# Patient Record
Sex: MALE | Race: WHITE | HISPANIC OR LATINO | Employment: OTHER | URBAN - METROPOLITAN AREA
[De-identification: names, ages, dates, MRNs, and addresses within clinical notes are randomized per-mention and may not be internally consistent; named-entity substitution may affect disease eponyms.]

---

## 2017-01-06 ENCOUNTER — TELEPHONE (OUTPATIENT)
Dept: PULMONOLOGY | Facility: HOSPICE | Age: 77
End: 2017-01-06

## 2017-01-06 DIAGNOSIS — R91.8 PULMONARY NODULES: ICD-10-CM

## 2017-01-06 DIAGNOSIS — I26.99 OTHER PULMONARY EMBOLISM WITHOUT ACUTE COR PULMONALE, UNSPECIFIED CHRONICITY (HCC): ICD-10-CM

## 2017-01-17 NOTE — TELEPHONE ENCOUNTER
Called pt and spoke to wife. Pt is no longer being treated in [place]. He lives in [place].  Let her know I would document account.

## 2017-01-31 ENCOUNTER — ANTICOAGULATION MONITORING (OUTPATIENT)
Dept: VASCULAR LAB | Facility: MEDICAL CENTER | Age: 77
End: 2017-01-31

## 2017-01-31 DIAGNOSIS — Z79.01 CHRONIC ANTICOAGULATION: ICD-10-CM

## 2017-01-31 DIAGNOSIS — D68.9 OTHER AND UNSPECIFIED COAGULATION DEFECTS: ICD-10-CM

## 2017-01-31 DIAGNOSIS — I82.401 ACUTE DEEP VEIN THROMBOSIS (DVT) OF RIGHT LOWER EXTREMITY, UNSPECIFIED VEIN (HCC): ICD-10-CM

## 2017-01-31 DIAGNOSIS — I82.403 DEEP VEIN THROMBOSIS (DVT) OF BOTH LOWER EXTREMITIES, UNSPECIFIED CHRONICITY, UNSPECIFIED VEIN (HCC): ICD-10-CM

## 2017-01-31 DIAGNOSIS — I26.99 MULTIPLE PULMONARY EMBOLI (HCC): ICD-10-CM

## 2017-01-31 NOTE — PROGRESS NOTES
Discharged from [facility].  Therapy managed by Dr Rothman 3-1-2016    Marta Quintana, PHARMD

## 2018-01-22 ENCOUNTER — OFFICE VISIT (OUTPATIENT)
Dept: URGENT CARE | Facility: CLINIC | Age: 78
End: 2018-01-22
Payer: COMMERCIAL

## 2018-01-22 ENCOUNTER — APPOINTMENT (OUTPATIENT)
Dept: RADIOLOGY | Facility: IMAGING CENTER | Age: 78
End: 2018-01-22
Attending: FAMILY MEDICINE
Payer: COMMERCIAL

## 2018-01-22 VITALS
DIASTOLIC BLOOD PRESSURE: 72 MMHG | RESPIRATION RATE: 14 BRPM | HEIGHT: 68 IN | TEMPERATURE: 98.6 F | WEIGHT: 200 LBS | HEART RATE: 84 BPM | SYSTOLIC BLOOD PRESSURE: 108 MMHG | BODY MASS INDEX: 30.31 KG/M2 | OXYGEN SATURATION: 92 %

## 2018-01-22 DIAGNOSIS — J98.8 RTI (RESPIRATORY TRACT INFECTION): ICD-10-CM

## 2018-01-22 DIAGNOSIS — I10 ESSENTIAL HYPERTENSION: ICD-10-CM

## 2018-01-22 LAB
FLUAV+FLUBV AG SPEC QL IA: NEGATIVE
INT CON NEG: NORMAL
INT CON POS: NORMAL

## 2018-01-22 PROCEDURE — 71046 X-RAY EXAM CHEST 2 VIEWS: CPT | Mod: TC,FY | Performed by: FAMILY MEDICINE

## 2018-01-22 PROCEDURE — 87804 INFLUENZA ASSAY W/OPTIC: CPT | Performed by: FAMILY MEDICINE

## 2018-01-22 PROCEDURE — 99214 OFFICE O/P EST MOD 30 MIN: CPT | Performed by: FAMILY MEDICINE

## 2018-01-22 RX ORDER — OSELTAMIVIR PHOSPHATE 75 MG/1
75 CAPSULE ORAL EVERY 12 HOURS
Qty: 10 CAP | Refills: 0 | Status: SHIPPED | OUTPATIENT
Start: 2018-01-22 | End: 2018-01-27

## 2018-01-22 RX ORDER — CODEINE PHOSPHATE AND GUAIFENESIN 10; 100 MG/5ML; MG/5ML
10 SOLUTION ORAL EVERY 6 HOURS PRN
Qty: 240 ML | Refills: 0 | Status: SHIPPED | OUTPATIENT
Start: 2018-01-22 | End: 2018-01-29

## 2018-01-22 RX ORDER — CEFDINIR 300 MG/1
300 CAPSULE ORAL EVERY 12 HOURS
Qty: 10 CAP | Refills: 0 | Status: SHIPPED | OUTPATIENT
Start: 2018-01-22 | End: 2018-01-27

## 2018-01-22 ASSESSMENT — ENCOUNTER SYMPTOMS
CHILLS: 0
DIZZINESS: 0
FOCAL WEAKNESS: 0
ORTHOPNEA: 0
HEMOPTYSIS: 0
SHORTNESS OF BREATH: 0
FEVER: 0

## 2018-01-22 NOTE — PROGRESS NOTES
Subjective:      Paulo Paredes is a 77 y.o. male who presents with Fever; Cough; and Pharyngitis    Chief Complaint   Patient presents with   • Fever   • Cough   • Pharyngitis        - This is a very pleasant 77 y.o. male with complaints of sudden onset cough runny nose malaise bodyaches and fever.     - Hx HTN/CKD/DM. Stable on current meds               ALLERGIES:  Patient has no known allergies.     PMH:  Past Medical History:   Diagnosis Date   • Anesthesia     difficult intubation with surgery 2008,2010   • Basal cell carcinoma of skin    • Cancer (CMS-HCC)     rt  kidney   • DVT (deep venous thrombosis) (CMS-HCC) 2014   • ED (erectile dysfunction)    • GERD (gastroesophageal reflux disease)    • Glaucoma    • Heart burn    • Hyperlipidemia 12/3/2012   • Hypertension    • Indigestion    • Multiple pulmonary emboli (CMS-HCC) 2014   • Multiple thyroid nodules 2009   • Papillary carcinoma of thyroid (CMS-HCC) 2014    R 2 foci / 4.5mm,0.25mm / no mets/ pT1pN0   • postsurgical hypothyroidism 2014   • Pre-diabetes    • Renal disorder     rt kidney cancer,nephrectomy   • S/P CABG x 4 2003   • S/P colectomy 2010    multiple colon polyps / no Ca   • S/p nephrectomy 1998    carcinoma   • S/P prostatectomy 2008    carcinoma   • Stroke (CMS-HCC)     'mild',no residual,'one doctor said it was a tia'   • Transient renal failure 2014    renal vein thrombosis   • Type II or unspecified type diabetes mellitus without mention of complication, not stated as uncontrolled     on no medications   • Unspecified urinary incontinence         MEDS:    Current Outpatient Prescriptions:   •  cefdinir (OMNICEF) 300 MG Cap, Take 1 Cap by mouth every 12 hours for 5 days., Disp: 10 Cap, Rfl: 0  •  oseltamivir (TAMIFLU) 75 MG Cap, Take 1 Cap by mouth every 12 hours for 5 days., Disp: 10 Cap, Rfl: 0  •  guaifenesin-codeine (ROBITUSSIN AC) Solution oral solution, Take 10 mL by mouth every 6 hours as needed for Cough for up to 7 days., Disp:  "240 mL, Rfl: 0  •  Levothyroxine Sodium 137 MCG Cap, Take 137 mcg by mouth every day., Disp: 90 Cap, Rfl: 0  •  valsartan (DIOVAN) 160 MG Tab, TAKE ONE TABLET BY MOUTH ONCE DAILY, Disp: 90 Tab, Rfl: 0  •  warfarin (COUMADIN) 5 MG TABS, Take 1 Tab by mouth every day. Take 1 to 1.5t;po;qd;or as directed, Disp: 135 Tab, Rfl: 1  •  Coenzyme Q10 (CO Q-10 PO), Take  by mouth every day., Disp: , Rfl:   •  CINNAMON PO, Take 1 Cap by mouth every day., Disp: , Rfl:   •  Cyanocobalamin (VITAMIN B12 PO), Take 1 Tab by mouth every day., Disp: , Rfl:   •  fenofibrate (TRICOR) 145 MG TABS, Take 1 Tab by mouth every day., Disp: 90 Tab, Rfl: 3  •  atenolol (TENORMIN) 25 MG TABS, Take 1 Tab by mouth every day., Disp: 90 Tab, Rfl: 3  •  diazepam (VALIUM) 2 MG TABS, Take 2 mg by mouth 1 time daily as needed. Indications: Feeling Anxious, Disp: , Rfl:   •  acetaminophen (TYLENOL) 500 MG TABS, Take 1,000 mg by mouth every 6 hours as needed. Indications: Pain, Disp: , Rfl:   •  sodium chloride (OCEAN) 0.65 % SOLN, Spray 2 Sprays in nose as needed. Indications: nasal dryness, Disp: , Rfl:   •  latanoprost (XALATAN) 0.005 % SOLN, Place 1 Drop in both eyes every bedtime., Disp: , Rfl:     ** I have documented what I find to be significant in regards to past medical, social, family and surgical history  in my HPI or under PMH/PSH/FH review section, otherwise it is contributory **           HPI    Review of Systems   Constitutional: Negative for chills and fever.   Respiratory: Negative for hemoptysis and shortness of breath.    Cardiovascular: Negative for chest pain and orthopnea.   Neurological: Negative for dizziness and focal weakness.          Objective:     /72   Pulse 84   Temp 37 °C (98.6 °F)   Resp 14   Ht 1.727 m (5' 8\")   Wt 90.7 kg (200 lb)   SpO2 92%   BMI 30.41 kg/m²      Physical Exam   Constitutional: He appears well-developed. No distress.   HENT:   Head: Normocephalic and atraumatic.   Mouth/Throat: Oropharynx " is clear and moist.   Eyes: Conjunctivae are normal.   Neck: Neck supple.   Cardiovascular: Regular rhythm.    No murmur heard.  Pulmonary/Chest: Effort normal. No respiratory distress.   Neurological: He is alert. He exhibits normal muscle tone.   Skin: Skin is warm and dry.   Psychiatric: He has a normal mood and affect. Judgment normal.   Nursing note and vitals reviewed.              Assessment/Plan:         1. RTI (respiratory tract infection)  POCT Influenza A/B    DX-CHEST-2 VIEWS    cefdinir (OMNICEF) 300 MG Cap    oseltamivir (TAMIFLU) 75 MG Cap    guaifenesin-codeine (ROBITUSSIN AC) Solution oral solution   2. Essential hypertension           * ? False negative flu, will add tamiflu just incase     - rest hydrate  - tylenol     Dx & d/c instructions discussed w/ patient and/or family members. Follow up w/ Prvt Dr or here in 3-4 days if not getting better, sooner if needed,  ER if worse and UC/PCP unavailable.        Possible side effects (i.e. Rash, GI upset/constipation, sedation, elevation of BP or sugars) of any medications given discussed.

## 2018-02-12 ENCOUNTER — OFFICE VISIT (OUTPATIENT)
Dept: MEDICAL GROUP | Facility: MEDICAL CENTER | Age: 78
End: 2018-02-12
Payer: MEDICARE

## 2018-02-12 ENCOUNTER — ANTICOAGULATION VISIT (OUTPATIENT)
Dept: MEDICAL GROUP | Facility: MEDICAL CENTER | Age: 78
End: 2018-02-12
Payer: MEDICARE

## 2018-02-12 VITALS
DIASTOLIC BLOOD PRESSURE: 76 MMHG | BODY MASS INDEX: 29.54 KG/M2 | SYSTOLIC BLOOD PRESSURE: 122 MMHG | WEIGHT: 194.89 LBS | OXYGEN SATURATION: 92 % | HEIGHT: 68 IN | HEART RATE: 72 BPM | TEMPERATURE: 97.8 F

## 2018-02-12 DIAGNOSIS — Z76.89 ENCOUNTER TO ESTABLISH CARE: ICD-10-CM

## 2018-02-12 DIAGNOSIS — I25.10 CORONARY ARTERY DISEASE INVOLVING NATIVE CORONARY ARTERY OF NATIVE HEART WITHOUT ANGINA PECTORIS: ICD-10-CM

## 2018-02-12 DIAGNOSIS — Z85.46 H/O PROSTATE CANCER: ICD-10-CM

## 2018-02-12 DIAGNOSIS — Z95.828 PRESENCE OF IVC FILTER: ICD-10-CM

## 2018-02-12 DIAGNOSIS — E78.5 DYSLIPIDEMIA: ICD-10-CM

## 2018-02-12 DIAGNOSIS — Z85.850 HISTORY OF THYROID CANCER: ICD-10-CM

## 2018-02-12 DIAGNOSIS — N18.32 CHRONIC KIDNEY DISEASE (CKD) STAGE G3B/A2, MODERATELY DECREASED GLOMERULAR FILTRATION RATE (GFR) BETWEEN 30-44 ML/MIN/1.73 SQUARE METER AND ALBUMINURIA CREATININE RATIO BETWEEN 30-299 MG/G (HCC): ICD-10-CM

## 2018-02-12 DIAGNOSIS — I10 ESSENTIAL HYPERTENSION: ICD-10-CM

## 2018-02-12 DIAGNOSIS — Z86.718 HISTORY OF DVT (DEEP VEIN THROMBOSIS): ICD-10-CM

## 2018-02-12 DIAGNOSIS — I26.99 MULTIPLE PULMONARY EMBOLI (HCC): ICD-10-CM

## 2018-02-12 DIAGNOSIS — Z90.79 H/O PROSTATECTOMY: ICD-10-CM

## 2018-02-12 DIAGNOSIS — Z79.01 CHRONIC ANTICOAGULATION: ICD-10-CM

## 2018-02-12 DIAGNOSIS — E11.9 DIABETES MELLITUS TYPE 2, DIET-CONTROLLED (HCC): ICD-10-CM

## 2018-02-12 DIAGNOSIS — E89.0 POSTOPERATIVE HYPOTHYROIDISM: ICD-10-CM

## 2018-02-12 DIAGNOSIS — Z00.00 ROUTINE HEALTH MAINTENANCE: ICD-10-CM

## 2018-02-12 PROBLEM — G45.9 TRANSIENT ISCHEMIC ATTACK: Status: ACTIVE | Noted: 2018-02-12

## 2018-02-12 LAB — INR PPP: 3.4 (ref 2–3.5)

## 2018-02-12 PROCEDURE — 99205 OFFICE O/P NEW HI 60 MIN: CPT | Mod: 25 | Performed by: INTERNAL MEDICINE

## 2018-02-12 PROCEDURE — 85610 PROTHROMBIN TIME: CPT | Performed by: INTERNAL MEDICINE

## 2018-02-12 PROCEDURE — 92250 FUNDUS PHOTOGRAPHY W/I&R: CPT | Mod: TC | Performed by: INTERNAL MEDICINE

## 2018-02-12 PROCEDURE — 99211 OFF/OP EST MAY X REQ PHY/QHP: CPT | Performed by: INTERNAL MEDICINE

## 2018-02-12 ASSESSMENT — PATIENT HEALTH QUESTIONNAIRE - PHQ9: CLINICAL INTERPRETATION OF PHQ2 SCORE: 0

## 2018-02-12 NOTE — PROGRESS NOTES
CHIEF COMPLIANT:   Chief Complaint   Patient presents with   • Establish Care     NP   • Diabetes   • Hyperlipidemia   • Heart Problem     Need referral to [facility]     Paulo Paredes is a 77 y.o. male here for DM follow up    DIABETES MELLITUS TYPE 2  Onset/D  Diabetes education:  unknown    Medications:   · No meds  · ACE/ARB: valsartan  · Statin: N  · ASA: N, on coumadin    Checking feet daily/wear soft socks/shoes: advised    Diabetes ABCDE TARGETS  · A1c, last:  Pending  · Fingersticks:  N  · Blood Pressure, goal < 140/90: yes  · Cholesterol-Lipid Panel: pending  · Dysalbuminuria:  pending    Diet: regular  Exercise:  Walk daily  BMI:  29.63 kg/m²    DM complications:  · Peripheral neuropathy: No numbness or tingling sensation in the feet.  · Retinopathy:      Last eye exam: . No evidence of retinopathy.    · Nephropathy:     Neg  · CVS:     Has CAD symptoms (CP/pressure, exertional dyspnea, edema).    · GI:     No gastropathy sx (nausea/vomiting).    FH of DM: mother    HYPERTENSION/CAD  Meds: Valsartan 160 mg daily, atenolol 25 mg daily; no ASA, on coumadin.   Taking meds as prescribed.   He is measuring BP at home, it has been < 140/90  Denies:  -  headaches, vision problems, tinnitus.                 -  chest pain/pressure, palpitations, irregular heart beats, exertional, dyspnea, peripheral edema.  Low salt diet: Y  Diet / exercise / BMI: as above.   FH of HTN: unknown    LIPID PROFILE / HYPERLIPIDEMIA  On Fenofibrate, once 45 mg daily, taking as prescribed. No muscle weakness, cramps, nausea,abdominal discomfort.   Diet / exercise / BMI: as above.   FH: unknown    CKD  The patient had decreased GFR with normal creatinine and electrolytes.   No consistent NSAIDs use.     H/o DVT/PE, IVC in place  Chronic anticoagulation  The patient has history of DVT and multiple PEs in 2015.  IVC filter is in place, chronic anticoagulation with Coumadin.   Pending records.     H/o thyroid  cancer  Hypothyroidism  Dg: in 2012.   Status post total thyroidectomy.   On Levothyroxine, 137 mcg, taking daily early in am, before any po intake.  No temperature intolerance. No change in weight,  hair/skin quality, BMs.   No tremors, weakness.  No peripheral swelling.  No mood changes.  FH: N    H/o kidney cancer  Dg: In 1989  Status post right nephrectomy.  Denies hematuria, flank or abdominal pain, weight loss.     H/o prostate cancer, st post prostatectomy  Dg: in 2008.   Status post prostatectomy.   He has been followed up by urology.    Reviewed PMH, PSH, FH, SH, ALL, IMM, MEDS.     Current medicines (including changes today)  Current Outpatient Prescriptions   Medication Sig Dispense Refill   • Levothyroxine Sodium 137 MCG Cap Take 137 mcg by mouth every day. 90 Cap 0   • valsartan (DIOVAN) 160 MG Tab TAKE ONE TABLET BY MOUTH ONCE DAILY 90 Tab 0   • warfarin (COUMADIN) 5 MG TABS Take 1 Tab by mouth every day. Take 1 to 1.5t;po;qd;or as directed 135 Tab 1   • Coenzyme Q10 (CO Q-10 PO) Take  by mouth every day.     • diazepam (VALIUM) 2 MG TABS Take 2 mg by mouth 1 time daily as needed. Indications: Feeling Anxious     • CINNAMON PO Take 1 Cap by mouth every day.     • Cyanocobalamin (VITAMIN B12 PO) Take 1 Tab by mouth every day.     • acetaminophen (TYLENOL) 500 MG TABS Take 1,000 mg by mouth every 6 hours as needed. Indications: Pain     • sodium chloride (OCEAN) 0.65 % SOLN Spray 2 Sprays in nose as needed. Indications: nasal dryness     • fenofibrate (TRICOR) 145 MG TABS Take 1 Tab by mouth every day. 90 Tab 3   • atenolol (TENORMIN) 25 MG TABS Take 1 Tab by mouth every day. 90 Tab 3   • latanoprost (XALATAN) 0.005 % SOLN Place 1 Drop in both eyes every bedtime.       No current facility-administered medications for this visit.      Allergies: Patient has no known allergies.  He  has a past medical history of Anesthesia; Basal cell carcinoma of skin; Cancer (CMS-HCC); DVT (deep venous thrombosis)  (CMS-HCC) (2014); ED (erectile dysfunction); GERD (gastroesophageal reflux disease); Glaucoma; Heart burn; Hyperlipidemia (12/3/2012); Hypertension; Indigestion; Multiple pulmonary emboli (CMS-HCC) (2014); Multiple thyroid nodules (2009); Papillary carcinoma of thyroid (CMS-HCC) (2014); postsurgical hypothyroidism (2014); Pre-diabetes; Renal disorder; S/P CABG x 4 (2003); S/P colectomy (2010); S/p nephrectomy (1998); S/P prostatectomy (2008); Stroke (CMS-HCC); Transient renal failure (2014); Type II or unspecified type diabetes mellitus without mention of complication, not stated as uncontrolled; and Unspecified urinary incontinence.  He  has a past surgical history that includes colon resection; nephrectomy radical; multiple coronary artery bypass; prostatectomy, radical retro; other orthopedic surgery; thyroidectomy total (5/13/2014); and node dissection (5/13/2014).  Social History   Substance Use Topics   • Smoking status: Never Smoker   • Smokeless tobacco: Never Used   • Alcohol use Yes      Comment: 2 PER WK     Social History     Social History Narrative   • No narrative on file     Family History   Problem Relation Age of Onset   • Diabetes Mother    • Diabetes Father    • Thyroid Sister      Cancer   • Cancer Sister      thyroid   • Diabetes Sister    • Cancer Brother 49     colon   • Diabetes Brother    • Diabetes Maternal Grandfather    • Diabetes Paternal Grandmother    • Diabetes Paternal Grandfather      Family Status   Relation Status   • Mother    • Father    • Sister    • Brother    • Maternal Grandfather    • Paternal Grandmother    • Paternal Grandfather        ROS   Constitutional: Negative for fever, chills, weight loss and malaise/fatigue.   HENT: Negative for ear pain, nosebleeds, congestion, sore throat and neck pain.    Eyes: Negative for blurred vision.   Respiratory: Negative for cough, sputum production, shortness of breath and wheezing.    Cardiovascular: Negative for chest pain,  "palpitations, orthopnea and leg swelling. And per HPI.  Gastrointestinal: Negative for heartburn, nausea, vomiting and abdominal pain.   Genitourinary: as above  Musculoskeletal: Negative for myalgias, back pain and joint pain.   Skin: Negative for rash and itching.   Neuro: Negative for dizziness,  tremor,  weakness and headaches.   Endo/Heme/Allergies: Does not bruise/bleed easily. And per HPI.  Psychiatric/Behavioral: Negative for depression.     PHYSICAL EXAM   Blood pressure 122/76, pulse 72, temperature 36.6 °C (97.8 °F), height 1.727 m (5' 8\"), weight 88.4 kg (194 lb 14.2 oz), SpO2 92 %. Body mass index is 29.63 kg/m².  Alert, oriented in no acute distress.  Eye contact is good, speech goal directed, affect bright.  HEENT: EOMI, PERRL, conjunctiva non-injected, sclera non-icteric.  Nares patent with no significant congestion or drainage.  Normal pinnae, external auditory canals, TM pearly gray with normal light reflex bilaterally.  Oral mucous membranes pink and moist with no lesions.  Neck supple with no cervical lymphadenopathy, JVD, palpable thyroid nodules or carotid bruits.  Lungs: clear to auscultation bilaterally with good excursion.  CV: regular rate and rhythm, without murmur, rubs, thrills, or gallops.  Abdomen: soft, non-distended, non-tender with normal bowel sounds. No CVAT, No masses, or organomegaly.  Lower extremities: color normal, vascularity normal, no edema, temperature normal  Musculoskeletal: Normal gait. No obvious muscle weakness or wasting.  Psych: Normal mood and affect. Alert and oriented x3. Judgment and insight is normal.  Neuro:speech normal, mental status intact, cranial nerves 2-12 intact, gait, including heel, toe, and tandem walking normal, muscle tone normal, muscle strength normal, sensation to light touch and pinprick normal, reflexes normal and symmetric    LABS     Results reviewed and discussed with the patient, questions answered.    The last labs were done within few " mos, not in our sytem.     Lab Results   Component Value Date/Time    CHOLSTRLTOT 254 (H) 09/21/2015 07:38 AM     (H) 09/21/2015 07:38 AM    HDL 37 (A) 09/21/2015 07:38 AM    TRIGLYCERIDE 307 (H) 09/21/2015 07:38 AM       Lab Results   Component Value Date/Time    SODIUM 138 01/26/2016 09:01 AM    POTASSIUM 4.5 01/26/2016 09:01 AM    CHLORIDE 105 01/26/2016 09:01 AM    CO2 26 01/26/2016 09:01 AM    GLUCOSE 184 (H) 01/26/2016 09:01 AM    BUN 21 01/26/2016 09:01 AM    CREATININE 2.07 (H) 01/26/2016 09:01 AM     Lab Results   Component Value Date/Time    ALKPHOSPHAT 35 01/26/2016 09:01 AM    ASTSGOT 20 01/26/2016 09:01 AM    ALTSGPT 19 01/26/2016 09:01 AM    TBILIRUBIN 0.7 01/26/2016 09:01 AM      Lab Results   Component Value Date/Time    HBA1C 7.2 (H) 09/21/2015 07:38 AM    HBA1C 6.7 (H) 03/16/2015 01:46 PM    HBA1C 7.2 (H) 12/03/2014 09:40 AM     No results found for: TSH  Lab Results   Component Value Date/Time    FREET4 1.10 01/26/2016 09:00 AM    FREET4 1.34 09/21/2015 07:38 AM     Lab Results   Component Value Date/Time    WBC 5.1 01/26/2016 09:01 AM    RBC 5.13 01/26/2016 09:01 AM    HEMOGLOBIN 16.5 01/26/2016 09:01 AM    HEMATOCRIT 49.7 01/26/2016 09:01 AM    MCV 96.9 01/26/2016 09:01 AM    MCH 32.2 01/26/2016 09:01 AM    MCHC 33.2 (L) 01/26/2016 09:01 AM    MPV 11.3 01/26/2016 09:01 AM    NEUTSPOLYS 56.60 01/26/2016 09:01 AM    LYMPHOCYTES 31.30 01/26/2016 09:01 AM    MONOCYTES 7.20 01/26/2016 09:01 AM    EOSINOPHILS 3.30 01/26/2016 09:01 AM    BASOPHILS 0.80 01/26/2016 09:01 AM        ASSESMENT AND PLAN     1. Diabetes mellitus type 2, diet-controlled (CMS-HCC)  Unclear if he is in controlled DM, or in remission:   - pending labs    - HEMOGLOBIN A1C; Future  - COMP METABOLIC PANEL; Future  - MICROALBUMIN CREAT RATIO URINE; Future  - POCT Retinal Eye Exam    Discussed about:    - importance of appropriate diet and exercise.   - hypoglycemic symptoms and precautions.    - long-term complications of  uncontrolled DM, (increased risk of CAD, strokes, retinopathy, nephropathy, /can lead to kidney failure, dialysis/, peripheral neuropathy, (can lead to amputation)   - Good DM control can prevent / delay many of these complications.    - Recommended appropriate foot care     2. Essential hypertension  Control, continue current treatment  - COMP METABOLIC PANEL; Future    3. Coronary artery disease involving native coronary artery of native heart without angina pectoris  Continue current treatment, asymptomatic and cardiology follow-up    4. Dyslipidemia  Pending labs, continue current treatment  - COMP METABOLIC PANEL; Future  - LIPID PROFILE; Future    5. Chronic kidney disease (CKD) stage G3b/A2, moderately decreased glomerular filtration rate (GFR) between 30-44 mL/min/1.73 square meter and albuminuria creatinine ratio between  mg/g  Pending labs, advised to avoid NSAIDs and have a good fluid intake  - COMP METABOLIC PANEL; Future    6. History of DVT (deep vein thrombosis)  - REFERRAL TO ANTICOAGULATION MONITORING  7. Multiple pulmonary emboli (CMS-HCC)  - REFERRAL TO ANTICOAGULATION MONITORING  8. Chronic anticoagulation  - REFERRAL TO ANTICOAGULATION MONITORING  9. Presence of IVC filter  - REFERRAL TO ANTICOAGULATION MONITORING  Continue anticoagulation, Coumadin clinic follow-up.    10. Postoperative hypothyroidism  Asymptomatic, continue current treatment and endocrinology follow-up requested by patient  - TSH; Future  - REFERRAL TO ENDOCRINOLOGY    11. History of thyroid cancer  - TSH; Future  - REFERRAL TO ENDOCRINOLOGY    12. H/O prostate cancer  13. H/O prostatectomy  Diagnosis 10 years ago, no treatment.  No signs of recurrence.   Continue urology follow-up    14. Routine health maintenance  Pending records    15. Encounter to establish care  Reviewed PMH, PSH, FH, SH, ALL, MEDS, HCM/IMM.   Advised healthy habits, diet, exercise.  Release form for old records: signed    All questions are  answered.    Smoking Counseling: Nonsmoke    Followup: Return in about 4 weeks (around 3/12/2018) for Short.    Time spent 60 minutes face to face, with > 50% spent counseling and coordinating care.      *A1c target should be based on age and diabetes complication. If the patient has any complications including retinopathy, nephropathy, or neuropathy the risk of hypoglycemia increases. Therefore the target A1c for these patients should be higher. Also higher A1c targets are appropriate and SAFER for patients who have poor medical prognoses, compliance problems, hypoglycemic unawareness, and intellectual deficits.  _____________________________________________________________________  Age <75 with no complications 7%/with complications 8%  Age >75 with no complications 8%/with complications 9%   Please note that this dictation was created using voice recognition software. Despite all efforts, some minor grammar mistakes are possible.    · (Target Urine Albumin Creatinine Ratio [UACR] < 7.5 In women and < 4.0 in men)   · Vitamin D: Target: 38-40. Levels <20 or >60 asociated with increased ALL cause mortality

## 2018-02-13 NOTE — PROGRESS NOTES
OP Anticoagulation Service Note    Date: 2/12/2018  There were no vitals filed for this visit.    Anticoagulation Summary  As of 2/12/2018    INR goal:   2.0-3.0   TTR:   73.5 % (9.9 mo)   Today's INR:   3.4!   Maintenance plan:   7.5 mg (5 mg x 1.5) on Sun, Wed; 5 mg (5 mg x 1) all other days   Weekly total:   40 mg   Plan last modified:   Kelsey Junior, PharmD (2/12/2018)   Next INR check:   3/5/2018   Target end date:   Indefinite    Indications    DVT (deep venous thrombosis) (CMS-HCC) [I82.409]  Deep vein thrombosis (DVT) of both lower extremities (CMS-HCC) [I82.403]  Presence of IVC filter [Z95.828]  Multiple pulmonary emboli (CMS-HCC) [I26.99]  Transient ischemic attack [G45.9] [G45.9]             Anticoagulation Episode Summary     INR check location:       Preferred lab:       Send INR reminders to:       Comments:         Anticoagulation Care Providers     Provider Role Specialty Phone number    [facility]   [phone]    Edward Walters M.D.  Family Medicine [phone]        Anticoagulation Patient Findings      HPI:   Paulo Paredes is new to our services, on anticoagulation therapy with warfarin (a high risk medication) for hx of DVT and PE in 2015    Pt is not new to warfarin but is returning to our services after living in [place] for a period of time. He is already well educated on warfarin.     Medication reviewed and updated, he will bring in a more complete list at future visit.   Denies hx of major bleeding.     He currently has warfarin 5 mg tablets and takes as outlined. His last INR was 2.8 about 3 weeks ago. He reports labile INRs since stopping his coumadin for a colonoscopy in December    A/P   INR  supar-therapeutic.   Tonight take 2.5 mg then resume usual regimen.     Follow up appointment in 2 week(s) as pt is new to warfarin and will require close follow up.   Kelsey Junior, Pharm D

## 2018-02-26 ENCOUNTER — NON-PROVIDER VISIT (OUTPATIENT)
Dept: MEDICAL GROUP | Facility: MEDICAL CENTER | Age: 78
End: 2018-02-26
Payer: MEDICARE

## 2018-02-26 NOTE — PROGRESS NOTES
Paulo Paredes is a 77 y.o. male here for a non-provider visit for retinal exam     If abnormal was an in office provider notified today (if so, indicate provider)? No  Routed to PCP? No

## 2018-02-27 LAB — RETINAL SCREEN: NEGATIVE

## 2018-03-05 ENCOUNTER — TELEPHONE (OUTPATIENT)
Dept: MEDICAL GROUP | Facility: MEDICAL CENTER | Age: 78
End: 2018-03-05

## 2018-03-05 ENCOUNTER — ANTICOAGULATION VISIT (OUTPATIENT)
Dept: MEDICAL GROUP | Facility: MEDICAL CENTER | Age: 78
End: 2018-03-05
Payer: MEDICARE

## 2018-03-05 VITALS — HEART RATE: 63 BPM | DIASTOLIC BLOOD PRESSURE: 69 MMHG | SYSTOLIC BLOOD PRESSURE: 144 MMHG | HEIGHT: 69 IN

## 2018-03-05 DIAGNOSIS — G45.8 OTHER SPECIFIED TRANSIENT CEREBRAL ISCHEMIAS: ICD-10-CM

## 2018-03-05 DIAGNOSIS — Z95.828 PRESENCE OF IVC FILTER: ICD-10-CM

## 2018-03-05 DIAGNOSIS — I82.401 ACUTE DEEP VEIN THROMBOSIS (DVT) OF RIGHT LOWER EXTREMITY, UNSPECIFIED VEIN (HCC): ICD-10-CM

## 2018-03-05 DIAGNOSIS — I82.423 DEEP VEIN THROMBOSIS (DVT) OF ILIAC VEIN OF BOTH LOWER EXTREMITIES, UNSPECIFIED CHRONICITY (HCC): ICD-10-CM

## 2018-03-05 DIAGNOSIS — I26.99 MULTIPLE PULMONARY EMBOLI (HCC): ICD-10-CM

## 2018-03-05 LAB — INR PPP: 2.8 (ref 2–3.5)

## 2018-03-05 PROCEDURE — 85610 PROTHROMBIN TIME: CPT | Performed by: INTERNAL MEDICINE

## 2018-03-05 PROCEDURE — 99999 PR NO CHARGE: CPT | Performed by: INTERNAL MEDICINE

## 2018-03-05 NOTE — PROGRESS NOTES
Anticoagulation Summary  As of 3/5/2018    INR goal:   2.0-3.0   TTR:   60.7 % (1.6 wk)   Today's INR:   2.8   Maintenance plan:   7.5 mg (5 mg x 1.5) on Sun, Wed; 5 mg (5 mg x 1) all other days   Weekly total:   40 mg   Plan last modified:   Kelsey Junior, PharmD (2/12/2018)   Next INR check:   4/2/2018   Target end date:   Indefinite    Indications    DVT (deep venous thrombosis) (CMS-HCC) [I82.409]  Deep vein thrombosis (DVT) of both lower extremities (CMS-HCC) [I82.403]  Presence of IVC filter [Z95.828]  Multiple pulmonary emboli (CMS-HCC) [I26.99]  Transient ischemic attack [G45.9] [G45.9]             Anticoagulation Episode Summary     INR check location:       Preferred lab:       Send INR reminders to:       Comments:         Anticoagulation Care Providers     Provider Role Specialty Phone number    [facility]   [phone]    Edward Walters M.D.  Family Medicine [phone]        Anticoagulation Patient Findings  Patient Findings     Negatives:   Signs/symptoms of thrombosis, Signs/symptoms of bleeding, Laboratory test error suspected, Change in health, Change in alcohol use, Change in activity, Upcoming invasive procedure, Emergency department visit, Upcoming dental procedure, Missed doses, Extra doses, Change in medications, Change in diet/appetite, Hospital admission, Bruising, Other complaints        History of Present Illness: follow up appointment for chronic anticoagulation with the high risk medication, warfarin for long term anticoagulation DVT/PE/TIA.    Last INR was out of range, dosage adjusted: dosage was reduced.  Pt is now therapeutic today. Pt is to continue with current warfarin dosing regimen.  Will increase the interval to recheck INR  Follow up in 4 weeks, to reduce risk of adverse events related to this high risk medication,  Warfarin.    Marta Quintana, PharmD

## 2018-03-12 ENCOUNTER — HOSPITAL ENCOUNTER (OUTPATIENT)
Dept: LAB | Facility: MEDICAL CENTER | Age: 78
End: 2018-03-12
Attending: INTERNAL MEDICINE
Payer: MEDICARE

## 2018-03-12 DIAGNOSIS — I10 ESSENTIAL HYPERTENSION: ICD-10-CM

## 2018-03-12 DIAGNOSIS — E11.9 DIABETES MELLITUS TYPE 2, DIET-CONTROLLED (HCC): ICD-10-CM

## 2018-03-12 DIAGNOSIS — E89.0 POSTOPERATIVE HYPOTHYROIDISM: ICD-10-CM

## 2018-03-12 DIAGNOSIS — Z85.850 HISTORY OF THYROID CANCER: ICD-10-CM

## 2018-03-12 DIAGNOSIS — N18.32 CHRONIC KIDNEY DISEASE (CKD) STAGE G3B/A2, MODERATELY DECREASED GLOMERULAR FILTRATION RATE (GFR) BETWEEN 30-44 ML/MIN/1.73 SQUARE METER AND ALBUMINURIA CREATININE RATIO BETWEEN 30-299 MG/G (HCC): ICD-10-CM

## 2018-03-12 DIAGNOSIS — E78.5 DYSLIPIDEMIA: ICD-10-CM

## 2018-03-12 LAB
ALBUMIN SERPL BCP-MCNC: 4.1 G/DL (ref 3.2–4.9)
ALBUMIN/GLOB SERPL: 1.5 G/DL
ALP SERPL-CCNC: 39 U/L (ref 30–99)
ALT SERPL-CCNC: 13 U/L (ref 2–50)
ANION GAP SERPL CALC-SCNC: 6 MMOL/L (ref 0–11.9)
AST SERPL-CCNC: 16 U/L (ref 12–45)
BILIRUB SERPL-MCNC: 0.6 MG/DL (ref 0.1–1.5)
BUN SERPL-MCNC: 34 MG/DL (ref 8–22)
CALCIUM SERPL-MCNC: 9.5 MG/DL (ref 8.5–10.5)
CHLORIDE SERPL-SCNC: 105 MMOL/L (ref 96–112)
CHOLEST SERPL-MCNC: 218 MG/DL (ref 100–199)
CO2 SERPL-SCNC: 27 MMOL/L (ref 20–33)
CREAT SERPL-MCNC: 2.25 MG/DL (ref 0.5–1.4)
CREAT UR-MCNC: 181.4 MG/DL
GLOBULIN SER CALC-MCNC: 2.7 G/DL (ref 1.9–3.5)
GLUCOSE SERPL-MCNC: 171 MG/DL (ref 65–99)
HDLC SERPL-MCNC: 30 MG/DL
LDLC SERPL CALC-MCNC: 131 MG/DL
MICROALBUMIN UR-MCNC: 26.1 MG/DL
MICROALBUMIN/CREAT UR: 144 MG/G (ref 0–30)
POTASSIUM SERPL-SCNC: 4.8 MMOL/L (ref 3.6–5.5)
PROT SERPL-MCNC: 6.8 G/DL (ref 6–8.2)
SODIUM SERPL-SCNC: 138 MMOL/L (ref 135–145)
TRIGL SERPL-MCNC: 283 MG/DL (ref 0–149)
TSH SERPL DL<=0.005 MIU/L-ACNC: 1.11 UIU/ML (ref 0.38–5.33)

## 2018-03-12 PROCEDURE — 80053 COMPREHEN METABOLIC PANEL: CPT

## 2018-03-12 PROCEDURE — 84443 ASSAY THYROID STIM HORMONE: CPT

## 2018-03-12 PROCEDURE — 80061 LIPID PANEL: CPT

## 2018-03-12 PROCEDURE — 36415 COLL VENOUS BLD VENIPUNCTURE: CPT

## 2018-03-12 PROCEDURE — 82570 ASSAY OF URINE CREATININE: CPT

## 2018-03-12 PROCEDURE — 83036 HEMOGLOBIN GLYCOSYLATED A1C: CPT

## 2018-03-12 PROCEDURE — 82043 UR ALBUMIN QUANTITATIVE: CPT

## 2018-03-13 LAB
EST. AVERAGE GLUCOSE BLD GHB EST-MCNC: 177 MG/DL
HBA1C MFR BLD: 7.8 % (ref 0–5.6)

## 2018-03-19 ENCOUNTER — OFFICE VISIT (OUTPATIENT)
Dept: ENDOCRINOLOGY | Facility: MEDICAL CENTER | Age: 78
End: 2018-03-19
Payer: MEDICARE

## 2018-03-19 ENCOUNTER — HOSPITAL ENCOUNTER (OUTPATIENT)
Dept: LAB | Facility: MEDICAL CENTER | Age: 78
End: 2018-03-19
Attending: INTERNAL MEDICINE
Payer: MEDICARE

## 2018-03-19 ENCOUNTER — OFFICE VISIT (OUTPATIENT)
Dept: MEDICAL GROUP | Facility: MEDICAL CENTER | Age: 78
End: 2018-03-19
Payer: MEDICARE

## 2018-03-19 VITALS
BODY MASS INDEX: 29.25 KG/M2 | HEART RATE: 60 BPM | DIASTOLIC BLOOD PRESSURE: 60 MMHG | SYSTOLIC BLOOD PRESSURE: 110 MMHG | HEIGHT: 68 IN | OXYGEN SATURATION: 92 % | WEIGHT: 193 LBS | TEMPERATURE: 96.9 F

## 2018-03-19 VITALS
BODY MASS INDEX: 29.25 KG/M2 | SYSTOLIC BLOOD PRESSURE: 114 MMHG | HEIGHT: 68 IN | OXYGEN SATURATION: 100 % | DIASTOLIC BLOOD PRESSURE: 68 MMHG | WEIGHT: 193 LBS | HEART RATE: 59 BPM

## 2018-03-19 DIAGNOSIS — Q66.70 HIGH FOOT ARCH: ICD-10-CM

## 2018-03-19 DIAGNOSIS — I10 ESSENTIAL HYPERTENSION: ICD-10-CM

## 2018-03-19 DIAGNOSIS — E78.5 DYSLIPIDEMIA: ICD-10-CM

## 2018-03-19 DIAGNOSIS — C73 PAPILLARY CARCINOMA OF THYROID (HCC): Primary | ICD-10-CM

## 2018-03-19 DIAGNOSIS — E89.0 HYPOTHYROIDISM, POSTSURGICAL: ICD-10-CM

## 2018-03-19 DIAGNOSIS — N18.4 CHRONIC KIDNEY DISEASE (CKD), STAGE IV (SEVERE) (HCC): ICD-10-CM

## 2018-03-19 DIAGNOSIS — Z95.1 S/P CABG X 4: ICD-10-CM

## 2018-03-19 DIAGNOSIS — Z86.711 HX PULMONARY EMBOLISM: ICD-10-CM

## 2018-03-19 DIAGNOSIS — E11.9 CONTROLLED TYPE 2 DIABETES MELLITUS WITHOUT COMPLICATION, WITHOUT LONG-TERM CURRENT USE OF INSULIN (HCC): ICD-10-CM

## 2018-03-19 DIAGNOSIS — Z86.718 HISTORY OF DVT OF LOWER EXTREMITY: ICD-10-CM

## 2018-03-19 DIAGNOSIS — I25.10 CORONARY ARTERY DISEASE INVOLVING NATIVE CORONARY ARTERY OF NATIVE HEART WITHOUT ANGINA PECTORIS: ICD-10-CM

## 2018-03-19 DIAGNOSIS — Z79.01 CHRONIC ANTICOAGULATION: ICD-10-CM

## 2018-03-19 DIAGNOSIS — C73 PAPILLARY CARCINOMA OF THYROID (HCC): ICD-10-CM

## 2018-03-19 DIAGNOSIS — Z95.828 PRESENCE OF IVC FILTER: ICD-10-CM

## 2018-03-19 DIAGNOSIS — Z00.00 HEALTH CARE MAINTENANCE: ICD-10-CM

## 2018-03-19 PROCEDURE — 86800 THYROGLOBULIN ANTIBODY: CPT

## 2018-03-19 PROCEDURE — 99214 OFFICE O/P EST MOD 30 MIN: CPT | Performed by: INTERNAL MEDICINE

## 2018-03-19 PROCEDURE — 99215 OFFICE O/P EST HI 40 MIN: CPT | Performed by: INTERNAL MEDICINE

## 2018-03-19 PROCEDURE — 84432 ASSAY OF THYROGLOBULIN: CPT

## 2018-03-19 PROCEDURE — 36415 COLL VENOUS BLD VENIPUNCTURE: CPT

## 2018-03-19 NOTE — PROGRESS NOTES
CHIEF COMPLIANT:   Chief Complaint   Patient presents with   • Results     lab results   • Diabetes     FV     Paulo Paredes is a 77 y.o. male here for DM follow up    DIABETES MELLITUS TYPE 2  Onset/D  Diabetes education:  unknown     Medications:   · No meds  · ACE/ARB: valsartan  · Statin: N  · ASA: N, on coumadin     Checking feet daily/wear soft socks/shoes: advised     Diabetes ABCDE TARGETS  · A1c, last:  7.8, previous 7.2  · Fingersticks:  N  · Blood Pressure, goal < 140/90: yes  · Cholesterol-Lipid Panel: elevated choilesterol/triglycerides, low HDL  · Dysalbuminuria:  pos     Diet: regular  Exercise:  Walk daily  BMI:  29 kg/m²     DM complications:  · Peripheral neuropathy:    No numbness or tingling sensation in the feet.  · Retinopathy:                   Last eye exam: . No evidence of retinopathy.    · Nephropathy:              Neg  · CVS:                                Has CAD symptoms (CP/pressure, exertional dyspnea, edema).    · GI:                             No gastropathy sx (nausea/vomiting).     FH of DM: mother     HYPERTENSION/CAD  Meds: Valsartan 160 mg daily, atenolol 25 mg daily; no ASA, on coumadin.   Taking meds as prescribed.   He is measuring BP at home, it has been < 140/90  Denies:  -  headaches, vision problems, tinnitus.                 -  chest pain/pressure, palpitations, irregular heart beats, exertional, dyspnea, peripheral edema.  Low salt diet: Y  Diet / exercise / BMI: as above.   FH of HTN: unknown     LIPID PROFILE / HYPERLIPIDEMIA  On Fenofibrate, once 45 mg daily, taking as prescribed. No muscle weakness, cramps, nausea,abdominal discomfort.   Diet / exercise / BMI: as above.   FH: unknown     CKD stage III-IV  The patient had decreased GFR with normal creatinine and electrolytes.   No consistent NSAIDs use.       H/o thyroid cancer  Hypothyroidism, postsurgical  Dg: in .   Status post total thyroidectomy.   On Levothyroxine, 137 mcg, taking daily early  in am, before any po intake.  No temperature intolerance. No change in weight,  hair/skin quality, BMs.   No tremors, weakness.  No peripheral swelling.  No mood changes.  FH: N    DVT/PE/Chronic anticoagulation/IVC filter  Dg/Onset: 5/2014.   Since, he has been on Coumadin, coumadin clinic f/u.   Denies:   - Leg swelling or pain  - Shortness of breath, palpitations.    High foot arch, B/L  He has high arch B/L, and used orthotics at all times.   He requested referral to renew.     Reviewed PMH, PSH, FH, SH, ALL, IMM, MEDS.     Current medicines (including changes today)  Current Outpatient Prescriptions   Medication Sig Dispense Refill   • Levothyroxine Sodium 137 MCG Cap Take 137 mcg by mouth every day. 90 Cap 0   • valsartan (DIOVAN) 160 MG Tab TAKE ONE TABLET BY MOUTH ONCE DAILY 90 Tab 0   • warfarin (COUMADIN) 5 MG TABS Take 1 Tab by mouth every day. Take 1 to 1.5t;po;qd;or as directed 135 Tab 1   • Coenzyme Q10 (CO Q-10 PO) Take  by mouth every day.     • diazepam (VALIUM) 2 MG TABS Take 2 mg by mouth 1 time daily as needed. Indications: Feeling Anxious     • CINNAMON PO Take 1 Cap by mouth every day.     • Cyanocobalamin (VITAMIN B12 PO) Take 1 Tab by mouth every day.     • acetaminophen (TYLENOL) 500 MG TABS Take 1,000 mg by mouth every 6 hours as needed. Indications: Pain     • sodium chloride (OCEAN) 0.65 % SOLN Spray 2 Sprays in nose as needed. Indications: nasal dryness     • fenofibrate (TRICOR) 145 MG TABS Take 1 Tab by mouth every day. 90 Tab 3   • atenolol (TENORMIN) 25 MG TABS Take 1 Tab by mouth every day. 90 Tab 3   • latanoprost (XALATAN) 0.005 % SOLN Place 1 Drop in both eyes every bedtime.       No current facility-administered medications for this visit.      Allergies: Patient has no known allergies.  He  has a past medical history of Anesthesia; Basal cell carcinoma of skin; Cancer (CMS-HCC); DVT (deep venous thrombosis) (CMS-HCC) (2014); ED (erectile dysfunction); GERD (gastroesophageal  reflux disease); Glaucoma; Heart burn; Hyperlipidemia (12/3/2012); Hypertension; Indigestion; Multiple pulmonary emboli (CMS-HCC) (2014); Multiple thyroid nodules (2009); Papillary carcinoma of thyroid (CMS-HCC) (2014); postsurgical hypothyroidism (2014); Pre-diabetes; Renal disorder; S/P CABG x 4 (2003); S/P colectomy (2010); S/p nephrectomy (1998); S/P prostatectomy (2008); Stroke (CMS-HCC); Transient renal failure (2014); Type II or unspecified type diabetes mellitus without mention of complication, not stated as uncontrolled; and Unspecified urinary incontinence.  He  has a past surgical history that includes colon resection; nephrectomy radical; multiple coronary artery bypass; prostatectomy, radical retro; other orthopedic surgery; thyroidectomy total (5/13/2014); and node dissection (5/13/2014).  Social History   Substance Use Topics   • Smoking status: Never Smoker   • Smokeless tobacco: Never Used   • Alcohol use Yes      Comment: 2 PER WK     Social History     Social History Narrative   • No narrative on file     Family History   Problem Relation Age of Onset   • Diabetes Mother    • Heart Disease Mother    • Diabetes Father    • Cancer Father      pancreas   • Thyroid Sister      Cancer   • Cancer Sister      thyroid   • Diabetes Sister    • Cancer Brother 49     colon   • Diabetes Brother    • Diabetes Maternal Grandfather    • Diabetes Paternal Grandmother    • Diabetes Paternal Grandfather      Family Status   Relation Status   • Mother    • Father    • Sister    • Brother    • Maternal Grandfather    • Paternal Grandmother    • Paternal Grandfather        ROS   Constitutional: Negative for fever, chills and weight loss.   HEENT: Negative for blurred vision, sore throat, swollen glands. No hearing loss or vertigo.  Respiratory: Negative for cough, wheezing, shortness of breath.   CVS: Negative for chest pain, palpitations, irregular heart beats, exertional dyspnea.   GI: Negative for heartburn,  "abdominal pain, nausea, vomiting, change in BMs.   : Negative for dysuria, polyuria. And per HPI.  MS: Negative for myalgias, joint pain. And per HPI.  Neuro: no headaches, numbness, weakness, seizures.   Skin: no skin lesions, rash.  Heme: as above.   Endocrine: per HPI.     PHYSICAL EXAM   /60   Pulse 60   Temp 36.1 °C (96.9 °F)   Ht 1.727 m (5' 8\")   Wt 87.5 kg (193 lb)   SpO2 92%   BMI 29.35 kg/m²   Alert, oriented in no acute distress.  Eye contact is good, speech goal directed, affect bright.  HEENT: EOMI, PERRL, conjunctiva non-injected, sclera non-icteric.  Nares patent with no significant congestion or drainage.  Normal pinnae, external auditory canals, TM pearly gray with normal light reflex bilaterally.  Oral mucous membranes pink and moist with no lesions.  Neck supple with no cervical lymphadenopathy, JVD, palpable thyroid nodules or carotid bruits.  Lungs: clear to auscultation bilaterally with good excursion.  CV: regular rate and rhythm, without murmur, rubs, thrills, or gallops.  Abdomen: soft, non-distended, non-tender with normal bowel sounds. No CVAT, No masses, or organomegaly.  Lower extremities: color normal, vascularity normal, no edema, temperature normal. High arch B/L.  Musculoskeletal: Normal gait. No obvious muscle weakness or wasting.  Psych: Normal mood and affect. Alert and oriented x3. Judgment and insight is normal.  Neuro:speech normal, mental status intact, cranial nerves 2-12 intact, gait, including heel, toe, and tandem walking normal, muscle tone normal, muscle strength normal, sensation to light touch and pinprick normal, reflexes normal and symmetric  DM foot exam: intact to pin prick, bilaterally.  Dorsalis pedis pulse is +4/4 bilaterally. No redness, ulcers, calluses. High arch B/L.    LABS     Results reviewed and discussed with the patient, questions answered.    Lab Results   Component Value Date/Time    CHOLSTRLTOT 218 (H) 03/12/2018 08:01 AM     " (H) 03/12/2018 08:01 AM    HDL 30 (A) 03/12/2018 08:01 AM    TRIGLYCERIDE 283 (H) 03/12/2018 08:01 AM       Lab Results   Component Value Date/Time    SODIUM 138 03/12/2018 08:01 AM    POTASSIUM 4.8 03/12/2018 08:01 AM    CHLORIDE 105 03/12/2018 08:01 AM    CO2 27 03/12/2018 08:01 AM    GLUCOSE 171 (H) 03/12/2018 08:01 AM    BUN 34 (H) 03/12/2018 08:01 AM    CREATININE 2.25 (H) 03/12/2018 08:01 AM     Lab Results   Component Value Date/Time    ALKPHOSPHAT 39 03/12/2018 08:01 AM    ASTSGOT 16 03/12/2018 08:01 AM    ALTSGPT 13 03/12/2018 08:01 AM    TBILIRUBIN 0.6 03/12/2018 08:01 AM      Lab Results   Component Value Date/Time    HBA1C 7.8 (H) 03/12/2018 08:01 AM    HBA1C 7.2 (H) 09/21/2015 07:38 AM    HBA1C 6.7 (H) 03/16/2015 01:46 PM      Ref. Range 1/26/18 3/12/18   TSH Latest Ref Range: 0.380 - 5.330 uIU/mL 10.050 (H) 1.110   Free T-4 Latest Ref Range: 0.53 - 1.43 ng/dL 1.10    Thyroglobulin  LC-MC/MS-S/P Latest Ref Range: 1.3 - 31.8 ng/mL NA    Thyroglobulin Latest Ref Range: 1.3 - 31.8 ng/mL 0.6 (L)    Anti-Thyroglobulin Latest Ref Range: 0.0 - 4.0 IU/mL <0.9      Lab Results   Component Value Date/Time    FREET4 1.10 01/26/2016 09:00 AM    FREET4 1.34 09/21/2015 07:38 AM     Lab Results   Component Value Date/Time    WBC 5.1 01/26/2016 09:01 AM    RBC 5.13 01/26/2016 09:01 AM    HEMOGLOBIN 16.5 01/26/2016 09:01 AM    HEMATOCRIT 49.7 01/26/2016 09:01 AM    MCV 96.9 01/26/2016 09:01 AM    MCH 32.2 01/26/2016 09:01 AM    MCHC 33.2 (L) 01/26/2016 09:01 AM    MPV 11.3 01/26/2016 09:01 AM    NEUTSPOLYS 56.60 01/26/2016 09:01 AM    LYMPHOCYTES 31.30 01/26/2016 09:01 AM    MONOCYTES 7.20 01/26/2016 09:01 AM    EOSINOPHILS 3.30 01/26/2016 09:01 AM    BASOPHILS 0.80 01/26/2016 09:01 AM      ASSESMENT AND PLAN     1. Controlled type 2 diabetes mellitus without complication, without long-term current use of insulin (CMS-HCC)  The patient has not been on any diabetes medication and he requested that he continues to  lifestyle modification / declined medication.    - COMP METABOLIC PANEL; Future  - HEMOGLOBIN A1C; Future    Discussed about:    - importance of appropriate diet and exercise.   - hypoglycemic symptoms and precautions.    - long-term complications of uncontrolled DM, (increased risk of CAD, strokes, retinopathy, nephropathy, /can lead to kidney failure, dialysis/, peripheral neuropathy, (can lead to amputation)   - Good DM control can prevent / delay many of these complications.    - Recommended appropriate foot care     2. Essential hypertension  Controlled, continue with current meds  - COMP METABOLIC PANEL; Future    3. Coronary artery disease involving native coronary artery of native heart without angina pectoris  4. S/P CABG x 4  Asymptomatic; continue current treatment and cardiology follow-up    5. Chronic kidney disease (CKD), stage IV (severe) (CMS-HCC)  Slightly decreased GFR Compared to previous.   Otherwise continue good fluid intake, avoid NSAIDs.   Follow-up labs     6. Dyslipidemia  Improved, continue current treatment  - LIPID PROFILE; Future    7. Hypothyroidism, postsurgical  Asymptomatic, continue current treatment and endocrinology follow-up  - TSH; Future    8. History of DVT of lower extremity  9. Hx pulmonary embolism  10. Chronic anticoagulation  11. Presence of IVC filter  Continue Coumadin, Coumadin clinic follow-up.    12. High foot arch  - REFERRAL FOR ORTHOTICS    13. Health care maintenance  Advised immunizations x 2.    All questions are answered.    Smoking Counseling: Nonsmoker    Followup: in 3 months    Time spent 40 minutes face to face, with > 50% spent counseling and coordinating care.      *A1c target should be based on age and diabetes complication. If the patient has any complications including retinopathy, nephropathy, or neuropathy the risk of hypoglycemia increases. Therefore the target A1c for these patients should be higher. Also higher A1c targets are appropriate and  SAFER for patients who have poor medical prognoses, compliance problems, hypoglycemic unawareness, and intellectual deficits.  _____________________________________________________________________  Age <75 with no complications 7%/with complications 8%  Age >75 with no complications 8%/with complications 9%   Please note that this dictation was created using voice recognition software. Despite all efforts, some minor grammar mistakes are possible.    · (Target Urine Albumin Creatinine Ratio [UACR] < 7.5 In women and < 4.0 in men)   · Vitamin D: Target: 38-40. Levels <20 or >60 asociated with increased ALL cause mortality

## 2018-03-20 NOTE — PROGRESS NOTES
Chief Complaint   Patient presents with   • Thyroid Carcinoma   • Hypothyroidism     post surgical        HPI:    Papillary carcinoma thyroid, 2014             Patient is back after a two-year absence. He moved to [place] and found living there was not bloody thought it was going to be. [facility]             And underwent a total thyroidectomy in 2014. There were 2 foci of papillary carcinoma. One was 4.5 mm and the other 0.2 mm. No postoperative I-131. Follow-up Thyroglobulins have been 0.2 and 0.3 2015 and 0.6 2016. He had a soft tissue ultrasound which showed some small right neck lymph nodes that appeared to be normal lymph nodes. Nothing suspicious for recurrent carcinoma.    Postsurgical hypothyroidism           Is currently euthyroid taking levothyroxine 137 µg per day. His most recent TSH is 1.1.    ROS:  Multiple other medical problems apparently are stable as indicated in Dr. Conner note      Allergies: No Known Allergies    Current medicines including changes today:  Current Outpatient Prescriptions   Medication Sig Dispense Refill   • Levothyroxine Sodium 137 MCG Cap Take 137 mcg by mouth every day. 90 Cap 0   • valsartan (DIOVAN) 160 MG Tab TAKE ONE TABLET BY MOUTH ONCE DAILY 90 Tab 0   • warfarin (COUMADIN) 5 MG TABS Take 1 Tab by mouth every day. Take 1 to 1.5t;po;qd;or as directed 135 Tab 1   • Coenzyme Q10 (CO Q-10 PO) Take  by mouth every day.     • CINNAMON PO Take 1 Cap by mouth every day.     • Cyanocobalamin (VITAMIN B12 PO) Take 1 Tab by mouth every day.     • sodium chloride (OCEAN) 0.65 % SOLN Spray 2 Sprays in nose as needed. Indications: nasal dryness     • fenofibrate (TRICOR) 145 MG TABS Take 1 Tab by mouth every day. 90 Tab 3   • atenolol (TENORMIN) 25 MG TABS Take 1 Tab by mouth every day. 90 Tab 3   • latanoprost (XALATAN) 0.005 % SOLN Place 1 Drop in both eyes every bedtime.     • diazepam (VALIUM) 2 MG TABS Take 2 mg by mouth 1 time daily as needed.  "Indications: Feeling Anxious     • acetaminophen (TYLENOL) 500 MG TABS Take 1,000 mg by mouth every 6 hours as needed. Indications: Pain       No current facility-administered medications for this visit.         Past Medical History:   Diagnosis Date   • Anesthesia     difficult intubation with surgery 2008,2010   • Basal cell carcinoma of skin    • Cancer (CMS-HCC)     rt  kidney 1989;  thyroid cancer 2012, prostate 2008, skin   • DVT (deep venous thrombosis) (CMS-HCC) 2014   • ED (erectile dysfunction)    • GERD (gastroesophageal reflux disease)    • Glaucoma    • Heart burn    • Hyperlipidemia 12/3/2012   • Hypertension    • Indigestion    • Multiple pulmonary emboli (CMS-HCC) 2014   • Multiple thyroid nodules 2009   • Papillary carcinoma of thyroid (CMS-HCC) 2014    R 2 foci / 4.5mm,0.25mm / no mets/ pT1pN0   • postsurgical hypothyroidism 2014   • Pre-diabetes    • Renal disorder     rt kidney cancer,nephrectomy   • S/P CABG x 4 2003   • S/P colectomy 2010    multiple colon polyps / no Ca   • S/p nephrectomy 1998    carcinoma   • S/P prostatectomy 2008    carcinoma   • Stroke (CMS-HCC)     'mild',no residual,'one doctor said it was a tia'   • Transient renal failure 2014    renal vein thrombosis   • Type II or unspecified type diabetes mellitus without mention of complication, not stated as uncontrolled     on no medications   • Unspecified urinary incontinence        PHYSICAL EXAM:    /68   Pulse (!) 59   Ht 1.727 m (5' 8\")   Wt 87.5 kg (193 lb)   SpO2 100%   BMI 29.35 kg/m²      Gen.   appears healthy for age    Skin   appropriate for sex and age    HEENT  unremarkable    Neck   no palpable thyroid or nodules in the neck area or elsewhere in the supraclavicular areas    Heart  regular    Extremities  no edema    Neuro  gait and station normal    Psych  appropriate    ASSESSMENT AND RECOMMENDATIONS    1. Papillary carcinoma of thyroid (CMS-HCC) 2014            2  micro foci of papillary carcinoma " without apparent residual.             Update lab and report by phone or my chart  - ANTITHYROGLOBULIN AB; Future  - THYROGLOBULIN, QT; Future    2. Hypothyroidism, postsurgical            TSH around 1.1 is satisfactory for his circumstance and well tolerated      DISPOSITION:   Follow up results by phone                              If stable return in one year       Yunier Hughes M.D.    Copies to: Edward Walters M.D. [phone]

## 2018-03-21 LAB
THYROGLOB AB SERPL-ACNC: <0.9 IU/ML (ref 0–4)
THYROGLOB SERPL-MCNC: 0.3 NG/ML (ref 1.3–31.8)
THYROGLOB SERPL-MCNC: ABNORMAL NG/ML (ref 1.3–31.8)

## 2018-04-02 ENCOUNTER — ANTICOAGULATION VISIT (OUTPATIENT)
Dept: MEDICAL GROUP | Facility: MEDICAL CENTER | Age: 78
End: 2018-04-02
Payer: MEDICARE

## 2018-04-02 VITALS — SYSTOLIC BLOOD PRESSURE: 130 MMHG | HEART RATE: 57 BPM | DIASTOLIC BLOOD PRESSURE: 62 MMHG

## 2018-04-02 DIAGNOSIS — I82.401 ACUTE DEEP VEIN THROMBOSIS (DVT) OF RIGHT LOWER EXTREMITY, UNSPECIFIED VEIN (HCC): ICD-10-CM

## 2018-04-02 DIAGNOSIS — I26.99 MULTIPLE PULMONARY EMBOLI (HCC): ICD-10-CM

## 2018-04-02 DIAGNOSIS — Z95.828 PRESENCE OF IVC FILTER: ICD-10-CM

## 2018-04-02 DIAGNOSIS — I82.403 DEEP VEIN THROMBOSIS (DVT) OF BOTH LOWER EXTREMITIES, UNSPECIFIED CHRONICITY, UNSPECIFIED VEIN (HCC): ICD-10-CM

## 2018-04-02 DIAGNOSIS — G45.8 OTHER SPECIFIED TRANSIENT CEREBRAL ISCHEMIAS: ICD-10-CM

## 2018-04-02 LAB — INR PPP: 3.2 (ref 2–3.5)

## 2018-04-02 PROCEDURE — 99211 OFF/OP EST MAY X REQ PHY/QHP: CPT | Performed by: FAMILY MEDICINE

## 2018-04-02 PROCEDURE — 85610 PROTHROMBIN TIME: CPT | Performed by: FAMILY MEDICINE

## 2018-04-02 NOTE — PROGRESS NOTES
OP Anticoagulation Service Note    Date: 4/2/2018  Vitals:    04/02/18 0907   BP: 130/62   Pulse: (!) 57       Anticoagulation Summary  As of 4/2/2018    INR goal:   2.0-3.0   TTR:   53.1 % (1.3 mo)   Today's INR:   3.2!   Maintenance plan:   7.5 mg (5 mg x 1.5) on Sun; 5 mg (5 mg x 1) all other days   Weekly total:   37.5 mg   Plan last modified:   Kelsey Junior, PharmD (4/2/2018)   Next INR check:   4/16/2018   Target end date:   Indefinite    Indications    DVT (deep venous thrombosis) (CMS-HCC) [I82.409]  Deep vein thrombosis (DVT) of both lower extremities (CMS-HCC) [I82.403]  Presence of IVC filter [Z95.828]  Multiple pulmonary emboli (CMS-HCC) [I26.99]  Transient ischemic attack [G45.9] [G45.9]             Anticoagulation Episode Summary     INR check location:       Preferred lab:       Send INR reminders to:       Comments:         Anticoagulation Care Providers     Provider Role Specialty Phone number    [facility]   [phone]    Edward Walters M.D.  Family Medicine [phone]        Anticoagulation Patient Findings      HPI:   Paulo Paredes seen in clinic today, on anticoagulation therapy with warfarin (a high risk medication) for hx of DVT, PE and TIA    Pt is here today to evaluate anticoagulation therapy    Previous INR was  2.8 on 3-5-18    Pt was instructed to Continue current warfarin regimen     Confirmed warfarin dosing regimen, denies missed or extra doses of coumadin.   Diet has been consistent with foods rich in vitamin K: Yes  Changes in ETOH:  No  Changes in smoking status: No  Changes in medication: No   Cost restriction: No  S/s of bleeding:  No  Signs/symptoms  thrombosis since the last appt: No    A/P   INR  supratherapeutic today, will require dose adjust ment today to prevent bleeding complications and closer follow up.   Begin lower weekly regimen     Pt educated to contact our clinic with any changes in medications or s/s of bleeding or  thrombosis    Follow up appointment in 2 week(s) to reduce risk of adverse events from warfarin  Kelsey Junior, PharmD

## 2018-04-16 ENCOUNTER — ANTICOAGULATION VISIT (OUTPATIENT)
Dept: MEDICAL GROUP | Facility: MEDICAL CENTER | Age: 78
End: 2018-04-16
Payer: MEDICARE

## 2018-04-16 DIAGNOSIS — Z79.01 CHRONIC ANTICOAGULATION: ICD-10-CM

## 2018-04-16 DIAGNOSIS — I82.401 ACUTE DEEP VEIN THROMBOSIS (DVT) OF RIGHT LOWER EXTREMITY, UNSPECIFIED VEIN (HCC): ICD-10-CM

## 2018-04-16 DIAGNOSIS — I26.99 MULTIPLE PULMONARY EMBOLI (HCC): ICD-10-CM

## 2018-04-16 DIAGNOSIS — G45.8 OTHER SPECIFIED TRANSIENT CEREBRAL ISCHEMIAS: ICD-10-CM

## 2018-04-16 DIAGNOSIS — Z95.828 PRESENCE OF IVC FILTER: ICD-10-CM

## 2018-04-16 DIAGNOSIS — I82.403 DEEP VEIN THROMBOSIS (DVT) OF BOTH LOWER EXTREMITIES, UNSPECIFIED CHRONICITY, UNSPECIFIED VEIN (HCC): ICD-10-CM

## 2018-04-16 LAB — INR PPP: 2.6 (ref 2–3.5)

## 2018-04-16 PROCEDURE — 85610 PROTHROMBIN TIME: CPT | Performed by: PHYSICIAN ASSISTANT

## 2018-04-16 NOTE — PROGRESS NOTES
OP Anticoagulation Service Note    Date: 4/16/2018  There were no vitals filed for this visit.   pt declined vitals    Anticoagulation Summary  As of 4/16/2018    INR goal:   2.0-3.0   TTR:   56.7 % (1.8 mo)   Today's INR:   2.6   Maintenance plan:   7.5 mg (5 mg x 1.5) on Sun; 5 mg (5 mg x 1) all other days   Weekly total:   37.5 mg   Plan last modified:   Kelsey Junior, PharmD (4/2/2018)   Next INR check:   5/14/2018   Target end date:   Indefinite    Indications    Presence of IVC filter [Z95.828]  Transient ischemic attack [G45.9] [G45.9]  Deep vein thrombosis (DVT) of both lower extremities (CMS-HCC) [I82.403]  DVT (deep venous thrombosis) (CMS-HCC) [I82.409]  Multiple pulmonary emboli (CMS-HCC) [I26.99]  Chronic anticoagulation [Z79.01]             Anticoagulation Episode Summary     INR check location:       Preferred lab:       Send INR reminders to:       Comments:         Anticoagulation Care Providers     Provider Role Specialty Phone number    [facility]   [phone]    Edward Walters M.D.  Family Medicine [phone]        Anticoagulation Patient Findings      HPI:   Paulo Paredes seen in clinic today, on anticoagulation therapy with warfarin (a high risk medication) for hx of DVT, PE and TIA       Pt is here today to evaluate anticoagulation therapy    Previous INR was  3.2 on 4-2-18    Pt was instructed to decrease weekly warfarin regimen    Confirmed warfarin dosing regimen, denies missed or extra doses of coumadin.   Diet has been consistent with foods rich in vitamin K: Yes  Changes in ETOH:  No  Changes in smoking status: No  Changes in medication: No   Cost restriction: No  S/s of bleeding:  No  Signs/symptoms  thrombosis since the last appt: No    A/P   INR  therapeutic today, will require close follow up as previous INR was supra-therapeutic   Continue current warfarin regimen      2- check referral      Pt educated to contact our clinic with any  changes in medications or s/s of bleeding or thrombosis. Pt is aware to seek immediate medical attention for falls, head injury or deep cuts    Follow up appointment in 4 week(s) to reduce risk of adverse events from warfarin  Kelsey Junior, PharmD

## 2018-05-07 DIAGNOSIS — E89.0 HYPOTHYROIDISM, POSTSURGICAL: ICD-10-CM

## 2018-05-08 ENCOUNTER — TELEPHONE (OUTPATIENT)
Dept: ENDOCRINOLOGY | Facility: MEDICAL CENTER | Age: 78
End: 2018-05-08

## 2018-05-08 DIAGNOSIS — I10 ESSENTIAL HYPERTENSION: ICD-10-CM

## 2018-05-08 RX ORDER — LEVOTHYROXINE SODIUM 137 UG/1
137 CAPSULE ORAL DAILY
Qty: 90 CAP | Refills: 0 | Status: SHIPPED | OUTPATIENT
Start: 2018-05-08 | End: 2018-08-13 | Stop reason: SDUPTHER

## 2018-05-08 NOTE — TELEPHONE ENCOUNTER
From: Paulo Lena  Sent: 5/7/2018 1:27 PM PDT  Subject: Medication Renewal Request    Paulo Diazuregui would like a refill of the following medications:     Levothyroxine Sodium 137 MCG Cap [Yunier Hughes M.D.]    Preferred pharmacy: [facility] [address], [place] - [address]        Medication renewals requested in this message routed separately:     valsartan (DIOVAN) 160 MG Tab [Sarah Mcintyre M.D.]

## 2018-05-08 NOTE — TELEPHONE ENCOUNTER
Daly from [facility] said the medication prescribed for Levothyroxine is a non formulary and usually not covered by insurance. They want to know if Dr. Hughes would like to switch it to tablets?

## 2018-05-09 RX ORDER — VALSARTAN 160 MG/1
160 TABLET ORAL DAILY
Qty: 90 TAB | Refills: 1 | Status: SHIPPED | OUTPATIENT
Start: 2018-05-09 | End: 2018-07-19

## 2018-05-09 NOTE — TELEPHONE ENCOUNTER
I let pharmacist know Dr. Hughes said it is very inexpensive and sometimes less expensive to pay cash than co-pays. He said let the patient decide. Pharmacist wanted to speak with Dr. Hughes or MA. I transferred call to that department.

## 2018-05-09 NOTE — TELEPHONE ENCOUNTER
"Dr. Hughes wrote, \"It is very inexpensive and sometimes less expensive to pay cash than co-pays. Let the patient decided.\"      "

## 2018-05-09 NOTE — TELEPHONE ENCOUNTER
From: Paulo Lena  Sent: 5/8/2018 8:07 PM PDT  Subject: Medication Renewal Request    Paulo Diazuregui would like a refill of the following medications:     valsartan (DIOVAN) 160 MG Tab [Sarah Mcintyre M.D.]    Preferred pharmacy: [facility] [address] [place] [address], [place] [address]        Medication renewals requested in this message routed separately:     Levothyroxine Sodium 137 MCG Cap [Yunier Hughes M.D.]

## 2018-05-14 ENCOUNTER — ANTICOAGULATION VISIT (OUTPATIENT)
Dept: MEDICAL GROUP | Facility: MEDICAL CENTER | Age: 78
End: 2018-05-14
Payer: MEDICARE

## 2018-05-14 DIAGNOSIS — I82.403 DEEP VEIN THROMBOSIS (DVT) OF BOTH LOWER EXTREMITIES, UNSPECIFIED CHRONICITY, UNSPECIFIED VEIN (HCC): ICD-10-CM

## 2018-05-14 DIAGNOSIS — Z79.01 CHRONIC ANTICOAGULATION: Primary | ICD-10-CM

## 2018-05-14 DIAGNOSIS — G45.8 OTHER SPECIFIED TRANSIENT CEREBRAL ISCHEMIAS: ICD-10-CM

## 2018-05-14 DIAGNOSIS — Z95.828 PRESENCE OF IVC FILTER: ICD-10-CM

## 2018-05-14 DIAGNOSIS — I82.401 ACUTE DEEP VEIN THROMBOSIS (DVT) OF RIGHT LOWER EXTREMITY, UNSPECIFIED VEIN (HCC): ICD-10-CM

## 2018-05-14 DIAGNOSIS — I26.99 MULTIPLE PULMONARY EMBOLI (HCC): ICD-10-CM

## 2018-05-14 LAB — INR PPP: 1.8 (ref 2–3.5)

## 2018-05-14 PROCEDURE — 99999 PR NO CHARGE: CPT | Performed by: FAMILY MEDICINE

## 2018-05-14 PROCEDURE — 85610 PROTHROMBIN TIME: CPT | Performed by: FAMILY MEDICINE

## 2018-05-14 PROCEDURE — 99211 OFF/OP EST MAY X REQ PHY/QHP: CPT | Performed by: FAMILY MEDICINE

## 2018-05-14 NOTE — PROGRESS NOTES
OP Anticoagulation Service Note    Date: 5/14/2018  There were no vitals filed for this visit.   pt declined vitals    Anticoagulation Summary  As of 5/14/2018    INR goal:   2.0-3.0   TTR:   63.0 % (2.7 mo)   Today's INR:   1.8!   Warfarin maintenance plan:   7.5 mg (5 mg x 1.5) on Sun, Thu; 5 mg (5 mg x 1) all other days   Weekly warfarin total:   40 mg   Plan last modified:   Kelsey Junior, PharmD (5/14/2018)   Next INR check:   5/29/2018   Target end date:   Indefinite    Indications    Presence of IVC filter [Z95.828]  Transient ischemic attack [G45.9] [G45.9]  Deep vein thrombosis (DVT) of both lower extremities (HCC) [I82.403]  DVT (deep venous thrombosis) (HCC) [I82.409]  Multiple pulmonary emboli (HCC) [I26.99]  Chronic anticoagulation [Z79.01]             Anticoagulation Episode Summary     INR check location:       Preferred lab:       Send INR reminders to:       Comments:         Anticoagulation Care Providers     Provider Role Specialty Phone number    [facility]   [phone]    Edward Walters M.D.  Family Medicine [phone]        Anticoagulation Patient Findings      HPI:   Paulo Paredes seen in clinic today, on anticoagulation therapy with warfarin (a high risk medication) for hs of DVT, PE and TIA      Pt is here today to evaluate anticoagulation therapy    Previous INR was  2.6 on 4-16-18    Pt was instructed to Continue current warfarin regimen       Confirmed warfarin dosing regimen, denies missed or extra doses of coumadin.   Diet has been consistent with foods rich in vitamin K: No - pt reports diet higher in vegetables d/t diabetes  Changes in ETOH:  No  Changes in smoking status: No  Changes in medication: No   Cost restriction: No  S/s of bleeding:  No  Signs/symptoms  thrombosis since the last appt: No    A/P   INR  sub-therapeutic today, will require dose adjust ment today to prevent  recurrence of thrombosis or stroke and closer follow up.   Increase  weekly warfarin regimen       2-2019  check referral      Pt educated to contact our clinic with any changes in medications or s/s of bleeding or thrombosis. Pt is aware to seek immediate medical attention for falls, head injury or deep cuts    Follow up appointment in 2 week(s) to reduce risk of adverse events from warfarin  Kelsey Junior, Pharm D

## 2018-05-29 ENCOUNTER — ANTICOAGULATION VISIT (OUTPATIENT)
Dept: MEDICAL GROUP | Facility: MEDICAL CENTER | Age: 78
End: 2018-05-29
Payer: MEDICARE

## 2018-05-29 VITALS — HEART RATE: 58 BPM | DIASTOLIC BLOOD PRESSURE: 75 MMHG | SYSTOLIC BLOOD PRESSURE: 142 MMHG

## 2018-05-29 DIAGNOSIS — Z95.828 PRESENCE OF IVC FILTER: ICD-10-CM

## 2018-05-29 DIAGNOSIS — I82.401 ACUTE DEEP VEIN THROMBOSIS (DVT) OF RIGHT LOWER EXTREMITY, UNSPECIFIED VEIN (HCC): ICD-10-CM

## 2018-05-29 DIAGNOSIS — G45.8 OTHER SPECIFIED TRANSIENT CEREBRAL ISCHEMIAS: ICD-10-CM

## 2018-05-29 DIAGNOSIS — Z79.01 CHRONIC ANTICOAGULATION: Primary | ICD-10-CM

## 2018-05-29 DIAGNOSIS — I82.403 DEEP VEIN THROMBOSIS (DVT) OF BOTH LOWER EXTREMITIES, UNSPECIFIED CHRONICITY, UNSPECIFIED VEIN (HCC): ICD-10-CM

## 2018-05-29 DIAGNOSIS — I26.99 MULTIPLE PULMONARY EMBOLI (HCC): ICD-10-CM

## 2018-05-29 LAB — INR PPP: 3 (ref 2–3.5)

## 2018-05-29 PROCEDURE — 85610 PROTHROMBIN TIME: CPT | Performed by: FAMILY MEDICINE

## 2018-05-29 NOTE — PROGRESS NOTES
OP Anticoagulation Service Note    Date: 5/29/2018  Vitals:    05/29/18 0908   BP: 142/75   Pulse: (!) 58     Anticoagulation Summary  As of 5/29/2018    INR goal:   2.0-3.0   TTR:   66.1 % (3.2 mo)   Today's INR:   3.0   Warfarin maintenance plan:   7.5 mg (5 mg x 1.5) on Sun, Thu; 5 mg (5 mg x 1) all other days   Weekly warfarin total:   40 mg   Plan last modified:   Kelsey Junior, PharmD (5/14/2018)   Next INR check:   6/19/2018   Target end date:   Indefinite    Indications    Presence of IVC filter [Z95.828]  Transient ischemic attack [G45.9] [G45.9]  Deep vein thrombosis (DVT) of both lower extremities (HCC) [I82.403]  DVT (deep venous thrombosis) (HCC) [I82.409]  Multiple pulmonary emboli (HCC) [I26.99]  Chronic anticoagulation [Z79.01]             Anticoagulation Episode Summary     INR check location:       Preferred lab:       Send INR reminders to:       Comments:         Anticoagulation Care Providers     Provider Role Specialty Phone number    [facility]   [phone]    Edward Walters M.D.  Family Medicine [phone]        Anticoagulation Patient Findings      HPI:   Paulo Paredes seen in clinic today, on anticoagulation therapy with warfarin (a high risk medication) for hx of DVT and PE      Pt is here today to evaluate anticoagulation therapy    Previous INR was  1.8 on 5-14-18    Pt was instructed to increase weekly regimen d/t increased vegetables    Confirmed warfarin dosing regimen, denies missed or extra doses of coumadin.   Diet has been consistent with foods rich in vitamin K: Yes  Changes in ETOH:  No  Changes in smoking status: No  Changes in medication: No   Cost restriction: No  S/s of bleeding:  No  Signs/symptoms  thrombosis since the last appt: No    A/P   INR  therapeutic today,  will require close follow up as previous INR was sub-therapeutic   Continue current warfarin regimen          Pt educated to contact our clinic with any changes in  medications or s/s of bleeding or thrombosis. Pt is aware to seek immediate medical attention for falls, head injury or deep cuts    Follow up appointment in 3 week(s) to reduce risk of adverse events from warfarin  Zay Archer D

## 2018-06-06 ENCOUNTER — HOSPITAL ENCOUNTER (OUTPATIENT)
Dept: LAB | Facility: MEDICAL CENTER | Age: 78
End: 2018-06-06
Attending: INTERNAL MEDICINE
Payer: MEDICARE

## 2018-06-06 DIAGNOSIS — E89.0 HYPOTHYROIDISM, POSTSURGICAL: ICD-10-CM

## 2018-06-06 DIAGNOSIS — E78.5 DYSLIPIDEMIA: ICD-10-CM

## 2018-06-06 DIAGNOSIS — E11.9 CONTROLLED TYPE 2 DIABETES MELLITUS WITHOUT COMPLICATION, WITHOUT LONG-TERM CURRENT USE OF INSULIN (HCC): ICD-10-CM

## 2018-06-06 DIAGNOSIS — I10 ESSENTIAL HYPERTENSION: ICD-10-CM

## 2018-06-06 LAB
ALBUMIN SERPL BCP-MCNC: 4.4 G/DL (ref 3.2–4.9)
ALBUMIN/GLOB SERPL: 1.5 G/DL
ALP SERPL-CCNC: 40 U/L (ref 30–99)
ALT SERPL-CCNC: 18 U/L (ref 2–50)
ANION GAP SERPL CALC-SCNC: 8 MMOL/L (ref 0–11.9)
AST SERPL-CCNC: 21 U/L (ref 12–45)
BILIRUB SERPL-MCNC: 0.7 MG/DL (ref 0.1–1.5)
BUN SERPL-MCNC: 35 MG/DL (ref 8–22)
CALCIUM SERPL-MCNC: 9.8 MG/DL (ref 8.5–10.5)
CHLORIDE SERPL-SCNC: 104 MMOL/L (ref 96–112)
CHOLEST SERPL-MCNC: 223 MG/DL (ref 100–199)
CO2 SERPL-SCNC: 28 MMOL/L (ref 20–33)
CREAT SERPL-MCNC: 2.22 MG/DL (ref 0.5–1.4)
GLOBULIN SER CALC-MCNC: 2.9 G/DL (ref 1.9–3.5)
GLUCOSE SERPL-MCNC: 171 MG/DL (ref 65–99)
HDLC SERPL-MCNC: 33 MG/DL
LDLC SERPL CALC-MCNC: 125 MG/DL
POTASSIUM SERPL-SCNC: 5 MMOL/L (ref 3.6–5.5)
PROT SERPL-MCNC: 7.3 G/DL (ref 6–8.2)
SODIUM SERPL-SCNC: 140 MMOL/L (ref 135–145)
TRIGL SERPL-MCNC: 324 MG/DL (ref 0–149)
TSH SERPL DL<=0.005 MIU/L-ACNC: 1.37 UIU/ML (ref 0.38–5.33)

## 2018-06-06 PROCEDURE — 80053 COMPREHEN METABOLIC PANEL: CPT

## 2018-06-06 PROCEDURE — 80061 LIPID PANEL: CPT

## 2018-06-06 PROCEDURE — 36415 COLL VENOUS BLD VENIPUNCTURE: CPT

## 2018-06-06 PROCEDURE — 84443 ASSAY THYROID STIM HORMONE: CPT

## 2018-06-06 PROCEDURE — 83036 HEMOGLOBIN GLYCOSYLATED A1C: CPT

## 2018-06-07 LAB
EST. AVERAGE GLUCOSE BLD GHB EST-MCNC: 183 MG/DL
HBA1C MFR BLD: 8 % (ref 0–5.6)

## 2018-06-11 ENCOUNTER — TELEPHONE (OUTPATIENT)
Dept: ENDOCRINOLOGY | Facility: MEDICAL CENTER | Age: 78
End: 2018-06-11

## 2018-06-11 NOTE — TELEPHONE ENCOUNTER
Pharmacy sent a fax asking if it is okay to switch Levothyroxine 137 mcg caps to tabs. I called patient and he said he is okay with the switch. I faxed back the pharmacy, [facility], to let them know. AT

## 2018-06-18 RX ORDER — LANOLIN ALCOHOL/MO/W.PET/CERES
1000 CREAM (GRAM) TOPICAL DAILY
COMMUNITY

## 2018-06-18 RX ORDER — LEVOTHYROXINE SODIUM 137 UG/1
TABLET ORAL
COMMUNITY
Start: 2018-04-09 | End: 2018-08-08

## 2018-06-18 RX ORDER — VALSARTAN 160 MG/1
160 TABLET ORAL
COMMUNITY
End: 2018-08-08

## 2018-06-18 RX ORDER — ATENOLOL 25 MG/1
TABLET ORAL
COMMUNITY
Start: 2018-01-22 | End: 2018-10-11

## 2018-06-18 RX ORDER — FENOFIBRATE 160 MG/1
160 TABLET ORAL
COMMUNITY
Start: 2018-04-12 | End: 2018-10-11

## 2018-06-18 RX ORDER — MAG HYDROX/ALUMINUM HYD/SIMETH 400-400-40
300 SUSPENSION, ORAL (FINAL DOSE FORM) ORAL DAILY
COMMUNITY

## 2018-06-18 RX ORDER — UBIDECARENONE 100 MG
100 CAPSULE ORAL
COMMUNITY
End: 2018-10-11

## 2018-06-18 RX ORDER — VITS A,C,E/LUTEIN/MINERALS 300MCG-200
1 TABLET ORAL DAILY
COMMUNITY
End: 2019-04-15

## 2018-06-18 RX ORDER — LEVOTHYROXINE SODIUM 137 UG/1
TABLET ORAL
COMMUNITY
Start: 2018-04-04 | End: 2018-10-11

## 2018-06-18 RX ORDER — AMPICILLIN TRIHYDRATE 250 MG
1000 CAPSULE ORAL
COMMUNITY

## 2018-06-18 RX ORDER — FENOFIBRATE 160 MG/1
TABLET ORAL
COMMUNITY
Start: 2018-04-24 | End: 2018-10-09 | Stop reason: SDUPTHER

## 2018-06-18 RX ORDER — WARFARIN SODIUM 5 MG/1
TABLET ORAL
COMMUNITY
Start: 2017-11-01 | End: 2018-08-08

## 2018-06-18 RX ORDER — BLOOD-GLUCOSE METER
1 EACH MISCELLANEOUS
COMMUNITY
Start: 2017-03-03 | End: 2019-01-23 | Stop reason: CLARIF

## 2018-06-18 RX ORDER — LEVOTHYROXINE SODIUM 137 UG/1
TABLET ORAL
COMMUNITY
Start: 2018-04-04 | End: 2018-08-08

## 2018-06-18 RX ORDER — FLUVASTATIN 20 MG/1
20 CAPSULE ORAL
COMMUNITY
Start: 2018-02-05 | End: 2018-10-10 | Stop reason: SDUPTHER

## 2018-06-19 ENCOUNTER — OFFICE VISIT (OUTPATIENT)
Dept: MEDICAL GROUP | Facility: MEDICAL CENTER | Age: 78
End: 2018-06-19
Payer: MEDICARE

## 2018-06-19 ENCOUNTER — ANTICOAGULATION VISIT (OUTPATIENT)
Dept: MEDICAL GROUP | Facility: MEDICAL CENTER | Age: 78
End: 2018-06-19
Payer: MEDICARE

## 2018-06-19 VITALS
SYSTOLIC BLOOD PRESSURE: 122 MMHG | DIASTOLIC BLOOD PRESSURE: 72 MMHG | OXYGEN SATURATION: 97 % | BODY MASS INDEX: 28.97 KG/M2 | WEIGHT: 191.14 LBS | HEIGHT: 68 IN | TEMPERATURE: 98.8 F | HEART RATE: 57 BPM

## 2018-06-19 DIAGNOSIS — I25.10 CORONARY ARTERY DISEASE INVOLVING NATIVE CORONARY ARTERY OF NATIVE HEART WITHOUT ANGINA PECTORIS: ICD-10-CM

## 2018-06-19 DIAGNOSIS — E89.0 POSTOPERATIVE HYPOTHYROIDISM: ICD-10-CM

## 2018-06-19 DIAGNOSIS — C73 PAPILLARY CARCINOMA OF THYROID (HCC): ICD-10-CM

## 2018-06-19 DIAGNOSIS — Z95.1 S/P CABG X 4: ICD-10-CM

## 2018-06-19 DIAGNOSIS — E78.5 DYSLIPIDEMIA: ICD-10-CM

## 2018-06-19 DIAGNOSIS — Z00.00 HEALTH CARE MAINTENANCE: ICD-10-CM

## 2018-06-19 DIAGNOSIS — Z79.01 CHRONIC ANTICOAGULATION: Primary | ICD-10-CM

## 2018-06-19 DIAGNOSIS — I10 ESSENTIAL HYPERTENSION: ICD-10-CM

## 2018-06-19 DIAGNOSIS — Z95.828 PRESENCE OF IVC FILTER: ICD-10-CM

## 2018-06-19 DIAGNOSIS — G45.8 OTHER SPECIFIED TRANSIENT CEREBRAL ISCHEMIAS: ICD-10-CM

## 2018-06-19 DIAGNOSIS — Z90.5 SINGLE KIDNEY: ICD-10-CM

## 2018-06-19 DIAGNOSIS — I82.403 DEEP VEIN THROMBOSIS (DVT) OF BOTH LOWER EXTREMITIES, UNSPECIFIED CHRONICITY, UNSPECIFIED VEIN (HCC): ICD-10-CM

## 2018-06-19 DIAGNOSIS — I26.99 MULTIPLE PULMONARY EMBOLI (HCC): ICD-10-CM

## 2018-06-19 DIAGNOSIS — E89.0 S/P TOTAL THYROIDECTOMY: ICD-10-CM

## 2018-06-19 DIAGNOSIS — E11.22 CKD STAGE 3 DUE TO TYPE 2 DIABETES MELLITUS (HCC): ICD-10-CM

## 2018-06-19 DIAGNOSIS — I82.401 ACUTE DEEP VEIN THROMBOSIS (DVT) OF RIGHT LOWER EXTREMITY, UNSPECIFIED VEIN (HCC): ICD-10-CM

## 2018-06-19 DIAGNOSIS — N18.30 CKD STAGE 3 DUE TO TYPE 2 DIABETES MELLITUS (HCC): ICD-10-CM

## 2018-06-19 LAB — INR PPP: 3.6 (ref 2–3.5)

## 2018-06-19 PROCEDURE — 99214 OFFICE O/P EST MOD 30 MIN: CPT | Performed by: INTERNAL MEDICINE

## 2018-06-19 PROCEDURE — 85610 PROTHROMBIN TIME: CPT | Performed by: FAMILY MEDICINE

## 2018-06-19 PROCEDURE — 99999 PR NO CHARGE: CPT | Performed by: FAMILY MEDICINE

## 2018-06-19 RX ORDER — GLIPIZIDE 10 MG/1
10 TABLET ORAL 2 TIMES DAILY
Qty: 180 TAB | Refills: 0 | Status: SHIPPED | OUTPATIENT
Start: 2018-06-19 | End: 2018-10-09 | Stop reason: SDUPTHER

## 2018-06-20 NOTE — PROGRESS NOTES
CHIEF COMPLIANT:   Chief Complaint   Patient presents with   • Results     Lab results   DM    Paulo Paredes is a 77 y.o. male here for DM follow up     DIABETES MELLITUS TYPE 2  Onset/D  Diabetes education:  unknown     Medications:   • No meds  o Start glipizide 5 mg BID  • ACE/ARB: valsartan  • Statin: fluvastatin 20 mg QD  • ASA: N, on coumadin     Checking feet daily/wear soft socks/shoes: advised     Diabetes ABCDE TARGETS  • A1c, last:  8.0, previous 7.8  • Fingersticks:  N  • Blood Pressure: < 140/90  • Cholesterol-Lipid Panel: elevated choilesterol/triglycerides, low HDL  • Dysalbuminuria:  pos     Diet: regular  Exercise:  Walk daily  BMI:  29 kg/m²     DM complications:  • Peripheral neuropathy:           No numbness or tingling sensation in the feet.  • Retinopathy:                      Last eye exam: . No evidence of retinopathy.    • Nephropathy:                          Neg  • CVS:                                    Has CAD, on rx.    • GI:                                  No gastropathy sx (nausea/vomiting).     FH of DM: mother     CKD stage III-IV, single kidney  The patient had decreased GFR with normal creatinine and electrolytes.   No consistent NSAIDs use.   He has been followed up by nephrology    HYPERTENSION/CAD, st post CABG  Meds: Valsartan 160 mg daily, atenolol 25 mg daily; no ASA, on coumadin.   Taking meds as prescribed.   He is measuring BP at home, it has been < 140/90  Denies:  -  headaches, vision problems, tinnitus.                 -  chest pain/pressure, palpitations, irregular heart beats, exertional, dyspnea, peripheral edema.  Low salt diet: Y  Diet / exercise / BMI: as above.   FH of HTN: unknown     Dyslipidemia  On  fluvastatin 20 mg QD, Fenofibrate, 45 mg daily, taking as prescribed.   - No muscle weakness, cramps, nausea,abdominal discomfort.   Diet / exercise / BMI: as above.  All  FH: unknown       H/o thyroid cancer  Hypothyroidism, postsurgical  Dg: in  2012.   Status post total thyroidectomy.   On Levothyroxine, 137 mcg, taking daily early in am, before any po intake.  No temperature intolerance. No change in weight,  hair/skin quality, BMs.   No tremors, weakness.  No peripheral swelling.  No mood changes.  FH: N  Review TSH.  He has been followed up by endocrinology    Reviewed PMH, PSH, FH, SH, ALL, IMM, MEDS.     Current medicines (including changes today)  Current Outpatient Prescriptions   Medication Sig Dispense Refill   • glipiZIDE (GLUCOTROL) 10 MG Tab Take 1 Tab by mouth 2 times a day. 180 Tab 0   • glucose blood (ASCENSIA MICROFILL TEST) strip as directed     • Lancets (MICROLET) Misc by Does not apply route.     • aspirin 81 MG tablet Take 81 mg by mouth.     • Blood Glucose Monitoring Suppl (ONE TOUCH ULTRA 2) w/Device Kit 1 Each by Other route.     • Calcium Carb-Cholecalciferol (CALCIUM 1000 + D PO) Take 1,000 Units by mouth.     • fenofibrate (TRIGLIDE) 160 MG tablet      • fluvastatin (LESCOL) 20 MG Cap Take 20 mg by mouth.     • levothyroxine (SYNTHROID) 137 MCG Tab      • Multiple Vitamins-Minerals (OCUVITE-LUTEIN) Tab Take 1 tablet by mouth.     • Omega-3 Krill Oil 300 MG Cap Take 350 mg by mouth.     • glucose blood (ONE TOUCH ULTRA TEST) strip To check twice daily Use as directed.     • ONETOUCH DELICA LANCETS FINE Misc 2 Each by Other route.     • Cinnamon 500 MG Cap Take 1,000 mg by mouth.     • Cyanocobalamin (VITAMIN B-12) 1000 MCG Tab Take 1,000 mcg by mouth.     • valsartan (DIOVAN) 160 MG Tab Take 1 Tab by mouth every day. 90 Tab 1   • Levothyroxine Sodium 137 MCG Cap Take 137 mcg by mouth every day. 90 Cap 0   • warfarin (COUMADIN) 5 MG TABS Take 1 Tab by mouth every day. Take 1 to 1.5t;po;qd;or as directed 135 Tab 1   • Coenzyme Q10 (CO Q-10 PO) Take  by mouth every day.     • acetaminophen (TYLENOL) 500 MG TABS Take 1,000 mg by mouth every 6 hours as needed. Indications: Pain     • fenofibrate (TRICOR) 145 MG TABS Take 1 Tab by  mouth every day. 90 Tab 3   • atenolol (TENORMIN) 25 MG TABS Take 1 Tab by mouth every day. 90 Tab 3   • enoxaparin (LOVENOX) 120 MG/0.8ML Solution inj 120 mg by Subdermal route.     • atenolol (TENORMIN) 25 MG Tab TAKE 1 TAB BY MOUTH DAILY.     • Coenzyme Q10 100 MG Cap Take 100 mg by mouth.     • fenofibrate (TRIGLIDE) 160 MG tablet Take 160 mg by mouth.     • levothyroxine (SYNTHROID) 137 MCG Tab TAKE 1 TABLET BY MOUTH DAILY' TAKE ON EMPTY STOMACH 1HR BEFORE FOOD DONT COMBINE WITH OTHER MEDS     • levothyroxine (SYNTHROID) 137 MCG Tab TAKE 1 TABLET BY MOUTH DAILY' TAKE ON EMPTY STOMACH 1HR BEFORE FOOD DONT COMBINE WITH OTHER MEDS     • valsartan (DIOVAN) 160 MG Tab Take 160 mg by mouth.     • warfarin (COUMADIN) 5 MG Tab TAKE 1 AND 1/2 TABS THREE DAYS A WEEK AND TAKE 1 TAB 4 DAYS A WEEK--AS DIRECTED BY ANTI-COAG CLINIC     • diazepam (VALIUM) 2 MG TABS Take 2 mg by mouth 1 time daily as needed. Indications: Feeling Anxious     • CINNAMON PO Take 1 Cap by mouth every day.     • Cyanocobalamin (VITAMIN B12 PO) Take 1 Tab by mouth every day.     • sodium chloride (OCEAN) 0.65 % SOLN Spray 2 Sprays in nose as needed. Indications: nasal dryness     • latanoprost (XALATAN) 0.005 % SOLN Place 1 Drop in both eyes every bedtime.       No current facility-administered medications for this visit.      Allergies: Lactose and Metoprolol  He  has a past medical history of Anesthesia; Basal cell carcinoma of skin; Cancer (HCC); DVT (deep venous thrombosis) ([facility]) (2014); ED (erectile dysfunction); GERD (gastroesophageal reflux disease); Glaucoma; Heart burn; Hyperlipidemia (12/3/2012); Hypertension; Indigestion; Multiple pulmonary emboli ([facility]) (2014); Multiple thyroid nodules (2009); Papillary carcinoma of thyroid ([facility]) (2014); postsurgical hypothyroidism (2014); Pre-diabetes; Renal disorder; S/P CABG x 4 (2003); S/P colectomy (2010); S/p nephrectomy (1998); S/P prostatectomy (2008); Stroke (HCC); Transient renal failure (2014);  "Type II or unspecified type diabetes mellitus without mention of complication, not stated as uncontrolled; and Unspecified urinary incontinence.  He  has a past surgical history that includes colon resection; nephrectomy radical; multiple coronary artery bypass; prostatectomy, radical retro; other orthopedic surgery; thyroidectomy total (5/13/2014); and node dissection (5/13/2014).  Social History   Substance Use Topics   • Smoking status: Never Smoker   • Smokeless tobacco: Never Used   • Alcohol use Yes      Comment: 2 PER WK     Social History     Social History Narrative   • No narrative on file     Family History   Problem Relation Age of Onset   • Diabetes Mother    • Heart Disease Mother    • Diabetes Father    • Cancer Father      pancreas   • Thyroid Sister      Cancer   • Cancer Sister      thyroid   • Diabetes Sister    • Cancer Brother 49     colon   • Diabetes Brother    • Diabetes Maternal Grandfather    • Diabetes Paternal Grandmother    • Diabetes Paternal Grandfather      Family Status   Relation Status   • Mother    • Father    • Sister    • Brother    • Maternal Grandfather    • Paternal Grandmother    • Paternal Grandfather      ROS   Constitutional: Negative for fever, chills and weight loss.   HEENT: Negative for blurred vision, sore throat, swollen glands. No hearing loss or vertigo.  Respiratory: Negative for cough, wheezing, shortness of breath.   CVS: Negative for chest pain, palpitations, irregular heart beats, exertional dyspnea. And per HPI.  GI: Negative for heartburn, abdominal pain, nausea, vomiting, change in BMs.   : Negative for dysuria, polyuria. And per HPI.  MS: Negative for myalgias, joint pain.   Neuro: no headaches, numbness, weakness, seizures.   Skin: no skin lesions, rash.  Endocrine: per HPI.     PHYSICAL EXAM   Blood pressure 122/72, pulse (!) 57, temperature 37.1 °C (98.8 °F), height 1.727 m (5' 8\"), weight 86.7 kg (191 lb 2.2 oz), SpO2 97 %. Body mass index is 29.06 " kg/m².  Alert, oriented in no acute distress.  Eye contact is good, speech goal directed, affect bright.  HEENT: EOMI, PERRL, conjunctiva non-injected, sclera non-icteric.  Nares patent with no significant congestion or drainage.  Normal pinnae, external auditory canals, TM pearly gray with normal light reflex bilaterally.  Oral mucous membranes pink and moist with no lesions.  Neck supple with no cervical lymphadenopathy, JVD, palpable thyroid nodules or carotid bruits.  Lungs: clear to auscultation bilaterally with good excursion.  CV: regular rate and rhythm, without murmur, rubs, thrills, or gallops.  Abdomen: soft, non-distended, non-tender with normal bowel sounds. No CVAT, No masses, or organomegaly.  Lower extremities: color normal, vascularity normal, no edema, temperature normal  Musculoskeletal: Normal gait. No obvious muscle weakness or wasting.  Psych: Normal mood and affect. Alert and oriented x3. Judgment and insight is normal.  Neuro:speech normal, mental status intact, cranial nerves 2-12 intact, gait, including heel, toe, and tandem walking normal, muscle tone normal, muscle strength normal, sensation to light touch and pinprick normal, reflexes normal and symmetric  DM foot exam: intact to pin prick, bilaterally.  Dorsalis pedis pulse is +4/4 bilaterally. No redness, ulcers, calluses.     LABS     Results reviewed and discussed with the patient, questions answered.    Last labs:  Lab Results   Component Value Date/Time    CHOLSTRLTOT 223 (H) 06/06/2018 08:25 AM     (H) 06/06/2018 08:25 AM    HDL 33 (A) 06/06/2018 08:25 AM    TRIGLYCERIDE 324 (H) 06/06/2018 08:25 AM       Lab Results   Component Value Date/Time    SODIUM 140 06/06/2018 08:25 AM    POTASSIUM 5.0 06/06/2018 08:25 AM    CHLORIDE 104 06/06/2018 08:25 AM    CO2 28 06/06/2018 08:25 AM    GLUCOSE 171 (H) 06/06/2018 08:25 AM    BUN 35 (H) 06/06/2018 08:25 AM    CREATININE 2.22 (H) 06/06/2018 08:25 AM     Lab Results   Component  Value Date/Time    ALKPHOSPHAT 40 06/06/2018 08:25 AM    ASTSGOT 21 06/06/2018 08:25 AM    ALTSGPT 18 06/06/2018 08:25 AM    TBILIRUBIN 0.7 06/06/2018 08:25 AM      Lab Results   Component Value Date/Time    HBA1C 8.0 (H) 06/06/2018 08:25 AM    HBA1C 7.8 (H) 03/12/2018 08:01 AM    HBA1C 7.2 (H) 09/21/2015 07:38 AM     No results found for: TSH  Lab Results   Component Value Date/Time    FREET4 1.10 01/26/2016 09:00 AM    FREET4 1.34 09/21/2015 07:38 AM     Lab Results   Component Value Date/Time    WBC 5.1 01/26/2016 09:01 AM    RBC 5.13 01/26/2016 09:01 AM    HEMOGLOBIN 16.5 01/26/2016 09:01 AM    HEMATOCRIT 49.7 01/26/2016 09:01 AM    MCV 96.9 01/26/2016 09:01 AM    MCH 32.2 01/26/2016 09:01 AM    MCHC 33.2 (L) 01/26/2016 09:01 AM    MPV 11.3 01/26/2016 09:01 AM    NEUTSPOLYS 56.60 01/26/2016 09:01 AM    LYMPHOCYTES 31.30 01/26/2016 09:01 AM    MONOCYTES 7.20 01/26/2016 09:01 AM    EOSINOPHILS 3.30 01/26/2016 09:01 AM    BASOPHILS 0.80 01/26/2016 09:01 AM      ASSESMENT AND PLAN     1. Uncontrolled type 2 diabetes mellitus with microalbuminuria, without long-term current use of insulin (HCC)    The patient declined in the past indications, preferred to try lifestyle modification  -He did exercise daily for 1 hour, low calorie diet  -A1c came slightly higher than previous  -Discussed about treatment options and start glipizide re CKD stage III/single kidney    - Reviewed effects and side effects of medication, the patient verbalized understanding  -Advised to continue daily exercise, low calorie diet with 3 meals and 2 snacks    - glipiZIDE (GLUCOTROL) 10 MG Tab; Take 1 Tab by mouth 2 times a day.  Dispense: 180 Tab; Refill: 0  - COMP METABOLIC PANEL; Future  - Microalbumin, future  - HEMOGLOBIN A1C; Future    2. CKD stage 3 due to type 2 diabetes mellitus (HCC)  3. Single kidney  Stable, advised to continue to avoid NSAIDs, have good fluid intake    4. Essential hypertension  Controlled, continue current treatment  and monitoring blood pressure at home    5. Coronary artery disease involving native coronary artery of native heart without angina pectoris  6. S/P CABG x 4  Asymptomatic, continue current treatment.    7. Dyslipidemia  Note at the goal, may improve with diabetes control, follow-up labs.   Advised lifestyle is above.  - LIPID PROFILE; Future      8. S/P total thyroidectomy  - TSH; Future  9. Papillary carcinoma of thyroid (CMS-HCC)  10. Postoperative hypothyroidism  Stable, controlled, continue current treatment and endocrinology follow-up    11.  Healthcare maintenance  UTD    All questions are answered.    Smoking Counseling: Nonsmoker    Followup: Return in about 3 months (around 9/19/2018) for DM-RN.

## 2018-06-26 ENCOUNTER — HOSPITAL ENCOUNTER (OUTPATIENT)
Facility: MEDICAL CENTER | Age: 78
End: 2018-06-26
Attending: INTERNAL MEDICINE
Payer: MEDICARE

## 2018-06-26 LAB
CREAT UR-MCNC: 125.9 MG/DL
MICROALBUMIN UR-MCNC: 11.8 MG/DL
MICROALBUMIN/CREAT UR: 94 MG/G (ref 0–30)

## 2018-06-26 PROCEDURE — 82043 UR ALBUMIN QUANTITATIVE: CPT

## 2018-06-26 PROCEDURE — 82570 ASSAY OF URINE CREATININE: CPT

## 2018-07-09 ENCOUNTER — PATIENT MESSAGE (OUTPATIENT)
Dept: MEDICAL GROUP | Facility: MEDICAL CENTER | Age: 78
End: 2018-07-09

## 2018-07-09 DIAGNOSIS — I15.9 SECONDARY HYPERTENSION: ICD-10-CM

## 2018-07-09 RX ORDER — ATENOLOL 25 MG/1
25 TABLET ORAL DAILY
Qty: 90 TAB | Refills: 3 | Status: SHIPPED | OUTPATIENT
Start: 2018-07-09 | End: 2019-01-23 | Stop reason: CLARIF

## 2018-07-10 ENCOUNTER — ANTICOAGULATION VISIT (OUTPATIENT)
Dept: MEDICAL GROUP | Facility: MEDICAL CENTER | Age: 78
End: 2018-07-10
Payer: MEDICARE

## 2018-07-10 DIAGNOSIS — Z79.01 CHRONIC ANTICOAGULATION: ICD-10-CM

## 2018-07-10 DIAGNOSIS — Z95.828 PRESENCE OF IVC FILTER: ICD-10-CM

## 2018-07-10 DIAGNOSIS — I26.99 MULTIPLE PULMONARY EMBOLI (HCC): ICD-10-CM

## 2018-07-10 LAB — INR PPP: 4.3 (ref 2–3.5)

## 2018-07-10 PROCEDURE — 85610 PROTHROMBIN TIME: CPT | Performed by: FAMILY MEDICINE

## 2018-07-10 PROCEDURE — 99211 OFF/OP EST MAY X REQ PHY/QHP: CPT | Performed by: INTERNAL MEDICINE

## 2018-07-10 NOTE — PROGRESS NOTES
Anticoagulation Summary  As of 7/10/2018    INR goal:   2.0-3.0   TTR:   46.1 % (4.6 mo)   Today's INR:   4.3!   Warfarin maintenance plan:   5 mg (5 mg x 1) every day   Weekly warfarin total:   35 mg   Plan last modified:   Marta Quintana, PharmD (7/10/2018)   Next INR check:   7/24/2018   Target end date:   Indefinite    Indications    Presence of IVC filter [Z95.828]  Transient ischemic attack [G45.9] [G45.9]  Deep vein thrombosis (DVT) of both lower extremities (HCC) [I82.403]  DVT (deep venous thrombosis) (HCC) [I82.409]  Multiple pulmonary emboli (HCC) [I26.99]  Chronic anticoagulation [Z79.01]             Anticoagulation Episode Summary     INR check location:       Preferred lab:       Send INR reminders to:       Comments:         Anticoagulation Care Providers     Provider Role Specialty Phone number    [facility]   [phone]    Edward Walters M.D.  Family Medicine [phone]        Anticoagulation Patient Findings    History of Present Illness: follow up appointment for chronic anticoagulation with the high risk medication, warfarin for TIA, DVT.    Last INR was out of range, dosage adjusted: pt remains supra therapeutic today, trending upward.  Will HOLD dose tonight, AND decrease weekly dose by 12%.     Follow up in 2 weeks, to reduce risk of adverse events related to this high risk medication,  Warfarin.    Marta Quintana, PharmD      Pt declines vitals today

## 2018-07-19 DIAGNOSIS — I10 ESSENTIAL HYPERTENSION: ICD-10-CM

## 2018-07-19 RX ORDER — LOSARTAN POTASSIUM 50 MG/1
50 TABLET ORAL DAILY
Qty: 90 TAB | Refills: 0 | Status: SHIPPED | OUTPATIENT
Start: 2018-07-19 | End: 2018-10-09 | Stop reason: SDUPTHER

## 2018-07-24 ENCOUNTER — ANTICOAGULATION VISIT (OUTPATIENT)
Dept: MEDICAL GROUP | Facility: MEDICAL CENTER | Age: 78
End: 2018-07-24
Payer: MEDICARE

## 2018-07-24 VITALS — SYSTOLIC BLOOD PRESSURE: 114 MMHG | DIASTOLIC BLOOD PRESSURE: 53 MMHG | HEART RATE: 69 BPM

## 2018-07-24 DIAGNOSIS — Z79.01 CHRONIC ANTICOAGULATION: Primary | ICD-10-CM

## 2018-07-24 DIAGNOSIS — Z95.828 PRESENCE OF IVC FILTER: ICD-10-CM

## 2018-07-24 DIAGNOSIS — I26.99 MULTIPLE PULMONARY EMBOLI (HCC): ICD-10-CM

## 2018-07-24 LAB — INR PPP: 2 (ref 2–3.5)

## 2018-07-24 PROCEDURE — 85610 PROTHROMBIN TIME: CPT | Performed by: PHYSICIAN ASSISTANT

## 2018-07-24 PROCEDURE — 99999 PR NO CHARGE: CPT | Performed by: PHYSICIAN ASSISTANT

## 2018-07-24 NOTE — PROGRESS NOTES
OP Anticoagulation Service Note    Date: 7/24/2018  Vitals:    07/24/18 0854   BP: 114/53   Pulse: 69         Anticoagulation Summary  As of 7/24/2018    INR goal:   2.0-3.0   TTR:   45.8 % (5.1 mo)   Today's INR:   2.0   Warfarin maintenance plan:   5 mg (5 mg x 1) every day   Weekly warfarin total:   35 mg   Plan last modified:   Marta Quintana, PharmD (7/10/2018)   Next INR check:   8/7/2018   Target end date:   Indefinite    Indications    Presence of IVC filter [Z95.828]  Transient ischemic attack [G45.9] [G45.9]  Deep vein thrombosis (DVT) of both lower extremities (HCC) [I82.403]  DVT (deep venous thrombosis) (HCC) [I82.409]  Multiple pulmonary emboli (HCC) [I26.99]  Chronic anticoagulation [Z79.01]             Anticoagulation Episode Summary     INR check location:       Preferred lab:       Send INR reminders to:       Comments:         Anticoagulation Care Providers     Provider Role Specialty Phone number    [facility]   [phone]    Edward Walters M.D.  Family Medicine [phone]        Anticoagulation Patient Findings      HPI:   Paulo Paredes seen in clinic today, on anticoagulation therapy with warfarin (a high risk medication) for hx of PE/DVT and TIA    Pt is here today to evaluate anticoagulation therapy    Previous INR was  4.3 on 7-    Pt was instructed to HOLD then lower weekly regimen    Confirmed warfarin dosing regimen, denies missed or extra doses of coumadin.   Diet has been consistent with foods rich in vitamin K: Yes  Changes in ETOH:  No  Changes in smoking status: No  Changes in medication: No   Cost restriction: No  S/s of bleeding:  No  Signs/symptoms  thrombosis since the last appt: No    A/P   INR  therapeutic today, will require close follow up as previous INR was supra-therapeutic   Continue current warfarin regimen          2-2019 check referral    Pt educated to contact our clinic with any changes in medications or s/s of bleeding or  thrombosis. Pt is aware to seek immediate medical attention for falls, head injury or deep cuts    Follow up appointment in 2 week(s) to reduce risk of adverse events from warfarin   Kelsey Junior, PharmD

## 2018-07-26 ENCOUNTER — PATIENT OUTREACH (OUTPATIENT)
Dept: HEALTH INFORMATION MANAGEMENT | Facility: OTHER | Age: 78
End: 2018-07-26

## 2018-07-26 NOTE — PROGRESS NOTES
Attempt #:1    WebIZ Checked & Epic Updated: Yes  HealthConnect Verified: yes  Verify PCP: yes    Communication Preference Obtained: yes    Diabetes Visit Scheduling  Scheduling Status:Scheduled      Care Gap Scheduling (Attempt to Schedule EACH Overdue Care Gap!)    Health Maintenance Due   Topic Date Due   • IMM PNEUMOCOCCAL 65+ (ADULT) HIGH/HIGHEST RISK SERIES (2 of 2 - PCV13) 11/01/2005   • IMM ZOSTER VACCINES (2 of 3) 12/23/2008   • IMM DTaP/Tdap/Td Vaccine (1 - Tdap) 03/05/2010   • Annual Wellness Visit  04/24/2016        Patient was directed to Health and Wellness Website: no     - Patient already has appointment scheduled for DM VISIT. SCHEDULED AWV    Screened for Food Pant[employer] Prescription? yes  [employer] Activation: already active  [employer] Jojo: no  Virtual Visits: no  Opt In to Text Messages: no

## 2018-08-01 ENCOUNTER — TELEPHONE (OUTPATIENT)
Dept: MEDICAL GROUP | Facility: MEDICAL CENTER | Age: 78
End: 2018-08-01

## 2018-08-01 NOTE — TELEPHONE ENCOUNTER
Future Appointments       Provider Department Center    [facility]2018 8:45 [facility]    8/8/2018 10:40 AM Edward Walters M.D.; [facility]  [facility] LISA Herman    10/9/2018 2:20 PM Edward Walters M.D.; [facility] DIABETES RN [facility] S. Herman    3/19/2019 1:00 PM Yunier Hughes M.D. [facility]        ANNUAL WELLNESS VISIT PRE-VISIT PLANNING WITHOUT OUTREACH    1.  Reviewed note from last office visit with PCP: YES 06/19/2018      3.  Immunizations were updated in [facility] using WebIZ?: Yes       •  WebIZ Recommendations: PREVNAR (PCV13) , TDAP and SHINGRIX (Shingles)        •  Is patient due for Tdap? Yes       •  Is patient due for Shingles?Yes    4.  Patient is due for the following Health Maintenance Topics:   Health Maintenance Due   Topic Date Due   • IMM PNEUMOCOCCAL 65+ (ADULT) HIGH/HIGHEST RISK SERIES (2 of 2 - PCV13) 11/01/2005   • IMM ZOSTER VACCINES (2 of 3) 12/23/2008   • IMM DTaP/Tdap/Td Vaccine (1 - Tdap) 03/05/2010   • Annual Wellness Visit  04/24/2016       6.  Care Team Updated:       •   DME Company (gait device, O2, CPAP, etc.): N\A       •   Other Specialists (eye doctor, derm, GYN, cardiology, endo, etc): N\A    7.  Patient has the following Care Path diagnoses on Problem List:  NONE    8.  MDX printed and highlighted for Provider? YES    9.  Patient was advised: “This is a free wellness visit. The provider will screen for medical conditions to help you stay healthy. If you have other concerns to address you may be asked to discuss these at a separate visit or there may be an additional fee.”     10.  Patient was informed to arrive 15 min prior to their scheduled appointment and bring in their medication bottles.

## 2018-08-01 NOTE — TELEPHONE ENCOUNTER
Left message for patient to call back regarding pre-visit planning. Please transfer call to [phone].

## 2018-08-07 ENCOUNTER — ANTICOAGULATION VISIT (OUTPATIENT)
Dept: MEDICAL GROUP | Facility: MEDICAL CENTER | Age: 78
End: 2018-08-07
Payer: MEDICARE

## 2018-08-07 VITALS — SYSTOLIC BLOOD PRESSURE: 130 MMHG | HEART RATE: 59 BPM | DIASTOLIC BLOOD PRESSURE: 73 MMHG

## 2018-08-07 DIAGNOSIS — Z79.01 CHRONIC ANTICOAGULATION: Primary | ICD-10-CM

## 2018-08-07 DIAGNOSIS — I26.99 MULTIPLE PULMONARY EMBOLI (HCC): ICD-10-CM

## 2018-08-07 DIAGNOSIS — Z95.828 PRESENCE OF IVC FILTER: ICD-10-CM

## 2018-08-07 LAB — INR PPP: 2.7 (ref 2–3.5)

## 2018-08-07 PROCEDURE — 85610 PROTHROMBIN TIME: CPT | Performed by: INTERNAL MEDICINE

## 2018-08-07 PROCEDURE — 99999 PR NO CHARGE: CPT | Performed by: INTERNAL MEDICINE

## 2018-08-07 NOTE — PROGRESS NOTES
OP Anticoagulation Service Note    Date: 8/7/2018  Vitals:    08/07/18 0855   BP: 130/73   Pulse: (!) 59       Anticoagulation Summary  As of 8/7/2018    INR goal:   2.0-3.0   TTR:   50.4 % (5.5 mo)   Today's INR:   2.7   Warfarin maintenance plan:   5 mg (5 mg x 1) every day   Weekly warfarin total:   35 mg   Plan last modified:   Marta Quintana, PharmD (7/10/2018)   Next INR check:   9/4/2018   Target end date:   Indefinite    Indications    Presence of IVC filter [Z95.828]  Transient ischemic attack [G45.9] [G45.9]  Deep vein thrombosis (DVT) of both lower extremities (HCC) [I82.403]  DVT (deep venous thrombosis) (HCC) [I82.409]  Multiple pulmonary emboli (HCC) [I26.99]  Chronic anticoagulation [Z79.01]             Anticoagulation Episode Summary     INR check location:       Preferred lab:       Send INR reminders to:       Comments:         Anticoagulation Care Providers     Provider Role Specialty Phone number    [facility]   [phone]    Edward Walters M.D.  Family Medicine [phone]        Anticoagulation Patient Findings      HPI:   Pauol Paredes seen in clinic today, on anticoagulation therapy with warfarin (a high risk medication) for hx of DVT and PE       Pt is here today to evaluate anticoagulation therapy    Previous INR was  2.0 on 7-    Pt was instructed to Continue current warfarin regimen       Confirmed warfarin dosing regimen, denies missed or extra doses of coumadin.   Diet has been consistent with foods rich in vitamin K: Yes  Changes in ETOH:  No  Changes in smoking status: No  Changes in medication: No   Cost restriction: No  S/s of bleeding:  No  Signs/symptoms  thrombosis since the last appt: No    A/P   INR  therapeutic today,   Continue current warfarin regimen       2-2019 check referral    Pt educated to contact our clinic with any changes in medications or s/s of bleeding or thrombosis. Pt is aware to seek immediate medical attention for  falls, head injury or deep cuts    Follow up appointment in 4 week(s) to reduce risk of adverse events from warfarin  Kelsey Junior, PharmD

## 2018-08-07 NOTE — TELEPHONE ENCOUNTER
Left message for patient to call back regarding pre-visit planning. Please transfer call to [phone].

## 2018-08-08 ENCOUNTER — OFFICE VISIT (OUTPATIENT)
Dept: MEDICAL GROUP | Facility: MEDICAL CENTER | Age: 78
End: 2018-08-08
Payer: MEDICARE

## 2018-08-08 VITALS
BODY MASS INDEX: 29.57 KG/M2 | DIASTOLIC BLOOD PRESSURE: 60 MMHG | TEMPERATURE: 98.2 F | RESPIRATION RATE: 16 BRPM | HEART RATE: 60 BPM | SYSTOLIC BLOOD PRESSURE: 128 MMHG | HEIGHT: 68 IN | WEIGHT: 195.11 LBS | OXYGEN SATURATION: 95 %

## 2018-08-08 DIAGNOSIS — I25.10 CORONARY ARTERY DISEASE INVOLVING NATIVE CORONARY ARTERY OF NATIVE HEART WITHOUT ANGINA PECTORIS: ICD-10-CM

## 2018-08-08 DIAGNOSIS — K63.5 POLYP OF COLON, UNSPECIFIED PART OF COLON, UNSPECIFIED TYPE: ICD-10-CM

## 2018-08-08 DIAGNOSIS — Z79.01 CHRONIC ANTICOAGULATION: ICD-10-CM

## 2018-08-08 DIAGNOSIS — E78.5 DYSLIPIDEMIA: ICD-10-CM

## 2018-08-08 DIAGNOSIS — C73 PAPILLARY CARCINOMA OF THYROID (HCC): ICD-10-CM

## 2018-08-08 DIAGNOSIS — K21.9 GASTROESOPHAGEAL REFLUX DISEASE, ESOPHAGITIS PRESENCE NOT SPECIFIED: ICD-10-CM

## 2018-08-08 DIAGNOSIS — Z90.49 S/P PARTIAL RESECTION OF COLON: ICD-10-CM

## 2018-08-08 DIAGNOSIS — E03.9 ACQUIRED HYPOTHYROIDISM: ICD-10-CM

## 2018-08-08 DIAGNOSIS — N52.9 ERECTILE DYSFUNCTION, UNSPECIFIED ERECTILE DYSFUNCTION TYPE: ICD-10-CM

## 2018-08-08 DIAGNOSIS — I10 ESSENTIAL HYPERTENSION: ICD-10-CM

## 2018-08-08 DIAGNOSIS — Z85.46 H/O PROSTATE CANCER: ICD-10-CM

## 2018-08-08 DIAGNOSIS — H40.10X0 OPEN-ANGLE GLAUCOMA, UNSPECIFIED GLAUCOMA STAGE, UNSPECIFIED LATERALITY, UNSPECIFIED OPEN-ANGLE GLAUCOMA TYPE: ICD-10-CM

## 2018-08-08 DIAGNOSIS — E89.0 HYPOTHYROIDISM, POSTSURGICAL: ICD-10-CM

## 2018-08-08 DIAGNOSIS — Z95.828 PRESENCE OF IVC FILTER: ICD-10-CM

## 2018-08-08 DIAGNOSIS — Z95.1 S/P CABG X 4: ICD-10-CM

## 2018-08-08 DIAGNOSIS — Z90.5 SINGLE KIDNEY: ICD-10-CM

## 2018-08-08 DIAGNOSIS — Z23 NEED FOR PNEUMOCOCCAL VACCINATION: ICD-10-CM

## 2018-08-08 DIAGNOSIS — E89.0 POSTOPERATIVE HYPOTHYROIDISM: ICD-10-CM

## 2018-08-08 DIAGNOSIS — Z00.00 HEALTH CARE MAINTENANCE: ICD-10-CM

## 2018-08-08 DIAGNOSIS — N18.4 CKD (CHRONIC KIDNEY DISEASE) STAGE 4, GFR 15-29 ML/MIN (HCC): ICD-10-CM

## 2018-08-08 DIAGNOSIS — E89.0 S/P TOTAL THYROIDECTOMY: ICD-10-CM

## 2018-08-08 DIAGNOSIS — Z86.73 HX OF TRANSIENT ISCHEMIC ATTACK (TIA): ICD-10-CM

## 2018-08-08 DIAGNOSIS — Z00.00 MEDICARE ANNUAL WELLNESS VISIT, SUBSEQUENT: ICD-10-CM

## 2018-08-08 PROCEDURE — G0439 PPPS, SUBSEQ VISIT: HCPCS | Mod: 25 | Performed by: INTERNAL MEDICINE

## 2018-08-08 PROCEDURE — G0009 ADMIN PNEUMOCOCCAL VACCINE: HCPCS | Performed by: INTERNAL MEDICINE

## 2018-08-08 PROCEDURE — 90670 PCV13 VACCINE IM: CPT | Performed by: INTERNAL MEDICINE

## 2018-08-08 ASSESSMENT — ACTIVITIES OF DAILY LIVING (ADL): BATHING_REQUIRES_ASSISTANCE: 0

## 2018-08-08 ASSESSMENT — PATIENT HEALTH QUESTIONNAIRE - PHQ9: CLINICAL INTERPRETATION OF PHQ2 SCORE: 0

## 2018-08-08 ASSESSMENT — ENCOUNTER SYMPTOMS: GENERAL WELL-BEING: FAIR

## 2018-08-08 NOTE — PROGRESS NOTES
Chief Complaint   Patient presents with   • Annual Exam     HPI:  Paulo is a 77 y.o. here for Medicare Annual Wellness Visit    Patient Active Problem List    Diagnosis Date Noted   • Chronic anticoagulation 08/01/2014     Priority: High   • Presence of IVC filter 06/02/2014     Priority: High   • Essential hypertension 12/03/2012     Priority: Medium   • S/P CABG x 4 12/03/2012     Priority: Medium   • Dyslipidemia 03/19/2018   • Transient ischemic attack [G45.9] 02/12/2018   • Health care maintenance 02/12/2018   • Deep vein thrombosis (DVT) of both lower extremities (HCC) 09/09/2015   • Hypothyroidism, postsurgical 08/03/2015   • Perirectal abscess 03/16/2015   • Hypothyroidism 02/09/2015   • postsurgical hypothyroidism    • Papillary carcinoma of thyroid (CMS-HCC)    • DVT (deep venous thrombosis) (HCC)    • Multiple pulmonary emboli (HCC)    • S/P total thyroidectomy 06/02/2014   • CAD (coronary artery disease) 01/22/2014   • S/P partial resection of colon 07/09/2013   • Glaucoma 07/09/2013   • GERD (gastroesophageal reflux disease) 12/03/2012   • ED (erectile dysfunction) 12/03/2012   • Routine health maintenance 12/03/2012   • Colon polyp 12/03/2012   • Single kidney 12/03/2012   • H/O prostate cancer 12/03/2012   • Basal cell carcinoma of skin 12/03/2012       Current Outpatient Prescriptions   Medication Sig Dispense Refill   • losartan (COZAAR) 50 MG Tab Take 1 Tab by mouth every day. 90 Tab 0   • atenolol (TENORMIN) 25 MG Tab Take 1 Tab by mouth every day. 90 Tab 3   • glipiZIDE (GLUCOTROL) 10 MG Tab Take 1 Tab by mouth 2 times a day. 180 Tab 0   • Lancets (MICROLET) Misc by Does not apply route.     • aspirin 81 MG tablet Take 81 mg by mouth.     • Blood Glucose Monitoring Suppl (ONE TOUCH ULTRA 2) w/Device Kit 1 Each by Other route.     • Calcium Carb-Cholecalciferol (CALCIUM 1000 + D PO) Take 1,000 Units by mouth.     • enoxaparin (LOVENOX) 120 MG/0.8ML Solution inj 120 mg by Subdermal route.     •  fluvastatin (LESCOL) 20 MG Cap Take 20 mg by mouth.     • levothyroxine (SYNTHROID) 137 MCG Tab      • Omega-3 Krill Oil 300 MG Cap Take 350 mg by mouth.     • glucose blood (ONE TOUCH ULTRA TEST) strip To check twice daily Use as directed.     • Cinnamon 500 MG Cap Take 1,000 mg by mouth.     • Coenzyme Q10 100 MG Cap Take 100 mg by mouth.     • Cyanocobalamin (VITAMIN B-12) 1000 MCG Tab Take 1,000 mcg by mouth.     • fenofibrate (TRIGLIDE) 160 MG tablet Take 160 mg by mouth.     • levothyroxine (SYNTHROID) 137 MCG Tab TAKE 1 TABLET BY MOUTH DAILY' TAKE ON EMPTY STOMACH 1HR BEFORE FOOD DONT COMBINE WITH OTHER MEDS     • Levothyroxine Sodium 137 MCG Cap Take 137 mcg by mouth every day. 90 Cap 0   • warfarin (COUMADIN) 5 MG TABS Take 1 Tab by mouth every day. Take 1 to 1.5t;po;qd;or as directed 135 Tab 1   • Coenzyme Q10 (CO Q-10 PO) Take  by mouth every day.     • fenofibrate (TRICOR) 145 MG TABS Take 1 Tab by mouth every day. 90 Tab 3   • glucose blood (ASCENSIA MICROFILL TEST) strip as directed     • fenofibrate (TRIGLIDE) 160 MG tablet      • Multiple Vitamins-Minerals (OCUVITE-LUTEIN) Tab Take 1 tablet by mouth.     • ONETOUCH DELICA LANCETS FINE Misc 2 Each by Other route.     • atenolol (TENORMIN) 25 MG Tab TAKE 1 TAB BY MOUTH DAILY.     • levothyroxine (SYNTHROID) 137 MCG Tab TAKE 1 TABLET BY MOUTH DAILY' TAKE ON EMPTY STOMACH 1HR BEFORE FOOD DONT COMBINE WITH OTHER MEDS     • valsartan (DIOVAN) 160 MG Tab Take 160 mg by mouth.     • warfarin (COUMADIN) 5 MG Tab TAKE 1 AND 1/2 TABS THREE DAYS A WEEK AND TAKE 1 TAB 4 DAYS A WEEK--AS DIRECTED BY ANTI-COAG CLINIC     • diazepam (VALIUM) 2 MG TABS Take 2 mg by mouth 1 time daily as needed. Indications: Feeling Anxious     • CINNAMON PO Take 1 Cap by mouth every day.     • Cyanocobalamin (VITAMIN B12 PO) Take 1 Tab by mouth every day.     • acetaminophen (TYLENOL) 500 MG TABS Take 1,000 mg by mouth every 6 hours as needed. Indications: Pain     • sodium chloride  (OCEAN) 0.65 % SOLN Spray 2 Sprays in nose as needed. Indications: nasal dryness     • latanoprost (XALATAN) 0.005 % SOLN Place 1 Drop in both eyes every bedtime.       No current facility-administered medications for this visit.       Patient is taking medications as noted in medication list.  Current supplements as per medication list.     Allergies: Lactose and Metoprolol    Current social contact/activities: [employer] club, spend time with wife.      Is patient current with immunizations? No, due for PREVNAR (PCV13)  and SHINGRIX (Shingles). Patient is interested in receiving PREVNAR (PCV13)  today.    He  reports that he has never smoked. He has never used smokeless tobacco. He reports that he drinks alcohol. He reports that he does not use drugs.  Counseling given: Not Answered    DPA/Advanced directive: Patient declined to bring in copy of advance directive.     ROS:    Gait: Uses no assistive device   Ostomy: No   Other tubes: No   Amputations: No   Chronic oxygen use No   Last eye exam 06/2018   Wears hearing aids: No     : Reports urinary leakage during the last 6 months that has not interfered at all with their daily activities or sleep.    Screening:    DIABETES  Has patient ever had diabetes education? Yes, and is NOT interested in more at this time.    Depression Screening  Little interest or pleasure in doing things?  0 - not at all  Feeling down, depressed, or hopeless? 0 - not at all  Patient Health Questionnaire Score: 0    Screening for Cognitive Impairment  Three Minute Recall (leader, season, table)  2/3 (leader, season, table)  Donal clock face with all 12 numbers and set the hands to show 10 past 11.  Yes 5/5  If cognitive concerns identified, deferred for follow up unless specifically addressed in assessment and plan.    Fall Risk Assessment  Has the patient had two or more falls in the last year or any fall with injury in the last year?  No  If fall risk identified, deferred for follow up  unless specifically addressed in assessment and plan.    Safety Assessment  Throw rugs on floor.  Yes  Handrails on all stairs.  No  Good lighting in all hallways.  Yes  Difficulty hearing.  No  Patient counseled about all safety risks that were identified.    Functional Assessment ADLs  Are there any barriers preventing you from cooking for yourself or meeting nutritional needs?  No.    Are there any barriers preventing you from driving safely or obtaining transportation?  No.    Are there any barriers preventing you from using a telephone or calling for help?  No.    Are there any barriers preventing you from shopping?  No.    Are there any barriers preventing you from taking care of your own finances?  No.    Are there any barriers preventing you from managing your medications?  No.    Are there any barriers preventing you from showering, bathing or dressing yourself?  No.    Are you currently engaging in any exercise or physical activity?  Yes.  Walking daily 50 mins on treadmill.  What is your perception of your health?  Fair.    Health Maintenance Summary                IMM PNEUMOCOCCAL 65+ (ADULT) HIGH/HIGHEST RISK SERIES Overdue 11/1/2005      Done 11/1/2004 Imm Admin: Pneumococcal polysaccharide vaccine (PPSV-23)     Patient has more history with this topic...    IMM ZOSTER VACCINES Overdue 12/23/2008      Done 10/28/2008 Imm Admin: Zoster Vaccine Live (ZVL) (Zostavax)    IMM DTaP/Tdap/Td Vaccine Overdue 3/5/2010      Done 3/4/2010 Imm Admin: TD Vaccine     Patient has more history with this topic...    Annual Wellness Visit Overdue 4/24/2016      Done 4/24/2015     IMM INFLUENZA Next Due 9/1/2018      Done 10/11/2017 Imm Admin: Influenza Vaccine Adult HD     Patient has more history with this topic...    A1C SCREENING Next Due 12/6/2018      Done 6/6/2018 HEMOGLOBIN A1C      Patient has more history with this topic...    RETINAL SCREENING Next Due 5/7/2019      Done 5/7/2018 REFERRAL FOR RETINAL SCREENING  EXAM     Patient has more history with this topic...    FASTING LIPID PROFILE Next Due 6/6/2019      Done 6/6/2018 LIPID PROFILE      Patient has more history with this topic...    SERUM CREATININE Next Due 6/6/2019      Done 6/6/2018 COMP METABOLIC PANEL      Patient has more history with this topic...    DIABETES MONOFILAMENT / LE EXAM Next Due 6/19/2019      Done 6/19/2018      Patient has more history with this topic...    URINE ACR / MICROALBUMIN Next Due 6/26/2019      Done 6/26/2018 MICROALBUMIN CREAT RATIO URINE      Patient has more history with this topic...    COLONOSCOPY Next Due 12/22/2022      Done 12/22/2017      Patient has more history with this topic...      Patient Care Team:  Edward Walters M.D. as PCP - General (Family Medicine)  Yunier Hughes M.D. as Consulting Physician (Endocrinology)  [facility]  Abhi Damon M.D. as Consulting Physician (Cardiology)    Social History   Substance Use Topics   • Smoking status: Never Smoker   • Smokeless tobacco: Never Used   • Alcohol use Yes      Comment: 2 PER WK     Family History   Problem Relation Age of Onset   • Diabetes Mother    • Heart Disease Mother    • Diabetes Father    • Cancer Father         pancreas   • Thyroid Sister         Cancer   • Cancer Sister         thyroid   • Diabetes Sister    • Cancer Brother 49        colon   • Diabetes Brother    • Diabetes Maternal Grandfather    • Diabetes Paternal Grandmother    • Diabetes Paternal Grandfather      He  has a past medical history of Anesthesia; Basal cell carcinoma of skin; Cancer (HCC); DVT (deep venous thrombosis) ([facility]) (2014); ED (erectile dysfunction); GERD (gastroesophageal reflux disease); Glaucoma; Heart burn; Hyperlipidemia (12/3/2012); Hypertension; Indigestion; Multiple pulmonary emboli (HCC) (2014); Multiple thyroid nodules (2009); Papillary carcinoma of thyroid (HCC) (2014); postsurgical hypothyroidism (2014); Pre-diabetes; Renal disorder; S/P  "CABG x 4 (2003); S/P colectomy (2010); S/p nephrectomy (1998); S/P prostatectomy (2008); Stroke (HCC); Transient renal failure (2014); Type II or unspecified type diabetes mellitus without mention of complication, not stated as uncontrolled; and Unspecified urinary incontinence.   Past Surgical History:   Procedure Laterality Date   • THYROIDECTOMY TOTAL  5/13/2014    Performed by Eliceo Bolanos M.D. at SURGERY SAME DAY [facility] ORS   • NODE DISSECTION  5/13/2014    Performed by Eliceo Bolanos M.D. at SURGERY SAME DAY [facility] ORS   • COLON RESECTION     • MULTIPLE CORONARY ARTERY BYPASS      x 4 11/2003   • NEPHRECTOMY RADICAL      right side 1998   • OTHER ORTHOPEDIC SURGERY      trotator cuff   • PROSTATECTOMY, RADICAL RETRO      cancer /january 2008     Exam:   Blood pressure 128/60, pulse 60, temperature 36.8 °C (98.2 °F), resp. rate 16, height 1.727 m (5' 8\"), weight 88.5 kg (195 lb 1.7 oz), SpO2 95 %. Body mass index is 29.67 kg/m².  Hearing fair.    Dentition fair  Alert, oriented in no acute distress.  Eye contact is good, speech goal directed, affect calm    Labs  Reviewed and discussed  Lab Results   Component Value Date/Time    CHOLSTRLTOT 223 (H) 06/06/2018 08:25 AM     (H) 06/06/2018 08:25 AM    HDL 33 (A) 06/06/2018 08:25 AM    TRIGLYCERIDE 324 (H) 06/06/2018 08:25 AM       Lab Results   Component Value Date/Time    SODIUM 140 06/06/2018 08:25 AM    POTASSIUM 5.0 06/06/2018 08:25 AM    CHLORIDE 104 06/06/2018 08:25 AM    CO2 28 06/06/2018 08:25 AM    GLUCOSE 171 (H) 06/06/2018 08:25 AM    BUN 35 (H) 06/06/2018 08:25 AM    CREATININE 2.22 (H) 06/06/2018 08:25 AM     Lab Results   Component Value Date/Time    ALKPHOSPHAT 40 06/06/2018 08:25 AM    ASTSGOT 21 06/06/2018 08:25 AM    ALTSGPT 18 06/06/2018 08:25 AM    TBILIRUBIN 0.7 06/06/2018 08:25 AM      Lab Results   Component Value Date/Time    HBA1C 8.0 (H) 06/06/2018 08:25 AM    HBA1C 7.8 (H) 03/12/2018 08:01 AM    HBA1C 7.2 (H) 09/21/2015 " 07:38 AM     No results found for: TSH  Lab Results   Component Value Date/Time    FREET4 1.10 01/26/2016 09:00 AM    FREET4 1.34 09/21/2015 07:38 AM     Lab Results   Component Value Date/Time    WBC 5.1 01/26/2016 09:01 AM    RBC 5.13 01/26/2016 09:01 AM    HEMOGLOBIN 16.5 01/26/2016 09:01 AM    HEMATOCRIT 49.7 01/26/2016 09:01 AM    MCV 96.9 01/26/2016 09:01 AM    MCH 32.2 01/26/2016 09:01 AM    MCHC 33.2 (L) 01/26/2016 09:01 AM    MPV 11.3 01/26/2016 09:01 AM    NEUTSPOLYS 56.60 01/26/2016 09:01 AM    LYMPHOCYTES 31.30 01/26/2016 09:01 AM    MONOCYTES 7.20 01/26/2016 09:01 AM    EOSINOPHILS 3.30 01/26/2016 09:01 AM    BASOPHILS 0.80 01/26/2016 09:01 AM      Assessment and Plan    1. Medicare annual wellness visit, subsequent  - Annual Wellness Visit - Includes PPPS Subsequent ()    2. Health care maintenance  - Annual Wellness Visit - Includes PPPS Subsequent ()    3. Need for pneumococcal vaccination  - Pneumococcal Conjugate Vaccine 13-Valent <4yo IM  - Annual Wellness Visit - Includes PPPS Subsequent ()  Information was provided to the patient regarding the vaccine, including side effects. Vaccine was given by my medical assistant under my supervision.    4. Essential hypertension  Controlled, continue with current meds: losartan, 50 mg QD, atenolol 25 mg QD  - Annual Wellness Visit - Includes PPPS Subsequent ()    5. S/P CABG x 4  Asymptomatic, continue current meds:  - Annual Wellness Visit - Includes PPPS Subsequent ()    6. Presence of IVC filter  Due to remote VTE/DVT  - Annual Wellness Visit - Includes PPPS Subsequent ()    7. Single kidney  Advised good fluid intake, avoid NSAIDs  - Annual Wellness Visit - Includes PPPS Subsequent ()    8. H/O prostate cancer  Remote in remission  - Annual Wellness Visit - Includes PPPS Subsequent ()    9. Acquired hypothyroidism  Postop; Controlled, continue with current dose of levothyroxine  - Annual Wellness Visit - Includes  PPPS Subsequent ()    10. CKD (chronic kidney disease) stage 4, GFR 15-29 ml/min ([facility])  Stable, advised good fluid intake, avoid NSAIDs  - REFERRAL TO NEPHROLOGY  - Annual Wellness Visit - Includes PPPS Subsequent ()    11. Uncontrolled type 2 diabetes mellitus with microalbuminuria, without long-term current use of insulin ([facility])  On glipizide 5 mg BID  - Annual Wellness Visit - Includes PPPS Subsequent ()    12. Gastroesophageal reflux disease, esophagitis presence not specified  Controlled, continue omeprazole 20 mg QD  - Annual Wellness Visit - Includes PPPS Subsequent ()    13. Erectile dysfunction, unspecified erectile dysfunction type  No meds. St post prostate cancer  - Annual Wellness Visit - Includes PPPS Subsequent ()    14. Polyp of colon, unspecified part of colon, unspecified type  Colonoscopy in 2017, need f/u after 5 yrs  - Annual Wellness Visit - Includes PPPS Subsequent ()    15. S/P partial resection of colon  Remote, no GI problems  - Annual Wellness Visit - Includes PPPS Subsequent ()    16. Coronary artery disease involving native coronary artery of native heart without angina pectoris  Asymptomatic, continue statin, BB.   - Annual Wellness Visit - Includes PPPS Subsequent ()    17. Open-angle glaucoma, unspecified glaucoma stage, unspecified laterality, unspecified open-angle glaucoma type  Stable, continue ophthalmology f/u  - Annual Wellness Visit - Includes PPPS Subsequent ()    18. S/P total thyroidectomy  Remote,due to thyroid cancer.  - Annual Wellness Visit - Includes PPPS Subsequent ()    19. Papillary carcinoma of thyroid (CMS-HCC)  St post thyroidectomy.  - Annual Wellness Visit - Includes PPPS Subsequent ()    20. Chronic anticoagulation  Due to h/o DVT, on coumadin.  - Annual Wellness Visit - Includes PPPS Subsequent ()    21. Postoperative hypothyroidism  Controlled hypothyroid.  - Annual Wellness Visit - Includes PPPS  Subsequent ()    22. Hypothyroidism, postsurgical  Controlled, continue levothyroxine 137 mcg QD    - Annual Wellness Visit - Includes PPPS Subsequent ()    23. Dyslipidemia  Controlled, continue with current dose of fenofibrate 160 mg QD, fluvastatin 20 mg QD  - Annual Wellness Visit - Includes PPPS Subsequent ()    24. Hx of transient ischemic attack (TIA)  BP is controlled. On statin and coumadin  - Annual Wellness Visit - Includes PPPS Subsequent ()    Services suggested: No services needed at this time  Health Care Screening recommendations as per orders if indicated.  Referrals offered: PT/OT/Nutrition counseling/Behavioral Health/Smoking cessation as per orders if indicated.    Discussion today about general wellness and lifestyle habits:    · Prevent falls and reduce trip hazards; Cautioned about securing or removing rugs.  · Have a working fire alarm and carbon monoxide detector;   · Engage in regular physical activity and social activities     Follow-up: in 2 months

## 2018-08-13 DIAGNOSIS — E89.0 HYPOTHYROIDISM, POSTSURGICAL: ICD-10-CM

## 2018-08-14 RX ORDER — LEVOTHYROXINE SODIUM 137 UG/1
TABLET ORAL
Qty: 90 TAB | Refills: 2 | Status: SHIPPED | OUTPATIENT
Start: 2018-08-14 | End: 2019-01-14 | Stop reason: SDUPTHER

## 2018-09-04 ENCOUNTER — ANTICOAGULATION VISIT (OUTPATIENT)
Dept: MEDICAL GROUP | Facility: MEDICAL CENTER | Age: 78
End: 2018-09-04
Payer: MEDICARE

## 2018-09-04 VITALS — DIASTOLIC BLOOD PRESSURE: 71 MMHG | SYSTOLIC BLOOD PRESSURE: 146 MMHG | HEART RATE: 57 BPM

## 2018-09-04 DIAGNOSIS — Z95.828 PRESENCE OF IVC FILTER: ICD-10-CM

## 2018-09-04 DIAGNOSIS — Z79.01 CHRONIC ANTICOAGULATION: Primary | ICD-10-CM

## 2018-09-04 LAB — INR PPP: 2.8 (ref 2–3.5)

## 2018-09-04 PROCEDURE — 90662 IIV NO PRSV INCREASED AG IM: CPT | Performed by: REGISTERED NURSE

## 2018-09-04 PROCEDURE — G0008 ADMIN INFLUENZA VIRUS VAC: HCPCS | Performed by: REGISTERED NURSE

## 2018-09-04 PROCEDURE — 85610 PROTHROMBIN TIME: CPT | Performed by: PHYSICIAN ASSISTANT

## 2018-09-04 PROCEDURE — 99999 PR NO CHARGE: CPT | Performed by: PHYSICIAN ASSISTANT

## 2018-09-04 NOTE — PROGRESS NOTES
OP Anticoagulation Service Note    Date: 9/4/2018  Vitals:    09/04/18 0910   BP: 146/71   Pulse: (!) 57       Anticoagulation Summary  As of 9/4/2018    INR goal:   2.0-3.0   TTR:   57.5 % (6.5 mo)   Today's INR:   2.8   Warfarin maintenance plan:   5 mg (5 mg x 1) every day   Weekly warfarin total:   35 mg   Plan last modified:   Marta Quintana, PharmD (7/10/2018)   Next INR check:   10/9/2018   Target end date:   Indefinite    Indications    Presence of IVC filter [Z95.828]  Transient ischemic attack [G45.9] [G45.9]  Deep vein thrombosis (DVT) of both lower extremities (HCC) (Resolved) [I82.403]  DVT (deep venous thrombosis) (HCC) (Resolved) [I82.409]  Multiple pulmonary emboli (HCC) (Resolved) [I26.99]  Chronic anticoagulation [Z79.01]             Anticoagulation Episode Summary     INR check location:       Preferred lab:       Send INR reminders to:       Comments:         Anticoagulation Care Providers     Provider Role Specialty Phone number    [facility]   [phone]    Edward Walters M.D.  Family Medicine [phone]        Anticoagulation Patient Findings      HPI:   Paulo Paredes seen in clinic today, on anticoagulation therapy with warfarin (a high risk medication) for hx of DVT and PE       Pt is here today to evaluate anticoagulation therapy    Previous INR was  2.7 on 8-7-2018    Pt was instructed to Continue current warfarin regimen       Confirmed warfarin dosing regimen, denies missed or extra doses of coumadin.   Diet has been consistent with foods rich in vitamin K: Yes  Changes in ETOH:  No  Changes in smoking status: No  Changes in medication: No   Cost restriction: No  S/s of bleeding:  No  Signs/symptoms  thrombosis since the last appt: No    A/P   INR  therapeutic today  Continue current warfarin regimen          2-2019 check referral    Pt educated to contact our clinic with any changes in medications or s/s of bleeding or thrombosis. Pt is aware to seek  immediate medical attention for falls, head injury or deep cuts    Follow up appointment in 4 week(s) to reduce risk of adverse events from warfarin  Kelsey Junior, PharmD

## 2018-09-10 ENCOUNTER — IMMUNIZATION (OUTPATIENT)
Dept: SOCIAL WORK | Facility: CLINIC | Age: 78
End: 2018-09-10
Payer: MEDICARE

## 2018-09-10 DIAGNOSIS — I82.409 ACUTE THROMBOEMBOLISM OF DEEP VEINS OF LOWER EXTREMITY, UNSPECIFIED LATERALITY (HCC): ICD-10-CM

## 2018-09-10 DIAGNOSIS — Z23 NEED FOR VACCINATION: ICD-10-CM

## 2018-09-11 RX ORDER — WARFARIN SODIUM 5 MG/1
TABLET ORAL
Qty: 90 TAB | Refills: 1 | Status: SHIPPED | OUTPATIENT
Start: 2018-09-11 | End: 2019-01-23 | Stop reason: CLARIF

## 2018-09-26 ENCOUNTER — OFFICE VISIT (OUTPATIENT)
Dept: NEPHROLOGY | Facility: MEDICAL CENTER | Age: 78
End: 2018-09-26
Payer: MEDICARE

## 2018-09-26 VITALS
HEIGHT: 68 IN | TEMPERATURE: 97.8 F | BODY MASS INDEX: 30.16 KG/M2 | DIASTOLIC BLOOD PRESSURE: 74 MMHG | OXYGEN SATURATION: 98 % | SYSTOLIC BLOOD PRESSURE: 128 MMHG | WEIGHT: 199 LBS | RESPIRATION RATE: 14 BRPM | HEART RATE: 58 BPM

## 2018-09-26 DIAGNOSIS — Z95.1 S/P CABG X 4: ICD-10-CM

## 2018-09-26 DIAGNOSIS — Z90.5 SINGLE KIDNEY: ICD-10-CM

## 2018-09-26 DIAGNOSIS — Z85.46 H/O PROSTATE CANCER: ICD-10-CM

## 2018-09-26 DIAGNOSIS — N18.4 CKD (CHRONIC KIDNEY DISEASE) STAGE 4, GFR 15-29 ML/MIN (HCC): ICD-10-CM

## 2018-09-26 DIAGNOSIS — G89.29 CHRONIC BACK PAIN, UNSPECIFIED BACK LOCATION, UNSPECIFIED BACK PAIN LATERALITY: ICD-10-CM

## 2018-09-26 DIAGNOSIS — M54.9 CHRONIC BACK PAIN, UNSPECIFIED BACK LOCATION, UNSPECIFIED BACK PAIN LATERALITY: ICD-10-CM

## 2018-09-26 DIAGNOSIS — R32 URINARY INCONTINENCE, UNSPECIFIED TYPE: ICD-10-CM

## 2018-09-26 DIAGNOSIS — I10 ESSENTIAL HYPERTENSION: ICD-10-CM

## 2018-09-26 PROCEDURE — 99204 OFFICE O/P NEW MOD 45 MIN: CPT | Performed by: INTERNAL MEDICINE

## 2018-09-26 ASSESSMENT — ENCOUNTER SYMPTOMS
INSOMNIA: 1
FEVER: 0
CHILLS: 0
BACK PAIN: 1
SHORTNESS OF BREATH: 0
HYPERTENSION: 1
COUGH: 0
DIAPHORESIS: 0
NAUSEA: 0
VOMITING: 0

## 2018-09-26 NOTE — PROGRESS NOTES
"Subjective:      Paulo Paredes is a 78 y.o. male who presents with Hypertension and Chronic Kidney Disease            Patient has a history of renal cell carcinoma, status post nephrectomy, also history of prostate cancer, chronic kidney disease, recently moved to [facility].      Hypertension   This is a chronic problem. The current episode started more than 1 year ago. The problem is unchanged. The problem is controlled. Pertinent negatives include no chest pain, malaise/fatigue, peripheral edema or shortness of breath. Risk factors for coronary artery disease include diabetes mellitus, male gender and obesity. Past treatments include angiotensin blockers and beta blockers. The current treatment provides significant improvement. There are no compliance problems.  Hypertensive end-organ damage includes kidney disease. Identifiable causes of hypertension include chronic renal disease.   Chronic Kidney Disease   This is a chronic problem. The current episode started more than 1 year ago. The problem occurs constantly. The problem has been waxing and waning. Associated symptoms include urinary symptoms. Pertinent negatives include no chest pain, chills, coughing, diaphoresis, fever, nausea or vomiting.       Review of Systems   Constitutional: Negative for chills, diaphoresis, fever and malaise/fatigue.   Respiratory: Negative for cough and shortness of breath.    Cardiovascular: Negative for chest pain and leg swelling.   Gastrointestinal: Negative for nausea and vomiting.   Genitourinary: Negative for dysuria, frequency and urgency.        Urinary incontinence   Musculoskeletal: Positive for back pain.   Psychiatric/Behavioral: The patient has insomnia.    All other systems reviewed and are negative.         Objective:     /74 (BP Location: Right arm, Patient Position: Sitting)   Pulse (!) 58   Temp 36.6 °C (97.8 °F)   Resp 14   Ht 1.727 m (5' 8\")   Wt 90.3 kg (199 lb)   SpO2 98%   BMI 30.26 kg/m²  "     Physical Exam   Constitutional: He is oriented to person, place, and time. No distress.   HENT:   Head: Normocephalic and atraumatic.   Nose: Nose normal.   Eyes: Conjunctivae are normal. Right eye exhibits no discharge. Left eye exhibits no discharge. No scleral icterus.   Cardiovascular: Normal rate and regular rhythm.    Pulmonary/Chest: Effort normal and breath sounds normal. No respiratory distress. He has no wheezes.   Musculoskeletal: He exhibits no edema.   Neurological: He is alert and oriented to person, place, and time.   Skin: Skin is warm. He is not diaphoretic.   Psychiatric: He has a normal mood and affect. His behavior is normal. Judgment and thought content normal.   Nursing note and vitals reviewed.              Assessment/Plan:     1. Essential hypertension  Controlled  Continued low-sodium diet    2. CKD (chronic kidney disease) stage 4, GFR 15-29 ml/min ([facility])  Etiology is probably multifactorial  I doubt diabetic nephropathy considering the slow progress of his kidney disease  Patient has no uremic symptoms  No acute need for dialysis  Renal dose all medication  Avoid nephrotoxins    3. Single kidney  Avoid nephrotoxins like NSAIDs    4. S/P CABG x 4   REFERRAL TO CARDIOLOGY    5. Uncontrolled type 2 diabetes mellitus with microalbuminuria, without long-term current use of insulin ([facility])  Continue to optimize diabetes control    6. H/O prostate cancer   REFERRAL TO UROLOGY    7. Urinary incontinence, unspecified type   REFERRAL TO UROLOGY    8. Chronic back pain, unspecified back location, unspecified back pain laterality  No clear etiology  Avoid nephrotoxins like NSAIDs  Consider referral to spine neurosurgeon    Over 50% of this 45 minute visit was spent on counseling and coordination of care regarding diet, side effects, and complication.

## 2018-09-29 ENCOUNTER — HOSPITAL ENCOUNTER (OUTPATIENT)
Dept: LAB | Facility: MEDICAL CENTER | Age: 78
End: 2018-09-29
Attending: INTERNAL MEDICINE
Payer: MEDICARE

## 2018-09-29 DIAGNOSIS — N18.4 CKD (CHRONIC KIDNEY DISEASE) STAGE 4, GFR 15-29 ML/MIN (HCC): ICD-10-CM

## 2018-09-29 DIAGNOSIS — E78.5 DYSLIPIDEMIA: ICD-10-CM

## 2018-09-29 DIAGNOSIS — E89.0 S/P TOTAL THYROIDECTOMY: ICD-10-CM

## 2018-09-29 DIAGNOSIS — I10 ESSENTIAL HYPERTENSION: ICD-10-CM

## 2018-09-29 DIAGNOSIS — Z90.5 SINGLE KIDNEY: ICD-10-CM

## 2018-09-29 LAB
ALBUMIN SERPL BCP-MCNC: 4.4 G/DL (ref 3.2–4.9)
ALBUMIN/GLOB SERPL: 1.4 G/DL
ALP SERPL-CCNC: 39 U/L (ref 30–99)
ALT SERPL-CCNC: 18 U/L (ref 2–50)
ANION GAP SERPL CALC-SCNC: 5 MMOL/L (ref 0–11.9)
ANION GAP SERPL CALC-SCNC: 7 MMOL/L (ref 0–11.9)
AST SERPL-CCNC: 20 U/L (ref 12–45)
BASOPHILS # BLD AUTO: 0.8 % (ref 0–1.8)
BASOPHILS # BLD: 0.05 K/UL (ref 0–0.12)
BILIRUB SERPL-MCNC: 0.7 MG/DL (ref 0.1–1.5)
BUN SERPL-MCNC: 32 MG/DL (ref 8–22)
BUN SERPL-MCNC: 32 MG/DL (ref 8–22)
CALCIUM SERPL-MCNC: 9.6 MG/DL (ref 8.5–10.5)
CALCIUM SERPL-MCNC: 9.9 MG/DL (ref 8.5–10.5)
CHLORIDE SERPL-SCNC: 106 MMOL/L (ref 96–112)
CHLORIDE SERPL-SCNC: 107 MMOL/L (ref 96–112)
CHOLEST SERPL-MCNC: 261 MG/DL (ref 100–199)
CO2 SERPL-SCNC: 24 MMOL/L (ref 20–33)
CO2 SERPL-SCNC: 27 MMOL/L (ref 20–33)
CREAT SERPL-MCNC: 2.44 MG/DL (ref 0.5–1.4)
CREAT SERPL-MCNC: 2.48 MG/DL (ref 0.5–1.4)
CREAT UR-MCNC: 166.6 MG/DL
EOSINOPHIL # BLD AUTO: 0.23 K/UL (ref 0–0.51)
EOSINOPHIL NFR BLD: 3.9 % (ref 0–6.9)
ERYTHROCYTE [DISTWIDTH] IN BLOOD BY AUTOMATED COUNT: 49.1 FL (ref 35.9–50)
EST. AVERAGE GLUCOSE BLD GHB EST-MCNC: 169 MG/DL
FASTING STATUS PATIENT QL REPORTED: NORMAL
FASTING STATUS PATIENT QL REPORTED: NORMAL
GLOBULIN SER CALC-MCNC: 3.2 G/DL (ref 1.9–3.5)
GLUCOSE SERPL-MCNC: 148 MG/DL (ref 65–99)
GLUCOSE SERPL-MCNC: 159 MG/DL (ref 65–99)
HBA1C MFR BLD: 7.5 % (ref 0–5.6)
HCT VFR BLD AUTO: 52.4 % (ref 42–52)
HDLC SERPL-MCNC: 36 MG/DL
HGB BLD-MCNC: 16.7 G/DL (ref 14–18)
IMM GRANULOCYTES # BLD AUTO: 0.03 K/UL (ref 0–0.11)
IMM GRANULOCYTES NFR BLD AUTO: 0.5 % (ref 0–0.9)
LDLC SERPL CALC-MCNC: 151 MG/DL
LYMPHOCYTES # BLD AUTO: 2.16 K/UL (ref 1–4.8)
LYMPHOCYTES NFR BLD: 36.5 % (ref 22–41)
MCH RBC QN AUTO: 31.4 PG (ref 27–33)
MCHC RBC AUTO-ENTMCNC: 31.9 G/DL (ref 33.7–35.3)
MCV RBC AUTO: 98.5 FL (ref 81.4–97.8)
MICROALBUMIN UR-MCNC: 40.1 MG/DL
MICROALBUMIN/CREAT UR: 241 MG/G (ref 0–30)
MONOCYTES # BLD AUTO: 0.43 K/UL (ref 0–0.85)
MONOCYTES NFR BLD AUTO: 7.3 % (ref 0–13.4)
NEUTROPHILS # BLD AUTO: 3.02 K/UL (ref 1.82–7.42)
NEUTROPHILS NFR BLD: 51 % (ref 44–72)
NRBC # BLD AUTO: 0 K/UL
NRBC BLD-RTO: 0 /100 WBC
PLATELET # BLD AUTO: 127 K/UL (ref 164–446)
PMV BLD AUTO: 11.7 FL (ref 9–12.9)
POTASSIUM SERPL-SCNC: 5.2 MMOL/L (ref 3.6–5.5)
POTASSIUM SERPL-SCNC: 5.4 MMOL/L (ref 3.6–5.5)
PROT SERPL-MCNC: 7.6 G/DL (ref 6–8.2)
RBC # BLD AUTO: 5.32 M/UL (ref 4.7–6.1)
SODIUM SERPL-SCNC: 138 MMOL/L (ref 135–145)
SODIUM SERPL-SCNC: 138 MMOL/L (ref 135–145)
TRIGL SERPL-MCNC: 368 MG/DL (ref 0–149)
TSH SERPL DL<=0.005 MIU/L-ACNC: 2.65 UIU/ML (ref 0.38–5.33)
WBC # BLD AUTO: 5.9 K/UL (ref 4.8–10.8)

## 2018-09-29 PROCEDURE — 80061 LIPID PANEL: CPT

## 2018-09-29 PROCEDURE — 85025 COMPLETE CBC W/AUTO DIFF WBC: CPT

## 2018-09-29 PROCEDURE — 82043 UR ALBUMIN QUANTITATIVE: CPT

## 2018-09-29 PROCEDURE — 36415 COLL VENOUS BLD VENIPUNCTURE: CPT

## 2018-09-29 PROCEDURE — 83036 HEMOGLOBIN GLYCOSYLATED A1C: CPT

## 2018-09-29 PROCEDURE — 80048 BASIC METABOLIC PNL TOTAL CA: CPT

## 2018-09-29 PROCEDURE — 84443 ASSAY THYROID STIM HORMONE: CPT

## 2018-09-29 PROCEDURE — 80053 COMPREHEN METABOLIC PANEL: CPT

## 2018-09-29 PROCEDURE — 82570 ASSAY OF URINE CREATININE: CPT

## 2018-10-09 ENCOUNTER — OFFICE VISIT (OUTPATIENT)
Dept: MEDICAL GROUP | Facility: MEDICAL CENTER | Age: 78
End: 2018-10-09
Payer: MEDICARE

## 2018-10-09 ENCOUNTER — ANTICOAGULATION VISIT (OUTPATIENT)
Dept: MEDICAL GROUP | Facility: MEDICAL CENTER | Age: 78
End: 2018-10-09
Payer: MEDICARE

## 2018-10-09 VITALS
SYSTOLIC BLOOD PRESSURE: 136 MMHG | WEIGHT: 196.43 LBS | RESPIRATION RATE: 15 BRPM | BODY MASS INDEX: 29.09 KG/M2 | HEIGHT: 69 IN | HEART RATE: 56 BPM | DIASTOLIC BLOOD PRESSURE: 64 MMHG | OXYGEN SATURATION: 97 % | TEMPERATURE: 98.3 F

## 2018-10-09 VITALS — HEART RATE: 58 BPM | DIASTOLIC BLOOD PRESSURE: 67 MMHG | SYSTOLIC BLOOD PRESSURE: 126 MMHG

## 2018-10-09 DIAGNOSIS — E89.0 HYPOTHYROIDISM, POSTSURGICAL: ICD-10-CM

## 2018-10-09 DIAGNOSIS — E78.5 DYSLIPIDEMIA: ICD-10-CM

## 2018-10-09 DIAGNOSIS — Z90.5 SINGLE KIDNEY: ICD-10-CM

## 2018-10-09 DIAGNOSIS — Z85.850 HISTORY OF THYROID CANCER: ICD-10-CM

## 2018-10-09 DIAGNOSIS — G45.9 TRANSIENT ISCHEMIC ATTACK: ICD-10-CM

## 2018-10-09 DIAGNOSIS — N18.4 CKD (CHRONIC KIDNEY DISEASE), STAGE IV (HCC): ICD-10-CM

## 2018-10-09 DIAGNOSIS — D69.6 THROMBOCYTOPENIA (HCC): ICD-10-CM

## 2018-10-09 DIAGNOSIS — M54.50 CHRONIC LUMBOSACRAL PAIN: ICD-10-CM

## 2018-10-09 DIAGNOSIS — G89.29 CHRONIC LUMBOSACRAL PAIN: ICD-10-CM

## 2018-10-09 DIAGNOSIS — I10 ESSENTIAL HYPERTENSION: ICD-10-CM

## 2018-10-09 DIAGNOSIS — Z95.828 PRESENCE OF IVC FILTER: ICD-10-CM

## 2018-10-09 DIAGNOSIS — I25.10 CORONARY ARTERY DISEASE INVOLVING NATIVE CORONARY ARTERY OF NATIVE HEART WITHOUT ANGINA PECTORIS: ICD-10-CM

## 2018-10-09 DIAGNOSIS — Z00.00 HEALTH CARE MAINTENANCE: ICD-10-CM

## 2018-10-09 DIAGNOSIS — Z79.01 CHRONIC ANTICOAGULATION: Primary | ICD-10-CM

## 2018-10-09 DIAGNOSIS — Z95.1 S/P CABG X 4: ICD-10-CM

## 2018-10-09 LAB — INR PPP: 2.2 (ref 2–3.5)

## 2018-10-09 PROCEDURE — 99999 PR NO CHARGE: CPT | Performed by: INTERNAL MEDICINE

## 2018-10-09 PROCEDURE — 99215 OFFICE O/P EST HI 40 MIN: CPT | Performed by: INTERNAL MEDICINE

## 2018-10-09 PROCEDURE — 85610 PROTHROMBIN TIME: CPT | Performed by: INTERNAL MEDICINE

## 2018-10-09 RX ORDER — FENOFIBRATE 160 MG/1
160 TABLET ORAL DAILY
Qty: 90 TAB | Refills: 3 | Status: SHIPPED | OUTPATIENT
Start: 2018-10-09 | End: 2018-10-11

## 2018-10-09 RX ORDER — LOSARTAN POTASSIUM 50 MG/1
50 TABLET ORAL DAILY
Qty: 90 TAB | Refills: 3 | Status: SHIPPED | OUTPATIENT
Start: 2018-10-09 | End: 2019-01-23 | Stop reason: CLARIF

## 2018-10-09 RX ORDER — GLIPIZIDE 10 MG/1
10 TABLET ORAL 2 TIMES DAILY
Qty: 180 TAB | Refills: 3 | Status: SHIPPED | OUTPATIENT
Start: 2018-10-09 | End: 2019-01-23 | Stop reason: CLARIF

## 2018-10-09 NOTE — PROGRESS NOTES
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  Family Medicine [phone]        Anticoagulation Patient Findings  Patient Findings     Negatives:   Signs/symptoms of thrombosis, Signs/symptoms of bleeding, Laboratory test error suspected, Change in health, Change in alcohol use, Change in activity, Upcoming invasive procedure, Emergency department visit, Upcoming dental procedure, Missed doses, Extra doses, Change in medications, Change in diet/appetite, Hospital admission, Bruising, Other complaints          HPI:   Paulo Paredes seen in clinic today, on anticoagulation therapy with warfarin (a high risk medication) for hx of DVT & PE     Pt is here today to evaluate anticoagulation therapy    Previous INR was  2.8 on 9/4/18    Pt was instructed to continue with the current regimen.    Confirmed warfarin dosing regimen, denies  missed or extra doses of coumadin.   Diet has been consistent with foods rich in vitamin K: Yes  Changes in ETOH:  No  Changes in smoking status: No  Changes in medication: No   Cost restriction: No  S/s of bleeding:  No  Signs/symptoms  thrombosis since the last appt: No    A/P   INR is therapeutic today, at 2.2  Patient will continue with the current dosing regimen.    2/2019 check referral    Pt educated to contact our clinic with any changes in medications or s/s of bleeding or thrombosis. Pt is aware to seek immediate medical attention for falls, head injury or deep cuts    Follow up appointment in 5 week(s) to reduce risk of adverse events from warfarin    Julissa CollazoD

## 2018-10-09 NOTE — PROGRESS NOTES
RNRAGHAVENDRA Note    Subjective:     Health changes since last visit/interval Hx: None    Medications (including changes made today)  Current Outpatient Prescriptions   Medication Sig Dispense Refill   • warfarin (COUMADIN) 5 MG Tab Take 1 tablet by mouth daily as directed by the coumadin clinic 90 Tab 1   • levothyroxine (SYNTHROID) 137 MCG Tab TAKE 1 TABLET BY MOUTH ONCE DAILY IN THE MORNING ON AN EMPTY STOMACH, ONE-HALF HOUR BEFORE EATING. 90 Tab 2   • losartan (COZAAR) 50 MG Tab Take 1 Tab by mouth every day. 90 Tab 0   • atenolol (TENORMIN) 25 MG Tab Take 1 Tab by mouth every day. 90 Tab 3   • glipiZIDE (GLUCOTROL) 10 MG Tab Take 1 Tab by mouth 2 times a day. 180 Tab 0   • glucose blood (ASCENSIA MICROFILL TEST) strip as directed     • Lancets (MICROLET) Misc by Does not apply route.     • aspirin 81 MG tablet Take 81 mg by mouth.     • Blood Glucose Monitoring Suppl (ONE TOUCH ULTRA 2) w/Device Kit 1 Each by Other route.     • Calcium Carb-Cholecalciferol (CALCIUM 1000 + D PO) Take 1,000 Units by mouth.     • fenofibrate (TRIGLIDE) 160 MG tablet      • fluvastatin (LESCOL) 20 MG Cap Take 20 mg by mouth.     • Multiple Vitamins-Minerals (OCUVITE-LUTEIN) Tab Take 1 tablet by mouth.     • Omega-3 Krill Oil 300 MG Cap Take 350 mg by mouth.     • glucose blood (ONE TOUCH ULTRA TEST) strip To check twice daily Use as directed.     • ONETOUCH DELICA LANCETS FINE Misc 2 Each by Other route.     • atenolol (TENORMIN) 25 MG Tab TAKE 1 TAB BY MOUTH DAILY.     • Cinnamon 500 MG Cap Take 1,000 mg by mouth.     • Coenzyme Q10 100 MG Cap Take 100 mg by mouth.     • Cyanocobalamin (VITAMIN B-12) 1000 MCG Tab Take 1,000 mcg by mouth.     • fenofibrate (TRIGLIDE) 160 MG tablet Take 160 mg by mouth.     • levothyroxine (SYNTHROID) 137 MCG Tab TAKE 1 TABLET BY MOUTH DAILY' TAKE ON EMPTY STOMACH 1HR BEFORE FOOD DONT COMBINE WITH OTHER MEDS     • Coenzyme Q10 (CO Q-10 PO) Take  by mouth every day.     • diazepam (VALIUM) 2 MG TABS Take 2  "mg by mouth 1 time daily as needed. Indications: Feeling Anxious     • CINNAMON PO Take 1 Cap by mouth every day.     • Cyanocobalamin (VITAMIN B12 PO) Take 1 Tab by mouth every day.     • acetaminophen (TYLENOL) 500 MG TABS Take 1,000 mg by mouth every 6 hours as needed. Indications: Pain     • sodium chloride (OCEAN) 0.65 % SOLN Spray 2 Sprays in nose as needed. Indications: nasal dryness     • fenofibrate (TRICOR) 145 MG TABS Take 1 Tab by mouth every day. 90 Tab 3   • latanoprost (XALATAN) 0.005 % SOLN Place 1 Drop in both eyes every bedtime.       No current facility-administered medications for this visit.        Taking daily ASA: No  Taking above medications as prescribed: yes  SIDE EFFECTS: Patient denies side effects to medications    Exercise: moderate regular exercise, aerobic < 3 days a week  Diet: \"healthy\" diet  in general  Patient's body mass index is 29.43 kg/m². Exercise and nutrition counseling were performed at this visit.  Vitals:    10/09/18 1428   BP: 136/64   Pulse: (!) 56   Resp: 15   Temp: 36.8 °C (98.3 °F)   SpO2: 97%         Health Maintenance:   Health Maintenance Due   Topic Date Due   • IMM HEP B VACCINE (1 of 3 - Risk 3-dose series) 09/06/1959   • IMM ZOSTER VACCINES (2 of 3) 12/23/2008   • IMM DTaP/Tdap/Td Vaccine (1 - Tdap) 03/05/2010       Immunizations:   PPSV23: Up-to-date  [id number]: Up-to-date  Tdap: Up-to-date  Flu: Due  Hep B: Due    DM:   Last A1c:   Lab Results   Component Value Date/Time    HBA1C 7.5 (H) 09/29/2018 08:18 AM      A1C GOAL: < 7    Glucose monitoring frequency: daily     Hypoglycemic episodes: no    Last Retinal Exam: on file and up-to-date  Daily Foot Exam: Yes   Routine Dental Exams: Yes    Lab Results   Component Value Date/Time    MALBCRT 241 (H) 09/29/2018 08:18 AM    MICROALBUR 40.1 09/29/2018 08:18 AM        ACR Albumin/Creatinine Ratio goal <30     HTN:   Blood pressure goal <140/<80 .   Currently Rx ACE/ARB: Yes    Dyslipidemia:    Lab Results "   Component Value Date/Time    CHOLSTRLTOT 261 (H) 09/29/2018 08:18 AM     (H) 09/29/2018 08:18 AM    HDL 36 (A) 09/29/2018 08:18 AM    TRIGLYCERIDE 368 (H) 09/29/2018 08:18 AM       Lab Results   Component Value Date/Time    SODIUM 138 09/29/2018 08:18 AM    SODIUM 138 09/29/2018 08:18 AM    POTASSIUM 5.2 09/29/2018 08:18 AM    POTASSIUM 5.4 09/29/2018 08:18 AM    CHLORIDE 107 09/29/2018 08:18 AM    CHLORIDE 106 09/29/2018 08:18 AM    CO2 24 09/29/2018 08:18 AM    CO2 27 09/29/2018 08:18 AM    GLUCOSE 159 (H) 09/29/2018 08:18 AM    GLUCOSE 148 (H) 09/29/2018 08:18 AM    BUN 32 (H) 09/29/2018 08:18 AM    BUN 32 (H) 09/29/2018 08:18 AM    CREATININE 2.44 (H) 09/29/2018 08:18 AM    CREATININE 2.48 (H) 09/29/2018 08:18 AM     Lab Results   Component Value Date/Time    ALKPHOSPHAT 39 09/29/2018 08:18 AM    ASTSGOT 20 09/29/2018 08:18 AM    ALTSGPT 18 09/29/2018 08:18 AM    TBILIRUBIN 0.7 09/29/2018 08:18 AM        Currently Rx Statin: Yes    He  reports that he has never smoked. He has never used smokeless tobacco.    Objective:     Exam:  Monofilament: done    Plan:     Discussed and educated on:   - All medications, side effects and compliance (discussed carefully)  - Annual eye examinations at Ophthalmology  - Diabetic Meal Plan: foods that contain carbs  - Home glucose monitoring emphasized  - Weight control and daily exercise    Recommended medication changes: add protein with carbs

## 2018-10-09 NOTE — PROGRESS NOTES
CHIEF COMPLIANT:   Chief Complaint   Patient presents with   • Diabetes Mellitus   With DM RN    Paulo Paredes is a 78 y.o. male here for DM follow up    DIABETES MELLITUS TYPE 2  Onset/D  Diabetes education:  unknown     Medications:   • Glipizide 10 mg BID  • ACE/ARB: valsartan  • Statin: fluvastatin 20 mg QD  • ASA: N, on coumadin     Checking feet daily/wear soft socks/shoes: advised     Diabetes ABCDE TARGETS  • A1c, last:  7.5, previous 8.0  • Fingersticks:  N  • Blood Pressure: < 140/90  • Cholesterol-Lipid Panel: elevated choilesterol/triglycerides, low HDL  • Dysalbuminuria:  pos     Diet: regular  Exercise:  Walk daily  BMI:  29 kg/m²     DM complications:  • Peripheral neuropathy:         No numbness or tingling sensation in the feet.  • Retinopathy:                        Last eye exam: . No evidence of retinopathy.    • Nephropathy:                          Neg  • CVS:                                     Has CAD, on rx.   • GI:                             No gastropathy sx (nausea/vomiting).     FH of DM: mother     CKD stage IV, single kidney  The patient had decreased GFR with normal creatinine and electrolytes.   No consistent NSAIDs use.   He has been followed up by nephrology.     HYPERTENSION/CAD, st post CABG  Meds: Valsartan 160 mg daily, atenolol 25 mg daily; no ASA, on coumadin.   Taking meds as prescribed.   He is measuring BP at home, it has been < 140/90  Denies:  -  headaches, vision problems, tinnitus.                 -  chest pain/pressure, palpitations, irregular heart beats, exertional, dyspnea, peripheral edema.  Low salt diet: Y  Diet / exercise / BMI: as above.   FH of HTN: unknown     Dyslipidemia  On fluvastatin 20 mg QD.  Fenofibrate, 160 mg QD, unable to find in a pharmacy   - No muscle weakness, cramps, nausea,abdominal discomfort.   Diet / exercise / BMI: as above.   FH: unknown     H/o thyroid cancer  Hypothyroidism, postsurgical  Dg: in .   Status post total  thyroidectomy.   On Levothyroxine, 137 mcg, taking daily early in am, before any po intake.  No temperature intolerance. No change in weight,  hair/skin quality, BMs.   No tremors, weakness.  No peripheral swelling.  No mood changes.  FH: N  Review TSH.  He has been followed up by endocrinology    Thrombocytopenia  The patient has had borderline thrombocytopenia, stable.   Denies easy bleeding or bruising.   Reviewed medication list.    Lower back pain  - Onset: remote  - Triger: lifting  - located in: lower back, B/L  - intensity:  mild to moderate  - quality:  dull and sharp  - radiation:  no  - alleviating factors are:  rest, tylenol   - epidural injection did not help.  -  exacerbating factors are:  activity  - accompanied:    - no numbness, weakness, tingling, fever, chills  - course: worse for the last 1-2 months  - therapy: as above  -No recent imaging.    No reported history of:  - chronic immune suppression, alcoholism, IV drug abuse, indwelling catheter, diabetes.    - No history of recent spinal surgery or injection.    Denies:  - numbness/saddle anesthesia.  - bowel/bladder changes, fever.   - trauma  - nausea/vomiting  - chest pain, shortness of breath, abdominal pain.      Reviewed PMH, PSH, FH, SH, ALL, IMM, MEDS.     Current medicines (including changes today)  Current Outpatient Prescriptions   Medication Sig Dispense Refill   • warfarin (COUMADIN) 5 MG Tab Take 1 tablet by mouth daily as directed by the coumadin clinic 90 Tab 1   • levothyroxine (SYNTHROID) 137 MCG Tab TAKE 1 TABLET BY MOUTH ONCE DAILY IN THE MORNING ON AN EMPTY STOMACH, ONE-HALF HOUR BEFORE EATING. 90 Tab 2   • losartan (COZAAR) 50 MG Tab Take 1 Tab by mouth every day. 90 Tab 0   • atenolol (TENORMIN) 25 MG Tab Take 1 Tab by mouth every day. 90 Tab 3   • glipiZIDE (GLUCOTROL) 10 MG Tab Take 1 Tab by mouth 2 times a day. 180 Tab 0   • glucose blood (ASCENSIA MICROFILL TEST) strip as directed     • Lancets (MICROLET) Misc by Does  not apply route.     • aspirin 81 MG tablet Take 81 mg by mouth.     • Blood Glucose Monitoring Suppl (ONE TOUCH ULTRA 2) w/Device Kit 1 Each by Other route.     • Calcium Carb-Cholecalciferol (CALCIUM 1000 + D PO) Take 1,000 Units by mouth.     • fenofibrate (TRIGLIDE) 160 MG tablet      • fluvastatin (LESCOL) 20 MG Cap Take 20 mg by mouth.     • Multiple Vitamins-Minerals (OCUVITE-LUTEIN) Tab Take 1 tablet by mouth.     • Omega-3 Krill Oil 300 MG Cap Take 350 mg by mouth.     • glucose blood (ONE TOUCH ULTRA TEST) strip To check twice daily Use as directed.     • ONETOUCH DELICA LANCETS FINE Misc 2 Each by Other route.     • atenolol (TENORMIN) 25 MG Tab TAKE 1 TAB BY MOUTH DAILY.     • Cinnamon 500 MG Cap Take 1,000 mg by mouth.     • Coenzyme Q10 100 MG Cap Take 100 mg by mouth.     • Cyanocobalamin (VITAMIN B-12) 1000 MCG Tab Take 1,000 mcg by mouth.     • fenofibrate (TRIGLIDE) 160 MG tablet Take 160 mg by mouth.     • levothyroxine (SYNTHROID) 137 MCG Tab TAKE 1 TABLET BY MOUTH DAILY' TAKE ON EMPTY STOMACH 1HR BEFORE FOOD DONT COMBINE WITH OTHER MEDS     • Coenzyme Q10 (CO Q-10 PO) Take  by mouth every day.     • diazepam (VALIUM) 2 MG TABS Take 2 mg by mouth 1 time daily as needed. Indications: Feeling Anxious     • CINNAMON PO Take 1 Cap by mouth every day.     • Cyanocobalamin (VITAMIN B12 PO) Take 1 Tab by mouth every day.     • acetaminophen (TYLENOL) 500 MG TABS Take 1,000 mg by mouth every 6 hours as needed. Indications: Pain     • sodium chloride (OCEAN) 0.65 % SOLN Spray 2 Sprays in nose as needed. Indications: nasal dryness     • fenofibrate (TRICOR) 145 MG TABS Take 1 Tab by mouth every day. 90 Tab 3   • latanoprost (XALATAN) 0.005 % SOLN Place 1 Drop in both eyes every bedtime.       No current facility-administered medications for this visit.      Allergies: Lactose and Metoprolol  He  has a past medical history of Anesthesia; Basal cell carcinoma of skin; Cancer (HCC); DVT (deep venous  thrombosis) (HCC) (2014); ED (erectile dysfunction); GERD (gastroesophageal reflux disease); Glaucoma; Heart burn; Hyperlipidemia (12/3/2012); Hypertension; Indigestion; Multiple pulmonary emboli (HCC) (2014); Multiple thyroid nodules (2009); Papillary carcinoma of thyroid (HCC) (2014); postsurgical hypothyroidism (2014); Pre-diabetes; Renal disorder; S/P CABG x 4 (2003); S/P colectomy (2010); S/p nephrectomy (1998); S/P prostatectomy (2008); Stroke (HCC); Transient renal failure (2014); Type II or unspecified type diabetes mellitus without mention of complication, not stated as uncontrolled; and Unspecified urinary incontinence.  He  has a past surgical history that includes colon resection; nephrectomy radical; multiple coronary artery bypass; prostatectomy, radical retro; other orthopedic surgery; thyroidectomy total (5/13/2014); and node dissection (5/13/2014).  Social History   Substance Use Topics   • Smoking status: Never Smoker   • Smokeless tobacco: Never Used   • Alcohol use Yes      Comment: 2 PER WK     Social History     Social History Narrative   • No narrative on file     Family History   Problem Relation Age of Onset   • Diabetes Mother    • Heart Disease Mother    • Diabetes Father    • Cancer Father         pancreas   • Thyroid Sister         Cancer   • Cancer Sister         thyroid   • Diabetes Sister    • Cancer Brother 49        colon   • Diabetes Brother    • Diabetes Maternal Grandfather    • Diabetes Paternal Grandmother    • Diabetes Paternal Grandfather      Family Status   Relation Status   • Mo (Not Specified)   • Fa (Not Specified)   • Sis (Not Specified)   • Bro (Not Specified)   • MGFa (Not Specified)   • PGMo (Not Specified)   • PGFa (Not Specified)     ROS   Constitutional: Negative for fever, chills and weight loss.   HEENT: Negative for blurred vision, sore throat, swollen glands. No hearing loss or vertigo.  Respiratory: Negative for cough, wheezing, shortness of breath.   CVS:  "Negative for chest pain, palpitations, irregular heart beats, exertional dyspnea. And per HPI.  GI: Negative for heartburn, abdominal pain, nausea, vomiting, change in BMs.   : Negative for dysuria, polyuria. And per HPI.  MS: as above.  Neuro: no headaches, numbness, weakness, seizures.   Heme: as above.  Skin: no skin lesions, rash.  Endocrine: per HPI.     PHYSICAL EXAM   Blood pressure 136/64, pulse (!) 56, temperature 36.8 °C (98.3 °F), temperature source Temporal, resp. rate 15, height 1.74 m (5' 8.5\"), weight 89.1 kg (196 lb 6.9 oz), SpO2 97 %. Body mass index is 29.43 kg/m².  Alert, oriented in no acute distress.  Eye contact is good, speech goal directed, affect bright.  HEENT: EOMI, PERRL, conjunctiva non-injected, sclera non-icteric.  Nares patent with no significant congestion or drainage.  Normal pinnae, external auditory canals, TM pearly gray with normal light reflex bilaterally.  Oral mucous membranes pink and moist with no lesions.  Neck supple with no cervical lymphadenopathy, JVD, palpable thyroid nodules or carotid bruits.  Lungs: clear to auscultation bilaterally with good excursion.  CV: regular rate and rhythm, without murmur, rubs, thrills, or gallops.  Abdomen: soft, non-distended, non-tender with normal bowel sounds. No CVAT, No masses, or organomegaly.  Lower extremities: color normal, vascularity normal, no edema, temperature normal  Psych: Normal mood and affect. Alert and oriented x3. Judgment and insight is normal.  Neuro:speech normal, mental status intact, cranial nerves 2-12 intact, gait, including heel, toe, and tandem walking normal, muscle tone normal, muscle strength normal, sensation to light touch and pinprick normal, reflexes normal and symmetric  MS: no TTP over the spine or paraspinal muscles.    LABS     Results reviewed and discussed with the patient, questions answered.    Last labs:  Lab Results   Component Value Date/Time    CHOLSTRLTOT 261 (H) 09/29/2018 08:18 AM    "  (H) 09/29/2018 08:18 AM    HDL 36 (A) 09/29/2018 08:18 AM    TRIGLYCERIDE 368 (H) 09/29/2018 08:18 AM       Lab Results   Component Value Date/Time    SODIUM 138 09/29/2018 08:18 AM    SODIUM 138 09/29/2018 08:18 AM    POTASSIUM 5.2 09/29/2018 08:18 AM    POTASSIUM 5.4 09/29/2018 08:18 AM    CHLORIDE 107 09/29/2018 08:18 AM    CHLORIDE 106 09/29/2018 08:18 AM    CO2 24 09/29/2018 08:18 AM    CO2 27 09/29/2018 08:18 AM    GLUCOSE 159 (H) 09/29/2018 08:18 AM    GLUCOSE 148 (H) 09/29/2018 08:18 AM    BUN 32 (H) 09/29/2018 08:18 AM    BUN 32 (H) 09/29/2018 08:18 AM    CREATININE 2.44 (H) 09/29/2018 08:18 AM    CREATININE 2.48 (H) 09/29/2018 08:18 AM     Lab Results   Component Value Date/Time    ALKPHOSPHAT 39 09/29/2018 08:18 AM    ASTSGOT 20 09/29/2018 08:18 AM    ALTSGPT 18 09/29/2018 08:18 AM    TBILIRUBIN 0.7 09/29/2018 08:18 AM      Lab Results   Component Value Date/Time    HBA1C 7.5 (H) 09/29/2018 08:18 AM    HBA1C 8.0 (H) 06/06/2018 08:25 AM    HBA1C 7.8 (H) 03/12/2018 08:01 AM      Ref. Range 9/29/2018   TSH  0.380 - 5.330 uIU/mL 2.650     Lab Results   Component Value Date/Time    FREET4 1.10 01/26/2016 09:00 AM    FREET4 1.34 09/21/2015 07:38 AM     Lab Results   Component Value Date/Time    WBC 5.9 09/29/2018 08:18 AM    RBC 5.32 09/29/2018 08:18 AM    HEMOGLOBIN 16.7 09/29/2018 08:18 AM    HEMATOCRIT 52.4 (H) 09/29/2018 08:18 AM    MCV 98.5 (H) 09/29/2018 08:18 AM    MCH 31.4 09/29/2018 08:18 AM    MCHC 31.9 (L) 09/29/2018 08:18 AM    MPV 11.7 09/29/2018 08:18 AM    NEUTSPOLYS 51.00 09/29/2018 08:18 AM    LYMPHOCYTES 36.50 09/29/2018 08:18 AM    MONOCYTES 7.30 09/29/2018 08:18 AM    EOSINOPHILS 3.90 09/29/2018 08:18 AM    BASOPHILS 0.80 09/29/2018 08:18 AM      ASSESMENT AND PLAN     1. Uncontrolled type 2 diabetes mellitus with microalbuminuria, without long-term current use of insulin (HCC)  Improved, continue current treatment.    Discussed about:    - importance of appropriate diet and  exercise.   - hypoglycemic symptoms and precautions.    - long-term complications of uncontrolled DM, (increased risk of CAD, strokes, retinopathy, nephropathy, /can lead to kidney failure, dialysis/, peripheral neuropathy, (can lead to amputation)   - Good DM control can prevent / delay many of these complications.    - Recommended appropriate foot care     - COMP METABOLIC PANEL; Future  - HEMOGLOBIN A1C; Future  - MICROALBUMIN CREAT RATIO URINE; Future  - glipiZIDE (GLUCOTROL) 10 MG Tab; Take 1 Tab by mouth 2 times a day.  Dispense: 180 Tab; Refill: 3    2. CKD (chronic kidney disease), stage IV (HCC)  3. Single kidney  Advised to continue to avoid NSAIDs, have good intake and continue nephrology follow-up.    4. Essential hypertension  Controlled, continue current treatment  - COMP METABOLIC PANEL; Future  - losartan (COZAAR) 50 MG Tab; Take 1 Tab by mouth every day.  Dispense: 90 Tab; Refill: 3    5. Coronary artery disease involving native coronary artery of native heart without angina pectoris  6. S/P CABG x 4  Asymptomatic, continue current treatment and cardiology follow-up    7. Dyslipidemia  Triglycerides were high, but he was not on fenofibrate.   He will try to find medication in some other pharmacy.  - COMP METABOLIC PANEL; Future  - LIPID PROFILE; Future    8. Hypothyroidism, postsurgical  - TSH; Future  9. History of thyroid cancer  Controlled, continue current treatment and endocrinology follow-up    10. Thrombocytopenia (HCC)  Borderline, stable, follow-up labs  - CBC WITH DIFFERENTIAL; Future    11. Chronic lumbosacral pain  Advised to continue activity as tolerated, Tylenol as needed  - REFERRAL TO PHYSICAL THERAPY Reason for Therapy: Eval/Treat/Report  - DX-LUMBAR SPINE-2 OR 3 VIEWS; Future.  Declined referral to orthopedics or PMR.    12.  Health care maintenance  UTD    All questions are answered.    Time spent 40 minutes face to face with DM RN, with > 50% spent counseling and coordinating  care.      Smoking Counseling: Nonsmoker    Followup: in 4 months

## 2018-10-10 ENCOUNTER — HOSPITAL ENCOUNTER (OUTPATIENT)
Dept: RADIOLOGY | Facility: MEDICAL CENTER | Age: 78
End: 2018-10-10
Attending: INTERNAL MEDICINE
Payer: MEDICARE

## 2018-10-10 DIAGNOSIS — G89.29 CHRONIC LUMBOSACRAL PAIN: ICD-10-CM

## 2018-10-10 DIAGNOSIS — M54.50 CHRONIC LUMBOSACRAL PAIN: ICD-10-CM

## 2018-10-10 PROCEDURE — 72100 X-RAY EXAM L-S SPINE 2/3 VWS: CPT

## 2018-10-10 RX ORDER — FLUVASTATIN 20 MG/1
20 CAPSULE ORAL
Qty: 90 CAP | Refills: 5 | Status: SHIPPED | OUTPATIENT
Start: 2018-10-10 | End: 2018-10-15

## 2018-10-11 ENCOUNTER — OFFICE VISIT (OUTPATIENT)
Dept: CARDIOLOGY | Facility: MEDICAL CENTER | Age: 78
End: 2018-10-11
Payer: MEDICARE

## 2018-10-11 VITALS
HEIGHT: 69 IN | HEART RATE: 54 BPM | OXYGEN SATURATION: 98 % | SYSTOLIC BLOOD PRESSURE: 142 MMHG | WEIGHT: 197.42 LBS | DIASTOLIC BLOOD PRESSURE: 80 MMHG | BODY MASS INDEX: 29.24 KG/M2

## 2018-10-11 DIAGNOSIS — I10 ESSENTIAL HYPERTENSION: ICD-10-CM

## 2018-10-11 DIAGNOSIS — Z95.1 S/P CABG X 4: ICD-10-CM

## 2018-10-11 DIAGNOSIS — G45.9 TRANSIENT ISCHEMIC ATTACK: ICD-10-CM

## 2018-10-11 DIAGNOSIS — Z95.828 PRESENCE OF IVC FILTER: ICD-10-CM

## 2018-10-11 DIAGNOSIS — Z79.01 CHRONIC ANTICOAGULATION: ICD-10-CM

## 2018-10-11 DIAGNOSIS — I27.82 OTHER CHRONIC PULMONARY EMBOLISM WITHOUT ACUTE COR PULMONALE (HCC): ICD-10-CM

## 2018-10-11 DIAGNOSIS — I25.10 CORONARY ARTERY DISEASE INVOLVING NATIVE CORONARY ARTERY OF NATIVE HEART WITHOUT ANGINA PECTORIS: ICD-10-CM

## 2018-10-11 DIAGNOSIS — E78.5 DYSLIPIDEMIA: ICD-10-CM

## 2018-10-11 PROCEDURE — 99214 OFFICE O/P EST MOD 30 MIN: CPT | Performed by: INTERNAL MEDICINE

## 2018-10-11 ASSESSMENT — ENCOUNTER SYMPTOMS
LOSS OF CONSCIOUSNESS: 0
BACK PAIN: 1
ABDOMINAL PAIN: 0
WEAKNESS: 0
PND: 0
DIZZINESS: 0
CHILLS: 0
INSOMNIA: 0
FEVER: 0
SHORTNESS OF BREATH: 0
BLURRED VISION: 0
ORTHOPNEA: 0
PALPITATIONS: 0
MYALGIAS: 0

## 2018-10-11 NOTE — PROGRESS NOTES
Chief Complaint   Patient presents with   • Coronary Artery Disease     follow up        Subjective:   Paulo Paredes is a 78 y.o. male who presents today for follow up of .    Since the patient's last visit on 02/04/16, he has been doing well clinically. He denies chest pain, shortness of breath, palpitations, nausea/vomiting or diaphoresis. He and his wife are moving to [place] to be close to their children, however, this did not work out as expected and they moved back to [place] approximately a year ago. He is suffering with back surgery and may need surgery.    Past Medical History:   Diagnosis Date   • Anesthesia     difficult intubation with surgery 2008,2010   • Basal cell carcinoma of skin    • CAD (coronary artery disease)    • Cancer (HCC)     rt  kidney 1989;  thyroid cancer 2012, prostate 2008, skin   • DVT (deep venous thrombosis) (HCC) 2014   • ED (erectile dysfunction)    • GERD (gastroesophageal reflux disease)    • Glaucoma    • Heart burn    • Hyperlipidemia 12/3/2012   • Hypertension    • Indigestion    • Multiple pulmonary emboli (HCC) 2014   • Multiple thyroid nodules 2009   • Papillary carcinoma of thyroid (HCC) 2014    R 2 foci / 4.5mm,0.25mm / no mets/ pT1pN0   • postsurgical hypothyroidism 2014   • Pre-diabetes    • Renal disorder     rt kidney cancer,nephrectomy   • S/P CABG x 4 2003   • S/P colectomy 2010    multiple colon polyps / no Ca   • S/p nephrectomy 1998    carcinoma   • S/P prostatectomy 2008    carcinoma   • Stroke (HCC)     'mild',no residual,'one doctor said it was a tia'   • Transient renal failure 2014    renal vein thrombosis   • Type II or unspecified type diabetes mellitus without mention of complication, not stated as uncontrolled     on no medications   • Unspecified urinary incontinence      Past Surgical History:   Procedure Laterality Date   • THYROIDECTOMY TOTAL  5/13/2014    Performed by Eliceo Bolanos M.D. at SURGERY SAME DAY [facility]   • NODE DISSECTION   5/13/2014    Performed by Eliceo Bolanos M.D. at SURGERY SAME DAY [facility]   • COLON RESECTION     • MULTIPLE CORONARY ARTERY BYPASS      x 4 11/2003   • NEPHRECTOMY RADICAL      right side 1998   • OTHER ORTHOPEDIC SURGERY      trotator cuff   • PROSTATECTOMY, RADICAL RETRO      cancer /january 2008     Family History   Problem Relation Age of Onset   • Diabetes Mother    • Heart Disease Mother    • Diabetes Father    • Cancer Father         pancreas   • Thyroid Sister         Cancer   • Cancer Sister         thyroid   • Diabetes Sister    • Cancer Brother 49        colon   • Diabetes Brother    • Diabetes Maternal Grandfather    • Diabetes Paternal Grandmother    • Diabetes Paternal Grandfather      Social History     Social History   • Marital status:      Spouse name: N/A   • Number of children: N/A   • Years of education: N/A     Occupational History   • Not on file.     Social History Main Topics   • Smoking status: Never Smoker   • Smokeless tobacco: Never Used   • Alcohol use Yes      Comment: 2 PER WK   • Drug use: No   • Sexual activity: Not on file      Comment: retired      Other Topics Concern   • Not on file     Social History Narrative   • No narrative on file     Allergies   Allergen Reactions   • Lactose    • Metoprolol      2003-09-22;cns     Medications reviewed.    Outpatient Encounter Prescriptions as of 10/11/2018   Medication Sig Dispense Refill   • fluvastatin (LESCOL) 20 MG Cap Take 1 Cap by mouth every bedtime. 90 Cap 5   • losartan (COZAAR) 50 MG Tab Take 1 Tab by mouth every day. 90 Tab 3   • glipiZIDE (GLUCOTROL) 10 MG Tab Take 1 Tab by mouth 2 times a day. 180 Tab 3   • warfarin (COUMADIN) 5 MG Tab Take 1 tablet by mouth daily as directed by the coumadin clinic 90 Tab 1   • levothyroxine (SYNTHROID) 137 MCG Tab TAKE 1 TABLET BY MOUTH ONCE DAILY IN THE MORNING ON AN EMPTY STOMACH, ONE-HALF HOUR BEFORE EATING. 90 Tab 2   • atenolol (TENORMIN) 25 MG Tab Take 1  Tab by mouth every day. 90 Tab 3   • aspirin 81 MG tablet Take 81 mg by mouth.     • Calcium Carb-Cholecalciferol (CALCIUM 1000 + D PO) Take 1,000 Units by mouth.     • Multiple Vitamins-Minerals (OCUVITE-LUTEIN) Tab Take 1 tablet by mouth.     • Omega-3 Krill Oil 300 MG Cap Take 350 mg by mouth.     • Cinnamon 500 MG Cap Take 1,000 mg by mouth.     • Cyanocobalamin (VITAMIN B-12) 1000 MCG Tab Take 1,000 mcg by mouth.     • Coenzyme Q10 (CO Q-10 PO) Take  by mouth every day.     • acetaminophen (TYLENOL) 500 MG TABS Take 1,000 mg by mouth every 6 hours as needed. Indications: Pain     • fenofibrate (TRICOR) 145 MG TABS Take 1 Tab by mouth every day. 90 Tab 3   • [DISCONTINUED] fenofibrate (TRIGLIDE) 160 MG tablet Take 1 Tab by mouth every day. 90 Tab 3   • glucose blood (ASCENSIA MICROFILL TEST) strip as directed     • Lancets (MICROLET) Misc by Does not apply route.     • Blood Glucose Monitoring Suppl (ONE TOUCH ULTRA 2) w/Device Kit 1 Each by Other route.     • glucose blood (ONE TOUCH ULTRA TEST) strip To check twice daily Use as directed.     • ONETOUCH DELICA LANCETS FINE Misc 2 Each by Other route.     • [DISCONTINUED] atenolol (TENORMIN) 25 MG Tab TAKE 1 TAB BY MOUTH DAILY.     • [DISCONTINUED] Coenzyme Q10 100 MG Cap Take 100 mg by mouth.     • [DISCONTINUED] fenofibrate (TRIGLIDE) 160 MG tablet Take 160 mg by mouth.     • [DISCONTINUED] levothyroxine (SYNTHROID) 137 MCG Tab TAKE 1 TABLET BY MOUTH DAILY' TAKE ON EMPTY STOMACH 1HR BEFORE FOOD DONT COMBINE WITH OTHER MEDS     • [DISCONTINUED] CINNAMON PO Take 1 Cap by mouth every day.     • [DISCONTINUED] Cyanocobalamin (VITAMIN B12 PO) Take 1 Tab by mouth every day.     • [DISCONTINUED] sodium chloride (OCEAN) 0.65 % SOLN Spray 2 Sprays in nose as needed. Indications: nasal dryness     • [DISCONTINUED] latanoprost (XALATAN) 0.005 % SOLN Place 1 Drop in both eyes every bedtime.       No facility-administered encounter medications on file as of 10/11/2018.   "    Review of Systems   Constitutional: Negative for chills, fever and malaise/fatigue.   HENT: Negative for congestion.    Eyes: Negative for blurred vision.   Respiratory: Negative for shortness of breath.    Cardiovascular: Negative for chest pain, palpitations, orthopnea, leg swelling and PND.   Gastrointestinal: Negative for abdominal pain.   Genitourinary: Negative for dysuria.   Musculoskeletal: Positive for back pain. Negative for joint pain and myalgias.   Skin: Negative for rash.   Neurological: Negative for dizziness, loss of consciousness and weakness.   Psychiatric/Behavioral: The patient does not have insomnia.         Objective:   /80 (BP Location: Left arm, Patient Position: Sitting, BP Cuff Size: Adult)   Pulse (!) 54   Ht 1.74 m (5' 8.5\")   Wt 89.5 kg (197 lb 6.8 oz)   SpO2 98%   BMI 29.58 kg/m²     Physical Exam   Constitutional: He is oriented to person, place, and time. He appears well-developed and well-nourished.   HENT:   Head: Normocephalic and atraumatic.   Eyes: Conjunctivae are normal.   Neck: Normal range of motion. Neck supple.   Cardiovascular: Normal rate and regular rhythm.    No murmur heard.  Pulmonary/Chest: Effort normal and breath sounds normal.   Abdominal: Soft. Bowel sounds are normal.   Musculoskeletal: Normal range of motion. He exhibits no edema.   Neurological: He is alert and oriented to person, place, and time.   Skin: Skin is warm and dry.   Psychiatric: He has a normal mood and affect.     CARDIAC STUDIES/PROCEDURES:     ABDOMINAL ULTRASOUND (06/04/14)  1. Ectatic aorta.  2. Atherosclerotic plaque.  3. Cholelithiasis.     CT OF CHEST (06/06/14)  1. There is thrombus in the IVC which extends into the distal left renal vein between the aorta and vena cava. The patient is on heparin for 36 hours approximately, and the improvement in left   renal edema and decrease in caliber of the left renal vein compared to previous CT probably reflects improved venous " drainage related to the therapy .  2. The thrombus attached to the filter extends cephalad to the filter to the level of the caudate lobe of the liver.  3. There is DVT in both lower extremities.     ECHOCARDIOGRAM CONCLUSIONS (05/21/14)  Normal left ventricular size. Sigmoid septum with increased outflow   velocity but not anterior motion of the mitral valve.  Basal inferior and apical septal hypokinesis. Left ventricular   ejection fraction is 50% to 55%.  Grade I diastolic dysfunction is present.  Normal left atrial size.  Aortic sclerosis without significant stenosis.  Trace mitral regurgitation.     ECHOCARDIOGRAM CONCLUSIONS (01/25/14)  Normal left ventricular size and function.   Grade I diastolic dysfunction is present.   Normal left ventricular wall thickness.   Left ventricular ejection fraction is 60% to 65%.  Mild mitral regurgitation.   Aortic valve probably trileaflet. No stenosis or regurgitation seen.   AV MG 5.39 mmHg, PG 9.33 mmHg.  Mild tricuspid regurgitation. Right ventricular systolic pressure is   estimated to be 30 to 35 mmHg consistent with mild pulmonary hypertension.  No pericardial effusion seen.   Normal aortic diameter 3.2 cm  No prior study for comparison.     EKG performed on (05/15/14) EKG shows normal sinus rhythm with non-specific intra-ventricular conduction delay.     Laboratory results of (09/29/18) were reviewed. Cholesterol profile of 261/365/36/151 noted.  Laboratory results of (09/21/15) Cholesterol profile of 251/307/37/153 noted.  Laboratory results of (06/16/14) Cholesterol profile of 172/233/37/88 noted.     LOWER EXTREMITY VENOUS ULTRASOUND (06/05/14)  1. No evidence of acute right lower extremity deep venous thrombosis with   recannalized venous thrombosis throughout.  2. Normal left lower extremity superficial and deep venous examination.      MRA OF BRAIN (01/25/14)  1. MRA OF THE [place] OF GREEN WITHIN NORMAL LIMITS WITH NO EVIDENCE OF ANEURYSM OR CEREBROVASCULAR  OCCLUSIVE DISEASE.  2. FIELD OF VIEW PARTIALLY EXCLUDES THE INFERIOR TEMPORAL M2 DIVISION LEFT MCA.     MRA OF NECK (01/25/14)  1. Unremarkable cervical vertebral arteries.  2. Right carotid bulb and ICA origin minimal plaquing with up to about 10% stenosis. No flow limiting lesion.  3. Left carotid bulb and left ICA origin minimal plaquing with up to about 10-15% stenosis. No flow limiting lesion.     STRESS ECHOCARDIOGRAM [facility] (03/14/12)  Stress echocardiogram showing no evidence for stress-induced ischemia.    Assessment:     1. Coronary artery disease involving native coronary artery of native heart without angina pectoris     2. S/P CABG x 4     3. Essential hypertension     4. Dyslipidemia     5. Other chronic pulmonary embolism without acute cor pulmonale (HCC)     6. Chronic anticoagulation     7. Presence of IVC filter     8. Transient ischemic attack [G45.9]         Medical Decision Making:  Today's Assessment / Status / Plan:     1. Coronary artery disease with prior coronary bypass graft (4 vessel CABG at [facility] 2003): He is clinically doing well. We will perform an echocardiogram and myocardial perfusion imaging study.  2. Hypertension: Blood pressure is well controlled.  3. Hyperlipidemia with intolerance to statin due to myalgia: We will refer him to lipid clinic for considered for PCSK9 inhibitor therapy.  4. Chronic anticoagulation therapy and IVC filter with pulmonary embolism/deep venous thrombosis (managed by [facility]): He is clinically doing well without recurrence or chest pain or shortness of breath.  5. Status post stroke (managed by Dr. Rodriguez): He is clinically doing well on Plavix. Plans per neurology.  6. Renal insufficiency (managed by Dr. Cloud)  7. Health maintenance: Recovered large hematoma following thyroidectomy.  8. Emotional stress: He is undergoing significant emotional stress.  Emotional counseling was recommended.     We will  follow up the patient in one year.     CC Sarah Schaeffer, Yunier Stout and Tamica Roblero

## 2018-10-11 NOTE — LETTER
[facility]   [address], [facility] [address]  [place] [address]  Phone: [phone]  Fax: [phone]              Paulo Paredes  1940    Encounter Date: 10/11/2018    Abhi Damon M.D.          PROGRESS NOTE:  Chief Complaint   Patient presents with   • Coronary Artery Disease     follow up        Subjective:   Paulo Paredes is a 78 y.o. male who presents today for follow up of .    Since the patient's last visit on 02/04/16, he has been doing well clinically. He denies chest pain, shortness of breath, palpitations, nausea/vomiting or diaphoresis. He and his wife are moving to [place] to be close to their children, however, this did not work out as expected and they moved back to [place] approximately a year ago. He is suffering with back surgery and may need surgery.    Past Medical History:   Diagnosis Date   • Anesthesia     difficult intubation with surgery 2008,2010   • Basal cell carcinoma of skin    • CAD (coronary artery disease)    • Cancer (HCC)     rt  kidney 1989;  thyroid cancer 2012, prostate 2008, skin   • DVT (deep venous thrombosis) (HCC) 2014   • ED (erectile dysfunction)    • GERD (gastroesophageal reflux disease)    • Glaucoma    • Heart burn    • Hyperlipidemia 12/3/2012   • Hypertension    • Indigestion    • Multiple pulmonary emboli (HCC) 2014   • Multiple thyroid nodules 2009   • Papillary carcinoma of thyroid (HCC) 2014    R 2 foci / 4.5mm,0.25mm / no mets/ pT1pN0   • postsurgical hypothyroidism 2014   • Pre-diabetes    • Renal disorder     rt kidney cancer,nephrectomy   • S/P CABG x 4 2003   • S/P colectomy 2010    multiple colon polyps / no Ca   • S/p nephrectomy 1998    carcinoma   • S/P prostatectomy 2008    carcinoma   • Stroke (HCC)     'mild',no residual,'one doctor said it was a tia'   • Transient renal failure 2014    renal vein thrombosis   • Type II or unspecified type diabetes mellitus without mention of complication, not stated  as uncontrolled     on no medications   • Unspecified urinary incontinence      Past Surgical History:   Procedure Laterality Date   • THYROIDECTOMY TOTAL  5/13/2014    Performed by Eliceo Bolanos M.D. at SURGERY SAME DAY [facility] ORS   • NODE DISSECTION  5/13/2014    Performed by Eliceo Bolanos M.D. at SURGERY SAME DAY [facility] ORS   • COLON RESECTION     • MULTIPLE CORONARY ARTERY BYPASS      x 4 11/2003   • NEPHRECTOMY RADICAL      right side 1998   • OTHER ORTHOPEDIC SURGERY      trotator cuff   • PROSTATECTOMY, RADICAL RETRO      cancer /january 2008     Family History   Problem Relation Age of Onset   • Diabetes Mother    • Heart Disease Mother    • Diabetes Father    • Cancer Father         pancreas   • Thyroid Sister         Cancer   • Cancer Sister         thyroid   • Diabetes Sister    • Cancer Brother 49        colon   • Diabetes Brother    • Diabetes Maternal Grandfather    • Diabetes Paternal Grandmother    • Diabetes Paternal Grandfather      Social History     Social History   • Marital status:      Spouse name: N/A   • Number of children: N/A   • Years of education: N/A     Occupational History   • Not on file.     Social History Main Topics   • Smoking status: Never Smoker   • Smokeless tobacco: Never Used   • Alcohol use Yes      Comment: 2 PER [facility]   • Drug use: No   • Sexual activity: Not on file      Comment: retired      Other Topics Concern   • Not on file     Social History Narrative   • No narrative on file     Allergies   Allergen Reactions   • Lactose    • Metoprolol      2003-09-22;cns     Medications reviewed.    Outpatient Encounter Prescriptions as of 10/11/2018   Medication Sig Dispense Refill   • fluvastatin (LESCOL) 20 MG Cap Take 1 Cap by mouth every bedtime. 90 Cap 5   • losartan (COZAAR) 50 MG Tab Take 1 Tab by mouth every day. 90 Tab 3   • glipiZIDE (GLUCOTROL) 10 MG Tab Take 1 Tab by mouth 2 times a day. 180 Tab 3   • warfarin (COUMADIN) 5 MG Tab Take 1  tablet by mouth daily as directed by the coumadin clinic 90 Tab 1   • levothyroxine (SYNTHROID) 137 MCG Tab TAKE 1 TABLET BY MOUTH ONCE DAILY IN THE MORNING ON AN EMPTY STOMACH, ONE-HALF HOUR BEFORE EATING. 90 Tab 2   • atenolol (TENORMIN) 25 MG Tab Take 1 Tab by mouth every day. 90 Tab 3   • aspirin 81 MG tablet Take 81 mg by mouth.     • Calcium Carb-Cholecalciferol (CALCIUM 1000 + D PO) Take 1,000 Units by mouth.     • Multiple Vitamins-Minerals (OCUVITE-LUTEIN) Tab Take 1 tablet by mouth.     • Omega-3 Krill Oil 300 MG Cap Take 350 mg by mouth.     • Cinnamon 500 MG Cap Take 1,000 mg by mouth.     • Cyanocobalamin (VITAMIN B-12) 1000 MCG Tab Take 1,000 mcg by mouth.     • Coenzyme Q10 (CO Q-10 PO) Take  by mouth every day.     • acetaminophen (TYLENOL) 500 MG TABS Take 1,000 mg by mouth every 6 hours as needed. Indications: Pain     • fenofibrate (TRICOR) 145 MG TABS Take 1 Tab by mouth every day. 90 Tab 3   • [DISCONTINUED] fenofibrate (TRIGLIDE) 160 MG tablet Take 1 Tab by mouth every day. 90 Tab 3   • glucose blood (ASCENSIA MICROFILL TEST) strip as directed     • Lancets (MICROLET) Misc by Does not apply route.     • Blood Glucose Monitoring Suppl (ONE TOUCH ULTRA 2) w/Device Kit 1 Each by Other route.     • glucose blood (ONE TOUCH ULTRA TEST) strip To check twice daily Use as directed.     • ONETOUCH DELICA LANCETS FINE Misc 2 Each by Other route.     • [DISCONTINUED] atenolol (TENORMIN) 25 MG Tab TAKE 1 TAB BY MOUTH DAILY.     • [DISCONTINUED] Coenzyme Q10 100 MG Cap Take 100 mg by mouth.     • [DISCONTINUED] fenofibrate (TRIGLIDE) 160 MG tablet Take 160 mg by mouth.     • [DISCONTINUED] levothyroxine (SYNTHROID) 137 MCG Tab TAKE 1 TABLET BY MOUTH DAILY' TAKE ON EMPTY STOMACH 1HR BEFORE FOOD DONT COMBINE WITH OTHER MEDS     • [DISCONTINUED] CINNAMON PO Take 1 Cap by mouth every day.     • [DISCONTINUED] Cyanocobalamin (VITAMIN B12 PO) Take 1 Tab by mouth every day.     • [DISCONTINUED] sodium chloride  "(OCEAN) 0.65 % SOLN Spray 2 Sprays in nose as needed. Indications: nasal dryness     • [DISCONTINUED] latanoprost (XALATAN) 0.005 % SOLN Place 1 Drop in both eyes every bedtime.       No facility-administered encounter medications on file as of 10/11/2018.      Review of Systems   Constitutional: Negative for chills, fever and malaise/fatigue.   HENT: Negative for congestion.    Eyes: Negative for blurred vision.   Respiratory: Negative for shortness of breath.    Cardiovascular: Negative for chest pain, palpitations, orthopnea, leg swelling and PND.   Gastrointestinal: Negative for abdominal pain.   Genitourinary: Negative for dysuria.   Musculoskeletal: Positive for back pain. Negative for joint pain and myalgias.   Skin: Negative for rash.   Neurological: Negative for dizziness, loss of consciousness and weakness.   Psychiatric/Behavioral: The patient does not have insomnia.         Objective:   /80 (BP Location: Left arm, Patient Position: Sitting, BP Cuff Size: Adult)   Pulse (!) 54   Ht 1.74 m (5' 8.5\")   Wt 89.5 kg (197 lb 6.8 oz)   SpO2 98%   BMI 29.58 kg/m²      Physical Exam   Constitutional: He is oriented to person, place, and time. He appears well-developed and well-nourished.   HENT:   Head: Normocephalic and atraumatic.   Eyes: Conjunctivae are normal.   Neck: Normal range of motion. Neck supple.   Cardiovascular: Normal rate and regular rhythm.    No murmur heard.  Pulmonary/Chest: Effort normal and breath sounds normal.   Abdominal: Soft. Bowel sounds are normal.   Musculoskeletal: Normal range of motion. He exhibits no edema.   Neurological: He is alert and oriented to person, place, and time.   Skin: Skin is warm and dry.   Psychiatric: He has a normal mood and affect.     CARDIAC STUDIES/PROCEDURES:     ABDOMINAL ULTRASOUND (06/04/14)  1. Ectatic aorta.  2. Atherosclerotic plaque.  3. Cholelithiasis.     CT OF CHEST (06/06/14)  1. There is thrombus in the IVC which extends into the " distal left renal vein between the aorta and vena cava. The patient is on heparin for 36 hours approximately, and the improvement in left   renal edema and decrease in caliber of the left renal vein compared to previous CT probably reflects improved venous drainage related to the therapy .  2. The thrombus attached to the filter extends cephalad to the filter to the level of the caudate lobe of the liver.  3. There is DVT in both lower extremities.     ECHOCARDIOGRAM CONCLUSIONS (05/21/14)  Normal left ventricular size. Sigmoid septum with increased outflow   velocity but not anterior motion of the mitral valve.  Basal inferior and apical septal hypokinesis. Left ventricular   ejection fraction is 50% to 55%.  Grade I diastolic dysfunction is present.  Normal left atrial size.  Aortic sclerosis without significant stenosis.  Trace mitral regurgitation.     ECHOCARDIOGRAM CONCLUSIONS (01/25/14)  Normal left ventricular size and function.   Grade I diastolic dysfunction is present.   Normal left ventricular wall thickness.   Left ventricular ejection fraction is 60% to 65%.  Mild mitral regurgitation.   Aortic valve probably trileaflet. No stenosis or regurgitation seen.   AV MG 5.39 mmHg, PG 9.33 mmHg.  Mild tricuspid regurgitation. Right ventricular systolic pressure is   estimated to be 30 to 35 mmHg consistent with mild pulmonary hypertension.  No pericardial effusion seen.   Normal aortic diameter 3.2 cm  No prior study for comparison.     EKG performed on (05/15/14) EKG shows normal sinus rhythm with non-specific intra-ventricular conduction delay.     Laboratory results of (09/29/18) were reviewed. Cholesterol profile of 261/365/36/151 noted.  Laboratory results of (09/21/15) Cholesterol profile of 251/307/37/153 noted.  Laboratory results of (06/16/14) Cholesterol profile of 172/233/37/88 noted.     LOWER EXTREMITY VENOUS ULTRASOUND (06/05/14)  1. No evidence of acute right lower extremity deep venous  thrombosis with   recannalized venous thrombosis throughout.  2. Normal left lower extremity superficial and deep venous examination.      MRA OF BRAIN (01/25/14)  1. MRA OF THE [place] OF GREEN WITHIN NORMAL LIMITS WITH NO EVIDENCE OF ANEURYSM OR CEREBROVASCULAR OCCLUSIVE DISEASE.  2. FIELD OF VIEW PARTIALLY EXCLUDES THE INFERIOR TEMPORAL M2 DIVISION LEFT MCA.     MRA OF NECK (01/25/14)  1. Unremarkable cervical vertebral arteries.  2. Right carotid bulb and ICA origin minimal plaquing with up to about 10% stenosis. No flow limiting lesion.  3. Left carotid bulb and left ICA origin minimal plaquing with up to about 10-15% stenosis. No flow limiting lesion.     STRESS ECHOCARDIOGRAM [facility] (03/14/12)  Stress echocardiogram showing no evidence for stress-induced ischemia.    Assessment:     1. Coronary artery disease involving native coronary artery of native heart without angina pectoris     2. S/P CABG x 4     3. Essential hypertension     4. Dyslipidemia     5. Other chronic pulmonary embolism without acute cor pulmonale (HCC)     6. Chronic anticoagulation     7. Presence of IVC filter     8. Transient ischemic attack [G45.9]         Medical Decision Making:  Today's Assessment / Status / Plan:     1. Coronary artery disease with prior coronary bypass graft (4 vessel CABG at [facility] 2003): He is clinically doing well. We will perform an echocardiogram and myocardial perfusion imaging study.  2. Hypertension: Blood pressure is well controlled.  3. Hyperlipidemia with intolerance to statin due to myalgia: We will refer him to lipid clinic for considered for PCSK9 inhibitor therapy.  4. Chronic anticoagulation therapy and IVC filter with pulmonary embolism/deep venous thrombosis (managed by [facility]): He is clinically doing well without recurrence or chest pain or shortness of breath.  5. Status post stroke (managed by Dr. Rodriguez): He is clinically doing well on Plavix.  Plans per neurology.  6. Renal insufficiency (managed by Dr. Cloud)  7. Health maintenance: Recovered large hematoma following thyroidectomy.  8. Emotional stress: He is undergoing significant emotional stress.  Emotional counseling was recommended.     We will follow up the patient in one year.     CC Sarah Schaeffer, Yunier Stout and Tamica Roblero        No Recipients

## 2018-10-15 ENCOUNTER — TELEPHONE (OUTPATIENT)
Dept: MEDICAL GROUP | Facility: MEDICAL CENTER | Age: 78
End: 2018-10-15

## 2018-10-15 DIAGNOSIS — E78.5 DYSLIPIDEMIA: ICD-10-CM

## 2018-10-15 RX ORDER — ATORVASTATIN CALCIUM 10 MG/1
10 TABLET, FILM COATED ORAL DAILY
Qty: 90 TAB | Refills: 1 | Status: SHIPPED | OUTPATIENT
Start: 2018-10-15 | End: 2019-01-23 | Stop reason: CLARIF

## 2018-10-15 NOTE — TELEPHONE ENCOUNTER
1. Caller Name: [facility]                                         Call Back Number: [phone]      Patient approves a detailed voicemail message: yes    We recieved a fax from [facility] stating that Fluvastatin 20mg tabs is currently not available. Can we switch to a different medication?     Please advise.     Thank you,     Grazyna Kellogg  Medical Assistant

## 2018-10-16 ENCOUNTER — HOSPITAL ENCOUNTER (OUTPATIENT)
Dept: LAB | Facility: MEDICAL CENTER | Age: 78
End: 2018-10-16
Attending: PHYSICIAN ASSISTANT
Payer: MEDICARE

## 2018-10-16 LAB — AMBIGUOUS DTTM AMBI4: NORMAL

## 2018-10-16 PROCEDURE — 84153 ASSAY OF PSA TOTAL: CPT

## 2018-10-17 LAB — PSA SERPL-MCNC: 0.02 NG/ML (ref 0–4)

## 2018-10-18 ENCOUNTER — HOSPITAL ENCOUNTER (OUTPATIENT)
Dept: CARDIOLOGY | Facility: MEDICAL CENTER | Age: 78
End: 2018-10-18
Attending: INTERNAL MEDICINE
Payer: MEDICARE

## 2018-10-18 ENCOUNTER — HOSPITAL ENCOUNTER (OUTPATIENT)
Dept: RADIOLOGY | Facility: MEDICAL CENTER | Age: 78
End: 2018-10-18
Attending: INTERNAL MEDICINE
Payer: MEDICARE

## 2018-10-18 DIAGNOSIS — Z95.1 S/P CABG X 4: ICD-10-CM

## 2018-10-18 DIAGNOSIS — I25.10 CORONARY ARTERY DISEASE INVOLVING NATIVE CORONARY ARTERY OF NATIVE HEART WITHOUT ANGINA PECTORIS: ICD-10-CM

## 2018-10-18 LAB
LV EJECT FRACT  99904: 65
LV EJECT FRACT MOD 2C 99903: 81.13
LV EJECT FRACT MOD 4C 99902: 68.91
LV EJECT FRACT MOD BP 99901: 73.81

## 2018-10-18 PROCEDURE — 93306 TTE W/DOPPLER COMPLETE: CPT | Mod: 26 | Performed by: INTERNAL MEDICINE

## 2018-10-18 PROCEDURE — A9502 TC99M TETROFOSMIN: HCPCS

## 2018-10-18 PROCEDURE — 700111 HCHG RX REV CODE 636 W/ 250 OVERRIDE (IP)

## 2018-10-18 PROCEDURE — 93018 CV STRESS TEST I&R ONLY: CPT | Performed by: INTERNAL MEDICINE

## 2018-10-18 PROCEDURE — 78452 HT MUSCLE IMAGE SPECT MULT: CPT | Mod: 26 | Performed by: INTERNAL MEDICINE

## 2018-10-18 PROCEDURE — 93306 TTE W/DOPPLER COMPLETE: CPT

## 2018-10-18 RX ORDER — REGADENOSON 0.08 MG/ML
INJECTION, SOLUTION INTRAVENOUS
Status: COMPLETED
Start: 2018-10-18 | End: 2018-10-18

## 2018-10-18 RX ADMIN — REGADENOSON 0.4 MG: 0.08 INJECTION, SOLUTION INTRAVENOUS at 10:38

## 2018-10-19 ENCOUNTER — PHYSICAL THERAPY (OUTPATIENT)
Dept: PHYSICAL THERAPY | Facility: REHABILITATION | Age: 78
End: 2018-10-19
Attending: INTERNAL MEDICINE
Payer: MEDICARE

## 2018-10-19 DIAGNOSIS — G89.29 CHRONIC RIGHT-SIDED LOW BACK PAIN WITHOUT SCIATICA: ICD-10-CM

## 2018-10-19 DIAGNOSIS — M54.50 CHRONIC LUMBOSACRAL PAIN: ICD-10-CM

## 2018-10-19 DIAGNOSIS — M54.50 CHRONIC RIGHT-SIDED LOW BACK PAIN WITHOUT SCIATICA: ICD-10-CM

## 2018-10-19 DIAGNOSIS — G89.29 CHRONIC LUMBOSACRAL PAIN: ICD-10-CM

## 2018-10-19 PROCEDURE — 97110 THERAPEUTIC EXERCISES: CPT

## 2018-10-19 PROCEDURE — 97162 PT EVAL MOD COMPLEX 30 MIN: CPT

## 2018-10-19 ASSESSMENT — ENCOUNTER SYMPTOMS
PAIN SCALE: 3
PAIN SCALE AT HIGHEST: 8
PAIN SCALE AT LOWEST: 0

## 2018-10-19 NOTE — OP THERAPY EVALUATION
"  Outpatient Physical Therapy  INITIAL EVALUATION    [facility] [address]  [address]  [place] [address]  Phone:  [phone]  Fax:  [phone]    Date of Evaluation: 10/19/2018    Patient: Paulo Paredes  YOB: 1940  MRN: [id number]     Referring Provider: Edward Walters M.D.  [address]  [address]  [place] [address]   Referring Diagnosis Chronic lumbosacral pain [M54.5, G89.29]     Time Calculation  Start time: 0810  Stop time: 0900 Time Calculation (min): 50 minutes     Physical Therapy Occurrence Codes    Date of onset of impairment:  10/9/18   Date physical therapy care plan established or reviewed:  10/19/18   Date physical therapy treatment started:  10/19/18          Chief Complaint: Back Problem    Visit Diagnoses     ICD-10-CM   1. Chronic lumbosacral pain M54.5    G89.29   2. Chronic right-sided low back pain without sciatica M54.5    G89.29         Subjective:   History of Present Illness:     Mechanism of injury:  Pt \"Al\" states right now his back just feels like it is tired and it right below his waist on the right. Pt has been dealing with the pain for about a year and thinks moving may have been the initial aggravator, but then about 2 months ago started getting more sore. Pt sleeps on his R side and has to get up after about 2-3 hours of being in bed. Mopping the kitchen floor a few days ago, but has been really sore since then. A quick move will send a shock through the back as well. Acetaminaphen helps his pain. Pain is mostly on the right side below his waist, will sometimes spread out to the side or to the left, but mainly on the right. Pt walks on the treadmill for 50 min at 3.1 mph at 3.5-4% grade. Pt denies n/t into BLE, saddle anesthesia, B/B symptoms. Pt does have incontinence issues but this has been since his prostate surgery, he plans to get this assessed in the next month or so to see if there is something he can do. Pt's " medical history includes: kidney removal due to cancer, prostate removal, partial colon removal, diabetes. No history of peripheral neuropathy.   Sleep disturbance:  Interrupted sleep  Pain:     Current pain rating:  3    At best pain ratin (If he takes the acetapminaphen)    At worst pain ratin  Patient Goals:     Patient goals for therapy:  Decreased pain, increased motion and increased strength      Past Medical History:   Diagnosis Date   • Anesthesia     difficult intubation with surgery ,   • Basal cell carcinoma of skin    • CAD (coronary artery disease)    • Cancer (HCC)     rt  kidney ;  thyroid cancer , prostate , skin   • DVT (deep venous thrombosis) (HCC)    • ED (erectile dysfunction)    • GERD (gastroesophageal reflux disease)    • Glaucoma    • Heart burn    • Hyperlipidemia 12/3/2012   • Hypertension    • Indigestion    • Multiple pulmonary emboli (HCC)    • Multiple thyroid nodules    • Papillary carcinoma of thyroid (HCC)     R 2 foci / 4.5mm,0.25mm / no mets/ pT1pN0   • postsurgical hypothyroidism    • Pre-diabetes    • Renal disorder     rt kidney cancer,nephrectomy   • S/P CABG x 2003   • S/P colectomy     multiple colon polyps / no Ca   • S/p nephrectomy     carcinoma   • S/P prostatectomy     carcinoma   • Stroke (HCC)     'mild',no residual,'one doctor said it was a tia'   • Transient renal failure     renal vein thrombosis   • Type II or unspecified type diabetes mellitus without mention of complication, not stated as uncontrolled     on no medications   • Unspecified urinary incontinence      Past Surgical History:   Procedure Laterality Date   • THYROIDECTOMY TOTAL  2014    Performed by Eliceo Bolanos M.D. at SURGERY SAME DAY [facility] ORS   • NODE DISSECTION  2014    Performed by Eliceo Bolanos M.D. at SURGERY SAME DAY [facility] ORS   • COLON RESECTION     • MULTIPLE CORONARY ARTERY BYPASS      x 4 2003   •  NEPHRECTOMY RADICAL      right side 1998   • OTHER ORTHOPEDIC SURGERY      trotator cuff   • PROSTATECTOMY, RADICAL RETRO      cancer /january 2008     Social History   Substance Use Topics   • Smoking status: Never Smoker   • Smokeless tobacco: Never Used   • Alcohol use Yes      Comment: 2 PER [facility]     Family and Occupational History     Social History   • Marital status:      Spouse name: N/A   • Number of children: N/A   • Years of education: N/A       Objective     Hip Screen   Hip range of motion within functional limits with the following exceptions: Decreased IR on the L and ER on the R  Hip strength within functional limits    Palpation   Left   No palpable tenderness to the gluteus medius, lumbar paraspinals, piriformis and quadratus lumborum.     Right   Tenderness of the gluteus medius, lumbar paraspinals, piriformis and quadratus lumborum.     Active Range of Motion     Lumbar   Flexion: within functional limits  Extension: decreased  Left lateral flexion: within functional limits (painful on the right)  Right lateral flexion: within functional limits  Left rotation: within functional limits (delayed pain after rotation)  Right rotation: within functional limits (delayed pain after rotation)    Joint Play   Spine     Central PA Gregory        T10: WFL and painful       T11: WFL       T12: WFL       L1: hypomobile       L2: hypomobile       L3: hypomobile and painful       L5: hypomobile       S1: hypomobile    Unilateral PA Glide (left)        T10: WFL       T11: WFL       T12: WFL       L1: hypomobile       L2: hypomobile       L3: hypomobile       L4: hypomobile       L5: hypomobile       S1: hypomobile    Unilateral PA Glide (right)        T10: WFL       T11: WFL       T12: WFL       L1: hypomobile and painful       L2: hypomobile and painful       L3: hypomobile and painful       L4: hypomobile and painful       L5: hypomobile and painful       S1: hypomobile and painful        Strength:   "    Abdominals   Lower abdominals: Able to initiate but not maintain neutral    Left Hip   Planes of Motion   Flexion: 4+    Right Hip   Planes of Motion   Flexion: 4+    Left Knee   Flexion: 5  Extension: 5    Right Knee   Flexion: 5  Extension: 5    Left Ankle/Foot   Dorsiflexion: 5  Plantar flexion: 5    Right Ankle/Foot   Dorsiflexion: 5  Plantar flexion: 5    Additional Strength Details  Hip MMT IR/ER: 4+/5 bilaterally    Tests     Left Hip   SLR: Negative.     Right Hip   SLR: Negative.         Therapeutic Exercises (CPT 48057):     1. LTR, 10 x 1    2. SKTC, 30\" x 1    3. Piriformis str, 30\" x 1    4. Hamstring glide, 10 x 1    5. Bridge, 10 x 1    6. Opening hip mob (DL), 20\" x 2      Time-based treatments/modalities:  Therapeutic exercise minutes (CPT 77011): 10 minutes       Assessment, Response and Plan:   Impairments: abnormal muscle tone, abnormal or restricted ROM, activity intolerance, impaired physical strength and pain with function    Assessment details:  Pt is a pleasant, cooperative 79 yo male who presents with chronic R sided low back pain. Pt has decreased lx ROM and mobility without pain, decreased hip mobility, and lower abdominal weakness. Pt will benefit from skilled physical therapy in order to increase his pain free lx ROM, increase his core and hip strength and control, improve his hip mobility, and decrease his overall pain in order to return to ADLs and recreational activities with less pain and restriction.  Prognosis: good    Goals:   Short Term Goals:   1. Pt is to have full painfree Lx ROM into SB bilaterally  2. Pt is to have 1/10 VAS pain at rest  3. Pt is to perform HEP without cueing demonstrating compliance.  Short term goal time span:  2-4 weeks      Long Term Goals:    1. Pt is to be able to perform all ADLs, including mopping without increase in pain.  2. Pt is to have 0/10 VAS pain at rest  3. Pt is to no longer wake at night due to pain  Long term goal time span:  6-8 " weeks    Plan:   Therapy options:  Physical therapy treatment to continue  Planned therapy interventions:  Mechanical Traction (CPT 17299), Manual Therapy (CPT 76475), Neuromuscular Re-education (CPT 79515), E Stim Unattended (CPT 45251), Gait Training (CPT 48429), Therapeutic Activities (CPT 96707) and Therapeutic Exercise (CPT 86737)  Frequency:  1x month  Duration in weeks:  8  Discussed with:  Patient  Plan details:  Pt to start with PT for 2x/week and will then decrease to 1x/week once HEP is established and pt is making improvements between visits.        Functional Limitation G-Codes and Severity Modifiers  Edu Adolfo Low Back Pain and Disability Score: 29.17   Current:     Goal:       Referring provider co-signature:  I have reviewed this plan of care and my co-signature certifies the need for services.  Certification Dates:   From 10/19/18     To 12/14/18    Physician Signature: ________________________________ Date: ______________

## 2018-10-22 ENCOUNTER — PATIENT MESSAGE (OUTPATIENT)
Dept: CARDIOLOGY | Facility: MEDICAL CENTER | Age: 78
End: 2018-10-22

## 2018-10-22 NOTE — PATIENT COMMUNICATION
Message   Received: 4 days ago   Message Contents   ADITYA Chávez R.N.             Please call with unremarkable studies, echocardiogram and  myocardial perfusion imaging study.     Thanks.  ELIZABETH

## 2018-10-24 ENCOUNTER — PHYSICAL THERAPY (OUTPATIENT)
Dept: PHYSICAL THERAPY | Facility: REHABILITATION | Age: 78
End: 2018-10-24
Attending: INTERNAL MEDICINE
Payer: MEDICARE

## 2018-10-24 DIAGNOSIS — G89.29 CHRONIC RIGHT-SIDED LOW BACK PAIN WITHOUT SCIATICA: ICD-10-CM

## 2018-10-24 DIAGNOSIS — G89.29 CHRONIC LUMBOSACRAL PAIN: ICD-10-CM

## 2018-10-24 DIAGNOSIS — M54.50 CHRONIC RIGHT-SIDED LOW BACK PAIN WITHOUT SCIATICA: ICD-10-CM

## 2018-10-24 DIAGNOSIS — M54.50 CHRONIC LUMBOSACRAL PAIN: ICD-10-CM

## 2018-10-24 PROCEDURE — 97110 THERAPEUTIC EXERCISES: CPT

## 2018-10-24 NOTE — OP THERAPY DAILY TREATMENT
"  Outpatient Physical Therapy  DAILY TREATMENT     [facility] [address]  [address]  [place] [address]  Phone:  [phone]  Fax:  [phone]    Date: 10/24/2018    Patient: Paulo Paredes  YOB: 1940  MRN: [id number]     Time Calculation  Start time: 1430  Stop time: 1500 Time Calculation (min): 30 minutes     Chief Complaint: Back Problem    Visit #: 2    SUBJECTIVE:  Pt states he is doing pretty well. He feels like he can reach further forward.    OBJECTIVE:  Current objective measures: Pt reaches mid shin with lx forward bend          Therapeutic Exercises (CPT 82207):     1. Bridge, 10 x 1    2. Hamstring curl/bridge on ball, 10 x 1    3. Sit<>stand, 10 x 1    4. ADIM + ball squeeze, 5\" x 10 x 1    5. ADIM + BKFO, 10 x 1    6. Tandem stance, 30\" x 2    Therapeutic Treatments and Modalities:     1. Manual Therapy (CPT 53656), opening mob, grade 3, 20\" x 2. neutral gap, grade 3, 20\" x 2    Time-based treatments/modalities:  Manual therapy minutes (CPT 90500): 5 minutes  Therapeutic exercise minutes (CPT 95286): 25 minutes       ASSESSMENT:   Response to treatment: Pt presents with chronic R low back pain. Pt able to tolerate progression of abdominal, hip, and balance exercises without increase in pain. Pt had decreased pain following manual.     PLAN/RECOMMENDATIONS:   Plan for treatment: therapy treatment to continue next visit.  Planned interventions for next visit: continue with current treatment. Progress to standing strength. Add head turns to balance/dynamic.      "

## 2018-10-31 ENCOUNTER — PHYSICAL THERAPY (OUTPATIENT)
Dept: PHYSICAL THERAPY | Facility: REHABILITATION | Age: 78
End: 2018-10-31
Attending: INTERNAL MEDICINE
Payer: MEDICARE

## 2018-10-31 DIAGNOSIS — M54.50 CHRONIC RIGHT-SIDED LOW BACK PAIN WITHOUT SCIATICA: ICD-10-CM

## 2018-10-31 DIAGNOSIS — G89.29 CHRONIC RIGHT-SIDED LOW BACK PAIN WITHOUT SCIATICA: ICD-10-CM

## 2018-10-31 DIAGNOSIS — M54.50 CHRONIC LUMBOSACRAL PAIN: ICD-10-CM

## 2018-10-31 DIAGNOSIS — G89.29 CHRONIC LUMBOSACRAL PAIN: ICD-10-CM

## 2018-10-31 PROCEDURE — 97110 THERAPEUTIC EXERCISES: CPT

## 2018-10-31 NOTE — OP THERAPY DAILY TREATMENT
"  Outpatient Physical Therapy  DAILY TREATMENT     [facility] Physical [address]  [address]  [place] [address]  Phone:  [phone]  Fax:  [phone]    Date: 10/31/2018    Patient: Paulo Paredes  YOB: 1940  MRN: [id number]     Time Calculation  Start time: 0930  Stop time: 1000 Time Calculation (min): 30 minutes     Chief Complaint: Back Problem    Visit #: 3    SUBJECTIVE:  Pt states his back is doing pretty good, it is not getting as sore. His back muscles were pretty sore and tired after the last visit, but this has improved. He has not had to take an aspirin for 4 days and was taking one daily.     OBJECTIVE:  Current objective measures: Pt reaches mid shin with lx forward bend          Therapeutic Exercises (CPT 72483):     1. Bridge, 10 x 1    2. Hamstring curl/bridge on ball, 10 x 1    3. Sit<>stand, 10 x 1    4. ADIM + ball squeeze, 5\" x 10 x 1, reviewed    5. ADIM + BKFO, 10 x 1, reviewed    6. Tandem stance (reg/head turns/EC), 30\" x 2    7. Nu step, L5, 5 min    8. Rows/shoulder ext, pink, 10 x 1     9. Multifidus WO, pink, 10 x 1      Time-based treatments/modalities:  Therapeutic exercise minutes (CPT 67586): 25 minutes       ASSESSMENT:   Response to treatment: Pt presents with chronic R low back pain. Pt able to tolerate progression of abdominal, hip, and balance exercises without increase in pain and has a decrease in back pain intensity overall.    PLAN/RECOMMENDATIONS:   Plan for treatment: therapy treatment to continue next visit.  Planned interventions for next visit: continue with current treatment. Progress to standing strength. Progress to dynamic balance.      "

## 2018-11-02 ENCOUNTER — PHYSICAL THERAPY (OUTPATIENT)
Dept: PHYSICAL THERAPY | Facility: REHABILITATION | Age: 78
End: 2018-11-02
Attending: INTERNAL MEDICINE
Payer: MEDICARE

## 2018-11-02 DIAGNOSIS — G89.29 CHRONIC LUMBOSACRAL PAIN: ICD-10-CM

## 2018-11-02 DIAGNOSIS — M54.50 CHRONIC LUMBOSACRAL PAIN: ICD-10-CM

## 2018-11-02 DIAGNOSIS — M54.50 CHRONIC RIGHT-SIDED LOW BACK PAIN WITHOUT SCIATICA: ICD-10-CM

## 2018-11-02 DIAGNOSIS — G89.29 CHRONIC RIGHT-SIDED LOW BACK PAIN WITHOUT SCIATICA: ICD-10-CM

## 2018-11-02 PROCEDURE — 97110 THERAPEUTIC EXERCISES: CPT

## 2018-11-02 NOTE — OP THERAPY DAILY TREATMENT
Outpatient Physical Therapy  DAILY TREATMENT     [facility] Physical [address]  [address]  [place] [address]  Phone:  [phone]  Fax:  [phone]    Date: 11/02/2018    Patient: Paulo Paredes  YOB: 1940  MRN: [id number]     Time Calculation  Start time: 1005  Stop time: 1035 Time Calculation (min): 30 minutes     Chief Complaint: No chief complaint on file.    Visit #: 4    SUBJECTIVE:  Pt states his back is doing pretty good, it is not getting as sore. His back muscles were pretty sore and tired after the last visit, but the soreness went away after a day or two. Pt still has soreness in the mornings at times, but has been able to ease it after walking for 20 min.     OBJECTIVE:  Current objective measures: Pt reaches mid shin with lx forward bend          Therapeutic Exercises (CPT 25665):     1. Nu-step, L5, 5 min    2. Bridge, 10 x 1    3. On ball: hamstring curl, bridge, 10 x 1    4. Sit<>stand, 10 x 1    5. 3-way hip , 10 x 1      Therapeutic Exercise Summary: HEP: bridges, sit<>stand, ADIM + ball squeeze, ADIM + BKFO, tandem (reg/head/EC), rows/shoulder ext, multifidus WO, 3-way hip    Gait: tandem (reg/dual task), marching, side step, 3 times      Time-based treatments/modalities:  Therapeutic exercise minutes (CPT 67450): 25 minutes       ASSESSMENT:   Response to treatment: Pt presents with chronic R low back pain. Pt able to tolerate progression of abdominal, hip, and balance exercises without increase in pain and has a decrease in back pain intensity overall.    PLAN/RECOMMENDATIONS:   Plan for treatment: therapy treatment to continue next visit.  Planned interventions for next visit: continue with current treatment. Progress to standing strength. Progress to dynamic balance. Work towards d/c (2 more appointments scheduled)

## 2018-11-05 ENCOUNTER — PHYSICAL THERAPY (OUTPATIENT)
Dept: PHYSICAL THERAPY | Facility: REHABILITATION | Age: 78
End: 2018-11-05
Attending: INTERNAL MEDICINE
Payer: MEDICARE

## 2018-11-05 DIAGNOSIS — M54.50 CHRONIC RIGHT-SIDED LOW BACK PAIN WITHOUT SCIATICA: ICD-10-CM

## 2018-11-05 DIAGNOSIS — G89.29 CHRONIC RIGHT-SIDED LOW BACK PAIN WITHOUT SCIATICA: ICD-10-CM

## 2018-11-05 DIAGNOSIS — M54.50 CHRONIC LUMBOSACRAL PAIN: ICD-10-CM

## 2018-11-05 DIAGNOSIS — G89.29 CHRONIC LUMBOSACRAL PAIN: ICD-10-CM

## 2018-11-05 PROCEDURE — 97110 THERAPEUTIC EXERCISES: CPT

## 2018-11-05 NOTE — OP THERAPY DAILY TREATMENT
Outpatient Physical Therapy  DAILY TREATMENT     [facility] Physical [address]  [address]  [place] [address]  Phone:  [phone]  Fax:  [phone]    Date: 11/05/2018    Patient: Paulo Paredes  YOB: 1940  MRN: [id number]     Time Calculation  Start time: 1000  Stop time: 1025 Time Calculation (min): 25 minutes     Chief Complaint: Back Problem    Visit #: 5    SUBJECTIVE:  Pt states his back is doing pretty good, it is not getting as sore. He is able to do most of the exercises without getting too sore.     OBJECTIVE:  Current objective measures: Pt reaches mid shin with lx forward bend          Therapeutic Exercises (CPT 42000):     1. Nu-step, L5, 5 min    2. Bridge, 10 x 1    3. On ball: hamstring curl, bridge, 10 x 1    4. Sit<>stand, 10 x 1    5. 3-way hip , 10 x 1    6. Seated weighted ADIM, 2lbs, 10 x 1    7. Seated isometric ADIM (bands and pull/push), pink, 10 x 1      Therapeutic Exercise Summary: HEP: bridges, sit<>stand, ADIM + ball squeeze, ADIM + BKFO, tandem (reg/head/EC), rows/shoulder ext, multifidus WO, 3-way hip    Gait: tandem, marching, 2 times (one reg/one dual tasking)      Time-based treatments/modalities:  Therapeutic exercise minutes (CPT 23143): 25 minutes       ASSESSMENT:   Response to treatment: Pt presents with chronic R low back pain. Pt able to tolerate progression of abdominal, hip, and balance exercises without increase in pain and has a decrease in back pain intensity overall.    PLAN/RECOMMENDATIONS:   Plan for treatment: therapy treatment to continue next visit.  Planned interventions for next visit: continue with current treatment. Progress to standing strength. Progress to dynamic balance. Pt scheduled in two weeks, likely d/c pending progress.

## 2018-11-07 ENCOUNTER — PHYSICAL THERAPY (OUTPATIENT)
Dept: PHYSICAL THERAPY | Facility: REHABILITATION | Age: 78
End: 2018-11-07
Attending: INTERNAL MEDICINE
Payer: MEDICARE

## 2018-11-07 DIAGNOSIS — M54.50 CHRONIC RIGHT-SIDED LOW BACK PAIN WITHOUT SCIATICA: ICD-10-CM

## 2018-11-07 DIAGNOSIS — M54.50 CHRONIC LUMBOSACRAL PAIN: ICD-10-CM

## 2018-11-07 DIAGNOSIS — G89.29 CHRONIC LUMBOSACRAL PAIN: ICD-10-CM

## 2018-11-07 DIAGNOSIS — G89.29 CHRONIC RIGHT-SIDED LOW BACK PAIN WITHOUT SCIATICA: ICD-10-CM

## 2018-11-07 PROCEDURE — 97110 THERAPEUTIC EXERCISES: CPT

## 2018-11-07 NOTE — OP THERAPY DAILY TREATMENT
"  Outpatient Physical Therapy  DAILY TREATMENT     [facility] Physical [address].  [address]  [place] [address]  Phone:  [phone]  Fax:  [phone]    Date: 11/07/2018    Patient: Paulo Paredes  YOB: 1940  MRN: [id number]     Time Calculation  Start time: 0930  Stop time: 0958 Time Calculation (min): 28 minutes     Chief Complaint: Back Problem    Visit #: 6    SUBJECTIVE:  Pt states his back is sore today and spreading out across his back.     OBJECTIVE:  Current objective measures: Pt reaches mid shin with lx forward bend. Pain spreads to the B hip, further to the R side.   Pt stands in supination, wears arch supports, arch seems to be too posterior, encouraged to look for different arch supports          Therapeutic Exercises (CPT 34789):     1. LTR, 10 x 1    2. SKTC, 30\" x 2    3. Piriformis, 30\" x 1    4. Bridge, 10 x 1    5. Cat/cow, 10 x 1    6. Quad lat SB, 10 x 1      Therapeutic Exercise Summary: HEP: bridges, sit<>stand, ADIM + ball squeeze, ADIM + BKFO, tandem (reg/head/EC), rows/shoulder ext, multifidus WO, 3-way hip, sit<>stand      Therapeutic Treatments and Modalities:     1. Manual Therapy (CPT 36497), long axis distraction, grade 3, 20\" x 3    Time-based treatments/modalities:  Manual therapy minutes (CPT 80483): 5 minutes  Therapeutic exercise minutes (CPT 40074): 23 minutes       ASSESSMENT:   Response to treatment: Pt presents with chronic R low back pain. Pt had increased pain in his low back at the start of therapy, but pain decreased and centralized with mobility and strengthening exercises.     PLAN/RECOMMENDATIONS:   Plan for treatment: therapy treatment to continue next visit.  Planned interventions for next visit: continue with current treatment. Check in, likely d/c pending progress.      "

## 2018-11-13 ENCOUNTER — ANTICOAGULATION VISIT (OUTPATIENT)
Dept: MEDICAL GROUP | Facility: MEDICAL CENTER | Age: 78
End: 2018-11-13
Payer: MEDICARE

## 2018-11-13 DIAGNOSIS — G45.9 TRANSIENT ISCHEMIC ATTACK: ICD-10-CM

## 2018-11-13 DIAGNOSIS — Z95.828 PRESENCE OF IVC FILTER: ICD-10-CM

## 2018-11-13 DIAGNOSIS — Z79.01 CHRONIC ANTICOAGULATION: ICD-10-CM

## 2018-11-13 LAB — INR PPP: 2.3 (ref 2–3.5)

## 2018-11-13 PROCEDURE — 99999 PR NO CHARGE: CPT | Performed by: PHYSICIAN ASSISTANT

## 2018-11-13 PROCEDURE — 85610 PROTHROMBIN TIME: CPT | Performed by: PHYSICIAN ASSISTANT

## 2018-11-13 NOTE — PROGRESS NOTES
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  Family Medicine [phone]        Anticoagulation Patient Findings    History of Present Illness: follow up appointment for chronic anticoagulation with the high risk medication, warfarin for TIA    Pt remains therapeutic today.Will increase the interval to recheck INR.  Follow up in 8 weeks, to reduce the risk of adverse events related to this high risk medication, warfarin.    Marta Quintana Clinical Pharmacist      Pt declines vitals today

## 2018-11-26 ENCOUNTER — PHYSICAL THERAPY (OUTPATIENT)
Dept: PHYSICAL THERAPY | Facility: REHABILITATION | Age: 78
End: 2018-11-26
Attending: INTERNAL MEDICINE
Payer: MEDICARE

## 2018-11-26 DIAGNOSIS — G89.29 CHRONIC RIGHT-SIDED LOW BACK PAIN WITHOUT SCIATICA: ICD-10-CM

## 2018-11-26 DIAGNOSIS — M54.50 CHRONIC RIGHT-SIDED LOW BACK PAIN WITHOUT SCIATICA: ICD-10-CM

## 2018-11-26 DIAGNOSIS — M54.50 CHRONIC LUMBOSACRAL PAIN: ICD-10-CM

## 2018-11-26 DIAGNOSIS — G89.29 CHRONIC LUMBOSACRAL PAIN: ICD-10-CM

## 2018-11-26 PROCEDURE — 97110 THERAPEUTIC EXERCISES: CPT

## 2018-11-26 NOTE — OP THERAPY DISCHARGE SUMMARY
Outpatient Physical Therapy  DISCHARGE SUMMARY NOTE      [facility] Physical Therapy [place] [address]  [address]  [place] [address]  Phone:  [phone]  Fax:  [phone]    Date of Visit: 11/26/2018    Patient: Paulo Paredes  YOB: 1940  MRN: [id number]     Referring Provider: Edward Walters M.D.  [address]  [address]  [place] [address]   Referring Diagnosis Low back pain [M54.5];Other chronic pain [G89.29]     Physical Therapy Occurrence Codes    Date of onset of impairment:  10/9/18   Date physical therapy care plan established or reviewed:  10/19/18   Date physical therapy treatment started:  10/19/18          Functional Limitation G-Codes and Severity Modifiers  Edu Adolfo Low Back Pain and Disability Score: 12.5   Goal:     Discharge:         Your patient is being discharged from Physical Therapy with the following comments:   · Goals met (all but one met)    Lx ROM: flexion WNL and painfree, extension limited and painfree, SB WNL and slight pull (no pain), rotation WNL and painfree  VAS: 0/10 best, current. Discomfort when he wakes up, not necessarily a hard pain  Still waking at night due to pain, but not consistently  Can do ADLs around the house without any issues    Short Term Goals:   1. Pt is to have full painfree Lx ROM into SB bilaterally. MET  2. Pt is to have 1/10 VAS pain at rest. MET  3. Pt is to perform HEP without cueing demonstrating compliance. MET  Short term goal time span:  2-4 weeks      Long Term Goals:    1. Pt is to be able to perform all ADLs, including mopping without increase in pain. MET  2. Pt is to have 0/10 VAS pain at rest. MET  3. Pt is to no longer wake at night due to pain. NOT MET  Long term goal time span:  6-8 weeks  Edu Adolfo Low Back Pain and Disability Score: 12.5     Comments:  Response to treatment: Pt presented with chronic R low back pain. Pt has decreased overall pain and increased tolerance of activities. Pt  feels ready to d/c to his HEP.     Limitations Remaining:  Pt will occasionally wake at night due to discomfort, but not as often. Pt feels comfortable focusing on HEP independenly.    Recommendations:  Pt to d/c to HEP    Heavenly Gordon PT, DPT    Date: 11/26/2018

## 2018-11-26 NOTE — OP THERAPY DAILY TREATMENT
"  Outpatient Physical Therapy  DAILY TREATMENT     [facility] Physical [address]  [address]  [place] [address]  Phone:  [phone]  Fax:  [phone]    Date: 11/26/2018    Patient: Paulo Paredes  YOB: 1940  MRN: [id number]     Time Calculation  Start time: 1500  Stop time: 1520 Time Calculation (min): 20 minutes     Chief Complaint: Back Problem    Visit #: 7    SUBJECTIVE:  Pt states he has been feeling pretty good. He has woken up once at 2am due to discomfort but takes a \"non-aspirin, aspirin\" and was able to go back to sleep. Overall, he feels like he has a good series of exercises to work on things on his own.     OBJECTIVE:  Current objective measures: Pt reaches mid shin with lx forward bend.   Lx ROM: flexion WNL and painfree, extension limited and painfree, SB WNL and slight pull (no pain), rotation WNL and painfree  VAS: 0/10 best, current. Discomfort when he wakes up, not necessarily a hard pain  Still waking at night due to pain, but not consistently  Can do ADLs around the house without any issues    Short Term Goals:   1. Pt is to have full painfree Lx ROM into SB bilaterally. MET  2. Pt is to have 1/10 VAS pain at rest. MET  3. Pt is to perform HEP without cueing demonstrating compliance. MET  Short term goal time span:  2-4 weeks      Long Term Goals:    1. Pt is to be able to perform all ADLs, including mopping without increase in pain. MET  2. Pt is to have 0/10 VAS pain at rest. MET  3. Pt is to no longer wake at night due to pain. NOT MET  Long term goal time span:  6-8 weeks  Edu Adolfo Low Back Pain and Disability Score: 12.5       Therapeutic Exercises (CPT 09759):       Therapeutic Exercise Summary: Reviewed HEP: bridges, sit<>stand, ADIM + ball squeeze, ADIM + BKFO, tandem (reg/head/EC), rows/shoulder ext, multifidus WO, 3-way hip, sit<>stand        Time-based treatments/modalities:  Therapeutic exercise minutes (CPT 73641): 15 minutes   "     ASSESSMENT:   Response to treatment: Pt presented with chronic R low back pain. Pt has decreased overall pain and increased tolerance of activities. Pt feels ready to d/c to his HEP.    PLAN/RECOMMENDATIONS:   Pt to d/c to HEP

## 2018-11-26 NOTE — LETTER
November 26, 2018    No Recipients      Re:  Paulo Paredes          Dear  No Recipients,    It was a pleasure seeing your patient, Paulo Paredes, on 11/26/2018 for his final therapy visit.     Please find enclosed a copy of the patient's discharge summary from outpatient physical therapy services.          On behalf of the staff at [facility], we would like to thank you for your referrals.  We appreciate your confidence in our clinic and will continue to work hard to provide the best outcomes for your patients.    We sincerely enjoy treating your patients and hope that you have been happy with their care.  Thank you again, and please call with any questions, concerns or ways that we can best meet your needs.        Sincerely,          Heavenly Gordon, PT, DPT    No Recipients              Outpatient Physical Therapy  DISCHARGE SUMMARY NOTE      [address]  [address]  [facility] [address]  Phone:  [phone]  Fax:  [phone]    Date of Visit: 11/26/2018    Patient: Paulo Paredes  YOB: 1940  MRN: [id number]     Referring Provider: Edward Walters M.D.  [address]  [address]  [place] [address]   Referring Diagnosis Low back pain [M54.5];Other chronic pain [G89.29]     Physical Therapy Occurrence Codes    Date of onset of impairment:  10/9/18   Date physical therapy care plan established or reviewed:  10/19/18   Date physical therapy treatment started:  10/19/18          Functional Limitation G-Codes and Severity Modifiers  Edu Adolfo Low Back Pain and Disability Score: 12.5   Goal:     Discharge:         Your patient is being discharged from Physical Therapy with the following comments:   · Goals met (all but one met)    Lx ROM: flexion WNL and painfree, extension limited and painfree, SB WNL and slight pull (no pain), rotation WNL and painfree  VAS: 0/10 best, current. Discomfort when he wakes up, not necessarily  a hard pain  Still waking at night due to pain, but not consistently  Can do ADLs around the house without any issues    Short Term Goals:   1. Pt is to have full painfree Lx ROM into SB bilaterally. MET  2. Pt is to have 1/10 VAS pain at rest. MET  3. Pt is to perform HEP without cueing demonstrating compliance. MET  Short term goal time span:  2-4 weeks      Long Term Goals:    1. Pt is to be able to perform all ADLs, including mopping without increase in pain. MET  2. Pt is to have 0/10 VAS pain at rest. MET  3. Pt is to no longer wake at night due to pain. NOT MET  Long term goal time span:  6-8 weeks  Edu Adolfo Low Back Pain and Disability Score: 12.5     Comments:  Response to treatment: Pt presented with chronic R low back pain. Pt has decreased overall pain and increased tolerance of activities. Pt feels ready to d/c to his HEP.     Limitations Remaining:  Pt will occasionally wake at night due to discomfort, but not as often. Pt feels comfortable focusing on HEP independenly.    Recommendations:  Pt to d/c to HEP    Heavenly Gordon, PT, DPT    Date: 11/26/2018

## 2018-12-13 ENCOUNTER — OFFICE VISIT (OUTPATIENT)
Dept: URGENT CARE | Facility: CLINIC | Age: 78
End: 2018-12-13
Payer: MEDICARE

## 2018-12-13 ENCOUNTER — HOSPITAL ENCOUNTER (OUTPATIENT)
Facility: MEDICAL CENTER | Age: 78
End: 2018-12-13
Attending: FAMILY MEDICINE
Payer: MEDICARE

## 2018-12-13 VITALS
HEIGHT: 69 IN | BODY MASS INDEX: 28.88 KG/M2 | RESPIRATION RATE: 16 BRPM | SYSTOLIC BLOOD PRESSURE: 124 MMHG | TEMPERATURE: 96.8 F | HEART RATE: 60 BPM | OXYGEN SATURATION: 96 % | DIASTOLIC BLOOD PRESSURE: 70 MMHG | WEIGHT: 195 LBS

## 2018-12-13 DIAGNOSIS — R30.0 DYSURIA: ICD-10-CM

## 2018-12-13 LAB
APPEARANCE UR: CLEAR
BILIRUB UR STRIP-MCNC: NEGATIVE MG/DL
COLOR UR AUTO: YELLOW
GLUCOSE UR STRIP.AUTO-MCNC: NORMAL MG/DL
KETONES UR STRIP.AUTO-MCNC: NEGATIVE MG/DL
LEUKOCYTE ESTERASE UR QL STRIP.AUTO: NEGATIVE
NITRITE UR QL STRIP.AUTO: NEGATIVE
PH UR STRIP.AUTO: 5.5 [PH] (ref 5–8)
PROT UR QL STRIP: NORMAL MG/DL
RBC UR QL AUTO: NORMAL
SP GR UR STRIP.AUTO: 1.02
UROBILINOGEN UR STRIP-MCNC: 0.2 MG/DL

## 2018-12-13 PROCEDURE — 99214 OFFICE O/P EST MOD 30 MIN: CPT | Performed by: FAMILY MEDICINE

## 2018-12-13 PROCEDURE — 87086 URINE CULTURE/COLONY COUNT: CPT

## 2018-12-13 PROCEDURE — 99000 SPECIMEN HANDLING OFFICE-LAB: CPT | Performed by: FAMILY MEDICINE

## 2018-12-13 PROCEDURE — 81002 URINALYSIS NONAUTO W/O SCOPE: CPT | Performed by: FAMILY MEDICINE

## 2018-12-13 RX ORDER — SULFAMETHOXAZOLE AND TRIMETHOPRIM 400; 80 MG/1; MG/1
1 TABLET ORAL 2 TIMES DAILY
Qty: 20 TAB | Refills: 0 | Status: SHIPPED | OUTPATIENT
Start: 2018-12-13 | End: 2018-12-23

## 2018-12-13 NOTE — PATIENT INSTRUCTIONS
Start oral antibiotics as directed twice daily  Get your INR checked within latest a week  Follow up if not significantly improved as expected, sooner if any worsening or new symptoms

## 2018-12-13 NOTE — PROGRESS NOTES
"Subjective:      Paulo Paredes is a 78 y.o. male who presents with UTI (X 3 DAYS)            This is a new problem  Patient had cystoscopy more than a week ago and over the course of the past few days is noticed some irritation when he urinates.  Denies any back pain, flank pain, nausea or vomiting abdominal pain.  Denies any urinary frequency. He is incontinent urine usually  Past history of prostate cancer        ROS       Objective:     /70 (BP Location: Left arm, Patient Position: Sitting, BP Cuff Size: Adult)   Pulse 60   Temp 36 °C (96.8 °F) (Temporal)   Resp 16   Ht 1.74 m (5' 8.5\")   Wt 88.5 kg (195 lb)   SpO2 96%   BMI 29.21 kg/m²      Physical Exam   Constitutional: He is oriented to person, place, and time. He appears well-developed and well-nourished.  Non-toxic appearance. He does not have a sickly appearance. He does not appear ill. No distress.   Cardiovascular: Normal rate.    Pulmonary/Chest: Effort normal. No respiratory distress.   Genitourinary:   Genitourinary Comments: inspection is normal, no rashes noted, no penile d/c   Neurological: He is alert and oriented to person, place, and time.   Skin: He is not diaphoretic.     Results for orders placed or performed in visit on 12/13/18   POCT Urinalysis   Result Value Ref Range    POC Color YELLOW Negative    POC Appearance CLEAR Negative    POC Leukocyte Esterase NEGATIVE Negative    POC Nitrites NEGATIVE Negative    POC Urobiligen 0.2 Negative (0.2) mg/dL    POC Protein 100MG Negative mg/dL    POC Urine PH 5.5 5.0 - 8.0    POC Blood TRACE Negative    POC Specific Gravity 1.020 <1.005 - >1.030    POC Ketones NEGATIVE Negative mg/dL    POC Bilirubin NEGATIVE Negative mg/dL    POC Glucose 500MG Negative mg/dL               Assessment/Plan:     1. Dysuria  - Urine Culture; Future  - POCT Urinalysis  - sulfamethoxazole-trimethoprim (BACTRIM) 400-80 MG Tab; Take 1 Tab by mouth 2 times a day for 10 days.  Dispense: 20 Tab; Refill: " 0      Likely Uncomplicated UTI  Bactrim half dose based on the last GFR for 10 days  Recommended to have his INR rechecked within a week  Plan per orders and instructions  Warning signs reviewed

## 2018-12-14 ENCOUNTER — ANTICOAGULATION MONITORING (OUTPATIENT)
Dept: VASCULAR LAB | Facility: MEDICAL CENTER | Age: 78
End: 2018-12-14

## 2018-12-14 DIAGNOSIS — Z95.828 PRESENCE OF IVC FILTER: ICD-10-CM

## 2018-12-14 DIAGNOSIS — Z79.01 CHRONIC ANTICOAGULATION: ICD-10-CM

## 2018-12-14 DIAGNOSIS — G45.9 TRANSIENT ISCHEMIC ATTACK: ICD-10-CM

## 2018-12-15 LAB
BACTERIA UR CULT: NORMAL
SIGNIFICANT IND 70042: NORMAL
SITE SITE: NORMAL
SOURCE SOURCE: NORMAL

## 2018-12-17 ENCOUNTER — ANTICOAGULATION VISIT (OUTPATIENT)
Dept: MEDICAL GROUP | Facility: MEDICAL CENTER | Age: 78
End: 2018-12-17
Payer: MEDICARE

## 2018-12-17 DIAGNOSIS — Z95.828 PRESENCE OF IVC FILTER: ICD-10-CM

## 2018-12-17 DIAGNOSIS — G45.9 TRANSIENT ISCHEMIC ATTACK: ICD-10-CM

## 2018-12-17 DIAGNOSIS — Z79.01 CHRONIC ANTICOAGULATION: Primary | ICD-10-CM

## 2018-12-17 LAB — INR PPP: 2 (ref 2–3.5)

## 2018-12-17 PROCEDURE — 99211 OFF/OP EST MAY X REQ PHY/QHP: CPT | Performed by: NURSE PRACTITIONER

## 2018-12-17 PROCEDURE — 85610 PROTHROMBIN TIME: CPT | Performed by: NURSE PRACTITIONER

## 2018-12-17 NOTE — PROGRESS NOTES
OP Anticoagulation Service Note    Date: 12/17/2018  There were no vitals filed for this visit.  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    Pt is here today to evaluate anticoagulation therapy    Previous INR was  2.3 on 11/13/18    Pt was instructed to continue with the current dosing regimen    Confirmed warfarin dosing regimen, 0  missed doses of coumadin. HOWEVER, patient was started on Bactrim DS x 10 days and called the clinic and reduced dose and scheduled closer follow up.   Diet has been consistent with foods rich in vitamin K: Yes  Changes in ETOH:  No  Changes in smoking status: No  Changes in medication: No   Cost restriction: No  S/s of bleeding:  No  Signs/symptoms  thrombosis since the last appt: No    A/P   INR therapeutic today, at 2.0         Patient will continue with the slightly reduced regimen while he is on Bactrim DS and will resume his current dosing regimen starting Friday. Patient will be going out of town for the holidays, and will return to clinic when he gets back in about 2 weeks.     2/2019 check referral    Pt educated to contact our clinic with any changes in medications or s/s of bleeding or thrombosis. Pt is aware to seek immediate medical attention for falls, head injury or deep cuts    Follow up appointment in 2.5 week(s) to reduce risk of adverse events from warfarin    Next appt: Thursday, Ellis 3, 2019 8:30am

## 2019-01-03 ENCOUNTER — ANTICOAGULATION VISIT (OUTPATIENT)
Dept: MEDICAL GROUP | Facility: MEDICAL CENTER | Age: 79
End: 2019-01-03
Payer: MEDICARE

## 2019-01-03 VITALS — DIASTOLIC BLOOD PRESSURE: 69 MMHG | HEART RATE: 55 BPM | SYSTOLIC BLOOD PRESSURE: 138 MMHG

## 2019-01-03 DIAGNOSIS — Z79.01 CHRONIC ANTICOAGULATION: ICD-10-CM

## 2019-01-03 DIAGNOSIS — Z95.828 PRESENCE OF IVC FILTER: ICD-10-CM

## 2019-01-03 DIAGNOSIS — G45.9 TRANSIENT ISCHEMIC ATTACK: ICD-10-CM

## 2019-01-03 LAB — INR PPP: 2.4 (ref 2–3.5)

## 2019-01-03 PROCEDURE — 85610 PROTHROMBIN TIME: CPT | Performed by: INTERNAL MEDICINE

## 2019-01-03 PROCEDURE — 99999 PR NO CHARGE: CPT | Performed by: INTERNAL MEDICINE

## 2019-01-03 NOTE — PROGRESS NOTES
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  Family Medicine [phone]        Anticoagulation Patient Findings  Patient Findings     Negatives:   Signs/symptoms of thrombosis, Signs/symptoms of bleeding, Laboratory test error suspected, Change in health, Change in alcohol use, Change in activity, Upcoming invasive procedure, Emergency department visit, Upcoming dental procedure, Missed doses, Extra doses, Change in medications, Change in diet/appetite, Hospital admission, Bruising, Other complaints        History of Present Illness: follow up appointment for chronic anticoagulation with the high risk medication, warfarin for TIA/DVT IVC filter    Pt remains therapeutic today. Continue current dosing regimen.  Follow up in 4 weeks, to reduce the risk of adverse events related to this high risk medication, warfarin.    Marta Quintana, Clinical Pharmacist

## 2019-01-07 ENCOUNTER — TELEPHONE (OUTPATIENT)
Dept: MEDICAL GROUP | Facility: MEDICAL CENTER | Age: 79
End: 2019-01-07

## 2019-01-07 NOTE — TELEPHONE ENCOUNTER
ESTABLISHED PATIENT PRE-VISIT PLANNING     Patient was contacted to complete PVP.     Note: Patient will not be contacted if there is no indication to call.     1.  Reviewed notes from the last few office visits within the medical group: Yes  10/09/2018  2.  If any orders were placed at last visit or intended to be done for this visit (i.e. 6 mos follow-up), do we have Results/Consult Notes?        •  Labs - Labs ordered, NOT completed. Patient advised to complete prior to next appointment.   Note: If patient appointment is for lab review and patient did not complete labs, check with provider if OK to reschedule patient until labs completed.       •  Imaging - Imaging was not ordered at last office visit.       •  Referrals - No referrals were ordered at last office visit.    3. Is this appointment scheduled as a Hospital Follow-Up? No    4.  Immunizations were updated in [facility] using WebIZ?: Yes       •  Web Iz Recommendations: HEPATITIS B, TDAP and SHINGRIX (Shingles)    5.  Patient is due for the following Health Maintenance Topics:   Health Maintenance Due   Topic Date Due   • IMM HEP B VACCINE (1 of 3 - Risk 3-dose series) 09/06/1959   • IMM ZOSTER VACCINES (2 of 3) 12/23/2008   • IMM DTaP/Tdap/Td Vaccine (1 - Tdap) 03/05/2010       6. Orders for overdue Health Maintenance topics pended in Pre-Charting? NO    7.  AHA (MDX) form printed for Provider? YES    8.  Patient was informed to arrive 15 min prior to their scheduled appointment and bring in their medication bottles.

## 2019-01-08 ENCOUNTER — OFFICE VISIT (OUTPATIENT)
Dept: NEPHROLOGY | Facility: MEDICAL CENTER | Age: 79
End: 2019-01-08
Payer: MEDICARE

## 2019-01-08 VITALS
SYSTOLIC BLOOD PRESSURE: 128 MMHG | TEMPERATURE: 97.6 F | WEIGHT: 199 LBS | DIASTOLIC BLOOD PRESSURE: 68 MMHG | BODY MASS INDEX: 30.16 KG/M2 | HEIGHT: 68 IN | HEART RATE: 66 BPM | OXYGEN SATURATION: 99 % | RESPIRATION RATE: 14 BRPM

## 2019-01-08 DIAGNOSIS — N18.4 CKD (CHRONIC KIDNEY DISEASE) STAGE 4, GFR 15-29 ML/MIN (HCC): ICD-10-CM

## 2019-01-08 DIAGNOSIS — M54.9 CHRONIC BACK PAIN, UNSPECIFIED BACK LOCATION, UNSPECIFIED BACK PAIN LATERALITY: ICD-10-CM

## 2019-01-08 DIAGNOSIS — G89.29 CHRONIC BACK PAIN, UNSPECIFIED BACK LOCATION, UNSPECIFIED BACK PAIN LATERALITY: ICD-10-CM

## 2019-01-08 DIAGNOSIS — I10 ESSENTIAL HYPERTENSION: ICD-10-CM

## 2019-01-08 PROCEDURE — 99214 OFFICE O/P EST MOD 30 MIN: CPT | Performed by: INTERNAL MEDICINE

## 2019-01-08 ASSESSMENT — ENCOUNTER SYMPTOMS
SHORTNESS OF BREATH: 0
HYPERTENSION: 1
CHILLS: 0
FEVER: 0
VOMITING: 0
NAUSEA: 0
COUGH: 0
BACK PAIN: 1

## 2019-01-08 NOTE — PROGRESS NOTES
"Subjective:      Paulo Paredes is a 78 y.o. male who presents with Hypertension and Chronic Kidney Disease            Hypertension   This is a chronic problem. The current episode started more than 1 year ago. The problem is unchanged. The problem is controlled. Pertinent negatives include no chest pain, malaise/fatigue, peripheral edema or shortness of breath. Risk factors for coronary artery disease include male gender, obesity and diabetes mellitus. Past treatments include angiotensin blockers and beta blockers. The current treatment provides significant improvement. There are no compliance problems.  Hypertensive end-organ damage includes kidney disease. Identifiable causes of hypertension include chronic renal disease.   Chronic Kidney Disease   This is a chronic problem. The current episode started more than 1 year ago. The problem occurs constantly. The problem has been unchanged. Pertinent negatives include no chest pain, chills, coughing, fever, nausea, urinary symptoms or vomiting.       Review of Systems   Constitutional: Negative for chills, fever and malaise/fatigue.   Respiratory: Negative for cough and shortness of breath.    Cardiovascular: Negative for chest pain and leg swelling.   Gastrointestinal: Negative for nausea and vomiting.   Genitourinary: Negative for dysuria, frequency and urgency.   Musculoskeletal: Positive for back pain.          Objective:     /68 (BP Location: Right arm, Patient Position: Sitting)   Pulse 66   Temp 36.4 °C (97.6 °F)   Resp 14   Ht 1.727 m (5' 8\")   Wt 90.3 kg (199 lb)   SpO2 99%   BMI 30.26 kg/m²      Physical Exam   Constitutional: He is oriented to person, place, and time.   HENT:   Right Ear: External ear normal.   Left Ear: External ear normal.   Nose: Nose normal.   Eyes: Conjunctivae are normal. Right eye exhibits no discharge. Left eye exhibits no discharge.   Cardiovascular: Normal rate and regular rhythm.    Pulmonary/Chest: Effort normal " and breath sounds normal. No respiratory distress. He has no wheezes.   Musculoskeletal: He exhibits no edema.   Neurological: He is alert and oriented to person, place, and time.   Skin: Skin is warm.   Psychiatric: He has a normal mood and affect. His behavior is normal.   Nursing note and vitals reviewed.              Assessment/Plan:     1. Essential hypertension  Controlled  Continue low-sodium diet    2. CKD (chronic kidney disease) stage 4, GFR 15-29 ml/min ([facility])  Stable  No uremic symptoms  Renal dose of medication  Avoid nephrotoxins    3. Uncontrolled type 2 diabetes mellitus with microalbuminuria, without long-term current use of insulin ([facility])    4. Chronic back pain, unspecified back location, unspecified back pain laterality  Referral for neurosurgery evaluation

## 2019-01-09 ENCOUNTER — HOSPITAL ENCOUNTER (OUTPATIENT)
Dept: LAB | Facility: MEDICAL CENTER | Age: 79
End: 2019-01-09
Attending: INTERNAL MEDICINE
Payer: MEDICARE

## 2019-01-09 DIAGNOSIS — D69.6 THROMBOCYTOPENIA (HCC): ICD-10-CM

## 2019-01-09 DIAGNOSIS — I10 ESSENTIAL HYPERTENSION: ICD-10-CM

## 2019-01-09 DIAGNOSIS — G89.29 CHRONIC BACK PAIN, UNSPECIFIED BACK LOCATION, UNSPECIFIED BACK PAIN LATERALITY: ICD-10-CM

## 2019-01-09 DIAGNOSIS — N18.4 CKD (CHRONIC KIDNEY DISEASE) STAGE 4, GFR 15-29 ML/MIN (HCC): ICD-10-CM

## 2019-01-09 DIAGNOSIS — E89.0 HYPOTHYROIDISM, POSTSURGICAL: ICD-10-CM

## 2019-01-09 DIAGNOSIS — E78.5 DYSLIPIDEMIA: ICD-10-CM

## 2019-01-09 DIAGNOSIS — M54.9 CHRONIC BACK PAIN, UNSPECIFIED BACK LOCATION, UNSPECIFIED BACK PAIN LATERALITY: ICD-10-CM

## 2019-01-09 LAB
ALBUMIN SERPL BCP-MCNC: 4 G/DL (ref 3.2–4.9)
ALBUMIN/GLOB SERPL: 1.3 G/DL
ALP SERPL-CCNC: 33 U/L (ref 30–99)
ALT SERPL-CCNC: 16 U/L (ref 2–50)
ANION GAP SERPL CALC-SCNC: 5 MMOL/L (ref 0–11.9)
ANION GAP SERPL CALC-SCNC: 6 MMOL/L (ref 0–11.9)
AST SERPL-CCNC: 21 U/L (ref 12–45)
BASOPHILS # BLD AUTO: 0.8 % (ref 0–1.8)
BASOPHILS # BLD: 0.05 K/UL (ref 0–0.12)
BILIRUB SERPL-MCNC: 0.6 MG/DL (ref 0.1–1.5)
BUN SERPL-MCNC: 34 MG/DL (ref 8–22)
BUN SERPL-MCNC: 34 MG/DL (ref 8–22)
CALCIUM SERPL-MCNC: 9.2 MG/DL (ref 8.5–10.5)
CALCIUM SERPL-MCNC: 9.3 MG/DL (ref 8.5–10.5)
CHLORIDE SERPL-SCNC: 106 MMOL/L (ref 96–112)
CHLORIDE SERPL-SCNC: 106 MMOL/L (ref 96–112)
CHOLEST SERPL-MCNC: 184 MG/DL (ref 100–199)
CO2 SERPL-SCNC: 28 MMOL/L (ref 20–33)
CO2 SERPL-SCNC: 28 MMOL/L (ref 20–33)
CREAT SERPL-MCNC: 2.34 MG/DL (ref 0.5–1.4)
CREAT SERPL-MCNC: 2.37 MG/DL (ref 0.5–1.4)
CREAT UR-MCNC: 167 MG/DL
CREAT UR-MCNC: 169.4 MG/DL
EOSINOPHIL # BLD AUTO: 0.27 K/UL (ref 0–0.51)
EOSINOPHIL NFR BLD: 4.1 % (ref 0–6.9)
ERYTHROCYTE [DISTWIDTH] IN BLOOD BY AUTOMATED COUNT: 47.3 FL (ref 35.9–50)
EST. AVERAGE GLUCOSE BLD GHB EST-MCNC: 192 MG/DL
FASTING STATUS PATIENT QL REPORTED: NORMAL
GLOBULIN SER CALC-MCNC: 3.1 G/DL (ref 1.9–3.5)
GLUCOSE SERPL-MCNC: 169 MG/DL (ref 65–99)
GLUCOSE SERPL-MCNC: 172 MG/DL (ref 65–99)
HBA1C MFR BLD: 8.3 % (ref 0–5.6)
HCT VFR BLD AUTO: 50.4 % (ref 42–52)
HDLC SERPL-MCNC: 29 MG/DL
HGB BLD-MCNC: 16.4 G/DL (ref 14–18)
IMM GRANULOCYTES # BLD AUTO: 0.02 K/UL (ref 0–0.11)
IMM GRANULOCYTES NFR BLD AUTO: 0.3 % (ref 0–0.9)
LDLC SERPL CALC-MCNC: 85 MG/DL
LYMPHOCYTES # BLD AUTO: 2.2 K/UL (ref 1–4.8)
LYMPHOCYTES NFR BLD: 33.2 % (ref 22–41)
MCH RBC QN AUTO: 31.7 PG (ref 27–33)
MCHC RBC AUTO-ENTMCNC: 32.5 G/DL (ref 33.7–35.3)
MCV RBC AUTO: 97.3 FL (ref 81.4–97.8)
MICROALBUMIN UR-MCNC: 30.3 MG/DL
MICROALBUMIN UR-MCNC: 30.5 MG/DL
MICROALBUMIN/CREAT UR: 180 MG/G (ref 0–30)
MICROALBUMIN/CREAT UR: 181 MG/G (ref 0–30)
MONOCYTES # BLD AUTO: 0.45 K/UL (ref 0–0.85)
MONOCYTES NFR BLD AUTO: 6.8 % (ref 0–13.4)
NEUTROPHILS # BLD AUTO: 3.64 K/UL (ref 1.82–7.42)
NEUTROPHILS NFR BLD: 54.8 % (ref 44–72)
NRBC # BLD AUTO: 0 K/UL
NRBC BLD-RTO: 0 /100 WBC
PLATELET # BLD AUTO: 127 K/UL (ref 164–446)
PMV BLD AUTO: 10.9 FL (ref 9–12.9)
POTASSIUM SERPL-SCNC: 4.5 MMOL/L (ref 3.6–5.5)
POTASSIUM SERPL-SCNC: 4.7 MMOL/L (ref 3.6–5.5)
PROT SERPL-MCNC: 7.1 G/DL (ref 6–8.2)
RBC # BLD AUTO: 5.18 M/UL (ref 4.7–6.1)
SODIUM SERPL-SCNC: 139 MMOL/L (ref 135–145)
SODIUM SERPL-SCNC: 140 MMOL/L (ref 135–145)
TRIGL SERPL-MCNC: 351 MG/DL (ref 0–149)
TSH SERPL DL<=0.005 MIU/L-ACNC: 4.06 UIU/ML (ref 0.38–5.33)
WBC # BLD AUTO: 6.6 K/UL (ref 4.8–10.8)

## 2019-01-09 PROCEDURE — 83036 HEMOGLOBIN GLYCOSYLATED A1C: CPT

## 2019-01-09 PROCEDURE — 36415 COLL VENOUS BLD VENIPUNCTURE: CPT

## 2019-01-09 PROCEDURE — 85025 COMPLETE CBC W/AUTO DIFF WBC: CPT

## 2019-01-09 PROCEDURE — 82570 ASSAY OF URINE CREATININE: CPT | Mod: 91

## 2019-01-09 PROCEDURE — 82043 UR ALBUMIN QUANTITATIVE: CPT

## 2019-01-09 PROCEDURE — 84443 ASSAY THYROID STIM HORMONE: CPT

## 2019-01-09 PROCEDURE — 80048 BASIC METABOLIC PNL TOTAL CA: CPT

## 2019-01-09 PROCEDURE — 82570 ASSAY OF URINE CREATININE: CPT

## 2019-01-09 PROCEDURE — 80061 LIPID PANEL: CPT

## 2019-01-09 PROCEDURE — 80053 COMPREHEN METABOLIC PANEL: CPT

## 2019-01-09 PROCEDURE — 82043 UR ALBUMIN QUANTITATIVE: CPT | Mod: 91

## 2019-01-14 ENCOUNTER — APPOINTMENT (OUTPATIENT)
Dept: MEDICAL GROUP | Facility: MEDICAL CENTER | Age: 79
End: 2019-01-14
Payer: MEDICARE

## 2019-01-14 ENCOUNTER — OFFICE VISIT (OUTPATIENT)
Dept: MEDICAL GROUP | Facility: MEDICAL CENTER | Age: 79
End: 2019-01-14
Payer: MEDICARE

## 2019-01-14 VITALS
SYSTOLIC BLOOD PRESSURE: 114 MMHG | RESPIRATION RATE: 14 BRPM | WEIGHT: 197.31 LBS | BODY MASS INDEX: 29.9 KG/M2 | DIASTOLIC BLOOD PRESSURE: 62 MMHG | HEIGHT: 68 IN | TEMPERATURE: 96.4 F | OXYGEN SATURATION: 98 % | HEART RATE: 50 BPM

## 2019-01-14 DIAGNOSIS — E11.22 TYPE 2 DIABETES MELLITUS WITH CHRONIC KIDNEY DISEASE, WITHOUT LONG-TERM CURRENT USE OF INSULIN, UNSPECIFIED CKD STAGE (HCC): ICD-10-CM

## 2019-01-14 DIAGNOSIS — N18.4 CKD (CHRONIC KIDNEY DISEASE), STAGE IV (HCC): ICD-10-CM

## 2019-01-14 DIAGNOSIS — E11.29 TYPE 2 DIABETES MELLITUS WITH OTHER DIABETIC KIDNEY COMPLICATION (HCC): ICD-10-CM

## 2019-01-14 DIAGNOSIS — C73 PAPILLARY CARCINOMA OF THYROID (HCC): ICD-10-CM

## 2019-01-14 DIAGNOSIS — E89.0 HYPOTHYROIDISM, POSTSURGICAL: ICD-10-CM

## 2019-01-14 DIAGNOSIS — E66.9 OBESITY (BMI 30.0-34.9): ICD-10-CM

## 2019-01-14 DIAGNOSIS — E89.0 S/P TOTAL THYROIDECTOMY: ICD-10-CM

## 2019-01-14 DIAGNOSIS — N18.32 CHRONIC KIDNEY DISEASE (CKD) STAGE G3B/A2, MODERATELY DECREASED GLOMERULAR FILTRATION RATE (GFR) BETWEEN 30-44 ML/MIN/1.73 SQUARE METER AND ALBUMINURIA CREATININE RATIO BETWEEN 30-299 MG/G (HCC): ICD-10-CM

## 2019-01-14 DIAGNOSIS — E78.5 DYSLIPIDEMIA: ICD-10-CM

## 2019-01-14 DIAGNOSIS — Z12.83 SKIN CANCER SCREENING: ICD-10-CM

## 2019-01-14 DIAGNOSIS — C73 MALIGNANT NEOPLASM OF THYROID GLAND (HCC): ICD-10-CM

## 2019-01-14 DIAGNOSIS — D69.6 THROMBOCYTOPENIA (HCC): ICD-10-CM

## 2019-01-14 DIAGNOSIS — Z95.828 PRESENCE OF IVC FILTER: ICD-10-CM

## 2019-01-14 DIAGNOSIS — R80.9 MICROALBUMINURIA: ICD-10-CM

## 2019-01-14 DIAGNOSIS — E11.65 TYPE 2 DIABETES MELLITUS WITH HYPERGLYCEMIA, WITHOUT LONG-TERM CURRENT USE OF INSULIN (HCC): ICD-10-CM

## 2019-01-14 DIAGNOSIS — N18.30 CKD (CHRONIC KIDNEY DISEASE), STAGE III (HCC): ICD-10-CM

## 2019-01-14 DIAGNOSIS — Z95.1 S/P CABG X 4: ICD-10-CM

## 2019-01-14 DIAGNOSIS — I25.10 CORONARY ARTERY DISEASE INVOLVING NATIVE CORONARY ARTERY OF NATIVE HEART WITHOUT ANGINA PECTORIS: ICD-10-CM

## 2019-01-14 DIAGNOSIS — Z79.01 CHRONIC ANTICOAGULATION: ICD-10-CM

## 2019-01-14 DIAGNOSIS — Z00.00 HEALTH CARE MAINTENANCE: ICD-10-CM

## 2019-01-14 DIAGNOSIS — I27.82 OTHER CHRONIC PULMONARY EMBOLISM WITHOUT ACUTE COR PULMONALE (HCC): ICD-10-CM

## 2019-01-14 DIAGNOSIS — Z86.73 HX-TIA (TRANSIENT ISCHEMIC ATTACK): ICD-10-CM

## 2019-01-14 DIAGNOSIS — I10 ESSENTIAL HYPERTENSION: ICD-10-CM

## 2019-01-14 PROCEDURE — 99215 OFFICE O/P EST HI 40 MIN: CPT | Performed by: INTERNAL MEDICINE

## 2019-01-14 RX ORDER — LEVOTHYROXINE SODIUM 137 UG/1
TABLET ORAL
Qty: 90 TAB | Refills: 2 | Status: SHIPPED | OUTPATIENT
Start: 2019-01-14 | End: 2019-01-23 | Stop reason: CLARIF

## 2019-01-14 ASSESSMENT — PATIENT HEALTH QUESTIONNAIRE - PHQ9: CLINICAL INTERPRETATION OF PHQ2 SCORE: 0

## 2019-01-14 NOTE — PROGRESS NOTES
CHIEF COMPLIANT:   Chief Complaint   Patient presents with   • Follow-Up   • Diabetes       Paulo Paredes is a 78 y.o. male here for DM follow up    DM 2, with microalbuminuria  Onset/D  Diabetes education:  unknown     Medications:   • Glipizide 10 mg BID  • ACE/ARB: valsartan  • Statin: fluvastatin 20 mg QD  • ASA: N, on coumadin     Checking feet daily/wear soft socks/shoes: advised     Diabetes ABCDE TARGETS  • A1c, last:  8.3, previous 7.5  • Fingersticks:  N  • Blood Pressure: < 140/90  • Cholesterol-Lipid Panel: elevated triglycerides, low HDL  • Dysalbuminuria:  pos, stable     Diet: regular  Exercise:  Walk daily  BMI:  30     DM complications:  • Peripheral neuropathy: No numbness or tingling sensation in the feet.  • Retinopathy:                     Last eye exam: . No evidence of retinopathy.    • Nephropathy:                          Neg  • CVS:                                   Has CAD, on rx.   • GI:                               No gastropathy sx (nausea/vomiting).     FH of DM: mother     CKD stage IV, single kidney  The patient had decreased GFR with normal creatinine and electrolytes.   No consistent NSAIDs use.   He has been followed up by nephrology.     Hypertension/CAD, st post CABG x 4  St post CABG  x4  in  at [facility]  Meds: Valsartan 160 mg daily, atenolol 25 mg daily; no ASA, on coumadin.   Taking meds as prescribed.   He is measuring BP at home, it has been < 140/90  Denies:  -  headaches, vision problems, tinnitus.                 -  chest pain/pressure, palpitations, irregular heart beats, exertional, dyspnea, peripheral edema.  Low salt diet: Y  Diet / exercise / BMI: as above.   FH of HTN: unknown     Reviewed previous imaging and procedures  CARDIAC STUDIES/PROCEDURES:     ABDOMINAL ULTRASOUND (14)  1. Ectatic aorta.  2. Atherosclerotic plaque.  3. Cholelithiasis.     CT OF CHEST (14)  1. There is thrombus in the IVC which extends into the  distal left renal vein between the aorta and vena cava. The patient is on heparin for 36 hours approximately, and the improvement in left   renal edema and decrease in caliber of the left renal vein compared to previous CT probably reflects improved venous drainage related to the therapy .  2. The thrombus attached to the filter extends cephalad to the filter to the level of the caudate lobe of the liver.  3. There is DVT in both lower extremities.     ECHOCARDIOGRAM CONCLUSIONS (05/21/14)  Normal left ventricular size. Sigmoid septum with increased outflow   velocity but not anterior motion of the mitral valve.  Basal inferior and apical septal hypokinesis. Left ventricular   ejection fraction is 50% to 55%.  Grade I diastolic dysfunction is present.  Normal left atrial size.  Aortic sclerosis without significant stenosis.  Trace mitral regurgitation.     ECHOCARDIOGRAM CONCLUSIONS (01/25/14)  Normal left ventricular size and function.   Grade I diastolic dysfunction is present.   Normal left ventricular wall thickness.   Left ventricular ejection fraction is 60% to 65%.  Mild mitral regurgitation.   Aortic valve probably trileaflet. No stenosis or regurgitation seen.   AV MG 5.39 mmHg, PG 9.33 mmHg.  Mild tricuspid regurgitation. Right ventricular systolic pressure is   estimated to be 30 to 35 mmHg consistent with mild pulmonary hypertension.  No pericardial effusion seen.   Normal aortic diameter 3.2 cm  No prior study for comparison.     EKG performed on (05/15/14) EKG shows normal sinus rhythm with non-specific intra-ventricular conduction delay.     Laboratory results of (09/29/18) were reviewed. Cholesterol profile of 261/365/36/151 noted.  Laboratory results of (09/21/15) Cholesterol profile of 251/307/37/153 noted.  Laboratory results of (06/16/14) Cholesterol profile of 172/233/37/88 noted.     LOWER EXTREMITY VENOUS ULTRASOUND (06/05/14)  1. No evidence of acute right lower extremity deep venous  thrombosis with   recannalized venous thrombosis throughout.  2. Normal left lower extremity superficial and deep venous examination.      MRA OF BRAIN (01/25/14)  1. MRA OF THE [place] OF GREEN WITHIN NORMAL LIMITS WITH NO EVIDENCE OF ANEURYSM OR CEREBROVASCULAR OCCLUSIVE DISEASE.  2. FIELD OF VIEW PARTIALLY EXCLUDES THE INFERIOR TEMPORAL M2 DIVISION LEFT MCA.     MRA OF NECK (01/25/14)  1. Unremarkable cervical vertebral arteries.  2. Right carotid bulb and ICA origin minimal plaquing with up to about 10% stenosis. No flow limiting lesion.  3. Left carotid bulb and left ICA origin minimal plaquing with up to about 10-15% stenosis. No flow limiting lesion.     STRESS ECHOCARDIOGRAM [facility] (03/14/12)  Stress echocardiogram showing no evidence for stress-induced ischemia.    He has been followed up by cardiology, the last office visit was on 10/11/18, note was reviewed with the following:  - Ordered echocardiography and NM stress test, came back stable.  - continue current treatment, anticoagulation, nephrology follow-up.    H/o TIA  The patient has history of TIA, on chronic anticoagulation with Coumadin, follow-up with anticoagulation clinic.  He has placed IVC filter.     Dyslipidemia  On fluvastatin 20 mg QD.  Fenofibrate, 160 mg QD, unable to find in a pharmacy   - No muscle weakness, cramps, nausea,abdominal discomfort.   Diet / exercise / BMI: as above.   FH: unknown     H/o thyroid cancer  Hypothyroidism, postsurgical  Dg: in 2012.   Status post total thyroidectomy.   On Levothyroxine, 137 mcg, taking daily early in am, before any po intake.  No temperature intolerance. No change in weight,  hair/skin quality, BMs.   No tremors, weakness.  No peripheral swelling.  No mood changes.  FH: N  Review TSH.  He has been followed up by endocrinology     Thrombocytopenia  The patient has had borderline thrombocytopenia, stable.   Denies easy bleeding or bruising.   Reviewed medication list.       Reviewed PMH,  PSH, FH, SH, ALL, IMM, MEDS.     Current medicines (including changes today)  Current Outpatient Prescriptions   Medication Sig Dispense Refill   • atorvastatin (LIPITOR) 10 MG Tab Take 1 Tab by mouth every day. 90 Tab 1   • losartan (COZAAR) 50 MG Tab Take 1 Tab by mouth every day. 90 Tab 3   • glipiZIDE (GLUCOTROL) 10 MG Tab Take 1 Tab by mouth 2 times a day. 180 Tab 3   • warfarin (COUMADIN) 5 MG Tab Take 1 tablet by mouth daily as directed by the coumadin clinic 90 Tab 1   • levothyroxine (SYNTHROID) 137 MCG Tab TAKE 1 TABLET BY MOUTH ONCE DAILY IN THE MORNING ON AN EMPTY STOMACH, ONE-HALF HOUR BEFORE EATING. 90 Tab 2   • atenolol (TENORMIN) 25 MG Tab Take 1 Tab by mouth every day. 90 Tab 3   • glucose blood (ASCENSIA MICROFILL TEST) strip as directed     • Lancets (MICROLET) Misc by Does not apply route.     • aspirin 81 MG tablet Take 81 mg by mouth.     • Blood Glucose Monitoring Suppl (ONE TOUCH ULTRA 2) w/Device Kit 1 Each by Other route.     • Calcium Carb-Cholecalciferol (CALCIUM 1000 + D PO) Take 1,000 Units by mouth.     • Multiple Vitamins-Minerals (OCUVITE-LUTEIN) Tab Take 1 tablet by mouth.     • Omega-3 Krill Oil 300 MG Cap Take 350 mg by mouth.     • glucose blood (ONE TOUCH ULTRA TEST) strip To check twice daily Use as directed.     • ONETOUCH DELICA LANCETS FINE Misc 2 Each by Other route.     • Cinnamon 500 MG Cap Take 1,000 mg by mouth.     • Cyanocobalamin (VITAMIN B-12) 1000 MCG Tab Take 1,000 mcg by mouth.     • Coenzyme Q10 (CO Q-10 PO) Take  by mouth every day.     • acetaminophen (TYLENOL) 500 MG TABS Take 1,000 mg by mouth every 6 hours as needed. Indications: Pain     • fenofibrate (TRICOR) 145 MG TABS Take 1 Tab by mouth every day. 90 Tab 3     No current facility-administered medications for this visit.      Allergies: Lactose and Metoprolol  He  has a past medical history of Anesthesia; Basal cell carcinoma of skin; CAD (coronary artery disease); Cancer (HCC); DVT (deep venous  thrombosis) (HCC) (2014); ED (erectile dysfunction); GERD (gastroesophageal reflux disease); Glaucoma; Heart burn; Hyperlipidemia (12/3/2012); Hypertension; Indigestion; Multiple pulmonary emboli (HCC) (2014); Multiple thyroid nodules (2009); Papillary carcinoma of thyroid (HCC) (2014); postsurgical hypothyroidism (2014); Pre-diabetes; Renal disorder; S/P CABG x 4 (2003); S/P colectomy (2010); S/p nephrectomy (1998); S/P prostatectomy (2008); Stroke (HCC); Transient renal failure (2014); Type II or unspecified type diabetes mellitus without mention of complication, not stated as uncontrolled; and Unspecified urinary incontinence.  He  has a past surgical history that includes colon resection; nephrectomy radical; multiple coronary artery bypass; prostatectomy, radical retro; other orthopedic surgery; thyroidectomy total (5/13/2014); and node dissection (5/13/2014).  Social History   Substance Use Topics   • Smoking status: Never Smoker   • Smokeless tobacco: Never Used   • Alcohol use Yes      Comment: 2 PER WK     Social History     Social History Narrative   • No narrative on file     Family History   Problem Relation Age of Onset   • Diabetes Mother    • Heart Disease Mother    • Diabetes Father    • Cancer Father         pancreas   • Thyroid Sister         Cancer   • Cancer Sister         thyroid   • Diabetes Sister    • Cancer Brother 49        colon   • Diabetes Brother    • Diabetes Maternal Grandfather    • Diabetes Paternal Grandmother    • Diabetes Paternal Grandfather      Family Status   Relation Status   • Mo (Not Specified)   • Fa (Not Specified)   • Sis (Not Specified)   • Bro (Not Specified)   • MGFa (Not Specified)   • PGMo (Not Specified)   • PGFa (Not Specified)       ROS   Constitutional: Negative for fever, chills and weight loss.   HEENT: Negative for blurred vision, sore throat, swollen glands. No hearing loss or vertigo.  Respiratory: Negative for cough, wheezing, shortness of breath.   CVS:  "Negative for chest pain, palpitations, irregular heart beats, exertional dyspnea. And per HPI.  GI: Negative for heartburn, abdominal pain, nausea, vomiting, change in BMs.   : as above.   MS: Negative for myalgias, joint pain.   Neuro: no headaches, numbness, weakness, seizures.   Skin: no skin lesions, rash.  Endocrine: per HPI.     PHYSICAL EXAM   Blood pressure 114/62, pulse (!) 50, temperature (!) 35.8 °C (96.4 °F), temperature source Temporal, resp. rate 14, height 1.727 m (5' 7.99\"), weight 89.5 kg (197 lb 5 oz), SpO2 98 %. Body mass index is 30.01 kg/m².  Alert, oriented in no acute distress.  Eye contact is good, speech goal directed, affect bright.  HEENT: EOMI, PERRL, conjunctiva non-injected, sclera non-icteric.  Nares patent with no significant congestion or drainage.  Normal pinnae, external auditory canals, TM pearly gray with normal light reflex bilaterally.  Oral mucous membranes pink and moist with no lesions.  Neck supple with no cervical lymphadenopathy, JVD, palpable thyroid nodules or carotid bruits.  Lungs: clear to auscultation bilaterally with good excursion.  CV: regular rate and rhythm, without murmur, rubs, thrills, or gallops.  Abdomen: soft, non-distended, non-tender with normal bowel sounds. No CVAT, No masses, or organomegaly.  Lower extremities: color normal, vascularity normal, no edema, temperature normal  Musculoskeletal: Normal gait. No obvious muscle weakness or wasting.  Psych: Normal mood and affect. Alert and oriented x3. Judgment and insight is normal.  Neuro:speech normal, mental status intact, cranial nerves 2-12 intact, no focal deficits.    LABS     Results reviewed and discussed with the patient, questions answered.    Last labs:  Lab Results   Component Value Date/Time    CHOLSTRLTOT 184 01/09/2019 07:17 AM    LDL 85 01/09/2019 07:17 AM    HDL 29 (A) 01/09/2019 07:17 AM    TRIGLYCERIDE 351 (H) 01/09/2019 07:17 AM       Lab Results   Component Value Date/Time    " SODIUM 139 01/09/2019 07:17 AM    SODIUM 140 01/09/2019 07:17 AM    POTASSIUM 4.7 01/09/2019 07:17 AM    POTASSIUM 4.5 01/09/2019 07:17 AM    CHLORIDE 106 01/09/2019 07:17 AM    CHLORIDE 106 01/09/2019 07:17 AM    CO2 28 01/09/2019 07:17 AM    CO2 28 01/09/2019 07:17 AM    GLUCOSE 172 (H) 01/09/2019 07:17 AM    GLUCOSE 169 (H) 01/09/2019 07:17 AM    BUN 34 (H) 01/09/2019 07:17 AM    BUN 34 (H) 01/09/2019 07:17 AM    CREATININE 2.34 (H) 01/09/2019 07:17 AM    CREATININE 2.37 (H) 01/09/2019 07:17 AM     Lab Results   Component Value Date/Time    ALKPHOSPHAT 33 01/09/2019 07:17 AM    ASTSGOT 21 01/09/2019 07:17 AM    ALTSGPT 16 01/09/2019 07:17 AM    TBILIRUBIN 0.6 01/09/2019 07:17 AM      Lab Results   Component Value Date/Time    HBA1C 8.3 (H) 01/09/2019 07:17 AM    HBA1C 7.5 (H) 09/29/2018 08:18 AM    HBA1C 8.0 (H) 06/06/2018 08:25 AM     No results found for: TSH  Lab Results   Component Value Date/Time    FREET4 1.10 01/26/2016 09:00 AM    FREET4 1.34 09/21/2015 07:38 AM       Lab Results   Component Value Date/Time    WBC 6.6 01/09/2019 07:17 AM    RBC 5.18 01/09/2019 07:17 AM    HEMOGLOBIN 16.4 01/09/2019 07:17 AM    HEMATOCRIT 50.4 01/09/2019 07:17 AM    MCV 97.3 01/09/2019 07:17 AM    MCH 31.7 01/09/2019 07:17 AM    MCHC 32.5 (L) 01/09/2019 07:17 AM    MPV 10.9 01/09/2019 07:17 AM    NEUTSPOLYS 54.80 01/09/2019 07:17 AM    LYMPHOCYTES 33.20 01/09/2019 07:17 AM    MONOCYTES 6.80 01/09/2019 07:17 AM    EOSINOPHILS 4.10 01/09/2019 07:17 AM    BASOPHILS 0.80 01/09/2019 07:17 AM      Imaging     Reviewed and discussed the following imaging:    Echocardiography, 10/18/18:  Prior echo on 05/21/14. Compared to the report of the study done -   there has been no significant change.   Normal left ventricular systolic function.  Left ventricular ejection fraction is visually estimated to be 65%.  Mild tricuspid regurgitation.  Unable to estimate pulmonary artery pressure due to an inadequate   tricuspid regurgitant  jet.    NM stress test, 10/18/18:   NUCLEAR IMAGING INTERPRETATION   No evidence of significant jeopardized viable myocardium or prior myocardial    infarction.   Apical thinning noted.   Normal wall motion, EF 59%.   TID absent.    Sinus rhythm.   Nondiagnostic ECG portion of a nuclear stress test.   ECG INTERPRETATION   Nondiagnostic ECG portion of a nuclear stress test.    ASSESMENT AND PLAN     1. Uncontrolled type 2 diabetes mellitus with microalbuminuria, without long-term current use of insulin ([facility])  Weight and A1c went slightly up due to holiday season.   Advised to continue low calorie diet, daily exercise, weight control  - COMP METABOLIC PANEL; Future  - HEMOGLOBIN A1C; Future    2. Type 2 diabetes mellitus with hyperglycemia, without long-term current use of insulin (HCC)  As above    3. Type 2 diabetes mellitus with chronic kidney disease, without long-term current use of insulin, unspecified CKD stage ([facility])  As above    4. Microalbuminuria  As above    5. Coronary artery disease involving native coronary artery of native heart without angina pectoris  Stable, negative recent stress test, continue current treatment and cardiology follow-up.    6. CKD (chronic kidney disease), stage IV ([facility])  Stable, continue current lifestyle regimen, nephrology follow-up    7. Essential hypertension  Controlled, continue current treatment    8. S/P CABG x 4  As above    9. Hx-TIA (transient ischemic attack)  10. Chronic anticoagulation  11. Presence of IVC filter  Continue anticoagulation, Coumadin clinic follow-up    12. Dyslipidemia  Triglycerides are still high, continue statin plus fenofibrate, follow-up labs  - Lipid Profile; Future    13. S/P total thyroidectomy  14. Papillary carcinoma of thyroid (HCC)  15. Hypothyroidism, postsurgical  Controlled, continue current treatment, endocrinology follow-up  - levothyroxine (SYNTHROID) 137 MCG Tab; TAKE 1 TABLET BY MOUTH ONCE DAILY IN THE MORNING ON AN EMPTY STOMACH,  ONE-HALF HOUR BEFORE EATING.  Dispense: 90 Tab; Refill: 2    16. Thrombocytopenia (HCC)  Stable, continue labs follow-up  - CBC WITH DIFFERENTIAL; Future    17. Obesity (BMI 30.0-34.9)  Appropriate counseling given    18. Health care maintenance  Advised shingrix    19. Skin cancer screening  - REFERRAL TO DERMATOLOGY    20. CKD (chronic kidney disease), stage III (HCC)  Worsen to stage IV, as above; continue nephrology follow-up    21. Malignant neoplasm of thyroid gland (HCC)  Resolved, status post removal thyroidectomy    22. Type 2 diabetes mellitus with other diabetic kidney complication (HCC)  Resolved, he has more specified the diagnosis as above    23. Other chronic pulmonary embolism without acute cor pulmonale (HCC)  Resolved    24. Chronic kidney disease (CKD) stage G3b/A2, moderately decreased glomerular filtration rate (GFR) between 30-44 mL/min/1.73 square meter and albuminuria creatinine ratio between  mg/g (HCC)  As per #20    All questions are answered.    Smoking Counseling: Nonsmoker    Followup: Return in about 3 months (around 4/14/2019), or DM RN.    Time spent 40 minutes face to face, with > 50% spent counseling and coordinating care.  High complexity.    Annual Health Assessment Questions:    1.  Are you currently engaging in any exercise or physical activity? Yes    2.  How would you describe your mood or emotional well-being today? good    3.  Have you had any falls in the last year? No    4.  Have you noticed any problems with your balance or had difficulty walking? No    5.  In the last six months have you experienced any leakage of urine? Yes    6. DPA/Advanced Directive: Patient does not have an Advanced Directive.  A packet and workshop information was given on Advanced Directives.

## 2019-01-23 ENCOUNTER — OFFICE VISIT (OUTPATIENT)
Dept: URGENT CARE | Facility: MEDICAL CENTER | Age: 79
End: 2019-01-23
Payer: MEDICARE

## 2019-01-23 ENCOUNTER — HOSPITAL ENCOUNTER (OUTPATIENT)
Facility: MEDICAL CENTER | Age: 79
End: 2019-01-23
Attending: EMERGENCY MEDICINE
Payer: MEDICARE

## 2019-01-23 ENCOUNTER — APPOINTMENT (OUTPATIENT)
Dept: RADIOLOGY | Facility: MEDICAL CENTER | Age: 79
End: 2019-01-23
Attending: EMERGENCY MEDICINE
Payer: MEDICARE

## 2019-01-23 VITALS
RESPIRATION RATE: 16 BRPM | TEMPERATURE: 97 F | WEIGHT: 197 LBS | HEIGHT: 67 IN | SYSTOLIC BLOOD PRESSURE: 120 MMHG | DIASTOLIC BLOOD PRESSURE: 68 MMHG | OXYGEN SATURATION: 94 % | BODY MASS INDEX: 30.92 KG/M2 | HEART RATE: 54 BPM

## 2019-01-23 VITALS
OXYGEN SATURATION: 95 % | WEIGHT: 196.87 LBS | SYSTOLIC BLOOD PRESSURE: 150 MMHG | TEMPERATURE: 97.2 F | HEART RATE: 56 BPM | HEIGHT: 69 IN | BODY MASS INDEX: 29.16 KG/M2 | RESPIRATION RATE: 18 BRPM | DIASTOLIC BLOOD PRESSURE: 72 MMHG

## 2019-01-23 DIAGNOSIS — R94.31 ABNORMAL EKG: ICD-10-CM

## 2019-01-23 DIAGNOSIS — Z86.79 HISTORY OF CARDIAC DISORDER: ICD-10-CM

## 2019-01-23 DIAGNOSIS — M79.602 PAIN OF LEFT UPPER EXTREMITY: ICD-10-CM

## 2019-01-23 DIAGNOSIS — R79.89 TROPONIN I ABOVE REFERENCE RANGE: ICD-10-CM

## 2019-01-23 PROBLEM — B02.9 ZOSTER: Status: ACTIVE | Noted: 2019-01-23

## 2019-01-23 LAB
ALBUMIN SERPL BCP-MCNC: 4.2 G/DL (ref 3.2–4.9)
ALBUMIN/GLOB SERPL: 1.3 G/DL
ALP SERPL-CCNC: 38 U/L (ref 30–99)
ALT SERPL-CCNC: 22 U/L (ref 2–50)
ANION GAP SERPL CALC-SCNC: 8 MMOL/L (ref 0–11.9)
APTT PPP: 37.2 SEC (ref 24.7–36)
AST SERPL-CCNC: 26 U/L (ref 12–45)
BASOPHILS # BLD AUTO: 0.8 % (ref 0–1.8)
BASOPHILS # BLD: 0.05 K/UL (ref 0–0.12)
BILIRUB SERPL-MCNC: 0.7 MG/DL (ref 0.1–1.5)
BNP SERPL-MCNC: 29 PG/ML (ref 0–100)
BUN SERPL-MCNC: 27 MG/DL (ref 8–22)
CALCIUM SERPL-MCNC: 9.1 MG/DL (ref 8.4–10.2)
CHLORIDE SERPL-SCNC: 103 MMOL/L (ref 96–112)
CO2 SERPL-SCNC: 27 MMOL/L (ref 20–33)
CREAT SERPL-MCNC: 2.19 MG/DL (ref 0.5–1.4)
EKG IMPRESSION: NORMAL
EKG IMPRESSION: NORMAL
EOSINOPHIL # BLD AUTO: 0.25 K/UL (ref 0–0.51)
EOSINOPHIL NFR BLD: 4.2 % (ref 0–6.9)
ERYTHROCYTE [DISTWIDTH] IN BLOOD BY AUTOMATED COUNT: 45.4 FL (ref 35.9–50)
GLOBULIN SER CALC-MCNC: 3.2 G/DL (ref 1.9–3.5)
GLUCOSE SERPL-MCNC: 227 MG/DL (ref 65–99)
HCT VFR BLD AUTO: 50.3 % (ref 42–52)
HGB BLD-MCNC: 16.7 G/DL (ref 14–18)
IMM GRANULOCYTES # BLD AUTO: 0.03 K/UL (ref 0–0.11)
IMM GRANULOCYTES NFR BLD AUTO: 0.5 % (ref 0–0.9)
INR PPP: 2.65 (ref 0.87–1.13)
LIPASE SERPL-CCNC: 61 U/L (ref 7–58)
LYMPHOCYTES # BLD AUTO: 1.9 K/UL (ref 1–4.8)
LYMPHOCYTES NFR BLD: 31.9 % (ref 22–41)
MCH RBC QN AUTO: 31.5 PG (ref 27–33)
MCHC RBC AUTO-ENTMCNC: 33.2 G/DL (ref 33.7–35.3)
MCV RBC AUTO: 94.9 FL (ref 81.4–97.8)
MONOCYTES # BLD AUTO: 0.39 K/UL (ref 0–0.85)
MONOCYTES NFR BLD AUTO: 6.6 % (ref 0–13.4)
NEUTROPHILS # BLD AUTO: 3.33 K/UL (ref 1.82–7.42)
NEUTROPHILS NFR BLD: 56 % (ref 44–72)
NRBC # BLD AUTO: 0 K/UL
NRBC BLD-RTO: 0 /100 WBC
PLATELET # BLD AUTO: 129 K/UL (ref 164–446)
PMV BLD AUTO: 10.1 FL (ref 9–12.9)
POTASSIUM SERPL-SCNC: 4.7 MMOL/L (ref 3.6–5.5)
PROT SERPL-MCNC: 7.4 G/DL (ref 6–8.2)
PROTHROMBIN TIME: 27.9 SEC (ref 12–14.6)
RBC # BLD AUTO: 5.3 M/UL (ref 4.7–6.1)
SODIUM SERPL-SCNC: 138 MMOL/L (ref 135–145)
TROPONIN I SERPL-MCNC: 0.03 NG/ML (ref 0–0.04)
TROPONIN I SERPL-MCNC: <0.02 NG/ML (ref 0–0.04)
WBC # BLD AUTO: 6 K/UL (ref 4.8–10.8)

## 2019-01-23 PROCEDURE — 99214 OFFICE O/P EST MOD 30 MIN: CPT | Performed by: NURSE PRACTITIONER

## 2019-01-23 PROCEDURE — 85730 THROMBOPLASTIN TIME PARTIAL: CPT

## 2019-01-23 PROCEDURE — 99214 OFFICE O/P EST MOD 30 MIN: CPT | Performed by: HOSPITALIST

## 2019-01-23 PROCEDURE — 84484 ASSAY OF TROPONIN QUANT: CPT

## 2019-01-23 PROCEDURE — G0378 HOSPITAL OBSERVATION PER HR: HCPCS

## 2019-01-23 PROCEDURE — 83690 ASSAY OF LIPASE: CPT

## 2019-01-23 PROCEDURE — 36415 COLL VENOUS BLD VENIPUNCTURE: CPT

## 2019-01-23 PROCEDURE — 71045 X-RAY EXAM CHEST 1 VIEW: CPT

## 2019-01-23 PROCEDURE — 99285 EMERGENCY DEPT VISIT HI MDM: CPT

## 2019-01-23 PROCEDURE — 93000 ELECTROCARDIOGRAM COMPLETE: CPT | Performed by: NURSE PRACTITIONER

## 2019-01-23 PROCEDURE — 80053 COMPREHEN METABOLIC PANEL: CPT

## 2019-01-23 PROCEDURE — 85610 PROTHROMBIN TIME: CPT

## 2019-01-23 PROCEDURE — 83880 ASSAY OF NATRIURETIC PEPTIDE: CPT

## 2019-01-23 PROCEDURE — 93005 ELECTROCARDIOGRAM TRACING: CPT | Mod: 76 | Performed by: EMERGENCY MEDICINE

## 2019-01-23 PROCEDURE — 93005 ELECTROCARDIOGRAM TRACING: CPT

## 2019-01-23 PROCEDURE — 85025 COMPLETE CBC W/AUTO DIFF WBC: CPT

## 2019-01-23 RX ORDER — ATENOLOL 25 MG/1
25 TABLET ORAL DAILY
Status: SHIPPED | COMMUNITY
End: 2019-10-18

## 2019-01-23 RX ORDER — ACYCLOVIR 400 MG/1
800 TABLET ORAL
Qty: 70 TAB | Refills: 0 | Status: SHIPPED | OUTPATIENT
Start: 2019-01-23 | End: 2019-01-30

## 2019-01-23 RX ORDER — GLIPIZIDE 10 MG/1
10 TABLET ORAL 2 TIMES DAILY
Status: SHIPPED | COMMUNITY
End: 2019-07-16

## 2019-01-23 RX ORDER — ATORVASTATIN CALCIUM 10 MG/1
10 TABLET, FILM COATED ORAL NIGHTLY
Status: SHIPPED | COMMUNITY
End: 2019-06-18

## 2019-01-23 RX ORDER — FENOFIBRATE 160 MG/1
160 TABLET ORAL DAILY
Status: SHIPPED | COMMUNITY
Start: 2019-01-05 | End: 2020-02-12

## 2019-01-23 RX ORDER — LEVOTHYROXINE SODIUM 137 UG/1
137 TABLET ORAL DAILY
Status: SHIPPED | COMMUNITY
End: 2020-02-11

## 2019-01-23 RX ORDER — WARFARIN SODIUM 5 MG/1
5 TABLET ORAL EVERY EVENING
Status: SHIPPED | COMMUNITY
End: 2019-05-06 | Stop reason: SDUPTHER

## 2019-01-23 RX ORDER — LOSARTAN POTASSIUM 50 MG/1
50 TABLET ORAL DAILY
Status: SHIPPED | COMMUNITY
End: 2020-01-02 | Stop reason: SDUPTHER

## 2019-01-23 RX ORDER — SULFAMETHOXAZOLE AND TRIMETHOPRIM 400; 80 MG/1; MG/1
1 TABLET ORAL 2 TIMES DAILY
Status: SHIPPED | COMMUNITY
Start: 2018-12-13 | End: 2019-04-15

## 2019-01-23 ASSESSMENT — ENCOUNTER SYMPTOMS
FLANK PAIN: 0
BACK PAIN: 0
CHILLS: 0
PSYCHIATRIC NEGATIVE: 1
MYALGIAS: 0
VOMITING: 0
FEVER: 0
MUSCULOSKELETAL NEGATIVE: 1
WEIGHT LOSS: 0
BRUISES/BLEEDS EASILY: 0
CARDIOVASCULAR NEGATIVE: 1
ABDOMINAL PAIN: 0
SHORTNESS OF BREATH: 0
DIZZINESS: 0
WEAKNESS: 0
CONSTITUTIONAL NEGATIVE: 1
NEUROLOGICAL NEGATIVE: 1
DEPRESSION: 0
NAUSEA: 0
GASTROINTESTINAL NEGATIVE: 1
COUGH: 0
LOSS OF CONSCIOUSNESS: 0
RESPIRATORY NEGATIVE: 1
NERVOUS/ANXIOUS: 0

## 2019-01-23 NOTE — ED PROVIDER NOTES
ED Provider Note    CHIEF COMPLAINT  Chief Complaint   Patient presents with   • Arm Pain     sharp pain left arm last night and this early am. Hx of DVT and PE's       HPI  Paulo Paredes is a 78 y.o. male who presents after waking up in the middle of the night around 2 AM this morning with sharp stabbing pain in his left bicep upper arm area.  He states he has had a little shortness of breath today as well.  He has a history of a four-vessel bypass surgery in 2003.  He states that he walks on the treadmill every day and that this does not cause him any pain or discomfort.  He denies any headaches dizziness or lightheadedness.  He has not had any fevers chills cough or cold symptoms.  He states that the shortness of breath is worse when he lays down and improves when he just rests sitting up.  He did have a stress test back in October which was not remarkable for acute ischemia.  Currently he is completely symptom-free.  He has never had any chest pain with this episode.    REVIEW OF SYSTEMS  HEENT:  No ear pain, congestion or sore throat   EYES: no discharge redness or vision changes  CARDIAC: no chest pain, palpitations    PULMONARY: Positive intermittent dyspnea, no cough or congestion   GI: no vomiting diarrhea or abdominal pain   : no dysuria, back pain or hematuria   Neuro: no weakness, numbness aphasia or headache  Musculoskeletal: no swelling deformity or pain no joint swelling positive left upper arm pain resolved  Endocrine: no fevers, sweating, weight loss   SKIN: no rash, erythema or contusions     See history of present illness all other systems are negative      PAST MEDICAL HISTORY  Past Medical History:   Diagnosis Date   • postsurgical hypothyroidism 2014   • Papillary carcinoma of thyroid (HCC) 2014    R 2 foci / 4.5mm,0.25mm / no mets/ pT1pN0   • DVT (deep venous thrombosis) ([facility]) 2014   • Multiple pulmonary emboli ([facility]) 2014   • Transient renal failure 2014    renal vein thrombosis   •  Hyperlipidemia 12/3/2012   • S/P colectomy 2010    multiple colon polyps / no Ca   • Multiple thyroid nodules 2009   • S/P prostatectomy 2008    carcinoma   • S/P CABG x 4 2003   • S/p nephrectomy 1998    carcinoma   • Anesthesia     difficult intubation with surgery 2008,2010   • Basal cell carcinoma of skin    • CAD (coronary artery disease)    • Cancer (HCC)     rt  kidney 1989;  thyroid cancer 2012, prostate 2008, skin   • ED (erectile dysfunction)    • GERD (gastroesophageal reflux disease)    • Glaucoma    • Heart burn    • Hypertension    • Indigestion    • Pre-diabetes    • Renal disorder     rt kidney cancer,nephrectomy   • Stroke (HCC)     'mild',no residual,'one doctor said it was a tia'   • Type II or unspecified type diabetes mellitus without mention of complication, not stated as uncontrolled     on no medications   • Unspecified urinary incontinence        FAMILY HISTORY  Family History   Problem Relation Age of Onset   • Diabetes Mother    • Heart Disease Mother    • Diabetes Father    • Cancer Father         pancreas   • Thyroid Sister         Cancer   • Cancer Sister         thyroid   • Diabetes Sister    • Cancer Brother 49        colon   • Diabetes Brother    • Diabetes Maternal Grandfather    • Diabetes Paternal Grandmother    • Diabetes Paternal Grandfather        SOCIAL HISTORY  Social History     Social History   • Marital status:      Spouse name: N/A   • Number of children: N/A   • Years of education: N/A     Social History Main Topics   • Smoking status: Never Smoker   • Smokeless tobacco: Never Used   • Alcohol use Yes      Comment: 2 PER WK   • Drug use: No   • Sexual activity: Not on file      Comment: retired      Other Topics Concern   • Not on file     Social History Narrative   • No narrative on file       SURGICAL HISTORY  Past Surgical History:   Procedure Laterality Date   • THYROIDECTOMY TOTAL  5/13/2014    Performed by Eliceo Bolanos M.D. at SURGERY SAME  "DAY [facility] ORS   • NODE DISSECTION  5/13/2014    Performed by Eliceo Bolanos M.D. at SURGERY SAME DAY [facility] ORS   • COLON RESECTION     • MULTIPLE CORONARY ARTERY BYPASS      x 4 11/2003   • NEPHRECTOMY RADICAL      right side 1998   • OTHER ORTHOPEDIC SURGERY      trotator cuff   • PROSTATECTOMY, RADICAL RETRO      cancer /january 2008       CURRENT MEDICATIONS  Home Medications    **Home medications have not yet been reviewed for this encounter**         ALLERGIES  Allergies   Allergen Reactions   • Lactose    • Metoprolol      2003-09-22;cns       PHYSICAL EXAM  VITAL SIGNS: /79   Pulse (!) 51   Temp 36.2 °C (97.2 °F) (Temporal)   Resp 19   Ht 1.74 m (5' 8.5\")   Wt 89.3 kg (196 lb 13.9 oz)   SpO2 95%   BMI 29.50 kg/m²  Room air O2: 93    Constitutional: Well developed, Well nourished, No acute distress, Non-toxic appearance.   HENT: Normocephalic, Atraumatic, Bilateral external ears normal, Oropharynx moist, No oral exudates, Nose normal.   Eyes: PERRLA, EOMI, Conjunctiva normal, No discharge.   Neck: Normal range of motion, No tenderness, Supple, No stridor.   Cardiovascular: Regular rate and rhythm no murmurs rubs gallops  Thorax & Lungs: There to auscultation bilaterally without wheezes rales or rhonchi  Abdomen: Bowel sounds normal, Soft, No tenderness, No masses, No pulsatile masses.   Skin: Warm, Dry, No erythema, No rash.   Back: No tenderness, No CVA tenderness.   Extremities: Intact distal pulses, No tenderness, No cyanosis, No clubbing. *Negative edema, negative ccording negative Homans sign  Neurologic: Alert & oriented x 3, Normal motor function, Normal sensory function, No focal deficits noted.       RADIOLOGY/PROCEDURES    DX-CHEST-LIMITED (1 VIEW)   Final Result      No evidence of acute cardiopulmonary process.            COURSE & MEDICAL DECISION MAKING  Pertinent Labs & Imaging studies reviewed. (See chart for details)  Differential diagnosis: Nonspecific arm pain, anginal " equivalent cardiac ischemia, congestive heart failure    Results for orders placed or performed during the hospital encounter of 01/23/19   Troponin   Result Value Ref Range    Troponin I <0.02 0.00 - 0.04 ng/mL   Btype Natriuretic Peptide   Result Value Ref Range    B Natriuretic Peptide 29 0 - 100 pg/mL   CBC with Differential   Result Value Ref Range    WBC 6.0 4.8 - 10.8 K/uL    RBC 5.30 4.70 - 6.10 M/uL    Hemoglobin 16.7 14.0 - 18.0 g/dL    Hematocrit 50.3 42.0 - 52.0 %    MCV 94.9 81.4 - 97.8 fL    MCH 31.5 27.0 - 33.0 pg    MCHC 33.2 (L) 33.7 - 35.3 g/dL    RDW 45.4 35.9 - 50.0 fL    Platelet Count 129 (L) 164 - 446 K/uL    MPV 10.1 9.0 - 12.9 fL    Neutrophils-Polys 56.00 44.00 - 72.00 %    Lymphocytes 31.90 22.00 - 41.00 %    Monocytes 6.60 0.00 - 13.40 %    Eosinophils 4.20 0.00 - 6.90 %    Basophils 0.80 0.00 - 1.80 %    Immature Granulocytes 0.50 0.00 - 0.90 %    Nucleated RBC 0.00 /100 WBC    Neutrophils (Absolute) 3.33 1.82 - 7.42 K/uL    Lymphs (Absolute) 1.90 1.00 - 4.80 K/uL    Monos (Absolute) 0.39 0.00 - 0.85 K/uL    Eos (Absolute) 0.25 0.00 - 0.51 K/uL    Baso (Absolute) 0.05 0.00 - 0.12 K/uL    Immature Granulocytes (abs) 0.03 0.00 - 0.11 K/uL    NRBC (Absolute) 0.00 K/uL   Complete Metabolic Panel (CMP)   Result Value Ref Range    Sodium 138 135 - 145 mmol/L    Potassium 4.7 3.6 - 5.5 mmol/L    Chloride 103 96 - 112 mmol/L    Co2 27 20 - 33 mmol/L    Anion Gap 8.0 0.0 - 11.9    Glucose 227 (H) 65 - 99 mg/dL    Bun 27 (H) 8 - 22 mg/dL    Creatinine 2.19 (H) 0.50 - 1.40 mg/dL    Calcium 9.1 8.4 - 10.2 mg/dL    AST(SGOT) 26 12 - 45 U/L    ALT(SGPT) 22 2 - 50 U/L    Alkaline Phosphatase 38 30 - 99 U/L    Total Bilirubin 0.7 0.1 - 1.5 mg/dL    Albumin 4.2 3.2 - 4.9 g/dL    Total Protein 7.4 6.0 - 8.2 g/dL    Globulin 3.2 1.9 - 3.5 g/dL    A-G Ratio 1.3 g/dL   Prothrombin Time   Result Value Ref Range    PT 27.9 (H) 12.0 - 14.6 sec    INR 2.65 (H) 0.87 - 1.13   APTT   Result Value Ref Range    APTT  37.2 (H) 24.7 - 36.0 sec   Lipase   Result Value Ref Range    Lipase 61 (H) 7 - 58 U/L   ESTIMATED GFR   Result Value Ref Range    GFR If African American 35 (A) >60 mL/min/1.73 m 2    GFR If Non  29 (A) >60 mL/min/1.73 m 2   TROPONIN   Result Value Ref Range    Troponin I 0.03 0.00 - 0.04 ng/mL   EKG   Result Value Ref Range    Report       [facility] Emergency Dept.    Test Date:  2019  Pt Name:    [facility]              Department: [facility]  MRN:        [id number]                      Room:  Gender:     Male                         Technician: [id number]  :        [id number]                   Requested By:ER TRIAGE PROTOCOL  Order #:    [id number]                    Reading MD: SARIAH JAMISON MD    Measurements  Intervals                                Axis  Rate:       53                           P:          49  TX:         264                          QRS:        65  QRSD:       112                          T:          99  QT:         428  QTc:        402    Interpretive Statements  SINUS BRADYCARDIA  ABERRANT COMPLEX, POSSIBLY SUPRAVENTRICULAR  FIRST DEGREE AV BLOCK  NONSPECIFIC INTRAVENTRICULAR CONDUCTION DELAY  Compared to ECG 2015 16:46:55  Aberrant conduction of supraventricular beat(s) now present  First degree AV block now present  Sinus rhythm no longer present  Atrial abnormal ity no longer present    Electronically Signed On 2019 12:28:57 PST by SARIAH JAMISON MD     EKG   Result Value Ref Range    Report       [facility] Emergency Dept.    Test Date:  2019  Pt Name:    [facility]              Department: [facility]  MRN:        [id number]                      Room:  Gender:     Male                         Technician: [id number]  :        1940                   Requested By:ER TRIAGE PROTOCOL  Order #:    [id number]                    Reading MD: SARIAH JAMISON MD    Measurements  Intervals                                 Axis  Rate:       52                           P:          45  DC:         244                          QRS:        59  QRSD:       108                          T:          96  QT:         404  QTc:        376    Interpretive Statements  Sinus bradycardia  Prolonged DC interval  Probable left atrial enlargement  Abnrm T, consider ischemia, anterolateral lds  Baseline wander in lead(s) I,III,aVR,aVL,aVF,V1,V3,V4,V5,V6  Compared to ECG 01/23/2019 11:28:55  Possible ischemia now present  Aberrant conduction of supraventricular beat(s) no longer presen t  Intraventricular conduction delay no longer present    Electronically Signed On 1- 14:30:54 PST by SARIAH JAMISON MD          I spoke with Dr. Surinder Briggs cardiology regarding the patient's now abnormal EKG.  His first troponin is negative.  The patient is pain-free.  We will perform a second troponin and if his EKG and troponin are unchanged then he can be discharged home to follow-up with cardiology as an outpatient.  If his troponin comes back slightly elevated or his EKG changes then he will need to stay in the hospital for further cardiac workup.    2:31 PM she is second troponin is slightly higher than the first 1.  His EKG is still abnormal.  I spoke with Dr. Flores who will admit him to the hospitalist telemetry service for further cardiac evaluation.  The patient understands his need to stay at this time.    FINAL IMPRESSION  1. Abnormal EKG    2. Troponin I above reference range          Electronically signed by: Sariah Jamison, 1/23/2019 12:36 PM

## 2019-01-23 NOTE — ED NOTES
Med Rec Updated and Complete per Pt at bedside  Allergies Reviewed    Pt reports completing a 10-day course of Bactrim SS twice daily Starting 12/13/18 Ending 12/22/18.    Pt is on a Warfarin Regimen as follows:    Warfarin 5mg tablets    1. Take 5mg nightly    Last dose: 5mg 01/22/19 PM.    Pt excellent historian.

## 2019-01-23 NOTE — PROGRESS NOTES
Chief Complaint   Patient presents with   • Herpes Zoster     (L) arm sore, sharp pain, tender, x 1 day       HISTORY OF PRESENT ILLNESS: Patient is a 78 y.o. male with a history of CABG, CAD, PE, DVT, hypertension, diabetes, dyslipidemia, and chronic anticoagulation who presents to the urgent care today with complaints of left arm pain.  States that for the past 2 days he has had intermittent pain to his left anterior upper arm.  The pain occurs at rest, waking him up several times last night.  He notes that when he touches his arm lightly he also feels pain.  He cannot reproduce pain with movement.  In addition, he does admit to 2 episodes of shortness of breath upon awakening over the past week.  He denies chest pain, diaphoresis, dizziness, syncope, or nausea since onset.  He has blood drawn regularly from his left arm for chronic anticoagulation, he cannot recall last date of blood draw from left arm. He has not taken any medication for symptom relief.  His cardiologist is Dr. Damon.  He is concerned about potential for shingles today.    Patient Active Problem List    Diagnosis Date Noted   • Chronic anticoagulation 08/01/2014     Priority: High   • Presence of IVC filter 06/02/2014     Priority: High   • Essential hypertension 12/03/2012     Priority: Medium   • S/P CABG x 4 12/03/2012     Priority: Medium   • Dyslipidemia 03/19/2018   • Transient ischemic attack [G45.9] 02/12/2018   • Health care maintenance 02/12/2018   • Hypothyroidism, postsurgical 08/03/2015   • postsurgical hypothyroidism    • Papillary carcinoma of thyroid (HCC) 06/30/2014   • Pulmonary embolism (HCC) 06/09/2014   • S/P total thyroidectomy 06/02/2014   • CAD (coronary artery disease) 01/22/2014   • S/P partial resection of colon 07/09/2013   • Glaucoma 07/09/2013   • GERD (gastroesophageal reflux disease) 12/03/2012   • ED (erectile dysfunction) 12/03/2012   • Uncontrolled type 2 diabetes mellitus with microalbuminuria, without  long-term current use of insulin (HCC) 12/03/2012   • Colon polyp 12/03/2012   • Single kidney 12/03/2012   • H/O prostate cancer 12/03/2012       Allergies:Lactose and Metoprolol    Current Outpatient Prescriptions Ordered in [facility]   Medication Sig Dispense Refill   • levothyroxine (SYNTHROID) 137 MCG Tab TAKE 1 TABLET BY MOUTH ONCE DAILY IN THE MORNING ON AN EMPTY STOMACH, ONE-HALF HOUR BEFORE EATING. 90 Tab 2   • atorvastatin (LIPITOR) 10 MG Tab Take 1 Tab by mouth every day. 90 Tab 1   • losartan (COZAAR) 50 MG Tab Take 1 Tab by mouth every day. 90 Tab 3   • glipiZIDE (GLUCOTROL) 10 MG Tab Take 1 Tab by mouth 2 times a day. 180 Tab 3   • warfarin (COUMADIN) 5 MG Tab Take 1 tablet by mouth daily as directed by the coumadin clinic 90 Tab 1   • atenolol (TENORMIN) 25 MG Tab Take 1 Tab by mouth every day. 90 Tab 3   • glucose blood (ASCENSIA MICROFILL TEST) strip as directed     • Lancets (MICROLET) Misc by Does not apply route.     • aspirin 81 MG tablet Take 81 mg by mouth.     • Blood Glucose Monitoring Suppl (ONE TOUCH ULTRA 2) w/Device Kit 1 Each by Other route.     • Calcium Carb-Cholecalciferol (CALCIUM 1000 + D PO) Take 1,000 Units by mouth.     • Multiple Vitamins-Minerals (OCUVITE-LUTEIN) Tab Take 1 tablet by mouth.     • Omega-3 Krill Oil 300 MG Cap Take 350 mg by mouth.     • glucose blood (ONE TOUCH ULTRA TEST) strip To check twice daily Use as directed.     • ONETOUCH DELICA LANCETS FINE Misc 2 Each by Other route.     • Cinnamon 500 MG Cap Take 1,000 mg by mouth.     • Cyanocobalamin (VITAMIN B-12) 1000 MCG Tab Take 1,000 mcg by mouth.     • Coenzyme Q10 (CO Q-10 PO) Take  by mouth every day.     • acetaminophen (TYLENOL) 500 MG TABS Take 1,000 mg by mouth every 6 hours as needed. Indications: Pain     • fenofibrate (TRICOR) 145 MG TABS Take 1 Tab by mouth every day. 90 Tab 3     No current Epic-ordered facility-administered medications on file.        Past Medical History:   Diagnosis Date   •  Anesthesia     difficult intubation with surgery 2008,2010   • Basal cell carcinoma of skin    • CAD (coronary artery disease)    • Cancer (HCC)     rt  kidney 1989;  thyroid cancer 2012, prostate 2008, skin   • DVT (deep venous thrombosis) (HCC) 2014   • ED (erectile dysfunction)    • GERD (gastroesophageal reflux disease)    • Glaucoma    • Heart burn    • Hyperlipidemia 12/3/2012   • Hypertension    • Indigestion    • Multiple pulmonary emboli (HCC) 2014   • Multiple thyroid nodules 2009   • Papillary carcinoma of thyroid (HCC) 2014    R 2 foci / 4.5mm,0.25mm / no mets/ pT1pN0   • postsurgical hypothyroidism 2014   • Pre-diabetes    • Renal disorder     rt kidney cancer,nephrectomy   • S/P CABG x 4 2003   • S/P colectomy 2010    multiple colon polyps / no Ca   • S/p nephrectomy 1998    carcinoma   • S/P prostatectomy 2008    carcinoma   • Stroke (HCC)     'mild',no residual,'one doctor said it was a tia'   • Transient renal failure 2014    renal vein thrombosis   • Type II or unspecified type diabetes mellitus without mention of complication, not stated as uncontrolled     on no medications   • Unspecified urinary incontinence        Social History   Substance Use Topics   • Smoking status: Never Smoker   • Smokeless tobacco: Never Used   • Alcohol use Yes      Comment: 2 PER WK       Family Status   Relation Status   • Mo (Not Specified)   • Fa (Not Specified)   • Sis (Not Specified)   • Bro (Not Specified)   • MGFa (Not Specified)   • PGMo (Not Specified)   • PGFa (Not Specified)     Family History   Problem Relation Age of Onset   • Diabetes Mother    • Heart Disease Mother    • Diabetes Father    • Cancer Father         pancreas   • Thyroid Sister         Cancer   • Cancer Sister         thyroid   • Diabetes Sister    • Cancer Brother 49        colon   • Diabetes Brother    • Diabetes Maternal Grandfather    • Diabetes Paternal Grandmother    • Diabetes Paternal Grandfather        ROS:  Review of Systems  "  Constitutional: Negative for fever, chills, weight loss, malaise, and fatigue.   HENT: Negative for ear pain, nosebleeds, congestion, sore throat and neck pain.    Eyes: Negative for vision changes.   Neuro: Negative for headache, sensory changes, weakness, seizure, LOC.   Cardiovascular: Negative for chest pain, palpitations, orthopnea and leg swelling.   Respiratory: Positive for shortness of breath. Negative for cough, sputum production, and wheezing.   Gastrointestinal: Negative for abdominal pain, nausea, vomiting or diarrhea.   Genitourinary: Negative for dysuria, urgency and frequency.  Musculoskeletal: Positive for left upper arm pain. Negative for falls, neck pain, back pain, myalgias.   Skin: Negative for rash, diaphoresis.     Exam:  Blood pressure 120/68, pulse (!) 54, temperature 36.1 °C (97 °F), resp. rate 16, height 1.702 m (5' 7\"), weight 89.4 kg (197 lb), SpO2 94 %.  General: well-nourished, well-developed male in NAD  Head: normocephalic, atraumatic  Eyes: PERRLA, no conjunctival injection, acuity grossly intact, lids normal.  Ears: normal shape and symmetry, no tenderness, no discharge. External canals are without any significant edema or erythema. Tympanic membranes are without any inflammation, no effusion. Gross auditory acuity is intact.  Nose: symmetrical without tenderness, no discharge.  Mouth/Throat: reasonable hygiene, no erythema, exudates or tonsillar enlargement.  Neck: no masses, range of motion within normal limits, no tracheal deviation. No obvious thyroid enlargement.   Lymph: no cervical adenopathy. No supraclavicular adenopathy.   Neuro: alert and oriented. Cranial nerves 1-12 grossly intact. No sensory deficit.   Cardiovascular: bradycardic rate, regular rhythm. No edema.  Pulmonary: no distress. Chest is symmetrical with respiration, no wheezes, crackles, or rhonchi.   Musculoskeletal: no clubbing, appropriate muscle tone, gait is stable. Left upper arm is normal in " appearance. Notes that he has pain with light touch to mid anterior area. No swelling, ecchymosis. Left shoulder, elbow have full ROM. Strength 5/5.   Skin: warm, dry, intact, no clubbing, no cyanosis, no rashes.   Psych: appropriate mood, affect, judgement.         Assessment/Plan:  1. Pain of left upper extremity  EKG   2. History of cardiac disorder           12-lead study EKG interpretation, interpreted by myself.  The rhythm is bradycardic with a rate of 54. There are no acute ST segment changes. Inverted T waves noted with previous inversion on 2015 EKG.  Interpretation: Abnormal EKG.      The patient is a pleasant 78-year-old male with a significant cardiac history who presents the clinic with atraumatic left upper arm pain for the past 2 days.  He admits to associated shortness of breath over the past week.  He feels that he can reproduce some pain with light touch to the arm but notes that he was also awakened by significant pain to the arm throughout the night last night. Differential diagnoses include but are not limited to: DVT, ACS, herpes zoster, muscular strain.  With the patient's extensive cardiac history and abnormal EKG, the patient requires a higher level of care including closer monitoring, stat lab work and/or imaging for further evaluation as I cannot entirely rule out cardiac etiology at this time. This has been discussed with the patient and his wife, they state agreement and understanding. The patient would like to go to [facility] ED, the ERP ([facility]) has been contacted regarding patient and will be anticipating patient's arrival. The patient is stable to leave [facility] at this time and will go directly to ED without delay. He is in no acute distress upon departure.       Please note that this dictation was created using voice recognition software. I have made every reasonable attempt to correct obvious errors, but I expect that there are errors of grammar and possibly content that I  did not discover before finalizing the note.      VAN oRwe.

## 2019-01-23 NOTE — ED TRIAGE NOTES
"Chief Complaint   Patient presents with   • Arm Pain     sharp pain left arm last night and this early am. Hx of DVT and PE's   The left elbow is specifically where the sharp pain occurred.  This pt has a \"filter\" that was place din his abd to catch blood clots, wife stated the Urgent Care requests he get an ultrasound on his left arm. Blood to the lab.  "

## 2019-01-23 NOTE — PROGRESS NOTES
Patient updated on results and POC and is agreeable. Patient stable and has no questions or concerns at this time.

## 2019-01-23 NOTE — ED NOTES
Assumed care of pt using AIDET.  Pt currently denies c/o arm pain, denies CP, denies feeling short of breath.

## 2019-01-24 NOTE — PROGRESS NOTES
Patient to be DCd per CARDS with OP follow up. ERP and hospitalist notified and new orders placed.

## 2019-01-24 NOTE — ASSESSMENT & PLAN NOTE
There is possible early zoster outbreak present the dermatome of the left upper arm.  As we may have a chance to get this patient very early treatment for this condition and recommend that he be discharged on a course of acyclovir to follow-up with his primary care provider.  I discussed this with Dr. Guy Gansert the ER visit from providing care who agrees with the plan.

## 2019-01-24 NOTE — ASSESSMENT & PLAN NOTE
Patient presented here with left arm pain given his prior history of coronary artery disease and bypass surgery there was concern for anginal equivalent.  He was evaluated in the emergency department with serial troponins both of which were within normal limits and no dynamic EKG changes.  This was reviewed by the cardiologist on call Dr. Surinder Briggs who recommends patient be discharged especially in the light of a recent normal cardiac stress test.  He would like the patient to follow-up as an outpatient.  I strongly believe that the pain in the arm may be due to early zoster, thus he will be treated with acyclovir empirically and follow-up with his primary care provider.  I discussed these findings with emergency department physician who took over care from Dr. Orourke and Dr. Guy Gansert agrees with the assessment and plan. No change in medications.

## 2019-01-24 NOTE — PROGRESS NOTES
Cardiology brief note    I discussed this case with emergency room physician.  2 negative troponins, nonspecific EKG changes, recent negative myocardial perfusion imaging.  Stable for discharge with outpatient cardiology follow-up.    Surinder Briggs MD  Cardiologist, [facility]

## 2019-01-24 NOTE — CONSULTS
Hospital Medicine Consultation    Date of Service  1/23/2019    Referring Physician  Dr. Sridevi Orourke.    Consulting Physician  Anayeli Flores M.D.    Reason for Consultation  Left arm pain, consideration of possible anginal equivalent.    History of Presenting Illness  78 y.o. male who presented 1/23/2019 with left arm tingling and discomfort.  He has a history of four-vessel bypass in 2003, he has not had any worsening of exercise tolerance or chest pain when he exercised on the treadmill which he does daily.  There have been no signs of heart failure such as dyspnea with exertion, shortness of breath, lower extremity edema, orthopnea or paroxysmal nocturnal dyspnea. He is concerned about possible zoster starting in his left upper arm, his wife had similar symptoms when she had zoster.    Review of Systems  Review of Systems   Constitutional: Negative.  Negative for chills, fever, malaise/fatigue and weight loss.   HENT: Negative.    Respiratory: Negative.  Negative for cough and shortness of breath.    Cardiovascular: Negative.  Negative for chest pain and leg swelling.   Gastrointestinal: Negative.  Negative for abdominal pain, nausea and vomiting.   Genitourinary: Negative.  Negative for dysuria and flank pain.   Musculoskeletal: Negative.  Negative for back pain and myalgias.   Neurological: Negative.  Negative for dizziness, loss of consciousness and weakness.   Endo/Heme/Allergies: Negative.  Does not bruise/bleed easily.   Psychiatric/Behavioral: Negative.  Negative for depression. The patient is not nervous/anxious.    All other systems reviewed and are negative.      Past Medical History   has a past medical history of Anesthesia; Basal cell carcinoma of skin; CAD (coronary artery disease); Cancer ([facility]); DVT (deep venous thrombosis) ([facility]) (2014); ED (erectile dysfunction); GERD (gastroesophageal reflux disease); Glaucoma; Heart burn; Hyperlipidemia (12/3/2012); Hypertension; Indigestion; Multiple  pulmonary emboli (HCC) (2014); Multiple thyroid nodules (2009); Papillary carcinoma of thyroid (HCC) (2014); postsurgical hypothyroidism (2014); Pre-diabetes; Renal disorder; S/P CABG x 4 (2003); S/P colectomy (2010); S/p nephrectomy (1998); S/P prostatectomy (2008); Stroke (HCC); Transient renal failure (2014); Type II or unspecified type diabetes mellitus without mention of complication, not stated as uncontrolled; and Unspecified urinary incontinence.    Surgical History   has a past surgical history that includes colon resection; nephrectomy radical; multiple coronary artery bypass; prostatectomy, radical retro; other orthopedic surgery; thyroidectomy total (5/13/2014); and node dissection (5/13/2014).    Family History  family history includes Cancer in his father and sister; Cancer (age of onset: 49) in his brother; Diabetes in his brother, father, maternal grandfather, mother, paternal grandfather, paternal grandmother, and sister; Heart Disease in his mother; Thyroid in his sister.    Social History   reports that he has never smoked. He has never used smokeless tobacco. He reports that he drinks alcohol. He reports that he does not use drugs.    Medications  Prior to Admission Medications   Prescriptions Last Dose Informant Patient Reported? Taking?   Calcium Carb-Cholecalciferol (CALCIUM 1000 + D PO) 1/23/2019 at AM Patient Yes Yes   Sig: Take 1 Dose by mouth every day.   Cinnamon 500 MG Cap 1/23/2019 at AM Patient Yes No   Sig: Take 1,000 mg by mouth.   Coenzyme Q10 (CO Q-10 PO) 1/23/2019 at AM Patient Yes No   Sig: Take 1 Dose by mouth every day. Unknown OTC Strength   Cyanocobalamin (VITAMIN B-12) 1000 MCG Tab 1/23/2019 at AM Patient Yes No   Sig: Take 1,000 mcg by mouth every day.   Multiple Vitamins-Minerals (OCUVITE-LUTEIN) Tab 1/23/2019 at AM Patient Yes No   Sig: Take 1 tablet by mouth every day.   Omega-3 Krill Oil 300 MG Cap 1/23/2019 at AM Patient Yes No   Sig: Take 300 mg by mouth every day.    aspirin 81 MG tablet 1/22/2019 at PM Patient Yes No   Sig: Take 81 mg by mouth every evening.   atenolol (TENORMIN) 25 MG Tab 1/23/2019 at AM Patient Yes Yes   Sig: Take 25 mg by mouth every day.   atorvastatin (LIPITOR) 10 MG Tab 1/22/2019 at PM Patient Yes Yes   Sig: Take 10 mg by mouth every evening.   fenofibrate (TRIGLIDE) 160 MG tablet 1/23/2019 at AM Patient Yes No   Sig: Take 160 mg by mouth every day.   glipiZIDE (GLUCOTROL) 10 MG Tab 1/23/2019 at AM Patient Yes Yes   Sig: Take 10 mg by mouth 2 times a day.   levothyroxine (SYNTHROID) 137 MCG Tab 1/23/2019 at AM Patient Yes Yes   Sig: Take 137 mcg by mouth every day.   losartan (COZAAR) 50 MG Tab 1/23/2019 at AM Patient Yes Yes   Sig: Take 50 mg by mouth every day.   sulfamethoxazole-trimethoprim (BACTRIM) 400-80 MG Tab 12/22/2018 at END  Yes Yes   Sig: Take 1 Tab by mouth 2 times a day. 10-day course Starting 12/13/18 Ending 12/22/18.   warfarin (COUMADIN) 5 MG Tab 1/22/2019 at PM Patient Yes Yes   Sig: Take 5 mg by mouth every evening.      Facility-Administered Medications: None       Allergies  Allergies   Allergen Reactions   • Lactose        Physical Exam  Temp:  [36.2 °C (97.2 °F)] 36.2 °C (97.2 °F)  Pulse:  [50-56] 56  Resp:  [16-19] 18  BP: (147-150)/(72-79) 150/72  SpO2:  [93 %-95 %] 95 %    Physical Exam   Constitutional: He is oriented to person, place, and time. He appears well-developed and well-nourished. No distress.   Plan of care discussed with bedside RN.   HENT:   Nose: Nose normal.   Mouth/Throat: Oropharynx is clear and moist. No oropharyngeal exudate.   Eyes: Conjunctivae are normal. Right eye exhibits no discharge. Left eye exhibits no discharge. No scleral icterus.   Neck: No JVD present. No tracheal deviation present.   Cardiovascular: Normal rate, regular rhythm and normal heart sounds.    Pulmonary/Chest: Effort normal and breath sounds normal. No stridor. No respiratory distress. He has no wheezes. He has no rales. He exhibits  no tenderness.   Abdominal: Soft. Bowel sounds are normal. He exhibits no distension. There is no tenderness.   Musculoskeletal: He exhibits no edema or tenderness.   Neurological: He is alert and oriented to person, place, and time. No cranial nerve deficit. He exhibits normal muscle tone.   Skin: Skin is warm and dry. He is not diaphoretic. No pallor.   Slight dysesthesia and red streak inner aspect of the left upper arm.   Psychiatric: He has a normal mood and affect. His behavior is normal. Judgment and thought content normal.   Nursing note and vitals reviewed.      Fluids       Laboratory  Recent Labs      01/23/19   1137   WBC  6.0   RBC  5.30   HEMOGLOBIN  16.7   HEMATOCRIT  50.3   MCV  94.9   MCH  31.5   MCHC  33.2*   RDW  45.4   PLATELETCT  129*   MPV  10.1     Recent Labs      01/23/19   1137   SODIUM  138   POTASSIUM  4.7   CHLORIDE  103   CO2  27   GLUCOSE  227*   BUN  27*   CREATININE  2.19*   CALCIUM  9.1     Recent Labs      01/23/19   1137   APTT  37.2*   INR  2.65*      Recent Labs      01/23/19   1137   BNPBTYPENAT  29            Imaging  DX-CHEST-LIMITED (1 VIEW)   Final Result      No evidence of acute cardiopulmonary process.          Assessment/Plan  Zoster- (present on admission)   Assessment & Plan    There is possible early zoster outbreak present the dermatome of the left upper arm.  As we may have a chance to get this patient very early treatment for this condition and recommend that he be discharged on a course of acyclovir to follow-up with his primary care provider.  I discussed this with Dr. Guy Gansert the ER visit from providing care who agrees with the plan.     CAD (coronary artery disease)- (present on admission)   Assessment & Plan    Patient presented here with left arm pain given his prior history of coronary artery disease and bypass surgery there was concern for anginal equivalent.  He was evaluated in the emergency department with serial troponins both of which were within  normal limits and no dynamic EKG changes.  This was reviewed by the cardiologist on call Dr. Surinder Briggs who recommends patient be discharged especially in the light of a recent normal cardiac stress test.  He would like the patient to follow-up as an outpatient.  I strongly believe that the pain in the arm may be due to early zoster, thus he will be treated with acyclovir empirically and follow-up with his primary care provider.  I discussed these findings with emergency department physician who took over care from Dr. Orourke and Dr. Guy Gansert agrees with the assessment and plan. No change in medications.

## 2019-01-31 ENCOUNTER — ANTICOAGULATION VISIT (OUTPATIENT)
Dept: MEDICAL GROUP | Facility: MEDICAL CENTER | Age: 79
End: 2019-01-31
Payer: MEDICARE

## 2019-01-31 VITALS — SYSTOLIC BLOOD PRESSURE: 145 MMHG | HEART RATE: 52 BPM | DIASTOLIC BLOOD PRESSURE: 67 MMHG

## 2019-01-31 DIAGNOSIS — G45.9 TRANSIENT ISCHEMIC ATTACK: ICD-10-CM

## 2019-01-31 DIAGNOSIS — Z95.828 PRESENCE OF IVC FILTER: ICD-10-CM

## 2019-01-31 DIAGNOSIS — Z79.01 CHRONIC ANTICOAGULATION: ICD-10-CM

## 2019-01-31 LAB — INR PPP: 1.9 (ref 2–3.5)

## 2019-01-31 PROCEDURE — 85610 PROTHROMBIN TIME: CPT | Performed by: INTERNAL MEDICINE

## 2019-01-31 PROCEDURE — 99211 OFF/OP EST MAY X REQ PHY/QHP: CPT | Performed by: INTERNAL MEDICINE

## 2019-01-31 NOTE — PROGRESS NOTES
Anticoagulation Summary  As of 2019    INR goal:   2.0-3.0   TTR:   75.7 % (11.4 mo)   INR used for dosin.9! 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  Family Medicine [phone]        Anticoagulation Patient Findings  Patient Findings     Negatives:   Signs/symptoms of thrombosis, Signs/symptoms of bleeding, Laboratory test error suspected, Change in health, Change in alcohol use, Change in activity, Upcoming invasive procedure, Emergency department visit, Upcoming dental procedure, Missed doses, Extra doses, Change in medications, Change in diet/appetite, Hospital admission, Bruising, Other complaints        History of Present Illness: follow up appointment for chronic anticoagulation with the high risk medication, warfarin for DVT/TIA    Last INR was at goal, pt is now sub therapeutic today.  Will bolus dose with 7.5mg tonight, then resume current dosing regimen. Follow up in 2 weeks, to reduce the risk of adverse events related to this high risk medication, warfarin.    Marta Quintana, Clinical Pharmacist

## 2019-02-14 ENCOUNTER — ANTICOAGULATION VISIT (OUTPATIENT)
Dept: MEDICAL GROUP | Facility: MEDICAL CENTER | Age: 79
End: 2019-02-14
Payer: MEDICARE

## 2019-02-14 DIAGNOSIS — Z95.828 PRESENCE OF IVC FILTER: ICD-10-CM

## 2019-02-14 DIAGNOSIS — G45.9 TRANSIENT ISCHEMIC ATTACK: ICD-10-CM

## 2019-02-14 DIAGNOSIS — Z79.01 CHRONIC ANTICOAGULATION: ICD-10-CM

## 2019-02-14 LAB — INR PPP: 2.5 (ref 2–3.5)

## 2019-02-14 PROCEDURE — 85610 PROTHROMBIN TIME: CPT | Performed by: FAMILY MEDICINE

## 2019-02-14 PROCEDURE — 93793 ANTICOAG MGMT PT WARFARIN: CPT | Performed by: FAMILY MEDICINE

## 2019-02-14 NOTE — PROGRESS NOTES
Anticoagulation Summary  As of 2019    INR goal:   2.0-3.0   TTR:   76.0 % (11.9 mo)   INR used for dosin.5 (2019)   Warfarin maintenance plan:   5 mg (5 mg x 1) every day   Weekly warfarin total:   35 mg   Plan last modified:   Marta Quintana, PharmD (7/10/2018)   Next INR check:   3/14/2019   Target end date:   Indefinite    Indications    Presence of IVC filter [Z95.828]  Transient ischemic attack [G45.9] [G45.9]  Deep vein thrombosis (DVT) of both lower extremities (HCC) (Resolved) [I82.403]  DVT (deep venous thrombosis) (HCC) (Resolved) [I82.409]  Multiple pulmonary emboli (HCC) (Resolved) [I26.99]  Chronic anticoagulation [Z79.01]             Anticoagulation Episode Summary     INR check location:       Preferred lab:       Send INR reminders to:       Comments:         Anticoagulation Care Providers     Provider Role Specialty Phone number    [facility]   [phone]    Edward Walters M.D.  Family Medicine [phone]        Anticoagulation Patient Findings    History of Present Illness: follow up appointment for chronic anticoagulation with the high risk medication, warfarin for TIA    Last INR was out of range, dosage adjusted: pt is now at goal,  kylah mark the interval to recheck INR   Follow up in 4 weeks, to reduce the risk of adverse events related to this high risk medication, warfarin.    Marta Quintana, Clinical Pharmacist  Pt declines vitals today

## 2019-02-19 ENCOUNTER — APPOINTMENT (RX ONLY)
Dept: URBAN - METROPOLITAN AREA CLINIC 22 | Facility: CLINIC | Age: 79
Setting detail: DERMATOLOGY
End: 2019-02-19

## 2019-02-19 DIAGNOSIS — L72.8 OTHER FOLLICULAR CYSTS OF THE SKIN AND SUBCUTANEOUS TISSUE: ICD-10-CM

## 2019-02-19 DIAGNOSIS — D22 MELANOCYTIC NEVI: ICD-10-CM

## 2019-02-19 DIAGNOSIS — L82.0 INFLAMED SEBORRHEIC KERATOSIS: ICD-10-CM

## 2019-02-19 DIAGNOSIS — Z85.828 PERSONAL HISTORY OF OTHER MALIGNANT NEOPLASM OF SKIN: ICD-10-CM

## 2019-02-19 DIAGNOSIS — L82.1 OTHER SEBORRHEIC KERATOSIS: ICD-10-CM

## 2019-02-19 PROBLEM — D48.5 NEOPLASM OF UNCERTAIN BEHAVIOR OF SKIN: Status: ACTIVE | Noted: 2019-02-19

## 2019-02-19 PROCEDURE — ? BIOPSY BY PUNCH METHOD

## 2019-02-19 PROCEDURE — ? COUNSELING

## 2019-02-19 PROCEDURE — 11104 PUNCH BX SKIN SINGLE LESION: CPT

## 2019-02-19 PROCEDURE — 17110 DESTRUCTION B9 LES UP TO 14: CPT | Mod: 59

## 2019-02-19 PROCEDURE — ? LIQUID NITROGEN

## 2019-02-19 PROCEDURE — 11103 TANGNTL BX SKIN EA SEP/ADDL: CPT

## 2019-02-19 PROCEDURE — ? BIOPSY BY SHAVE METHOD

## 2019-02-19 PROCEDURE — 99203 OFFICE O/P NEW LOW 30 MIN: CPT | Mod: 25

## 2019-02-19 ASSESSMENT — LOCATION DETAILED DESCRIPTION DERM
LOCATION DETAILED: RIGHT FOREHEAD
LOCATION DETAILED: LEFT INFERIOR OCCIPITAL SCALP
LOCATION DETAILED: RIGHT CENTRAL MALAR CHEEK
LOCATION DETAILED: INFERIOR THORACIC SPINE
LOCATION DETAILED: LEFT CENTRAL MALAR CHEEK
LOCATION DETAILED: LEFT INFERIOR UPPER BACK

## 2019-02-19 ASSESSMENT — LOCATION SIMPLE DESCRIPTION DERM
LOCATION SIMPLE: RIGHT FOREHEAD
LOCATION SIMPLE: LEFT CHEEK
LOCATION SIMPLE: UPPER BACK
LOCATION SIMPLE: SCALP
LOCATION SIMPLE: RIGHT CHEEK
LOCATION SIMPLE: LEFT UPPER BACK

## 2019-02-19 ASSESSMENT — LOCATION ZONE DERM
LOCATION ZONE: TRUNK
LOCATION ZONE: SCALP
LOCATION ZONE: FACE

## 2019-02-19 NOTE — PROCEDURE: BIOPSY BY SHAVE METHOD
Biopsy Type: H and E
Electrodesiccation And Curettage Text: The wound bed was treated with electrodesiccation and curettage after the biopsy was performed.
Type Of Destruction Used: Curettage
Dressing: Band-Aid
Post-Care Instructions: I reviewed with the patient in detail post-care instructions. Patient is to keep the biopsy site dry overnight. Gentle cleansing daily.  Apply petroleum ointment daily until healed. Patient may apply hydrogen peroxide soaks to remove any crusting.
Anesthesia Volume In Cc: 2
Was A Bandage Applied: Yes
Notification Instructions: Patient will be notified of biopsy results. However, patient instructed to call the office if not contacted within 2 weeks.
Silver Nitrate Text: The wound bed was treated with silver nitrate after the biopsy was performed.
Curettage Text: The wound bed was treated with curettage after the biopsy was performed.
Lab: [id number]
Bill For Surgical Tray: no
Detail Level: Detailed
Biopsy Method: Personna blade
Hemostasis: Drysol
Consent: Written consent was obtained and risks were reviewed including but not limited to scarring, infection, bleeding, scabbing, incomplete removal, nerve damage and allergy to anesthesia.
Lab Facility: 
Size Of Lesion In Cm: 0.5
Anesthesia Type: 1% lidocaine with 1:100,000 epinephrine and a 1:10 solution of 8.4% sodium bicarbonate
Cryotherapy Text: The wound bed was treated with cryotherapy after the biopsy was performed.
Electrodesiccation Text: The wound bed was treated with electrodesiccation after the biopsy was performed.
Additional Anesthesia Volume In Cc (Will Not Render If 0): 0
Wound Care: Petrolatum
Billing Type: Third-Party Bill
Depth Of Biopsy: dermis

## 2019-02-19 NOTE — HPI: SKIN LESION
Is This A New Presentation, Or A Follow-Up?: Growth
How Severe Is Your Skin Lesion?: moderate
Has Your Skin Lesion Been Treated?: not been treated
no indicators present

## 2019-02-19 NOTE — PROCEDURE: LIQUID NITROGEN
Add 52 Modifier (Optional): no
Medical Necessity Clause: This procedure was medically necessary because the lesions that were treated were:
Detail Level: Simple
Consent: The patient's consent was obtained including but not limited to risks of crusting, scabbing, blistering, scarring, darker or lighter pigmentary change, recurrence, incomplete removal and infection.
Render Post-Care Instructions In Note?: yes
Post-Care Instructions: I reviewed with the patient in detail post-care instructions. Patient is to wear sunprotection, and avoid picking at any of the treated lesions. Pt may apply Vaseline to crusted or scabbing areas.
Duration Of Freeze Thaw-Cycle (Seconds): 4
Medical Necessity Information: It is in your best interest to select a reason for this procedure from the list below. All of these items fulfill various CMS LCD requirements except the new and changing color options.
Number Of Freeze-Thaw Cycles: 1 freeze-thaw cycle

## 2019-02-19 NOTE — PROCEDURE: BIOPSY BY PUNCH METHOD
Render Post-Care Instructions In Note?: yes
Additional Anesthesia Volume In Cc (Will Not Render If 0): 0
Number Of Epidermal Sutures (Optional): 1
Lab Facility: 
Anesthesia Type: 1% lidocaine with 1:100,000 epinephrine and a 1:10 solution of 8.4% sodium bicarbonate
Bill 63881 For Specimen Handling/Conveyance To Laboratory?: no
Billing Type: Third-Party Bill
Suture Removal: 10 days
Detail Level: Detailed
Punch Size In Mm: 3
Wound Care: Petrolatum
Anesthesia Volume In Cc: 2.5
Post-Care Instructions: I reviewed with the patient in detail post-care instructions. Patient is to keep the biopsy site dry overnight, and then apply bacitracin twice daily until healed. Patient may apply hydrogen peroxide soaks to remove any crusting.
Epidermal Sutures: 4-0 Monosof
Home Suture Removal Text: Patient was provided a home suture removal kit and will remove their sutures at home.  If they have any questions or difficulties they will call the office.
Hemostasis: None
Lab: [id number]
Biopsy Type: H and E
Notification Instructions: Patient will be notified of biopsy results. However, patient instructed to call the office if not contacted within 2 weeks.
Consent: Written consent was obtained and risks were reviewed including but not limited to scarring, infection, bleeding, scabbing, incomplete removal, nerve damage and allergy to anesthesia.
Dressing: pressure dressing

## 2019-02-20 DIAGNOSIS — Z86.711 HISTORY OF PULMONARY EMBOLISM: ICD-10-CM

## 2019-02-28 ENCOUNTER — APPOINTMENT (RX ONLY)
Dept: URBAN - METROPOLITAN AREA CLINIC 22 | Facility: CLINIC | Age: 79
Setting detail: DERMATOLOGY
End: 2019-02-28

## 2019-02-28 DIAGNOSIS — Z48.02 ENCOUNTER FOR REMOVAL OF SUTURES: ICD-10-CM

## 2019-02-28 PROCEDURE — ? SUTURE REMOVAL (GLOBAL PERIOD)

## 2019-02-28 ASSESSMENT — LOCATION ZONE DERM: LOCATION ZONE: SCALP

## 2019-02-28 ASSESSMENT — LOCATION DETAILED DESCRIPTION DERM: LOCATION DETAILED: LEFT INFERIOR OCCIPITAL SCALP

## 2019-02-28 ASSESSMENT — LOCATION SIMPLE DESCRIPTION DERM: LOCATION SIMPLE: POSTERIOR SCALP

## 2019-02-28 NOTE — PROCEDURE: SUTURE REMOVAL (GLOBAL PERIOD)
Add 35772 Cpt? (Important Note: In 2017 The Use Of 49032 Is Being Tracked By Cms To Determine Future Global Period Reimbursement For Global Periods): no
Detail Level: Detailed

## 2019-03-14 ENCOUNTER — ANTICOAGULATION VISIT (OUTPATIENT)
Dept: MEDICAL GROUP | Facility: MEDICAL CENTER | Age: 79
End: 2019-03-14
Payer: MEDICARE

## 2019-03-14 DIAGNOSIS — Z79.01 CHRONIC ANTICOAGULATION: Primary | ICD-10-CM

## 2019-03-14 DIAGNOSIS — Z95.828 PRESENCE OF IVC FILTER: ICD-10-CM

## 2019-03-14 DIAGNOSIS — G45.9 TRANSIENT ISCHEMIC ATTACK: ICD-10-CM

## 2019-03-14 LAB — INR PPP: 2.4 (ref 2–3.5)

## 2019-03-14 PROCEDURE — 85610 PROTHROMBIN TIME: CPT | Performed by: FAMILY MEDICINE

## 2019-03-14 PROCEDURE — 93793 ANTICOAG MGMT PT WARFARIN: CPT | Performed by: FAMILY MEDICINE

## 2019-03-14 NOTE — PROGRESS NOTES
OP Anticoagulation Service Note    Date: 3/14/2019  There were no vitals filed for this visit.   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  Family Medicine [phone]        Anticoagulation Patient Findings      HPI:   Paulo Paredes seen in clinic today, on anticoagulation therapy with warfarin (a high risk medication) for hx of DVT and TIA     Pt is here today to evaluate anticoagulation therapy    Previous INR was  2.5 on 2019    Pt was instructed to Continue current warfarin regimen       Confirmed warfarin dosing regimen, denies missed or extra doses of coumadin.   Diet has been consistent with foods rich in vitamin K: Yes  Changes in ETOH:  No  Changes in smoking status: No  Changes in medication: No   Cost restriction: No  S/s of bleeding:  No  Falls or accidents since last visit No  Signs/symptoms  thrombosis since the last appt: No    A/P   INR  therapeutic today,   Continue current warfarin regimen           check referral    Pt educated to  contact our clinic with any changes in medications or s/s of bleeding or thrombosis. Pt is aware to seek immediate medical attention for falls, head injury or deep cuts    Follow up appointment in 4 week(s) to reduce risk of adverse events from warfarin   Kelsey Junior, PharmD

## 2019-03-19 ENCOUNTER — OFFICE VISIT (OUTPATIENT)
Dept: ENDOCRINOLOGY | Facility: MEDICAL CENTER | Age: 79
End: 2019-03-19
Payer: MEDICARE

## 2019-03-19 VITALS
HEART RATE: 57 BPM | SYSTOLIC BLOOD PRESSURE: 124 MMHG | WEIGHT: 194 LBS | BODY MASS INDEX: 28.73 KG/M2 | DIASTOLIC BLOOD PRESSURE: 78 MMHG | HEIGHT: 69 IN | OXYGEN SATURATION: 97 %

## 2019-03-19 DIAGNOSIS — E89.0 HYPOTHYROIDISM, POSTSURGICAL: ICD-10-CM

## 2019-03-19 DIAGNOSIS — C73 PAPILLARY CARCINOMA OF THYROID (HCC): ICD-10-CM

## 2019-03-19 PROCEDURE — 99213 OFFICE O/P EST LOW 20 MIN: CPT | Performed by: INTERNAL MEDICINE

## 2019-03-20 NOTE — PROGRESS NOTES
Chief Complaint   Patient presents with   • Thyroid Carcinoma     2014   • Hypothyroidism     Post thyroidectomy        HPI:    See assessment and recommendations below    ROS:  All other systems reported as negative or unchanged since last exam      Allergies:   Allergies   Allergen Reactions   • Lactose        Current medicines including changes today:  Current Outpatient Prescriptions   Medication Sig Dispense Refill   • fenofibrate (TRIGLIDE) 160 MG tablet Take 160 mg by mouth every day.     • atenolol (TENORMIN) 25 MG Tab Take 25 mg by mouth every day.     • atorvastatin (LIPITOR) 10 MG Tab Take 10 mg by mouth every evening.     • Calcium Carb-Cholecalciferol (CALCIUM 1000 + D PO) Take 1 Dose by mouth every day.     • glipiZIDE (GLUCOTROL) 10 MG Tab Take 10 mg by mouth 2 times a day.     • levothyroxine (SYNTHROID) 137 MCG Tab Take 137 mcg by mouth every day.     • losartan (COZAAR) 50 MG Tab Take 50 mg by mouth every day.     • warfarin (COUMADIN) 5 MG Tab Take 5 mg by mouth every evening.     • aspirin 81 MG tablet Take 81 mg by mouth every evening.     • Omega-3 Krill Oil 300 MG Cap Take 300 mg by mouth every day.     • Cinnamon 500 MG Cap Take 1,000 mg by mouth.     • Cyanocobalamin (VITAMIN B-12) 1000 MCG Tab Take 1,000 mcg by mouth every day.     • Coenzyme Q10 (CO Q-10 PO) Take 1 Dose by mouth every day. Unknown OTC Strength     • sulfamethoxazole-trimethoprim (BACTRIM) 400-80 MG Tab Take 1 Tab by mouth 2 times a day. 10-day course Starting 12/13/18 Ending 12/22/18.     • Multiple Vitamins-Minerals (OCUVITE-LUTEIN) Tab Take 1 tablet by mouth every day.       No current facility-administered medications for this visit.         Past Medical History:   Diagnosis Date   • Anesthesia     difficult intubation with surgery 2008,2010   • Basal cell carcinoma of skin    • CAD (coronary artery disease)    • Cancer (HCC)     rt  kidney 1989;  thyroid cancer 2012, prostate 2008, skin   • DVT (deep venous thrombosis)  "(HCC) 2014   • ED (erectile dysfunction)    • GERD (gastroesophageal reflux disease)    • Glaucoma    • Heart burn    • Hyperlipidemia 12/3/2012   • Hypertension    • Indigestion    • Multiple pulmonary emboli (HCC) 2014   • Multiple thyroid nodules 2009   • Papillary carcinoma of thyroid (HCC) 2014    R 2 foci / 4.5mm,0.25mm / no mets/ pT1pN0   • postsurgical hypothyroidism 2014   • Pre-diabetes    • Renal disorder     rt kidney cancer,nephrectomy   • S/P CABG x 4 2003   • S/P colectomy 2010    multiple colon polyps / no Ca   • S/p nephrectomy 1998    carcinoma   • S/P prostatectomy 2008    carcinoma   • Stroke (HCC)     'mild',no residual,'one doctor said it was a tia'   • Transient renal failure 2014    renal vein thrombosis   • Type II or unspecified type diabetes mellitus without mention of complication, not stated as uncontrolled     on no medications   • Unspecified urinary incontinence        PHYSICAL EXAM:    /78 (BP Location: Left arm, Patient Position: Sitting)   Pulse (!) 57   Ht 1.74 m (5' 8.5\")   Wt 88 kg (194 lb)   SpO2 97%   BMI 29.07 kg/m²     Gen.   appears healthy     Skin   appropriate for sex and age    HEENT  unremarkable    Neck   no palpable nodules in the thyroid area or elsewhere in the neck or supraclavicular areas    Heart     regular    Extremities  no edema    Neuro  gait and station normal    Psych  appropriate    ASSESSMENT AND RECOMMENDATIONS    1. Papillary carcinoma of thyroid (HCC) 2014              There were 2 micro papillary carcinomas 4.5 mm and 0.2 mm.               No invasion and no metastases.  No I-131 post thyroidectomy               1 year ago thyroglobulin was 0.3 without interfering antibodies                Update lab and report by my chart  - ANTITHYROGLOBULIN AB; Future  - THYROGLOBULIN, QT; Future    2. Hypothyroidism, postsurgical              Clinically euthyroid taking levothyroxine 137 mcg daily.               In January of this year his TSH was " 4.0.  Previously TSH was 2.6 and prior to that 1.3 on the same dose.               He insists that he takes his thyroid correctly every day and never misses but he does not have a weekly                organizer so I can easily imagine how he misses a dose here and there                He is comfortable with his current level of thyroid.               I would prefer his TSH be in the low normal range between 0.5 and 1.0 but I am not going to risk pushing the dose and overtreating.  I do not think the possible benefit would be worth the risk.    Update lab  - FREE THYROXINE; Future  - TSH; Future      DISPOSITION: Follow-up blood test by phone or my chart                            If stable I think Dr. Conner can follow this for his convenience on an annual basis.      Yunier Hughes M.D.    Copies to: Edward Walters M.D. [phone]

## 2019-04-12 ENCOUNTER — HOSPITAL ENCOUNTER (OUTPATIENT)
Dept: LAB | Facility: MEDICAL CENTER | Age: 79
End: 2019-04-12
Attending: INTERNAL MEDICINE
Payer: MEDICARE

## 2019-04-12 DIAGNOSIS — E89.0 HYPOTHYROIDISM, POSTSURGICAL: ICD-10-CM

## 2019-04-12 DIAGNOSIS — D69.6 THROMBOCYTOPENIA (HCC): ICD-10-CM

## 2019-04-12 DIAGNOSIS — E78.5 DYSLIPIDEMIA: ICD-10-CM

## 2019-04-12 DIAGNOSIS — C73 PAPILLARY CARCINOMA OF THYROID (HCC): ICD-10-CM

## 2019-04-12 LAB
ALBUMIN SERPL BCP-MCNC: 4 G/DL (ref 3.2–4.9)
ALBUMIN/GLOB SERPL: 1.7 G/DL
ALP SERPL-CCNC: 33 U/L (ref 30–99)
ALT SERPL-CCNC: 17 U/L (ref 2–50)
ANION GAP SERPL CALC-SCNC: 6 MMOL/L (ref 0–11.9)
AST SERPL-CCNC: 20 U/L (ref 12–45)
BASOPHILS # BLD AUTO: 0.8 % (ref 0–1.8)
BASOPHILS # BLD: 0.04 K/UL (ref 0–0.12)
BILIRUB SERPL-MCNC: 0.7 MG/DL (ref 0.1–1.5)
BUN SERPL-MCNC: 27 MG/DL (ref 8–22)
CALCIUM SERPL-MCNC: 8.8 MG/DL (ref 8.5–10.5)
CHLORIDE SERPL-SCNC: 108 MMOL/L (ref 96–112)
CHOLEST SERPL-MCNC: 181 MG/DL (ref 100–199)
CO2 SERPL-SCNC: 28 MMOL/L (ref 20–33)
CREAT SERPL-MCNC: 2 MG/DL (ref 0.5–1.4)
EOSINOPHIL # BLD AUTO: 0.2 K/UL (ref 0–0.51)
EOSINOPHIL NFR BLD: 4 % (ref 0–6.9)
ERYTHROCYTE [DISTWIDTH] IN BLOOD BY AUTOMATED COUNT: 46.5 FL (ref 35.9–50)
EST. AVERAGE GLUCOSE BLD GHB EST-MCNC: 206 MG/DL
FASTING STATUS PATIENT QL REPORTED: NORMAL
GLOBULIN SER CALC-MCNC: 2.4 G/DL (ref 1.9–3.5)
GLUCOSE SERPL-MCNC: 160 MG/DL (ref 65–99)
HBA1C MFR BLD: 8.8 % (ref 0–5.6)
HCT VFR BLD AUTO: 48.5 % (ref 42–52)
HDLC SERPL-MCNC: 27 MG/DL
HGB BLD-MCNC: 15.8 G/DL (ref 14–18)
IMM GRANULOCYTES # BLD AUTO: 0.03 K/UL (ref 0–0.11)
IMM GRANULOCYTES NFR BLD AUTO: 0.6 % (ref 0–0.9)
LDLC SERPL CALC-MCNC: 81 MG/DL
LYMPHOCYTES # BLD AUTO: 1.64 K/UL (ref 1–4.8)
LYMPHOCYTES NFR BLD: 33.2 % (ref 22–41)
MCH RBC QN AUTO: 32.2 PG (ref 27–33)
MCHC RBC AUTO-ENTMCNC: 32.6 G/DL (ref 33.7–35.3)
MCV RBC AUTO: 99 FL (ref 81.4–97.8)
MONOCYTES # BLD AUTO: 0.33 K/UL (ref 0–0.85)
MONOCYTES NFR BLD AUTO: 6.7 % (ref 0–13.4)
NEUTROPHILS # BLD AUTO: 2.7 K/UL (ref 1.82–7.42)
NEUTROPHILS NFR BLD: 54.7 % (ref 44–72)
NRBC # BLD AUTO: 0 K/UL
NRBC BLD-RTO: 0 /100 WBC
PLATELET # BLD AUTO: 112 K/UL (ref 164–446)
PMV BLD AUTO: 11 FL (ref 9–12.9)
POTASSIUM SERPL-SCNC: 4.8 MMOL/L (ref 3.6–5.5)
PROT SERPL-MCNC: 6.4 G/DL (ref 6–8.2)
RBC # BLD AUTO: 4.9 M/UL (ref 4.7–6.1)
SODIUM SERPL-SCNC: 142 MMOL/L (ref 135–145)
T4 FREE SERPL-MCNC: 0.9 NG/DL (ref 0.53–1.43)
TRIGL SERPL-MCNC: 366 MG/DL (ref 0–149)
TSH SERPL DL<=0.005 MIU/L-ACNC: 0.99 UIU/ML (ref 0.38–5.33)
WBC # BLD AUTO: 4.9 K/UL (ref 4.8–10.8)

## 2019-04-12 PROCEDURE — 80053 COMPREHEN METABOLIC PANEL: CPT

## 2019-04-12 PROCEDURE — 80061 LIPID PANEL: CPT

## 2019-04-12 PROCEDURE — 83036 HEMOGLOBIN GLYCOSYLATED A1C: CPT

## 2019-04-12 PROCEDURE — 85025 COMPLETE CBC W/AUTO DIFF WBC: CPT

## 2019-04-12 PROCEDURE — 84432 ASSAY OF THYROGLOBULIN: CPT

## 2019-04-12 PROCEDURE — 84439 ASSAY OF FREE THYROXINE: CPT

## 2019-04-12 PROCEDURE — 84443 ASSAY THYROID STIM HORMONE: CPT

## 2019-04-12 PROCEDURE — 86800 THYROGLOBULIN ANTIBODY: CPT

## 2019-04-12 PROCEDURE — 36415 COLL VENOUS BLD VENIPUNCTURE: CPT

## 2019-04-14 LAB
THYROGLOB AB SERPL-ACNC: <0.9 IU/ML (ref 0–4)
THYROGLOB SERPL-MCNC: 0.2 NG/ML (ref 1.3–31.8)
THYROGLOB SERPL-MCNC: ABNORMAL NG/ML (ref 1.3–31.8)

## 2019-04-15 ENCOUNTER — ANTICOAGULATION VISIT (OUTPATIENT)
Dept: MEDICAL GROUP | Facility: MEDICAL CENTER | Age: 79
End: 2019-04-15
Payer: MEDICARE

## 2019-04-15 ENCOUNTER — OFFICE VISIT (OUTPATIENT)
Dept: MEDICAL GROUP | Facility: MEDICAL CENTER | Age: 79
End: 2019-04-15
Payer: MEDICARE

## 2019-04-15 VITALS — DIASTOLIC BLOOD PRESSURE: 60 MMHG | SYSTOLIC BLOOD PRESSURE: 133 MMHG | HEART RATE: 55 BPM

## 2019-04-15 VITALS
TEMPERATURE: 98.1 F | HEART RATE: 53 BPM | OXYGEN SATURATION: 97 % | DIASTOLIC BLOOD PRESSURE: 58 MMHG | BODY MASS INDEX: 29.5 KG/M2 | HEIGHT: 68 IN | WEIGHT: 194.67 LBS | SYSTOLIC BLOOD PRESSURE: 106 MMHG

## 2019-04-15 DIAGNOSIS — Z00.00 HEALTH CARE MAINTENANCE: ICD-10-CM

## 2019-04-15 DIAGNOSIS — I10 ESSENTIAL HYPERTENSION: ICD-10-CM

## 2019-04-15 DIAGNOSIS — Z79.01 CHRONIC ANTICOAGULATION: ICD-10-CM

## 2019-04-15 DIAGNOSIS — N18.30 CKD (CHRONIC KIDNEY DISEASE), STAGE III (HCC): ICD-10-CM

## 2019-04-15 DIAGNOSIS — G45.9 TRANSIENT ISCHEMIC ATTACK: ICD-10-CM

## 2019-04-15 DIAGNOSIS — E11.29 MICROALBUMINURIA DUE TO TYPE 2 DIABETES MELLITUS (HCC): ICD-10-CM

## 2019-04-15 DIAGNOSIS — I25.10 CORONARY ARTERY DISEASE INVOLVING NATIVE CORONARY ARTERY OF NATIVE HEART WITHOUT ANGINA PECTORIS: ICD-10-CM

## 2019-04-15 DIAGNOSIS — E78.5 DYSLIPIDEMIA: ICD-10-CM

## 2019-04-15 DIAGNOSIS — R80.9 MICROALBUMINURIA DUE TO TYPE 2 DIABETES MELLITUS (HCC): ICD-10-CM

## 2019-04-15 DIAGNOSIS — D69.6 THROMBOCYTOPENIA (HCC): ICD-10-CM

## 2019-04-15 DIAGNOSIS — Z95.1 S/P CABG X 4: ICD-10-CM

## 2019-04-15 DIAGNOSIS — Z90.5 SINGLE KIDNEY: ICD-10-CM

## 2019-04-15 DIAGNOSIS — Z95.828 PRESENCE OF IVC FILTER: ICD-10-CM

## 2019-04-15 LAB — INR PPP: 3 (ref 2–3.5)

## 2019-04-15 PROCEDURE — 99214 OFFICE O/P EST MOD 30 MIN: CPT | Performed by: INTERNAL MEDICINE

## 2019-04-15 PROCEDURE — 85610 PROTHROMBIN TIME: CPT | Performed by: FAMILY MEDICINE

## 2019-04-15 NOTE — PROGRESS NOTES
[id number] Note    Subjective:     Health changes since last visit/interval Hx: Paulo is a new patient to me.  He has Type II DM treated with glipizide 10 mg twice daily    Medications (including changes made today)  Current Outpatient Prescriptions   Medication Sig Dispense Refill   • fenofibrate (TRIGLIDE) 160 MG tablet Take 160 mg by mouth every day.     • atenolol (TENORMIN) 25 MG Tab Take 25 mg by mouth every day.     • atorvastatin (LIPITOR) 10 MG Tab Take 10 mg by mouth every evening.     • Calcium Carb-Cholecalciferol (CALCIUM 1000 + D PO) Take 1 Dose by mouth every day.     • glipiZIDE (GLUCOTROL) 10 MG Tab Take 10 mg by mouth 2 times a day.     • levothyroxine (SYNTHROID) 137 MCG Tab Take 137 mcg by mouth every day.     • losartan (COZAAR) 50 MG Tab Take 50 mg by mouth every day.     • warfarin (COUMADIN) 5 MG Tab Take 5 mg by mouth every evening.     • aspirin 81 MG tablet Take 81 mg by mouth every evening.     • Omega-3 Krill Oil 300 MG Cap Take 300 mg by mouth every day.     • Cinnamon 500 MG Cap Take 1,000 mg by mouth.     • Cyanocobalamin (VITAMIN B-12) 1000 MCG Tab Take 1,000 mcg by mouth every day.     • Coenzyme Q10 (CO Q-10 PO) Take 1 Dose by mouth every day. Unknown OTC Strength       No current facility-administered medications for this visit.        Taking daily ASA: Yes  Taking above medications as prescribed: yes  SIDE EFFECTS: Patient denies side effects to medications    Exercise: TM daily at 0900 x 40 minutes  Diet: meals per day on average: 3, snacks on chips  Patient's body mass index is unknown because there is no height or weight on file. Exercise and nutrition counseling were performed at this visit.      Health Maintenance:   Health Maintenance Due   Topic Date Due   • IMM HEP B VACCINE (1 of 3 - Risk 3-dose series) 09/06/1959   • IMM ZOSTER VACCINES (2 of 3) 12/23/2008   • IMM DTaP/Tdap/Td Vaccine (1 - Tdap) 03/05/2010       Immunizations:   PPSV23: Up-to-date  [id number]:  Up-to-date  Tdap: Due  Flu: Up-to-date  Hep B: Due    DM:   Last A1c:   Lab Results   Component Value Date/Time    HBA1C 8.8 (H) 04/12/2019 07:33 AM      A1C GOAL: < 7    Glucose monitoring frequency: Fasting  frustrated his numbers have been > 200  Hypoglycemic episodes: no    Last Retinal Exam: on file and up-to-date  Daily Foot Exam: Yes advised  Routine Dental Exams: Yes    Lab Results   Component Value Date/Time    MALBCRT 181 (H) 01/09/2019 07:22 AM    MICROALBUR 30.3 01/09/2019 07:22 AM        ACR Albumin/Creatinine Ratio goal <30     HTN:   Blood pressure goal <140/<80 yes.   Currently Rx ACE/ARB: Yes    Dyslipidemia:    Lab Results   Component Value Date/Time    CHOLSTRLTOT 181 04/12/2019 07:33 AM    LDL 81 04/12/2019 07:33 AM    HDL 27 (A) 04/12/2019 07:33 AM    TRIGLYCERIDE 366 (H) 04/12/2019 07:33 AM       Lab Results   Component Value Date/Time    SODIUM 142 04/12/2019 07:33 AM    POTASSIUM 4.8 04/12/2019 07:33 AM    CHLORIDE 108 04/12/2019 07:33 AM    CO2 28 04/12/2019 07:33 AM    GLUCOSE 160 (H) 04/12/2019 07:33 AM    BUN 27 (H) 04/12/2019 07:33 AM    CREATININE 2.00 (H) 04/12/2019 07:33 AM     Lab Results   Component Value Date/Time    ALKPHOSPHAT 33 04/12/2019 07:33 AM    ASTSGOT 20 04/12/2019 07:33 AM    ALTSGPT 17 04/12/2019 07:33 AM    TBILIRUBIN 0.7 04/12/2019 07:33 AM        Currently Rx Statin: Yes    He  reports that he has never smoked. He has never used smokeless tobacco.    Objective:     Exam:  Monofilament: not done    Plan:     Discussed and educated on:   - All medications, side effects and compliance (discussed carefully)  - Annual eye examinations at Ophthalmology  - Diabetic Meal Plan: foods that contain carbs and plate method  - Foot Care: what to look for when checking feet every day and when to contact HCP  - HbA1C: target and what A1C is  - Home glucose monitoring emphasized  - Interpretation of Lab Results  - Long term diabetic complications  - Reminded pt to bring in BS diary  at next visit  - Testing Blood Glucose: when to test, recording blood sugars, target range for FSBS and when to contact HCP  - Weight control and daily exercise    Recommended medication changes: start Trulicity 0.75 mg weekly, decrease Glipizide to 1 tablet every morning, send blood glucose results in 2 weeks or F/U with me in 2-3 weeks

## 2019-04-15 NOTE — PROGRESS NOTES
CHIEF COMPLIANT:   Chief Complaint   Patient presents with   • Diabetes   With DM RN  Paulo Paredes is a 78 y.o. male here for DM follow up      DM 2, with microalbuminuria  Onset/D  Diabetes education:  unknown     Medications:   • Glipizide 10 mg BID  • ACE/ARB: valsartan  • Statin: fluvastatin 20 mg QD  • ASA: N, on coumadin     Checking feet daily/wear soft socks/shoes: advised     Diabetes ABCDE TARGETS  • A1c, last:  8.8, previous 8.3  • Fingersticks:  N  • Blood Pressure: < 140/90  • Cholesterol-Lipid Panel: elevated triglycerides, low HDL  • Dysalbuminuria:  microalbumin pos     Diet: regular  Exercise:  Walk daily  BMI:  29     DM complications:  • Peripheral neuropathy:           No numbness or tingling sensation in the feet.  • Retinopathy:                     Last eye exam: . No evidence of retinopathy.    • Nephropathy:                          Neg  • CVS:                                  Has CAD, on rx.   • GI:                                        No gastropathy sx (nausea/vomiting).     FH of DM: mother     CKD stage IV, single kidney  The patient had decreased GFR with normal creatinine and electrolytes.   No consistent NSAIDs use.   He has been followed up by nephrology.     Hypertension/CAD, st post CABG x 4  [facility]  Meds: Valsartan 160 mg daily, atenolol 25 mg daily; no ASA, on coumadin.   Taking meds as prescribed.   He is measuring BP at home, it has been < 140/90  Denies:  -  headaches, vision problems, tinnitus.                 -  chest pain/pressure, palpitations, irregular heart beats, exertional, dyspnea, peripheral edema.  Low salt diet: Y  Diet / exercise / BMI: as above.   FH of HTN: unknown      Reviewed previous imaging and procedures  CARDIAC STUDIES/PROCEDURES:     ABDOMINAL ULTRASOUND (14)  1. Ectatic aorta.  2. Atherosclerotic plaque.  3. Cholelithiasis.     CT OF CHEST (14)  1. There is thrombus in the IVC which  extends into the distal left renal vein between the aorta and vena cava. The patient is on heparin for 36 hours approximately, and the improvement in left   renal edema and decrease in caliber of the left renal vein compared to previous CT probably reflects improved venous drainage related to the therapy .  2. The thrombus attached to the filter extends cephalad to the filter to the level of the caudate lobe of the liver.  3. There is DVT in both lower extremities.     ECHOCARDIOGRAM CONCLUSIONS (05/21/14)  Normal left ventricular size. Sigmoid septum with increased outflow   velocity but not anterior motion of the mitral valve.  Basal inferior and apical septal hypokinesis. Left ventricular   ejection fraction is 50% to 55%.  Grade I diastolic dysfunction is present.  Normal left atrial size.  Aortic sclerosis without significant stenosis.  Trace mitral regurgitation.     ECHOCARDIOGRAM CONCLUSIONS (01/25/14)  Normal left ventricular size and function.   Grade I diastolic dysfunction is present.   Normal left ventricular wall thickness.   Left ventricular ejection fraction is 60% to 65%.  Mild mitral regurgitation.   Aortic valve probably trileaflet. No stenosis or regurgitation seen.   AV MG 5.39 mmHg, PG 9.33 mmHg.  Mild tricuspid regurgitation. Right ventricular systolic pressure is   estimated to be 30 to 35 mmHg consistent with mild pulmonary hypertension.  No pericardial effusion seen.   Normal aortic diameter 3.2 cm  No prior study for comparison.     EKG performed on (05/15/14) EKG shows normal sinus rhythm with non-specific intra-ventricular conduction delay.     Laboratory results of (09/29/18) were reviewed. Cholesterol profile of 261/365/36/151 noted.  Laboratory results of (09/21/15) Cholesterol profile of 251/307/37/153 noted.  Laboratory results of (06/16/14) Cholesterol profile of 172/233/37/88 noted.     LOWER EXTREMITY VENOUS ULTRASOUND (06/05/14)  1. No evidence of acute right lower extremity  deep venous thrombosis with   recannalized venous thrombosis throughout.  2. Normal left lower extremity superficial and deep venous examination.      MRA OF BRAIN (01/25/14)  1. MRA OF THE [place] WITHIN NORMAL LIMITS WITH NO EVIDENCE OF ANEURYSM OR CEREBROVASCULAR OCCLUSIVE DISEASE.  2. FIELD OF VIEW PARTIALLY EXCLUDES THE INFERIOR TEMPORAL M2 DIVISION LEFT MCA.     MRA OF NECK (01/25/14)  1. Unremarkable cervical vertebral arteries.  2. Right carotid bulb and ICA origin minimal plaquing with up to about 10% stenosis. No flow limiting lesion.  3. Left carotid bulb and left ICA origin minimal plaquing with up to about 10-15% stenosis. No flow limiting lesion.     STRESS ECHOCARDIOGRAM [facility] (03/14/12)  Stress echocardiogram showing no evidence for stress-induced ischemia.     He has been followed up by cardiology, the last office visit was on 10/11/18, note was reviewed with the following:  - Ordered echocardiography and NM stress test, came back stable.  - continue current treatment, anticoagulation, nephrology follow-up.     Dyslipidemia  On fluvastatin 20 mg QD.  Fenofibrate, 160 mg QD, unable to find in a pharmacy   - No muscle weakness, cramps, nausea,abdominal discomfort.   Diet / exercise / BMI: as above.   FH: unknown     Thrombocytopenia  The patient has had borderline thrombocytopenia, stable.   Denies easy bleeding or bruising.   Reviewed medication list.     Reviewed PMH, PSH, FH, SH, ALL, IMM, MEDS.     Current medicines (including changes today)  Current Outpatient Prescriptions   Medication Sig Dispense Refill   • Dulaglutide (TRULICITY) 0.75 MG/0.5ML Solution Pen-injector Inject 0.75 mg as instructed every 7 days. 4 PEN 11   • fenofibrate (TRIGLIDE) 160 MG tablet Take 160 mg by mouth every day.     • atenolol (TENORMIN) 25 MG Tab Take 25 mg by mouth every day.     • atorvastatin (LIPITOR) 10 MG Tab Take 10 mg by mouth every evening.     • Calcium Carb-Cholecalciferol (CALCIUM 1000  + D PO) Take 1 Dose by mouth every day.     • glipiZIDE (GLUCOTROL) 10 MG Tab Take 10 mg by mouth 2 times a day.     • levothyroxine (SYNTHROID) 137 MCG Tab Take 137 mcg by mouth every day.     • losartan (COZAAR) 50 MG Tab Take 50 mg by mouth every day.     • warfarin (COUMADIN) 5 MG Tab Take 5 mg by mouth every evening.     • aspirin 81 MG tablet Take 81 mg by mouth every evening.     • Omega-3 Krill Oil 300 MG Cap Take 300 mg by mouth every day.     • Cinnamon 500 MG Cap Take 1,000 mg by mouth.     • Cyanocobalamin (VITAMIN B-12) 1000 MCG Tab Take 1,000 mcg by mouth every day.     • Coenzyme Q10 (CO Q-10 PO) Take 1 Dose by mouth every day. Unknown OTC Strength       No current facility-administered medications for this visit.      Allergies: Lactose  He  has a past medical history of Anesthesia; Basal cell carcinoma of skin; CAD (coronary artery disease); Cancer ([facility]); DVT (deep venous thrombosis) ([facility]) (2014); ED (erectile dysfunction); GERD (gastroesophageal reflux disease); Glaucoma; Heart burn; Hyperlipidemia (12/3/2012); Hypertension; Indigestion; Multiple pulmonary emboli ([facility]) (2014); Multiple thyroid nodules (2009); Papillary carcinoma of thyroid ([facility]) (2014); postsurgical hypothyroidism (2014); Pre-diabetes; Renal disorder; S/P CABG x 4 (2003); S/P colectomy (2010); S/p nephrectomy (1998); S/P prostatectomy (2008); Stroke (HCC); Transient renal failure (2014); Type II or unspecified type diabetes mellitus without mention of complication, not stated as uncontrolled; and Unspecified urinary incontinence.  He  has a past surgical history that includes colon resection; nephrectomy radical; multiple coronary artery bypass; prostatectomy, radical retro; other orthopedic surgery; thyroidectomy total (5/13/2014); and node dissection (5/13/2014).  Social History   Substance Use Topics   • Smoking status: Never Smoker   • Smokeless tobacco: Never Used   • Alcohol use Yes      Comment: 2 PER WK     Social History  "    Social History Narrative   • No narrative on file     Family History   Problem Relation Age of Onset   • Diabetes Mother    • Heart Disease Mother    • Diabetes Father    • Cancer Father         pancreas   • Thyroid Sister         Cancer   • Cancer Sister         thyroid   • Diabetes Sister    • Cancer Brother 49        colon   • Diabetes Brother    • Diabetes Maternal Grandfather    • Diabetes Paternal Grandmother    • Diabetes Paternal Grandfather      Family Status   Relation Status   • Mo (Not Specified)   • Fa (Not Specified)   • Sis (Not Specified)   • Bro (Not Specified)   • MGFa (Not Specified)   • PGMo (Not Specified)   • PGFa (Not Specified)     ROS   Constitutional: Negative for fever, chills and weight loss.   HEENT: Negative for blurred vision, sore throat, swollen glands. No hearing loss or vertigo.  Respiratory: Negative for cough, wheezing, shortness of breath.   CVS: Negative for chest pain, palpitations, irregular heart beats, exertional dyspnea.   GI: Negative for heartburn, abdominal pain, nausea, vomiting, change in BMs.   : Negative for polyuria.   MS: Negative for myalgias, joint pain.   Neuro: no headaches, numbness, weakness, seizures.   Skin: no skin lesions, rash.  Endocrine: per HPI.     PHYSICAL EXAM   /58   Pulse (!) 53   Temp 36.7 °C (98.1 °F)   Ht 1.727 m (5' 8\")   Wt 88.3 kg (194 lb 10.7 oz)   SpO2 97%  Body mass index is 29.6 kg/m².  Alert, oriented in no acute distress.  Eye contact is good, speech goal directed, affect bright.  HEENT: EOMI, PERRL, conjunctiva non-injected, sclera non-icteric.  Nares patent with no significant congestion or drainage.  Normal pinnae, external auditory canals, TM pearly gray with normal light reflex bilaterally.  Oral mucous membranes pink and moist with no lesions.  Neck supple with no cervical lymphadenopathy, JVD, palpable thyroid nodules or carotid bruits.  Lungs: clear to auscultation bilaterally with good excursion.  CV: " regular rate and rhythm, without murmur, rubs, thrills, or gallops.  Abdomen: soft, non-distended, non-tender with normal bowel sounds. No CVAT, unable to palpate liver/spleen due to obesity..  Lower extremities: color normal, vascularity normal, no edema, temperature normal  Musculoskeletal: Normal gait. No obvious muscle weakness or wasting.  Psych: Normal mood and affect. Alert and oriented x3. Judgment and insight is normal.  Neuro:speech normal, mental status intact, cranial nerves 2-12 intact.    LABS     Results reviewed and discussed with the patient, questions answered.    Last labs:  Lab Results   Component Value Date/Time    CHOLSTRLTOT 181 04/12/2019 07:33 AM    LDL 81 04/12/2019 07:33 AM    HDL 27 (A) 04/12/2019 07:33 AM    TRIGLYCERIDE 366 (H) 04/12/2019 07:33 AM       Lab Results   Component Value Date/Time    SODIUM 142 04/12/2019 07:33 AM    POTASSIUM 4.8 04/12/2019 07:33 AM    CHLORIDE 108 04/12/2019 07:33 AM    CO2 28 04/12/2019 07:33 AM    GLUCOSE 160 (H) 04/12/2019 07:33 AM    BUN 27 (H) 04/12/2019 07:33 AM    CREATININE 2.00 (H) 04/12/2019 07:33 AM     Lab Results   Component Value Date/Time    ALKPHOSPHAT 33 04/12/2019 07:33 AM    ASTSGOT 20 04/12/2019 07:33 AM    ALTSGPT 17 04/12/2019 07:33 AM    TBILIRUBIN 0.7 04/12/2019 07:33 AM      Lab Results   Component Value Date/Time    HBA1C 8.8 (H) 04/12/2019 07:33 AM    HBA1C 8.3 (H) 01/09/2019 07:17 AM    HBA1C 7.5 (H) 09/29/2018 08:18 AM     No results found for: TSH  Lab Results   Component Value Date/Time    FREET4 0.90 04/12/2019 07:30 AM    FREET4 1.10 01/26/2016 09:00 AM       Lab Results   Component Value Date/Time    WBC 4.9 04/12/2019 07:33 AM    RBC 4.90 04/12/2019 07:33 AM    HEMOGLOBIN 15.8 04/12/2019 07:33 AM    HEMATOCRIT 48.5 04/12/2019 07:33 AM    MCV 99.0 (H) 04/12/2019 07:33 AM    MCH 32.2 04/12/2019 07:33 AM    MCHC 32.6 (L) 04/12/2019 07:33 AM    MPV 11.0 04/12/2019 07:33 AM    NEUTSPOLYS 54.70 04/12/2019 07:33 AM     LYMPHOCYTES 33.20 04/12/2019 07:33 AM    MONOCYTES 6.70 04/12/2019 07:33 AM    EOSINOPHILS 4.00 04/12/2019 07:33 AM    BASOPHILS 0.80 04/12/2019 07:33 AM      ASSESMENT AND PLAN     1. Uncontrolled type 2 diabetes mellitus with microalbuminuria, without long-term current use of insulin (HCC)  Changes:     Discussed and educated on:   - All medications, side effects and compliance (discussed carefully)  - Annual eye examinations at Ophthalmology  - Diabetic Meal Plan: foods that contain carbs and plate method  - Foot Care: what to look for when checking feet every day and when to contact HCP  - HbA1C: target and what A1C is  - Home glucose monitoring emphasized  - Interpretation of Lab Results  - Long term diabetic complications  - Reminded pt to bring in BS diary at next visit  - Testing Blood Glucose: when to test, recording blood sugars, target range for FSBS and when to contact HCP  - Weight control and daily exercise     Recommended medication changes: start Trulicity 0.75 mg weekly, decrease Glipizide to 1 tablet every morning, send blood glucose results in 2 weeks or F/U with me in 2-3 weeks       - Dulaglutide (TRULICITY) 0.75 MG/0.5ML Solution Pen-injector; Inject 0.75 mg as instructed every 7 days.  Dispense: 4 PEN; Refill: 11  - HEMOGLOBIN A1C; Future  - Comp Metabolic Panel; Future  - HEMOGLOBIN A1C; Future  - POCT Retinal Eye Exam    2. Microalbuminuria due to type 2 diabetes mellitus (HCC)  As above    3. CKD (chronic kidney disease), stage III ([facility])  He has been followed up by nephrology   - stable, continue to avoid NSAIDs, have good fluid intake  - Comp Metabolic Panel; Future    4. Single kidney  As above    5. Essential hypertension  Controlled, continue current treatment  - Comp Metabolic Panel; Future    6. Coronary artery disease involving native coronary artery of native heart without angina pectoris  7. S/P CABG x 4  Asymptomatic, continue current treatment and cardiology follow-up    8.  Dyslipidemia  Triglycerides still high regardless of combined treatment;  -Follow-up labs  - Lipid Profile; Future    11. Thrombocytopenia (HCC)  Stable, follow-up labs  - CBC WITH DIFFERENTIAL; Future    12. Health care maintenance  Advised immunizations X3    All questions are answered.    Smoking Counseling: Nonsmoker    Time spent 40 minutes face to face, with > 50% spent counseling and coordinating care.  With DM RN    Followup: Return in about 3 months (around 7/15/2019) for DM-RN, DM.

## 2019-04-15 NOTE — PATIENT INSTRUCTIONS
start Trulicity 0.75 mg weekly, decrease Glipizide to 1 tablet every morning, send blood glucose results in 2 weeks or F/U with me in 2-3 weeks

## 2019-04-15 NOTE — PROGRESS NOTES
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  Family Medicine [phone]        Anticoagulation Patient Findings      HPI:   Paulo Paredes seen in clinic today, on anticoagulation therapy with warfarin (a high risk medication) for hx of DVT and TIA       Pt is here today to evaluate anticoagulation therapy    Previous INR was  2.4 on 3-    Pt was instructed to Continue current warfarin regimen       Confirmed warfarin dosing regimen, denies missed or extra doses of coumadin.   Diet has been consistent with foods rich in vitamin K: Yes  Changes in ETOH:  No  Changes in smoking status: No  Changes in medication: No   Cost restriction: No  S/s of bleeding:  No  Falls or accidents since last visit No  Signs/symptoms  thrombosis since the last appt: No    A/P   INR  therapeutic today   Continue current warfarin regimen          2-2020 check referral    Pt educated to contact our clinic with any changes in  medications or s/s of bleeding or thrombosis. Pt is aware to seek immediate medical attention for falls, head injury or deep cuts    Follow up appointment in 5 week(s) to reduce risk of adverse events from warfarin  Zay LuzD

## 2019-04-23 NOTE — TELEPHONE ENCOUNTER
Was the patient seen in the last year in this department? Yes    Does patient have an active prescription for medications requested? No     Received Request Via: Pharmacy       NEED TO ADD DX, QUANTITY AND DIRECTIONS PER MEDICARE GUIDELINES

## 2019-04-24 RX ORDER — BLOOD-GLUCOSE METER
1 EACH MISCELLANEOUS 2 TIMES DAILY
Qty: 1 KIT | Refills: 2 | Status: SHIPPED | OUTPATIENT
Start: 2019-04-24 | End: 2019-07-16

## 2019-04-24 NOTE — TELEPHONE ENCOUNTER
Was the patient seen in the last year in this department? Yes    Does patient have an active prescription for medications requested? No     Received Request Via: Pharmacy       PATIENT ALSO NEEDS BLOOD TEST DEVICE

## 2019-05-06 RX ORDER — WARFARIN SODIUM 5 MG/1
5 TABLET ORAL EVERY EVENING
Qty: 120 TAB | Refills: 3 | Status: SHIPPED | OUTPATIENT
Start: 2019-05-06 | End: 2020-08-21 | Stop reason: SDUPTHER

## 2019-05-14 ENCOUNTER — OFFICE VISIT (OUTPATIENT)
Dept: NEPHROLOGY | Facility: MEDICAL CENTER | Age: 79
End: 2019-05-14
Payer: MEDICARE

## 2019-05-14 VITALS
RESPIRATION RATE: 14 BRPM | OXYGEN SATURATION: 95 % | HEIGHT: 69 IN | TEMPERATURE: 98 F | DIASTOLIC BLOOD PRESSURE: 68 MMHG | HEART RATE: 70 BPM | WEIGHT: 188 LBS | BODY MASS INDEX: 27.85 KG/M2 | SYSTOLIC BLOOD PRESSURE: 106 MMHG

## 2019-05-14 DIAGNOSIS — N18.30 CKD (CHRONIC KIDNEY DISEASE) STAGE 3, GFR 30-59 ML/MIN (HCC): ICD-10-CM

## 2019-05-14 DIAGNOSIS — I10 ESSENTIAL HYPERTENSION: ICD-10-CM

## 2019-05-14 PROCEDURE — 99214 OFFICE O/P EST MOD 30 MIN: CPT | Performed by: INTERNAL MEDICINE

## 2019-05-14 ASSESSMENT — ENCOUNTER SYMPTOMS
CHILLS: 0
SHORTNESS OF BREATH: 0
NAUSEA: 0
VOMITING: 0
COUGH: 0
FEVER: 0
HYPERTENSION: 1

## 2019-05-14 NOTE — PROGRESS NOTES
"Subjective:      Paulo Paredes is a 78 y.o. male who presents with Chronic Kidney Disease and Hypertension            The patient has a history of diabetes mellitus, recently started on Trulicity, is noticing his blood sugar has been low, he had one incidence with it was in the 50s and the second was in the 70s.      Chronic Kidney Disease   This is a chronic problem. The current episode started more than 1 year ago. The problem occurs constantly. The problem has been unchanged. Pertinent negatives include no chest pain, chills, coughing, fever, nausea, urinary symptoms or vomiting. Nothing aggravates the symptoms.   Hypertension   This is a chronic problem. The current episode started more than 1 year ago. The problem is unchanged. The problem is controlled. Pertinent negatives include no chest pain, malaise/fatigue, peripheral edema or shortness of breath. Agents associated with hypertension include thyroid hormones. Risk factors for coronary artery disease include diabetes mellitus and male gender. Past treatments include beta blockers and angiotensin blockers. The current treatment provides significant improvement. There are no compliance problems.  Hypertensive end-organ damage includes kidney disease. Identifiable causes of hypertension include chronic renal disease.       Review of Systems   Constitutional: Negative for chills, fever and malaise/fatigue.   Respiratory: Negative for cough and shortness of breath.    Cardiovascular: Negative for chest pain and leg swelling.   Gastrointestinal: Negative for nausea and vomiting.   Genitourinary: Negative for dysuria, frequency and urgency.          Objective:     /68 (BP Location: Right arm, Patient Position: Sitting, BP Cuff Size: Adult)   Pulse 70   Temp 36.7 °C (98 °F) (Temporal)   Resp 14   Ht 1.74 m (5' 8.5\")   Wt 85.3 kg (188 lb)   SpO2 95%   BMI 28.17 kg/m²      Physical Exam   Constitutional: He is oriented to person, place, and time. "   HENT:   Right Ear: External ear normal.   Left Ear: External ear normal.   Nose: Nose normal.   Eyes: Conjunctivae are normal. Right eye exhibits no discharge. Left eye exhibits no discharge.   Cardiovascular: Normal rate and regular rhythm.    Pulmonary/Chest: Effort normal and breath sounds normal. No respiratory distress. He has no wheezes.   Musculoskeletal: He exhibits no edema.   Neurological: He is alert and oriented to person, place, and time.   Skin: Skin is warm.   Psychiatric: He has a normal mood and affect. His behavior is normal.   Nursing note and vitals reviewed.    Past Medical History:   Diagnosis Date   • Anesthesia     difficult intubation with surgery 2008,2010   • Basal cell carcinoma of skin    • CAD (coronary artery disease)    • Cancer (HCC)     rt  kidney 1989;  thyroid cancer 2012, prostate 2008, skin   • DVT (deep venous thrombosis) (HCC) 2014   • ED (erectile dysfunction)    • GERD (gastroesophageal reflux disease)    • Glaucoma    • Heart burn    • Hyperlipidemia 12/3/2012   • Hypertension    • Indigestion    • Multiple pulmonary emboli (HCC) 2014   • Multiple thyroid nodules 2009   • Papillary carcinoma of thyroid (HCC) 2014    R 2 foci / 4.5mm,0.25mm / no mets/ pT1pN0   • postsurgical hypothyroidism 2014   • Pre-diabetes    • Renal disorder     rt kidney cancer,nephrectomy   • S/P CABG x 4 2003   • S/P colectomy 2010    multiple colon polyps / no Ca   • S/p nephrectomy 1998    carcinoma   • S/P prostatectomy 2008    carcinoma   • Stroke (HCC)     'mild',no residual,'one doctor said it was a tia'   • Transient renal failure 2014    renal vein thrombosis   • Type II or unspecified type diabetes mellitus without mention of complication, not stated as uncontrolled     on no medications   • Unspecified urinary incontinence      Social History     Social History   • Marital status:      Spouse name: N/A   • Number of children: N/A   • Years of education: N/A     Occupational  History   • Not on file.     Social History Main Topics   • Smoking status: Never Smoker   • Smokeless tobacco: Never Used   • Alcohol use Yes      Comment: 2 PER WK   • Drug use: No   • Sexual activity: Not on file      Comment: retired      Other Topics Concern   • Not on file     Social History Narrative   • No narrative on file     Family History   Problem Relation Age of Onset   • Diabetes Mother    • Heart Disease Mother    • Diabetes Father    • Cancer Father         pancreas   • Thyroid Sister         Cancer   • Cancer Sister         thyroid   • Diabetes Sister    • Cancer Brother 49        colon   • Diabetes Brother    • Diabetes Maternal Grandfather    • Diabetes Paternal Grandmother    • Diabetes Paternal Grandfather      Recent Labs      09/29/18   0818  01/09/19   0717  01/23/19   1137  04/12/19   0733   ALBUMIN  4.4  4.0  4.2  4.0   HDL  36*  29*   --   27*   TRIGLYCERIDE  368*  351*   --   366*   SODIUM  138  138  140  139  138  142   POTASSIUM  5.2  5.4  4.5  4.7  4.7  4.8   CHLORIDE  107  106  106  106  103  108   CO2  24  27  28  28  27  28   BUN  32*  32*  34*  34*  27*  27*   CREATININE  2.44*  2.48*  2.37*  2.34*  2.19*  2.00*               Assessment/Plan:     1. Essential hypertension  Controlled  Continue same medication regimen  Continue low-sodium diet      2. CKD (chronic kidney disease) stage 3, GFR 30-59 ml/min ([facility])  Stable  No uremic symptoms  Renal dose of medication  Avoid nephrotoxins  Continue same medication regimen      3. Uncontrolled type 2 diabetes mellitus with microalbuminuria, without long-term current use of insulin ([facility])  I advised the patient to decrease the dose of glipizide, check his blood sugar regularly, to avoid hypoglycemia.

## 2019-05-20 ENCOUNTER — ANTICOAGULATION VISIT (OUTPATIENT)
Dept: MEDICAL GROUP | Facility: MEDICAL CENTER | Age: 79
End: 2019-05-20
Payer: MEDICARE

## 2019-05-20 DIAGNOSIS — Z79.01 CHRONIC ANTICOAGULATION: Primary | ICD-10-CM

## 2019-05-20 DIAGNOSIS — Z95.828 PRESENCE OF IVC FILTER: ICD-10-CM

## 2019-05-20 DIAGNOSIS — G45.9 TRANSIENT ISCHEMIC ATTACK: ICD-10-CM

## 2019-05-20 LAB — INR PPP: 2.4 (ref 2–3.5)

## 2019-05-20 PROCEDURE — 93793 ANTICOAG MGMT PT WARFARIN: CPT | Performed by: INTERNAL MEDICINE

## 2019-05-20 PROCEDURE — 85610 PROTHROMBIN TIME: CPT | Performed by: INTERNAL MEDICINE

## 2019-05-20 NOTE — PROGRESS NOTES
OP Anticoagulation Service Note    Date: 2019      Anticoagulation Summary  As of 2019    INR goal:   2.0-3.0   TTR:   81.0 % (1.2 y)   INR used for dosin.40 (2019)   Warfarin maintenance plan:   5 mg (5 mg x 1) every day   Weekly warfarin total:   35 mg   Plan last modified:   Marta Quintana, PharmD (7/10/2018)   Next INR check:   2019   Target end date:   Indefinite    Indications    Presence of IVC filter [Z95.828]  Transient ischemic attack [G45.9] [G45.9]  Deep vein thrombosis (DVT) of both lower extremities (HCC) (Resolved) [I82.403]  DVT (deep venous thrombosis) (HCC) (Resolved) [I82.409]  Multiple pulmonary emboli (HCC) (Resolved) [I26.99]  Chronic anticoagulation [Z79.01]             Anticoagulation Episode Summary     INR check location:       Preferred lab:       Send INR reminders to:       Comments:         Anticoagulation Care Providers     Provider Role Specialty Phone number    [facility]   [phone]    Edward Walters M.D.  [facility] [phone]        Anticoagulation Patient Findings      HPI:   Pualo Paredes seen in clinic today, on anticoagulation therapy with warfarin (a high risk medication) for hx of DVT and TIA     Pt is here today to evaluate anticoagulation therapy    Previous INR was  3.0 on 4-15-19    Pt was instructed to continue current regimen.     Confirmed warfarin dosing regimen, denies missed or extra doses of coumadin.   Diet has been consistent with foods rich in vitamin K: Yes  Changes in ETOH:  No  Changes in smoking status: No  Changes in medication: No   Cost restriction: No  S/s of bleeding:  No  Falls or accidents since last visit No  Signs/symptoms  thrombosis since the last appt: No    A/P   INR  therapeutic today,   Continue current warfarin regimen          2-20 check referral    Pt educated to contact our clinic with any changes in medications or s/s of bleeding or thrombosis. Pt is aware to seek  immediate medical attention for falls, head injury or deep cuts    Follow up appointment in 6 week(s) to reduce risk of adverse events from warfarin   Kelsey Junior, PharmD

## 2019-06-07 ENCOUNTER — NON-PROVIDER VISIT (OUTPATIENT)
Dept: MEDICAL GROUP | Facility: MEDICAL CENTER | Age: 79
End: 2019-06-07
Payer: MEDICARE

## 2019-06-07 PROCEDURE — 92250 FUNDUS PHOTOGRAPHY W/I&R: CPT | Mod: TC | Performed by: INTERNAL MEDICINE

## 2019-06-07 NOTE — NON-PROVIDER
Paulo Paredes is a 78 y.o. male here for a non-provider visit for Retinal Eye Exam    If abnormal was an in office provider notified today (if so, indicate provider)? No  Routed to PCP? Yes

## 2019-06-11 LAB — RETINAL SCREEN: NEGATIVE

## 2019-06-15 DIAGNOSIS — E78.5 DYSLIPIDEMIA: ICD-10-CM

## 2019-06-17 RX ORDER — ATORVASTATIN CALCIUM 10 MG/1
TABLET, FILM COATED ORAL
Qty: 90 TAB | Refills: 5 | Status: SHIPPED | OUTPATIENT
Start: 2019-06-17 | End: 2019-06-18 | Stop reason: SDUPTHER

## 2019-06-18 DIAGNOSIS — E78.5 DYSLIPIDEMIA: ICD-10-CM

## 2019-06-18 RX ORDER — ATORVASTATIN CALCIUM 10 MG/1
10 TABLET, FILM COATED ORAL DAILY
Qty: 100 TAB | Refills: 1 | Status: SHIPPED | OUTPATIENT
Start: 2019-06-18 | End: 2020-01-16 | Stop reason: SDUPTHER

## 2019-07-01 ENCOUNTER — ANTICOAGULATION VISIT (OUTPATIENT)
Dept: MEDICAL GROUP | Facility: MEDICAL CENTER | Age: 79
End: 2019-07-01
Payer: MEDICARE

## 2019-07-01 VITALS — DIASTOLIC BLOOD PRESSURE: 70 MMHG | SYSTOLIC BLOOD PRESSURE: 130 MMHG | HEART RATE: 68 BPM

## 2019-07-01 DIAGNOSIS — G45.9 TRANSIENT ISCHEMIC ATTACK: ICD-10-CM

## 2019-07-01 DIAGNOSIS — Z95.828 PRESENCE OF IVC FILTER: ICD-10-CM

## 2019-07-01 DIAGNOSIS — Z79.01 CHRONIC ANTICOAGULATION: Primary | ICD-10-CM

## 2019-07-01 LAB — INR PPP: 2.4 (ref 2–3.5)

## 2019-07-01 PROCEDURE — 93793 ANTICOAG MGMT PT WARFARIN: CPT | Performed by: INTERNAL MEDICINE

## 2019-07-01 PROCEDURE — 85610 PROTHROMBIN TIME: CPT | Performed by: INTERNAL MEDICINE

## 2019-07-01 NOTE — PROGRESS NOTES
OP Anticoagulation Service Note    Date: 2019  Vitals:    19 0924   BP: 130/70   Pulse: 68         Anticoagulation Summary  As of 2019    INR goal:   2.0-3.0   TTR:   82.6 % (1.4 y)   INR used for dosin.40 (2019)   Warfarin maintenance plan:   5 mg (5 mg x 1) every day   Weekly warfarin total:   35 mg   Plan last modified:   Marta Quintana, PharmD (7/10/2018)   Next INR check:   2019   Target end date:   Indefinite    Indications    Presence of IVC filter [Z95.828]  Transient ischemic attack [G45.9] [G45.9]  Deep vein thrombosis (DVT) of both lower extremities (HCC) (Resolved) [I82.403]  DVT (deep venous thrombosis) (HCC) (Resolved) [I82.409]  Multiple pulmonary emboli (HCC) (Resolved) [I26.99]  Chronic anticoagulation [Z79.01]             Anticoagulation Episode Summary     INR check location:       Preferred lab:       Send INR reminders to:       Comments:         Anticoagulation Care Providers     Provider Role Specialty Phone number    [facility]   [phone]    Edward Walters M.D.  Family Medicine [phone]        Anticoagulation Patient Findings      HPI:   Paulo Paredes seen in clinic today, on anticoagulation therapy with warfarin (a high risk medication) for hx of DVT and TIA, IVC filter      Pt is here today to evaluate anticoagulation therapy    Previous INR was  2.4 on 19    Pt was instructed to continue current regimen    Confirmed warfarin dosing regimen, denies missed or extra doses of coumadin.   Diet has been consistent with foods rich in vitamin K: Yes  Changes in ETOH:  No  Changes in smoking status: No  Changes in medication: No   Cost restriction: No  S/s of bleeding:  No  Falls or accidents since last visit No  Signs/symptoms  thrombosis since the last appt: No    A/P   INR  therapeutic today,  Continue current warfarin regimen          - check referral    Pt educated to contact our clinic with any changes in medications  or s/s of bleeding or thrombosis. Pt is aware to seek immediate medical attention for falls, head injury or deep cuts    Follow up appointment in 8 week(s) to reduce risk of adverse events from warfarin   Kelsey Junior, PharmD

## 2019-07-09 ENCOUNTER — TELEPHONE (OUTPATIENT)
Dept: MEDICAL GROUP | Facility: MEDICAL CENTER | Age: 79
End: 2019-07-09

## 2019-07-09 NOTE — TELEPHONE ENCOUNTER
ESTABLISHED PATIENT PRE-VISIT PLANNING     Patient was NOT contacted to complete PVP.     Note: Patient will not be contacted if there is no indication to call.     1.  Reviewed notes from the last few office visits within the medical group: Yes    2.  If any orders were placed at last visit or intended to be done for this visit (i.e. 6 mos follow-up), do we have Results/Consult Notes?        •  Labs - Labs ordered, NOT completed. Patient advised to complete prior to next appointment.   Note: If patient appointment is for lab review and patient did not complete labs, check with provider if OK to reschedule patient until labs completed.       •  Imaging - Imaging was not ordered at last office visit.       •  Referrals - Referral ordered, patient was seen and consult notes are in chart. Care Teams updated  YES.    3. Is this appointment scheduled as a Hospital Follow-Up? No    4.  Immunizations were updated in [facility] using WebIZ?: Yes       •  Web Iz Recommendations: MMR , TDAP and SHINGRIX (Shingles)    5.  Patient is due for the following Health Maintenance Topics:   Health Maintenance Due   Topic Date Due   • IMM HEP B VACCINE (1 of 3 - Risk 3-dose series) 09/06/1959   • IMM ZOSTER VACCINES (2 of 3) 12/23/2008   • IMM DTaP/Tdap/Td Vaccine (1 - Tdap) 03/05/2010       - Patient has completed FLU and PREVNAR (PCV13)  Immunization(s) per WebIZ. Chart has been updated.    6. Orders for overdue Health Maintenance topics pended in Pre-Charting? NO    7.  AHA (MDX) form printed for Provider? No, already completed    8.  Patient was NOT informed to arrive 15 min prior to their scheduled appointment and bring in their medication bottles.

## 2019-07-15 ENCOUNTER — HOSPITAL ENCOUNTER (OUTPATIENT)
Dept: LAB | Facility: MEDICAL CENTER | Age: 79
End: 2019-07-15
Attending: INTERNAL MEDICINE
Payer: MEDICARE

## 2019-07-15 DIAGNOSIS — N18.30 CKD (CHRONIC KIDNEY DISEASE), STAGE III (HCC): ICD-10-CM

## 2019-07-15 DIAGNOSIS — E78.5 DYSLIPIDEMIA: ICD-10-CM

## 2019-07-15 DIAGNOSIS — D69.6 THROMBOCYTOPENIA (HCC): ICD-10-CM

## 2019-07-15 DIAGNOSIS — I10 ESSENTIAL HYPERTENSION: ICD-10-CM

## 2019-07-15 LAB
ALBUMIN SERPL BCP-MCNC: 4 G/DL (ref 3.2–4.9)
ALBUMIN/GLOB SERPL: 1.5 G/DL
ALP SERPL-CCNC: 32 U/L (ref 30–99)
ALT SERPL-CCNC: 15 U/L (ref 2–50)
ANION GAP SERPL CALC-SCNC: 8 MMOL/L (ref 0–11.9)
AST SERPL-CCNC: 18 U/L (ref 12–45)
BASOPHILS # BLD AUTO: 0.7 % (ref 0–1.8)
BASOPHILS # BLD: 0.04 K/UL (ref 0–0.12)
BILIRUB SERPL-MCNC: 0.6 MG/DL (ref 0.1–1.5)
BUN SERPL-MCNC: 34 MG/DL (ref 8–22)
CALCIUM SERPL-MCNC: 9.2 MG/DL (ref 8.5–10.5)
CHLORIDE SERPL-SCNC: 107 MMOL/L (ref 96–112)
CHOLEST SERPL-MCNC: 169 MG/DL (ref 100–199)
CO2 SERPL-SCNC: 26 MMOL/L (ref 20–33)
CREAT SERPL-MCNC: 2.17 MG/DL (ref 0.5–1.4)
EOSINOPHIL # BLD AUTO: 0.2 K/UL (ref 0–0.51)
EOSINOPHIL NFR BLD: 3.6 % (ref 0–6.9)
ERYTHROCYTE [DISTWIDTH] IN BLOOD BY AUTOMATED COUNT: 48.3 FL (ref 35.9–50)
EST. AVERAGE GLUCOSE BLD GHB EST-MCNC: 148 MG/DL
FASTING STATUS PATIENT QL REPORTED: NORMAL
GLOBULIN SER CALC-MCNC: 2.7 G/DL (ref 1.9–3.5)
GLUCOSE SERPL-MCNC: 131 MG/DL (ref 65–99)
HBA1C MFR BLD: 6.8 % (ref 0–5.6)
HCT VFR BLD AUTO: 48.3 % (ref 42–52)
HDLC SERPL-MCNC: 32 MG/DL
HGB BLD-MCNC: 15.6 G/DL (ref 14–18)
IMM GRANULOCYTES # BLD AUTO: 0.01 K/UL (ref 0–0.11)
IMM GRANULOCYTES NFR BLD AUTO: 0.2 % (ref 0–0.9)
LDLC SERPL CALC-MCNC: 100 MG/DL
LYMPHOCYTES # BLD AUTO: 1.86 K/UL (ref 1–4.8)
LYMPHOCYTES NFR BLD: 33.8 % (ref 22–41)
MCH RBC QN AUTO: 31.8 PG (ref 27–33)
MCHC RBC AUTO-ENTMCNC: 32.3 G/DL (ref 33.7–35.3)
MCV RBC AUTO: 98.4 FL (ref 81.4–97.8)
MONOCYTES # BLD AUTO: 0.4 K/UL (ref 0–0.85)
MONOCYTES NFR BLD AUTO: 7.3 % (ref 0–13.4)
NEUTROPHILS # BLD AUTO: 2.99 K/UL (ref 1.82–7.42)
NEUTROPHILS NFR BLD: 54.4 % (ref 44–72)
NRBC # BLD AUTO: 0 K/UL
NRBC BLD-RTO: 0 /100 WBC
PLATELET # BLD AUTO: 124 K/UL (ref 164–446)
PMV BLD AUTO: 11.2 FL (ref 9–12.9)
POTASSIUM SERPL-SCNC: 4.7 MMOL/L (ref 3.6–5.5)
PROT SERPL-MCNC: 6.7 G/DL (ref 6–8.2)
RBC # BLD AUTO: 4.91 M/UL (ref 4.7–6.1)
SODIUM SERPL-SCNC: 141 MMOL/L (ref 135–145)
TRIGL SERPL-MCNC: 185 MG/DL (ref 0–149)
WBC # BLD AUTO: 5.5 K/UL (ref 4.8–10.8)

## 2019-07-15 PROCEDURE — 85025 COMPLETE CBC W/AUTO DIFF WBC: CPT

## 2019-07-15 PROCEDURE — 36415 COLL VENOUS BLD VENIPUNCTURE: CPT

## 2019-07-15 PROCEDURE — 80061 LIPID PANEL: CPT

## 2019-07-15 PROCEDURE — 80053 COMPREHEN METABOLIC PANEL: CPT

## 2019-07-15 PROCEDURE — 83036 HEMOGLOBIN GLYCOSYLATED A1C: CPT

## 2019-07-15 NOTE — PROGRESS NOTES
RN-JOHN Note    Subjective:     Health changes since last visit/interval Hx: Paulo follows up after starting Trulicity.  He decreased glipizide to once daily due to hypoglycemia, then stopped it entirely.  He has lost 13 pounds    Medications (including changes made today)  Current Outpatient Prescriptions   Medication Sig Dispense Refill   • atorvastatin (LIPITOR) 10 MG Tab Take 1 Tab by mouth every day. 100 Tab 1   • FREESTYLE LITE strip   11   • warfarin (COUMADIN) 5 MG Tab Take 1 Tab by mouth every evening. 120 Tab 3   • Dulaglutide (TRULICITY) 0.75 MG/0.5ML Solution Pen-injector Inject 0.75 mg as instructed every 7 days. 4 PEN 2   • fenofibrate (TRIGLIDE) 160 MG tablet Take 160 mg by mouth every day.     • atenolol (TENORMIN) 25 MG Tab Take 25 mg by mouth every day.     • Calcium Carb-Cholecalciferol (CALCIUM 1000 + D PO) Take 1 Dose by mouth every day.     • levothyroxine (SYNTHROID) 137 MCG Tab Take 137 mcg by mouth every day.     • losartan (COZAAR) 50 MG Tab Take 50 mg by mouth every day.     • aspirin 81 MG tablet Take 81 mg by mouth every evening.     • Omega-3 Krill Oil 300 MG Cap Take 300 mg by mouth every day.     • Cinnamon 500 MG Cap Take 1,000 mg by mouth.     • Cyanocobalamin (VITAMIN B-12) 1000 MCG Tab Take 1,000 mcg by mouth every day.     • Coenzyme Q10 (CO Q-10 PO) Take 1 Dose by mouth every day. Unknown OTC Strength       No current facility-administered medications for this visit.        Taking daily ASA: Yes  Taking above medications as prescribed: yes  SIDE EFFECTS: Patient denies side effects to medications    Exercise: 5x/week 50 minutes TM  Diet: eating less and healthy choices  Patient's body mass index is 27.15 kg/m². Exercise and nutrition counseling were performed at this visit.      Health Maintenance:   Health Maintenance Due   Topic Date Due   • IMM HEP B VACCINE (1 of 3 - Risk 3-dose series) 09/06/1959   • IMM ZOSTER VACCINES (2 of 3) 12/23/2008   • IMM DTaP/Tdap/Td Vaccine (1 -  Tdap) 03/05/2010       Immunizations:   PPSV23: Up-to-date  [id number]: Up-to-date  Tdap: Due  Flu: not asked  Hep B: Due    DM:   Last A1c:   Lab Results   Component Value Date/Time    HBA1C 6.8 (H) 07/15/2019 07:19 AM      A1C GOAL: < 7    Glucose monitoring frequency: daily  's, most 110-115  Hypoglycemic episodes: no    Last Retinal Exam: on file and up-to-date  Daily Foot Exam: Yes no problems  Routine Dental Exams: Yes    Lab Results   Component Value Date/Time    MALBCRT 181 (H) 01/09/2019 07:22 AM    MICROALBUR 30.3 01/09/2019 07:22 AM        ACR Albumin/Creatinine Ratio goal <30     HTN:   Blood pressure goal <140/<80 yes.   Currently Rx ACE/ARB: Yes    Dyslipidemia:    Lab Results   Component Value Date/Time    CHOLSTRLTOT 169 07/15/2019 07:19 AM     (H) 07/15/2019 07:19 AM    HDL 32 (A) 07/15/2019 07:19 AM    TRIGLYCERIDE 185 (H) 07/15/2019 07:19 AM       Lab Results   Component Value Date/Time    SODIUM 141 07/15/2019 07:19 AM    POTASSIUM 4.7 07/15/2019 07:19 AM    CHLORIDE 107 07/15/2019 07:19 AM    CO2 26 07/15/2019 07:19 AM    GLUCOSE 131 (H) 07/15/2019 07:19 AM    BUN 34 (H) 07/15/2019 07:19 AM    CREATININE 2.17 (H) 07/15/2019 07:19 AM     Lab Results   Component Value Date/Time    ALKPHOSPHAT 32 07/15/2019 07:19 AM    ASTSGOT 18 07/15/2019 07:19 AM    ALTSGPT 15 07/15/2019 07:19 AM    TBILIRUBIN 0.6 07/15/2019 07:19 AM        Currently Rx Statin: Yes    He  reports that he has never smoked. He has never used smokeless tobacco.    Objective:     Exam:  Monofilament: not done    Plan:     Discussed and educated on:   - All medications, side effects and compliance (discussed carefully)  - Annual eye examinations at Ophthalmology  - Diabetic Meal Plan: foods that contain carbs and plate method  - Factors Affecting Blood Glucose Control: food, illness, medication and stress  - Foot Care: what to look for when checking feet every day and when to contact HCP  - HbA1C: target and what A1C  is  - Home glucose monitoring emphasized  - Hypoglycemia: factors causing, signs/symptoms, proper treatment and when to contact HCP  - Interpretation of Lab Results  - Long term diabetic complications  - Reminded pt to bring in BS diary at next visit  - Testing Blood Glucose: when to test, recording blood sugars, target range for FSBS and when to contact HCP  - Weight control and daily exercise    Recommended medication changes: no change

## 2019-07-16 ENCOUNTER — OFFICE VISIT (OUTPATIENT)
Dept: MEDICAL GROUP | Facility: MEDICAL CENTER | Age: 79
End: 2019-07-16
Payer: MEDICARE

## 2019-07-16 VITALS
WEIGHT: 181.22 LBS | TEMPERATURE: 97.2 F | HEIGHT: 69 IN | BODY MASS INDEX: 26.84 KG/M2 | DIASTOLIC BLOOD PRESSURE: 64 MMHG | OXYGEN SATURATION: 94 % | SYSTOLIC BLOOD PRESSURE: 124 MMHG | HEART RATE: 66 BPM

## 2019-07-16 DIAGNOSIS — Z95.1 S/P CABG X 4: ICD-10-CM

## 2019-07-16 DIAGNOSIS — D69.6 THROMBOCYTOPENIA (HCC): ICD-10-CM

## 2019-07-16 DIAGNOSIS — Z90.5 SINGLE KIDNEY: ICD-10-CM

## 2019-07-16 DIAGNOSIS — N18.30 CKD (CHRONIC KIDNEY DISEASE), STAGE III (HCC): ICD-10-CM

## 2019-07-16 DIAGNOSIS — I25.10 CORONARY ARTERY DISEASE INVOLVING NATIVE CORONARY ARTERY OF NATIVE HEART WITHOUT ANGINA PECTORIS: ICD-10-CM

## 2019-07-16 DIAGNOSIS — E11.8 CONTROLLED TYPE 2 DIABETES MELLITUS WITH COMPLICATION, WITHOUT LONG-TERM CURRENT USE OF INSULIN (HCC): ICD-10-CM

## 2019-07-16 DIAGNOSIS — I10 ESSENTIAL HYPERTENSION: ICD-10-CM

## 2019-07-16 DIAGNOSIS — R80.9 MICROALBUMINURIA DUE TO TYPE 2 DIABETES MELLITUS (HCC): ICD-10-CM

## 2019-07-16 DIAGNOSIS — Z86.711 HISTORY OF PULMONARY EMBOLUS (PE): ICD-10-CM

## 2019-07-16 DIAGNOSIS — E11.29 MICROALBUMINURIA DUE TO TYPE 2 DIABETES MELLITUS (HCC): ICD-10-CM

## 2019-07-16 DIAGNOSIS — Z79.01 CHRONIC ANTICOAGULATION: ICD-10-CM

## 2019-07-16 DIAGNOSIS — Z86.718 HISTORY OF DVT IN ADULTHOOD: ICD-10-CM

## 2019-07-16 DIAGNOSIS — E78.5 DYSLIPIDEMIA: ICD-10-CM

## 2019-07-16 DIAGNOSIS — Z85.850 HISTORY OF THYROID CANCER: ICD-10-CM

## 2019-07-16 DIAGNOSIS — Z95.828 PRESENCE OF IVC FILTER: ICD-10-CM

## 2019-07-16 DIAGNOSIS — E89.0 HYPOTHYROIDISM, POSTSURGICAL: ICD-10-CM

## 2019-07-16 DIAGNOSIS — Z86.73 HX-TIA (TRANSIENT ISCHEMIC ATTACK): ICD-10-CM

## 2019-07-16 PROCEDURE — 99214 OFFICE O/P EST MOD 30 MIN: CPT | Performed by: INTERNAL MEDICINE

## 2019-07-16 NOTE — PROGRESS NOTES
CHIEF COMPLIANT:   Chief Complaint   Patient presents with   • Diabetes   With DM RNA    Paulo Paredes is a 78 y.o. male here for DM follow up    DM 2, with microalbuminuria  Onset/D  Diabetes education:  unknown     Medications:   • Trulicity 0.75 mg weekly  • ACE/ARB: losartan  • Statin: atorvastatin 10 mg QD  • ASA: No, on coumadin     Checking feet daily/wear soft socks/shoes: advised     Diabetes ABCDE TARGETS  • A1c, last:  6.8, previous 8.8  • Fingersticks:  N  • Blood Pressure: < 140/90  • Cholesterol-Lipid Panel: elevated triglycerides, low HDL  • Dysalbuminuria:  microalbumin pos     Diet: regular  Exercise:  Walk daily  BMI:  27     DM complications:  • Peripheral neuropathy:  No numbness or tingling in feet.  • Retinopathy:                     Last eye exam: . No retinopathy.    • Nephropathy:                          CKD stage III-IV, microalbuminuria  • CVS:                                   Has CAD, on rx.   • GI:                                     No gastropathy.     FH of DM: mother     CKD stage IV, single kidney  The patient had decreased GFR with normal creatinine and electrolytes.   No consistent NSAIDs use.   He has been followed up by nephrology.     Hypertension/CAD, st post CABG x 4  St post CABG x4 in  at [facility]  Meds: Valsartan 160 mg daily, atenolol 25 mg daily; no ASA, on coumadin.   Taking meds as prescribed.   He is measuring BP at home, it has been < 140/90  Denies:  -  headaches, vision problems, tinnitus.                 -  chest pain/pressure, palpitations, irregular heart beats, exertional, dyspnea, peripheral edema.  Low salt diet: Y  Diet / exercise / BMI: as above.   FH of HTN: unknown    H/o thyroid cancer  Hypothyroidism, postsurgical  Dg: in .   Status post total thyroidectomy.   On Levothyroxine, 137 mcg, taking daily early in am, before any po intake.  No temperature intolerance. No change in weight,  hair/skin quality, BMs.   No  tremors, weakness.  No peripheral swelling.  No mood changes.  FH: N  Review TSH.  He has been followed up by endocrinology     H/o TIA  (H/o DVT/PE)  Chronic anticoagulation/IVC filter  Dg/Onset: 5/2014.   Since, he has been on Coumadin, coumadin clinic f/u.   Denies:   - Leg swelling or pain  - Shortness of breath, palpitations.    Thrombocytopenia  The patient has had borderline thrombocytopenia, stable.   Denies easy bleeding or bruising.   Reviewed medication list.     Reviewed PMH, PSH, FH, SH, ALL, IMM, MEDS.     Current medicines (including changes today)  Current Outpatient Prescriptions   Medication Sig Dispense Refill   • atorvastatin (LIPITOR) 10 MG Tab Take 1 Tab by mouth every day. 100 Tab 1   • FREESTYLE LITE strip   11   • warfarin (COUMADIN) 5 MG Tab Take 1 Tab by mouth every evening. 120 Tab 3   • Dulaglutide (TRULICITY) 0.75 MG/0.5ML Solution Pen-injector Inject 0.75 mg as instructed every 7 days. 4 PEN 2   • fenofibrate (TRIGLIDE) 160 MG tablet Take 160 mg by mouth every day.     • atenolol (TENORMIN) 25 MG Tab Take 25 mg by mouth every day.     • Calcium Carb-Cholecalciferol (CALCIUM 1000 + D PO) Take 1 Dose by mouth every day.     • levothyroxine (SYNTHROID) 137 MCG Tab Take 137 mcg by mouth every day.     • losartan (COZAAR) 50 MG Tab Take 50 mg by mouth every day.     • aspirin 81 MG tablet Take 81 mg by mouth every evening.     • Omega-3 Krill Oil 300 MG Cap Take 300 mg by mouth every day.     • Cinnamon 500 MG Cap Take 1,000 mg by mouth.     • Cyanocobalamin (VITAMIN B-12) 1000 MCG Tab Take 1,000 mcg by mouth every day.     • Coenzyme Q10 (CO Q-10 PO) Take 1 Dose by mouth every day. Unknown OTC Strength       No current facility-administered medications for this visit.      Allergies: Lactose  He  has a past medical history of Anesthesia; Basal cell carcinoma of skin; CAD (coronary artery disease); Cancer (HCC); DVT (deep venous thrombosis) ([facility]) (2014); ED (erectile dysfunction); GERD  (gastroesophageal reflux disease); Glaucoma; Heart burn; Hyperlipidemia (12/3/2012); Hypertension; Indigestion; Multiple pulmonary emboli (HCC) (2014); Multiple thyroid nodules (2009); Papillary carcinoma of thyroid (HCC) (2014); postsurgical hypothyroidism (2014); Pre-diabetes; Renal disorder; S/P CABG x 4 (2003); S/P colectomy (2010); S/p nephrectomy (1998); S/P prostatectomy (2008); Stroke (HCC); Transient renal failure (2014); Type II or unspecified type diabetes mellitus without mention of complication, not stated as uncontrolled; and Unspecified urinary incontinence.  He  has a past surgical history that includes colon resection; nephrectomy radical; multiple coronary artery bypass; prostatectomy, radical retro; other orthopedic surgery; thyroidectomy total (5/13/2014); and node dissection (5/13/2014).  Social History   Substance Use Topics   • Smoking status: Never Smoker   • Smokeless tobacco: Never Used   • Alcohol use Yes      Comment: 2 PER WK     Social History     Social History Narrative   • No narrative on file     Family History   Problem Relation Age of Onset   • Diabetes Mother    • Heart Disease Mother    • Diabetes Father    • Cancer Father         pancreas   • Thyroid Sister         Cancer   • Cancer Sister         thyroid   • Diabetes Sister    • Cancer Brother 49        colon   • Diabetes Brother    • Diabetes Maternal Grandfather    • Diabetes Paternal Grandmother    • Diabetes Paternal Grandfather      Family Status   Relation Status   • Mo (Not Specified)   • Fa (Not Specified)   • Sis (Not Specified)   • Bro (Not Specified)   • MGFa (Not Specified)   • PGMo (Not Specified)   • PGFa (Not Specified)     ROS   Constitutional: Negative for fever, chills.   HEENT: Negative for blurred vision, sore throat, swollen glands. No hearing loss or vertigo.  Respiratory: Negative for cough, wheezing, shortness of breath.   CVS: Negative for chest pain, palpitations, irregular heart beats, exertional  "dyspnea.   GI: Negative for heartburn, abdominal pain, nausea, vomiting, change in BMs.   : Negative for dysuria, polyuria.   MS: Negative for myalgias, joint pain.   Neuro: no headaches, numbness, weakness, seizures.   Skin: no skin lesions, rash.  Endocrine: per HPI.     PHYSICAL EXAM   /64   Pulse 66   Temp 36.2 °C (97.2 °F)   Ht 1.74 m (5' 8.5\")   Wt 82.2 kg (181 lb 3.5 oz)   SpO2 94%  Body mass index is 27.15 kg/m².  Alert, oriented in no acute distress.  Eye contact is good, speech goal directed, affect bright.  HEENT: EOMI, PERRL, conjunctiva non-injected, sclera non-icteric.  Nares patent with no significant congestion or drainage.  Normal pinnae, external auditory canals, TM pearly gray with normal light reflex bilaterally.  Oral mucous membranes pink and moist with no lesions.  Neck supple with no cervical lymphadenopathy, JVD, palpable thyroid nodules or carotid bruits.  Lungs: clear to auscultation bilaterally with good excursion.  CV: regular rate and rhythm, without murmur, rubs, thrills, or gallops.  Abdomen: soft, non-distended, non-tender with normal bowel sounds. No CVAT, No masses, or organomegaly.  Lower extremities: color normal, vascularity normal, no edema, temperature normal  Musculoskeletal: Normal gait. No obvious muscle weakness or wasting.  Psych: Normal mood and affect. Alert and oriented x3. Judgment and insight is normal.  Neuro: AAO x 3. CN 2-12 intact. No focal deficits.    LABS     Results reviewed and discussed with the patient, questions answered.    Last labs:  Lab Results   Component Value Date/Time    CHOLSTRLTOT 169 07/15/2019 07:19 AM     (H) 07/15/2019 07:19 AM    HDL 32 (A) 07/15/2019 07:19 AM    TRIGLYCERIDE 185 (H) 07/15/2019 07:19 AM       Lab Results   Component Value Date/Time    SODIUM 141 07/15/2019 07:19 AM    POTASSIUM 4.7 07/15/2019 07:19 AM    CHLORIDE 107 07/15/2019 07:19 AM    CO2 26 07/15/2019 07:19 AM    GLUCOSE 131 (H) 07/15/2019 07:19 AM "    BUN 34 (H) 07/15/2019 07:19 AM    CREATININE 2.17 (H) 07/15/2019 07:19 AM     Lab Results   Component Value Date/Time    ALKPHOSPHAT 32 07/15/2019 07:19 AM    ASTSGOT 18 07/15/2019 07:19 AM    ALTSGPT 15 07/15/2019 07:19 AM    TBILIRUBIN 0.6 07/15/2019 07:19 AM      Lab Results   Component Value Date/Time    HBA1C 6.8 (H) 07/15/2019 07:19 AM    HBA1C 8.8 (H) 04/12/2019 07:33 AM    HBA1C 8.3 (H) 01/09/2019 07:17 AM     No results found for: TSH  Lab Results   Component Value Date/Time    FREET4 0.90 04/12/2019 07:30 AM    FREET4 1.10 01/26/2016 09:00 AM       Lab Results   Component Value Date/Time    WBC 5.5 07/15/2019 07:19 AM    RBC 4.91 07/15/2019 07:19 AM    HEMOGLOBIN 15.6 07/15/2019 07:19 AM    HEMATOCRIT 48.3 07/15/2019 07:19 AM    MCV 98.4 (H) 07/15/2019 07:19 AM    MCH 31.8 07/15/2019 07:19 AM    MCHC 32.3 (L) 07/15/2019 07:19 AM    MPV 11.2 07/15/2019 07:19 AM    NEUTSPOLYS 54.40 07/15/2019 07:19 AM    LYMPHOCYTES 33.80 07/15/2019 07:19 AM    MONOCYTES 7.30 07/15/2019 07:19 AM    EOSINOPHILS 3.60 07/15/2019 07:19 AM    BASOPHILS 0.70 07/15/2019 07:19 AM      ASSESMENT AND PLAN     1. Controlled type 2 diabetes mellitus with complication, without long-term current use of insulin (HCC)      Discussed and educated on:   - All medications, side effects and compliance (discussed carefully)  - Annual eye examinations at Ophthalmology  - Diabetic Meal Plan: foods that contain carbs and plate method  - Factors Affecting Blood Glucose Control: food, illness, medication and stress  - Foot Care: what to look for when checking feet every day and when to contact HCP  - HbA1C: target and what A1C is  - Home glucose monitoring emphasized  - Hypoglycemia: factors causing, signs/symptoms, proper treatment and when to contact HCP  - Interpretation of Lab Results  - Long term diabetic complications  - Reminded pt to bring in BS diary at next visit  - Testing Blood Glucose: when to test, recording blood sugars, target  range for FSBS and when to contact HCP  - Weight control and daily exercise     Recommended medication changes: continue current rx    - HEMOGLOBIN A1C; Future  - Comp Metabolic Panel; Future  - MICROALBUMIN CREAT RATIO URINE; Future    2. Microalbuminuria due to type 2 diabetes mellitus (HCC)  As above    3. CKD (chronic kidney disease), stage III (HCC)  Stable, continue nephrology follow-up, avoid NSAIDs and have good fluid intake    4. Single kidney  As above    5. Essential hypertension  Controlled, continue current treatment    6. Coronary artery disease involving native coronary artery of native heart without angina pectoris  7. S/P CABG x 4  Asymptomatic, continue current treatment and cardiology follow-up    8. Dyslipidemia  Controlled, continue current treatment    9. Hypothyroidism, postsurgical  10. History of thyroid cancer  FT4 and TSH, Future  Stable and controlled, continue current treatment    11. Hx-TIA (transient ischemic attack)  12. History of DVT in adulthood  13. History of pulmonary embolus (PE)  14. Chronic anticoagulation  15. Presence of IVC filter  Continue current treatment    16. Thrombocytopenia (HCC)  F/u labs  - CBC WITH DIFFERENTIAL; Future    All questions are answered.    Health Maintenance: Completed    All questions are answered.    Smoking Counseling: Nonsmoker    Followup: in 10/19      Time spent 40 minutes face to face, with > 50% spent counseling and coordinating care.  With DM RN

## 2019-07-19 ENCOUNTER — APPOINTMENT (OUTPATIENT)
Dept: RADIOLOGY | Facility: MEDICAL CENTER | Age: 79
End: 2019-07-19
Attending: EMERGENCY MEDICINE
Payer: MEDICARE

## 2019-07-19 ENCOUNTER — HOSPITAL ENCOUNTER (EMERGENCY)
Facility: MEDICAL CENTER | Age: 79
End: 2019-07-19
Attending: EMERGENCY MEDICINE
Payer: MEDICARE

## 2019-07-19 VITALS
OXYGEN SATURATION: 97 % | HEART RATE: 68 BPM | SYSTOLIC BLOOD PRESSURE: 139 MMHG | DIASTOLIC BLOOD PRESSURE: 73 MMHG | RESPIRATION RATE: 16 BRPM | TEMPERATURE: 97.7 F

## 2019-07-19 DIAGNOSIS — M79.605 PAIN OF LEFT LOWER EXTREMITY: ICD-10-CM

## 2019-07-19 DIAGNOSIS — S86.812A STRAIN OF CALF MUSCLE, LEFT, INITIAL ENCOUNTER: ICD-10-CM

## 2019-07-19 LAB
INR PPP: 2.38 (ref 0.87–1.13)
PROTHROMBIN TIME: 26.6 SEC (ref 12–14.6)

## 2019-07-19 PROCEDURE — 85610 PROTHROMBIN TIME: CPT

## 2019-07-19 PROCEDURE — 99284 EMERGENCY DEPT VISIT MOD MDM: CPT

## 2019-07-19 PROCEDURE — 93971 EXTREMITY STUDY: CPT | Mod: RT

## 2019-07-19 NOTE — ED PROVIDER NOTES
ED Provider Note    CHIEF COMPLAINT   Chief Complaint   Patient presents with   • Leg Pain     Report a sharp pain this am in his right calf, then after walking on his treadmill he co now of calf swelling.       HPI   Paulo Paredes is a 78 y.o. male who presents with right calf muscular tenderness, worse with movement.  He uses a treadmill daily for exercise, denies acute pain during this but does agree he may have overdone it.  Patient takes Coumadin as well as has an IVC filter for history of DVT.  He states he has been compliant with his medications.  He felt like his right lower leg was swollen in addition to the pain that started last night.  No chest pain, no pleurisy, no cough.  Patient presents with normal vital signs.  Patient concerned regarding injury, or recurrent DVT    REVIEW OF SYSTEMS   Musculoskeletal: Right calf pain  Neurologic: No numbness  Skin: Swelling, no bruising  Respiratory: No shortness of breath, no pleurisy    PAST MEDICAL HISTORY   Past Medical History:   Diagnosis Date   • Anesthesia     difficult intubation with surgery 2008,2010   • Basal cell carcinoma of skin    • CAD (coronary artery disease)    • Cancer (HCC)     rt  kidney 1989;  thyroid cancer 2012, prostate 2008, skin   • DVT (deep venous thrombosis) (HCC) 2014   • ED (erectile dysfunction)    • GERD (gastroesophageal reflux disease)    • Glaucoma    • Heart burn    • Hyperlipidemia 12/3/2012   • Hypertension    • Indigestion    • Multiple pulmonary emboli (HCC) 2014   • Multiple thyroid nodules 2009   • Papillary carcinoma of thyroid (HCC) 2014    R 2 foci / 4.5mm,0.25mm / no mets/ pT1pN0   • postsurgical hypothyroidism 2014   • Pre-diabetes    • Renal disorder     rt kidney cancer,nephrectomy   • S/P CABG x 4 2003   • S/P colectomy 2010    multiple colon polyps / no Ca   • S/p nephrectomy 1998    carcinoma   • S/P prostatectomy 2008    carcinoma   • Stroke (HCC)     'mild',no residual,'one doctor said it was a tia'   •  Transient renal failure 2014    renal vein thrombosis   • Type II or unspecified type diabetes mellitus without mention of complication, not stated as uncontrolled     on no medications   • Unspecified urinary incontinence        FAMILY HISTORY  Family History   Problem Relation Age of Onset   • Diabetes Mother    • Heart Disease Mother    • Diabetes Father    • Cancer Father         pancreas   • Thyroid Sister         Cancer   • Cancer Sister         thyroid   • Diabetes Sister    • Cancer Brother 49        colon   • Diabetes Brother    • Diabetes Maternal Grandfather    • Diabetes Paternal Grandmother    • Diabetes Paternal Grandfather        SOCIAL HISTORY  Social History     Social History   • Marital status:      Spouse name: N/A   • Number of children: N/A   • Years of education: N/A     Social History Main Topics   • Smoking status: Never Smoker   • Smokeless tobacco: Never Used   • Alcohol use Yes      Comment: 2 PER WK   • Drug use: No   • Sexual activity: Not on file      Comment: retired      Other Topics Concern   • Not on file     Social History Narrative   • No narrative on file       SURGICAL HISTORY  Past Surgical History:   Procedure Laterality Date   • THYROIDECTOMY TOTAL  5/13/2014    Performed by Eliceo Bolanos M.D. at SURGERY SAME DAY [facility] ORS   • NODE DISSECTION  5/13/2014    Performed by Eliceo Bolanos M.D. at SURGERY SAME DAY [facility] ORS   • COLON RESECTION     • MULTIPLE CORONARY ARTERY BYPASS      x 4 11/2003   • NEPHRECTOMY RADICAL      right side 1998   • OTHER ORTHOPEDIC SURGERY      trotator cuff   • PROSTATECTOMY, RADICAL RETRO      cancer /january 2008       CURRENT MEDICATIONS   Home Medications     Reviewed by Keo Robles (Pharmacy Tech) on 07/19/19 at 1255  Med List Status: Complete   Medication Last Dose Status   aspirin 81 MG tablet 7/18/2019 Active   atenolol (TENORMIN) 25 MG Tab 7/18/2019 Active   atorvastatin (LIPITOR) 10 MG Tab  7/18/2019 Active   Calcium Carb-Cholecalciferol (CALCIUM 1000 + D PO) 7/18/2019 Active   Cinnamon 500 MG Cap 7/18/2019 Active   Coenzyme Q10 (CO Q-10 PO) 7/18/2019 Active   Cyanocobalamin (VITAMIN B-12) 1000 MCG Tab 7/18/2019 Active   Dulaglutide (TRULICITY) 0.75 MG/0.5ML Solution Pen-injector 7/15/2019 Active   fenofibrate (TRIGLIDE) 160 MG tablet 7/18/2019 Active   levothyroxine (SYNTHROID) 137 MCG Tab 7/19/2019 Active   losartan (COZAAR) 50 MG Tab 7/18/2019 Active   Omega-3 Krill Oil 300 MG Cap 7/18/2019 Active   warfarin (COUMADIN) 5 MG Tab 7/18/2019 Active                ALLERGIES   Allergies   Allergen Reactions   • Lactose        PHYSICAL EXAM  VITAL SIGNS: /73   Pulse 68   Temp 36.5 °C (97.7 °F) (Temporal)   Resp 16   SpO2 97%   Skin: Slight swelling right calf compared to the left.  No foot swelling, no redness, no bruising..   Vascular: Intact distal capillary refill.   Musculoskeletal: Mild right calf tenderness.  No crepitance or deformity.  Plantar flexion elicits left calf pain.  Left knee and left ankle are nontender  Neurologic: Sensation normal        RADIOLOGY/PROCEDURES  US-EXTREMITY VENOUS LOWER UNILAT RIGHT   Final Result        Results for orders placed or performed during the hospital encounter of 07/19/19   PROTHROMBIN TIME (INR)   Result Value Ref Range    PT 26.6 (H) 12.0 - 14.6 sec    INR 2.38 (H) 0.87 - 1.13         COURSE & MEDICAL DECISION MAKING  Pertinent Labs & Imaging studies reviewed. (See chart for details)  Ultrasound is read as negative for DVT.  Patient's INR is therapeutic.  No evidence of hematoma on the ultrasound.  Suspect simple calf musculature strain.  Patient has refused crutches or splint.  He is advised follow-up with his doctor for recheck in 1 week if not better and to discontinue any activity causing him calf pain while he rests his leg to help with healing.  Patient is well-appearing upon discharge    FINAL IMPRESSION     1. Strain of calf muscle, left,  initial encounter    2. Pain of left lower extremity              Electronically signed by: Eliceo De Luna, 7/20/2019 12:58 PM

## 2019-07-19 NOTE — ED TRIAGE NOTES
Chief Complaint   Patient presents with   • Leg Pain     Report a sharp pain this am in his right calf, then after walking on his treadmill he co now of calf swelling.   Hx of DVT, he has a filter for blood clots in his aorta.  /90   Pulse 72   Temp 36.4 °C (97.6 °F) (Temporal)   Resp 16   SpO2 97%

## 2019-07-19 NOTE — ED NOTES
Patient in stable condition. No signs of distress. Patient pain free. Patient ambulatory out of ED to personal vehicle with stable gait with family.   Patient verbalized understanding plan of care and discharge information.

## 2019-07-19 NOTE — ED NOTES
Med Rec completed per patient   Allergies reviewed  No ORAL antibiotics in last 14 days    Patient stated he takes straight 5 mg Coumadin daily

## 2019-08-26 ENCOUNTER — ANTICOAGULATION VISIT (OUTPATIENT)
Dept: MEDICAL GROUP | Facility: MEDICAL CENTER | Age: 79
End: 2019-08-26
Payer: MEDICARE

## 2019-08-26 VITALS — HEIGHT: 67 IN | BODY MASS INDEX: 28.38 KG/M2

## 2019-08-26 DIAGNOSIS — G45.9 TRANSIENT ISCHEMIC ATTACK: ICD-10-CM

## 2019-08-26 DIAGNOSIS — Z95.828 PRESENCE OF IVC FILTER: ICD-10-CM

## 2019-08-26 DIAGNOSIS — Z79.01 CHRONIC ANTICOAGULATION: ICD-10-CM

## 2019-08-26 LAB — INR PPP: 2.5 (ref 2–3.5)

## 2019-08-26 PROCEDURE — 93793 ANTICOAG MGMT PT WARFARIN: CPT | Performed by: INTERNAL MEDICINE

## 2019-08-26 PROCEDURE — 85610 PROTHROMBIN TIME: CPT | Performed by: PHYSICIAN ASSISTANT

## 2019-08-26 NOTE — PROGRESS NOTES
"OP Anticoagulation Service Note    Date: 2019  Vitals:    19 1112   Height: 1.702 m (5' 7.01\")   Vitals declined today    Anticoagulation Summary  As of 2019    INR goal:   2.0-3.0   TTR:   84.4 % (1.5 y)   INR used for dosin.50 (2019)   Warfarin maintenance plan:   5 mg (5 mg x 1) every day   Weekly warfarin total:   35 mg   Plan last modified:   Marta Quintana, PharmD (7/10/2018)   Next INR check:   2019   Target end date:   Indefinite    Indications    Presence of IVC filter [Z95.828]  Transient ischemic attack [G45.9] [G45.9]  Deep vein thrombosis (DVT) of both lower extremities (HCC) (Resolved) [I82.403]  DVT (deep venous thrombosis) (HCC) (Resolved) [I82.409]  Multiple pulmonary emboli (HCC) (Resolved) [I26.99]  Chronic anticoagulation [Z79.01]             Anticoagulation Episode Summary     INR check location:       Preferred lab:       Send INR reminders to:       Comments:         Anticoagulation Care Providers     Provider Role Specialty Phone number    [facility]   [phone]    Edward Walters M.D.  Family Medicine [phone]        Anticoagulation Patient Findings  Patient Findings     Negatives:   Signs/symptoms of thrombosis, Signs/symptoms of bleeding, Laboratory test error suspected, Change in health, Change in alcohol use, Change in activity, Upcoming invasive procedure, Emergency department visit, Upcoming dental procedure, Missed doses, Extra doses, Change in medications, Change in diet/appetite, Hospital admission, Bruising, Other complaints          HPI:   Paulo Marinellii seen in clinic today, they are here today for a INR check on anticoagulation therapy with warfarin because they have hx of DVT/PE, TIA    The reason for today's visit is to prevent morbidity and mortality from a clot/stroke  and to reduce the risk of bleeding while on a anticoagulant.     Reason for today's visit (per our collaborative practice policy) is because " their last INR was 2.4  on  7/1/19.   Intervention at the last visit:  Pt is to continue with current warfarin dosing regimen.    Confirmed warfarin dosing regimen  Interval Changes with foods rich in vitamin K: No  Interval Changes in ETOH:   No  Interval Changes in smoking status:  No  Interval Changes in medication:  No   Cost restriction:  No    S/S of bleeding or bruising:  No  Signs/symptoms  thrombosis since the last appt:  No    Assessment:   INR  therapeutic.     No change in dose needed today, they will need to continue with the same dose and diet to prevent adverse events while on a anticoagulant.     Plan:  Pt is to continue with current warfarin dosing regimen.    Follow up:  Follow up appointment in 10 week(s)       Other info:  Pt educated to contact our clinic with any changes in medications or s/s of bleeding or thrombosis    CHEST guidelines recommend frequent INR monitoring at regular intervals (a few days up to a max of 12 weeks) to ensure they are on the proper dose of warfarin and not having any complications from therapy.  INRs can dramatically change over a short time period due to diet, medications, and medical conditions.     Leah Solo, ZayD

## 2019-08-27 ENCOUNTER — TELEPHONE (OUTPATIENT)
Dept: HEALTH INFORMATION MANAGEMENT | Facility: MEDICAL CENTER | Age: 79
End: 2019-08-27

## 2019-09-03 ENCOUNTER — IMMUNIZATION (OUTPATIENT)
Dept: SOCIAL WORK | Facility: CLINIC | Age: 79
End: 2019-09-03
Payer: MEDICARE

## 2019-09-03 DIAGNOSIS — Z23 NEED FOR VACCINATION: ICD-10-CM

## 2019-09-03 PROCEDURE — G0008 ADMIN INFLUENZA VIRUS VAC: HCPCS | Performed by: REGISTERED NURSE

## 2019-09-03 PROCEDURE — 90662 IIV NO PRSV INCREASED AG IM: CPT | Performed by: REGISTERED NURSE

## 2019-09-03 PROCEDURE — G0009 ADMIN PNEUMOCOCCAL VACCINE: HCPCS | Performed by: REGISTERED NURSE

## 2019-09-03 PROCEDURE — 90732 PPSV23 VACC 2 YRS+ SUBQ/IM: CPT | Performed by: REGISTERED NURSE

## 2019-09-04 DIAGNOSIS — I15.9 SECONDARY HYPERTENSION: ICD-10-CM

## 2019-09-04 RX ORDER — ATENOLOL 25 MG/1
TABLET ORAL
Qty: 90 TAB | Refills: 3 | Status: SHIPPED | OUTPATIENT
Start: 2019-09-04 | End: 2020-09-10

## 2019-10-01 ENCOUNTER — APPOINTMENT (RX ONLY)
Dept: URBAN - METROPOLITAN AREA CLINIC 22 | Facility: CLINIC | Age: 79
Setting detail: DERMATOLOGY
End: 2019-10-01

## 2019-10-01 DIAGNOSIS — L57.0 ACTINIC KERATOSIS: ICD-10-CM

## 2019-10-01 DIAGNOSIS — Z85.828 PERSONAL HISTORY OF OTHER MALIGNANT NEOPLASM OF SKIN: ICD-10-CM

## 2019-10-01 DIAGNOSIS — L21.8 OTHER SEBORRHEIC DERMATITIS: ICD-10-CM

## 2019-10-01 PROCEDURE — 17000 DESTRUCT PREMALG LESION: CPT

## 2019-10-01 PROCEDURE — ? LIQUID NITROGEN

## 2019-10-01 PROCEDURE — ? PRESCRIPTION

## 2019-10-01 PROCEDURE — 99213 OFFICE O/P EST LOW 20 MIN: CPT | Mod: 25

## 2019-10-01 PROCEDURE — ? COUNSELING

## 2019-10-01 RX ORDER — KETOCONAZOLE 20.5 MG/ML
SHAMPOO, SUSPENSION TOPICAL BIW
Qty: 1 | Refills: 9 | Status: ERX

## 2019-10-01 RX ORDER — HYDROCORTISONE 25 MG/G
CREAM TOPICAL
Qty: 1 | Refills: 3 | Status: ERX | COMMUNITY
Start: 2019-10-01

## 2019-10-01 RX ADMIN — HYDROCORTISONE THIN LAYER: 25 CREAM TOPICAL at 00:00

## 2019-10-01 ASSESSMENT — LOCATION ZONE DERM
LOCATION ZONE: FACE
LOCATION ZONE: SCALP

## 2019-10-01 ASSESSMENT — LOCATION DETAILED DESCRIPTION DERM
LOCATION DETAILED: INFERIOR MID FOREHEAD
LOCATION DETAILED: LEFT SUPERIOR CENTRAL MALAR CHEEK
LOCATION DETAILED: POSTERIOR MID-PARIETAL SCALP
LOCATION DETAILED: RIGHT SUPERIOR POSTERIOR PARIETAL SCALP

## 2019-10-01 ASSESSMENT — LOCATION SIMPLE DESCRIPTION DERM
LOCATION SIMPLE: POSTERIOR SCALP
LOCATION SIMPLE: LEFT CHEEK
LOCATION SIMPLE: INFERIOR FOREHEAD

## 2019-10-15 ENCOUNTER — HOSPITAL ENCOUNTER (OUTPATIENT)
Dept: LAB | Facility: MEDICAL CENTER | Age: 79
End: 2019-10-15
Attending: INTERNAL MEDICINE
Payer: MEDICARE

## 2019-10-15 DIAGNOSIS — I10 ESSENTIAL HYPERTENSION: ICD-10-CM

## 2019-10-15 DIAGNOSIS — N18.30 CKD (CHRONIC KIDNEY DISEASE) STAGE 3, GFR 30-59 ML/MIN (HCC): ICD-10-CM

## 2019-10-15 DIAGNOSIS — E11.8 CONTROLLED TYPE 2 DIABETES MELLITUS WITH COMPLICATION, WITHOUT LONG-TERM CURRENT USE OF INSULIN (HCC): ICD-10-CM

## 2019-10-15 LAB
ALBUMIN SERPL BCP-MCNC: 4.1 G/DL (ref 3.2–4.9)
ALBUMIN/GLOB SERPL: 1.8 G/DL
ALP SERPL-CCNC: 30 U/L (ref 30–99)
ALT SERPL-CCNC: 18 U/L (ref 2–50)
ANION GAP SERPL CALC-SCNC: 7 MMOL/L (ref 0–11.9)
ANION GAP SERPL CALC-SCNC: 9 MMOL/L (ref 0–11.9)
AST SERPL-CCNC: 21 U/L (ref 12–45)
BASOPHILS # BLD AUTO: 0.7 % (ref 0–1.8)
BASOPHILS # BLD: 0.03 K/UL (ref 0–0.12)
BILIRUB SERPL-MCNC: 0.6 MG/DL (ref 0.1–1.5)
BUN SERPL-MCNC: 32 MG/DL (ref 8–22)
BUN SERPL-MCNC: 33 MG/DL (ref 8–22)
CALCIUM SERPL-MCNC: 9 MG/DL (ref 8.5–10.5)
CALCIUM SERPL-MCNC: 9.1 MG/DL (ref 8.5–10.5)
CHLORIDE SERPL-SCNC: 107 MMOL/L (ref 96–112)
CHLORIDE SERPL-SCNC: 108 MMOL/L (ref 96–112)
CO2 SERPL-SCNC: 27 MMOL/L (ref 20–33)
CO2 SERPL-SCNC: 28 MMOL/L (ref 20–33)
CREAT SERPL-MCNC: 1.97 MG/DL (ref 0.5–1.4)
CREAT SERPL-MCNC: 2.01 MG/DL (ref 0.5–1.4)
CREAT UR-MCNC: 143 MG/DL
CREAT UR-MCNC: 143.7 MG/DL
EOSINOPHIL # BLD AUTO: 0.16 K/UL (ref 0–0.51)
EOSINOPHIL NFR BLD: 3.6 % (ref 0–6.9)
ERYTHROCYTE [DISTWIDTH] IN BLOOD BY AUTOMATED COUNT: 49.1 FL (ref 35.9–50)
FASTING STATUS PATIENT QL REPORTED: NORMAL
FASTING STATUS PATIENT QL REPORTED: NORMAL
GLOBULIN SER CALC-MCNC: 2.3 G/DL (ref 1.9–3.5)
GLUCOSE SERPL-MCNC: 127 MG/DL (ref 65–99)
GLUCOSE SERPL-MCNC: 129 MG/DL (ref 65–99)
HCT VFR BLD AUTO: 46.4 % (ref 42–52)
HGB BLD-MCNC: 15 G/DL (ref 14–18)
IMM GRANULOCYTES # BLD AUTO: 0.02 K/UL (ref 0–0.11)
IMM GRANULOCYTES NFR BLD AUTO: 0.4 % (ref 0–0.9)
LYMPHOCYTES # BLD AUTO: 1.5 K/UL (ref 1–4.8)
LYMPHOCYTES NFR BLD: 33.5 % (ref 22–41)
MCH RBC QN AUTO: 32.3 PG (ref 27–33)
MCHC RBC AUTO-ENTMCNC: 32.3 G/DL (ref 33.7–35.3)
MCV RBC AUTO: 99.8 FL (ref 81.4–97.8)
MICROALBUMIN UR-MCNC: 16.4 MG/DL
MICROALBUMIN UR-MCNC: 16.6 MG/DL
MICROALBUMIN/CREAT UR: 114 MG/G (ref 0–30)
MICROALBUMIN/CREAT UR: 116 MG/G (ref 0–30)
MONOCYTES # BLD AUTO: 0.4 K/UL (ref 0–0.85)
MONOCYTES NFR BLD AUTO: 8.9 % (ref 0–13.4)
NEUTROPHILS # BLD AUTO: 2.37 K/UL (ref 1.82–7.42)
NEUTROPHILS NFR BLD: 52.9 % (ref 44–72)
NRBC # BLD AUTO: 0 K/UL
NRBC BLD-RTO: 0 /100 WBC
PLATELET # BLD AUTO: 109 K/UL (ref 164–446)
PMV BLD AUTO: 11.3 FL (ref 9–12.9)
POTASSIUM SERPL-SCNC: 4.6 MMOL/L (ref 3.6–5.5)
POTASSIUM SERPL-SCNC: 4.6 MMOL/L (ref 3.6–5.5)
PROT SERPL-MCNC: 6.4 G/DL (ref 6–8.2)
RBC # BLD AUTO: 4.65 M/UL (ref 4.7–6.1)
SODIUM SERPL-SCNC: 143 MMOL/L (ref 135–145)
SODIUM SERPL-SCNC: 143 MMOL/L (ref 135–145)
WBC # BLD AUTO: 4.5 K/UL (ref 4.8–10.8)

## 2019-10-15 PROCEDURE — 82043 UR ALBUMIN QUANTITATIVE: CPT

## 2019-10-15 PROCEDURE — 80053 COMPREHEN METABOLIC PANEL: CPT

## 2019-10-15 PROCEDURE — 80048 BASIC METABOLIC PNL TOTAL CA: CPT

## 2019-10-15 PROCEDURE — 85025 COMPLETE CBC W/AUTO DIFF WBC: CPT

## 2019-10-15 PROCEDURE — 82570 ASSAY OF URINE CREATININE: CPT

## 2019-10-15 PROCEDURE — 82570 ASSAY OF URINE CREATININE: CPT | Mod: 91

## 2019-10-15 PROCEDURE — 82043 UR ALBUMIN QUANTITATIVE: CPT | Mod: 91

## 2019-10-15 PROCEDURE — 83036 HEMOGLOBIN GLYCOSYLATED A1C: CPT

## 2019-10-15 PROCEDURE — 36415 COLL VENOUS BLD VENIPUNCTURE: CPT

## 2019-10-18 ENCOUNTER — OFFICE VISIT (OUTPATIENT)
Dept: CARDIOLOGY | Facility: MEDICAL CENTER | Age: 79
End: 2019-10-18
Payer: MEDICARE

## 2019-10-18 VITALS
BODY MASS INDEX: 28.6 KG/M2 | OXYGEN SATURATION: 95 % | WEIGHT: 182.21 LBS | HEART RATE: 66 BPM | HEIGHT: 67 IN | SYSTOLIC BLOOD PRESSURE: 118 MMHG | DIASTOLIC BLOOD PRESSURE: 68 MMHG

## 2019-10-18 DIAGNOSIS — I10 ESSENTIAL HYPERTENSION: ICD-10-CM

## 2019-10-18 DIAGNOSIS — Z95.828 PRESENCE OF IVC FILTER: ICD-10-CM

## 2019-10-18 DIAGNOSIS — Z95.1 S/P CABG X 4: ICD-10-CM

## 2019-10-18 DIAGNOSIS — Z79.01 CHRONIC ANTICOAGULATION: ICD-10-CM

## 2019-10-18 DIAGNOSIS — I25.10 CORONARY ARTERY DISEASE INVOLVING NATIVE CORONARY ARTERY OF NATIVE HEART WITHOUT ANGINA PECTORIS: ICD-10-CM

## 2019-10-18 DIAGNOSIS — E78.5 DYSLIPIDEMIA: ICD-10-CM

## 2019-10-18 DIAGNOSIS — Z86.711 HISTORY OF PULMONARY EMBOLUS (PE): ICD-10-CM

## 2019-10-18 LAB
EST. AVERAGE GLUCOSE BLD GHB EST-MCNC: 151 MG/DL
HBA1C MFR BLD: 6.9 % (ref 0–5.6)

## 2019-10-18 PROCEDURE — 99214 OFFICE O/P EST MOD 30 MIN: CPT | Performed by: INTERNAL MEDICINE

## 2019-10-18 ASSESSMENT — ENCOUNTER SYMPTOMS
DEPRESSION: 0
NAUSEA: 0
SHORTNESS OF BREATH: 0
CLAUDICATION: 0
NEUROLOGICAL NEGATIVE: 1
PALPITATIONS: 0
FOCAL WEAKNESS: 0
DIZZINESS: 0
EYES NEGATIVE: 1
CHILLS: 0
PSYCHIATRIC NEGATIVE: 1
DOUBLE VISION: 0
VOMITING: 0
ABDOMINAL PAIN: 0
MUSCULOSKELETAL NEGATIVE: 1
CARDIOVASCULAR NEGATIVE: 1
NERVOUS/ANXIOUS: 0
HEADACHES: 0
FEVER: 0
GASTROINTESTINAL NEGATIVE: 1
CONSTITUTIONAL NEGATIVE: 1
RESPIRATORY NEGATIVE: 1
BLURRED VISION: 0
WEIGHT LOSS: 0
COUGH: 0
BRUISES/BLEEDS EASILY: 0
MYALGIAS: 0
WEAKNESS: 0

## 2019-10-18 NOTE — PROGRESS NOTES
Chief Complaint   Patient presents with   • Coronary Artery Disease       Subjective:   Kevin Paredes is a 79 y.o. male who presents today for annual follow up of coronary artery disease with prior coronary artery bypass graft surgery.    Since the patient's last visit on 10/11/18, he has been doing well clinically. He denies chest pain, shortness of breath, palpitations, nausea/vomiting or diaphoresis. He keeps active walking on treadmill daily and was active gardening this summer.    Past Medical History:   Diagnosis Date   • Anesthesia     difficult intubation with surgery 2008,2010   • Basal cell carcinoma of skin    • CAD (coronary artery disease)    • Cancer (HCC)     rt  kidney 1989;  thyroid cancer 2012, prostate 2008, skin   • DVT (deep venous thrombosis) (HCC) 2014   • ED (erectile dysfunction)    • GERD (gastroesophageal reflux disease)    • Glaucoma    • Heart burn    • Hyperlipidemia 12/3/2012   • Hypertension    • Indigestion    • Multiple pulmonary emboli (HCC) 2014   • Multiple thyroid nodules 2009   • Papillary carcinoma of thyroid (HCC) 2014    R 2 foci / 4.5mm,0.25mm / no mets/ pT1pN0   • postsurgical hypothyroidism 2014   • Pre-diabetes    • Renal disorder     rt kidney cancer,nephrectomy   • S/P CABG x 4 2003   • S/P colectomy 2010    multiple colon polyps / no Ca   • S/p nephrectomy 1998    carcinoma   • S/P prostatectomy 2008    carcinoma   • Stroke (HCC)     'mild',no residual,'one doctor said it was a tia'   • Transient renal failure 2014    renal vein thrombosis   • Type II or unspecified type diabetes mellitus without mention of complication, not stated as uncontrolled     on no medications   • Unspecified urinary incontinence      Past Surgical History:   Procedure Laterality Date   • THYROIDECTOMY TOTAL  5/13/2014    Performed by Eliceo Bolanos M.D. at SURGERY SAME DAY [facility] ORS   • NODE DISSECTION  5/13/2014    Performed by Eliceo Bolanos M.D. at SURGERY SAME DAY [facility] ORS   •  COLON RESECTION     • MULTIPLE CORONARY ARTERY BYPASS      x 4 11/2003   • NEPHRECTOMY RADICAL      right side 1998   • OTHER ORTHOPEDIC SURGERY      trotator cuff   • PROSTATECTOMY, RADICAL RETRO      cancer /january 2008     Family History   Problem Relation Age of Onset   • Diabetes Mother    • Heart Disease Mother    • Diabetes Father    • Cancer Father         pancreas   • Thyroid Sister         Cancer   • Cancer Sister         thyroid   • Diabetes Sister    • Cancer Brother 49        colon   • Diabetes Brother    • Diabetes Maternal Grandfather    • Diabetes Paternal Grandmother    • Diabetes Paternal Grandfather      Social History     Socioeconomic History   • Marital status:      Spouse name: Not on file   • Number of children: Not on file   • Years of education: Not on file   • Highest education level: Not on file   Occupational History   • Not on file   Social Needs   • Financial resource strain: Not on file   • Food insecurity:     Worry: Not on file     Inability: Not on file   • Transportation needs:     Medical: Not on file     Non-medical: Not on file   Tobacco Use   • Smoking status: Never Smoker   • Smokeless tobacco: Never Used   Substance and Sexual Activity   • Alcohol use: Yes     Comment: 2 PER WK   • Drug use: No   • Sexual activity: Not on file     Comment: retired    Lifestyle   • Physical activity:     Days per week: Not on file     Minutes per session: Not on file   • Stress: Not on file   Relationships   • Social connections:     Talks on phone: Not on file     Gets together: Not on file     Attends Baptism service: Not on file     Active member of club or organization: Not on file     Attends meetings of clubs or organizations: Not on file     Relationship status: Not on file   • Intimate partner violence:     Fear of current or ex partner: Not on file     Emotionally abused: Not on file     Physically abused: Not on file     Forced sexual activity: Not on file    Other Topics Concern   • Not on file   Social History Narrative   • Not on file     Allergies   Allergen Reactions   • Lactose      (Medications reviewed.)    Outpatient Encounter Medications as of 10/18/2019   Medication Sig Dispense Refill   • atenolol (TENORMIN) 25 MG Tab TAKE 1 TABLET BY MOUTH ONCE DAILY 90 Tab 3   • Dulaglutide (TRULICITY) 0.75 MG/0.5ML Solution Pen-injector Inject 0.75 mg as instructed every 7 days. 2 PEN 0   • atorvastatin (LIPITOR) 10 MG Tab Take 1 Tab by mouth every day. 100 Tab 1   • warfarin (COUMADIN) 5 MG Tab Take 1 Tab by mouth every evening. 120 Tab 3   • fenofibrate (TRIGLIDE) 160 MG tablet Take 160 mg by mouth every day.     • Calcium Carb-Cholecalciferol (CALCIUM 1000 + D PO) Take 1 Dose by mouth every day.     • levothyroxine (SYNTHROID) 137 MCG Tab Take 137 mcg by mouth every day.     • losartan (COZAAR) 50 MG Tab Take 50 mg by mouth every day.     • aspirin 81 MG tablet Take 81 mg by mouth every evening.     • Omega-3 Krill Oil 300 MG Cap Take 300 mg by mouth every day.     • Cinnamon 500 MG Cap Take 1,000 mg by mouth.     • Cyanocobalamin (VITAMIN B-12) 1000 MCG Tab Take 1,000 mcg by mouth every day.     • Coenzyme Q10 (CO Q-10 PO) Take 1 Dose by mouth every day. Unknown OTC Strength     • Dulaglutide (TRULICITY) 0.75 MG/0.5ML Solution Pen-injector Inject 0.75 mg as instructed every 7 days. 4 PEN 2   • atenolol (TENORMIN) 25 MG Tab Take 25 mg by mouth every day.       No facility-administered encounter medications on file as of 10/18/2019.      Review of Systems   Constitutional: Negative.  Negative for chills, fever, malaise/fatigue and weight loss.   HENT: Negative.  Negative for hearing loss.    Eyes: Negative.  Negative for blurred vision and double vision.   Respiratory: Negative.  Negative for cough and shortness of breath.    Cardiovascular: Negative.  Negative for chest pain, palpitations, claudication and leg swelling.   Gastrointestinal: Negative.  Negative for  "abdominal pain, nausea and vomiting.   Genitourinary: Negative.  Negative for dysuria and urgency.   Musculoskeletal: Negative.  Negative for joint pain and myalgias.   Skin: Negative.  Negative for itching and rash.   Neurological: Negative.  Negative for dizziness, focal weakness, weakness and headaches.   Endo/Heme/Allergies: Negative.  Does not bruise/bleed easily.   Psychiatric/Behavioral: Negative.  Negative for depression. The patient is not nervous/anxious.         Objective:   /68 (BP Location: Right arm, Patient Position: Sitting, BP Cuff Size: Adult)   Pulse 66   Ht 1.702 m (5' 7.01\")   Wt 82.6 kg (182 lb 3.4 oz)   SpO2 95%   BMI 28.53 kg/m²     Physical Exam   Constitutional: He is oriented to person, place, and time. He appears well-developed and well-nourished.   HENT:   Head: Normocephalic and atraumatic.   Eyes: EOM are normal.   Neck: No JVD present.   Cardiovascular: Normal rate, regular rhythm and normal heart sounds.   Pulmonary/Chest: Effort normal and breath sounds normal.   Abdominal: Soft. Bowel sounds are normal.   No hepatosplenomegaly.   Musculoskeletal: Normal range of motion.   Lymphadenopathy:     He has no cervical adenopathy.   Neurological: He is alert and oriented to person, place, and time.   Skin: Skin is warm and dry.   Psychiatric: He has a normal mood and affect.     CARDIAC STUDIES/PROCEDURES:     ABDOMINAL ULTRASOUND (06/04/14)  1. Ectatic aorta.  2. Atherosclerotic plaque.  3. Cholelithiasis.     CT OF CHEST (06/06/14)  1. There is thrombus in the IVC which extends into the distal left renal vein between the aorta and vena cava. The patient is on heparin for 36 hours approximately, and the improvement in left   renal edema and decrease in caliber of the left renal vein compared to previous CT probably reflects improved venous drainage related to the therapy .  2. The thrombus attached to the filter extends cephalad to the filter to the level of the caudate lobe of " the liver.  3. There is DVT in both lower extremities.    ECHOCARDIOGRAM CONCLUSIONS (10/18/18)  Prior echo on 05/21/14. Compared to the report of the study done -   there has been no significant change.   Normal left ventricular systolic function.  Left ventricular ejection fraction is visually estimated to be 65%.  Mild tricuspid regurgitation.  Unable to estimate pulmonary artery pressure due to an inadequate   tricuspid regurgitant jet.  (study result reviewed)     ECHOCARDIOGRAM CONCLUSIONS (05/21/14)  Normal left ventricular size. Sigmoid septum with increased outflow   velocity but not anterior motion of the mitral valve.  Basal inferior and apical septal hypokinesis. Left ventricular   ejection fraction is 50% to 55%.  Grade I diastolic dysfunction is present.  Normal left atrial size.  Aortic sclerosis without significant stenosis.  Trace mitral regurgitation.     ECHOCARDIOGRAM CONCLUSIONS (01/25/14)  Normal left ventricular size and function.   Grade I diastolic dysfunction is present.   Normal left ventricular wall thickness.   Left ventricular ejection fraction is 60% to 65%.  Mild mitral regurgitation.   Aortic valve probably trileaflet. No stenosis or regurgitation seen.   AV MG 5.39 mmHg, PG 9.33 mmHg.  Mild tricuspid regurgitation. Right ventricular systolic pressure is   estimated to be 30 to 35 mmHg consistent with mild pulmonary hypertension.  No pericardial effusion seen.   Normal aortic diameter 3.2 cm  No prior study for comparison.     EKG performed on (01/23/19) was reviewed: EKG personally interpreted shows sinus bradycardia.  EKG performed on (05/15/14) EKG shows normal sinus rhythm with non-specific intra-ventricular conduction delay.     Laboratory results of (07/15/19) were reviewed. Cholesterol profile of 169/185/32/100 noted.  Laboratory results of (09/29/18) Cholesterol profile of 261/365/36/151 noted.  Laboratory results of (09/21/15) Cholesterol profile of 251/307/37/153  noted.  Laboratory results of (06/16/14) Cholesterol profile of 172/233/37/88 noted.    LOWER EXTREMITY VENOUS ULTRASOUND (07/19/19)  No evidence of RIGHT lower extremity DVT.   (study result reviewed)     LOWER EXTREMITY VENOUS ULTRASOUND (06/05/14)  1. No evidence of acute right lower extremity deep venous thrombosis with   recannalized venous thrombosis throughout.  2. Normal left lower extremity superficial and deep venous examination.      MRA OF BRAIN (01/25/14)  1. MRA OF THE [place] OF GREEN WITHIN NORMAL LIMITS WITH NO EVIDENCE OF ANEURYSM OR CEREBROVASCULAR OCCLUSIVE DISEASE.  2. FIELD OF VIEW PARTIALLY EXCLUDES THE INFERIOR TEMPORAL M2 DIVISION LEFT MCA.     MRA OF NECK (01/25/14)  1. Unremarkable cervical vertebral arteries.  2. Right carotid bulb and ICA origin minimal plaquing with up to about 10% stenosis. No flow limiting lesion.  3. Left carotid bulb and left ICA origin minimal plaquing with up to about 10-15% stenosis. No flow limiting lesion.     MYOCARDIAL PERFUSION STUDY CONCLUSIONS (10/18/18)  No evidence of significant jeopardized viable myocardium or prior myocardial infarction.  Apical thinning noted.  Normal wall motion, EF 59%.   TID absent.   Sinus rhythm.  Nondiagnostic ECG portion of a nuclear stress test.  ECG INTERPRETATION  Nondiagnostic ECG portion of a nuclear stress test.  (study result reviewed)    STRESS ECHOCARDIOGRAM [facility] (03/14/12)  Stress echocardiogram showing no evidence for stress-induced ischemia.    Assessment:     1. Coronary artery disease involving native coronary artery of native heart without angina pectoris     2. S/P CABG x 4     3. Essential hypertension     4. Dyslipidemia     5. History of pulmonary embolus (PE)     6. Presence of IVC filter     7. Chronic anticoagulation       Medical Decision Making:  Today's Assessment / Status / Plan:     1. Coronary artery disease with prior coronary bypass graft (4 vessel CABG at [facility] 2003):  He is clinically doing well. I will continue with current medical care including aspirin, atenolol, losartan and atorvastatin..  2. Hypertension: Blood pressure is well controlled. We will continue with beta blockade therapy and angiotensin receptor blockade.  3. Hyperlipidemia: He is doing well on statin therapy without myalgia symptoms. (Managed by primary care physician)  4. Chronic anticoagulation therapy and IVC filter with pulmonary embolism/deep venous thrombosis (managed by [facility]): He is clinically doing well without recurrence or chest pain or shortness of breath.  5. Status post stroke (managed by Dr. Rodriguez): He is clinically doing well on Plavix. Plans per neurology.  6. Renal insufficiency (managed by Dr. Cloud)  7. Health maintenance: Recovered large hematoma following thyroidectomy.  8. Emotional stress: He is undergoing significant emotional stress.  Emotional counseling was recommended.     We will follow up the patient in one year.     CC Edward Mckoy

## 2019-11-04 ENCOUNTER — ANTICOAGULATION VISIT (OUTPATIENT)
Dept: MEDICAL GROUP | Facility: MEDICAL CENTER | Age: 79
End: 2019-11-04
Payer: MEDICARE

## 2019-11-04 DIAGNOSIS — Z95.828 PRESENCE OF IVC FILTER: ICD-10-CM

## 2019-11-04 DIAGNOSIS — G45.9 TRANSIENT ISCHEMIC ATTACK: ICD-10-CM

## 2019-11-04 DIAGNOSIS — Z79.01 CHRONIC ANTICOAGULATION: ICD-10-CM

## 2019-11-04 LAB — INR PPP: 2.5 (ref 2–3.5)

## 2019-11-04 PROCEDURE — 93793 ANTICOAG MGMT PT WARFARIN: CPT | Performed by: NURSE PRACTITIONER

## 2019-11-04 PROCEDURE — 85610 PROTHROMBIN TIME: CPT | Performed by: NURSE PRACTITIONER

## 2019-11-04 NOTE — PROGRESS NOTES
OP Anticoagulation Service Note    Date: 2019  There were no vitals filed for this visit.   pt declined vitals    Anticoagulation Summary  As of 2019    INR goal:   2.0-3.0   TTR:   86.1 % (1.7 y)   INR used for dosin.50 (2019)   Warfarin maintenance plan:   5 mg (5 mg x 1) every day   Weekly warfarin total:   35 mg   Plan last modified:   Marta Quintana, PharmD (7/10/2018)   Next INR check:   2020   Target end date:   Indefinite    Indications    Presence of IVC filter [Z95.828]  Transient ischemic attack [G45.9] [G45.9]  Deep vein thrombosis (DVT) of both lower extremities (HCC) (Resolved) [I82.403]  DVT (deep venous thrombosis) (HCC) (Resolved) [I82.409]  Multiple pulmonary emboli (HCC) (Resolved) [I26.99]  Chronic anticoagulation [Z79.01]             Anticoagulation Episode Summary     INR check location:       Preferred lab:       Send INR reminders to:       Comments:         Anticoagulation Care Providers     Provider Role Specialty Phone number    [facility]   [phone]    Edward Walters M.D.  [facility] [phone]        Anticoagulation Patient Findings      HPI:   Paulo Paredes seen in clinic today, on anticoagulation therapy with warfarin (a high risk medication) for hx of DVT and PE       Pt is here today to evaluate anticoagulation therapy    Previous INR was  2.5 on 19    Pt was instructed to continue current regimen    Confirmed warfarin dosing regimen, denies missed or extra doses of coumadin.   Diet has been consistent with foods rich in vitamin K: Yes  Changes in ETOH:  No  Changes in smoking status: No  Changes in medication: No   Cost restriction: No  S/s of bleeding:  No  Falls or accidents since last visit No  Signs/symptoms  thrombosis since the last appt: No    A/P   INR  therapeutic today,   Continue current warfarin regimen          220 check referral    Pt educated to contact our clinic with any changes in medications  or s/s of bleeding or thrombosis. Pt is aware to seek immediate medical attention for falls, head injury or deep cuts    Follow up appointment in 12 week(s) to reduce risk of adverse events from warfarin   Kelsey Junior, PharmD

## 2019-11-20 ENCOUNTER — OFFICE VISIT (OUTPATIENT)
Dept: NEPHROLOGY | Facility: MEDICAL CENTER | Age: 79
End: 2019-11-20
Payer: MEDICARE

## 2019-11-20 VITALS
DIASTOLIC BLOOD PRESSURE: 66 MMHG | WEIGHT: 181 LBS | OXYGEN SATURATION: 98 % | HEIGHT: 68 IN | TEMPERATURE: 98.4 F | SYSTOLIC BLOOD PRESSURE: 116 MMHG | BODY MASS INDEX: 27.43 KG/M2 | HEART RATE: 67 BPM | RESPIRATION RATE: 16 BRPM

## 2019-11-20 DIAGNOSIS — N18.30 CKD (CHRONIC KIDNEY DISEASE) STAGE 3, GFR 30-59 ML/MIN (HCC): ICD-10-CM

## 2019-11-20 DIAGNOSIS — I10 ESSENTIAL HYPERTENSION: ICD-10-CM

## 2019-11-20 DIAGNOSIS — M19.90 OSTEOARTHRITIS, UNSPECIFIED OSTEOARTHRITIS TYPE, UNSPECIFIED SITE: ICD-10-CM

## 2019-11-20 PROCEDURE — 99214 OFFICE O/P EST MOD 30 MIN: CPT | Performed by: INTERNAL MEDICINE

## 2019-11-20 ASSESSMENT — ENCOUNTER SYMPTOMS
VOMITING: 0
COUGH: 0
NAUSEA: 0
SHORTNESS OF BREATH: 0
HYPERTENSION: 1
FEVER: 0
CHILLS: 0
BACK PAIN: 1

## 2019-11-20 NOTE — PROGRESS NOTES
"Subjective:      Kevin Paredes is a 79 y.o. male who presents with Chronic Kidney Disease and Hypertension            Chronic Kidney Disease   This is a chronic problem. The current episode started more than 1 year ago. The problem occurs constantly. The problem has been unchanged. Pertinent negatives include no chest pain, chills, coughing, fever, nausea, urinary symptoms or vomiting.   Hypertension   This is a chronic problem. The current episode started more than 1 year ago. The problem is unchanged. The problem is controlled. Pertinent negatives include no chest pain, malaise/fatigue, peripheral edema or shortness of breath. Agents associated with hypertension include thyroid hormones. Risk factors for coronary artery disease include male gender and diabetes mellitus. Past treatments include beta blockers and angiotensin blockers. The current treatment provides significant improvement. There are no compliance problems.  Hypertensive end-organ damage includes kidney disease. Identifiable causes of hypertension include chronic renal disease.       Review of Systems   Constitutional: Negative for chills, fever and malaise/fatigue.   Respiratory: Negative for cough and shortness of breath.    Cardiovascular: Negative for chest pain and leg swelling.   Gastrointestinal: Negative for nausea and vomiting.   Genitourinary: Negative for dysuria, frequency and urgency.   Musculoskeletal: Positive for back pain.          Objective:     /66 (BP Location: Right arm, Patient Position: Sitting, BP Cuff Size: Adult)   Pulse 67   Temp 36.9 °C (98.4 °F) (Temporal)   Resp 16   Ht 1.727 m (5' 8\")   Wt 82.1 kg (181 lb)   SpO2 98%   BMI 27.52 kg/m²      Physical Exam  Vitals signs and nursing note reviewed.   Constitutional:       General: He is not in acute distress.     Appearance: He is not ill-appearing.   HENT:      Head: Normocephalic and atraumatic.      Right Ear: External ear normal.      Left Ear: External ear " normal.      Nose: Nose normal.   Eyes:      General:         Right eye: No discharge.         Left eye: No discharge.      Conjunctiva/sclera: Conjunctivae normal.   Cardiovascular:      Rate and Rhythm: Normal rate and regular rhythm.      Heart sounds: No murmur.   Pulmonary:      Effort: Pulmonary effort is normal. No respiratory distress.      Breath sounds: Normal breath sounds. No wheezing.   Musculoskeletal:         General: No swelling, tenderness or deformity.   Skin:     General: Skin is warm.   Neurological:      General: No focal deficit present.      Mental Status: He is alert and oriented to person, place, and time.   Psychiatric:         Mood and Affect: Mood normal.         Behavior: Behavior normal.       Past Medical History:   Diagnosis Date   • Anesthesia     difficult intubation with surgery 2008,2010   • Basal cell carcinoma of skin    • CAD (coronary artery disease)    • Cancer (HCC)     rt  kidney 1989;  thyroid cancer 2012, prostate 2008, skin   • DVT (deep venous thrombosis) (HCC) 2014   • ED (erectile dysfunction)    • GERD (gastroesophageal reflux disease)    • Glaucoma    • Heart burn    • Hyperlipidemia 12/3/2012   • Hypertension    • Indigestion    • Multiple pulmonary emboli (HCC) 2014   • Multiple thyroid nodules 2009   • Papillary carcinoma of thyroid (HCC) 2014    R 2 foci / 4.5mm,0.25mm / no mets/ pT1pN0   • postsurgical hypothyroidism 2014   • Pre-diabetes    • Renal disorder     rt kidney cancer,nephrectomy   • S/P CABG x 4 2003   • S/P colectomy 2010    multiple colon polyps / no Ca   • S/p nephrectomy 1998    carcinoma   • S/P prostatectomy 2008    carcinoma   • Stroke (HCC)     'mild',no residual,'one doctor said it was a tia'   • Transient renal failure 2014    renal vein thrombosis   • Type II or unspecified type diabetes mellitus without mention of complication, not stated as uncontrolled     on no medications   • Unspecified urinary incontinence      Social History      Socioeconomic History   • Marital status:      Spouse name: Not on file   • Number of children: Not on file   • Years of education: Not on file   • Highest education level: Not on file   Occupational History   • Not on file   Social Needs   • Financial resource strain: Not on file   • Food insecurity:     Worry: Not on file     Inability: Not on file   • Transportation needs:     Medical: Not on file     Non-medical: Not on file   Tobacco Use   • Smoking status: Never Smoker   • Smokeless tobacco: Never Used   Substance and Sexual Activity   • Alcohol use: Yes     Comment: 2 PER WK   • Drug use: No   • Sexual activity: Not on file     Comment: retired    Lifestyle   • Physical activity:     Days per week: Not on file     Minutes per session: Not on file   • Stress: Not on file   Relationships   • Social connections:     Talks on phone: Not on file     Gets together: Not on file     Attends Baptism service: Not on file     Active member of club or organization: Not on file     Attends meetings of clubs or organizations: Not on file     Relationship status: Not on file   • Intimate partner violence:     Fear of current or ex partner: Not on file     Emotionally abused: Not on file     Physically abused: Not on file     Forced sexual activity: Not on file   Other Topics Concern   • Not on file   Social History Narrative   • Not on file     Family History   Problem Relation Age of Onset   • Diabetes Mother    • Heart Disease Mother    • Diabetes Father    • Cancer Father         pancreas   • Thyroid Sister         Cancer   • Cancer Sister         thyroid   • Diabetes Sister    • Cancer Brother 49        colon   • Diabetes Brother    • Diabetes Maternal Grandfather    • Diabetes Paternal Grandmother    • Diabetes Paternal Grandfather      Recent Labs     01/09/19  0717  04/12/19  0733 07/15/19  0719 10/15/19  0711 10/15/19  0716   ALBUMIN 4.0   < > 4.0 4.0 4.1  --    HDL 29*  --  27* 32*  --    --    TRIGLYCERIDE 351*  --  366* 185*  --   --    SODIUM 140  139   < > 142 141 143 143   POTASSIUM 4.5  4.7   < > 4.8 4.7 4.6 4.6   CHLORIDE 106  106   < > 108 107 108 107   CO2 28  28   < > 28 26 28 27   BUN 34*  34*   < > 27* 34* 32* 33*   CREATININE 2.37*  2.34*   < > 2.00* 2.17* 2.01* 1.97*    < > = values in this interval not displayed.               Assessment/Plan:       1. CKD (chronic kidney disease) stage 3, GFR 30-59 ml/min ([facility])  Stable  No uremic symptoms  Renal dose of medication  Avoid nephrotoxins  Continue same medication regimen      2. Essential hypertension  Controlled  Continue same medication regimen  Continue low-sodium diet      3. Type 2 diabetes mellitus with microalbuminuria, without long-term current use of insulin ([facility])  Better controlled    4. Osteoarthritis, unspecified osteoarthritis type, unspecified site  Avoid nephrotoxins like NSAIDs  - REFERRAL TO NEUROSURGERY

## 2019-12-06 DIAGNOSIS — I10 ESSENTIAL HYPERTENSION: ICD-10-CM

## 2019-12-09 RX ORDER — LOSARTAN POTASSIUM 50 MG/1
TABLET ORAL
Qty: 90 TAB | Refills: 0 | Status: SHIPPED | OUTPATIENT
Start: 2019-12-09 | End: 2020-10-27 | Stop reason: SDUPTHER

## 2019-12-18 ENCOUNTER — HOSPITAL ENCOUNTER (OUTPATIENT)
Dept: RADIOLOGY | Facility: MEDICAL CENTER | Age: 79
End: 2019-12-18
Attending: NEUROLOGICAL SURGERY
Payer: MEDICARE

## 2019-12-18 DIAGNOSIS — M54.50 LOW BACK PAIN, UNSPECIFIED BACK PAIN LATERALITY, UNSPECIFIED CHRONICITY, UNSPECIFIED WHETHER SCIATICA PRESENT: ICD-10-CM

## 2019-12-18 PROCEDURE — 72110 X-RAY EXAM L-2 SPINE 4/>VWS: CPT

## 2020-01-02 RX ORDER — LOSARTAN POTASSIUM 50 MG/1
50 TABLET ORAL DAILY
Qty: 90 TAB | Refills: 0 | Status: SHIPPED | OUTPATIENT
Start: 2020-01-02 | End: 2020-03-10 | Stop reason: SDUPTHER

## 2020-01-14 ENCOUNTER — HOSPITAL ENCOUNTER (OUTPATIENT)
Dept: LAB | Facility: MEDICAL CENTER | Age: 80
End: 2020-01-14
Attending: INTERNAL MEDICINE
Payer: MEDICARE

## 2020-01-14 DIAGNOSIS — Z85.850 HISTORY OF THYROID CANCER: ICD-10-CM

## 2020-01-14 DIAGNOSIS — N18.30 CKD (CHRONIC KIDNEY DISEASE) STAGE 3, GFR 30-59 ML/MIN: ICD-10-CM

## 2020-01-14 DIAGNOSIS — I10 ESSENTIAL HYPERTENSION: ICD-10-CM

## 2020-01-14 DIAGNOSIS — D69.6 THROMBOCYTOPENIA (HCC): ICD-10-CM

## 2020-01-14 DIAGNOSIS — E89.0 HYPOTHYROIDISM, POSTSURGICAL: ICD-10-CM

## 2020-01-14 LAB
ANION GAP SERPL CALC-SCNC: 5 MMOL/L (ref 0–11.9)
BASOPHILS # BLD AUTO: 0.8 % (ref 0–1.8)
BASOPHILS # BLD: 0.04 K/UL (ref 0–0.12)
BUN SERPL-MCNC: 33 MG/DL (ref 8–22)
CALCIUM SERPL-MCNC: 10 MG/DL (ref 8.5–10.5)
CHLORIDE SERPL-SCNC: 107 MMOL/L (ref 96–112)
CO2 SERPL-SCNC: 29 MMOL/L (ref 20–33)
CREAT SERPL-MCNC: 2.13 MG/DL (ref 0.5–1.4)
CREAT UR-MCNC: 144.1 MG/DL
EOSINOPHIL # BLD AUTO: 0.22 K/UL (ref 0–0.51)
EOSINOPHIL NFR BLD: 4.3 % (ref 0–6.9)
ERYTHROCYTE [DISTWIDTH] IN BLOOD BY AUTOMATED COUNT: 48.4 FL (ref 35.9–50)
GLUCOSE SERPL-MCNC: 146 MG/DL (ref 65–99)
HCT VFR BLD AUTO: 49.2 % (ref 42–52)
HGB BLD-MCNC: 16 G/DL (ref 14–18)
IMM GRANULOCYTES # BLD AUTO: 0.02 K/UL (ref 0–0.11)
IMM GRANULOCYTES NFR BLD AUTO: 0.4 % (ref 0–0.9)
LYMPHOCYTES # BLD AUTO: 1.57 K/UL (ref 1–4.8)
LYMPHOCYTES NFR BLD: 30.6 % (ref 22–41)
MCH RBC QN AUTO: 32.4 PG (ref 27–33)
MCHC RBC AUTO-ENTMCNC: 32.5 G/DL (ref 33.7–35.3)
MCV RBC AUTO: 99.6 FL (ref 81.4–97.8)
MICROALBUMIN UR-MCNC: 22.1 MG/DL
MICROALBUMIN/CREAT UR: 153 MG/G (ref 0–30)
MONOCYTES # BLD AUTO: 0.34 K/UL (ref 0–0.85)
MONOCYTES NFR BLD AUTO: 6.6 % (ref 0–13.4)
NEUTROPHILS # BLD AUTO: 2.94 K/UL (ref 1.82–7.42)
NEUTROPHILS NFR BLD: 57.3 % (ref 44–72)
NRBC # BLD AUTO: 0 K/UL
NRBC BLD-RTO: 0 /100 WBC
PLATELET # BLD AUTO: 111 K/UL (ref 164–446)
PMV BLD AUTO: 11.3 FL (ref 9–12.9)
POTASSIUM SERPL-SCNC: 4.7 MMOL/L (ref 3.6–5.5)
RBC # BLD AUTO: 4.94 M/UL (ref 4.7–6.1)
SODIUM SERPL-SCNC: 141 MMOL/L (ref 135–145)
WBC # BLD AUTO: 5.1 K/UL (ref 4.8–10.8)

## 2020-01-14 PROCEDURE — 85025 COMPLETE CBC W/AUTO DIFF WBC: CPT | Mod: 91

## 2020-01-14 PROCEDURE — 82043 UR ALBUMIN QUANTITATIVE: CPT

## 2020-01-14 PROCEDURE — 85025 COMPLETE CBC W/AUTO DIFF WBC: CPT

## 2020-01-14 PROCEDURE — 80048 BASIC METABOLIC PNL TOTAL CA: CPT

## 2020-01-14 PROCEDURE — 84439 ASSAY OF FREE THYROXINE: CPT

## 2020-01-14 PROCEDURE — 82570 ASSAY OF URINE CREATININE: CPT

## 2020-01-14 PROCEDURE — 36415 COLL VENOUS BLD VENIPUNCTURE: CPT

## 2020-01-14 PROCEDURE — 84443 ASSAY THYROID STIM HORMONE: CPT

## 2020-01-16 ENCOUNTER — OFFICE VISIT (OUTPATIENT)
Dept: MEDICAL GROUP | Facility: MEDICAL CENTER | Age: 80
End: 2020-01-16
Payer: MEDICARE

## 2020-01-16 VITALS
SYSTOLIC BLOOD PRESSURE: 107 MMHG | HEIGHT: 67 IN | DIASTOLIC BLOOD PRESSURE: 58 MMHG | BODY MASS INDEX: 28.03 KG/M2 | OXYGEN SATURATION: 95 % | WEIGHT: 178.57 LBS | TEMPERATURE: 98.7 F | RESPIRATION RATE: 12 BRPM | HEART RATE: 76 BPM

## 2020-01-16 DIAGNOSIS — E89.0 HYPOTHYROIDISM, POSTSURGICAL: ICD-10-CM

## 2020-01-16 DIAGNOSIS — Z00.00 HEALTH CARE MAINTENANCE: ICD-10-CM

## 2020-01-16 DIAGNOSIS — Z23 NEED FOR HEPATITIS B VACCINATION: ICD-10-CM

## 2020-01-16 DIAGNOSIS — Z86.718 HISTORY OF DVT IN ADULTHOOD: ICD-10-CM

## 2020-01-16 DIAGNOSIS — Z95.828 PRESENCE OF IVC FILTER: ICD-10-CM

## 2020-01-16 DIAGNOSIS — Z86.711 HISTORY OF PULMONARY EMBOLUS (PE): ICD-10-CM

## 2020-01-16 DIAGNOSIS — Z86.73 HX-TIA (TRANSIENT ISCHEMIC ATTACK): ICD-10-CM

## 2020-01-16 DIAGNOSIS — Z79.01 CHRONIC ANTICOAGULATION: ICD-10-CM

## 2020-01-16 DIAGNOSIS — N18.4 CKD (CHRONIC KIDNEY DISEASE), STAGE IV (HCC): ICD-10-CM

## 2020-01-16 DIAGNOSIS — D75.89 MACROCYTOSIS: ICD-10-CM

## 2020-01-16 DIAGNOSIS — I25.10 CORONARY ARTERY DISEASE INVOLVING NATIVE CORONARY ARTERY OF NATIVE HEART WITHOUT ANGINA PECTORIS: ICD-10-CM

## 2020-01-16 DIAGNOSIS — E89.0 S/P TOTAL THYROIDECTOMY: ICD-10-CM

## 2020-01-16 DIAGNOSIS — E55.9 HYPOVITAMINOSIS D: ICD-10-CM

## 2020-01-16 DIAGNOSIS — E11.29 MICROALBUMINURIA DUE TO TYPE 2 DIABETES MELLITUS (HCC): ICD-10-CM

## 2020-01-16 DIAGNOSIS — Z90.5 SINGLE KIDNEY: ICD-10-CM

## 2020-01-16 DIAGNOSIS — Z85.850 HISTORY OF THYROID CANCER: ICD-10-CM

## 2020-01-16 DIAGNOSIS — E11.8 CONTROLLED TYPE 2 DIABETES MELLITUS WITH COMPLICATION, WITHOUT LONG-TERM CURRENT USE OF INSULIN (HCC): ICD-10-CM

## 2020-01-16 DIAGNOSIS — D69.6 THROMBOCYTOPENIA (HCC): ICD-10-CM

## 2020-01-16 DIAGNOSIS — I10 ESSENTIAL HYPERTENSION: ICD-10-CM

## 2020-01-16 DIAGNOSIS — Z95.1 S/P CABG X 4: ICD-10-CM

## 2020-01-16 DIAGNOSIS — E78.5 DYSLIPIDEMIA: ICD-10-CM

## 2020-01-16 DIAGNOSIS — R80.9 MICROALBUMINURIA DUE TO TYPE 2 DIABETES MELLITUS (HCC): ICD-10-CM

## 2020-01-16 PROBLEM — G45.9 TRANSIENT ISCHEMIC ATTACK: Status: RESOLVED | Noted: 2018-02-12 | Resolved: 2020-01-16

## 2020-01-16 PROCEDURE — G0010 ADMIN HEPATITIS B VACCINE: HCPCS | Performed by: INTERNAL MEDICINE

## 2020-01-16 PROCEDURE — 90746 HEPB VACCINE 3 DOSE ADULT IM: CPT | Performed by: INTERNAL MEDICINE

## 2020-01-16 PROCEDURE — 99214 OFFICE O/P EST MOD 30 MIN: CPT | Mod: 25 | Performed by: INTERNAL MEDICINE

## 2020-01-16 RX ORDER — ATORVASTATIN CALCIUM 10 MG/1
10 TABLET, FILM COATED ORAL DAILY
Qty: 100 TAB | Refills: 1 | Status: SHIPPED | OUTPATIENT
Start: 2020-01-16 | End: 2020-08-13

## 2020-01-16 RX ORDER — FENOFIBRATE 160 MG/1
TABLET ORAL
Qty: 90 TAB | Refills: 0 | OUTPATIENT
Start: 2020-01-16

## 2020-01-16 ASSESSMENT — PATIENT HEALTH QUESTIONNAIRE - PHQ9: CLINICAL INTERPRETATION OF PHQ2 SCORE: 0

## 2020-01-16 NOTE — PROGRESS NOTES
CHIEF COMPLAINT  Chief Complaint   Patient presents with   • Follow-Up   DM, labs    HPI  Paulo Paredes is a 79 y.o. male who presents today for the following     DM 2, with microalbuminuria  Onset/D  Diabetes education:  unknown     Medications:   • Trulicity 0.75 mg weekly  • ACE/ARB: losartan  • Statin: atorvastatin 10 mg QD  • ASA: No, on coumadin     Checking feet daily/wear soft socks/shoes: advised     Diabetes ABCDE TARGETS  • A1c, last:  6.8, previous 8.8  • Fingersticks:  N  • Blood Pressure: < 140/90  • Cholesterol-Lipid Panel: elevated triglycerides, low HDL  • Dysalbuminuria:  microalbumin pos     Diet: regular  Exercise:  Walk daily  BMI:  27     DM complications:  • Peripheral neuropathy:           No numbness or tingling in feet.  • Retinopathy:                    Last eye exam: . No retinopathy.    • Nephropathy:                         CKD stage III-IV, microalbuminuria  • CVS:                                 Has CAD, on rx.   • GI:                                       No gastropathy.     FH of DM: mother     CKD stage IV, single kidney  The patient had decreased GFR with normal creatinine and electrolytes.   No consistent NSAIDs use.   He has been followed up by nephrology.     Hypertension/CAD, st post CABG x 4  St [facility] CABG [facility] in  at [facility]  Meds: Valsartan 160 mg daily, atenolol 25 mg daily; no ASA, on coumadin.   Taking meds as prescribed.   He is measuring BP at home, it has been < 140/90  Denies:  -  headaches, vision problems, tinnitus.                 -  chest pain/pressure, palpitations, irregular heart beats, exertional, dyspnea, peripheral edema.  Low salt diet: Y  Diet / exercise / BMI: as above.   FH of HTN: unknown    Dyslipidemia  The patient is on atorvastatin 10 mg, taking daily, as prescribed. No myalgias, muscle cramps or pain.   Diet /Exercise/BMI:  As above  FH:     H/o thyroid cancer  Hypothyroidism, postsurgical  Dg: in .   Status post  total thyroidectomy.   On Levothyroxine, 137 mcg, taking daily early in am, before any po intake.  No temperature intolerance. No change in weight,  hair/skin quality, BMs.   No tremors, weakness.  No peripheral swelling.  No mood changes.  FH: N  Review TSH.  He has been followed up by endocrinology     H/o TIA  (H/o DVT/PE)  Chronic anticoagulation/IVC filter  Dg/Onset: 5/2014.   Since, he has been on Coumadin, coumadin clinic f/u.   Denies:   - Leg swelling or pain  - Shortness of breath, palpitations.     Thrombocytopenia, macrocytosis  The patient has had borderline thrombocytopenia, stable.   Denies easy bleeding or bruising.   Reviewed medication list.     Hypovitaminosis D  The patient had low vitamin D level.  Vitamin D supplement:      Reviewed PMH, PSH, FH, SH, ALL, HCM/IMM, no changes  Reviewed MEDS, no changes    Patient Active Problem List    Diagnosis Date Noted   • Chronic anticoagulation 08/01/2014     Priority: High   • Presence of IVC filter 06/02/2014     Priority: High   • Essential hypertension 12/03/2012     Priority: Medium   • S/P CABG x 4 12/03/2012     Priority: Medium   • History of thyroid cancer 07/16/2019   • Microalbuminuria due to type 2 diabetes mellitus (HCC) 07/16/2019   • History of pulmonary embolus (PE) 07/16/2019   • History of DVT in adulthood 07/16/2019   • Zoster 01/23/2019   • Dyslipidemia 03/19/2018   • Transient ischemic attack [G45.9] 02/12/2018   • Health care maintenance 02/12/2018   • Hypothyroidism, postsurgical 08/03/2015   • Pulmonary embolism (HCC) 06/09/2014   • S/P total thyroidectomy 06/02/2014   • CAD (coronary artery disease) 01/22/2014   • S/P partial resection of colon 07/09/2013   • Glaucoma 07/09/2013   • GERD (gastroesophageal reflux disease) 12/03/2012   • ED (erectile dysfunction) 12/03/2012   • Controlled type 2 diabetes mellitus with complication, without long-term current use of insulin (HCC) 12/03/2012   • Colon polyp 12/03/2012   • Single kidney  12/03/2012   • H/O prostate cancer 12/03/2012     CURRENT MEDICATIONS  Current Outpatient Medications   Medication Sig Dispense Refill   • losartan (COZAAR) 50 MG Tab Take 1 Tab by mouth every day. 90 Tab 0   • Dulaglutide (TRULICITY) 0.75 MG/0.5ML Solution Pen-injector Inject 0.75 mg as instructed every 7 days. 2 PEN 0   • losartan (COZAAR) 50 MG Tab TAKE 1 TABLET BY MOUTH ONCE DAILY 90 Tab 0   • atenolol (TENORMIN) 25 MG Tab TAKE 1 TABLET BY MOUTH ONCE DAILY 90 Tab 3   • atorvastatin (LIPITOR) 10 MG Tab Take 1 Tab by mouth every day. 100 Tab 1   • warfarin (COUMADIN) 5 MG Tab Take 1 Tab by mouth every evening. 120 Tab 3   • fenofibrate (TRIGLIDE) 160 MG tablet Take 160 mg by mouth every day.     • Calcium Carb-Cholecalciferol (CALCIUM 1000 + D PO) Take 1 Dose by mouth every day.     • levothyroxine (SYNTHROID) 137 MCG Tab Take 137 mcg by mouth every day.     • aspirin 81 MG tablet Take 81 mg by mouth every evening.     • Omega-3 Krill Oil 300 MG Cap Take 300 mg by mouth every day.     • Cinnamon 500 MG Cap Take 1,000 mg by mouth.     • Cyanocobalamin (VITAMIN B-12) 1000 MCG Tab Take 1,000 mcg by mouth every day.     • Coenzyme Q10 (CO Q-10 PO) Take 1 Dose by mouth every day. Unknown OTC Strength       No current facility-administered medications for this visit.      ALLERGIES  Allergies: Lactose  PAST MEDICAL HISTORY  Past Medical History:   Diagnosis Date   • postsurgical hypothyroidism 2014   • Papillary carcinoma of thyroid (HCC) 2014    R 2 foci / 4.5mm,0.25mm / no mets/ pT1pN0   • DVT (deep venous thrombosis) (HCC) 2014   • Multiple pulmonary emboli (HCC) 2014   • Transient renal failure 2014    renal vein thrombosis   • Hyperlipidemia 12/3/2012   • S/P colectomy 2010    multiple colon polyps / no Ca   • Multiple thyroid nodules 2009   • S/P prostatectomy 2008    carcinoma   • S/P CABG x 4 2003   • S/p nephrectomy 1998    carcinoma   • Anesthesia     difficult intubation with surgery 2008,2010   • Basal cell  carcinoma of skin    • CAD (coronary artery disease)    • Cancer (HCC)     rt  kidney 1989;  thyroid cancer 2012, prostate 2008, skin   • ED (erectile dysfunction)    • GERD (gastroesophageal reflux disease)    • Glaucoma    • Heart burn    • Hypertension    • Indigestion    • Pre-diabetes    • Renal disorder     rt kidney cancer,nephrectomy   • Stroke (HCC)     'mild',no residual,'one doctor said it was a tia'   • Type II or unspecified type diabetes mellitus without mention of complication, not stated as uncontrolled     on no medications   • Unspecified urinary incontinence      SURGICAL HISTORY  He  has a past surgical history that includes colon resection; nephrectomy radical; multiple coronary artery bypass; prostatectomy, radical retro; other orthopedic surgery; thyroidectomy total (5/13/2014); and node dissection (5/13/2014).  SOCIAL HISTORY  Social History     Tobacco Use   • Smoking status: Never Smoker   • Smokeless tobacco: Never Used   Substance Use Topics   • Alcohol use: Yes     Comment: 2 PER WK   • Drug use: No     Social History     Patient does not qualify to have social determinant information on file (likely too young).   Social History Narrative   • Not on file     FAMILY HISTORY  Family History   Problem Relation Age of Onset   • Diabetes Mother    • Heart Disease Mother    • Diabetes Father    • Cancer Father         pancreas   • Thyroid Sister         Cancer   • Cancer Sister         thyroid   • Diabetes Sister    • Cancer Brother 49        colon   • Diabetes Brother    • Diabetes Maternal Grandfather    • Diabetes Paternal Grandmother    • Diabetes Paternal Grandfather      Family Status   Relation Name Status   • Mo  (Not Specified)   • Fa  (Not Specified)   • Sis  (Not Specified)   • Bro  (Not Specified)   • MGFa  (Not Specified)   • PGMo  (Not Specified)   • PGFa  (Not Specified)     ROS   Constitutional: Negative for fever, chills, fatigue.  HENT: Negative for congestion, sore  "throat.  Eyes: Negative for vision problems.   Respiratory: Negative for cough, shortness of breath.  Cardiovascular: Negative for chest pain, palpitations.   Gastrointestinal: Negative for heartburn, nausea, abdominal pain.   Genitourinary: Negative for dysuria.  Musculoskeletal: Negative for significant myalgia, back and joint pain.   Skin: Negative for rash.   Neuro: Negative for dizziness, weakness and headaches.   Endo/Heme/Allergies: Does not bruise/bleed easily.   Psychiatric/Behavioral: Negative for depression.    PHYSICAL EXAM   Blood Pressure 107/58   Pulse 76   Temperature 37.1 °C (98.7 °F)   Respiration 12   Height 1.702 m (5' 7\")   Weight 81 kg (178 lb 9.2 oz)   Oxygen Saturation 95%  Body mass index is 27.97 kg/m².  General:  NAD, well appearing  HEENT:   NC/AT, PERRLA, EOMI, TMs are clear. Oropharyngeal mucosa is pink,  without lesions;  no cervical / supraclavicular  lymphadenopathy, no thyromegaly.    Cardiovascular: RRR.   No m/r/g.       Lungs:   CTAB, no w/r/r, no respiratory distress.  Abdomen: Soft, NT/ND; no hepatosplenomegaly.  Extremities:  2+ DP and radial pulses bilaterally.  No c/c/e.   Skin:  Warm, dry.  No erythema. No rash.   Neurologic: Alert & oriented x 3. CN II-XII grossly intact. No focal deficits.  Psychiatric:  Affect normal, mood normal, judgment normal.    Labs     Labs are reviewed and discussed with a patient  Lab Results   Component Value Date/Time    CHOLSTRLTOT 169 07/15/2019 07:19 AM     (H) 07/15/2019 07:19 AM    HDL 32 (A) 07/15/2019 07:19 AM    TRIGLYCERIDE 185 (H) 07/15/2019 07:19 AM       Lab Results   Component Value Date/Time    SODIUM 141 01/14/2020 07:06 AM    POTASSIUM 4.7 01/14/2020 07:06 AM    CHLORIDE 107 01/14/2020 07:06 AM    CO2 29 01/14/2020 07:06 AM    GLUCOSE 146 (H) 01/14/2020 07:06 AM    BUN 33 (H) 01/14/2020 07:06 AM    CREATININE 2.13 (H) 01/14/2020 07:06 AM     Lab Results   Component Value Date/Time    ALKPHOSPHAT 30 10/15/2019 07:11 " AM    ASTSGOT 21 10/15/2019 07:11 AM    ALTSGPT 18 10/15/2019 07:11 AM    TBILIRUBIN 0.6 10/15/2019 07:11 AM      Lab Results   Component Value Date/Time    HBA1C 6.9 (H) 10/15/2019 07:11 AM    HBA1C 6.8 (H) 07/15/2019 07:19 AM    HBA1C 8.8 (H) 04/12/2019 07:33 AM     No results found for: TSH  Lab Results   Component Value Date/Time    FREET4 0.90 04/12/2019 07:30 AM    FREET4 1.10 01/26/2016 09:00 AM       Lab Results   Component Value Date/Time    WBC 5.1 01/14/2020 07:06 AM    RBC 4.94 01/14/2020 07:06 AM    HEMOGLOBIN 16.0 01/14/2020 07:06 AM    HEMATOCRIT 49.2 01/14/2020 07:06 AM    MCV 99.6 (H) 01/14/2020 07:06 AM    MCH 32.4 01/14/2020 07:06 AM    MCHC 32.5 (L) 01/14/2020 07:06 AM    MPV 11.3 01/14/2020 07:06 AM    NEUTSPOLYS 57.30 01/14/2020 07:06 AM    LYMPHOCYTES 30.60 01/14/2020 07:06 AM    MONOCYTES 6.60 01/14/2020 07:06 AM    EOSINOPHILS 4.30 01/14/2020 07:06 AM    BASOPHILS 0.80 01/14/2020 07:06 AM        Imaging     None    Assessment and Plan     Paulo Paredes is a 79 y.o. male    1. Controlled type 2 diabetes mellitus with complication, without long-term current use of insulin (HCC)  Controlled, continue current treatment  - Comp Metabolic Panel; Future  - HEMOGLOBIN A1C; Future  - MICROALBUMIN CREAT RATIO URINE; Future    2. Microalbuminuria due to type 2 diabetes mellitus (HCC)  As above    3. CKD (chronic kidney disease), stage IV (HCC)  Stable, advised to continue good fluid intake, avoid NSAIDs, follow-up by nephrology  - Comp Metabolic Panel; Future  4. Single kidney  As above    5. Essential hypertension  Controlled cardiovascular conditions, continue current treatment and cardiology follow-up  6. Coronary artery disease involving native coronary artery of native heart without angina pectoris  7. S/P CABG x 4  As above    8. Dyslipidemia  Controlled, continue current treatment  - Comp Metabolic Panel; Future  - Lipid Profile; Future  - atorvastatin (LIPITOR) 10 MG Tab; Take 1 Tab by mouth  every day.  Dispense: 100 Tab; Refill: 1    9. History of thyroid cancer  No recurrence, thyroid function controlled, continue current treatment  10. S/P total thyroidectomy  - TSH; Future  - FREE THYROXINE; Future  11. Hypothyroidism, postsurgical  - TSH; Future  - FREE THYROXINE; Future    12. Hx-TIA (transient ischemic attack)  13. History of DVT in adulthood  14. History of pulmonary embolus (PE)  15. Chronic anticoagulation  16. Presence of IVC filter  On chronic anticoagulation no VTE recurrence; continue current treatment and Coumadin clinic follow-up    17. Thrombocytopenia (HCC)  Stable, follow-up labs  - CBC WITH DIFFERENTIAL; Future    18. Macrocytosis  Stable, follow-up labs  - CBC WITH DIFFERENTIAL; Future  - CBC WITH DIFFERENTIAL; Future  - VIT B12,  FOLIC ACID    19. Hypovitaminosis D  Pending labs, advised 2000 units of vitamin D daily, OTC  - VITAMIN D,25 HYDROXY; Future    20. Health care maintenance  21. Need for hepatitis B vaccination  Information was provided to the patient regarding the vaccine, including side effects. Vaccine was given by my medical assistant under my supervision.  - Hep B Adult 20+    Counseling:   - Smoking:  Nonsmoker    Followup: in 4 months and prn    All questions are answered.    Please note that this dictation was created using voice recognition software, and that there might be errors of venkata and possibly content.

## 2020-01-27 ENCOUNTER — ANTICOAGULATION VISIT (OUTPATIENT)
Dept: MEDICAL GROUP | Facility: MEDICAL CENTER | Age: 80
End: 2020-01-27
Payer: MEDICARE

## 2020-01-27 DIAGNOSIS — Z95.828 PRESENCE OF IVC FILTER: ICD-10-CM

## 2020-01-27 DIAGNOSIS — Z79.01 CHRONIC ANTICOAGULATION: ICD-10-CM

## 2020-01-27 LAB — INR PPP: 3 (ref 2–3.5)

## 2020-01-27 PROCEDURE — 85610 PROTHROMBIN TIME: CPT | Performed by: INTERNAL MEDICINE

## 2020-01-27 PROCEDURE — 93793 ANTICOAG MGMT PT WARFARIN: CPT | Performed by: INTERNAL MEDICINE

## 2020-01-27 NOTE — PROGRESS NOTES
OP Anticoagulation Service Note    Date: 1/27/2020  There were no vitals filed for this visit.   pt declined vitals    Anticoagulation Summary  As of 1/27/2020    INR goal:   2.0-3.0   TTR:   87.8 % (1.9 y)   INR used for dosing:   3.00 (1/27/2020)   Warfarin maintenance plan:   5 mg (5 mg x 1) every day   Weekly warfarin total:   35 mg   Plan last modified:   Marta Quintana, PharmD (7/10/2018)   Next INR check:   4/20/2020   Target end date:   Indefinite    Indications    Presence of IVC filter [Z95.828]  Transient ischemic attack [G45.9] (Resolved) [G45.9]  Deep vein thrombosis (DVT) of both lower extremities (HCC) (Resolved) [I82.403]  DVT (deep venous thrombosis) (HCC) (Resolved) [I82.409]  Multiple pulmonary emboli (HCC) (Resolved) [I26.99]  Chronic anticoagulation [Z79.01]             Anticoagulation Episode Summary     INR check location:       Preferred lab:       Send INR reminders to:       Comments:         Anticoagulation Care Providers     Provider Role Specialty Phone number    [facility]   [phone]    Edward Walters M.D.  Family Medicine [phone]        Anticoagulation Patient Findings      HPI:   Paulo Paredes seen in clinic today, on anticoagulation therapy with warfarin (a high risk medication) for hx of DVT and TIA      Pt is here today to evaluate anticoagulation therapy    Previous INR was  2.5 on 11-4-2019    Pt was instructed to continue current regimen    Confirmed warfarin dosing regimen, denies missed or extra doses of coumadin.   Diet has been consistent with foods rich in vitamin K: Yes  Changes in ETOH:  Yes- had a few extra drinkgs this week.   Changes in smoking status: No  Changes in medication: No   Cost restriction: No  S/s of bleeding:  No  Falls or accidents since last visit No  Signs/symptoms  thrombosis since the last appt: No    A/P   INR  -therapeutic today  Continue current warfarin regimen          2-20 check referral    Pt educated to  contact our clinic with any changes in medications or s/s of bleeding or thrombosis. Pt is aware to seek immediate medical attention for falls, head injury or deep cuts    Follow up appointment in 12 week(s) to reduce risk of adverse events from warfarin  Kelsey Junior, PharmD

## 2020-01-29 ENCOUNTER — TELEPHONE (OUTPATIENT)
Dept: CARDIOLOGY | Facility: MEDICAL CENTER | Age: 80
End: 2020-01-29

## 2020-01-29 NOTE — TELEPHONE ENCOUNTER
"Received office notes from [facility].  Plan is to do a right greater trochanteric bursa injection.    Per SC:  \"Pt has CAD s/p CABG; Hx of PE with IVC filter.  Coumadin not per cardiology - managed by PCP or pulmonary?  Ok to proceed from cardiology standpoint\".    Office notes from [facility] placed in basket for scanning.  "

## 2020-02-11 RX ORDER — LEVOTHYROXINE SODIUM 137 UG/1
TABLET ORAL
Qty: 90 TAB | Refills: 0 | Status: SHIPPED | OUTPATIENT
Start: 2020-02-11 | End: 2020-05-11

## 2020-02-12 RX ORDER — FENOFIBRATE 160 MG/1
TABLET ORAL
Qty: 90 TAB | Refills: 0 | Status: SHIPPED | OUTPATIENT
Start: 2020-02-12 | End: 2020-05-11

## 2020-03-10 RX ORDER — LOSARTAN POTASSIUM 50 MG/1
50 TABLET ORAL DAILY
Qty: 100 TAB | Refills: 0 | Status: SHIPPED | OUTPATIENT
Start: 2020-03-10 | End: 2020-06-29

## 2020-03-10 NOTE — TELEPHONE ENCOUNTER
Received request via: Pharmacy    Was the patient seen in the last year in this department? Yes    Does the patient have an active prescription (recently filled or refills available) for medication(s) requested? Per pharamacy; needs a 100-day supply please

## 2020-03-30 ENCOUNTER — ANTICOAGULATION MONITORING (OUTPATIENT)
Dept: MEDICAL GROUP | Facility: MEDICAL CENTER | Age: 80
End: 2020-03-30

## 2020-03-30 ENCOUNTER — OFFICE VISIT (OUTPATIENT)
Dept: URGENT CARE | Facility: CLINIC | Age: 80
End: 2020-03-30
Payer: MEDICARE

## 2020-03-30 ENCOUNTER — ANTICOAGULATION MONITORING (OUTPATIENT)
Dept: VASCULAR LAB | Facility: MEDICAL CENTER | Age: 80
End: 2020-03-30

## 2020-03-30 ENCOUNTER — HOSPITAL ENCOUNTER (OUTPATIENT)
Facility: MEDICAL CENTER | Age: 80
End: 2020-03-30
Attending: FAMILY MEDICINE
Payer: MEDICARE

## 2020-03-30 ENCOUNTER — HOSPITAL ENCOUNTER (OUTPATIENT)
Dept: LAB | Facility: MEDICAL CENTER | Age: 80
End: 2020-03-30
Attending: FAMILY MEDICINE
Payer: MEDICARE

## 2020-03-30 VITALS
WEIGHT: 175 LBS | TEMPERATURE: 97.9 F | HEIGHT: 68 IN | HEART RATE: 62 BPM | OXYGEN SATURATION: 99 % | BODY MASS INDEX: 26.52 KG/M2 | RESPIRATION RATE: 20 BRPM | DIASTOLIC BLOOD PRESSURE: 58 MMHG | SYSTOLIC BLOOD PRESSURE: 122 MMHG

## 2020-03-30 DIAGNOSIS — Z95.828 PRESENCE OF IVC FILTER: ICD-10-CM

## 2020-03-30 DIAGNOSIS — R31.9 HEMATURIA, UNSPECIFIED TYPE: ICD-10-CM

## 2020-03-30 DIAGNOSIS — Z79.01 CHRONIC ANTICOAGULATION: ICD-10-CM

## 2020-03-30 LAB
ALBUMIN SERPL BCP-MCNC: 4.3 G/DL (ref 3.2–4.9)
ALBUMIN/GLOB SERPL: 1.7 G/DL
ALP SERPL-CCNC: 38 U/L (ref 30–99)
ALT SERPL-CCNC: 13 U/L (ref 2–50)
ANION GAP SERPL CALC-SCNC: 11 MMOL/L (ref 7–16)
APPEARANCE UR: CLEAR
AST SERPL-CCNC: 19 U/L (ref 12–45)
BILIRUB SERPL-MCNC: 0.7 MG/DL (ref 0.1–1.5)
BILIRUB UR STRIP-MCNC: NORMAL MG/DL
BUN SERPL-MCNC: 30 MG/DL (ref 8–22)
CALCIUM SERPL-MCNC: 9.5 MG/DL (ref 8.4–10.2)
CHLORIDE SERPL-SCNC: 104 MMOL/L (ref 96–112)
CO2 SERPL-SCNC: 25 MMOL/L (ref 20–33)
COLOR UR AUTO: YELLOW
CREAT SERPL-MCNC: 1.97 MG/DL (ref 0.5–1.4)
GLOBULIN SER CALC-MCNC: 2.6 G/DL (ref 1.9–3.5)
GLUCOSE SERPL-MCNC: 174 MG/DL (ref 65–99)
GLUCOSE UR STRIP.AUTO-MCNC: 100 MG/DL
INR PPP: 4.15 (ref 0.87–1.13)
KETONES UR STRIP.AUTO-MCNC: NORMAL MG/DL
LEUKOCYTE ESTERASE UR QL STRIP.AUTO: NORMAL
NITRITE UR QL STRIP.AUTO: NORMAL
PH UR STRIP.AUTO: 5 [PH] (ref 5–8)
POTASSIUM SERPL-SCNC: 4.8 MMOL/L (ref 3.6–5.5)
PROT SERPL-MCNC: 6.9 G/DL (ref 6–8.2)
PROT UR QL STRIP: 30 MG/DL
PROTHROMBIN TIME: 41.5 SEC (ref 12–14.6)
RBC UR QL AUTO: NORMAL
SODIUM SERPL-SCNC: 140 MMOL/L (ref 135–145)
SP GR UR STRIP.AUTO: 1.02
UROBILINOGEN UR STRIP-MCNC: 0.2 MG/DL

## 2020-03-30 PROCEDURE — 87086 URINE CULTURE/COLONY COUNT: CPT

## 2020-03-30 PROCEDURE — 36415 COLL VENOUS BLD VENIPUNCTURE: CPT

## 2020-03-30 PROCEDURE — 80053 COMPREHEN METABOLIC PANEL: CPT

## 2020-03-30 PROCEDURE — 99214 OFFICE O/P EST MOD 30 MIN: CPT | Performed by: FAMILY MEDICINE

## 2020-03-30 PROCEDURE — 85610 PROTHROMBIN TIME: CPT

## 2020-03-30 PROCEDURE — 81002 URINALYSIS NONAUTO W/O SCOPE: CPT | Performed by: FAMILY MEDICINE

## 2020-03-30 ASSESSMENT — FIBROSIS 4 INDEX: FIB4 SCORE: 3.61

## 2020-03-30 NOTE — PROGRESS NOTES
Anticoagulation Summary  As of 3/30/2020    INR goal:   2.0-3.0   TTR:   80.6 % (2.1 y)   INR used for dosin.15! (3/30/2020)   Warfarin maintenance plan:   5 mg (5 mg x 1) every day   Weekly warfarin total:   35 mg   Plan last modified:   Marta Quintana, PharmD (7/10/2018)   Next INR check:   2020   Target end date:   Indefinite    Indications    Presence of IVC filter [Z95.828]  Transient ischemic attack [G45.9] (Resolved) [G45.9]  Deep vein thrombosis (DVT) of both lower extremities (HCC) (Resolved) [I82.403]  DVT (deep venous thrombosis) (HCC) (Resolved) [I82.409]  Multiple pulmonary emboli (HCC) (Resolved) [I26.99]  Chronic anticoagulation [Z79.01]             Anticoagulation Episode Summary     INR check location:       Preferred lab:       Send INR reminders to:       Comments:         Anticoagulation Care Providers     Provider Role Specialty Phone number    [facility]   [phone]    Edward Walters M.D.  Family Medicine [phone]        Anticoagulation Patient Findings      Spoke with pt's wife.  INR is SUPRA therapeutic.   Pt has recently started Trulicity and has noted some weight loss, but appetite pretty normal.   Pt denies any unusual s/s of bleeding, bruising, clotting or any changes to diet or medications. Denies any etoh, cranberries, supplements, or illness.   Pt verifies warfarin weekly dosing.     Will have pt hold the warfarin dose today, tomorrow take a reduced dose of 2.5mg and then resume back to the normal dosing.     Repeat INR in 2 week(s).     Sarah Omalley, PharmD

## 2020-03-30 NOTE — PROGRESS NOTES
"Chief Complaint   Patient presents with   • Blood in Urine     dark urine, stains on blood, worsening  x 3 days               DYSURIA    This is a new problem. The current episode started in the past 3 days.  He c/o gross hematuria and some \"debris\" in urine.    Denies fevers or chills.   He does have remote hx of left sided kidney stone, but denies any back pain.    Denies dysuria or frequency or urgency .    problem occurs constantly. The problem has been unchanged.   States that he does not get frequent UTI's.   He does have some chronic incontinece, which started after radical prostatectomy.      Pertinent negatives include no change in bowel habit, chest pain, chills, visual change, vomiting or weakness. Nothing aggravates the symptoms.  he has tried nothing for the symptoms.          Social History     Socioeconomic History   • Marital status:      Spouse name: Not on file   • Number of children: Not on file   • Years of education: Not on file   • Highest education level: Not on file   Occupational History   • Not on file   Social Needs   • Financial resource strain: Not on file   • Food insecurity     Worry: Not on file     Inability: Not on file   • Transportation needs     Medical: Not on file     Non-medical: Not on file   Tobacco Use   • Smoking status: Never Smoker   • Smokeless tobacco: Never Used   Substance and Sexual Activity   • Alcohol use: Yes     Comment: 1 PER WK   • Drug use: No   • Sexual activity: Not on file     Comment: retired    Lifestyle   • Physical activity     Days per week: Not on file     Minutes per session: Not on file   • Stress: Not on file   Relationships   • Social connections     Talks on phone: Not on file     Gets together: Not on file     Attends Yazdanism service: Not on file     Active member of club or organization: Not on file     Attends meetings of clubs or organizations: Not on file     Relationship status: Not on file   • Intimate partner " violence     Fear of current or ex partner: Not on file     Emotionally abused: Not on file     Physically abused: Not on file     Forced sexual activity: Not on file   Other Topics Concern   • Not on file   Social History Narrative   • Not on file          Past Medical History:   Diagnosis Date   • Anesthesia     difficult intubation with surgery 2008,2010   • Basal cell carcinoma of skin    • CAD (coronary artery disease)    • Cancer (HCC)     rt  kidney 1989;  thyroid cancer 2012, prostate 2008, skin   • DVT (deep venous thrombosis) (HCC) 2014   • ED (erectile dysfunction)    • GERD (gastroesophageal reflux disease)    • Glaucoma    • Heart burn    • Hyperlipidemia 12/3/2012   • Hypertension    • Indigestion    • Multiple pulmonary emboli (HCC) 2014   • Multiple thyroid nodules 2009   • Papillary carcinoma of thyroid (HCC) 2014    R 2 foci / 4.5mm,0.25mm / no mets/ pT1pN0   • postsurgical hypothyroidism 2014   • Pre-diabetes    • Renal disorder     rt kidney cancer,nephrectomy   • S/P CABG x 4 2003   • S/P colectomy 2010    multiple colon polyps / no Ca   • S/p nephrectomy 1998    carcinoma   • S/P prostatectomy 2008    carcinoma   • Stroke (HCC)     'mild',no residual,'one doctor said it was a tia'   • Transient renal failure 2014    renal vein thrombosis   • Type II or unspecified type diabetes mellitus without mention of complication, not stated as uncontrolled     on no medications   • Unspecified urinary incontinence          Current Outpatient Medications on File Prior to Visit   Medication Sig Dispense Refill   • fenofibrate (TRIGLIDE) 160 MG tablet TAKE 1 TABLET BY MOUTH ONCE DAILY 90 Tab 0   • levothyroxine (SYNTHROID) 137 MCG Tab TAKE 1 TABLET BY MOUTH ONCE DAILY IN THE MORNING ON AN EMPTY STOMACH, ONE-HALF HOUR BEFORE EATING 90 Tab 0   • Dulaglutide (TRULICITY) 0.75 MG/0.5ML Solution Pen-injector Inject 0.75 mg as instructed every 7 days. 12 PEN 1   • atorvastatin (LIPITOR) 10 MG Tab Take 1 Tab by  "mouth every day. 100 Tab 1   • losartan (COZAAR) 50 MG Tab TAKE 1 TABLET BY MOUTH ONCE DAILY 90 Tab 0   • warfarin (COUMADIN) 5 MG Tab Take 1 Tab by mouth every evening. 120 Tab 3   • Calcium Carb-Cholecalciferol (CALCIUM 1000 + D PO) Take 1 Dose by mouth every day.     • aspirin 81 MG tablet Take 81 mg by mouth every evening.     • Omega-3 Krill Oil 300 MG Cap Take 300 mg by mouth every day.     • Cinnamon 500 MG Cap Take 1,000 mg by mouth.     • Cyanocobalamin (VITAMIN B-12) 1000 MCG Tab Take 1,000 mcg by mouth every day.     • Coenzyme Q10 (CO Q-10 PO) Take 1 Dose by mouth every day. Unknown OTC Strength     • losartan (COZAAR) 50 MG Tab Take 1 Tab by mouth every day. (Patient not taking: Reported on 3/30/2020) 100 Tab 0   • atenolol (TENORMIN) 25 MG Tab TAKE 1 TABLET BY MOUTH ONCE DAILY 90 Tab 3     No current facility-administered medications on file prior to visit.               Review of Systems   Constitutional: Negative for fever, chills and malaise/fatigue.   Eyes: Negative for vision changes, d/c.    Respiratory: Negative for cough and sputum production.    Cardiovascular: Negative for chest pain and palpitations.   Gastrointestinal: Negative for nausea, vomiting, abdominal pain, diarrhea and constipation.   Genitourinary: Negative for dysuria, urgency and frequency.   Skin: Negative for rash or  itching.   Neurological: Negative for dizziness and tingling.   Psychiatric/Behavioral: Negative for depression.   Hematologic/lymphatic - denies bruising or excessive bleeding  All other systems reviewed and are negative.         Objective:   /58 (BP Location: Left arm, Patient Position: Sitting, BP Cuff Size: Adult)   Pulse 62   Temp 36.6 °C (97.9 °F) (Temporal)   Resp 20   Ht 1.727 m (5' 8\")   Wt 79.4 kg (175 lb)   SpO2 99%       Physical Exam  HEENT - PERRLA, EOMI  Neuro - alert and oriented x3. CN 2-12 grossly intact.  Lungs - CTA. No wheezes, rhonchi or rales.  Heart - regular rate and rhythm " without murmur.  Abdomen - soft and non-tender, bowel sounds active x4.   No flank pain  Musculoskeletal - No lower extremity edema noted.         Lab Results   Component Value Date/Time    POCCOLOR YELLOW 12/13/2018 10:13 AM    POCAPPEAR CLEAR 12/13/2018 10:13 AM    POCLEUKEST NEGATIVE 12/13/2018 10:13 AM    POCNITRITE NEGATIVE 12/13/2018 10:13 AM    POCUROBILIGE 0.2 12/13/2018 10:13 AM    POCPROTEIN 100MG 12/13/2018 10:13 AM    POCURPH 5.5 12/13/2018 10:13 AM    POCBLOOD TRACE 12/13/2018 10:13 AM    POCSPGRV 1.020 12/13/2018 10:13 AM    POCKETONES NEGATIVE 12/13/2018 10:13 AM    POCBILIRUBIN NEGATIVE 12/13/2018 10:13 AM    POCGLUCUA 500MG 12/13/2018 10:13 AM           Assessment/Plan:     1. Hematuria, unspecified type  Pt presents with gross hematuria x 3 d, but really no other sx.       Currently on coumadin s/p CABG, DVT.   Most recent  INR 3.0 on 1/27    Will check INR today    UA was unremarkable, except for blood - will send for culture.  Does not seem to be infection at this time.       Pt has left single kidney - will check BUN/Cr      - URINE CULTURE(NEW); Future      Blood pressure today is greater than 120/80, patient is instructed to follow up with primary care provider for blood pressure recheck.

## 2020-04-01 LAB
BACTERIA UR CULT: NORMAL
SIGNIFICANT IND 70042: NORMAL
SITE SITE: NORMAL
SOURCE SOURCE: NORMAL

## 2020-04-02 ENCOUNTER — ANTICOAGULATION MONITORING (OUTPATIENT)
Dept: VASCULAR LAB | Facility: MEDICAL CENTER | Age: 80
End: 2020-04-02

## 2020-04-02 ENCOUNTER — ANTICOAGULATION VISIT (OUTPATIENT)
Dept: MEDICAL GROUP | Facility: MEDICAL CENTER | Age: 80
End: 2020-04-02
Payer: MEDICARE

## 2020-04-02 ENCOUNTER — TELEPHONE (OUTPATIENT)
Dept: VASCULAR LAB | Facility: MEDICAL CENTER | Age: 80
End: 2020-04-02

## 2020-04-02 DIAGNOSIS — Z79.01 CHRONIC ANTICOAGULATION: ICD-10-CM

## 2020-04-02 DIAGNOSIS — Z95.828 PRESENCE OF IVC FILTER: ICD-10-CM

## 2020-04-02 DIAGNOSIS — Z79.01 CHRONIC ANTICOAGULATION: Primary | ICD-10-CM

## 2020-04-02 LAB — INR PPP: 2.9 (ref 2–3.5)

## 2020-04-02 PROCEDURE — 85610 PROTHROMBIN TIME: CPT | Performed by: INTERNAL MEDICINE

## 2020-04-02 PROCEDURE — 99211 OFF/OP EST MAY X REQ PHY/QHP: CPT | Performed by: INTERNAL MEDICINE

## 2020-04-02 NOTE — TELEPHONE ENCOUNTER
Attempted to speak with pt's wife regarding a recent msg stating the pt had blood in his urine and blood-shot eyes. There was no answer; left VM to please call clinic back to discuss.      CORDELL Carney  [facility]

## 2020-04-02 NOTE — Clinical Note
Dr. Conner,  Pt with hematuria, he was seen in urgent care, Hematuria has gotten worse per pt. I held his warfarin today. Arranged for pt to see you tomorrow. Please see my note for details.  Thank you,  Kelsey Junior, ZayD

## 2020-04-02 NOTE — PROGRESS NOTES
Spoke with pt.   Having some hematuria again and blood shot eyes.   Confirmed he followed the warfarin dosing.   Will have pt come into clinic today.     Sarah Omalley, PharmD

## 2020-04-02 NOTE — PROGRESS NOTES
OP Anticoagulation Service Note    Date: 2020  There were no vitals filed for this visit.   pt declined vitals    Anticoagulation Summary  As of 2020    INR goal:   2.0-3.0   TTR:   80.3 % (2.1 y)   INR used for dosin.90 (2020)   Warfarin maintenance plan:   5 mg (5 mg x 1) every day   Weekly warfarin total:   35 mg   Plan last modified:   Marta Quintana, PharmD (7/10/2018)   Next INR check:   2020   Target end date:   Indefinite    Indications    Presence of IVC filter [Z95.828]  Transient ischemic attack [G45.9] (Resolved) [G45.9]  Deep vein thrombosis (DVT) of both lower extremities (HCC) (Resolved) [I82.403]  DVT (deep venous thrombosis) (HCC) (Resolved) [I82.409]  Multiple pulmonary emboli (HCC) (Resolved) [I26.99]  Chronic anticoagulation [Z79.01]             Anticoagulation Episode Summary     INR check location:       Preferred lab:       Send INR reminders to:       Comments:         Anticoagulation Care Providers     Provider Role Specialty Phone number    [facility]   [phone]    Edward Walters M.D.  Family Medicine [phone]        Anticoagulation Patient Findings  Patient Findings     Positives:   Signs/symptoms of bleeding    Negatives:   Signs/symptoms of thrombosis, Laboratory test error suspected, Change in health, Change in alcohol use, Change in activity, Upcoming invasive procedure, Emergency department visit, Upcoming dental procedure, Missed doses, Extra doses, Change in medications, Change in diet/appetite, Hospital admission, Bruising, Other complaints          HPI:   Paulo Paredes seen in clinic today, on anticoagulation therapy with warfarin (a high risk medication) for DVT And PE      Pt is here today to evaluate anticoagulation therapy    Previous INR was  4.15 on 3-30-20    Pt was instructed to HOLD, take 2.5 mg next day then resume usual regimen    Confirmed warfarin dosing regimen, denies missed or extra doses of coumadin.    Diet has been consistent with foods rich in vitamin K: Yes  Changes in ETOH:  No  Changes in smoking status: No  Changes in medication: No   Cost restriction: No  S/s of bleeding:  Yes - hematuria, pt reports it has gotten progressively worse, at this point. Advised pt to set up appt with PCP ASAP to discuss blood in urine as he went to urgent care already. He denies fever, pain with urination, back pain, no lightheadedness or dizziness.   Falls or accidents since last visit No  Signs/symptoms  thrombosis since the last appt: No    A/P   INR  therapeutic today,  will require close follow up as previous INR was supra-therapeutic and pt has hematuria  HOLD warfarin today then resume usual regimen.  Arranged for pt to see his PCP tomorrow. Advised pt to go to ER if symptoms worsen or he has signs of an infection.       Pt educated to contact our clinic with any changes in medications or s/s of bleeding or thrombosis. Pt is aware to seek immediate medical attention for falls, head injury or deep cuts    Follow up appointment in 4 days(s) to reduce risk of adverse events from warfarin   Kelsey Junior, PharmD

## 2020-04-03 ENCOUNTER — HOSPITAL ENCOUNTER (OUTPATIENT)
Dept: LAB | Facility: MEDICAL CENTER | Age: 80
End: 2020-04-03
Attending: INTERNAL MEDICINE
Payer: MEDICARE

## 2020-04-03 ENCOUNTER — TELEPHONE (OUTPATIENT)
Dept: VASCULAR LAB | Facility: MEDICAL CENTER | Age: 80
End: 2020-04-03

## 2020-04-03 ENCOUNTER — HOSPITAL ENCOUNTER (OUTPATIENT)
Dept: RADIOLOGY | Facility: MEDICAL CENTER | Age: 80
End: 2020-04-03
Attending: INTERNAL MEDICINE
Payer: MEDICARE

## 2020-04-03 ENCOUNTER — OFFICE VISIT (OUTPATIENT)
Dept: MEDICAL GROUP | Age: 80
End: 2020-04-03
Payer: MEDICARE

## 2020-04-03 VITALS
HEART RATE: 74 BPM | TEMPERATURE: 96.5 F | DIASTOLIC BLOOD PRESSURE: 72 MMHG | BODY MASS INDEX: 27.75 KG/M2 | SYSTOLIC BLOOD PRESSURE: 122 MMHG | WEIGHT: 176.8 LBS | OXYGEN SATURATION: 97 % | HEIGHT: 67 IN

## 2020-04-03 DIAGNOSIS — Z85.46 H/O PROSTATE CANCER: ICD-10-CM

## 2020-04-03 DIAGNOSIS — D69.6 THROMBOCYTOPENIA (HCC): ICD-10-CM

## 2020-04-03 DIAGNOSIS — R31.0 HEMATURIA, GROSS: ICD-10-CM

## 2020-04-03 DIAGNOSIS — Z79.01 CHRONIC ANTICOAGULATION: ICD-10-CM

## 2020-04-03 LAB
BASOPHILS # BLD AUTO: 0.4 % (ref 0–1.8)
BASOPHILS # BLD: 0.02 K/UL (ref 0–0.12)
EOSINOPHIL # BLD AUTO: 0.22 K/UL (ref 0–0.51)
EOSINOPHIL NFR BLD: 4.5 % (ref 0–6.9)
ERYTHROCYTE [DISTWIDTH] IN BLOOD BY AUTOMATED COUNT: 46 FL (ref 35.9–50)
HCT VFR BLD AUTO: 48.6 % (ref 42–52)
HGB BLD-MCNC: 15.7 G/DL (ref 14–18)
IMM GRANULOCYTES # BLD AUTO: 0.03 K/UL (ref 0–0.11)
IMM GRANULOCYTES NFR BLD AUTO: 0.6 % (ref 0–0.9)
LYMPHOCYTES # BLD AUTO: 1.57 K/UL (ref 1–4.8)
LYMPHOCYTES NFR BLD: 31.8 % (ref 22–41)
MCH RBC QN AUTO: 31.4 PG (ref 27–33)
MCHC RBC AUTO-ENTMCNC: 32.3 G/DL (ref 33.7–35.3)
MCV RBC AUTO: 97.2 FL (ref 81.4–97.8)
MONOCYTES # BLD AUTO: 0.3 K/UL (ref 0–0.85)
MONOCYTES NFR BLD AUTO: 6.1 % (ref 0–13.4)
NEUTROPHILS # BLD AUTO: 2.79 K/UL (ref 1.82–7.42)
NEUTROPHILS NFR BLD: 56.6 % (ref 44–72)
NRBC # BLD AUTO: 0 K/UL
NRBC BLD-RTO: 0 /100 WBC
PLATELET # BLD AUTO: 97 K/UL (ref 164–446)
PMV BLD AUTO: 10.2 FL (ref 9–12.9)
RBC # BLD AUTO: 5 M/UL (ref 4.7–6.1)
WBC # BLD AUTO: 4.9 K/UL (ref 4.8–10.8)

## 2020-04-03 PROCEDURE — 84153 ASSAY OF PSA TOTAL: CPT

## 2020-04-03 PROCEDURE — 76775 US EXAM ABDO BACK WALL LIM: CPT

## 2020-04-03 PROCEDURE — 84154 ASSAY OF PSA FREE: CPT

## 2020-04-03 PROCEDURE — 36415 COLL VENOUS BLD VENIPUNCTURE: CPT

## 2020-04-03 PROCEDURE — 85025 COMPLETE CBC W/AUTO DIFF WBC: CPT

## 2020-04-03 PROCEDURE — 99214 OFFICE O/P EST MOD 30 MIN: CPT | Performed by: INTERNAL MEDICINE

## 2020-04-03 ASSESSMENT — FIBROSIS 4 INDEX: FIB4 SCORE: 3.85

## 2020-04-03 NOTE — PROGRESS NOTES
CHIEF COMPLAINT  Hematuria    HPI  Patient is a 79 y.o. male patient who presents today for the following     Hematuria, chronic anticoagulation, thrombocytopenia, history of prostate cancer  The patient is 79-year old, male, with history of prostate cancer in 2007, st post prostatectomy, chronic anticoagulation with coumadin and supratherapeutic INR of 4.15, the last 2.9, developed significant and persistent gross hematuria regardless of normalization of the INR.      C/o:  · Dysuria  · Urinary frequency  · Suprapubic discomfort    Denies:  · Fever, chills  · Abdominal/flank pain  · N/V  · Change in urine odor/color    He was seen in  on 3/30, note was reviewed:  - had supratherapeutic INR of 4.15  - UA was negative    He has been followed up by anticoagulation clinic/medicine.    Reviewed PMH, PSH, FH, SH, ALL, HCM/IMM, no changes  Reviewed MEDS, no changes    Patient Active Problem List    Diagnosis Date Noted   • Chronic anticoagulation 08/01/2014     Priority: High   • Presence of IVC filter 06/02/2014     Priority: High   • Essential hypertension 12/03/2012     Priority: Medium   • S/P CABG x 4 12/03/2012     Priority: Medium   • Thrombocytopenia (HCC) 01/16/2020   • Macrocytosis 01/16/2020   • CKD (chronic kidney disease), stage IV (HCC) 01/16/2020   • Hypovitaminosis D 01/16/2020   • History of thyroid cancer 07/16/2019   • Microalbuminuria due to type 2 diabetes mellitus (HCC) 07/16/2019   • History of pulmonary embolus (PE) 07/16/2019   • History of DVT in adulthood 07/16/2019   • Zoster 01/23/2019   • Dyslipidemia 03/19/2018   • Health care maintenance 02/12/2018   • Hypothyroidism, postsurgical 08/03/2015   • S/P total thyroidectomy 06/02/2014   • CAD (coronary artery disease) 01/22/2014   • S/P partial resection of colon 07/09/2013   • GERD (gastroesophageal reflux disease) 12/03/2012   • ED (erectile dysfunction) 12/03/2012   • Controlled type 2 diabetes mellitus with complication, without long-term  current use of insulin (HCC) 12/03/2012   • Colon polyp 12/03/2012   • Single kidney 12/03/2012   • H/O prostate cancer 12/03/2012     CURRENT MEDICATIONS  Current Outpatient Medications   Medication Sig Dispense Refill   • losartan (COZAAR) 50 MG Tab Take 1 Tab by mouth every day. (Patient not taking: Reported on 3/30/2020) 100 Tab 0   • fenofibrate (TRIGLIDE) 160 MG tablet TAKE 1 TABLET BY MOUTH ONCE DAILY 90 Tab 0   • levothyroxine (SYNTHROID) 137 MCG Tab TAKE 1 TABLET BY MOUTH ONCE DAILY IN THE MORNING ON AN EMPTY STOMACH, ONE-HALF HOUR BEFORE EATING 90 Tab 0   • Dulaglutide (TRULICITY) 0.75 MG/0.5ML Solution Pen-injector Inject 0.75 mg as instructed every 7 days. 12 PEN 1   • atorvastatin (LIPITOR) 10 MG Tab Take 1 Tab by mouth every day. 100 Tab 1   • losartan (COZAAR) 50 MG Tab TAKE 1 TABLET BY MOUTH ONCE DAILY 90 Tab 0   • atenolol (TENORMIN) 25 MG Tab TAKE 1 TABLET BY MOUTH ONCE DAILY 90 Tab 3   • warfarin (COUMADIN) 5 MG Tab Take 1 Tab by mouth every evening. 120 Tab 3   • Calcium Carb-Cholecalciferol (CALCIUM 1000 + D PO) Take 1 Dose by mouth every day.     • aspirin 81 MG tablet Take 81 mg by mouth every evening.     • Omega-3 Krill Oil 300 MG Cap Take 300 mg by mouth every day.     • Cinnamon 500 MG Cap Take 1,000 mg by mouth.     • Cyanocobalamin (VITAMIN B-12) 1000 MCG Tab Take 1,000 mcg by mouth every day.     • Coenzyme Q10 (CO Q-10 PO) Take 1 Dose by mouth every day. Unknown OTC Strength       No current facility-administered medications for this visit.      ALLERGIES  Allergies: Lactose  PAST MEDICAL HISTORY  Past Medical History:   Diagnosis Date   • postsurgical hypothyroidism 2014   • Papillary carcinoma of thyroid (HCC) 2014    R 2 foci / 4.5mm,0.25mm / no mets/ pT1pN0   • DVT (deep venous thrombosis) (HCC) 2014   • Multiple pulmonary emboli (HCC) 2014   • Transient renal failure 2014    renal vein thrombosis   • Hyperlipidemia 12/3/2012   • S/P colectomy 2010    multiple colon polyps / no Ca    • Multiple thyroid nodules 2009   • S/P prostatectomy 2008    carcinoma   • S/P CABG x 4 2003   • S/p nephrectomy 1998    carcinoma   • Anesthesia     difficult intubation with surgery 2008,2010   • Basal cell carcinoma of skin    • CAD (coronary artery disease)    • Cancer (HCC)     rt  kidney 1989;  thyroid cancer 2012, prostate 2008, skin   • ED (erectile dysfunction)    • GERD (gastroesophageal reflux disease)    • Glaucoma    • Heart burn    • Hypertension    • Indigestion    • Pre-diabetes    • Renal disorder     rt kidney cancer,nephrectomy   • Stroke (HCC)     'mild',no residual,'one doctor said it was a tia'   • Type II or unspecified type diabetes mellitus without mention of complication, not stated as uncontrolled     on no medications   • Unspecified urinary incontinence      SURGICAL HISTORY  He  has a past surgical history that includes colon resection; nephrectomy radical; multiple coronary artery bypass; prostatectomy, radical retro; other orthopedic surgery; thyroidectomy total (5/13/2014); and node dissection (5/13/2014).  SOCIAL HISTORY  Social History     Tobacco Use   • Smoking status: Never Smoker   • Smokeless tobacco: Never Used   Substance Use Topics   • Alcohol use: Yes     Comment: 1 PER WK   • Drug use: No     Social History     Social History Narrative   • Not on file     FAMILY HISTORY  Family History   Problem Relation Age of Onset   • Diabetes Mother    • Heart Disease Mother    • Diabetes Father    • Cancer Father         pancreas   • Thyroid Sister         Cancer   • Cancer Sister         thyroid   • Diabetes Sister    • Cancer Brother 49        colon   • Diabetes Brother    • Diabetes Maternal Grandfather    • Diabetes Paternal Grandmother    • Diabetes Paternal Grandfather      Family Status   Relation Name Status   • Mo  (Not Specified)   • Fa  (Not Specified)   • Sis  (Not Specified)   • Bro  (Not Specified)   • MGFa  (Not Specified)   • PGMo  (Not Specified)   • PGFa  (Not  "Specified)     ROS   Constitutional: As above.  Respiratory: Negative for cough, shortness of breath.  Cardiovascular: Negative for chest pain.   Gastrointestinal: Negative for heartburn.   Genitourinary: As above.  Musculoskeletal: Negative for bones or back pain.   Skin: Negative for rash.   Neuro: Negative for dizziness, headaches.   Endo/Heme/Allergies: Does not bruise/bleed easily.   Psychiatric/Behavioral: Negative for depression.    PHYSICAL EXAM   Blood Pressure 122/72 (BP Location: Left arm, Patient Position: Sitting, BP Cuff Size: Adult)   Pulse 74   Temperature 35.8 °C (96.5 °F)   Height 1.702 m (5' 7\")   Weight 80.2 kg (176 lb 12.8 oz)   Oxygen Saturation 97%   Body Mass Index 27.69 kg/m²   General:  NAD, well appearing, afebrile.  HEENT:   NC/AT, PERRLA, EOMI .      Lungs:   CTAB, no w/r/r, no respiratory distress.  Abdomen: Soft, NT/ND + BS.  Extremities:  2+ DP and radial pulses bilaterally.  No c/c/e.   Skin:  Warm, dry.  No erythema. No rash.   Neurologic: Alert & oriented x 3.  No focal deficits.  Psychiatric:  Affect normal, mood normal, judgment normal.    LABS     Labs are reviewed and discussed with a patient:    POCT urinalysis: gross hematuria    Lab Results   Component Value Date/Time    CHOLSTRLTOT 169 07/15/2019 07:19 AM     (H) 07/15/2019 07:19 AM    HDL 32 (A) 07/15/2019 07:19 AM    TRIGLYCERIDE 185 (H) 07/15/2019 07:19 AM       Lab Results   Component Value Date/Time    SODIUM 140 03/30/2020 10:01 AM    POTASSIUM 4.8 03/30/2020 10:01 AM    CHLORIDE 104 03/30/2020 10:01 AM    CO2 25 03/30/2020 10:01 AM    GLUCOSE 174 (H) 03/30/2020 10:01 AM    BUN 30 (H) 03/30/2020 10:01 AM    CREATININE 1.97 (H) 03/30/2020 10:01 AM     Lab Results   Component Value Date/Time    ALKPHOSPHAT 38 03/30/2020 10:01 AM    ASTSGOT 19 03/30/2020 10:01 AM    ALTSGPT 13 03/30/2020 10:01 AM    TBILIRUBIN 0.7 03/30/2020 10:01 AM      Lab Results   Component Value Date/Time    HBA1C 6.9 (H) 10/15/2019 " 07:11 AM    HBA1C 6.8 (H) 07/15/2019 07:19 AM    HBA1C 8.8 (H) 04/12/2019 07:33 AM     No results found for: TSH  Lab Results   Component Value Date/Time    FREET4 0.90 04/12/2019 07:30 AM    FREET4 1.10 01/26/2016 09:00 AM     Lab Results   Component Value Date/Time    WBC 4.9 04/03/2020 09:58 AM    RBC 5.00 04/03/2020 09:58 AM    HEMOGLOBIN 15.7 04/03/2020 09:58 AM    HEMATOCRIT 48.6 04/03/2020 09:58 AM    MCV 97.2 04/03/2020 09:58 AM    MCH 31.4 04/03/2020 09:58 AM    MCHC 32.3 (L) 04/03/2020 09:58 AM    MPV 10.2 04/03/2020 09:58 AM    NEUTSPOLYS 56.60 04/03/2020 09:58 AM    LYMPHOCYTES 31.80 04/03/2020 09:58 AM    MONOCYTES 6.10 04/03/2020 09:58 AM    EOSINOPHILS 4.50 04/03/2020 09:58 AM    BASOPHILS 0.40 04/03/2020 09:58 AM      IMAGING     Reviewed and discussed last CT abdomen pelvis from 6/6/14  CT Abdomen:  There is mild atelectasis in both lung bases which is new.  There are stones in the gallbladder.    There is a filter in the IVC. The filter is tilted within the IVC which is somewhat dilated. There is a lack of enhancement in portions of the IVC near the filter which is consistent with clot. This clot extends into the left renal vein about 2-3 cm. The renal vein between the aorta and the kidney appears patent although mildly distended. The amount of distention of the left renal vein has decreased compared to previous    There is mild edema around the left kidney, improved from previous. There is no hydronephrosis.    No lesions are seen in the liver, spleen, pancreas, or adrenals. There has been previous right nephrectomy.    ASSESMENT AND PLAN        There are no diagnoses linked to this encounter.    1. Hematuria, gross  He continued to have gross hematuria, follow-up CBC  Because of history of prostate cancer, ordered PSA and imaging  - CBC WITH DIFFERENTIAL; Future  - REFERRAL TO UROLOGY  - PSA TOTAL + %FREE; Future  - US-RENAL; Future    2. Chronic anticoagulation  INR is currently  therapeutic    3. Thrombocytopenia (HCC)  Borderline, follow-up labs  - CBC WITH DIFFERENTIAL; Future    4. H/O prostate cancer  Follow-up labs, refer to urology  - REFERRAL TO UROLOGY  - PSA TOTAL + %FREE; Future  - US-RENAL; Future    Counseling:   - Smoking:  Nonsmoker    Followup: as needed    All questions are answered.    Please note that this dictation was created using voice recognition software, and that there might be errors of venkata and possibly content.

## 2020-04-03 NOTE — TELEPHONE ENCOUNTER
[facility]    Called pt to follow up with his hematuria and warfarin dose that was held yesterday.  Pt had appt with PCP today.  Left on call pharmacist phone number so the pt can utilize our clinic if he needs help over the weekend.    John Clement, PharmD

## 2020-04-04 LAB
PSA FREE MFR SERPL: NORMAL %
PSA FREE SERPL-MCNC: <0.1 NG/ML
PSA SERPL-MCNC: <0.1 NG/ML (ref 0–4)

## 2020-04-06 ENCOUNTER — ANTICOAGULATION VISIT (OUTPATIENT)
Dept: MEDICAL GROUP | Facility: MEDICAL CENTER | Age: 80
End: 2020-04-06
Payer: MEDICARE

## 2020-04-06 DIAGNOSIS — Z95.828 PRESENCE OF IVC FILTER: ICD-10-CM

## 2020-04-06 DIAGNOSIS — Z79.01 CHRONIC ANTICOAGULATION: Primary | ICD-10-CM

## 2020-04-06 LAB — INR PPP: 1.7 (ref 2–3.5)

## 2020-04-06 PROCEDURE — 85610 PROTHROMBIN TIME: CPT | Performed by: INTERNAL MEDICINE

## 2020-04-06 PROCEDURE — 99211 OFF/OP EST MAY X REQ PHY/QHP: CPT | Performed by: INTERNAL MEDICINE

## 2020-04-06 NOTE — PROGRESS NOTES
OP Anticoagulation Service Note    Date: 2020  There were no vitals filed for this visit.   pt declined vitals    Anticoagulation Summary  As of 2020    INR goal:   2.0-3.0   TTR:   80.3 % (2.1 y)   INR used for dosin.70! (2020)   Warfarin maintenance plan:   5 mg (5 mg x 1) every day   Weekly warfarin total:   35 mg   Plan last modified:   Marta Quintana, PharmD (7/10/2018)   Next INR check:   2020   Target end date:   Indefinite    Indications    Presence of IVC filter [Z95.828]  Transient ischemic attack [G45.9] (Resolved) [G45.9]  Deep vein thrombosis (DVT) of both lower extremities (HCC) (Resolved) [I82.403]  DVT (deep venous thrombosis) (HCC) (Resolved) [I82.409]  Multiple pulmonary emboli (HCC) (Resolved) [I26.99]  Chronic anticoagulation [Z79.01]             Anticoagulation Episode Summary     INR check location:       Preferred lab:       Send INR reminders to:       Comments:         Anticoagulation Care Providers     Provider Role Specialty Phone number    [facility]   [phone]    Edward Walters M.D.  Family Medicine [phone]        Anticoagulation Patient Findings      HPI:   Paulo Paredse seen in clinic today, on anticoagulation therapy with warfarin (a high risk medication) for TIA, DVT, PE      Pt is here today to evaluate anticoagulation therapy    Previous INR was  2.9 on 20    Pt was instructed to HOLD x 1 then continue usual regimen d/t hematuria    Confirmed warfarin dosing regimen, denies missed or extra doses of coumadin.   Diet has been consistent with foods rich in vitamin K: Yes  Changes in ETOH:  No  Changes in smoking status: No  Changes in medication: No   Cost restriction: No  S/s of bleeding:  No - the blood in his urine has stopped at this point.   Falls or accidents since last visit No  Signs/symptoms  thrombosis since the last appt: No    A/P   INR  SUB-therapeutic today, will require dose adjust ment today to prevent   thrombosis or stroke and closer follow up.   Resume usual regimen, hematuria has resolved at this point and he is following up with urology         Pt educated to contact our clinic with any changes in medications or s/s of bleeding or thrombosis. Pt is aware to seek immediate medical attention for falls, head injury or deep cuts    Follow up appointment in 1 week(s) to reduce risk of adverse events from warfarin   Kelsey Junior, PharmD

## 2020-04-13 ENCOUNTER — ANTICOAGULATION VISIT (OUTPATIENT)
Dept: MEDICAL GROUP | Facility: MEDICAL CENTER | Age: 80
End: 2020-04-13
Payer: MEDICARE

## 2020-04-13 ENCOUNTER — HOSPITAL ENCOUNTER (OUTPATIENT)
Dept: RADIOLOGY | Facility: MEDICAL CENTER | Age: 80
End: 2020-04-13
Attending: PHYSICIAN ASSISTANT
Payer: MEDICARE

## 2020-04-13 DIAGNOSIS — Z95.828 PRESENCE OF IVC FILTER: ICD-10-CM

## 2020-04-13 DIAGNOSIS — R31.0 GROSS HEMATURIA: ICD-10-CM

## 2020-04-13 DIAGNOSIS — Z79.01 CHRONIC ANTICOAGULATION: Primary | ICD-10-CM

## 2020-04-13 DIAGNOSIS — R31.0 FRANK HEMATURIA: ICD-10-CM

## 2020-04-13 LAB — INR PPP: 2.5 (ref 2–3.5)

## 2020-04-13 PROCEDURE — 93793 ANTICOAG MGMT PT WARFARIN: CPT | Performed by: INTERNAL MEDICINE

## 2020-04-13 PROCEDURE — 74176 CT ABD & PELVIS W/O CONTRAST: CPT

## 2020-04-13 PROCEDURE — 85610 PROTHROMBIN TIME: CPT | Performed by: INTERNAL MEDICINE

## 2020-04-13 NOTE — PROGRESS NOTES
OP Anticoagulation Service Note    Date: 2020  There were no vitals filed for this visit.   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  Family Medicine [phone]        Anticoagulation Patient Findings      HPI:   Paulo Paredes seen in clinic today, on anticoagulation therapy with warfarin (a high risk medication) for hx of DVT, PE and TIA    Pt is here today to evaluate anticoagulation therapy    Previous INR was  1.7 on 20    Pt was instructed to resume usual regimen    Confirmed warfarin dosing regimen. He reports he thought he may have missed a dose yesterday but is unsure  Diet has been consistent with foods rich in vitamin K: Yes  Changes in ETOH:  No  Changes in smoking status: No  Changes in medication: No   Cost restriction: No  S/s of bleeding:  No - no further hematuria  Falls or accidents since last visit No  Signs/symptoms  thrombosis since the last appt: No    A/P   INR  therapeutic today, will require close follow up as previous INR was SUB-therapeutic    Continue current warfarin regimen              Pt educated to contact our clinic with any changes in medications or s/s of bleeding or thrombosis. Pt is aware to seek immediate medical attention for falls, head injury or deep cuts    Follow up appointment in 2 week(s) to reduce risk of adverse events from warfarin  Kelsey Junior, PharmD

## 2020-04-25 ENCOUNTER — HOSPITAL ENCOUNTER (EMERGENCY)
Facility: MEDICAL CENTER | Age: 80
End: 2020-04-25
Attending: EMERGENCY MEDICINE
Payer: MEDICARE

## 2020-04-25 VITALS
SYSTOLIC BLOOD PRESSURE: 160 MMHG | DIASTOLIC BLOOD PRESSURE: 70 MMHG | WEIGHT: 178.57 LBS | RESPIRATION RATE: 18 BRPM | HEIGHT: 68 IN | BODY MASS INDEX: 27.06 KG/M2 | HEART RATE: 74 BPM | TEMPERATURE: 97 F | OXYGEN SATURATION: 93 %

## 2020-04-25 DIAGNOSIS — S01.512A: ICD-10-CM

## 2020-04-25 DIAGNOSIS — Z79.01 WARFARIN ANTICOAGULATION: ICD-10-CM

## 2020-04-25 PROCEDURE — 700101 HCHG RX REV CODE 250

## 2020-04-25 PROCEDURE — 304999 HCHG REPAIR-SIMPLE/INTERMED LEVEL 1

## 2020-04-25 PROCEDURE — 304217 HCHG IRRIGATION SYSTEM

## 2020-04-25 PROCEDURE — 99283 EMERGENCY DEPT VISIT LOW MDM: CPT

## 2020-04-25 PROCEDURE — 303747 HCHG EXTRA SUTURE

## 2020-04-25 RX ORDER — LIDOCAINE HYDROCHLORIDE AND EPINEPHRINE 10; 10 MG/ML; UG/ML
20 INJECTION, SOLUTION INFILTRATION; PERINEURAL ONCE
Status: COMPLETED | OUTPATIENT
Start: 2020-04-25 | End: 2020-04-25

## 2020-04-25 RX ORDER — LIDOCAINE HYDROCHLORIDE AND EPINEPHRINE 10; 10 MG/ML; UG/ML
INJECTION, SOLUTION INFILTRATION; PERINEURAL
Status: COMPLETED
Start: 2020-04-25 | End: 2020-04-25

## 2020-04-25 RX ADMIN — LIDOCAINE HYDROCHLORIDE AND EPINEPHRINE 20 ML: 10; 10 INJECTION, SOLUTION INFILTRATION; PERINEURAL at 03:15

## 2020-04-25 ASSESSMENT — FIBROSIS 4 INDEX: FIB4 SCORE: 4.4

## 2020-04-25 NOTE — ED NOTES
Pt d.c with steady gait. NAD noted. Pt called wife to pick him up. Pt instructed on salt water rinses at home. Pt stated understanding.

## 2020-04-25 NOTE — ED NOTES
"Pt \"bit left side of tongue eating salad at 1930 4/24/20 and is c.o of increased bleeding while on warfarin\" Pt has large blood clot noted in mouth. No current bleeding.   "

## 2020-04-25 NOTE — ED TRIAGE NOTES
"Chief Complaint   Patient presents with   • Tongue Laceration     bit hit tongue at 1700, reports slow bleeding all night.     ED Triage Vitals [04/25/20 0258]   Enc Vitals Group      Blood Pressure  (!) 186/88      Pulse 77      Respiration 16      Temperature 36.1 °C (97 °F)      Temp src Temporal      Pulse Oximetry 93 %      Weight 81 kg (178 lb 9.2 oz)      Height 1.727 m (5' 8\")     "

## 2020-04-25 NOTE — ED PROVIDER NOTES
ED Provider Note    ED Provider Note    Scribed for hCanda Moore MD by Chanda Moore M.D.. 4/25/2020, 3:11 AM.    Primary care provider: Edward Walters M.D.  Means of arrival: Private  History obtained from: Patient  History limited by: None    CHIEF COMPLAINT  Chief Complaint   Patient presents with   • Tongue Laceration     bit hit tongue at 1700, reports slow bleeding all night.       HPI  Paulo Paredes is a 79 y.o. male who presents to the Emergency Department for evaluation of a tongue laceration.  Patient relates last night he was eating a salad, he actually bit the left posterior aspect of his tongue.  Patient is anticoagulated on warfarin and unfortunately despite direct pressure and packing is had slow bleeding throughout the night.  Patient notes sensation of swallowed blood and has also been spitting out blood clots.  He has no trouble breathing and no difficulty swallowing, no significant pain.  No other trauma elsewhere, he does have a complicated medical history including heart disease status post CABG but notes no active chest pain or dizziness nor dyspnea.    REVIEW OF SYSTEMS  Pertinent positives include tongue laceration with anticoagulation. Pertinent negatives include no no dyspnea, no difficulty swallowing, no vomiting, no dizziness nor syncope.      PAST MEDICAL HISTORY   has a past medical history of Anesthesia, Basal cell carcinoma of skin, CAD (coronary artery disease), Cancer (HCC), DVT (deep venous thrombosis) ([facility]) (2014), ED (erectile dysfunction), GERD (gastroesophageal reflux disease), Glaucoma, Heart burn, Hyperlipidemia (12/3/2012), Hypertension, Indigestion, Multiple pulmonary emboli ([facility]) (2014), Multiple thyroid nodules (2009), Papillary carcinoma of thyroid (HCC) (2014), postsurgical hypothyroidism (2014), Pre-diabetes, Renal disorder, S/P CABG x 4 (2003), S/P colectomy (2010), S/p nephrectomy (1998), S/P prostatectomy (2008), Stroke (HCC), Transient  renal failure (2014), Type II or unspecified type diabetes mellitus without mention of complication, not stated as uncontrolled, and Unspecified urinary incontinence.    SURGICAL HISTORY   has a past surgical history that includes colon resection; nephrectomy radical; multiple coronary artery bypass; prostatectomy, radical retro; other orthopedic surgery; thyroidectomy total (5/13/2014); and node dissection (5/13/2014).    SOCIAL HISTORY  Social History     Tobacco Use   • Smoking status: Never Smoker   • Smokeless tobacco: Never Used   Substance Use Topics   • Alcohol use: Yes     Comment: 1 PER WK   • Drug use: No      Social History     Substance and Sexual Activity   Drug Use No       FAMILY HISTORY  Family History   Problem Relation Age of Onset   • Diabetes Mother    • Heart Disease Mother    • Diabetes Father    • Cancer Father         pancreas   • Thyroid Sister         Cancer   • Cancer Sister         thyroid   • Diabetes Sister    • Cancer Brother 49        colon   • Diabetes Brother    • Diabetes Maternal Grandfather    • Diabetes Paternal Grandmother    • Diabetes Paternal Grandfather        CURRENT MEDICATIONS  Home Medications     Reviewed by Jose Manuel Terry R.N. (Registered Nurse) on 04/25/20 at 0300  Med List Status: Partial   Medication Last Dose Status   aspirin 81 MG tablet 4/24/2020 Active   atenolol (TENORMIN) 25 MG Tab 4/24/2020 Active   atorvastatin (LIPITOR) 10 MG Tab 4/24/2020 Active   Calcium Carb-Cholecalciferol (CALCIUM 1000 + D PO) 4/24/2020 Active   Cinnamon 500 MG Cap 4/24/2020 Active   Coenzyme Q10 (CO Q-10 PO) 4/24/2020 Active   Cyanocobalamin (VITAMIN B-12) 1000 MCG Tab 4/24/2020 Active   Dulaglutide (TRULICITY) 0.75 MG/0.5ML Solution Pen-injector  Active   fenofibrate (TRIGLIDE) 160 MG tablet 4/24/2020 Active   levothyroxine (SYNTHROID) 137 MCG Tab 4/24/2020 Active   losartan (COZAAR) 50 MG Tab uncertain Active   losartan (COZAAR) 50 MG Tab  Active   Omega-3 Krill Oil 300 MG Cap  "4/24/2020 Active   warfarin (COUMADIN) 5 MG Tab 4/24/2020 Active                ALLERGIES  Allergies   Allergen Reactions   • Lactose        PHYSICAL EXAM  VITAL SIGNS: BP (!) 186/88 Comment: Simultaneous filing. User may not have seen previous data.  Pulse 77 Comment: Simultaneous filing. User may not have seen previous data.  Temp 36.1 °C (97 °F) (Temporal)   Resp 16 Comment: Simultaneous filing. User may not have seen previous data.  Ht 1.727 m (5' 8\")   Wt 81 kg (178 lb 9.2 oz)   SpO2 93% Comment: Simultaneous filing. User may not have seen previous data.  BMI 27.15 kg/m²     General: Alert, no acute distress  Skin: Warm, dry, normal for ethnicity  Head: Normocephalic, atraumatic  Neck: Trachea midline, no tenderness  ENMT: Oral mucosa moist, no pharyngeal erythema or exudate.  2.5 cm flap-like laceration in the vertical plane to the left posterior tongue with active nonpulsatile bleeding, extending into the superficial musculature of the tongue  Cardiovascular:  Normal peripheral perfusion  Respiratory: respirations are non-labored  Gastrointestinal: Soft, nontender, non distended  Musculoskeletal: No swelling, no deformity  Neurological: Alert and oriented to person, place, time, and situation, speech is clear.  Psychiatric: Cooperative, appropriate mood & affect        COURSE & MEDICAL DECISION MAKING  Pertinent Labs & Imaging studies reviewed. (See chart for details)    3:11 AM - Patient seen and examined at bedside. Patient will be treated with infiltration 1% lidocaine with epinephrine.  Performed laceration repair to his symptoms. The differential diagnoses include but are not limited to: Tongue laceration, anticoagulation with warfarin    0313: Laceration Repair Procedure Note    Indication: Laceration    Procedure: The patient was placed in the appropriate position and anesthesia around the laceration was obtained by infiltration using 1% Lidocaine with epinephrine. The area was then irrigated " "with normal saline and explored with no foreign bodies discovered. The laceration was closed with 4-0 absorbable Vicryl using interrupted sutures. There were no additional lacerations requiring repair.     Total repaired wound length: 2.5 cm.     Other Items: Suture count: 2    The patient tolerated the procedure well.    Complications: bleeding    0333: Patient tolerates laceration repair well.  Hemostasis at this time, will continue to observe and reassess to ensure there is no persistent significant bleeding.    0358: Patient reassessed, there is no active bleeding at this time, at this point he stable for discharge.    Patient Vitals for the past 24 hrs:   BP Temp Temp src Pulse Resp SpO2 Height Weight   04/25/20 0258 (!) 186/88 36.1 °C (97 °F) Temporal 77 16 93 % 1.727 m (5' 8\") 81 kg (178 lb 9.2 oz)         Decision Making:  This is a 79 y.o. year old male who presents with tongue laceration.  This occurred while he was eating a salad approximately 9 hours prior to arrival.  Patient has been bleeding since and as such I have performed laceration repair.  An uncomplicated tongue lacerations this is usually not necessary but the patient is anticoagulated with warfarin and given risk of warfarin reversal outweighs the benefit laceration repair with absorbable Vicryl is performed.    The patient will return for new or worsening symptoms and is stable at the time of discharge.    Patient has had high blood pressure while in the emergency department, felt likely secondary to medical condition. Counseled patient to monitor blood pressure at home and follow up with primary care physician.      DISPOSITION:  Patient will be discharged home in stable condition.    FOLLOW UP:  Edward Walters M.D.   [address] [place] [id number] [phone]          OUTPATIENT MEDICATIONS:  New Prescriptions    No medications on file         FINAL IMPRESSION  1. Laceration of tongue with complication, initial encounter    2. " Warfarin anticoagulation          Chanda BARTLETT M.D. (Scribe), am scribing for, and in the presence of, Chanda Moore MD.    Electronically signed by: Chanda Moore M.D. (Scribe), 4/25/2020    Chanda BARTLETT MD personally performed the services described in this documentation, as scribed by Chanda Moore M.D. in my presence, and it is both accurate and complete    The note accurately reflects work and decisions made by me.  Chanda Moore M.D.  4/25/2020  4:00 AM

## 2020-04-27 ENCOUNTER — ANTICOAGULATION VISIT (OUTPATIENT)
Dept: MEDICAL GROUP | Facility: MEDICAL CENTER | Age: 80
End: 2020-04-27
Payer: MEDICARE

## 2020-04-27 ENCOUNTER — PATIENT OUTREACH (OUTPATIENT)
Dept: HEALTH INFORMATION MANAGEMENT | Facility: OTHER | Age: 80
End: 2020-04-27

## 2020-04-27 DIAGNOSIS — Z95.828 PRESENCE OF IVC FILTER: ICD-10-CM

## 2020-04-27 DIAGNOSIS — Z79.01 CHRONIC ANTICOAGULATION: Primary | ICD-10-CM

## 2020-04-27 LAB — INR PPP: 3.6 (ref 2–3.5)

## 2020-04-27 PROCEDURE — 85610 PROTHROMBIN TIME: CPT | Performed by: INTERNAL MEDICINE

## 2020-04-27 PROCEDURE — 99211 OFF/OP EST MAY X REQ PHY/QHP: CPT | Performed by: INTERNAL MEDICINE

## 2020-04-27 NOTE — PROGRESS NOTES
Called to follow up on ED visit from 4/25/2020. Patient is doing good and did not need anything at this time.

## 2020-04-27 NOTE — PROGRESS NOTES
OP Anticoagulation Service Note    Date: 4/27/2020  There were no vitals filed for this visit.   pt declined vitals    Anticoagulation Summary  As of 4/27/2020    INR goal:   2.0-3.0   TTR:   79.5 % (2.2 y)   INR used for dosing:   3.60! 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  Family Medicine [phone]        Anticoagulation Patient Findings      HPI:   Paulo Paredes seen in clinic today, on anticoagulation therapy with warfarin (a high risk medication) for TIA, hx of DVT and PE       Pt is here today to evaluate anticoagulation therapy    Previous INR was  2.5 on 4-13-20    Pt was instructed to continue current regimen    Confirmed warfarin dosing regimen, denies missed or extra doses of coumadin.   Diet has been consistent with foods rich in vitamin K: Yes  Changes in ETOH:  No  Changes in smoking status: No  Changes in medication: No   Cost restriction: No  S/s of bleeding:  Yes - pt had to get stiches after biting tongue and could not get to stop bleeding.   Falls or accidents since last visit No  Signs/symptoms  thrombosis since the last appt: No    A/P   INR   SUPRA-therapeutic today, will require dose adjust ment today to prevent bleeding complications  and closer follow up.    Lower weekly regimen     Pt educated to contact our clinic with any changes in medications or s/s of bleeding or thrombosis. Pt is aware to seek immediate medical attention for falls, head injury or deep cuts    Follow up appointment in 2 week(s) to reduce risk of adverse events from warfarin  Kelsey Junior, PharmD

## 2020-05-07 ENCOUNTER — HOSPITAL ENCOUNTER (OUTPATIENT)
Dept: LAB | Facility: MEDICAL CENTER | Age: 80
End: 2020-05-07
Attending: INTERNAL MEDICINE
Payer: MEDICARE

## 2020-05-07 DIAGNOSIS — N18.4 CKD (CHRONIC KIDNEY DISEASE), STAGE IV (HCC): ICD-10-CM

## 2020-05-07 DIAGNOSIS — E11.8 CONTROLLED TYPE 2 DIABETES MELLITUS WITH COMPLICATION, WITHOUT LONG-TERM CURRENT USE OF INSULIN (HCC): ICD-10-CM

## 2020-05-07 DIAGNOSIS — D69.6 THROMBOCYTOPENIA (HCC): ICD-10-CM

## 2020-05-07 DIAGNOSIS — E89.0 HYPOTHYROIDISM, POSTSURGICAL: ICD-10-CM

## 2020-05-07 DIAGNOSIS — E55.9 HYPOVITAMINOSIS D: ICD-10-CM

## 2020-05-07 DIAGNOSIS — D75.89 MACROCYTOSIS: ICD-10-CM

## 2020-05-07 DIAGNOSIS — E89.0 S/P TOTAL THYROIDECTOMY: ICD-10-CM

## 2020-05-07 DIAGNOSIS — E78.5 DYSLIPIDEMIA: ICD-10-CM

## 2020-05-07 LAB
25(OH)D3 SERPL-MCNC: 56 NG/ML (ref 30–100)
ALBUMIN SERPL BCP-MCNC: 4.4 G/DL (ref 3.2–4.9)
ALBUMIN/GLOB SERPL: 1.7 G/DL
ALP SERPL-CCNC: 34 U/L (ref 30–99)
ALT SERPL-CCNC: 11 U/L (ref 2–50)
ANION GAP SERPL CALC-SCNC: 9 MMOL/L (ref 7–16)
AST SERPL-CCNC: 18 U/L (ref 12–45)
BASOPHILS # BLD AUTO: 0.9 % (ref 0–1.8)
BASOPHILS # BLD: 0.05 K/UL (ref 0–0.12)
BILIRUB SERPL-MCNC: 0.7 MG/DL (ref 0.1–1.5)
BUN SERPL-MCNC: 28 MG/DL (ref 8–22)
CALCIUM SERPL-MCNC: 9.3 MG/DL (ref 8.4–10.2)
CHLORIDE SERPL-SCNC: 103 MMOL/L (ref 96–112)
CHOLEST SERPL-MCNC: 177 MG/DL (ref 100–199)
CO2 SERPL-SCNC: 27 MMOL/L (ref 20–33)
CREAT SERPL-MCNC: 2.25 MG/DL (ref 0.5–1.4)
CREAT UR-MCNC: 145.04 MG/DL
EOSINOPHIL # BLD AUTO: 0.31 K/UL (ref 0–0.51)
EOSINOPHIL NFR BLD: 5.3 % (ref 0–6.9)
ERYTHROCYTE [DISTWIDTH] IN BLOOD BY AUTOMATED COUNT: 48.3 FL (ref 35.9–50)
EST. AVERAGE GLUCOSE BLD GHB EST-MCNC: 154 MG/DL
FASTING STATUS PATIENT QL REPORTED: NORMAL
GLOBULIN SER CALC-MCNC: 2.6 G/DL (ref 1.9–3.5)
GLUCOSE SERPL-MCNC: 130 MG/DL (ref 65–99)
HBA1C MFR BLD: 7 % (ref 0–5.6)
HCT VFR BLD AUTO: 49.6 % (ref 42–52)
HDLC SERPL-MCNC: 36 MG/DL
HGB BLD-MCNC: 15.4 G/DL (ref 14–18)
IMM GRANULOCYTES # BLD AUTO: 0.02 K/UL (ref 0–0.11)
IMM GRANULOCYTES NFR BLD AUTO: 0.3 % (ref 0–0.9)
LDLC SERPL CALC-MCNC: 106 MG/DL
LYMPHOCYTES # BLD AUTO: 1.83 K/UL (ref 1–4.8)
LYMPHOCYTES NFR BLD: 31.6 % (ref 22–41)
MCH RBC QN AUTO: 31.4 PG (ref 27–33)
MCHC RBC AUTO-ENTMCNC: 31 G/DL (ref 33.7–35.3)
MCV RBC AUTO: 101 FL (ref 81.4–97.8)
MICROALBUMIN UR-MCNC: 11.3 MG/DL
MICROALBUMIN/CREAT UR: 78 MG/G (ref 0–30)
MONOCYTES # BLD AUTO: 0.4 K/UL (ref 0–0.85)
MONOCYTES NFR BLD AUTO: 6.9 % (ref 0–13.4)
NEUTROPHILS # BLD AUTO: 3.19 K/UL (ref 1.82–7.42)
NEUTROPHILS NFR BLD: 55 % (ref 44–72)
NRBC # BLD AUTO: 0 K/UL
NRBC BLD-RTO: 0 /100 WBC
PLATELET # BLD AUTO: 139 K/UL (ref 164–446)
PMV BLD AUTO: 10.1 FL (ref 9–12.9)
POTASSIUM SERPL-SCNC: 5.3 MMOL/L (ref 3.6–5.5)
PROT SERPL-MCNC: 7 G/DL (ref 6–8.2)
RBC # BLD AUTO: 4.91 M/UL (ref 4.7–6.1)
SODIUM SERPL-SCNC: 139 MMOL/L (ref 135–145)
T4 FREE SERPL-MCNC: 1.46 NG/DL (ref 0.93–1.7)
TRIGL SERPL-MCNC: 173 MG/DL (ref 0–149)
TSH SERPL DL<=0.005 MIU/L-ACNC: 0.3 UIU/ML (ref 0.38–5.33)
WBC # BLD AUTO: 5.8 K/UL (ref 4.8–10.8)

## 2020-05-07 PROCEDURE — 82043 UR ALBUMIN QUANTITATIVE: CPT

## 2020-05-07 PROCEDURE — 82570 ASSAY OF URINE CREATININE: CPT

## 2020-05-07 PROCEDURE — 36415 COLL VENOUS BLD VENIPUNCTURE: CPT

## 2020-05-07 PROCEDURE — 80053 COMPREHEN METABOLIC PANEL: CPT

## 2020-05-07 PROCEDURE — 84443 ASSAY THYROID STIM HORMONE: CPT

## 2020-05-07 PROCEDURE — 84439 ASSAY OF FREE THYROXINE: CPT

## 2020-05-07 PROCEDURE — 83036 HEMOGLOBIN GLYCOSYLATED A1C: CPT

## 2020-05-07 PROCEDURE — 80061 LIPID PANEL: CPT

## 2020-05-07 PROCEDURE — 82306 VITAMIN D 25 HYDROXY: CPT

## 2020-05-07 PROCEDURE — 85025 COMPLETE CBC W/AUTO DIFF WBC: CPT

## 2020-05-11 ENCOUNTER — ANTICOAGULATION VISIT (OUTPATIENT)
Dept: MEDICAL GROUP | Facility: MEDICAL CENTER | Age: 80
End: 2020-05-11
Payer: MEDICARE

## 2020-05-11 DIAGNOSIS — Z95.828 PRESENCE OF IVC FILTER: ICD-10-CM

## 2020-05-11 DIAGNOSIS — Z79.01 CHRONIC ANTICOAGULATION: Primary | ICD-10-CM

## 2020-05-11 LAB — INR PPP: 2.4 (ref 2–3.5)

## 2020-05-11 PROCEDURE — 93793 ANTICOAG MGMT PT WARFARIN: CPT | Performed by: INTERNAL MEDICINE

## 2020-05-11 PROCEDURE — 85610 PROTHROMBIN TIME: CPT | Performed by: INTERNAL MEDICINE

## 2020-05-11 NOTE — PROGRESS NOTES
OP Anticoagulation Service Note    Date: 2020  There were no vitals filed for this visit.   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  Family Medicine [phone]        Anticoagulation Patient Findings      HPI:   Paulo Paredes seen in clinic today, on anticoagulation therapy with warfarin (a high risk medication) for TIA, PE and DVT   Pt is here today to evaluate anticoagulation therapy    Previous INR was  3.6 on 20    Pt was instructed to lower weekly regimen    Confirmed warfarin dosing regimen, denies missed or extra doses of coumadin.   Diet has been consistent with foods rich in vitamin K: Yes  Changes in ETOH:  No  Changes in smoking status: No  Changes in medication: No   Cost restriction: No  S/s of bleeding:  No  Falls or accidents since last visit No  Signs/symptoms  thrombosis since the last appt: No    A/P   INR  therapeutic today   Continue current warfarin regimen            Pt educated to contact our clinic with any  changes in medications or s/s of bleeding or thrombosis. Pt is aware to seek immediate medical attention for falls, head injury or deep cuts    Follow up appointment in 4 week(s) to reduce risk of adverse events from warfarin   Kelsey Junior, PharmD

## 2020-05-14 ENCOUNTER — TELEPHONE (OUTPATIENT)
Dept: MEDICAL GROUP | Age: 80
End: 2020-05-14

## 2020-05-14 NOTE — TELEPHONE ENCOUNTER
ESTABLISHED PATIENT PRE-VISIT PLANNING     Patient was NOT contacted to complete PVP.     Note: Patient will not be contacted if there is no indication to call.     1.  Reviewed notes from the last few office visits within the medical group: Yes    2.  If any orders were placed at last visit or intended to be done for this visit (i.e. 6 mos follow-up), do we have Results/Consult Notes?        •  Labs - Labs ordered, completed on 5/7/2020 and results are in chart.   Note: If patient appointment is for lab review and patient did not complete labs, check with provider if OK to reschedule patient until labs completed.       •  Imaging - Imaging ordered, completed and results are in chart.       •  Referrals - Referral ordered, patient has NOT been seen.    3. Is this appointment scheduled as a Hospital Follow-Up? No    4.  Immunizations were updated in Epic using WebIZ?: Epic matches WebIZ       •  Web Iz Recommendations: HEPATITIS B, TDAP and SHINGRIX (Shingles)    5.  Patient is due for the following Health Maintenance Topics:   Health Maintenance Due   Topic Date Due   • IMM HEP B VACCINE (2 of 3 - CpG risk 3-dose series) 09/06/1959   • IMM DTaP/Tdap/Td Vaccine (1 - Tdap) 09/06/1959   • IMM ZOSTER VACCINES (2 of 3) 12/23/2008   • Annual Wellness Visit  08/09/2019   • DIABETES MONOFILAMENT / LE EXAM  04/15/2020   • RETINAL SCREENING  06/11/2020           6. Orders for overdue Health Maintenance topics pended in Pre-Charting? N\A    7.  AHA (MDX) form printed for Provider? No, already completed    8.  Patient was NOT informed to arrive 15 min prior to their scheduled appointment and bring in their medication bottles.

## 2020-05-17 PROBLEM — Z86.73 H/O TIA (TRANSIENT ISCHEMIC ATTACK) AND STROKE: Status: ACTIVE | Noted: 2020-05-17

## 2020-05-17 NOTE — PROGRESS NOTES
CHIEF COMPLAINT  DM2    HPI  Paulo Paredes is a 79 y.o. male who presents today for the following     DM 2, with microalbuminuria  Interval course  HBA1C 7.0 (H) 2020 07:30 AM   HBA1C 6.9 (H) 10/15/2019 07:11 AM   HBA1C 6.8 (H) 07/15/2019 07:19 AM     Onset/D  Diabetes education:  unknown     Medications:   · Trulicity 0.75 mg weekly  · ACE/ARB: losartan  · Statin: atorvastatin 10 mg QD  · ASA: No, on coumadin     Checking feet daily/wear soft socks/shoes: advised     Diabetes ABCDE TARGETS  · Fingersticks:  N  · Blood Pressure: < 140/90  · Cholesterol-Lipid Panel: elevated triglycerides, low HDL  · Dysalbuminuria:  microalbumin pos     Diet: regular  Exercise:  Walk daily  BMI:  26     DM complications:  · Peripheral neuropathy:           No numbness or tingling in feet.  · Retinopathy:                     Last eye exam: . No retinopathy.    · Nephropathy:                          CKD stage III-IV, microalbuminuria  · CVS:                                 Has CAD, on rx.   · GI:                                       No gastropathy.     FH of DM: mother     CKD stage IV, single kidney  The patient had decreased GFR with normal creatinine and electrolytes.   No consistent NSAIDs use.   He has been followed up by nephrology.     Hypertension/CAD, st post CABG x 4  [facility]  Meds: Valsartan 160 mg daily, atenolol 25 mg daily; no ASA, on coumadin.   Taking meds as prescribed.   He is measuring BP at home, it has been < 140/90  Denies:  -  headaches, vision problems, tinnitus.                 -  chest pain/pressure, palpitations, irregular heart beats, exertional, dyspnea, peripheral edema.  Low salt diet: Y  Diet / exercise / BMI: as above.   FH of HTN: unknown     Dyslipidemia  The patient is on atorvastatin 10 mg, taking daily, as prescribed. No myalgias, muscle cramps or pain.   Diet /Exercise/BMI:  As above  FH:      H/o thyroid cancer  Hypothyroidism,  postsurgical  Dg: in 2012.   Status post total thyroidectomy.   On Levothyroxine, 137 mcg, taking daily early in am, before any po intake.  No temperature intolerance. No change in weight,  hair/skin quality, BMs.   No tremors, weakness.  No peripheral swelling.  No mood changes.  FH: N  Review TSH.  He has been followed up by endocrinology     H/o TIA  (H/o DVT/PE)  Chronic anticoagulation/IVC filter  Dg/Onset: 5/2014.   Since, he has been on Coumadin, coumadin clinic f/u.   Denies:   - Leg swelling or pain  - Shortness of breath, palpitations.     Thrombocytopenia, macrocytosis  The patient has had borderline thrombocytopenia, stable.   Denies easy bleeding or bruising.   Reviewed medication list.      Hypovitaminosis D  The patient had low vitamin D level.  Vitamin D supplement:      Reviewed PMH, PSH, FH, SH, ALL, HCM/IMM, no changes  Reviewed MEDS, no changes    Patient Active Problem List    Diagnosis Date Noted   • Chronic anticoagulation 08/01/2014     Priority: High   • Presence of IVC filter 06/02/2014     Priority: High   • Essential hypertension 12/03/2012     Priority: Medium   • S/P CABG x 4 12/03/2012     Priority: Medium   • Thrombocytopenia (HCC) 01/16/2020   • Macrocytosis 01/16/2020   • CKD (chronic kidney disease), stage IV (HCC) 01/16/2020   • Hypovitaminosis D 01/16/2020   • History of thyroid cancer 07/16/2019   • Microalbuminuria due to type 2 diabetes mellitus (HCC) 07/16/2019   • History of pulmonary embolus (PE) 07/16/2019   • History of DVT in adulthood 07/16/2019   • Zoster 01/23/2019   • Dyslipidemia 03/19/2018   • Health care maintenance 02/12/2018   • Hypothyroidism, postsurgical 08/03/2015   • S/P total thyroidectomy 06/02/2014   • CAD (coronary artery disease) 01/22/2014   • S/P partial resection of colon 07/09/2013   • GERD (gastroesophageal reflux disease) 12/03/2012   • ED (erectile dysfunction) 12/03/2012   • Controlled type 2 diabetes mellitus with complication, without  long-term current use of insulin (HCC) 12/03/2012   • Colon polyp 12/03/2012   • Single kidney 12/03/2012   • H/O prostate cancer 12/03/2012     CURRENT MEDICATIONS  Current Outpatient Medications   Medication Sig Dispense Refill   • EUTHYROX 137 MCG Tab TAKE 1 TABLET BY MOUTH ONCE DAILY IN THE MORNING ON AN EMPTY STOMACH. ONE HOUR BEFORE EATING 90 Tab 0   • fenofibrate (TRIGLIDE) 160 MG tablet Take 1 tablet by mouth once daily 90 Tab 0   • losartan (COZAAR) 50 MG Tab Take 1 Tab by mouth every day. (Patient not taking: Reported on 3/30/2020) 100 Tab 0   • Dulaglutide (TRULICITY) 0.75 MG/0.5ML Solution Pen-injector Inject 0.75 mg as instructed every 7 days. 12 PEN 1   • atorvastatin (LIPITOR) 10 MG Tab Take 1 Tab by mouth every day. 100 Tab 1   • losartan (COZAAR) 50 MG Tab TAKE 1 TABLET BY MOUTH ONCE DAILY 90 Tab 0   • atenolol (TENORMIN) 25 MG Tab TAKE 1 TABLET BY MOUTH ONCE DAILY 90 Tab 3   • warfarin (COUMADIN) 5 MG Tab Take 1 Tab by mouth every evening. 120 Tab 3   • Calcium Carb-Cholecalciferol (CALCIUM 1000 + D PO) Take 1 Dose by mouth every day.     • aspirin 81 MG tablet Take 81 mg by mouth every evening.     • Omega-3 Krill Oil 300 MG Cap Take 300 mg by mouth every day.     • Cinnamon 500 MG Cap Take 1,000 mg by mouth.     • Cyanocobalamin (VITAMIN B-12) 1000 MCG Tab Take 1,000 mcg by mouth every day.     • Coenzyme Q10 (CO Q-10 PO) Take 1 Dose by mouth every day. Unknown OTC Strength       No current facility-administered medications for this visit.      ALLERGIES  Allergies: Lactose  PAST MEDICAL HISTORY  Past Medical History:   Diagnosis Date   • postsurgical hypothyroidism 2014   • Papillary carcinoma of thyroid (HCC) 2014    R 2 foci / 4.5mm,0.25mm / no mets/ pT1pN0   • DVT (deep venous thrombosis) (HCC) 2014   • Multiple pulmonary emboli (HCC) 2014   • Transient renal failure 2014    renal vein thrombosis   • Hyperlipidemia 12/3/2012   • S/P colectomy 2010    multiple colon polyps / no Ca   • Multiple  thyroid nodules 2009   • S/P prostatectomy 2008    carcinoma   • S/P CABG x 4 2003   • S/p nephrectomy 1998    carcinoma   • Anesthesia     difficult intubation with surgery 2008,2010   • Basal cell carcinoma of skin    • CAD (coronary artery disease)    • Cancer (HCC)     rt  kidney 1989;  thyroid cancer 2012, prostate 2008, skin   • ED (erectile dysfunction)    • GERD (gastroesophageal reflux disease)    • Glaucoma    • Heart burn    • Hypertension    • Indigestion    • Pre-diabetes    • Renal disorder     rt kidney cancer,nephrectomy   • Stroke (HCC)     'mild',no residual,'one doctor said it was a tia'   • Type II or unspecified type diabetes mellitus without mention of complication, not stated as uncontrolled     on no medications   • Unspecified urinary incontinence      SURGICAL HISTORY  He  has a past surgical history that includes colon resection; nephrectomy radical; multiple coronary artery bypass; prostatectomy, radical retro; other orthopedic surgery; thyroidectomy total (5/13/2014); and node dissection (5/13/2014).  SOCIAL HISTORY  Social History     Tobacco Use   • Smoking status: Never Smoker   • Smokeless tobacco: Never Used   Substance Use Topics   • Alcohol use: Yes     Comment: 1 PER WK   • Drug use: No     Social History     Social History Narrative   • Not on file     FAMILY HISTORY  Family History   Problem Relation Age of Onset   • Diabetes Mother    • Heart Disease Mother    • Diabetes Father    • Cancer Father         pancreas   • Thyroid Sister         Cancer   • Cancer Sister         thyroid   • Diabetes Sister    • Cancer Brother 49        colon   • Diabetes Brother    • Diabetes Maternal Grandfather    • Diabetes Paternal Grandmother    • Diabetes Paternal Grandfather      Family Status   Relation Name Status   • Mo  (Not Specified)   • Fa  (Not Specified)   • Sis  (Not Specified)   • Bro  (Not Specified)   • MGFa  (Not Specified)   • PGMo  (Not Specified)   • PGFa  (Not Specified)  "    ROS   Constitutional: Negative for fever, chills.  HENT: Negative for congestion, sore throat.  Eyes: Negative for vision problems.   Respiratory: Negative for cough, shortness of breath.  Cardiovascular: Negative for chest pain, palpitations.   Gastrointestinal: Negative for heartburn, nausea, abdominal pain.   Genitourinary: Negative for dysuria.  Musculoskeletal: Negative for significant myalgia, back and joint pain.   Skin: Negative for rash.   Neuro: Negative for dizziness, weakness and headaches.   Endo/Heme/Allergies: Does not bruise/bleed easily.   Psychiatric/Behavioral: Negative for depression.    PHYSICAL EXAM   Blood Pressure 138/60 (BP Location: Left arm, Patient Position: Sitting, BP Cuff Size: Adult)   Pulse 63   Temperature 36.2 °C (97.2 °F) (Temporal)   Height 1.727 m (5' 8\")   Weight 80.2 kg (176 lb 11.2 oz)   Oxygen Saturation 97%   Body Mass Index 26.87 kg/m²   General:  NAD, well appearing  HEENT:   NC/AT, PERRLA, EOMI, TMs are clear. Oropharyngeal mucosa is pink,  without lesions;  no cervical / supraclavicular  lymphadenopathy, no thyromegaly.    Cardiovascular: RRR.   No m/r/g.       Lungs:   CTAB, no w/r/r, no respiratory distress.  Abdomen: Soft, NT/ND; no hepatosplenomegaly.  Extremities:  2+ DP and radial pulses bilaterally.  No c/c/e.   Skin:  Warm, dry.  No erythema. No rash.   Neurologic: Alert & oriented x 3. CN II-XII grossly intact. No focal deficits.  Psychiatric:  Affect normal, mood normal, judgment normal.    Labs     Labs are reviewed and discussed with a patient  Lab Results   Component Value Date/Time    CHOLSTRLTOT 177 05/07/2020 07:30 AM     (H) 05/07/2020 07:30 AM    HDL 36 (A) 05/07/2020 07:30 AM    TRIGLYCERIDE 173 (H) 05/07/2020 07:30 AM       Lab Results   Component Value Date/Time    SODIUM 139 05/07/2020 07:30 AM    POTASSIUM 5.3 05/07/2020 07:30 AM    CHLORIDE 103 05/07/2020 07:30 AM    CO2 27 05/07/2020 07:30 AM    GLUCOSE 130 (H) 05/07/2020 07:30 " AM    BUN 28 (H) 05/07/2020 07:30 AM    CREATININE 2.25 (H) 05/07/2020 07:30 AM     Lab Results   Component Value Date/Time    ALKPHOSPHAT 34 05/07/2020 07:30 AM    ASTSGOT 18 05/07/2020 07:30 AM    ALTSGPT 11 05/07/2020 07:30 AM    TBILIRUBIN 0.7 05/07/2020 07:30 AM      Lab Results   Component Value Date/Time    HBA1C 7.0 (H) 05/07/2020 07:30 AM    HBA1C 6.9 (H) 10/15/2019 07:11 AM    HBA1C 6.8 (H) 07/15/2019 07:19 AM     No results found for: TSH  Lab Results   Component Value Date/Time    FREET4 1.46 05/07/2020 07:30 AM    FREET4 0.90 04/12/2019 07:30 AM       Lab Results   Component Value Date/Time    WBC 5.8 05/07/2020 07:30 AM    RBC 4.91 05/07/2020 07:30 AM    HEMOGLOBIN 15.4 05/07/2020 07:30 AM    HEMATOCRIT 49.6 05/07/2020 07:30 AM    .0 (H) 05/07/2020 07:30 AM    MCH 31.4 05/07/2020 07:30 AM    MCHC 31.0 (L) 05/07/2020 07:30 AM    MPV 10.1 05/07/2020 07:30 AM    NEUTSPOLYS 55.00 05/07/2020 07:30 AM    LYMPHOCYTES 31.60 05/07/2020 07:30 AM    MONOCYTES 6.90 05/07/2020 07:30 AM    EOSINOPHILS 5.30 05/07/2020 07:30 AM    BASOPHILS 0.90 05/07/2020 07:30 AM        Imaging     None    Assessment and Plan     Paulo Paredes is a 79 y.o. male    There are no diagnoses linked to this encounter.    Counseling:   - Smoking:  Nonsmoker    Followup: in 3 months and prn    All questions are answered.    Please note that this dictation was created using voice recognition software, and that there might be errors of venkata and possibly content.

## 2020-05-18 ENCOUNTER — OFFICE VISIT (OUTPATIENT)
Dept: MEDICAL GROUP | Age: 80
End: 2020-05-18
Payer: MEDICARE

## 2020-05-18 VITALS
BODY MASS INDEX: 26.78 KG/M2 | WEIGHT: 176.7 LBS | SYSTOLIC BLOOD PRESSURE: 138 MMHG | HEIGHT: 68 IN | OXYGEN SATURATION: 97 % | DIASTOLIC BLOOD PRESSURE: 60 MMHG | HEART RATE: 63 BPM | TEMPERATURE: 97.2 F

## 2020-05-18 DIAGNOSIS — E89.0 POSTSURGICAL HYPOTHYROIDISM: ICD-10-CM

## 2020-05-18 DIAGNOSIS — N18.4 CKD (CHRONIC KIDNEY DISEASE), STAGE IV (HCC): ICD-10-CM

## 2020-05-18 DIAGNOSIS — E11.8 CONTROLLED TYPE 2 DIABETES MELLITUS WITH COMPLICATION, WITHOUT LONG-TERM CURRENT USE OF INSULIN (HCC): ICD-10-CM

## 2020-05-18 DIAGNOSIS — Z86.711 HX PULMONARY EMBOLISM: ICD-10-CM

## 2020-05-18 DIAGNOSIS — E78.5 DYSLIPIDEMIA: ICD-10-CM

## 2020-05-18 DIAGNOSIS — Z86.73 H/O TIA (TRANSIENT ISCHEMIC ATTACK) AND STROKE: ICD-10-CM

## 2020-05-18 DIAGNOSIS — R80.9 MICROALBUMINURIA DUE TO TYPE 2 DIABETES MELLITUS (HCC): ICD-10-CM

## 2020-05-18 DIAGNOSIS — I10 ESSENTIAL HYPERTENSION: ICD-10-CM

## 2020-05-18 DIAGNOSIS — E55.9 HYPOVITAMINOSIS D: ICD-10-CM

## 2020-05-18 DIAGNOSIS — I25.10 CAD IN NATIVE ARTERY: ICD-10-CM

## 2020-05-18 DIAGNOSIS — D75.89 MACROCYTOSIS: ICD-10-CM

## 2020-05-18 DIAGNOSIS — Z85.850 HISTORY OF THYROID CANCER: ICD-10-CM

## 2020-05-18 DIAGNOSIS — Z90.5 SINGLE KIDNEY: ICD-10-CM

## 2020-05-18 DIAGNOSIS — E11.29 MICROALBUMINURIA DUE TO TYPE 2 DIABETES MELLITUS (HCC): ICD-10-CM

## 2020-05-18 DIAGNOSIS — D69.6 THROMBOCYTOPENIA (HCC): ICD-10-CM

## 2020-05-18 DIAGNOSIS — Z79.01 CHRONIC ANTICOAGULATION: ICD-10-CM

## 2020-05-18 DIAGNOSIS — Z95.1 S/P CABG X 4: ICD-10-CM

## 2020-05-18 PROCEDURE — 99214 OFFICE O/P EST MOD 30 MIN: CPT | Performed by: INTERNAL MEDICINE

## 2020-05-18 ASSESSMENT — FIBROSIS 4 INDEX: FIB4 SCORE: 3.16

## 2020-05-18 NOTE — PROGRESS NOTES
Chief Complaint   Patient presents with   • Lab Results         HPI:  Al is a 79 y.o. here for Medicare Annual Wellness Visit      Patient Active Problem List    Diagnosis Date Noted   • Chronic anticoagulation 08/01/2014     Priority: High   • Presence of IVC filter 06/02/2014     Priority: High   • Essential hypertension 12/03/2012     Priority: Medium   • S/P CABG x 4 12/03/2012     Priority: Medium   • H/O TIA (transient ischemic attack) and stroke 05/17/2020   • Thrombocytopenia (HCC) 01/16/2020   • Macrocytosis 01/16/2020   • CKD (chronic kidney disease), stage IV (HCC) 01/16/2020   • Hypovitaminosis D 01/16/2020   • History of thyroid cancer 07/16/2019   • Microalbuminuria due to type 2 diabetes mellitus ([facility]) 07/16/2019   • Hx pulmonary embolism 07/16/2019   • History of DVT in adulthood 07/16/2019   • Zoster 01/23/2019   • Dyslipidemia 03/19/2018   • Health care maintenance 02/12/2018   • Postsurgical hypothyroidism 08/03/2015   • S/P total thyroidectomy 06/02/2014   • CAD in native artery 01/22/2014   • S/P partial resection of colon 07/09/2013   • GERD (gastroesophageal reflux disease) 12/03/2012   • ED (erectile dysfunction) 12/03/2012   • Controlled type 2 diabetes mellitus with complication, without long-term current use of insulin ([facility]) 12/03/2012   • Colon polyp 12/03/2012   • Single kidney 12/03/2012   • H/O prostate cancer 12/03/2012       Current Outpatient Medications   Medication Sig Dispense Refill   • EUTHYROX 137 MCG Tab TAKE 1 TABLET BY MOUTH ONCE DAILY IN THE MORNING ON AN EMPTY STOMACH. ONE HOUR BEFORE EATING 90 Tab 0   • fenofibrate (TRIGLIDE) 160 MG tablet Take 1 tablet by mouth once daily 90 Tab 0   • losartan (COZAAR) 50 MG Tab Take 1 Tab by mouth every day. (Patient not taking: Reported on 3/30/2020) 100 Tab 0   • Dulaglutide (TRULICITY) 0.75 MG/0.5ML Solution Pen-injector Inject 0.75 mg as instructed every 7 days. 12 PEN 1   • atorvastatin (LIPITOR) 10 MG Tab Take 1 Tab by mouth  every day. 100 Tab 1   • losartan (COZAAR) 50 MG Tab TAKE 1 TABLET BY MOUTH ONCE DAILY 90 Tab 0   • atenolol (TENORMIN) 25 MG Tab TAKE 1 TABLET BY MOUTH ONCE DAILY 90 Tab 3   • warfarin (COUMADIN) 5 MG Tab Take 1 Tab by mouth every evening. 120 Tab 3   • Calcium Carb-Cholecalciferol (CALCIUM 1000 + D PO) Take 1 Dose by mouth every day.     • aspirin 81 MG tablet Take 81 mg by mouth every evening.     • Omega-3 Krill Oil 300 MG Cap Take 300 mg by mouth every day.     • Cinnamon 500 MG Cap Take 1,000 mg by mouth.     • Cyanocobalamin (VITAMIN B-12) 1000 MCG Tab Take 1,000 mcg by mouth every day.     • Coenzyme Q10 (CO Q-10 PO) Take 1 Dose by mouth every day. Unknown OTC Strength       No current facility-administered medications for this visit.         {MEDICATION ADHERENCE:20401}  Current supplements as per medication list.     Allergies: Lactose    Current social contact/activities: walking    Is patient current with immunizations? No, due for HEPATITIS B, TDAP and SHINGRIX (Shingles). Patient is interested in receiving NONE today.    He  reports that he has never smoked. He has never used smokeless tobacco. He reports current alcohol use. He reports that he does not use drugs.  Counseling given: Not Answered        DPA/Advanced directive: Patient does not have an Advanced Directive.  A packet and workshop information was given on Advanced Directives.    ROS:    Gait: Uses no assistive device   Ostomy: No   Other tubes: No   Amputations: No   Chronic oxygen use No   Last eye exam: Pt is unable to recall  Wears hearing aids: No   : Denies any urinary leakage during the last 6 months      Screening:    Depression Screening    Little interest or pleasure in doing things?     Feeling down, depressed, or hopeless?    Trouble falling or staying asleep, or sleeping too much?     Feeling tired or having little energy?     Poor appetite or overeating?     Feeling bad about yourself - or that you are a failure or have let  yourself or your family down?    Trouble concentrating on things, such as reading the newspaper or watching television?    Moving or speaking so slowly that other people could have noticed.  Or the opposite - being so fidgety or restless that you have been moving around a lot more than usual?     Thoughts that you would be better off dead, or of hurting yourself?     Patient Health Questionnaire Score:        If depressive symptoms identified deferred to follow up visit unless specifically addressed in assessment and plan.    Interpretation of PHQ-9 Total Score   Score Severity   1-4 No Depression   5-9 Mild Depression   10-14 Moderate Depression   15-19 Moderately Severe Depression   20-27 Severe Depression      Screening for Cognitive Impairment    Three Minute Recall (village, kitchen, baby)   /3    Draw clock face with all 12 numbers and set the hands to show 10 past 10.       If cognitive concerns identified, deferred for follow up unless specifically addressed in assessment and plan.    Fall Risk Assessment    Has the patient had two or more falls in the last year or any fall with injury in the last year?     If fall risk identified, deferred for follow up unless specifically addressed in assessment and plan.      Safety Assessment    Throw rugs on floor.     Handrails on all stairs.     Good lighting in all hallways.     Difficulty hearing.     Patient counseled about all safety risks that were identified.    Functional Assessment ADLs    Are there any barriers preventing you from cooking for yourself or meeting nutritional needs?   .    Are there any barriers preventing you from driving safely or obtaining transportation?   .    Are there any barriers preventing you from using a telephone or calling for help?   .    Are there any barriers preventing you from shopping?   .    Are there any barriers preventing you from taking care of your own finances?   .    Are there any barriers preventing you from managing  your medications?     .    Are there any barriers preventing you from showering, bathing or dressing yourself?   .    Are you currently engaging in any exercise or physical activity?   .     What is your perception of your health?   .    Health Maintenance Summary                IMM HEP B VACCINE Overdue 9/6/1959      Done 1/16/2020 Imm Admin: Hepatitis B Vaccine Recombivax (Adol/Adult)    IMM DTaP/Tdap/Td Vaccine Overdue 9/6/1959      Done 3/4/2010 Imm Admin: TD Vaccine     Patient has more history with this topic...    IMM ZOSTER VACCINES Overdue 12/23/2008      Done 10/28/2008 Imm Admin: Zoster Vaccine Live (ZVL) (Zostavax)    Annual Wellness Visit Overdue 8/9/2019      Done 8/8/2018      Patient has more history with this topic...    RETINAL SCREENING Next Due 6/11/2020      Done 6/11/2019 REFERRAL FOR RETINAL SCREENING EXAM     Patient has more history with this topic...    A1C SCREENING Next Due 11/7/2020      Done 5/7/2020 HEMOGLOBIN A1C     Patient has more history with this topic...    FASTING LIPID PROFILE Next Due 5/7/2021      Done 5/7/2020 LIPID PROFILE     Patient has more history with this topic...    URINE ACR / MICROALBUMIN Next Due 5/7/2021      Done 5/7/2020 MICROALBUMIN CREAT RATIO URINE     Patient has more history with this topic...    SERUM CREATININE Next Due 5/7/2021      Done 5/7/2020 COMP METABOLIC PANEL     Patient has more history with this topic...    DIABETES MONOFILAMENT / LE EXAM Next Due 5/18/2021      Done 5/18/2020      Patient has more history with this topic...    COLONOSCOPY Next Due 12/22/2022      Done 12/22/2017      Patient has more history with this topic...          Patient Care Team:  Edward Walters M.D. as PCP - General (Family Medicine)  Yunier Hughes M.D. as Consulting Physician (Endocrinology)  [facility]  Abhi Damon M.D. as Consulting Physician (Cardiology)  Heavenly Gordon, PT, DPT as Physical Therapy  Beth KASH Garcia R.N. as  Registered Nurse (Diabetic Education)      Social History     Tobacco Use   • Smoking status: Never Smoker   • Smokeless tobacco: Never Used   Substance Use Topics   • Alcohol use: Yes     Comment: 1 PER WK   • Drug use: No     Family History   Problem Relation Age of Onset   • Diabetes Mother    • Heart Disease Mother    • Diabetes Father    • Cancer Father         pancreas   • Thyroid Sister         Cancer   • Cancer Sister         thyroid   • Diabetes Sister    • Cancer Brother 49        colon   • Diabetes Brother    • Diabetes Maternal Grandfather    • Diabetes Paternal Grandmother    • Diabetes Paternal Grandfather      He  has a past medical history of Anesthesia, Basal cell carcinoma of skin, CAD (coronary artery disease), Cancer (HCC), DVT (deep venous thrombosis) (HCC) (2014), ED (erectile dysfunction), GERD (gastroesophageal reflux disease), Glaucoma, Heart burn, Hyperlipidemia (12/3/2012), Hypertension, Indigestion, Multiple pulmonary emboli (HCC) (2014), Multiple thyroid nodules (2009), Papillary carcinoma of thyroid (HCC) (2014), postsurgical hypothyroidism (2014), Pre-diabetes, Renal disorder, S/P CABG x 4 (2003), S/P colectomy (2010), S/p nephrectomy (1998), S/P prostatectomy (2008), Stroke (HCC), Transient renal failure (2014), Type II or unspecified type diabetes mellitus without mention of complication, not stated as uncontrolled, and Unspecified urinary incontinence.   Past Surgical History:   Procedure Laterality Date   • THYROIDECTOMY TOTAL  5/13/2014    Performed by Eliceo Bolanos M.D. at SURGERY SAME DAY [facility] ORS   • NODE DISSECTION  5/13/2014    Performed by Eliceo Bolanos M.D. at SURGERY SAME DAY [facility] ORS   • COLON RESECTION     • MULTIPLE CORONARY ARTERY BYPASS      x 4 11/2003   • NEPHRECTOMY RADICAL      right side 1998   • OTHER ORTHOPEDIC SURGERY      trotator cuff   • PROSTATECTOMY, RADICAL RETRO      cancer /january 2008         Exam:     /60 (BP Location: Left arm,  "Patient Position: Sitting, BP Cuff Size: Adult)   Pulse 63   Temp 36.2 °C (97.2 °F) (Temporal)   Ht 1.727 m (5' 8\")   Wt 80.2 kg (176 lb 11.2 oz)   SpO2 97%  Body mass index is 26.87 kg/m².    Hearing {GOOD/FAIR/POOR/EXCELLENT:37510}.    Dentition {DENTITION:16883}  Alert, oriented in no acute distress.  Eye contact is good, speech goal directed, affect calm  ***    Assessment and Plan. The following treatment and monitoring plan is recommended:  ***  1. Controlled type 2 diabetes mellitus with complication, without long-term current use of insulin (HCC)  Comp Metabolic Panel   2. Microalbuminuria due to type 2 diabetes mellitus (HCC)  Comp Metabolic Panel   3. CKD (chronic kidney disease), stage IV (HCC)  Comp Metabolic Panel   4. Single kidney     5. Essential hypertension  Comp Metabolic Panel   6. CAD in native artery     7. S/P CABG x 4     8. Dyslipidemia  Comp Metabolic Panel    Lipid Profile   9. History of thyroid cancer     10. Postsurgical hypothyroidism  TSH    FREE THYROXINE   11. H/O TIA (transient ischemic attack) and stroke     12. Hx pulmonary embolism     13. Chronic anticoagulation     14. Thrombocytopenia (HCC)  CBC WITH DIFFERENTIAL   15. Macrocytosis  CBC WITH DIFFERENTIAL   16. Hypovitaminosis D  VITAMIN D,25 HYDROXY         Services suggested: { AWV COORDINATION OF SERVICES:20405}  Health Care Screening recommendations as per orders if indicated.  Referrals offered: PT/OT/Nutrition counseling/Behavioral Health/Smoking cessation as per orders if indicated.    Discussion today about general wellness and lifestyle habits:    · Prevent falls and reduce trip hazards; Cautioned about securing or removing rugs.  · Have a working fire alarm and carbon monoxide detector;   · Engage in regular physical activity and social activities       Follow-up: No follow-ups on file.  "

## 2020-05-19 ENCOUNTER — OFFICE VISIT (OUTPATIENT)
Dept: NEPHROLOGY | Facility: MEDICAL CENTER | Age: 80
End: 2020-05-19
Payer: MEDICARE

## 2020-05-19 VITALS
SYSTOLIC BLOOD PRESSURE: 118 MMHG | TEMPERATURE: 98 F | HEART RATE: 60 BPM | RESPIRATION RATE: 16 BRPM | BODY MASS INDEX: 26.98 KG/M2 | OXYGEN SATURATION: 96 % | DIASTOLIC BLOOD PRESSURE: 68 MMHG | HEIGHT: 68 IN | WEIGHT: 178 LBS

## 2020-05-19 DIAGNOSIS — N18.30 CKD (CHRONIC KIDNEY DISEASE) STAGE 3, GFR 30-59 ML/MIN: ICD-10-CM

## 2020-05-19 DIAGNOSIS — I10 ESSENTIAL HYPERTENSION: ICD-10-CM

## 2020-05-19 PROCEDURE — 99214 OFFICE O/P EST MOD 30 MIN: CPT | Performed by: INTERNAL MEDICINE

## 2020-05-19 ASSESSMENT — ENCOUNTER SYMPTOMS
HYPERTENSION: 1
CHILLS: 0
NAUSEA: 0
INSOMNIA: 1
VOMITING: 0
FEVER: 0
COUGH: 0
SHORTNESS OF BREATH: 0

## 2020-05-19 ASSESSMENT — FIBROSIS 4 INDEX: FIB4 SCORE: 3.16

## 2020-05-19 NOTE — PROGRESS NOTES
"Subjective:      Kevin Paredes is a 79 y.o. male who presents with Hypertension and Chronic Kidney Disease            Hypertension   This is a chronic problem. The current episode started more than 1 year ago. The problem is unchanged. The problem is controlled. Pertinent negatives include no chest pain, malaise/fatigue, peripheral edema or shortness of breath. Risk factors for coronary artery disease include male gender. Past treatments include angiotensin blockers. The current treatment provides significant improvement. There are no compliance problems.  Hypertensive end-organ damage includes kidney disease. Identifiable causes of hypertension include chronic renal disease.   Chronic Kidney Disease   This is a chronic problem. The current episode started more than 1 year ago. The problem occurs constantly. The problem has been unchanged. Pertinent negatives include no chest pain, chills, coughing, fever, nausea, urinary symptoms or vomiting.       Review of Systems   Constitutional: Negative for chills, fever and malaise/fatigue.   Respiratory: Negative for cough and shortness of breath.    Cardiovascular: Negative for chest pain and leg swelling.   Gastrointestinal: Negative for nausea and vomiting.   Genitourinary: Negative for dysuria, frequency and urgency.   Musculoskeletal: Positive for joint pain.   Psychiatric/Behavioral: The patient has insomnia.           Objective:     /68 (BP Location: Right arm, Patient Position: Sitting, BP Cuff Size: Adult)   Pulse 60   Temp 36.7 °C (98 °F) (Temporal)   Resp 16   Ht 1.727 m (5' 8\")   Wt 80.7 kg (178 lb)   SpO2 96%   BMI 27.06 kg/m²      Physical Exam  Vitals signs and nursing note reviewed.   Constitutional:       General: He is not in acute distress.     Appearance: He is not ill-appearing.   HENT:      Head: Normocephalic and atraumatic.      Right Ear: External ear normal.      Left Ear: External ear normal.      Nose: Nose normal.   Eyes:      " General:         Right eye: No discharge.         Left eye: No discharge.      Conjunctiva/sclera: Conjunctivae normal.   Cardiovascular:      Rate and Rhythm: Normal rate and regular rhythm.      Heart sounds: No murmur.   Pulmonary:      Effort: Pulmonary effort is normal. No respiratory distress.      Breath sounds: Normal breath sounds. No wheezing.   Musculoskeletal:         General: No tenderness or deformity.      Right lower leg: No edema.      Left lower leg: No edema.   Skin:     General: Skin is warm.   Neurological:      General: No focal deficit present.      Mental Status: He is alert and oriented to person, place, and time.   Psychiatric:         Mood and Affect: Mood normal.         Behavior: Behavior normal.       Past Medical History:   Diagnosis Date   • Anesthesia     difficult intubation with surgery 2008,2010   • Basal cell carcinoma of skin    • CAD (coronary artery disease)    • Cancer (HCC)     rt  kidney 1989;  thyroid cancer 2012, prostate 2008, skin   • DVT (deep venous thrombosis) (HCC) 2014   • ED (erectile dysfunction)    • GERD (gastroesophageal reflux disease)    • Glaucoma    • Heart burn    • Hyperlipidemia 12/3/2012   • Hypertension    • Indigestion    • Multiple pulmonary emboli (HCC) 2014   • Multiple thyroid nodules 2009   • Papillary carcinoma of thyroid (HCC) 2014    R 2 foci / 4.5mm,0.25mm / no mets/ pT1pN0   • postsurgical hypothyroidism 2014   • Pre-diabetes    • Renal disorder     rt kidney cancer,nephrectomy   • S/P CABG x 4 2003   • S/P colectomy 2010    multiple colon polyps / no Ca   • S/p nephrectomy 1998    carcinoma   • S/P prostatectomy 2008    carcinoma   • Stroke (HCC)     'mild',no residual,'one doctor said it was a tia'   • Transient renal failure 2014    renal vein thrombosis   • Type II or unspecified type diabetes mellitus without mention of complication, not stated as uncontrolled     on no medications   • Unspecified urinary incontinence      Social  History     Socioeconomic History   • Marital status:      Spouse name: Not on file   • Number of children: Not on file   • Years of education: Not on file   • Highest education level: Not on file   Occupational History   • Not on file   Social Needs   • Financial resource strain: Not on file   • Food insecurity     Worry: Not on file     Inability: Not on file   • Transportation needs     Medical: Not on file     Non-medical: Not on file   Tobacco Use   • Smoking status: Never Smoker   • Smokeless tobacco: Never Used   Substance and Sexual Activity   • Alcohol use: Yes     Comment: 1 PER WK   • Drug use: No   • Sexual activity: Not on file     Comment: retired    Lifestyle   • Physical activity     Days per week: Not on file     Minutes per session: Not on file   • Stress: Not on file   Relationships   • Social connections     Talks on phone: Not on file     Gets together: Not on file     Attends Jain service: Not on file     Active member of club or organization: Not on file     Attends meetings of clubs or organizations: Not on file     Relationship status: Not on file   • Intimate partner violence     Fear of current or ex partner: Not on file     Emotionally abused: Not on file     Physically abused: Not on file     Forced sexual activity: Not on file   Other Topics Concern   • Not on file   Social History Narrative   • Not on file     Family History   Problem Relation Age of Onset   • Diabetes Mother    • Heart Disease Mother    • Diabetes Father    • Cancer Father         pancreas   • Thyroid Sister         Cancer   • Cancer Sister         thyroid   • Diabetes Sister    • Cancer Brother 49        colon   • Diabetes Brother    • Diabetes Maternal Grandfather    • Diabetes Paternal Grandmother    • Diabetes Paternal Grandfather      Recent Labs     07/15/19  0719 10/15/19  0711  01/14/20  0706 03/30/20  1001 05/07/20  0730   ALBUMIN 4.0 4.1  --   --  4.3 4.4   HDL 32*  --   --   --    --  36*   TRIGLYCERIDE 185*  --   --   --   --  173*   SODIUM 141 143   < > 141 140 139   POTASSIUM 4.7 4.6   < > 4.7 4.8 5.3   CHLORIDE 107 108   < > 107 104 103   CO2 26 28   < > 29 25 27   BUN 34* 32*   < > 33* 30* 28*   CREATININE 2.17* 2.01*   < > 2.13* 1.97* 2.25*    < > = values in this interval not displayed.               Assessment/Plan:       1. Essential hypertension  Controlled  Continue same medication regimen  Continue low-sodium diet      2. CKD (chronic kidney disease) stage 3, GFR 30-59 ml/min ([facility])  Stable  No uremic symptoms  Renal dose of medication  Avoid nephrotoxins  Continue same medication regimen      3. Uncontrolled type 2 diabetes mellitus with microalbuminuria, without long-term current use of insulin ([facility])  Continue to optimize diabetes control    4.  Osteoarthritis  I advised the patient to try CBD ointment, patient was advised about the possible side effects

## 2020-06-08 ENCOUNTER — ANTICOAGULATION VISIT (OUTPATIENT)
Dept: MEDICAL GROUP | Facility: MEDICAL CENTER | Age: 80
End: 2020-06-08
Payer: MEDICARE

## 2020-06-08 DIAGNOSIS — Z95.828 PRESENCE OF IVC FILTER: ICD-10-CM

## 2020-06-08 DIAGNOSIS — Z79.01 CHRONIC ANTICOAGULATION: Primary | ICD-10-CM

## 2020-06-08 LAB — INR PPP: 1.6 (ref 2–3.5)

## 2020-06-08 PROCEDURE — 85610 PROTHROMBIN TIME: CPT | Performed by: PHYSICIAN ASSISTANT

## 2020-06-08 PROCEDURE — 99211 OFF/OP EST MAY X REQ PHY/QHP: CPT | Performed by: FAMILY MEDICINE

## 2020-06-08 NOTE — PROGRESS NOTES
OP Anticoagulation Service Note    Date: 6/8/2020  There were no vitals filed for this visit.  pt declined vitals    Anticoagulation Summary  As of 6/8/2020    INR goal:   2.0-3.0   TTR:   79.0 % (2.2 y)   INR used for dosing:      Plan last modified:   Kelsey Junior, PharmD (4/27/2020)   Next INR check:      Target end date:   Indefinite    Indications    Presence of IVC filter [Z95.828]  Transient ischemic attack [G45.9] (Resolved) [G45.9]  Deep vein thrombosis (DVT) of both lower extremities (HCC) (Resolved) [I82.403]  DVT (deep venous thrombosis) (HCC) (Resolved) [I82.409]  Multiple pulmonary emboli (HCC) (Resolved) [I26.99]  Chronic anticoagulation [Z79.01]             Anticoagulation Episode Summary     INR check location:       Preferred lab:       Send INR reminders to:       Comments:         Anticoagulation Care Providers     Provider Role Specialty Phone number    [facility]   [phone]    Edward Walters M.D.  [facility] [phone]        Anticoagulation Patient Findings      HPI:   Paulo Paredes seen in clinic today, on anticoagulation therapy with warfarin (a high risk medication) for hx of DVT    Pt is here today to evaluate anticoagulation therapy    Previous INR was  2.4 on 5/11    Pt was instructed to take 2.5 mg every Monday and 5 mg all other days    Confirmed warfarin dosing regimen, denies missed or extra doses of coumadin.   Diet has been consistent with foods rich in vitamin K: Yes  Changes in ETOH:  No  Changes in smoking status: No  Changes in medication: No   Cost restriction: No  S/s of bleeding:  No  Falls or accidents since last visit No  Signs/symptoms  thrombosis since the last appt: No    A/P   INR  SUB-therapeutic today, will require dose adjust ment today to prevent recurrence of thrombosis or stroke and closer follow up.     Instructed patient to take 7.5 mg today and tomorrow and continue with 5 mg daily     Pt educated to contact our  clinic with any changes in medications or s/s of bleeding or thrombosis. Pt is aware to seek immediate medical attention for falls, head injury or deep cuts    Follow up appointment in 3 days to reduce risk of adverse events from warfarin     Jose Short, ZayD

## 2020-06-11 ENCOUNTER — ANTICOAGULATION VISIT (OUTPATIENT)
Dept: MEDICAL GROUP | Facility: MEDICAL CENTER | Age: 80
End: 2020-06-11
Payer: MEDICARE

## 2020-06-11 DIAGNOSIS — Z95.828 PRESENCE OF IVC FILTER: ICD-10-CM

## 2020-06-11 DIAGNOSIS — Z79.01 CHRONIC ANTICOAGULATION: Primary | ICD-10-CM

## 2020-06-11 LAB — INR PPP: 3 (ref 2–3.5)

## 2020-06-11 PROCEDURE — 93793 ANTICOAG MGMT PT WARFARIN: CPT | Performed by: PHYSICIAN ASSISTANT

## 2020-06-11 PROCEDURE — 85610 PROTHROMBIN TIME: CPT | Performed by: PHYSICIAN ASSISTANT

## 2020-06-11 NOTE — PROGRESS NOTES
OP Anticoagulation Service Note    Date: 6/11/2020  There were no vitals filed for this visit.   pt declined vitals    Anticoagulation Summary  As of 6/11/2020    INR goal:   2.0-3.0   TTR:   78.0 % (2.3 y)   INR used for dosing:      Plan last modified:   Kelsey Junior, PharmD (4/27/2020)   Next INR check:      Target end date:   Indefinite    Indications    Presence of IVC filter [Z95.828]  Transient ischemic attack [G45.9] (Resolved) [G45.9]  Deep vein thrombosis (DVT) of both lower extremities (HCC) (Resolved) [I82.403]  DVT (deep venous thrombosis) (HCC) (Resolved) [I82.409]  Multiple pulmonary emboli (HCC) (Resolved) [I26.99]  Chronic anticoagulation [Z79.01]             Anticoagulation Episode Summary     INR check location:       Preferred lab:       Send INR reminders to:       Comments:         Anticoagulation Care Providers     Provider Role Specialty Phone number    [facility]   [phone]    Edward Walters M.D.  [facility] [phone]        Anticoagulation Patient Findings      HPI:   Paulo Paredes seen in clinic today, on anticoagulation therapy with warfarin (a high risk medication) for hx of DVT and PE      Pt is here today to evaluate anticoagulation therapy    Previous INR was  1.6 on 6-8-20    Pt was instructed to 7.5 mg x 2 days, then resume usual regimen    Confirmed warfarin dosing regimen, denies missed or extra doses of coumadin.   Diet has been consistent with foods rich in vitamin K: Yes  Changes in ETOH:  No  Changes in smoking status: No  Changes in medication: No   Cost restriction: No  S/s of bleeding:  No  Falls or accidents since last visit No  Signs/symptoms  thrombosis since the last appt: No    A/P   INR  therapeutic today, will require close follow up as previous INR was Sub-therapeutic   Continue current warfarin regimen        Pt educated to contact our clinic with any changes in medications or s/s of bleeding or thrombosis. Pt is aware  to seek immediate medical attention for falls, head injury or deep cuts    Follow up appointment in 2 week(s) to reduce risk of adverse events from warfarin   Kelsey Junior, PharmD

## 2020-06-25 ENCOUNTER — ANTICOAGULATION VISIT (OUTPATIENT)
Dept: MEDICAL GROUP | Facility: MEDICAL CENTER | Age: 80
End: 2020-06-25
Payer: MEDICARE

## 2020-06-25 DIAGNOSIS — Z95.828 PRESENCE OF IVC FILTER: ICD-10-CM

## 2020-06-25 DIAGNOSIS — Z79.01 CHRONIC ANTICOAGULATION: Primary | ICD-10-CM

## 2020-06-25 LAB — INR PPP: 2.3 (ref 2–3.5)

## 2020-06-25 PROCEDURE — 93793 ANTICOAG MGMT PT WARFARIN: CPT | Performed by: INTERNAL MEDICINE

## 2020-06-25 PROCEDURE — 85610 PROTHROMBIN TIME: CPT | Performed by: INTERNAL MEDICINE

## 2020-06-25 NOTE — PROGRESS NOTES
OP Anticoagulation Service Note    Date: 2020  There were no vitals filed for this visit.   pt declined vitals    Anticoagulation Summary  As of 2020    INR goal:   2.0-3.0   TTR:   78.4 % (2.3 y)   INR used for dosin.30 (2020)   Warfarin maintenance plan:   2.5 mg (5 mg x 0.5) every Mon; 5 mg (5 mg x 1) all other days   Weekly warfarin total:   32.5 mg   Plan last modified:   Kelsey Junior, PharmD (2020)   Next INR check:   2020   Target end date:   Indefinite    Indications    Presence of IVC filter [Z95.828]  Transient ischemic attack [G45.9] (Resolved) [G45.9]  Deep vein thrombosis (DVT) of both lower extremities (HCC) (Resolved) [I82.403]  DVT (deep venous thrombosis) (HCC) (Resolved) [I82.409]  Multiple pulmonary emboli (HCC) (Resolved) [I26.99]  Chronic anticoagulation [Z79.01]             Anticoagulation Episode Summary     INR check location:       Preferred lab:       Send INR reminders to:       Comments:         Anticoagulation Care Providers     Provider Role Specialty Phone number    [facility]   [phone]    Edward Walters M.D.  Family Medicine [phone]        Anticoagulation Patient Findings      HPI:   Paulo Paredes seen in clinic today, on anticoagulation therapy with warfarin (a high risk medication) for hx of TIA, DVT, PE      Pt is here today to evaluate anticoagulation therapy    Previous INR was  3.0 on 20    Pt was instructed to continue current regimen    Confirmed warfarin dosing regimen, denies missed or extra doses of coumadin.   Diet has been consistent with foods rich in vitamin K: Yes  Changes in ETOH:  No  Changes in smoking status: No  Changes in medication: No   Cost restriction: No  S/s of bleeding:  No  Falls or accidents since last visit No  Signs/symptoms  thrombosis since the last appt: No    A/P   INR  therapeutic today,   Continue current warfarin regimen          Pt educated to contact our clinic  with any changes in medications or s/s of bleeding or thrombosis. Pt is aware to seek immediate medical attention for falls, head injury or deep cuts    Follow up appointment in 4 week(s) to reduce risk of adverse events from warfarin  Kelsey Junior, PharmD

## 2020-06-29 RX ORDER — LOSARTAN POTASSIUM 50 MG/1
TABLET ORAL
Qty: 100 TAB | Refills: 0 | Status: SHIPPED | OUTPATIENT
Start: 2020-06-29 | End: 2020-10-10

## 2020-07-07 RX ORDER — DULAGLUTIDE 0.75 MG/.5ML
0.75 INJECTION, SOLUTION SUBCUTANEOUS
Qty: 12 PEN | Refills: 0 | Status: SHIPPED | OUTPATIENT
Start: 2020-07-07 | End: 2020-10-26

## 2020-07-23 ENCOUNTER — ANTICOAGULATION VISIT (OUTPATIENT)
Dept: MEDICAL GROUP | Facility: MEDICAL CENTER | Age: 80
End: 2020-07-23
Payer: MEDICARE

## 2020-07-23 DIAGNOSIS — Z95.828 PRESENCE OF IVC FILTER: ICD-10-CM

## 2020-07-23 DIAGNOSIS — Z79.01 CHRONIC ANTICOAGULATION: ICD-10-CM

## 2020-07-23 LAB — INR PPP: 1.5 (ref 2–3.5)

## 2020-07-23 PROCEDURE — 99211 OFF/OP EST MAY X REQ PHY/QHP: CPT | Performed by: INTERNAL MEDICINE

## 2020-07-23 PROCEDURE — 85610 PROTHROMBIN TIME: CPT | Performed by: INTERNAL MEDICINE

## 2020-07-23 NOTE — PROGRESS NOTES
Anticoagulation Summary  As of 2020    INR goal:   2.0-3.0   TTR:   77.1 % (2.4 y)   INR used for dosin.50! (2020)   Warfarin maintenance plan:   2.5 mg (5 mg x 0.5) every Mon; 5 mg (5 mg x 1) all other days   Weekly warfarin total:   32.5 mg   Plan last modified:   Kelsey Junior, PharmD (2020)   Next INR check:   2020   Target end date:   Indefinite    Indications    Presence of IVC filter [Z95.828]  Transient ischemic attack [G45.9] (Resolved) [G45.9]  Deep vein thrombosis (DVT) of both lower extremities (HCC) (Resolved) [I82.403]  DVT (deep venous thrombosis) (HCC) (Resolved) [I82.409]  Multiple pulmonary emboli (HCC) (Resolved) [I26.99]  Chronic anticoagulation [Z79.01]             Anticoagulation Episode Summary     INR check location:       Preferred lab:       Send INR reminders to:       Comments:         Anticoagulation Care Providers     Provider Role Specialty Phone number    [facility]   [phone]    Edward Walters M.D.  Family Medicine [phone]        Anticoagulation Patient Findings  Patient Findings     Negatives:   Signs/symptoms of thrombosis, Signs/symptoms of bleeding, Laboratory test error suspected, Change in health, Change in alcohol use, Change in activity, Upcoming invasive procedure, Emergency department visit, Upcoming dental procedure, Missed doses, Extra doses, Change in medications, Change in diet/appetite, Hospital admission, Bruising, Other complaints            History of Present Illness: follow up appointment for chronic anticoagulation with the high risk medication, warfarin for DVT    Last INR was at goal, pt is now sub therapeutic today.  He cannot remember if he has missed any doses.  Will bolus dose with 10mg tonight and 7.5mg tomorrow. Continue current dosing regimen.  Follow up in 1 weeks, to reduce the risk of adverse events related to this high risk medication, warfarin.    Pt declines vitals today  Marta Filter,  Clinical Pharmacist

## 2020-07-30 ENCOUNTER — ANTICOAGULATION VISIT (OUTPATIENT)
Dept: MEDICAL GROUP | Facility: MEDICAL CENTER | Age: 80
End: 2020-07-30
Payer: MEDICARE

## 2020-07-30 DIAGNOSIS — Z95.828 PRESENCE OF IVC FILTER: ICD-10-CM

## 2020-07-30 DIAGNOSIS — Z79.01 CHRONIC ANTICOAGULATION: Primary | ICD-10-CM

## 2020-07-30 LAB — INR PPP: 2.1 (ref 2–3.5)

## 2020-07-30 PROCEDURE — 93793 ANTICOAG MGMT PT WARFARIN: CPT | Performed by: INTERNAL MEDICINE

## 2020-07-30 PROCEDURE — 85610 PROTHROMBIN TIME: CPT | Performed by: INTERNAL MEDICINE

## 2020-07-30 NOTE — PROGRESS NOTES
OP Anticoagulation Service Note    Date: 2020  There were no vitals filed for this visit.   pt declined vitals    Anticoagulation Summary  As of 2020    INR goal:   2.0-3.0   TTR:   76.6 % (2.4 y)   INR used for dosin.10 (2020)   Warfarin maintenance plan:   2.5 mg (5 mg x 0.5) every Mon; 5 mg (5 mg x 1) all other days   Weekly warfarin total:   32.5 mg   Plan last modified:   Kelsey Junior, PharmD (2020)   Next INR check:   2020   Target end date:   Indefinite    Indications    Presence of IVC filter [Z95.828]  Transient ischemic attack [G45.9] (Resolved) [G45.9]  Deep vein thrombosis (DVT) of both lower extremities (HCC) (Resolved) [I82.403]  DVT (deep venous thrombosis) (HCC) (Resolved) [I82.409]  Multiple pulmonary emboli (HCC) (Resolved) [I26.99]  Chronic anticoagulation [Z79.01]             Anticoagulation Episode Summary     INR check location:       Preferred lab:       Send INR reminders to:       Comments:         Anticoagulation Care Providers     Provider Role Specialty Phone number    [facility]   [phone]    Edward Walters M.D.  [facility] [phone]        Anticoagulation Patient Findings      HPI:   Paulo Paredes seen in clinic today, on anticoagulation therapy with warfarin (a high risk medication) for hx of DVT and PE and TIA      Pt is here today to evaluate anticoagulation therapy    Previous INR was  1.5 on 20    Pt was instructed to increase x 2 then resume usual regimen    Confirmed warfarin dosing regimen, denies missed or extra doses of coumadin.   Diet has been consistent with foods rich in vitamin K: Yes  Changes in ETOH:  No  Changes in smoking status: No  Changes in medication: No   Cost restriction: No  S/s of bleeding:  No  Falls or accidents since last visit No  Signs/symptoms  thrombosis since the last appt: No    A/P   INR  therapeutic today,  will require close follow up as previous INR was  sub-therapeutic   Continue current warfarin regimen          1/21 check referral    Pt educated to contact our clinic with any changes in medications or s/s of bleeding or thrombosis. Pt is aware to seek immediate medical attention for falls, head injury or deep cuts    Follow up appointment in 1 week(s) to reduce risk of adverse events from warfarin   Kelsey Junior, PharmD

## 2020-08-06 ENCOUNTER — ANTICOAGULATION VISIT (OUTPATIENT)
Dept: MEDICAL GROUP | Facility: MEDICAL CENTER | Age: 80
End: 2020-08-06
Payer: MEDICARE

## 2020-08-06 DIAGNOSIS — Z95.828 PRESENCE OF IVC FILTER: ICD-10-CM

## 2020-08-06 DIAGNOSIS — Z79.01 CHRONIC ANTICOAGULATION: ICD-10-CM

## 2020-08-06 LAB — INR PPP: 1.5 (ref 2–3.5)

## 2020-08-06 PROCEDURE — 99211 OFF/OP EST MAY X REQ PHY/QHP: CPT | Performed by: PHYSICIAN ASSISTANT

## 2020-08-06 PROCEDURE — 85610 PROTHROMBIN TIME: CPT | Performed by: PHYSICIAN ASSISTANT

## 2020-08-06 NOTE — PROGRESS NOTES
OP Anticoagulation Service Note    Date: 2020  There were no vitals filed for this visit.   pt declined vitals    Anticoagulation Summary  As of 2020    INR goal:  2.0-3.0   TTR:  76.1 % (2.5 y)   INR used for dosin.50 (2020)   Warfarin maintenance plan:  5 mg (5 mg x 1) every day   Weekly warfarin total:  35 mg   Plan last modified:  Kelsey Junior, PharmD (2020)   Next INR check:  2020   Target end date:  Indefinite    Indications    Presence of IVC filter [Z95.828]  Transient ischemic attack [G45.9] (Resolved) [G45.9]  Deep vein thrombosis (DVT) of both lower extremities (HCC) (Resolved) [I82.403]  DVT (deep venous thrombosis) (HCC) (Resolved) [I82.409]  Multiple pulmonary emboli (HCC) (Resolved) [I26.99]  Chronic anticoagulation [Z79.01]             Anticoagulation Episode Summary     INR check location:      Preferred lab:      Send INR reminders to:      Comments:        Anticoagulation Care Providers     Provider Role Specialty Phone number    [facility]   [phone]    Edward Walters M.D.  Family Medicine [phone]        Anticoagulation Patient Findings      HPI:   Paulo Paredes seen in clinic today, on anticoagulation therapy with warfarin (a high risk medication) for hx of DVT and PE     Pt is here today to evaluate anticoagulation therapy    Previous INR was  2.1 on 20    Pt was instructed to continue current regimen    Confirmed warfarin dosing regimen, denies missed or extra doses of coumadin.   Diet has been consistent with foods rich in vitamin K: pt has been eating more tomatoes   Changes in ETOH:  No  Changes in smoking status: No  Changes in medication: No   Cost restriction: No  S/s of bleeding:  No  Falls or accidents since last visit No  Signs/symptoms  thrombosis since the last appt: No    A/P   INR  SUB-therapeutic today, will require dose adjust ment today to prevent recurrence of thrombosis  and closer follow up.    Tonight  7.5 mg then increase weekly regimen         Pt educated to contact our clinic with any changes in medications or s/s of bleeding or thrombosis. Pt is aware to seek immediate medical attention for falls, head injury or deep cuts    Follow up appointment in 2 week(s) to reduce risk of adverse events from warfarin   Kelsey Junoir, PharmD

## 2020-08-12 DIAGNOSIS — E78.5 DYSLIPIDEMIA: ICD-10-CM

## 2020-08-12 RX ORDER — LEVOTHYROXINE SODIUM 137 UG/1
TABLET ORAL
Qty: 90 TAB | Refills: 0 | Status: SHIPPED | OUTPATIENT
Start: 2020-08-12 | End: 2020-11-06

## 2020-08-13 RX ORDER — ATORVASTATIN CALCIUM 10 MG/1
TABLET, FILM COATED ORAL
Qty: 100 TAB | Refills: 0 | Status: SHIPPED | OUTPATIENT
Start: 2020-08-13 | End: 2020-10-27

## 2020-08-13 RX ORDER — FENOFIBRATE 160 MG/1
TABLET ORAL
Qty: 90 TAB | Refills: 0 | Status: SHIPPED | OUTPATIENT
Start: 2020-08-13 | End: 2020-10-27 | Stop reason: SDUPTHER

## 2020-08-20 ENCOUNTER — ANTICOAGULATION VISIT (OUTPATIENT)
Dept: MEDICAL GROUP | Facility: MEDICAL CENTER | Age: 80
End: 2020-08-20
Payer: MEDICARE

## 2020-08-20 DIAGNOSIS — Z95.828 PRESENCE OF IVC FILTER: ICD-10-CM

## 2020-08-20 DIAGNOSIS — Z79.01 CHRONIC ANTICOAGULATION: Primary | ICD-10-CM

## 2020-08-20 LAB — INR PPP: 2.2 (ref 2–3.5)

## 2020-08-20 PROCEDURE — 85610 PROTHROMBIN TIME: CPT | Performed by: INTERNAL MEDICINE

## 2020-08-20 PROCEDURE — 93793 ANTICOAG MGMT PT WARFARIN: CPT | Performed by: INTERNAL MEDICINE

## 2020-08-20 NOTE — PROGRESS NOTES
OP Anticoagulation Service Note    Date: 2020  There were no vitals filed for this visit.   pt declined vitals    Anticoagulation Summary  As of 2020    INR goal:  2.0-3.0   TTR:  75.4 % (2.5 y)   INR used for dosin.20 (2020)   Warfarin maintenance plan:  5 mg (5 mg x 1) every day   Weekly warfarin total:  35 mg   Plan last modified:  Kelsey Junior, PharmD (2020)   Next INR check:  9/10/2020   Target end date:  Indefinite    Indications    Presence of IVC filter [Z95.828]  Transient ischemic attack [G45.9] (Resolved) [G45.9]  Deep vein thrombosis (DVT) of both lower extremities (HCC) (Resolved) [I82.403]  DVT (deep venous thrombosis) (HCC) (Resolved) [I82.409]  Multiple pulmonary emboli (HCC) (Resolved) [I26.99]  Chronic anticoagulation [Z79.01]             Anticoagulation Episode Summary     INR check location:      Preferred lab:      Send INR reminders to:      Comments:        Anticoagulation Care Providers     Provider Role Specialty Phone number    [facility]   [phone]    Edward Walters M.D.  Family Medicine [phone]        Anticoagulation Patient Findings      HPI:   Paulo Paredes seen in clinic today, on anticoagulation therapy with warfarin (a high risk medication) for hx of TIA, DVT       Pt is here today to evaluate anticoagulation therapy    Previous INR was  1.5 on 20    Pt was instructed to increase regimen    Confirmed warfarin dosing regimen, denies missed or extra doses of coumadin.   Diet has been consistent with foods rich in vitamin K: Yes  Changes in ETOH:  No  Changes in smoking status: No  Changes in medication: No   Cost restriction: No  S/s of bleeding:  No  Falls or accidents since last visit No  Signs/symptoms  thrombosis since the last appt: No    A/P   INR  therapeutic today,  will require close follow up as previous INR was sub-therapeutic   Continue current warfarin regimen        Pt educated to contact our clinic  with any changes in medications or s/s of bleeding or thrombosis. Pt is aware to seek immediate medical attention for falls, head injury or deep cuts    Follow up appointment in 3 week(s) to reduce risk of adverse events from warfarin   Kelsey Junior, PharmD

## 2020-08-21 RX ORDER — WARFARIN SODIUM 5 MG/1
5 TABLET ORAL EVERY EVENING
Qty: 120 TAB | Refills: 3 | Status: SHIPPED | OUTPATIENT
Start: 2020-08-21 | End: 2021-10-07

## 2020-08-24 ENCOUNTER — HOSPITAL ENCOUNTER (OUTPATIENT)
Dept: LAB | Facility: MEDICAL CENTER | Age: 80
End: 2020-08-24
Attending: INTERNAL MEDICINE
Payer: MEDICARE

## 2020-08-24 DIAGNOSIS — E78.5 DYSLIPIDEMIA: ICD-10-CM

## 2020-08-24 DIAGNOSIS — D69.6 THROMBOCYTOPENIA (HCC): ICD-10-CM

## 2020-08-24 DIAGNOSIS — R80.9 MICROALBUMINURIA DUE TO TYPE 2 DIABETES MELLITUS (HCC): ICD-10-CM

## 2020-08-24 DIAGNOSIS — I10 ESSENTIAL HYPERTENSION: ICD-10-CM

## 2020-08-24 DIAGNOSIS — E89.0 POSTSURGICAL HYPOTHYROIDISM: ICD-10-CM

## 2020-08-24 DIAGNOSIS — D75.89 MACROCYTOSIS: ICD-10-CM

## 2020-08-24 DIAGNOSIS — E11.8 CONTROLLED TYPE 2 DIABETES MELLITUS WITH COMPLICATION, WITHOUT LONG-TERM CURRENT USE OF INSULIN (HCC): ICD-10-CM

## 2020-08-24 DIAGNOSIS — N18.4 CKD (CHRONIC KIDNEY DISEASE), STAGE IV (HCC): ICD-10-CM

## 2020-08-24 DIAGNOSIS — E11.29 MICROALBUMINURIA DUE TO TYPE 2 DIABETES MELLITUS (HCC): ICD-10-CM

## 2020-08-24 DIAGNOSIS — E55.9 HYPOVITAMINOSIS D: ICD-10-CM

## 2020-08-24 LAB
25(OH)D3 SERPL-MCNC: 53 NG/ML (ref 30–100)
ALBUMIN SERPL BCP-MCNC: 4.2 G/DL (ref 3.2–4.9)
ALBUMIN/GLOB SERPL: 1.8 G/DL
ALP SERPL-CCNC: 36 U/L (ref 30–99)
ALT SERPL-CCNC: 11 U/L (ref 2–50)
ANION GAP SERPL CALC-SCNC: 9 MMOL/L (ref 7–16)
AST SERPL-CCNC: 20 U/L (ref 12–45)
BASOPHILS # BLD AUTO: 0.4 % (ref 0–1.8)
BASOPHILS # BLD: 0.02 K/UL (ref 0–0.12)
BILIRUB SERPL-MCNC: 0.4 MG/DL (ref 0.1–1.5)
BUN SERPL-MCNC: 39 MG/DL (ref 8–22)
CALCIUM SERPL-MCNC: 9.3 MG/DL (ref 8.4–10.2)
CHLORIDE SERPL-SCNC: 106 MMOL/L (ref 96–112)
CHOLEST SERPL-MCNC: 199 MG/DL (ref 100–199)
CO2 SERPL-SCNC: 26 MMOL/L (ref 20–33)
CREAT SERPL-MCNC: 2.25 MG/DL (ref 0.5–1.4)
EOSINOPHIL # BLD AUTO: 0.2 K/UL (ref 0–0.51)
EOSINOPHIL NFR BLD: 4 % (ref 0–6.9)
ERYTHROCYTE [DISTWIDTH] IN BLOOD BY AUTOMATED COUNT: 46.4 FL (ref 35.9–50)
FASTING STATUS PATIENT QL REPORTED: NORMAL
GLOBULIN SER CALC-MCNC: 2.4 G/DL (ref 1.9–3.5)
GLUCOSE SERPL-MCNC: 145 MG/DL (ref 65–99)
HCT VFR BLD AUTO: 47 % (ref 42–52)
HDLC SERPL-MCNC: 37 MG/DL
HGB BLD-MCNC: 15.1 G/DL (ref 14–18)
IMM GRANULOCYTES # BLD AUTO: 0.02 K/UL (ref 0–0.11)
IMM GRANULOCYTES NFR BLD AUTO: 0.4 % (ref 0–0.9)
LDLC SERPL CALC-MCNC: 122 MG/DL
LYMPHOCYTES # BLD AUTO: 1.66 K/UL (ref 1–4.8)
LYMPHOCYTES NFR BLD: 33 % (ref 22–41)
MCH RBC QN AUTO: 31 PG (ref 27–33)
MCHC RBC AUTO-ENTMCNC: 32.1 G/DL (ref 33.7–35.3)
MCV RBC AUTO: 96.5 FL (ref 81.4–97.8)
MONOCYTES # BLD AUTO: 0.38 K/UL (ref 0–0.85)
MONOCYTES NFR BLD AUTO: 7.6 % (ref 0–13.4)
NEUTROPHILS # BLD AUTO: 2.75 K/UL (ref 1.82–7.42)
NEUTROPHILS NFR BLD: 54.6 % (ref 44–72)
NRBC # BLD AUTO: 0 K/UL
NRBC BLD-RTO: 0 /100 WBC
PLATELET # BLD AUTO: 120 K/UL (ref 164–446)
PMV BLD AUTO: 9.7 FL (ref 9–12.9)
POTASSIUM SERPL-SCNC: 5.2 MMOL/L (ref 3.6–5.5)
PROT SERPL-MCNC: 6.6 G/DL (ref 6–8.2)
RBC # BLD AUTO: 4.87 M/UL (ref 4.7–6.1)
SODIUM SERPL-SCNC: 141 MMOL/L (ref 135–145)
T4 FREE SERPL-MCNC: 1.32 NG/DL (ref 0.93–1.7)
TRIGL SERPL-MCNC: 202 MG/DL (ref 0–149)
TSH SERPL DL<=0.005 MIU/L-ACNC: 0.44 UIU/ML (ref 0.38–5.33)
WBC # BLD AUTO: 5 K/UL (ref 4.8–10.8)

## 2020-08-24 PROCEDURE — 80061 LIPID PANEL: CPT

## 2020-08-24 PROCEDURE — 85025 COMPLETE CBC W/AUTO DIFF WBC: CPT

## 2020-08-24 PROCEDURE — 80053 COMPREHEN METABOLIC PANEL: CPT

## 2020-08-24 PROCEDURE — 84439 ASSAY OF FREE THYROXINE: CPT

## 2020-08-24 PROCEDURE — 84443 ASSAY THYROID STIM HORMONE: CPT

## 2020-08-24 PROCEDURE — 82306 VITAMIN D 25 HYDROXY: CPT

## 2020-08-24 PROCEDURE — 36415 COLL VENOUS BLD VENIPUNCTURE: CPT

## 2020-08-31 ENCOUNTER — OFFICE VISIT (OUTPATIENT)
Dept: MEDICAL GROUP | Age: 80
End: 2020-08-31
Payer: MEDICARE

## 2020-08-31 VITALS
TEMPERATURE: 97.7 F | SYSTOLIC BLOOD PRESSURE: 118 MMHG | HEIGHT: 68 IN | OXYGEN SATURATION: 97 % | WEIGHT: 178 LBS | DIASTOLIC BLOOD PRESSURE: 66 MMHG | BODY MASS INDEX: 26.98 KG/M2 | HEART RATE: 60 BPM

## 2020-08-31 DIAGNOSIS — E55.9 HYPOVITAMINOSIS D: ICD-10-CM

## 2020-08-31 DIAGNOSIS — Z95.1 S/P CABG X 4: ICD-10-CM

## 2020-08-31 DIAGNOSIS — K63.5 POLYP OF COLON, UNSPECIFIED PART OF COLON, UNSPECIFIED TYPE: ICD-10-CM

## 2020-08-31 DIAGNOSIS — Z90.5 SINGLE KIDNEY: ICD-10-CM

## 2020-08-31 DIAGNOSIS — E11.59 HYPERTENSION ASSOCIATED WITH TYPE 2 DIABETES MELLITUS (HCC): ICD-10-CM

## 2020-08-31 DIAGNOSIS — E78.5 HYPERLIPIDEMIA ASSOCIATED WITH TYPE 2 DIABETES MELLITUS (HCC): ICD-10-CM

## 2020-08-31 DIAGNOSIS — N18.4 CKD (CHRONIC KIDNEY DISEASE), STAGE IV (HCC): ICD-10-CM

## 2020-08-31 DIAGNOSIS — Z23 NEED FOR VACCINATION: ICD-10-CM

## 2020-08-31 DIAGNOSIS — D69.6 THROMBOCYTOPENIA (HCC): ICD-10-CM

## 2020-08-31 DIAGNOSIS — R80.9 MICROALBUMINURIA DUE TO TYPE 2 DIABETES MELLITUS (HCC): ICD-10-CM

## 2020-08-31 DIAGNOSIS — I15.2 HYPERTENSION ASSOCIATED WITH TYPE 2 DIABETES MELLITUS (HCC): ICD-10-CM

## 2020-08-31 DIAGNOSIS — E11.69 HYPERLIPIDEMIA ASSOCIATED WITH TYPE 2 DIABETES MELLITUS (HCC): ICD-10-CM

## 2020-08-31 DIAGNOSIS — E89.0 POSTSURGICAL HYPOTHYROIDISM: ICD-10-CM

## 2020-08-31 DIAGNOSIS — E11.8 CONTROLLED TYPE 2 DIABETES MELLITUS WITH COMPLICATION, WITHOUT LONG-TERM CURRENT USE OF INSULIN (HCC): ICD-10-CM

## 2020-08-31 DIAGNOSIS — Z12.11 SCREENING FOR MALIGNANT NEOPLASM OF COLON: ICD-10-CM

## 2020-08-31 DIAGNOSIS — E11.29 MICROALBUMINURIA DUE TO TYPE 2 DIABETES MELLITUS (HCC): ICD-10-CM

## 2020-08-31 DIAGNOSIS — Z85.850 HISTORY OF THYROID CANCER: ICD-10-CM

## 2020-08-31 PROCEDURE — 99214 OFFICE O/P EST MOD 30 MIN: CPT | Performed by: INTERNAL MEDICINE

## 2020-08-31 ASSESSMENT — FIBROSIS 4 INDEX: FIB4 SCORE: 4.07

## 2020-08-31 NOTE — PROGRESS NOTES
CHIEF COMPLAINT  Chief Complaint   Patient presents with   • Lab Results       HPI  Paulo Paredes is a 79 y.o. male who presents today for the following     DM 2, with microalbuminuria  Interval course  HBA1C 7.0 (H) 2020 07:30 AM   HBA1C 6.9 (H) 10/15/2019 07:11 AM   HBA1C 6.8 (H) 07/15/2019 07:19 AM      Onset/D  Diabetes education:  unknown     Medications:   · Trulicity 0.75 mg weekly  · ACE/ARB: losartan  · Statin: atorvastatin 10 mg QD  · ASA: No, on coumadin     Checking feet daily/wear soft socks/shoes: advised     Diabetes ABCDE TARGETS  · Fingersticks:  occasionally  · - range   · Blood Pressure: < 140/90  · Cholesterol-Lipid Panel: elevated triglycerides, low HDL  · Dysalbuminuria:  microalbumin pos     Diet: regular  Exercise:  Walk daily  BMI:  27     DM complications:  · No neuropathy  · Last eye exam: . No retinopathy.    · Nephropathy:  CKD stage III-IV, microalbuminuria  · CVS: Has CAD, on rx.   · No gastropathy.     FH of DM: mother     CKD stage IV, single kidney  The patient had decreased GFR with normal creatinine and electrolytes.   No consistent NSAIDs use.   He has been followed up by nephrology.     Hypertension/CAD, st post CABG x 4  St post CABG x4 in  at [facility]  Meds: Valsartan 160 mg daily, atenolol 25 mg daily; no ASA, on coumadin.   Taking meds as prescribed.   He is measuring BP at home, it has been < 140/90  Denies:  -  headaches, vision problems, tinnitus.                 -  chest pain/pressure, palpitations, irregular heart beats, exertional, dyspnea, peripheral edema.  Low salt diet: Y  Diet / exercise / BMI: as above.   FH of HTN: unknown    Echocardiography, 10/18/2018  Prior echo on 14. Compared to the report of the study done -   there has been no significant change.   Normal left ventricular systolic function.  Left ventricular ejection fraction is visually estimated to be 65%.  Mild tricuspid regurgitation.  Unable to  estimate pulmonary artery pressure due to an inadequate   tricuspid regurgitant jet.     Hyperlipidemia  The patient is on atorvastatin 10 mg, taking daily, as prescribed. No myalgias, muscle cramps or pain.   Diet /Exercise/BMI:  As above  FH: unknown       Hypovitaminosis D  The patient had low vitamin D level.  Vitamin D supplement: OTC    H/o thyroid cancer  Hypothyroidism, postsurgical  Dg: in 2012.   Status post total thyroidectomy.   On Levothyroxine, 137 mcg, taking daily early in am, before any po intake.  No temperature intolerance. No change in weight,  hair/skin quality, BMs.   No tremors, weakness.  No peripheral swelling.  No mood changes.  FH: N  Review TSH.  He has been followed up by endocrinology     Thrombocytopenia  The patient has had borderline thrombocytopenia, stable.   Denies easy bleeding or bruising.   Reviewed medication list.    Reviewed PMH, PSH, FH, SH, ALL, HCM/IMM, no changes  Reviewed MEDS, no changes    Patient Active Problem List    Diagnosis Date Noted   • Chronic anticoagulation 08/01/2014     Priority: High   • Presence of IVC filter 06/02/2014     Priority: High   • Essential hypertension 12/03/2012     Priority: Medium   • S/P CABG x 4 12/03/2012     Priority: Medium   • H/O TIA (transient ischemic attack) and stroke 05/17/2020   • Thrombocytopenia (HCC) 01/16/2020   • Macrocytosis 01/16/2020   • CKD (chronic kidney disease), stage IV (HCC) 01/16/2020   • Hypovitaminosis D 01/16/2020   • History of thyroid cancer 07/16/2019   • Microalbuminuria due to type 2 diabetes mellitus (HCC) 07/16/2019   • Hx pulmonary embolism 07/16/2019   • History of DVT in adulthood 07/16/2019   • Zoster 01/23/2019   • Dyslipidemia 03/19/2018   • Health care maintenance 02/12/2018   • Postsurgical hypothyroidism 08/03/2015   • S/P total thyroidectomy 06/02/2014   • CAD in native artery 01/22/2014   • S/P partial resection of colon 07/09/2013   • GERD (gastroesophageal reflux disease) 12/03/2012   •  ED (erectile dysfunction) 12/03/2012   • Controlled type 2 diabetes mellitus with complication, without long-term current use of insulin (HCC) 12/03/2012   • Colon polyp 12/03/2012   • Single kidney 12/03/2012   • H/O prostate cancer 12/03/2012     CURRENT MEDICATIONS  Current Outpatient Medications   Medication Sig Dispense Refill   • warfarin (COUMADIN) 5 MG Tab Take 1 Tab by mouth every evening. 120 Tab 3   • fenofibrate (TRIGLIDE) 160 MG tablet Take 1 tablet by mouth once daily 90 Tab 0   • atorvastatin (LIPITOR) 10 MG Tab Take 1 tablet by mouth once daily 100 Tab 0   • EUTHYROX 137 MCG Tab TAKE 1 TABLET BY MOUTH IN THE MORNING ON AN EMPTY STOMACH ONE  HOUR  BEFORE  EATING 90 Tab 0   • Dulaglutide (TRULICITY) 0.75 MG/0.5ML Solution Pen-injector Inject 0.75 mg as instructed every 7 days. 12 PEN 0   • losartan (COZAAR) 50 MG Tab Take 1 tablet by mouth once daily 100 Tab 0   • losartan (COZAAR) 50 MG Tab TAKE 1 TABLET BY MOUTH ONCE DAILY 90 Tab 0   • atenolol (TENORMIN) 25 MG Tab TAKE 1 TABLET BY MOUTH ONCE DAILY 90 Tab 3   • Calcium Carb-Cholecalciferol (CALCIUM 1000 + D PO) Take 1 Dose by mouth every day.     • aspirin 81 MG tablet Take 81 mg by mouth every evening.     • Omega-3 Krill Oil 300 MG Cap Take 300 mg by mouth every day.     • Cinnamon 500 MG Cap Take 1,000 mg by mouth.     • Cyanocobalamin (VITAMIN B-12) 1000 MCG Tab Take 1,000 mcg by mouth every day.     • Coenzyme Q10 (CO Q-10 PO) Take 1 Dose by mouth every day. Unknown OTC Strength       No current facility-administered medications for this visit.      ALLERGIES  Allergies: Lactose  PAST MEDICAL HISTORY  Past Medical History:   Diagnosis Date   • postsurgical hypothyroidism 2014   • Papillary carcinoma of thyroid (HCC) 2014    R 2 foci / 4.5mm,0.25mm / no mets/ pT1pN0   • DVT (deep venous thrombosis) (HCC) 2014   • Multiple pulmonary emboli (HCC) 2014   • Transient renal failure 2014    renal vein thrombosis   • Hyperlipidemia 12/3/2012   • S/P  colectomy 2010    multiple colon polyps / no Ca   • Multiple thyroid nodules 2009   • S/P prostatectomy 2008    carcinoma   • S/P CABG x 4 2003   • S/p nephrectomy 1998    carcinoma   • Anesthesia     difficult intubation with surgery 2008,2010   • Basal cell carcinoma of skin    • CAD (coronary artery disease)    • Cancer (HCC)     rt  kidney 1989;  thyroid cancer 2012, prostate 2008, skin   • ED (erectile dysfunction)    • GERD (gastroesophageal reflux disease)    • Glaucoma    • Heart burn    • Hypertension    • Indigestion    • Pre-diabetes    • Renal disorder     rt kidney cancer,nephrectomy   • Stroke (HCC)     'mild',no residual,'one doctor said it was a tia'   • Type II or unspecified type diabetes mellitus without mention of complication, not stated as uncontrolled     on no medications   • Unspecified urinary incontinence      SURGICAL HISTORY  He  has a past surgical history that includes colon resection; nephrectomy radical; multiple coronary artery bypass; prostatectomy, radical retro; other orthopedic surgery; thyroidectomy total (5/13/2014); and node dissection (5/13/2014).  SOCIAL HISTORY  Social History     Tobacco Use   • Smoking status: Never Smoker   • Smokeless tobacco: Never Used   Substance Use Topics   • Alcohol use: Yes     Comment: 1 PER WK   • Drug use: No     Social History     Social History Narrative   • Not on file     FAMILY HISTORY  Family History   Problem Relation Age of Onset   • Diabetes Mother    • Heart Disease Mother    • Diabetes Father    • Cancer Father         pancreas   • Thyroid Sister         Cancer   • Cancer Sister         thyroid   • Diabetes Sister    • Cancer Brother 49        colon   • Diabetes Brother    • Diabetes Maternal Grandfather    • Diabetes Paternal Grandmother    • Diabetes Paternal Grandfather      Family Status   Relation Name Status   • Mo  (Not Specified)   • Fa  (Not Specified)   • Sis  (Not Specified)   • Bro  (Not Specified)   • MGFa  (Not  "Specified)   • PGMo  (Not Specified)   • PGFa  (Not Specified)     ROS   Constitutional: Negative for fever, chills, fatigue.  HENT: Negative for congestion, sore throat.  Eyes: Negative for vision problems.   Respiratory: Negative for cough, shortness of breath.  Cardiovascular: Negative for chest pain, palpitations.   Gastrointestinal: Negative for heartburn, nausea, abdominal pain. H/o colon polyps.  Genitourinary: Negative for dysuria.  Musculoskeletal: Negative for significant myalgia, back and joint pain.   Skin: Negative for rash.   Neuro: Negative for dizziness, weakness and headaches.   Endo/Heme/Allergies: Does not bruise/bleed easily.   Psychiatric/Behavioral: Negative for depression.    PHYSICAL EXAM   Blood Pressure 118/66 (BP Location: Left arm, Patient Position: Sitting, BP Cuff Size: Adult)   Pulse 60   Temperature 36.5 °C (97.7 °F) (Temporal)   Height 1.727 m (5' 8\")   Weight 80.7 kg (178 lb)   Oxygen Saturation 97%  Body mass index is 27.06 kg/m².  General:  NAD, well appearing  HEENT:   NC/AT, PERRLA, EOMI.  Cardiovascular: unlabored breathing, no peripheral cyanosis or swelling.     Lungs:   no respiratory distress.  Abdomen: non- distended.  Extremities:  No LE swelling.  Skin:  Warm, dry.  No erythema. No rash.   Neurologic: Alert & oriented x 3. CN II-XII grossly intact. No focal deficits.  Psychiatric:  Affect normal, mood normal, judgment normal.    Labs     Labs are reviewed and discussed with a patient  Lab Results   Component Value Date/Time    CHOLSTRLTOT 199 08/24/2020 07:40 AM     (H) 08/24/2020 07:40 AM    HDL 37 (A) 08/24/2020 07:40 AM    TRIGLYCERIDE 202 (H) 08/24/2020 07:40 AM       Lab Results   Component Value Date/Time    SODIUM 141 08/24/2020 07:40 AM    POTASSIUM 5.2 08/24/2020 07:40 AM    CHLORIDE 106 08/24/2020 07:40 AM    CO2 26 08/24/2020 07:40 AM    GLUCOSE 145 (H) 08/24/2020 07:40 AM    BUN 39 (H) 08/24/2020 07:40 AM    CREATININE 2.25 (H) 08/24/2020 07:40 AM "     Lab Results   Component Value Date/Time    ALKPHOSPHAT 36 08/24/2020 07:40 AM    ASTSGOT 20 08/24/2020 07:40 AM    ALTSGPT 11 08/24/2020 07:40 AM    TBILIRUBIN 0.4 08/24/2020 07:40 AM      Lab Results   Component Value Date/Time    HBA1C 7.0 (H) 05/07/2020 07:30 AM    HBA1C 6.9 (H) 10/15/2019 07:11 AM    HBA1C 6.8 (H) 07/15/2019 07:19 AM     No results found for: TSH  Lab Results   Component Value Date/Time    FREET4 1.32 08/24/2020 07:40 AM    FREET4 1.46 05/07/2020 07:30 AM       Lab Results   Component Value Date/Time    WBC 5.0 08/24/2020 07:40 AM    RBC 4.87 08/24/2020 07:40 AM    HEMOGLOBIN 15.1 08/24/2020 07:40 AM    HEMATOCRIT 47.0 08/24/2020 07:40 AM    MCV 96.5 08/24/2020 07:40 AM    MCH 31.0 08/24/2020 07:40 AM    MCHC 32.1 (L) 08/24/2020 07:40 AM    MPV 9.7 08/24/2020 07:40 AM    NEUTSPOLYS 54.60 08/24/2020 07:40 AM    LYMPHOCYTES 33.00 08/24/2020 07:40 AM    MONOCYTES 7.60 08/24/2020 07:40 AM    EOSINOPHILS 4.00 08/24/2020 07:40 AM    BASOPHILS 0.40 08/24/2020 07:40 AM      Imaging     Reviewed and discussed per HPI    Assessment and Plan     Paulo Paredes is a 79 y.o. male    1. Controlled type 2 diabetes mellitus with complication, without long-term current use of insulin (HCC)  Controlled, continue current treatment  - MICROALBUMIN CREAT RATIO URINE; Future  - Comp Metabolic Panel; Future  - HEMOGLOBIN A1C; Future    2. Microalbuminuria due to type 2 diabetes mellitus (HCC)  As above    3. CKD (chronic kidney disease), stage IV ([facility])  Advised good fluid intake, avoid NSAIDs  - Comp Metabolic Panel; Future  4. Single kidney  As above    5. Hypertension associated with type 2 diabetes mellitus ([facility])  Controlled cardiovascular conditions, continue current treatment and cardiology follow-up  6. S/P CABG x 4  7. Hyperlipidemia associated with type 2 diabetes mellitus (HCC)  - Comp Metabolic Panel; Future  - Lipid Profile; Future    8. Hypovitaminosis D  Improved, continue current supplement,  follow-up labs  - VITAMIN D,25 HYDROXY; Future    9. Thrombocytopenia (HCC)  Stable, follow-up labs  - CBC WITH DIFFERENTIAL; Future    10. Postsurgical hypothyroidism  Controlled, continue current treatment, endocrinology follow-up  - TSH WITH REFLEX TO FT4; Future  11. History of thyroid cancer  - TSH WITH REFLEX TO FT4; Future    12. Polyp of colon, unspecified part of colon, unspecified type  Due colonoscopy  - REFERRAL TO GI FOR COLONOSCOPY  13. Screening for malignant neoplasm of colon  - REFERRAL TO GI FOR COLONOSCOPY    14. Need for vaccination  Due immunizations x 3    Counseling:   - Smoking:  Nonsmoker    Followup: In 4 months, annual and labs    All questions are answered.    Please note that this dictation was created using voice recognition software, and that there might be errors of venkata and possibly content.

## 2020-09-10 ENCOUNTER — ANTICOAGULATION VISIT (OUTPATIENT)
Dept: MEDICAL GROUP | Facility: MEDICAL CENTER | Age: 80
End: 2020-09-10
Payer: MEDICARE

## 2020-09-10 DIAGNOSIS — I15.9 SECONDARY HYPERTENSION: ICD-10-CM

## 2020-09-10 DIAGNOSIS — Z79.01 CHRONIC ANTICOAGULATION: Primary | ICD-10-CM

## 2020-09-10 DIAGNOSIS — Z95.828 PRESENCE OF IVC FILTER: ICD-10-CM

## 2020-09-10 LAB — INR PPP: 3 (ref 2–3.5)

## 2020-09-10 PROCEDURE — 93793 ANTICOAG MGMT PT WARFARIN: CPT | Performed by: INTERNAL MEDICINE

## 2020-09-10 PROCEDURE — 85610 PROTHROMBIN TIME: CPT | Performed by: INTERNAL MEDICINE

## 2020-09-10 RX ORDER — ATENOLOL 25 MG/1
TABLET ORAL
Qty: 90 TAB | Refills: 0 | Status: SHIPPED | OUTPATIENT
Start: 2020-09-10 | End: 2020-10-27 | Stop reason: SDUPTHER

## 2020-09-10 NOTE — PROGRESS NOTES
OP Anticoagulation Service Note    Date: 9/10/2020  There were no vitals filed for this visit.   pt declined vitals    Anticoagulation Summary  As of 9/10/2020    INR goal:  2.0-3.0   TTR:  75.9 % (2.6 y)   INR used for dosing:  3.00 (9/10/2020)   Warfarin maintenance plan:  5 mg (5 mg x 1) every day   Weekly warfarin total:  35 mg   Plan last modified:  Kelsey Junior, PharmD (8/6/2020)   Next INR check:  10/8/2020   Target end date:  Indefinite    Indications    Presence of IVC filter [Z95.828]  Transient ischemic attack [G45.9] (Resolved) [G45.9]  Deep vein thrombosis (DVT) of both lower extremities (HCC) (Resolved) [I82.403]  DVT (deep venous thrombosis) (HCC) (Resolved) [I82.409]  Multiple pulmonary emboli (HCC) (Resolved) [I26.99]  Chronic anticoagulation [Z79.01]             Anticoagulation Episode Summary     INR check location:      Preferred lab:      Send INR reminders to:      Comments:        Anticoagulation Care Providers     Provider Role Specialty Phone number    [facility]   [phone]    Edward Walters M.D.  Family Medicine [phone]        Anticoagulation Patient Findings      HPI:   Paulo Paredes seen in clinic today, on anticoagulation therapy with warfarin (a high risk medication) for TIA, hx of DVT and PE       Pt is here today to evaluate anticoagulation therapy    Previous INR was  2.2 on 8-20-20    Pt was instructed to continue current regimen    Confirmed warfarin dosing regimen, denies missed or extra doses of coumadin.   Diet has been consistent with foods rich in vitamin K: Yes  Changes in ETOH:  No  Changes in smoking status: No  Changes in medication: No   Cost restriction: No  S/s of bleeding:  No  Falls or accidents since last visit No  Signs/symptoms  thrombosis since the last appt: No    A/P   INR  therapeutic today,   Continue current warfarin regimen          Pt educated to contact our clinic with any changes in medications or s/s of bleeding  or thrombosis. Pt is aware to seek immediate medical attention for falls, head injury or deep cuts    Follow up appointment in 4 week(s) to reduce risk of adverse events from warfarin   Kelsey Junior, PharmD

## 2020-10-08 ENCOUNTER — ANTICOAGULATION VISIT (OUTPATIENT)
Dept: MEDICAL GROUP | Facility: MEDICAL CENTER | Age: 80
End: 2020-10-08
Payer: MEDICARE

## 2020-10-08 DIAGNOSIS — Z95.828 PRESENCE OF IVC FILTER: ICD-10-CM

## 2020-10-08 DIAGNOSIS — Z79.01 CHRONIC ANTICOAGULATION: ICD-10-CM

## 2020-10-08 LAB — INR PPP: 2.5 (ref 2–3.5)

## 2020-10-08 PROCEDURE — 93793 ANTICOAG MGMT PT WARFARIN: CPT | Performed by: INTERNAL MEDICINE

## 2020-10-08 PROCEDURE — 85610 PROTHROMBIN TIME: CPT | Performed by: INTERNAL MEDICINE

## 2020-10-08 NOTE — PROGRESS NOTES
OP Anticoagulation Service Note    Date: 10/8/2020  There were no vitals filed for this visit.   pt declined vitals    Anticoagulation Summary  As of 10/8/2020    INR goal:  2.0-3.0   TTR:  76.6 % (2.6 y)   INR used for dosin.50 (10/8/2020)   Warfarin maintenance plan:  5 mg (5 mg x 1) every day   Weekly warfarin total:  35 mg   Plan last modified:  Kelsey Junior, PharmD (2020)   Next INR check:  2020   Target end date:  Indefinite    Indications    Presence of IVC filter [Z95.828]  Transient ischemic attack [G45.9] (Resolved) [G45.9]  Deep vein thrombosis (DVT) of both lower extremities (HCC) (Resolved) [I82.403]  DVT (deep venous thrombosis) (HCC) (Resolved) [I82.409]  Multiple pulmonary emboli (HCC) (Resolved) [I26.99]  Chronic anticoagulation [Z79.01]             Anticoagulation Episode Summary     INR check location:      Preferred lab:      Send INR reminders to:      Comments:        Anticoagulation Care Providers     Provider Role Specialty Phone number    [facility]   [phone]    Edward Walters M.D.  Family Medicine [phone]        Anticoagulation Patient Findings  Patient Findings     Negatives:  Signs/symptoms of thrombosis, Signs/symptoms of bleeding, Laboratory test error suspected, Change in health, Change in alcohol use, Change in activity, Upcoming invasive procedure, Emergency department visit, Upcoming dental procedure, Missed doses, Extra doses, Change in medications, Change in diet/appetite, Hospital admission, Bruising, Other complaints          HPI:   Paulo Lena seen in clinic today, on anticoagulation therapy with warfarin (a high risk medication) for PE/DVT    Pt is here today to evaluate anticoagulation therapy    Previous INR was  3.0 on 9/10/2020    Pt was instructed to continue regimen    Confirmed warfarin dosing regimen, denies missed or extra doses of coumadin.   Diet has been consistent with foods rich in vitamin K:  Yes  Changes in ETOH:  No  Changes in smoking status: No  Changes in medication: No   Cost restriction: No  S/s of bleeding:  No  Falls or accidents since last visit No  Signs/symptoms  thrombosis since the last appt: No    A/P   INR  therapeutic today    Pt is to continue with current warfarin dosing regimen.     01/21 check referral    Pt educated to contact our clinic with any changes in medications or s/s of bleeding or thrombosis. Pt is aware to seek immediate medical attention for falls, head injury or deep cuts    Follow up appointment in 6 week(s) to reduce risk of adverse events from warfarin   Leah Solo, ZayD

## 2020-10-10 RX ORDER — LOSARTAN POTASSIUM 50 MG/1
TABLET ORAL
Qty: 100 TAB | Refills: 0 | Status: SHIPPED | OUTPATIENT
Start: 2020-10-10 | End: 2020-10-27

## 2020-10-21 NOTE — TELEPHONE ENCOUNTER
Prescription electronically sent to pharmacy.       Was the patient seen in the last year in this department? Yes    Does patient have an active prescription for medications requested? No     Received Request Via: Pharmacy

## 2020-10-26 RX ORDER — DULAGLUTIDE 0.75 MG/.5ML
INJECTION, SOLUTION SUBCUTANEOUS
Qty: 12 ML | Refills: 3 | Status: SHIPPED | OUTPATIENT
Start: 2020-10-26 | End: 2021-09-24

## 2020-10-27 ENCOUNTER — OFFICE VISIT (OUTPATIENT)
Dept: CARDIOLOGY | Facility: MEDICAL CENTER | Age: 80
End: 2020-10-27
Payer: MEDICARE

## 2020-10-27 VITALS
HEIGHT: 68 IN | BODY MASS INDEX: 27.04 KG/M2 | SYSTOLIC BLOOD PRESSURE: 124 MMHG | RESPIRATION RATE: 12 BRPM | OXYGEN SATURATION: 97 % | DIASTOLIC BLOOD PRESSURE: 82 MMHG | HEART RATE: 61 BPM | WEIGHT: 178.4 LBS

## 2020-10-27 DIAGNOSIS — E78.5 DYSLIPIDEMIA: ICD-10-CM

## 2020-10-27 DIAGNOSIS — I15.9 SECONDARY HYPERTENSION: ICD-10-CM

## 2020-10-27 DIAGNOSIS — Z01.810 PREOP CARDIOVASCULAR EXAM: ICD-10-CM

## 2020-10-27 DIAGNOSIS — Z95.1 S/P CABG X 4: ICD-10-CM

## 2020-10-27 DIAGNOSIS — I25.10 CAD IN NATIVE ARTERY: ICD-10-CM

## 2020-10-27 DIAGNOSIS — I10 ESSENTIAL HYPERTENSION, BENIGN: ICD-10-CM

## 2020-10-27 DIAGNOSIS — I10 ESSENTIAL HYPERTENSION: ICD-10-CM

## 2020-10-27 PROCEDURE — 99214 OFFICE O/P EST MOD 30 MIN: CPT | Performed by: INTERNAL MEDICINE

## 2020-10-27 RX ORDER — ATENOLOL 25 MG/1
25 TABLET ORAL DAILY
Qty: 90 TAB | Refills: 3 | Status: SHIPPED | OUTPATIENT
Start: 2020-10-27 | End: 2021-07-14 | Stop reason: SDUPTHER

## 2020-10-27 RX ORDER — FENOFIBRATE 160 MG/1
160 TABLET ORAL DAILY
Qty: 90 TAB | Refills: 3 | Status: SHIPPED | OUTPATIENT
Start: 2020-10-27 | End: 2021-07-14 | Stop reason: SDUPTHER

## 2020-10-27 RX ORDER — ATORVASTATIN CALCIUM 20 MG/1
20 TABLET, FILM COATED ORAL DAILY
Qty: 90 TAB | Refills: 3 | Status: SHIPPED | OUTPATIENT
Start: 2020-10-27 | End: 2020-10-27 | Stop reason: SDUPTHER

## 2020-10-27 RX ORDER — LOSARTAN POTASSIUM 50 MG/1
50 TABLET ORAL DAILY
Qty: 90 TAB | Refills: 3 | Status: SHIPPED | OUTPATIENT
Start: 2020-10-27 | End: 2021-06-19 | Stop reason: SDUPTHER

## 2020-10-27 ASSESSMENT — ENCOUNTER SYMPTOMS
CLAUDICATION: 0
EYES NEGATIVE: 1
CARDIOVASCULAR NEGATIVE: 1
WEIGHT LOSS: 0
PSYCHIATRIC NEGATIVE: 1
GASTROINTESTINAL NEGATIVE: 1
BLURRED VISION: 0
HEADACHES: 0
DIZZINESS: 0
VOMITING: 0
NAUSEA: 0
PALPITATIONS: 0
DOUBLE VISION: 0
NEUROLOGICAL NEGATIVE: 1
COUGH: 0
MUSCULOSKELETAL NEGATIVE: 1
MYALGIAS: 0
FEVER: 0
BRUISES/BLEEDS EASILY: 0
CHILLS: 0
FOCAL WEAKNESS: 0
CONSTITUTIONAL NEGATIVE: 1
ABDOMINAL PAIN: 0
DEPRESSION: 0
SHORTNESS OF BREATH: 0
NERVOUS/ANXIOUS: 0
WEAKNESS: 0
RESPIRATORY NEGATIVE: 1

## 2020-10-27 ASSESSMENT — FIBROSIS 4 INDEX: FIB4 SCORE: 4.12

## 2020-10-27 NOTE — PROGRESS NOTES
Chief Complaint   Patient presents with   • Coronary Artery Disease     follow up        Subjective:   Kevin Paredes is a 80 y.o. male who presents today for annual follow up of coronary artery disease with prior coronary artery bypass graft surgery.    Since the patient's last visit on 10/18/19, he has been doing well clinically. He denies chest pain, shortness of breath, palpitations, nausea/vomiting or diaphoresis. He remains very active on his treadmill 50 minutes daily. He is pending colonoscope.    Past Medical History:   Diagnosis Date   • Anesthesia     difficult intubation with surgery 2008,2010   • Basal cell carcinoma of skin    • CAD (coronary artery disease)    • Cancer (HCC)     rt  kidney 1989;  thyroid cancer 2012, prostate 2008, skin   • DVT (deep venous thrombosis) (HCC) 2014   • ED (erectile dysfunction)    • GERD (gastroesophageal reflux disease)    • Glaucoma    • Heart burn    • Hyperlipidemia 12/3/2012   • Hypertension    • Indigestion    • Multiple pulmonary emboli (HCC) 2014   • Multiple thyroid nodules 2009   • Papillary carcinoma of thyroid (HCC) 2014    R 2 foci / 4.5mm,0.25mm / no mets/ pT1pN0   • postsurgical hypothyroidism 2014   • Pre-diabetes    • Renal disorder     rt kidney cancer,nephrectomy   • S/P CABG x 4 2003   • S/P colectomy 2010    multiple colon polyps / no Ca   • S/p nephrectomy 1998    carcinoma   • S/P prostatectomy 2008    carcinoma   • Stroke (HCC)     'mild',no residual,'one doctor said it was a tia'   • Transient renal failure 2014    renal vein thrombosis   • Type II or unspecified type diabetes mellitus without mention of complication, not stated as uncontrolled     on no medications   • Unspecified urinary incontinence      Past Surgical History:   Procedure Laterality Date   • THYROIDECTOMY TOTAL  5/13/2014    Performed by Eliceo Bolanos M.D. at SURGERY SAME DAY [facility]   • NODE DISSECTION  5/13/2014    Performed by Eliceo Bolanos M.D. at SURGERY SAME DAY  [facility]   • COLON RESECTION     • MULTIPLE CORONARY ARTERY BYPASS      x 4 11/2003   • NEPHRECTOMY RADICAL      right side 1998   • OTHER ORTHOPEDIC SURGERY      trotator cuff   • PROSTATECTOMY, RADICAL RETRO      cancer /january 2008     Family History   Problem Relation Age of Onset   • Diabetes Mother    • Heart Disease Mother    • Diabetes Father    • Cancer Father         pancreas   • Thyroid Sister         Cancer   • Cancer Sister         thyroid   • Diabetes Sister    • Cancer Brother 49        colon   • Diabetes Brother    • Diabetes Maternal Grandfather    • Diabetes Paternal Grandmother    • Diabetes Paternal Grandfather      Social History     Socioeconomic History   • Marital status:      Spouse name: Not on file   • Number of children: Not on file   • Years of education: Not on file   • Highest education level: Not on file   Occupational History   • Not on file   Social Needs   • Financial resource strain: Not on file   • Food insecurity     Worry: Not on file     Inability: Not on file   • Transportation needs     Medical: Not on file     Non-medical: Not on file   Tobacco Use   • Smoking status: Never Smoker   • Smokeless tobacco: Never Used   Substance and Sexual Activity   • Alcohol use: Yes     Comment: 1 PER WK   • Drug use: No   • Sexual activity: Not on file     Comment: retired    Lifestyle   • Physical activity     Days per week: Not on file     Minutes per session: Not on file   • Stress: Not on file   Relationships   • Social connections     Talks on phone: Not on file     Gets together: Not on file     Attends [employer] service: Not on file     Active member of club or organization: Not on file     Attends meetings of clubs or organizations: Not on file     Relationship status: Not on file   • Intimate partner violence     Fear of current or ex partner: Not on file     Emotionally abused: Not on file     Physically abused: Not on file     Forced sexual activity:  Not on file   Other Topics Concern   • Not on file   Social History Narrative   • Not on file     Allergies   Allergen Reactions   • Lactose      (Medications reviewed.)  Outpatient Encounter Medications as of 10/27/2020   Medication Sig Dispense Refill   • atorvastatin (LIPITOR) 20 MG Tab Take 1 Tab by mouth every day. 90 Tab 3   • atenolol (TENORMIN) 25 MG Tab Take 1 Tab by mouth every day. 90 Tab 3   • fenofibrate (TRIGLIDE) 160 MG tablet Take 1 Tab by mouth every day. 90 Tab 3   • losartan (COZAAR) 50 MG Tab Take 1 Tab by mouth every day. 90 Tab 3   • TRULICITY 0.75 MG/0.5ML Solution Pen-injector INJECT 1 SYRINGE SUBCUTANEOUSLY AS DIRECTED ONCE EVERY 7 DAYS 12 mL 3   • warfarin (COUMADIN) 5 MG Tab Take 1 Tab by mouth every evening. 120 Tab 3   • EUTHYROX 137 MCG Tab TAKE 1 TABLET BY MOUTH IN THE MORNING ON AN EMPTY STOMACH ONE  HOUR  BEFORE  EATING 90 Tab 0   • Calcium Carb-Cholecalciferol (CALCIUM 1000 + D PO) Take 1 Dose by mouth every day.     • aspirin 81 MG tablet Take 81 mg by mouth every evening.     • Omega-3 Krill Oil 300 MG Cap Take 300 mg by mouth every day.     • Cinnamon 500 MG Cap Take 1,000 mg by mouth.     • Cyanocobalamin (VITAMIN B-12) 1000 MCG Tab Take 1,000 mcg by mouth every day.     • Coenzyme Q10 (CO Q-10 PO) Take 1 Dose by mouth every day. Unknown OTC Strength     • [DISCONTINUED] losartan (COZAAR) 50 MG Tab Take 1 tablet by mouth once daily (Patient not taking: Reported on 10/27/2020) 100 Tab 0   • [DISCONTINUED] atenolol (TENORMIN) 25 MG Tab Take 1 tablet by mouth once daily 90 Tab 0   • [DISCONTINUED] fenofibrate (TRIGLIDE) 160 MG tablet Take 1 tablet by mouth once daily 90 Tab 0   • [DISCONTINUED] atorvastatin (LIPITOR) 10 MG Tab Take 1 tablet by mouth once daily 100 Tab 0   • [DISCONTINUED] losartan (COZAAR) 50 MG Tab TAKE 1 TABLET BY MOUTH ONCE DAILY 90 Tab 0     No facility-administered encounter medications on file as of 10/27/2020.      Review of Systems   Constitutional:  "Negative.  Negative for chills, fever, malaise/fatigue and weight loss.   HENT: Negative.  Negative for hearing loss.    Eyes: Negative.  Negative for blurred vision and double vision.   Respiratory: Negative.  Negative for cough and shortness of breath.    Cardiovascular: Negative.  Negative for chest pain, palpitations, claudication and leg swelling.   Gastrointestinal: Negative.  Negative for abdominal pain, nausea and vomiting.   Genitourinary: Negative.  Negative for dysuria and urgency.   Musculoskeletal: Negative.  Negative for joint pain and myalgias.   Skin: Negative.  Negative for itching and rash.   Neurological: Negative.  Negative for dizziness, focal weakness, weakness and headaches.   Endo/Heme/Allergies: Negative.  Does not bruise/bleed easily.   Psychiatric/Behavioral: Negative.  Negative for depression. The patient is not nervous/anxious.         Objective:   /82 (BP Location: Left arm, Patient Position: Sitting, BP Cuff Size: Adult)   Pulse 61   Resp 12   Ht 1.727 m (5' 8\")   Wt 80.9 kg (178 lb 6.4 oz)   SpO2 97%   BMI 27.13 kg/m²     Physical Exam   Constitutional: He is oriented to person, place, and time. He appears well-developed and well-nourished.   HENT:   Head: Normocephalic and atraumatic.   Eyes: EOM are normal.   Neck: No JVD present.   Cardiovascular: Normal rate, regular rhythm and normal heart sounds.   Pulmonary/Chest: Effort normal and breath sounds normal.   Abdominal: Soft. Bowel sounds are normal.   No hepatosplenomegaly.   Musculoskeletal: Normal range of motion.   Lymphadenopathy:     He has no cervical adenopathy.   Neurological: He is alert and oriented to person, place, and time.   Skin: Skin is warm and dry.   Psychiatric: He has a normal mood and affect.     CARDIAC STUDIES/PROCEDURES:     ABDOMINAL ULTRASOUND (06/04/14)  1. Ectatic aorta.  2. Atherosclerotic plaque.  3. Cholelithiasis.     CT OF CHEST (06/06/14)  1. There is thrombus in the IVC which extends " into the distal left renal vein between the aorta and vena cava. The patient is on heparin for 36 hours approximately, and the improvement in left   renal edema and decrease in caliber of the left renal vein compared to previous CT probably reflects improved venous drainage related to the therapy .  2. The thrombus attached to the filter extends cephalad to the filter to the level of the caudate lobe of the liver.  3. There is DVT in both lower extremities.     ECHOCARDIOGRAM CONCLUSIONS (10/18/18)  Prior echo on 05/21/14. Compared to the report of the study done -   there has been no significant change.   Normal left ventricular systolic function.  Left ventricular ejection fraction is visually estimated to be 65%.  Mild tricuspid regurgitation.  Unable to estimate pulmonary artery pressure due to an inadequate   tricuspid regurgitant jet.    ECHOCARDIOGRAM CONCLUSIONS (05/21/14)  Normal left ventricular size. Sigmoid septum with increased outflow   velocity but not anterior motion of the mitral valve.  Basal inferior and apical septal hypokinesis. Left ventricular   ejection fraction is 50% to 55%.  Grade I diastolic dysfunction is present.  Normal left atrial size.  Aortic sclerosis without significant stenosis.  Trace mitral regurgitation.     ECHOCARDIOGRAM CONCLUSIONS (01/25/14)  Normal left ventricular size and function.   Grade I diastolic dysfunction is present.   Normal left ventricular wall thickness.   Left ventricular ejection fraction is 60% to 65%.  Mild mitral regurgitation.   Aortic valve probably trileaflet. No stenosis or regurgitation seen.   AV MG 5.39 mmHg, PG 9.33 mmHg.  Mild tricuspid regurgitation. Right ventricular systolic pressure is   estimated to be 30 to 35 mmHg consistent with mild pulmonary hypertension.  No pericardial effusion seen.   Normal aortic diameter 3.2 cm  No prior study for comparison.     EKG performed on (01/23/19) EKG shows sinus bradycardia.  EKG performed on  (05/15/14) EKG shows normal sinus rhythm with non-specific intra-ventricular conduction delay.     Laboratory results of (08/24/20) were reviewed. Cholesterol profile of 199/202/37/122 mg/dl noted.  Laboratory results of (07/15/19) Cholesterol profile of 169/185/32/100 noted.  Laboratory results of (09/29/18) Cholesterol profile of 261/365/36/151 noted.  Laboratory results of (09/21/15) Cholesterol profile of 251/307/37/153 noted.  Laboratory results of (06/16/14) Cholesterol profile of 172/233/37/88 noted.     LOWER EXTREMITY VENOUS ULTRASOUND (07/19/19)  No evidence of RIGHT lower extremity DVT.      LOWER EXTREMITY VENOUS ULTRASOUND (06/05/14)  1. No evidence of acute right lower extremity deep venous thrombosis with   recannalized venous thrombosis throughout.  2. Normal left lower extremity superficial and deep venous examination.      MRA OF BRAIN (01/25/14)  1. MRA OF THE [place] OF GREEN WITHIN NORMAL LIMITS WITH NO EVIDENCE OF ANEURYSM OR CEREBROVASCULAR OCCLUSIVE DISEASE.  2. FIELD OF VIEW PARTIALLY EXCLUDES THE INFERIOR TEMPORAL M2 DIVISION LEFT MCA.     MRA OF NECK (01/25/14)  1. Unremarkable cervical vertebral arteries.  2. Right carotid bulb and ICA origin minimal plaquing with up to about 10% stenosis. No flow limiting lesion.  3. Left carotid bulb and left ICA origin minimal plaquing with up to about 10-15% stenosis. No flow limiting lesion.     MYOCARDIAL PERFUSION STUDY CONCLUSIONS (10/18/18)  No evidence of significant jeopardized viable myocardium or prior myocardial infarction.  Apical thinning noted.  Normal wall motion, EF 59%.   TID absent.   Sinus rhythm.  Nondiagnostic ECG portion of a nuclear stress test.  ECG INTERPRETATION  Nondiagnostic ECG portion of a nuclear stress test.     STRESS ECHOCARDIOGRAM [facility] (03/14/12)  Stress echocardiogram showing no evidence for stress-induced ischemia.    Assessment:     1. Preop cardiovascular exam     2. CAD in native artery     3. S/P CABG  x 4     4. Essential hypertension, benign     5. Dyslipidemia  Comp Metabolic Panel    Lipid Profile   6. Secondary hypertension  atenolol (TENORMIN) 25 MG Tab   7. Essential hypertension  losartan (COZAAR) 50 MG Tab       Medical Decision Making:  Today's Assessment / Status / Plan:     1. Presurgical evaluation: He is pending colonoscope and is doing well from cardiac standpoint. He will require bridging for his anticoagulation.  2. Coronary artery disease with prior coronary bypass graft (4 vessel CABG at [facility] 2003): He is clinically doing well. I will continue with current medical care including aspirin, atenolol, losartan and atorvastatin.  3. Hypertension: Blood pressure is well controlled. We will continue with beta blockade therapy and angiotensin receptor blockade.  4. Hyperlipidemia: He is doing well on statin therapy without myalgia symptoms. (Managed by primary care physician)  5. Chronic anticoagulation therapy and IVC filter with pulmonary embolism/deep venous thrombosis. He is clinically doing well without recurrence or chest pain or shortness of breath.  6. Status post stroke (managed by Dr. Rodriguez): He is clinically doing well on Plavix. Plans per neurology.  7. Renal insufficiency (managed by Dr. Cloud)  8. Health maintenance: Recovered large hematoma following thyroidectomy.    We will follow up the patient in one year.     CC Edward Mckoy

## 2020-10-28 ENCOUNTER — TELEPHONE (OUTPATIENT)
Dept: VASCULAR LAB | Facility: MEDICAL CENTER | Age: 80
End: 2020-10-28

## 2020-10-28 ENCOUNTER — TELEPHONE (OUTPATIENT)
Dept: CARDIOLOGY | Facility: MEDICAL CENTER | Age: 80
End: 2020-10-28

## 2020-10-28 RX ORDER — ATORVASTATIN CALCIUM 20 MG/1
20 TABLET, FILM COATED ORAL DAILY
Qty: 100 TAB | Refills: 3 | Status: SHIPPED | OUTPATIENT
Start: 2020-10-28 | End: 2021-07-14 | Stop reason: SDUPTHER

## 2020-10-28 NOTE — TELEPHONE ENCOUNTER
Message  Received: Yesterday  Message Contents   ADITYA Chávez R.N.             Please send [facility] clearance to [facility]. The may proceed with surgery from cardiac standpoint with moderate risk on beta blockade therapy. He will need bridging.     Thanks.  ELIZABETH.      -----------------------------------------------------------------    Pre-operative evaluation letter composed and will be faxed to [facility] at [phone].     To [facility] to help manage bridging.

## 2020-10-28 NOTE — LETTER
PROCEDURE/SURGERY CLEARANCE FORM      Encounter Date: 10/28/2020    Patient: Paulo Paredes  YOB: 1940    CARDIOLOGIST:   Abhi Damon MD    REFERRING DOCTOR:  [facility]      The above patient may proceed with surgery from cardiac standpoint with moderate risk on beta blockade therapy. He will need bridging.                                              Additional comments: Bridging should be managed by the [facility].                    MD Signature

## 2020-10-28 NOTE — TELEPHONE ENCOUNTER
Received notification from cardiology that pt will be having a colonoscopy. Procedure date unknown.     LVM with pt to reschedule f/u appt @ [facility] if his procedure is sooner than 11/19/2020.    Leah Solo, ZayD

## 2020-11-02 ENCOUNTER — ANTICOAGULATION VISIT (OUTPATIENT)
Dept: MEDICAL GROUP | Facility: MEDICAL CENTER | Age: 80
End: 2020-11-02
Payer: MEDICARE

## 2020-11-02 DIAGNOSIS — Z79.01 CHRONIC ANTICOAGULATION: Primary | ICD-10-CM

## 2020-11-02 DIAGNOSIS — Z95.828 PRESENCE OF IVC FILTER: ICD-10-CM

## 2020-11-02 LAB — INR PPP: 3.3 (ref 2–3.5)

## 2020-11-02 PROCEDURE — 85610 PROTHROMBIN TIME: CPT | Performed by: INTERNAL MEDICINE

## 2020-11-02 PROCEDURE — 99211 OFF/OP EST MAY X REQ PHY/QHP: CPT | Performed by: INTERNAL MEDICINE

## 2020-11-02 NOTE — PROGRESS NOTES
OP Anticoagulation Service Note    Date: 11/2/2020  There were no vitals filed for this visit.   pt declined vitals    Anticoagulation Summary  As of 11/2/2020    INR goal:  2.0-3.0   TTR:  76.3 % (2.7 y)   INR used for dosing:  3.30 (11/2/2020)   Warfarin maintenance plan:  5 mg (5 mg x 1) every day   Weekly warfarin total:  35 mg   Plan last modified:  Kelsey Junior, PharmD (8/6/2020)   Next INR check:  11/16/2020   Target end date:  Indefinite    Indications    Presence of IVC filter [Z95.828]  Transient ischemic attack [G45.9] (Resolved) [G45.9]  Deep vein thrombosis (DVT) of both lower extremities (HCC) (Resolved) [I82.403]  DVT (deep venous thrombosis) (HCC) (Resolved) [I82.409]  Multiple pulmonary emboli (HCC) (Resolved) [I26.99]  Chronic anticoagulation [Z79.01]             Anticoagulation Episode Summary     INR check location:      Preferred lab:      Send INR reminders to:      Comments:        Anticoagulation Care Providers     Provider Role Specialty Phone number    [facility]   [phone]    Edward Walters M.D.  Family Medicine [phone]        Anticoagulation Patient Findings      HPI:   Paulo Paredes seen in clinic today, on anticoagulation therapy with warfarin (a high risk medication) for PE/DVT, hx of TIA     Pt is here today to evaluate anticoagulation therapy    Previous INR was  2.5 on 10-8-2020    Pt was instructed to continue current regimen    Confirmed warfarin dosing regimen, denies missed or extra doses of coumadin.   Diet has been consistent with foods rich in vitamin K: Yes  Changes in ETOH:  No  Changes in smoking status: No  Changes in medication: No   Cost restriction: No  S/s of bleeding:  No  Falls or accidents since last visit No  Signs/symptoms  thrombosis since the last appt: No    A/P   INR  supra-therapeutic today, will require dose adjust ment today to prevent bleeding complications  and closer follow up.    Tonight 2.5 mg then Continue  current warfarin regimen   Pt is having colonoscopy 11-, he will need to stop anticoaguation. He has a hx of PE and DVT in 2014 with IVC filter in place. His CrCl = 28 ml/min and his last SCR =2.25 which is is his baseline. This limits ability to bridge pt with full dose anticoagulation. Discussed with pt risks of stopping anticoagulation including risk of DVT, will bridging with prophylactic dose lovenox 30 mg once daily due to renal function, pt is aware this is not full dose anticoagulation but will provide some protection for prevention of DVT while off warfarin and understands there is still a risk for bleeding as well.     11-6-2020 Stop warfarin   11-7-2020 Start Lovenox 30 mg once daily in the AM   11-8-2020 Continue Lovenox 30 mg once daily in the AM   11-9-2020 Continue  Lovenox 30 mg once daily in the AM  11- Day before procedure no blood thinners  11- Day of procedure No blood thinners before procedure. After procedure if ok with provider restart warfarin at your usual dosing regimen.   11- Continue warfarin at usual dosing regimen, Resume Lovenox 30 mg once daily in the AM. Continue both wafarin and lovenox until INR > 2.0    INR check 11-        Pt educated to contact our clinic with any changes in medications or s/s of bleeding or thrombosis. Pt is aware to seek immediate medical attention for falls, head injury or deep cuts    Follow up appointment in 2 week(s) to reduce risk of adverse events from warfarin  Kelsey Junior, ZayD

## 2020-11-04 NOTE — TELEPHONE ENCOUNTER
ELIZABETH    11/4/20 Batsheva Gonzalez from [facility] called to request the medical clearance for this patient be faxed to [phone].    Thank you

## 2020-11-05 NOTE — TELEPHONE ENCOUNTER
A clearance was sent 10/28.     This clearance form, plus 10/27 progress note, was filled out per Dr. Damon and faxed back to  at [phone].

## 2020-11-06 RX ORDER — LEVOTHYROXINE SODIUM 137 UG/1
TABLET ORAL
Qty: 90 TAB | Refills: 3 | Status: SHIPPED | OUTPATIENT
Start: 2020-11-06 | End: 2021-07-14 | Stop reason: SDUPTHER

## 2020-11-16 ENCOUNTER — ANTICOAGULATION VISIT (OUTPATIENT)
Dept: MEDICAL GROUP | Facility: MEDICAL CENTER | Age: 80
End: 2020-11-16
Payer: MEDICARE

## 2020-11-16 DIAGNOSIS — Z79.01 CHRONIC ANTICOAGULATION: Primary | ICD-10-CM

## 2020-11-16 DIAGNOSIS — Z95.828 PRESENCE OF IVC FILTER: ICD-10-CM

## 2020-11-16 LAB — INR PPP: 2 (ref 2–3.5)

## 2020-11-16 PROCEDURE — 99211 OFF/OP EST MAY X REQ PHY/QHP: CPT | Performed by: INTERNAL MEDICINE

## 2020-11-16 PROCEDURE — 85610 PROTHROMBIN TIME: CPT | Performed by: INTERNAL MEDICINE

## 2020-11-16 NOTE — PROGRESS NOTES
OP Anticoagulation Service Note    Date: 2020  There were no vitals filed for this visit.   pt declined vitals    Anticoagulation Summary  As of 2020    INR goal:  2.0-3.0   TTR:  76.3 % (2.7 y)   INR used for dosin.00 (2020)   Warfarin maintenance plan:  5 mg (5 mg x 1) every day   Weekly warfarin total:  35 mg   Plan last modified:  Kelsey Junior, PharmD (2020)   Next INR check:  2020   Target end date:  Indefinite    Indications    Presence of IVC filter [Z95.828]  Transient ischemic attack [G45.9] (Resolved) [G45.9]  Deep vein thrombosis (DVT) of both lower extremities (HCC) (Resolved) [I82.403]  DVT (deep venous thrombosis) (HCC) (Resolved) [I82.409]  Multiple pulmonary emboli (HCC) (Resolved) [I26.99]  Chronic anticoagulation [Z79.01]             Anticoagulation Episode Summary     INR check location:      Preferred lab:      Send INR reminders to:      Comments:        Anticoagulation Care Providers     Provider Role Specialty Phone number    [facility]   [phone]    Edward Walters M.D.  Family Medicine [phone]        Anticoagulation Patient Findings      HPI:   Paulo Paredes seen in clinic today, on anticoagulation therapy with warfarin (a high risk medication) for PE/DVT, hx of TIA  Pt is here today to evaluate anticoagulation therapy    Pt is currently bridging with Lovenox 30 mg daily since colonscopy last week.     Confirmed warfarin dosing regimen, denies missed or extra doses of coumadin.   Diet has been consistent with foods rich in vitamin K: Yes  Changes in ETOH:  No  Changes in smoking status: No  Changes in medication: No   Cost restriction: No  S/s of bleeding:  No  Falls or accidents since last visit No  Signs/symptoms  thrombosis since the last appt: No    A/P   INR  therapeutic today,   Stop lovenox, Continue current warfarin regimen           check referral    Pt educated to contact our clinic with any changes in  medications or s/s of bleeding or thrombosis. Pt is aware to seek immediate medical attention for falls, head injury or deep cuts    Follow up appointment in 2 week(s) to reduce risk of adverse events from warfarin  Kelsey Junior, PharmD

## 2020-11-30 ENCOUNTER — ANTICOAGULATION VISIT (OUTPATIENT)
Dept: MEDICAL GROUP | Facility: MEDICAL CENTER | Age: 80
End: 2020-11-30
Payer: MEDICARE

## 2020-11-30 DIAGNOSIS — Z95.828 PRESENCE OF IVC FILTER: ICD-10-CM

## 2020-11-30 DIAGNOSIS — Z79.01 CHRONIC ANTICOAGULATION: Primary | ICD-10-CM

## 2020-11-30 LAB — INR PPP: 2.6 (ref 2–3.5)

## 2020-11-30 PROCEDURE — 85610 PROTHROMBIN TIME: CPT | Performed by: INTERNAL MEDICINE

## 2020-11-30 PROCEDURE — 93793 ANTICOAG MGMT PT WARFARIN: CPT | Performed by: INTERNAL MEDICINE

## 2020-11-30 NOTE — PROGRESS NOTES
OP Anticoagulation Service Note    Date: 2020  There were no vitals filed for this visit.   pt declined vitals    Anticoagulation Summary  As of 2020    INR goal:  2.0-3.0   TTR:  76.6 % (2.8 y)   INR used for dosin.60 (2020)   Warfarin maintenance plan:  5 mg (5 mg x 1) every day   Weekly warfarin total:  35 mg   Plan last modified:  Kelsey Junior, PharmD (2020)   Next INR check:  2020   Target end date:  Indefinite    Indications    Presence of IVC filter [Z95.828]  Transient ischemic attack [G45.9] (Resolved) [G45.9]  Deep vein thrombosis (DVT) of both lower extremities (HCC) (Resolved) [I82.403]  DVT (deep venous thrombosis) (HCC) (Resolved) [I82.409]  Multiple pulmonary emboli (HCC) (Resolved) [I26.99]  Chronic anticoagulation [Z79.01]             Anticoagulation Episode Summary     INR check location:      Preferred lab:      Send INR reminders to:      Comments:        Anticoagulation Care Providers     Provider Role Specialty Phone number    [facility]   [phone]    Edward Walters M.D.  Family Medicine [phone]        Anticoagulation Patient Findings      HPI:   Paulo Paredes seen in clinic today, on anticoagulation therapy with warfarin (a high risk medication) for hx of DVT and PE and TIA       Pt is here today to evaluate anticoagulation therapy    Previous INR was  2.0 on 2020    Pt was instructed to continue current regimen    Confirmed warfarin dosing regimen, he missed a dose about 10 days ago  Diet has been consistent with foods rich in vitamin K: Yes  Changes in ETOH:  No  Changes in smoking status: No  Changes in medication: No   Cost restriction: No  S/s of bleeding:  No  Falls or accidents since last visit No  Signs/symptoms  thrombosis since the last appt: No    A/P   INR  therapeutic today  Continue current warfarin regimen          Pt educated to contact our clinic with any changes in medications or s/s of bleeding  or thrombosis. Pt is aware to seek immediate medical attention for falls, head injury or deep cuts    Follow up appointment in 4 week(s) to reduce risk of adverse events from warfarin   Kelsey Junior, PharmD

## 2020-12-04 ENCOUNTER — HOSPITAL ENCOUNTER (OUTPATIENT)
Dept: LAB | Facility: MEDICAL CENTER | Age: 80
End: 2020-12-04
Attending: INTERNAL MEDICINE
Payer: MEDICARE

## 2020-12-04 DIAGNOSIS — E11.8 CONTROLLED TYPE 2 DIABETES MELLITUS WITH COMPLICATION, WITHOUT LONG-TERM CURRENT USE OF INSULIN (HCC): ICD-10-CM

## 2020-12-04 DIAGNOSIS — E55.9 HYPOVITAMINOSIS D: ICD-10-CM

## 2020-12-04 DIAGNOSIS — E11.69 HYPERLIPIDEMIA ASSOCIATED WITH TYPE 2 DIABETES MELLITUS (HCC): ICD-10-CM

## 2020-12-04 DIAGNOSIS — Z85.850 HISTORY OF THYROID CANCER: ICD-10-CM

## 2020-12-04 DIAGNOSIS — D69.6 THROMBOCYTOPENIA (HCC): ICD-10-CM

## 2020-12-04 DIAGNOSIS — N18.4 CKD (CHRONIC KIDNEY DISEASE), STAGE IV (HCC): ICD-10-CM

## 2020-12-04 DIAGNOSIS — E78.5 HYPERLIPIDEMIA ASSOCIATED WITH TYPE 2 DIABETES MELLITUS (HCC): ICD-10-CM

## 2020-12-04 DIAGNOSIS — E89.0 POSTSURGICAL HYPOTHYROIDISM: ICD-10-CM

## 2020-12-04 LAB
25(OH)D3 SERPL-MCNC: 50 NG/ML (ref 30–100)
ALBUMIN SERPL BCP-MCNC: 4.4 G/DL (ref 3.2–4.9)
ALBUMIN/GLOB SERPL: 1.8 G/DL
ALP SERPL-CCNC: 43 U/L (ref 30–99)
ALT SERPL-CCNC: 12 U/L (ref 2–50)
ANION GAP SERPL CALC-SCNC: 7 MMOL/L (ref 7–16)
AST SERPL-CCNC: 21 U/L (ref 12–45)
BASOPHILS # BLD AUTO: 0.7 % (ref 0–1.8)
BASOPHILS # BLD: 0.04 K/UL (ref 0–0.12)
BILIRUB SERPL-MCNC: 0.5 MG/DL (ref 0.1–1.5)
BUN SERPL-MCNC: 29 MG/DL (ref 8–22)
CALCIUM SERPL-MCNC: 9.3 MG/DL (ref 8.4–10.2)
CHLORIDE SERPL-SCNC: 108 MMOL/L (ref 96–112)
CHOLEST SERPL-MCNC: 148 MG/DL (ref 100–199)
CO2 SERPL-SCNC: 27 MMOL/L (ref 20–33)
CREAT SERPL-MCNC: 1.97 MG/DL (ref 0.5–1.4)
CREAT UR-MCNC: 135.47 MG/DL
EOSINOPHIL # BLD AUTO: 0.26 K/UL (ref 0–0.51)
EOSINOPHIL NFR BLD: 4.7 % (ref 0–6.9)
ERYTHROCYTE [DISTWIDTH] IN BLOOD BY AUTOMATED COUNT: 47.8 FL (ref 35.9–50)
FASTING STATUS PATIENT QL REPORTED: NORMAL
GLOBULIN SER CALC-MCNC: 2.5 G/DL (ref 1.9–3.5)
GLUCOSE SERPL-MCNC: 145 MG/DL (ref 65–99)
HCT VFR BLD AUTO: 48 % (ref 42–52)
HDLC SERPL-MCNC: 37 MG/DL
HGB BLD-MCNC: 15.4 G/DL (ref 14–18)
IMM GRANULOCYTES # BLD AUTO: 0.02 K/UL (ref 0–0.11)
IMM GRANULOCYTES NFR BLD AUTO: 0.4 % (ref 0–0.9)
LDLC SERPL CALC-MCNC: 78 MG/DL
LYMPHOCYTES # BLD AUTO: 1.75 K/UL (ref 1–4.8)
LYMPHOCYTES NFR BLD: 31.4 % (ref 22–41)
MCH RBC QN AUTO: 31.5 PG (ref 27–33)
MCHC RBC AUTO-ENTMCNC: 32.1 G/DL (ref 33.7–35.3)
MCV RBC AUTO: 98.2 FL (ref 81.4–97.8)
MICROALBUMIN UR-MCNC: 18.5 MG/DL
MICROALBUMIN/CREAT UR: 137 MG/G (ref 0–30)
MONOCYTES # BLD AUTO: 0.36 K/UL (ref 0–0.85)
MONOCYTES NFR BLD AUTO: 6.5 % (ref 0–13.4)
NEUTROPHILS # BLD AUTO: 3.15 K/UL (ref 1.82–7.42)
NEUTROPHILS NFR BLD: 56.3 % (ref 44–72)
NRBC # BLD AUTO: 0 K/UL
NRBC BLD-RTO: 0 /100 WBC
PLATELET # BLD AUTO: 124 K/UL (ref 164–446)
PMV BLD AUTO: 10 FL (ref 9–12.9)
POTASSIUM SERPL-SCNC: 5.2 MMOL/L (ref 3.6–5.5)
PROT SERPL-MCNC: 6.9 G/DL (ref 6–8.2)
RBC # BLD AUTO: 4.89 M/UL (ref 4.7–6.1)
SODIUM SERPL-SCNC: 142 MMOL/L (ref 135–145)
TRIGL SERPL-MCNC: 166 MG/DL (ref 0–149)
TSH SERPL DL<=0.005 MIU/L-ACNC: 0.28 UIU/ML (ref 0.38–5.33)
WBC # BLD AUTO: 5.6 K/UL (ref 4.8–10.8)

## 2020-12-04 PROCEDURE — 80061 LIPID PANEL: CPT

## 2020-12-04 PROCEDURE — 82043 UR ALBUMIN QUANTITATIVE: CPT

## 2020-12-04 PROCEDURE — 82570 ASSAY OF URINE CREATININE: CPT

## 2020-12-04 PROCEDURE — 84443 ASSAY THYROID STIM HORMONE: CPT

## 2020-12-04 PROCEDURE — 85025 COMPLETE CBC W/AUTO DIFF WBC: CPT

## 2020-12-04 PROCEDURE — 83036 HEMOGLOBIN GLYCOSYLATED A1C: CPT

## 2020-12-04 PROCEDURE — 82306 VITAMIN D 25 HYDROXY: CPT

## 2020-12-04 PROCEDURE — 36415 COLL VENOUS BLD VENIPUNCTURE: CPT

## 2020-12-04 PROCEDURE — 80053 COMPREHEN METABOLIC PANEL: CPT

## 2020-12-05 LAB
EST. AVERAGE GLUCOSE BLD GHB EST-MCNC: 151 MG/DL
HBA1C MFR BLD: 6.9 % (ref 0–5.6)

## 2020-12-08 ENCOUNTER — OFFICE VISIT (OUTPATIENT)
Dept: NEPHROLOGY | Facility: MEDICAL CENTER | Age: 80
End: 2020-12-08
Payer: MEDICARE

## 2020-12-08 VITALS
DIASTOLIC BLOOD PRESSURE: 70 MMHG | HEIGHT: 68 IN | TEMPERATURE: 97.1 F | HEART RATE: 72 BPM | OXYGEN SATURATION: 97 % | WEIGHT: 143.6 LBS | SYSTOLIC BLOOD PRESSURE: 120 MMHG | BODY MASS INDEX: 21.76 KG/M2

## 2020-12-08 DIAGNOSIS — N18.30 STAGE 3 CHRONIC KIDNEY DISEASE, UNSPECIFIED WHETHER STAGE 3A OR 3B CKD: ICD-10-CM

## 2020-12-08 DIAGNOSIS — E11.22 TYPE 2 DIABETES MELLITUS WITH STAGE 3 CHRONIC KIDNEY DISEASE, UNSPECIFIED WHETHER LONG TERM INSULIN USE, UNSPECIFIED WHETHER STAGE 3A OR 3B CKD (HCC): ICD-10-CM

## 2020-12-08 DIAGNOSIS — N18.30 TYPE 2 DIABETES MELLITUS WITH STAGE 3 CHRONIC KIDNEY DISEASE, UNSPECIFIED WHETHER LONG TERM INSULIN USE, UNSPECIFIED WHETHER STAGE 3A OR 3B CKD (HCC): ICD-10-CM

## 2020-12-08 DIAGNOSIS — I10 ESSENTIAL HYPERTENSION: ICD-10-CM

## 2020-12-08 PROCEDURE — 99214 OFFICE O/P EST MOD 30 MIN: CPT | Performed by: INTERNAL MEDICINE

## 2020-12-08 ASSESSMENT — FIBROSIS 4 INDEX: FIB4 SCORE: 4.01

## 2020-12-08 ASSESSMENT — ENCOUNTER SYMPTOMS
NAUSEA: 0
SHORTNESS OF BREATH: 0
VOMITING: 0
FEVER: 0
COUGH: 0
CHILLS: 0
HYPERTENSION: 1

## 2020-12-08 NOTE — PROGRESS NOTES
"Subjective:      Kevin Paredes is a 80 y.o. male who presents with Hypertension and Chronic Kidney Disease            Hypertension  This is a chronic problem. The current episode started more than 1 year ago. The problem is unchanged. The problem is controlled. Pertinent negatives include no chest pain, malaise/fatigue, peripheral edema or shortness of breath. Risk factors for coronary artery disease include male gender and diabetes mellitus. Past treatments include angiotensin blockers. The current treatment provides significant improvement. There are no compliance problems.  Hypertensive end-organ damage includes kidney disease. Identifiable causes of hypertension include chronic renal disease.   Chronic Kidney Disease  This is a chronic problem. The current episode started more than 1 year ago. The problem occurs constantly. The problem has been unchanged. Pertinent negatives include no chest pain, chills, coughing, fever, nausea, urinary symptoms or vomiting.       Review of Systems   Constitutional: Negative for chills, fever and malaise/fatigue.   Respiratory: Negative for cough and shortness of breath.    Cardiovascular: Negative for chest pain and leg swelling.   Gastrointestinal: Negative for nausea and vomiting.   Genitourinary: Negative for dysuria, frequency and urgency.          Objective:     /70   Pulse 72   Temp 36.2 °C (97.1 °F) (Temporal)   Ht 1.727 m (5' 8\") Comment: pt reported  Wt 65.1 kg (143 lb 9.6 oz)   SpO2 97%   BMI 21.83 kg/m²      Physical Exam  Vitals signs and nursing note reviewed.   Constitutional:       General: He is not in acute distress.     Appearance: He is not ill-appearing.   HENT:      Head: Normocephalic and atraumatic.      Right Ear: External ear normal.      Left Ear: External ear normal.      Nose: Nose normal.   Eyes:      General:         Right eye: No discharge.         Left eye: No discharge.      Conjunctiva/sclera: Conjunctivae normal.   Cardiovascular: "      Rate and Rhythm: Normal rate and regular rhythm.      Heart sounds: No murmur.   Pulmonary:      Effort: Pulmonary effort is normal. No respiratory distress.      Breath sounds: Normal breath sounds. No wheezing.   Musculoskeletal:         General: No tenderness or deformity.      Right lower leg: No edema.      Left lower leg: No edema.   Skin:     General: Skin is warm.   Neurological:      General: No focal deficit present.      Mental Status: He is alert and oriented to person, place, and time.   Psychiatric:         Mood and Affect: Mood normal.         Behavior: Behavior normal.       Past Medical History:   Diagnosis Date   • Anesthesia     difficult intubation with surgery 2008,2010   • Basal cell carcinoma of skin    • CAD (coronary artery disease)    • Cancer (HCC)     rt  kidney 1989;  thyroid cancer 2012, prostate 2008, skin   • DVT (deep venous thrombosis) (HCC) 2014   • ED (erectile dysfunction)    • GERD (gastroesophageal reflux disease)    • Glaucoma    • Heart burn    • Hyperlipidemia 12/3/2012   • Hypertension    • Indigestion    • Multiple pulmonary emboli (HCC) 2014   • Multiple thyroid nodules 2009   • Papillary carcinoma of thyroid (HCC) 2014    R 2 foci / 4.5mm,0.25mm / no mets/ pT1pN0   • postsurgical hypothyroidism 2014   • Pre-diabetes    • Renal disorder     rt kidney cancer,nephrectomy   • S/P CABG x 4 2003   • S/P colectomy 2010    multiple colon polyps / no Ca   • S/p nephrectomy 1998    carcinoma   • S/P prostatectomy 2008    carcinoma   • Stroke (HCC)     'mild',no residual,'one doctor said it was a tia'   • Transient renal failure 2014    renal vein thrombosis   • Type II or unspecified type diabetes mellitus without mention of complication, not stated as uncontrolled     on no medications   • Unspecified urinary incontinence      Social History     Socioeconomic History   • Marital status:      Spouse name: Not on file   • Number of children: Not on file   • Years of  education: Not on file   • Highest education level: Not on file   Occupational History   • Not on file   Social Needs   • Financial resource strain: Not on file   • Food insecurity     Worry: Not on file     Inability: Not on file   • Transportation needs     Medical: Not on file     Non-medical: Not on file   Tobacco Use   • Smoking status: Never Smoker   • Smokeless tobacco: Never Used   Substance and Sexual Activity   • Alcohol use: Yes     Comment: 1 PER WK   • Drug use: No   • Sexual activity: Not on file     Comment: retired    Lifestyle   • Physical activity     Days per week: Not on file     Minutes per session: Not on file   • Stress: Not on file   Relationships   • Social connections     Talks on phone: Not on file     Gets together: Not on file     Attends Zoroastrianism service: Not on file     Active member of club or organization: Not on file     Attends meetings of clubs or organizations: Not on file     Relationship status: Not on file   • Intimate partner violence     Fear of current or ex partner: Not on file     Emotionally abused: Not on file     Physically abused: Not on file     Forced sexual activity: Not on file   Other Topics Concern   • Not on file   Social History Narrative   • Not on file     Family History   Problem Relation Age of Onset   • Diabetes Mother    • Heart Disease Mother    • Diabetes Father    • Cancer Father         pancreas   • Thyroid Sister         Cancer   • Cancer Sister         thyroid   • Diabetes Sister    • Cancer Brother 49        colon   • Diabetes Brother    • Diabetes Maternal Grandfather    • Diabetes Paternal Grandmother    • Diabetes Paternal Grandfather      Recent Labs     05/07/20  0730 08/24/20  0740 12/04/20  0723   ALBUMIN 4.4 4.2 4.4   HDL 36* 37* 37*   TRIGLYCERIDE 173* 202* 166*   SODIUM 139 141 142   POTASSIUM 5.3 5.2 5.2   CHLORIDE 103 106 108   CO2 27 26 27   BUN 28* 39* 29*   CREATININE 2.25* 2.25* 1.97*                    Assessment/Plan:        1. Essential hypertension  Controlled  Continue same medication regimen  Continue low-sodium diet      2. Stage 3 chronic kidney disease, unspecified whether stage 3a or 3b CKD  Stable  No uremic symptoms  Renal dose of medication  Avoid nephrotoxins  Continue same medication regimen      3. Type 2 diabetes mellitus with stage 3 chronic kidney disease, unspecified whether long term insulin use, unspecified whether stage 3a or 3b CKD (HCC)

## 2020-12-15 ENCOUNTER — TELEMEDICINE (OUTPATIENT)
Dept: MEDICAL GROUP | Age: 80
End: 2020-12-15
Payer: MEDICARE

## 2020-12-15 VITALS
SYSTOLIC BLOOD PRESSURE: 133 MMHG | HEIGHT: 68 IN | BODY MASS INDEX: 25.82 KG/M2 | DIASTOLIC BLOOD PRESSURE: 78 MMHG | TEMPERATURE: 97.9 F | WEIGHT: 170.4 LBS | HEART RATE: 63 BPM

## 2020-12-15 DIAGNOSIS — D75.89 MACROCYTOSIS: ICD-10-CM

## 2020-12-15 DIAGNOSIS — K21.9 GASTROESOPHAGEAL REFLUX DISEASE WITHOUT ESOPHAGITIS: ICD-10-CM

## 2020-12-15 DIAGNOSIS — Z79.01 CHRONIC ANTICOAGULATION: ICD-10-CM

## 2020-12-15 DIAGNOSIS — Z86.718 HISTORY OF DVT IN ADULTHOOD: ICD-10-CM

## 2020-12-15 DIAGNOSIS — R80.9 MICROALBUMINURIA DUE TO TYPE 2 DIABETES MELLITUS (HCC): ICD-10-CM

## 2020-12-15 DIAGNOSIS — Z00.00 MEDICARE ANNUAL WELLNESS VISIT, SUBSEQUENT: ICD-10-CM

## 2020-12-15 DIAGNOSIS — Z95.1 S/P CABG X 4: ICD-10-CM

## 2020-12-15 DIAGNOSIS — E89.0 POSTOPERATIVE HYPOTHYROIDISM: ICD-10-CM

## 2020-12-15 DIAGNOSIS — I25.10 CAD IN NATIVE ARTERY: ICD-10-CM

## 2020-12-15 DIAGNOSIS — Z90.5 SINGLE KIDNEY: ICD-10-CM

## 2020-12-15 DIAGNOSIS — N18.4 CKD (CHRONIC KIDNEY DISEASE), STAGE IV (HCC): ICD-10-CM

## 2020-12-15 DIAGNOSIS — Z86.73 H/O TIA (TRANSIENT ISCHEMIC ATTACK) AND STROKE: ICD-10-CM

## 2020-12-15 DIAGNOSIS — Z95.828 PRESENCE OF IVC FILTER: ICD-10-CM

## 2020-12-15 DIAGNOSIS — E78.5 DYSLIPIDEMIA: ICD-10-CM

## 2020-12-15 DIAGNOSIS — Z86.711 HX PULMONARY EMBOLISM: ICD-10-CM

## 2020-12-15 DIAGNOSIS — D69.6 THROMBOCYTOPENIA (HCC): ICD-10-CM

## 2020-12-15 DIAGNOSIS — E11.29 MICROALBUMINURIA DUE TO TYPE 2 DIABETES MELLITUS (HCC): ICD-10-CM

## 2020-12-15 DIAGNOSIS — I10 ESSENTIAL HYPERTENSION, BENIGN: ICD-10-CM

## 2020-12-15 DIAGNOSIS — E11.8 CONTROLLED TYPE 2 DIABETES MELLITUS WITH COMPLICATION, WITHOUT LONG-TERM CURRENT USE OF INSULIN (HCC): ICD-10-CM

## 2020-12-15 DIAGNOSIS — Z00.00 HEALTH CARE MAINTENANCE: ICD-10-CM

## 2020-12-15 PROBLEM — E55.9 HYPOVITAMINOSIS D: Status: RESOLVED | Noted: 2020-01-16 | Resolved: 2020-12-15

## 2020-12-15 PROBLEM — B02.9 ZOSTER: Status: RESOLVED | Noted: 2019-01-23 | Resolved: 2020-12-15

## 2020-12-15 PROBLEM — Z85.850 HISTORY OF THYROID CANCER: Status: RESOLVED | Noted: 2019-07-16 | Resolved: 2020-12-15

## 2020-12-15 PROBLEM — Z01.810 PREOP CARDIOVASCULAR EXAM: Status: RESOLVED | Noted: 2018-02-12 | Resolved: 2020-12-15

## 2020-12-15 PROCEDURE — G0439 PPPS, SUBSEQ VISIT: HCPCS | Mod: 95,CR | Performed by: INTERNAL MEDICINE

## 2020-12-15 RX ORDER — ASPIRIN 81 MG/1
TABLET ORAL
COMMUNITY
End: 2021-02-11

## 2020-12-15 RX ORDER — POLYETHYLENE GLYCOL 3350, SODIUM SULFATE, SODIUM CHLORIDE, POTASSIUM CHLORIDE, SODIUM ASCORBATE, AND ASCORBIC ACID 7.5-2.691G
KIT ORAL
COMMUNITY
Start: 2020-11-05 | End: 2021-07-01

## 2020-12-15 SDOH — HEALTH STABILITY: MENTAL HEALTH: HOW OFTEN DO YOU HAVE A DRINK CONTAINING ALCOHOL?: 2-4 TIMES A MONTH

## 2020-12-15 SDOH — HEALTH STABILITY: MENTAL HEALTH: HOW OFTEN DO YOU HAVE 6 OR MORE DRINKS ON ONE OCCASION?: NEVER

## 2020-12-15 SDOH — HEALTH STABILITY: MENTAL HEALTH: HOW MANY STANDARD DRINKS CONTAINING ALCOHOL DO YOU HAVE ON A TYPICAL DAY?: 1 OR 2

## 2020-12-15 ASSESSMENT — PATIENT HEALTH QUESTIONNAIRE - PHQ9: CLINICAL INTERPRETATION OF PHQ2 SCORE: 0

## 2020-12-15 ASSESSMENT — FIBROSIS 4 INDEX: FIB4 SCORE: 4.01

## 2020-12-15 ASSESSMENT — ACTIVITIES OF DAILY LIVING (ADL): BATHING_REQUIRES_ASSISTANCE: 0

## 2020-12-15 ASSESSMENT — PAIN SCALES - GENERAL: PAINLEVEL: NO PAIN

## 2020-12-15 ASSESSMENT — ENCOUNTER SYMPTOMS: GENERAL WELL-BEING: GOOD

## 2020-12-15 NOTE — PROGRESS NOTES
Chief Complaint   Patient presents with   • Annual Wellness Visit     Telemedicine Visit: Established Patient     This Remote Face to Face encounter was conducted via Fineline. Given the importance of social distancing and other strategies recommended to reduce the risk of COVID-19 transmission, I am providing medical care to this patient via audio/video visit in place of an in person visit at the request of the patient. Verbal consent to telehealth, risks, benefits, and consequences were discussed. Patient retains the right to withdraw at any time. All existing confidentiality protections apply. The patient has access to all transmitted medical information. No dissemination of any patient images or information to other entities without further written consent.    HPI:  Al is a 80 y.o. here for Medicare Annual Wellness Visit    DM 2, with microalbuminuria  Interval course  HBA1C 6.9 (H) 2020 07:23 AM   HBA1C 7.0 (H) 2020 07:30 AM   HBA1C 6.9 (H) 10/15/2019 07:11 AM     Onset/D  Diabetes education:  unknown     Medications:   · Trulicity 0.75 mg weekly  · ACE/ARB: losartan  · Statin: atorvastatin 10 mg QD  · ASA: No, on coumadin     Checking feet daily/wear soft socks/shoes: advised     Diabetes ABCDE TARGETS  · Fingersticks:  occasionally  ? - range   · Blood Pressure: < 140/90  · Cholesterol-Lipid Panel: elevated triglycerides, low HDL  · Dysalbuminuria:  microalbumin pos     Diet: regular  Exercise:  Walk daily  BMI:  27     DM complications:  · No neuropathy  · Last eye exam: . No retinopathy.    · Nephropathy:  CKD stage III-IV, microalbuminuria  · CVS: Has CAD, on rx.   · No gastropathy.     FH of DM: mother     CKD stage IV, single kidney  The patient had decreased GFR with normal creatinine and electrolytes.   No consistent NSAIDs use.   He has been followed up by nephrology.     Hypertension/CAD, st post CABG x 4  St post CABG x4 in  at [facility]  Meds: Valsartan  160 mg daily, atenolol 25 mg daily; no ASA, on coumadin.   Taking meds as prescribed.   He is measuring BP at home, it has been < 140/90  Denies:  -  headaches, vision problems, tinnitus.                 -  chest pain/pressure, palpitations, irregular heart beats, exertional, dyspnea, peripheral edema.  Low salt diet: Y  Diet / exercise / BMI: as above.   FH of HTN: unknown     Echocardiography, 10/18/2018  Prior echo on 05/21/14. Compared to the report of the study done -   there has been no significant change.   Normal left ventricular systolic function.  Left ventricular ejection fraction is visually estimated to be 65%.  Mild tricuspid regurgitation.  Unable to estimate pulmonary artery pressure due to an inadequate   tricuspid regurgitant jet.     Dyslipidemia  The patient is on atorvastatin 10 mg, taking daily, as prescribed. No myalgias, muscle cramps or pain.   Diet /Exercise/BMI:  As above  FH: unknown      Hypothyroidism, postsurgical (H/o thyroid cancer)  Dg: in 2012.   Status post total thyroidectomy.   On Levothyroxine, 137 mcg, taking daily early in am, before any po intake.  No temperature intolerance. No change in weight,  hair/skin quality, BMs.   No tremors, weakness.  No peripheral swelling.  No mood changes.  FH: N  Review TSH.  He has been followed up by endocrinology     Thrombocytopenia, macrocytosis  The patient has had borderline thrombocytopenia/elevated MCV, stable.   B12/folate were normal.  Denies easy bleeding or bruising.   Reviewed medication list.    GERD  On omeprazole, 20 mg QD; takes medication as prescribed, that controls sx.   Denies:   - heartburn, epigastric pain.   -  nausea, vomiting, or diarrhea  - dysphagia  - abnormal cough  - blood in the stool or dark tarry stools.  - early satiety  - tobacco use.    Patient Active Problem List    Diagnosis Date Noted   • Chronic anticoagulation 08/01/2014     Priority: High   • Presence of IVC filter 06/02/2014     Priority: High   •  Hypertension associated with type 2 diabetes mellitus ([facility]) 12/03/2012     Priority: Medium   • S/P CABG x 4 12/03/2012     Priority: Medium   • Essential hypertension, benign 10/27/2020   • H/O TIA (transient ischemic attack) and stroke 05/17/2020   • Thrombocytopenia (HCC) 01/16/2020   • Macrocytosis 01/16/2020   • CKD (chronic kidney disease), stage IV ([facility]) 01/16/2020   • Hypovitaminosis D 01/16/2020   • History of thyroid cancer 07/16/2019   • Microalbuminuria due to type 2 diabetes mellitus (HCC) 07/16/2019   • Hx pulmonary embolism 07/16/2019   • History of DVT in adulthood 07/16/2019   • Zoster 01/23/2019   • Dyslipidemia 03/19/2018   • Preop cardiovascular exam 02/12/2018   • Postsurgical hypothyroidism 08/03/2015   • S/P total thyroidectomy 06/02/2014   • CAD in native artery 01/22/2014   • S/P partial resection of colon 07/09/2013   • GERD (gastroesophageal reflux disease) 12/03/2012   • ED (erectile dysfunction) 12/03/2012   • Controlled type 2 diabetes mellitus with complication, without long-term current use of insulin ([facility]) 12/03/2012   • Colon polyp 12/03/2012   • Single kidney 12/03/2012   • H/O prostate cancer 12/03/2012       Current Outpatient Medications   Medication Sig Dispense Refill   • aspirin 81 MG EC tablet aspirin 81 mg tablet,delayed release   Take 1 tablet every day by oral route.     • EUTHYROX 137 MCG Tab TAKE 1 TABLET BY MOUTH ONCE DAILY IN THE MORNING ON  AN  EMPTY  STOMACH  ONE  HOUR  BEFORE  EATING 90 Tab 3   • atorvastatin (LIPITOR) 20 MG Tab Take 1 Tab by mouth every day. 100 Tab 3   • atenolol (TENORMIN) 25 MG Tab Take 1 Tab by mouth every day. 90 Tab 3   • fenofibrate (TRIGLIDE) 160 MG tablet Take 1 Tab by mouth every day. 90 Tab 3   • losartan (COZAAR) 50 MG Tab Take 1 Tab by mouth every day. 90 Tab 3   • TRULICITY 0.75 MG/0.5ML Solution Pen-injector INJECT 1 SYRINGE SUBCUTANEOUSLY AS DIRECTED ONCE EVERY 7 DAYS 12 mL 3   • warfarin (COUMADIN) 5 MG Tab Take 1 Tab by mouth  every evening. 120 Tab 3   • Calcium Carb-Cholecalciferol (CALCIUM 1000 + D PO) Take 1 Dose by mouth every day.     • Omega-3 Krill Oil 300 MG Cap Take 300 mg by mouth every day.     • Cinnamon 500 MG Cap Take 1,000 mg by mouth.     • Cyanocobalamin (VITAMIN B-12) 1000 MCG Tab Take 1,000 mcg by mouth every day.     • Coenzyme Q10 (CO Q-10 PO) Take 1 Dose by mouth every day. Unknown OTC Strength     • PEG-3350/ELECTROLYTES/ASCORBAT 100 g Recon Soln USE AS DIRECTED (FOLLOW GI CONSULTANTS INSTRUCTION HANDOUT)     • aspirin 81 MG tablet Take 81 mg by mouth every evening.       No current facility-administered medications for this visit.       Patient is taking medications as noted in medication list.  Current supplements as per medication list.     Allergies: Lactose and Seasonal    Current social contact/activities: Patient resides with both spouse and a family member. Reading, puzzles, listen to music and watch television. House hold chores: Laundry, Cleaning, & laundry. Gardening. Limited visits with family and friends during global pandemic COVID-19.    Is patient current with immunizations? No, due for FLU, HEPATITIS B and TDAP. Patient is interested in receiving NONE today.    He  reports that he has never smoked. He has never used smokeless tobacco. He reports current alcohol use. He reports that he does not use drugs.  Counseling given: Not Answered        DPA/Advanced directive: Patient does not have an Advanced Directive.  A packet and workshop information was given on Advanced Directives.    ROS:    Gait: Uses no assistive device   Ostomy: No   Other tubes: No   Amputations: No   Chronic oxygen use No   Last eye exam: Year 2020  Wears hearing aids: No   : Reports urinary leakage during the last 6 months that has not interfered at all with their daily activities or sleep.    Screening:    Depression Screening  Little interest or pleasure in doing things?  0 - not at all  Feeling down, depressed, or hopeless?  0 - not at all  Patient Health Questionnaire Score: 0    Screening for Cognitive Impairment  Three Minute Recall (river, nation, finger)  3/3 River, Nation, Finger  Donal clock face with all 12 numbers and set the hands to show 10 past 11.  Yes 5/5  If cognitive concerns identified, deferred for follow up unless specifically addressed in assessment and plan.    Fall Risk Assessment  Has the patient had two or more falls in the last year or any fall with injury in the last year?  No  If fall risk identified, deferred for follow up unless specifically addressed in assessment and plan.    Safety Assessment  Throw rugs on floor.  Yes  Handrails on all stairs.  Yes  Good lighting in all hallways.  Yes  Difficulty hearing.  No  Patient counseled about all safety risks that were identified.    Functional Assessment ADLs    Are there any barriers preventing you from cooking for yourself or meeting nutritional needs?  No.    Are there any barriers preventing you from driving safely or obtaining transportation?  No.    Are there any barriers preventing you from using a telephone or calling for help?  No.    Are there any barriers preventing you from shopping?  No.    Are there any barriers preventing you from taking care of your own finances?  No.    Are there any barriers preventing you from managing your medications?  No.    Are there any barriers preventing you from showering, bathing or dressing yourself?  No.    Are you currently engaging in any exercise or physical activity?  Yes.  Walk on treadmill 50 minutes daily  What is your perception of your health?  Good.    Health Maintenance Summary                IMM DTaP/Tdap/Td Vaccine Overdue 9/6/1959      Done 3/4/2010 Imm Admin: TD Vaccine     Patient has more history with this topic...    Annual Wellness Visit Overdue 8/9/2019      Done 8/8/2018      Patient has more history with this topic...    IMM HEP B VACCINE Overdue 2/13/2020      Done 1/16/2020 Imm Admin:  Hepatitis B Vaccine (Adol/Adult)    RETINAL SCREENING Overdue 6/11/2020      Done 6/11/2019 REFERRAL FOR RETINAL SCREENING EXAM     Patient has more history with this topic...    IMM INFLUENZA Overdue 9/1/2020      Done 9/3/2019 Imm Admin: Influenza Vaccine Adult HD     Patient has more history with this topic...    DIABETES MONOFILAMENT / LE EXAM Next Due 5/18/2021      Done 5/18/2020      Patient has more history with this topic...    A1C SCREENING Next Due 6/4/2021      Done 12/4/2020 HEMOGLOBIN A1C     Patient has more history with this topic...    FASTING LIPID PROFILE Next Due 12/4/2021      Done 12/4/2020 LIPID PROFILE     Patient has more history with this topic...    URINE ACR / MICROALBUMIN Next Due 12/4/2021      Done 12/4/2020 MICROALBUMIN CREAT RATIO URINE     Patient has more history with this topic...    SERUM CREATININE Next Due 12/4/2021      Done 12/4/2020 COMP METABOLIC PANEL     Patient has more history with this topic...    COLONOSCOPY Next Due 11/11/2025      Done 11/11/2020 REFERRAL TO GI FOR COLONOSCOPY     Patient has more history with this topic...        Patient Care Team:  Edward Walters M.D. as PCP - General (Family Medicine)  Yunier Hughes M.D. as Consulting Physician (Endocrinology)  [facility]tico[facility]  Abhi Damon M.D. as Consulting Physician (Cardiology)  Heavenly Gordon, PT, DPT as Physical Therapy  Beth Garcia R.N. as Registered Nurse (Diabetic Education)    Social History     Tobacco Use   • Smoking status: Never Smoker   • Smokeless tobacco: Never Used   Substance Use Topics   • Alcohol use: Yes     Frequency: 2-4 times a month     Drinks per session: 1 or 2     Binge frequency: Never     Comment: 1 PER WK   • Drug use: No     Family History   Problem Relation Age of Onset   • Diabetes Mother    • Heart Disease Mother    • Diabetes Father    • Cancer Father         pancreas   • Thyroid Sister         Cancer   • Cancer Sister         thyroid   •  "Diabetes Sister    • Cancer Brother 49        colon   • Diabetes Brother    • Diabetes Maternal Grandfather    • Diabetes Paternal Grandmother    • Diabetes Paternal Grandfather    • No Known Problems Maternal Grandmother      He  has a past medical history of Anesthesia, Basal cell carcinoma of skin, CAD (coronary artery disease), Cancer (HCC), DVT (deep venous thrombosis) ([facility]) (2014), ED (erectile dysfunction), GERD (gastroesophageal reflux disease), Glaucoma, Heart burn, Hyperlipidemia (12/3/2012), Hypertension, Indigestion, Multiple pulmonary emboli ([facility]) (2014), Multiple thyroid nodules (2009), Papillary carcinoma of thyroid (HCC) (2014), postsurgical hypothyroidism (2014), Pre-diabetes, Renal disorder, S/P CABG x 4 (2003), S/P colectomy (2010), S/p nephrectomy (1998), S/P prostatectomy (2008), Stroke ([facility]), Transient renal failure (2014), Type II or unspecified type diabetes mellitus without mention of complication, not stated as uncontrolled, and Unspecified urinary incontinence.   Past Surgical History:   Procedure Laterality Date   • THYROIDECTOMY TOTAL  5/13/2014    Performed by Eliceo Bolanos M.D. at SURGERY SAME DAY [facility] ORS   • NODE DISSECTION  5/13/2014    Performed by Eliceo Bolanos M.D. at SURGERY SAME DAY [facility] ORS   • COLON RESECTION     • MULTIPLE CORONARY ARTERY BYPASS      x 4 11/2003   • NEPHRECTOMY RADICAL      right side 1998   • OTHER ORTHOPEDIC SURGERY      trotator cuff   • PROSTATECTOMY, RADICAL RETRO      cancer /january 2008     Exam:   /78 (BP Location: Right arm, Patient Position: Sitting)   Pulse 63   Temp 36.6 °C (97.9 °F) (Tympanic)   Ht 1.727 m (5' 8\")   Wt 77.3 kg (170 lb 6.4 oz)  Body mass index is 25.91 kg/m².  Hearing as above.    Dentition fair  Alert, oriented in no acute distress.  Eye contact is good, speech goal directed, affect calm    Labs  Reviewed and discussed  Lab Results   Component Value Date/Time    CHOLSTRLTOT 148 12/04/2020 07:23 AM    LDL " 78 12/04/2020 07:23 AM    HDL 37 (A) 12/04/2020 07:23 AM    TRIGLYCERIDE 166 (H) 12/04/2020 07:23 AM       Lab Results   Component Value Date/Time    SODIUM 142 12/04/2020 07:23 AM    POTASSIUM 5.2 12/04/2020 07:23 AM    CHLORIDE 108 12/04/2020 07:23 AM    CO2 27 12/04/2020 07:23 AM    GLUCOSE 145 (H) 12/04/2020 07:23 AM    BUN 29 (H) 12/04/2020 07:23 AM    CREATININE 1.97 (H) 12/04/2020 07:23 AM     Lab Results   Component Value Date/Time    ALKPHOSPHAT 43 12/04/2020 07:23 AM    ASTSGOT 21 12/04/2020 07:23 AM    ALTSGPT 12 12/04/2020 07:23 AM    TBILIRUBIN 0.5 12/04/2020 07:23 AM      Lab Results   Component Value Date/Time    HBA1C 6.9 (H) 12/04/2020 07:23 AM    HBA1C 7.0 (H) 05/07/2020 07:30 AM    HBA1C 6.9 (H) 10/15/2019 07:11 AM     No results found for: TSH  Lab Results   Component Value Date/Time    FREET4 1.32 08/24/2020 07:40 AM    FREET4 1.46 05/07/2020 07:30 AM     Lab Results   Component Value Date/Time    WBC 5.6 12/04/2020 07:23 AM    RBC 4.89 12/04/2020 07:23 AM    HEMOGLOBIN 15.4 12/04/2020 07:23 AM    HEMATOCRIT 48.0 12/04/2020 07:23 AM    MCV 98.2 (H) 12/04/2020 07:23 AM    MCH 31.5 12/04/2020 07:23 AM    MCHC 32.1 (L) 12/04/2020 07:23 AM    MPV 10.0 12/04/2020 07:23 AM    NEUTSPOLYS 56.30 12/04/2020 07:23 AM    LYMPHOCYTES 31.40 12/04/2020 07:23 AM    MONOCYTES 6.50 12/04/2020 07:23 AM    EOSINOPHILS 4.70 12/04/2020 07:23 AM    BASOPHILS 0.70 12/04/2020 07:23 AM      Imaging  Reviewed and discussed per HPI    Assessment and Plan    1. Medicare annual wellness visit, subsequent  Reviewed PMH, PSH, FH, SH, ALL, MEDS, HCM/IMM.   - Subsequent Annual Wellness Visit - Includes PPPS ()    2. Health care maintenance  Per HPI  - Subsequent Annual Wellness Visit - Includes PPPS ()    3. Controlled type 2 diabetes mellitus with complication, without long-term current use of insulin (HCC)  Controlled, continue with current treatment.  - Comp Metabolic Panel; Future  - HEMOGLOBIN A1C; Future  -  MICROALBUMIN CREAT RATIO URINE; Future  - Subsequent Annual Wellness Visit - Includes PPPS ()  4. Microalbuminuria due to type 2 diabetes mellitus (HCC)  - Subsequent Annual Wellness Visit - Includes PPPS ()    5. CKD (chronic kidney disease), stage IV (HCC)  Stable, continue good fluid intake, avoid NSAIDs  - Comp Metabolic Panel; Future  - Subsequent Annual Wellness Visit - Includes PPPS ()  6. Single kidney  - Subsequent Annual Wellness Visit - Includes PPPS ()    7. Essential hypertension, benign  Controlled, continue with current treatment, cardiology f/u  - Comp Metabolic Panel; Future  - Subsequent Annual Wellness Visit - Includes PPPS ()  8. CAD in native artery  - Subsequent Annual Wellness Visit - Includes PPPS ()  9. S/P CABG x 4  - Subsequent Annual Wellness Visit - Includes PPPS ()    10. Dyslipidemia  Controlled, continue with current treatment.  - Comp Metabolic Panel; Future  - Lipid Profile; Future  - Subsequent Annual Wellness Visit - Includes PPPS ()    11. Postoperative hypothyroidism  Controlled, continue with current treatment.  - TSH; Future  - FREE THYROXINE; Future  - Subsequent Annual Wellness Visit - Includes PPPS ()    12. Thrombocytopenia (HCC)  Stable, borderline, f/u labs  - CBC WITH DIFFERENTIAL; Future  - Subsequent Annual Wellness Visit - Includes PPPS ()  13. Macrocytosis  - CBC WITH DIFFERENTIAL; Future  - Subsequent Annual Wellness Visit - Includes PPPS ()    14. Gastroesophageal reflux disease without esophagitis  Controlled, continue with current treatment.  - Subsequent Annual Wellness Visit - Includes PPPS ()    15. H/O TIA (transient ischemic attack) and stroke  No recurrence, continue current rx  - Subsequent Annual Wellness Visit - Includes PPPS ()  16. History of DVT in adulthood  - Subsequent Annual Wellness Visit - Includes PPPS ()  17. Hx pulmonary embolism  - Subsequent Annual Wellness Visit -  Includes PPPS ()  18. Chronic anticoagulation  - Subsequent Annual Wellness Visit - Includes PPPS ()  19. Presence of IVC filter  - Subsequent Annual Wellness Visit - Includes PPPS ()     Services suggested: No services needed at this time  Health Care Screening recommendations as per orders if indicated.  Referrals offered: PT/OT/Nutrition counseling/Behavioral Health/Smoking cessation as per orders if indicated.    Discussion today about general wellness and lifestyle habits:    · Prevent falls and reduce trip hazards; Cautioned about securing or removing rugs.  · Have a working fire alarm and carbon monoxide detector;   · Engage in regular physical activity and social activities     Follow-up: in 6 months and prn, labs

## 2020-12-28 ENCOUNTER — ANTICOAGULATION VISIT (OUTPATIENT)
Dept: MEDICAL GROUP | Facility: MEDICAL CENTER | Age: 80
End: 2020-12-28
Payer: MEDICARE

## 2020-12-28 DIAGNOSIS — Z79.01 CHRONIC ANTICOAGULATION: Primary | ICD-10-CM

## 2020-12-28 DIAGNOSIS — Z95.828 PRESENCE OF IVC FILTER: ICD-10-CM

## 2020-12-28 LAB — INR PPP: 2.9 (ref 2–3.5)

## 2020-12-28 PROCEDURE — 93793 ANTICOAG MGMT PT WARFARIN: CPT | Performed by: INTERNAL MEDICINE

## 2020-12-28 PROCEDURE — 85610 PROTHROMBIN TIME: CPT | Performed by: INTERNAL MEDICINE

## 2020-12-28 NOTE — PROGRESS NOTES
OP Anticoagulation Service Note    Date: 2020  There were no vitals filed for this visit.   pt declined vitals    Anticoagulation Summary  As of 2020    INR goal:  2.0-3.0   TTR:  77.2 % (2.8 y)   INR used for dosin.90 (2020)   Warfarin maintenance plan:  5 mg (5 mg x 1) every day   Weekly warfarin total:  35 mg   Plan last modified:  Kelsey Junior, PharmD (2020)   Next INR check:  2021   Target end date:  Indefinite    Indications    Presence of IVC filter [Z95.828]  Transient ischemic attack [G45.9] (Resolved) [G45.9]  Deep vein thrombosis (DVT) of both lower extremities (HCC) (Resolved) [I82.403]  DVT (deep venous thrombosis) (HCC) (Resolved) [I82.409]  Multiple pulmonary emboli (HCC) (Resolved) [I26.99]  Chronic anticoagulation [Z79.01]             Anticoagulation Episode Summary     INR check location:      Preferred lab:      Send INR reminders to:      Comments:        Anticoagulation Care Providers     Provider Role Specialty Phone number    [facility]   [phone]    Edward Walters M.D.  Family Medicine [phone]        Anticoagulation Patient Findings      HPI:   Paulo Paredes seen in clinic today, on anticoagulation therapy with warfarin (a high risk medication) for DVT and PE and TIA     Pt is here today to evaluate anticoagulation therapy    Previous INR was  2.6 on 2020    Pt was instructed to continue current regimen     Confirmed warfarin dosing regimen, denies missed or extra doses of coumadin.   Diet has been consistent with foods rich in vitamin K: Yes  Changes in ETOH:  No  Changes in smoking status: No  Changes in medication: No   Pt on antiplatelet therapy Aspirin 81mg for CAD and must be reviewed again on 2020.  Cost restriction: No  S/s of bleeding:  No  Falls or accidents since last visit No  Signs/symptoms  thrombosis since the last appt: No  =    A/P   INR  therapeutic today,   Continue current warfarin regimen         Pt educated to contact our clinic with any changes in medications or s/s of bleeding or thrombosis. Pt is aware to seek immediate medical attention for falls, head injury or deep cuts    Follow up appointment in 6 week(s) to reduce risk of adverse events from warfarin  Kelsey Junior, PharmD

## 2021-01-11 DIAGNOSIS — Z23 NEED FOR VACCINATION: ICD-10-CM

## 2021-01-14 ENCOUNTER — IMMUNIZATION (OUTPATIENT)
Dept: FAMILY PLANNING/WOMEN'S HEALTH CLINIC | Facility: IMMUNIZATION CENTER | Age: 81
End: 2021-01-14
Attending: INTERNAL MEDICINE
Payer: MEDICARE

## 2021-01-14 DIAGNOSIS — Z23 ENCOUNTER FOR VACCINATION: Primary | ICD-10-CM

## 2021-01-14 DIAGNOSIS — Z23 NEED FOR VACCINATION: ICD-10-CM

## 2021-01-14 PROCEDURE — 91300 PFIZER SARS-COV-2 VACCINE: CPT | Performed by: INTERNAL MEDICINE

## 2021-01-14 PROCEDURE — 0001A PFIZER SARS-COV-2 VACCINE: CPT | Performed by: INTERNAL MEDICINE

## 2021-01-15 DIAGNOSIS — Z79.01 CHRONIC ANTICOAGULATION: ICD-10-CM

## 2021-01-22 ENCOUNTER — TELEPHONE (OUTPATIENT)
Dept: VASCULAR LAB | Facility: MEDICAL CENTER | Age: 81
End: 2021-01-22

## 2021-01-22 NOTE — TELEPHONE ENCOUNTER
Received medical clearance for oral surgery form from Dental Office    [facility]    Denton Babb DMD, MD  Office: [phone]  Fax: [phone]      Pt is having one tooth extracted which will not require warfarin reversal. Contacted dental office and patient with instructions for pt not to hold warfarin. Faxed clearance form to dental office and encouraged to contact clinic if any questions/concerns arise.    Guillaume Villalta, ZayD

## 2021-02-01 ENCOUNTER — ANTICOAGULATION VISIT (OUTPATIENT)
Dept: MEDICAL GROUP | Facility: MEDICAL CENTER | Age: 81
End: 2021-02-01
Payer: MEDICARE

## 2021-02-01 DIAGNOSIS — Z95.828 PRESENCE OF IVC FILTER: ICD-10-CM

## 2021-02-01 DIAGNOSIS — Z79.01 CHRONIC ANTICOAGULATION: ICD-10-CM

## 2021-02-01 LAB — INR PPP: 2.3 (ref 2–3.5)

## 2021-02-01 PROCEDURE — 93793 ANTICOAG MGMT PT WARFARIN: CPT | Performed by: INTERNAL MEDICINE

## 2021-02-01 PROCEDURE — 85610 PROTHROMBIN TIME: CPT | Performed by: INTERNAL MEDICINE

## 2021-02-01 PROCEDURE — 99999 PR NO CHARGE: CPT | Performed by: INTERNAL MEDICINE

## 2021-02-01 NOTE — PROGRESS NOTES
OP Anticoagulation Service Note    Date: 2021  There were no vitals filed for this visit.   pt declined vitals    Anticoagulation Summary  As of 2021    INR goal:  2.0-3.0   TTR:  78.0 % (2.9 y)   INR used for dosin.30 (2021)   Warfarin maintenance plan:  5 mg (5 mg x 1) every day   Weekly warfarin total:  35 mg   Plan last modified:  Kelsey Junior, PharmD (2020)   Next INR check:  3/29/2021   Target end date:  Indefinite    Indications    Presence of IVC filter [Z95.828]  Transient ischemic attack [G45.9] (Resolved) [G45.9]  Deep vein thrombosis (DVT) of both lower extremities (HCC) (Resolved) [I82.403]  DVT (deep venous thrombosis) (HCC) (Resolved) [I82.409]  Multiple pulmonary emboli (HCC) (Resolved) [I26.99]  Chronic anticoagulation [Z79.01]             Anticoagulation Episode Summary     INR check location:      Preferred lab:      Send INR reminders to:      Comments:        Anticoagulation Care Providers     Provider Role Specialty Phone number    [facility]   [phone]    Edwadr Walters M.D.  Family Medicine [phone]        Anticoagulation Patient Findings  Patient Findings     Positives:  Upcoming dental procedure (single tooth extraction; holding anticoagulation is NOT recommended)    Negatives:  Signs/symptoms of thrombosis, Signs/symptoms of bleeding, Laboratory test error suspected, Change in health, Change in alcohol use, Change in activity, Upcoming invasive procedure, Emergency department visit, Missed doses, Extra doses, Change in medications, Change in diet/appetite, Hospital admission, Bruising, Other complaints          HPI:   Paulo Paredes seen in clinic today, on anticoagulation therapy with warfarin (a high risk medication) for hx of TIA, DVT    Pt is here today to evaluate anticoagulation therapy    Previous INR was  2.9 on 20    Pt was instructed to continue regimen    Confirmed warfarin dosing regimen, denies missed or extra  doses of coumadin.   Diet has been consistent with foods rich in vitamin K: Yes  Changes in ETOH:  No  Changes in smoking status: No  Changes in medication: No   Pt on antiplatelet therapy Aspirin 81mg for hx of TIA  Cost restriction: No  S/s of bleeding:  No  Falls or accidents since last visit No  Signs/symptoms  thrombosis since the last appt: No  =    A/P   INR  therapeutic today    Pt is to continue with current warfarin dosing regimen.  Pt has single tooth extraction tomorrow 2/2. Per current ADA guidelines, local control of bleeding is recommended over holding anticoagulation.    01/22 check referral    Pt educated to contact our clinic with any changes in medications or s/s of bleeding or thrombosis. Pt is aware to seek immediate medical attention for falls, head injury or deep cuts    Follow up appointment in 8 week(s) to reduce risk of adverse events from warfarin   Leah Solo, ZayD

## 2021-02-05 ENCOUNTER — IMMUNIZATION (OUTPATIENT)
Dept: FAMILY PLANNING/WOMEN'S HEALTH CLINIC | Facility: IMMUNIZATION CENTER | Age: 81
End: 2021-02-05
Attending: INTERNAL MEDICINE
Payer: MEDICARE

## 2021-02-05 DIAGNOSIS — Z23 ENCOUNTER FOR VACCINATION: Primary | ICD-10-CM

## 2021-02-05 PROCEDURE — 91300 PFIZER SARS-COV-2 VACCINE: CPT

## 2021-02-05 PROCEDURE — 0002A PFIZER SARS-COV-2 VACCINE: CPT

## 2021-02-07 ENCOUNTER — HOSPITAL ENCOUNTER (EMERGENCY)
Facility: MEDICAL CENTER | Age: 81
End: 2021-02-07
Attending: EMERGENCY MEDICINE | Admitting: EMERGENCY MEDICINE
Payer: MEDICARE

## 2021-02-07 ENCOUNTER — APPOINTMENT (OUTPATIENT)
Dept: RADIOLOGY | Facility: MEDICAL CENTER | Age: 81
End: 2021-02-07
Attending: EMERGENCY MEDICINE
Payer: MEDICARE

## 2021-02-07 VITALS
HEART RATE: 63 BPM | BODY MASS INDEX: 25.89 KG/M2 | TEMPERATURE: 97.1 F | HEIGHT: 68 IN | DIASTOLIC BLOOD PRESSURE: 70 MMHG | SYSTOLIC BLOOD PRESSURE: 113 MMHG | WEIGHT: 170.86 LBS | RESPIRATION RATE: 16 BRPM | OXYGEN SATURATION: 95 %

## 2021-02-07 DIAGNOSIS — K59.03 DRUG-INDUCED CONSTIPATION: ICD-10-CM

## 2021-02-07 PROCEDURE — 74018 RADEX ABDOMEN 1 VIEW: CPT

## 2021-02-07 PROCEDURE — 700101 HCHG RX REV CODE 250: Performed by: EMERGENCY MEDICINE

## 2021-02-07 PROCEDURE — 99284 EMERGENCY DEPT VISIT MOD MDM: CPT

## 2021-02-07 RX ORDER — DOCUSATE SODIUM 100 MG/1
100 CAPSULE, LIQUID FILLED ORAL 2 TIMES DAILY
Qty: 60 CAP | Refills: 0 | Status: SHIPPED | OUTPATIENT
Start: 2021-02-07 | End: 2021-10-28

## 2021-02-07 RX ORDER — ENEMA 19; 7 G/133ML; G/133ML
1 ENEMA RECTAL ONCE
Status: COMPLETED | OUTPATIENT
Start: 2021-02-07 | End: 2021-02-07

## 2021-02-07 RX ADMIN — SODIUM PHOSPHATE, DIBASIC AND SODIUM PHOSPHATE, MONOBASIC 133 ML: 7; 19 ENEMA RECTAL at 04:00

## 2021-02-07 ASSESSMENT — ENCOUNTER SYMPTOMS
ABDOMINAL PAIN: 0
VOMITING: 0
FEVER: 0
NAUSEA: 0

## 2021-02-07 ASSESSMENT — FIBROSIS 4 INDEX: FIB4 SCORE: 4.01

## 2021-02-07 NOTE — ED TRIAGE NOTES
"Chief Complaint   Patient presents with   • Constipation     last BM 2/1; new rx for percocet post wisdome tooth removal 2/2     /68   Pulse 62   Temp 36.1 °C (97 °F) (Oral)   Resp 16   Ht 1.727 m (5' 8\")   Wt 77.5 kg (170 lb 13.7 oz)   SpO2 97%   BMI 25.98 kg/m²     Pt bib wife for above concern. Pt has tried suppository at home with no relief.   "

## 2021-02-07 NOTE — ED NOTES
Patient is stable for discharge at this time, anticipatory guidance provided, close follow-up is encouraged, and ED return instructions have been detailed. Patient is both agreeable to the disposition and plan and discharged home in ambulatory state and in good condition.      Rx education provided, Pt verbalized understanding;

## 2021-02-07 NOTE — ED PROVIDER NOTES
ED Provider Note    Primary care provider: Edward Walters M.D.  Means of arrival: private vehicle  History obtained from: patient  History limited by: none    CHIEF COMPLAINT  Chief Complaint   Patient presents with   • Constipation     last BM 2/1; new rx for percocet post wisdom tooth removal 2/2       HPI  Paulo Paredes is a 80 y.o. male who presents to the Emergency Department for evaluation of constipation.  Patient reports that 5 days ago he had a removal of a cracked wisdom tooth.  He states that since then he has not had a bowel movement and is feeling some mild rectal pressure due to this.  He was concerned about the constipation and came in for further evaluation.  Patient reports that he did try a suppository at home and felt a hard ball of stool when he inserted the suppository, however when he tried to have a bowel movement after this he was unable to push out the stool.  He denies generalized abdominal discomfort, nausea, vomiting.  He has had decreased appetite since the surgery due to dental pain, but reports overall his pain is well controlled with the Percocet.    REVIEW OF SYSTEMS  Review of Systems   Constitutional: Negative for fever.   Gastrointestinal: Negative for abdominal pain, nausea and vomiting.   Genitourinary: Negative for dysuria.         PAST MEDICAL HISTORY   has a past medical history of Anesthesia, Basal cell carcinoma of skin, CAD (coronary artery disease), Cancer ([facility]), DVT (deep venous thrombosis) ([facility]) (2014), ED (erectile dysfunction), GERD (gastroesophageal reflux disease), Glaucoma, Heart burn, Hyperlipidemia (12/3/2012), Hypertension, Indigestion, Multiple pulmonary emboli ([facility]) (2014), Multiple thyroid nodules (2009), Papillary carcinoma of thyroid ([facility]) (2014), postsurgical hypothyroidism (2014), Pre-diabetes, Renal disorder, S/P CABG x 4 (2003), S/P colectomy (2010), S/p nephrectomy (1998), S/P prostatectomy (2008), Stroke (HCC), Transient renal failure (2014),  "Type II or unspecified type diabetes mellitus without mention of complication, not stated as uncontrolled, and Unspecified urinary incontinence.    SURGICAL HISTORY   has a past surgical history that includes colon resection; nephrectomy radical; multiple coronary artery bypass; prostatectomy, radical retro; other orthopedic surgery; thyroidectomy total (5/13/2014); and node dissection (5/13/2014).    SOCIAL HISTORY  Social History     Tobacco Use   • Smoking status: Never Smoker   • Smokeless tobacco: Never Used   Substance Use Topics   • Alcohol use: Yes     Frequency: 2-4 times a month     Drinks per session: 1 or 2     Binge frequency: Never     Comment: 1 PER WK   • Drug use: No      Social History     Substance and Sexual Activity   Drug Use No       FAMILY HISTORY  Family History   Problem Relation Age of Onset   • Diabetes Mother    • Heart Disease Mother    • Diabetes Father    • Cancer Father         pancreas   • Thyroid Sister         Cancer   • Cancer Sister         thyroid   • Diabetes Sister    • Cancer Brother 49        colon   • Diabetes Brother    • Diabetes Maternal Grandfather    • Diabetes Paternal Grandmother    • Diabetes Paternal Grandfather    • No Known Problems Maternal Grandmother        CURRENT MEDICATIONS  Home Medications    See medication list         ALLERGIES  Allergies   Allergen Reactions   • Lactose    • Seasonal        PHYSICAL EXAM  VITAL SIGNS: /68   Pulse 62   Temp 36.1 °C (97 °F) (Oral)   Resp 16   Ht 1.727 m (5' 8\")   Wt 77.5 kg (170 lb 13.7 oz)   SpO2 97%   BMI 25.98 kg/m²   Vitals reviewed by myself.  Physical Exam   Constitutional: He is oriented to person, place, and time and well-developed, well-nourished, and in no distress.   HENT:   Head: Normocephalic.   Eyes: EOM are normal.   Neck: Normal range of motion.   Cardiovascular: Normal rate and regular rhythm.   Pulmonary/Chest: Effort normal.   Abdominal: Soft. He exhibits no distension. There is no " abdominal tenderness. There is no rebound and no guarding.   Genitourinary:    Genitourinary Comments: Rectal exam demonstrates small hard stool ball in the upper rectal vault which cannot be removed with finger, it is broken up with the tip of my finger     Musculoskeletal: Normal range of motion.   Neurological: He is alert and oriented to person, place, and time.   Skin: Skin is warm and dry.         DIAGNOSTIC STUDIES /  LABS      RADIOLOGY  [id number] VIEW   Final Result      Mild colonic and small bowel gas distention, which is nonspecific but is most consistent with adynamic ileus.        The radiologist's interpretation of all radiological studies have been reviewed by me.        COURSE & MEDICAL DECISION MAKING  Nursing notes, VS, PMSFHx reviewed in chart.    Patient is a 80-year-old male who comes in for evaluation of constipation.  Differential diagnosis includes constipation, rectal impaction, bowel obstruction.  I feel bowel obstruction is less likely as patient is not having vomiting, will obtain a screening KUB to assess for obstructive process.  On exam there is no evidence of rectal impaction, and patient's vitals are within normal limits.  Abdominal exam is benign.  He will be treated with enema.    After enema is done patient is able to to have a bowel movement he feels greatly improved.  X-ray returns demonstrates mild colonic and small bowel gas distention consistent with likely ileus related to Percocet use, no evidence of obstructive process.  Therefore patient is reassured and will be started on a stool softener for as long as he is on Percocet.  He is given strict return precautions and discharged in stable condition.          FINAL IMPRESSION  1. Drug-induced constipation

## 2021-02-10 ENCOUNTER — ANTICOAGULATION MONITORING (OUTPATIENT)
Dept: VASCULAR LAB | Facility: MEDICAL CENTER | Age: 81
End: 2021-02-10

## 2021-02-10 DIAGNOSIS — Z79.01 CHRONIC ANTICOAGULATION: ICD-10-CM

## 2021-02-10 DIAGNOSIS — Z95.828 PRESENCE OF IVC FILTER: ICD-10-CM

## 2021-02-11 ENCOUNTER — ANTICOAGULATION VISIT (OUTPATIENT)
Dept: MEDICAL GROUP | Facility: MEDICAL CENTER | Age: 81
End: 2021-02-11
Payer: MEDICARE

## 2021-02-11 DIAGNOSIS — Z79.01 CHRONIC ANTICOAGULATION: ICD-10-CM

## 2021-02-11 DIAGNOSIS — Z95.828 PRESENCE OF IVC FILTER: ICD-10-CM

## 2021-02-11 LAB — INR PPP: 2.1 (ref 2–3.5)

## 2021-02-11 PROCEDURE — 85610 PROTHROMBIN TIME: CPT | Performed by: INTERNAL MEDICINE

## 2021-02-11 PROCEDURE — 99211 OFF/OP EST MAY X REQ PHY/QHP: CPT | Performed by: INTERNAL MEDICINE

## 2021-02-11 NOTE — PROGRESS NOTES
OP Anticoagulation Service Note    Date: 2021  There were no vitals filed for this visit.   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  Family Medicine [phone]        Anticoagulation Patient Findings  Patient Findings     Positives:  Signs/symptoms of bleeding (bleeding s/p molar extraction; resolved now), Missed doses (held warfarin last night), Change in diet/appetite (decreased caloric intake d/t recent dental procedure)    Negatives:  Signs/symptoms of thrombosis, Laboratory test error suspected, Change in health, Change in alcohol use, Change in activity, Upcoming invasive procedure, Emergency department visit, Upcoming dental procedure, Extra doses, Change in medications, Hospital admission, Bruising, Other complaints          HPI:   Paulo Marinellii seen in clinic today, on anticoagulation therapy with warfarin (a high risk medication) for hx of multiple VTE, hx of TIA    Pt is here today to evaluate anticoagulation therapy    Previous INR was  2.3 on 21    Pt was instructed to  continue regimen; per ADA/CHEST guidelines, it is not recommended to hold OAC for 1 tooth extraction    Confirmed warfarin dosing regimen, denies missed or extra doses of coumadin.   Diet has been consistent with foods rich in vitamin K: No  Changes in ETOH:  No  Changes in smoking status: No  Changes in medication: No   Cost restriction: No  S/s of bleeding:  No  Falls or accidents since last visit No  Signs/symptoms  thrombosis since the last appt: No  =    A/P   INR  therapeutic today    Will reduce dose x 1 day d/t bleeding concerns post-op and because of decreased caloric intake, then continue regimen    01/22 check referral    Pt educated to contact our clinic with any changes in medications or s/s of bleeding or thrombosis. Pt is aware to seek immediate medical attention for falls, head injury or deep cuts    Follow up appointment in 1 week(s) to reduce risk of adverse events from warfarin  Leah Solo, ZayD

## 2021-02-11 NOTE — PROGRESS NOTES
"Patient called today stating he has increased bleeding from site of molar extraction done on 2-2-21.  He has had to pack the site on several occasions.  He is concerned that he was not \"taken off warfarin and given lovenox\".  Explained current ADA and ACC/AHA guidelines and that lovenox is an anticoagulant as well and may put him at an even higher bleed risk.  Patient also states that he has not ate much of anything this week out of fear of causing more bleeding.  He would like to hold his warfarin dose tonight, which is a reasonable plan given decrease in caloric intake.  He is agreeable to INR check tomorrow morning at [facility].  Jasiel Trivedi, PharmD, BCACP    "

## 2021-02-15 ENCOUNTER — PATIENT OUTREACH (OUTPATIENT)
Dept: HEALTH INFORMATION MANAGEMENT | Facility: OTHER | Age: 81
End: 2021-02-15

## 2021-02-18 ENCOUNTER — ANTICOAGULATION VISIT (OUTPATIENT)
Dept: MEDICAL GROUP | Facility: MEDICAL CENTER | Age: 81
End: 2021-02-18
Payer: MEDICARE

## 2021-02-18 DIAGNOSIS — Z95.828 PRESENCE OF IVC FILTER: ICD-10-CM

## 2021-02-18 DIAGNOSIS — Z79.01 CHRONIC ANTICOAGULATION: ICD-10-CM

## 2021-02-18 LAB — INR PPP: 2.3 (ref 2–3.5)

## 2021-02-18 PROCEDURE — 99999 PR NO CHARGE: CPT | Performed by: INTERNAL MEDICINE

## 2021-02-18 PROCEDURE — 85610 PROTHROMBIN TIME: CPT | Performed by: INTERNAL MEDICINE

## 2021-02-18 PROCEDURE — 93793 ANTICOAG MGMT PT WARFARIN: CPT | Performed by: INTERNAL MEDICINE

## 2021-02-18 NOTE — PROGRESS NOTES
OP Anticoagulation Service Note    Date: 2021  There were no vitals filed for this visit.   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  Family Medicine [phone]        Anticoagulation Patient Findings  Patient Findings     Positives:  Signs/symptoms of bleeding (experiencing blood clots from nose; denies any epistaxis lasting over 20 mi)    Negatives:  Signs/symptoms of thrombosis, Laboratory test error suspected, Change in health, Change in alcohol use, Change in activity, Upcoming invasive procedure, Emergency department visit, Upcoming dental procedure, Missed doses, Extra doses, Change in medications, Change in diet/appetite, Hospital admission, Bruising, Other complaints          HPI:   Paulo Paredes seen in clinic today, on anticoagulation therapy with warfarin (a high risk medication) for hx of recurrent VTE and TIA    Pt is here today to evaluate anticoagulation therapy    Previous INR was  2.1 on     Pt was instructed to reduce x 1 day then continue regimen d/t bleeding concerns  s/p dental procedure    Confirmed warfarin dosing regimen, denies missed or extra doses of coumadin.   Diet has been consistent with foods rich in vitamin K: Yes  Changes in ETOH:  No  Changes in smoking status: No  Changes in medication: No   Cost restriction: No  S/s of bleeding:  Yes - see above  Falls or accidents since last visit No  Signs/symptoms  thrombosis since the last appt: No      A/P   INR  -therapeutic today    Pt is to continue with current warfarin dosing regimen.    01/22 check referral    Pt educated to contact our clinic with any changes in medications or s/s of bleeding or thrombosis. Pt is aware to seek immediate medical attention for falls, head injury or deep cuts    Follow up appointment in 4 week(s) to reduce risk of adverse events from warfarin   Leah Solo, ZayD

## 2021-03-18 ENCOUNTER — ANTICOAGULATION VISIT (OUTPATIENT)
Dept: MEDICAL GROUP | Facility: MEDICAL CENTER | Age: 81
End: 2021-03-18
Payer: MEDICARE

## 2021-03-18 DIAGNOSIS — Z95.828 PRESENCE OF IVC FILTER: ICD-10-CM

## 2021-03-18 DIAGNOSIS — Z79.01 CHRONIC ANTICOAGULATION: ICD-10-CM

## 2021-03-18 LAB — INR PPP: 3.1 (ref 2–3.5)

## 2021-03-18 PROCEDURE — 99211 OFF/OP EST MAY X REQ PHY/QHP: CPT | Performed by: INTERNAL MEDICINE

## 2021-03-18 PROCEDURE — 85610 PROTHROMBIN TIME: CPT | Performed by: INTERNAL MEDICINE

## 2021-03-18 NOTE — PROGRESS NOTES
OP Anticoagulation Service Note    Date: 3/18/2021  There were no vitals filed for this visit.   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  Family Medicine [phone]        Anticoagulation Patient Findings  Patient Findings     Positives:  Upcoming dental procedure    Negatives:  Signs/symptoms of thrombosis, Signs/symptoms of bleeding, Laboratory test error suspected, Change in health, Change in alcohol use, Change in activity, Upcoming invasive procedure, Emergency department visit, Missed doses, Extra doses, Change in medications, Change in diet/appetite, Hospital admission, Bruising, Other complaints          HPI:   Pauloalanna Paredes seen in clinic today, on anticoagulation therapy with warfarin (a high risk medication) for TIA, hx of recurrent VTE    Pt is here today to evaluate anticoagulation therapy    Previous INR was  2.3 on 2/18/21    Pt was instructed to continue regimen    Confirmed warfarin dosing regimen, denies missed or extra doses of coumadin.   Diet has been consistent with foods  rich in vitamin K: Yes  Changes in ETOH:  No  Changes in smoking status: No  Changes in medication: No   Cost restriction: No  S/s of bleeding:  No  Falls or accidents since last visit No  Signs/symptoms  thrombosis since the last appt: No      A/P   INR  slightly supratherapeutic    Pt to take reduced dose x 1 day then continue regimen    Pt has dental procedure on 4/2 and needs to hold warfarin 2 days prior.   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 Pt is aware to seek immediate medical attention for falls, head injury or deep cuts    Follow up appointment in 2.5 week(s) to reduce risk of adverse events from warfarin   Pt to contact us if the dentist wants a pre-op INR  Leah Solo, ZayD

## 2021-04-05 ENCOUNTER — ANTICOAGULATION VISIT (OUTPATIENT)
Dept: MEDICAL GROUP | Facility: MEDICAL CENTER | Age: 81
End: 2021-04-05
Payer: MEDICARE

## 2021-04-05 DIAGNOSIS — Z95.828 PRESENCE OF IVC FILTER: ICD-10-CM

## 2021-04-05 DIAGNOSIS — Z79.01 CHRONIC ANTICOAGULATION: Primary | ICD-10-CM

## 2021-04-05 LAB — INR PPP: 1.8 (ref 2–3.5)

## 2021-04-05 PROCEDURE — 99211 OFF/OP EST MAY X REQ PHY/QHP: CPT | Performed by: INTERNAL MEDICINE

## 2021-04-05 PROCEDURE — 85610 PROTHROMBIN TIME: CPT | Performed by: INTERNAL MEDICINE

## 2021-04-05 NOTE — PROGRESS NOTES
OP Anticoagulation Service Note    Date: 2021  There were no vitals filed for this visit.   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  Family Medicine [phone]        Anticoagulation Patient Findings      HPI:   Paulo Paredes seen in clinic today, on anticoagulation therapy with warfarin (a high risk medication) for hx of DVT and PE  And TIA       Pt is here today to evaluate anticoagulation therapy    Previous INR was  3.1 on 3/18/20    Pt was instructed to continue current regimen. He held x 2 doses for a procedure that didn't get done as dentist didn't think he should get done    Confirmed warfarin dosing regimen,  Diet has been consistent with foods rich in vitamin K: Yes  Changes in ETOH:  No  Changes in smoking status: No  Changes in medication: No   Pt is not on antiplatelet therapy  Cost restriction: No  S/s of bleeding:  No  Falls or accidents since last visit No  Signs/symptoms  thrombosis since the last appt: No      A/P   INR  SUBtherapeutic today, will require dose adjust ment today to  prevent recurrence of thrombosis or stroke and closer follow up.  Tonight 7.5 mg then continue current regimen     1/22 check referral    Pt educated to contact our clinic with any changes in medications or s/s of bleeding or thrombosis. Pt is aware to seek immediate medical attention for falls, head injury or deep cuts    Follow up appointment in 2 week(s) to reduce risk of adverse events from warfarin   Kelsey Junior, PharmD

## 2021-04-19 ENCOUNTER — ANTICOAGULATION VISIT (OUTPATIENT)
Dept: MEDICAL GROUP | Facility: MEDICAL CENTER | Age: 81
End: 2021-04-19
Payer: MEDICARE

## 2021-04-19 DIAGNOSIS — Z95.828 PRESENCE OF IVC FILTER: ICD-10-CM

## 2021-04-19 DIAGNOSIS — Z79.01 CHRONIC ANTICOAGULATION: ICD-10-CM

## 2021-04-19 LAB — INR PPP: 2.7 (ref 2–3.5)

## 2021-04-19 PROCEDURE — 99999 PR NO CHARGE: CPT | Performed by: INTERNAL MEDICINE

## 2021-04-19 PROCEDURE — 93793 ANTICOAG MGMT PT WARFARIN: CPT | Performed by: INTERNAL MEDICINE

## 2021-04-19 PROCEDURE — 85610 PROTHROMBIN TIME: CPT | Performed by: INTERNAL MEDICINE

## 2021-04-19 NOTE — PROGRESS NOTES
OP Anticoagulation Service Note    Date: 2021  There were no vitals filed for this visit.   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  Family Medicine [phone]        Anticoagulation Patient Findings      HPI:   Paulo Paredes seen in clinic today, on anticoagulation therapy with warfarin (a high risk medication) for hx of TIA, DVT, PE    Pt is here today to evaluate anticoagulation therapy    Previous INR was  1.8 on     Pt was instructed to bolus then continue regimen    Confirmed warfarin dosing regimen, denies missed or extra doses of coumadin.   Diet has been consistent with foods rich in vitamin K: Yes  Changes in ETOH:  No  Changes in smoking status: No  Changes in medication: No   Cost restriction: No  S/s of bleeding:  No  Falls or accidents since last visit No  Signs/symptoms  thrombosis since the last appt: No      A/P   INR  therapeutic today    Pt is to continue with current warfarin dosing regimen.     check referral    Pt educated to contact our clinic with any  changes in medications or s/s of bleeding or thrombosis. Pt is aware to seek immediate medical attention for falls, head injury or deep cuts    Follow up appointment in 4 week(s) to reduce risk of adverse events from warfarin   Zay DumontD

## 2021-05-10 ENCOUNTER — HOSPITAL ENCOUNTER (OUTPATIENT)
Dept: LAB | Facility: MEDICAL CENTER | Age: 81
End: 2021-05-10
Attending: INTERNAL MEDICINE
Payer: MEDICARE

## 2021-05-10 DIAGNOSIS — E78.5 DYSLIPIDEMIA: ICD-10-CM

## 2021-05-10 DIAGNOSIS — N18.30 STAGE 3 CHRONIC KIDNEY DISEASE, UNSPECIFIED WHETHER STAGE 3A OR 3B CKD: ICD-10-CM

## 2021-05-10 DIAGNOSIS — N18.4 CKD (CHRONIC KIDNEY DISEASE), STAGE IV (HCC): ICD-10-CM

## 2021-05-10 DIAGNOSIS — I10 ESSENTIAL HYPERTENSION, BENIGN: ICD-10-CM

## 2021-05-10 DIAGNOSIS — E89.0 POSTOPERATIVE HYPOTHYROIDISM: ICD-10-CM

## 2021-05-10 DIAGNOSIS — D69.6 THROMBOCYTOPENIA (HCC): ICD-10-CM

## 2021-05-10 DIAGNOSIS — I10 ESSENTIAL HYPERTENSION: ICD-10-CM

## 2021-05-10 DIAGNOSIS — E11.8 CONTROLLED TYPE 2 DIABETES MELLITUS WITH COMPLICATION, WITHOUT LONG-TERM CURRENT USE OF INSULIN (HCC): ICD-10-CM

## 2021-05-10 DIAGNOSIS — D75.89 MACROCYTOSIS: ICD-10-CM

## 2021-05-10 LAB
ALBUMIN SERPL BCP-MCNC: 4.4 G/DL (ref 3.2–4.9)
ALBUMIN/GLOB SERPL: 1.8 G/DL
ALP SERPL-CCNC: 41 U/L (ref 30–99)
ALT SERPL-CCNC: 14 U/L (ref 2–50)
ANION GAP SERPL CALC-SCNC: 10 MMOL/L (ref 7–16)
ANION GAP SERPL CALC-SCNC: 8 MMOL/L (ref 7–16)
AST SERPL-CCNC: 22 U/L (ref 12–45)
BASOPHILS # BLD AUTO: 0.8 % (ref 0–1.8)
BASOPHILS # BLD: 0.04 K/UL (ref 0–0.12)
BILIRUB SERPL-MCNC: 0.5 MG/DL (ref 0.1–1.5)
BUN SERPL-MCNC: 31 MG/DL (ref 8–22)
BUN SERPL-MCNC: 31 MG/DL (ref 8–22)
CALCIUM SERPL-MCNC: 9.4 MG/DL (ref 8.4–10.2)
CALCIUM SERPL-MCNC: 9.4 MG/DL (ref 8.4–10.2)
CHLORIDE SERPL-SCNC: 105 MMOL/L (ref 96–112)
CHLORIDE SERPL-SCNC: 108 MMOL/L (ref 96–112)
CHOLEST SERPL-MCNC: 150 MG/DL (ref 100–199)
CO2 SERPL-SCNC: 26 MMOL/L (ref 20–33)
CO2 SERPL-SCNC: 26 MMOL/L (ref 20–33)
CREAT SERPL-MCNC: 1.99 MG/DL (ref 0.5–1.4)
CREAT SERPL-MCNC: 2.02 MG/DL (ref 0.5–1.4)
CREAT UR-MCNC: 132.34 MG/DL
CREAT UR-MCNC: 133.89 MG/DL
EOSINOPHIL # BLD AUTO: 0.25 K/UL (ref 0–0.51)
EOSINOPHIL NFR BLD: 5 % (ref 0–6.9)
ERYTHROCYTE [DISTWIDTH] IN BLOOD BY AUTOMATED COUNT: 46.5 FL (ref 35.9–50)
ERYTHROCYTE [DISTWIDTH] IN BLOOD BY AUTOMATED COUNT: 47.3 FL (ref 35.9–50)
EST. AVERAGE GLUCOSE BLD GHB EST-MCNC: 143 MG/DL
FASTING STATUS PATIENT QL REPORTED: NORMAL
FASTING STATUS PATIENT QL REPORTED: NORMAL
GLOBULIN SER CALC-MCNC: 2.5 G/DL (ref 1.9–3.5)
GLUCOSE SERPL-MCNC: 140 MG/DL (ref 65–99)
GLUCOSE SERPL-MCNC: 144 MG/DL (ref 65–99)
HBA1C MFR BLD: 6.6 % (ref 4–5.6)
HCT VFR BLD AUTO: 47.1 % (ref 42–52)
HCT VFR BLD AUTO: 47.3 % (ref 42–52)
HDLC SERPL-MCNC: 38 MG/DL
HGB BLD-MCNC: 15 G/DL (ref 14–18)
HGB BLD-MCNC: 15.2 G/DL (ref 14–18)
IMM GRANULOCYTES # BLD AUTO: 0.02 K/UL (ref 0–0.11)
IMM GRANULOCYTES NFR BLD AUTO: 0.4 % (ref 0–0.9)
LDLC SERPL CALC-MCNC: 76 MG/DL
LYMPHOCYTES # BLD AUTO: 1.56 K/UL (ref 1–4.8)
LYMPHOCYTES NFR BLD: 31.3 % (ref 22–41)
MCH RBC QN AUTO: 31.1 PG (ref 27–33)
MCH RBC QN AUTO: 31.6 PG (ref 27–33)
MCHC RBC AUTO-ENTMCNC: 31.8 G/DL (ref 33.7–35.3)
MCHC RBC AUTO-ENTMCNC: 32.1 G/DL (ref 33.7–35.3)
MCV RBC AUTO: 97.5 FL (ref 81.4–97.8)
MCV RBC AUTO: 98.3 FL (ref 81.4–97.8)
MICROALBUMIN UR-MCNC: 15.7 MG/DL
MICROALBUMIN UR-MCNC: 16.5 MG/DL
MICROALBUMIN/CREAT UR: 119 MG/G (ref 0–30)
MICROALBUMIN/CREAT UR: 123 MG/G (ref 0–30)
MONOCYTES # BLD AUTO: 0.38 K/UL (ref 0–0.85)
MONOCYTES NFR BLD AUTO: 7.6 % (ref 0–13.4)
NEUTROPHILS # BLD AUTO: 2.73 K/UL (ref 1.82–7.42)
NEUTROPHILS NFR BLD: 54.9 % (ref 44–72)
NRBC # BLD AUTO: 0 K/UL
NRBC BLD-RTO: 0 /100 WBC
PLATELET # BLD AUTO: 129 K/UL (ref 164–446)
PLATELET # BLD AUTO: 131 K/UL (ref 164–446)
PMV BLD AUTO: 10.5 FL (ref 9–12.9)
PMV BLD AUTO: 10.6 FL (ref 9–12.9)
POTASSIUM SERPL-SCNC: 5.2 MMOL/L (ref 3.6–5.5)
POTASSIUM SERPL-SCNC: 5.4 MMOL/L (ref 3.6–5.5)
PROT SERPL-MCNC: 6.9 G/DL (ref 6–8.2)
RBC # BLD AUTO: 4.81 M/UL (ref 4.7–6.1)
RBC # BLD AUTO: 4.83 M/UL (ref 4.7–6.1)
SODIUM SERPL-SCNC: 141 MMOL/L (ref 135–145)
SODIUM SERPL-SCNC: 142 MMOL/L (ref 135–145)
T4 FREE SERPL-MCNC: 1.79 NG/DL (ref 0.93–1.7)
TRIGL SERPL-MCNC: 180 MG/DL (ref 0–149)
TSH SERPL DL<=0.005 MIU/L-ACNC: 0.05 UIU/ML (ref 0.38–5.33)
WBC # BLD AUTO: 5 K/UL (ref 4.8–10.8)
WBC # BLD AUTO: 5 K/UL (ref 4.8–10.8)

## 2021-05-10 PROCEDURE — 82570 ASSAY OF URINE CREATININE: CPT

## 2021-05-10 PROCEDURE — 80061 LIPID PANEL: CPT

## 2021-05-10 PROCEDURE — 80048 BASIC METABOLIC PNL TOTAL CA: CPT

## 2021-05-10 PROCEDURE — 83036 HEMOGLOBIN GLYCOSYLATED A1C: CPT

## 2021-05-10 PROCEDURE — 82043 UR ALBUMIN QUANTITATIVE: CPT

## 2021-05-10 PROCEDURE — 85025 COMPLETE CBC W/AUTO DIFF WBC: CPT

## 2021-05-10 PROCEDURE — 84443 ASSAY THYROID STIM HORMONE: CPT

## 2021-05-10 PROCEDURE — 85027 COMPLETE CBC AUTOMATED: CPT

## 2021-05-10 PROCEDURE — 36415 COLL VENOUS BLD VENIPUNCTURE: CPT

## 2021-05-10 PROCEDURE — 82043 UR ALBUMIN QUANTITATIVE: CPT | Mod: 91

## 2021-05-10 PROCEDURE — 84439 ASSAY OF FREE THYROXINE: CPT

## 2021-05-10 PROCEDURE — 80053 COMPREHEN METABOLIC PANEL: CPT

## 2021-05-10 PROCEDURE — 82570 ASSAY OF URINE CREATININE: CPT | Mod: 91

## 2021-05-20 ENCOUNTER — ANTICOAGULATION VISIT (OUTPATIENT)
Dept: MEDICAL GROUP | Facility: MEDICAL CENTER | Age: 81
End: 2021-05-20
Payer: MEDICARE

## 2021-05-20 ENCOUNTER — APPOINTMENT (RX ONLY)
Dept: URBAN - METROPOLITAN AREA CLINIC 22 | Facility: CLINIC | Age: 81
Setting detail: DERMATOLOGY
End: 2021-05-20

## 2021-05-20 DIAGNOSIS — Z85.828 PERSONAL HISTORY OF OTHER MALIGNANT NEOPLASM OF SKIN: ICD-10-CM

## 2021-05-20 DIAGNOSIS — L81.4 OTHER MELANIN HYPERPIGMENTATION: ICD-10-CM

## 2021-05-20 DIAGNOSIS — L72.8 OTHER FOLLICULAR CYSTS OF THE SKIN AND SUBCUTANEOUS TISSUE: ICD-10-CM

## 2021-05-20 DIAGNOSIS — L82.1 OTHER SEBORRHEIC KERATOSIS: ICD-10-CM

## 2021-05-20 DIAGNOSIS — L24.9 IRRITANT CONTACT DERMATITIS, UNSPECIFIED CAUSE: ICD-10-CM

## 2021-05-20 DIAGNOSIS — Z95.828 PRESENCE OF IVC FILTER: ICD-10-CM

## 2021-05-20 DIAGNOSIS — Z79.01 CHRONIC ANTICOAGULATION: Primary | ICD-10-CM

## 2021-05-20 DIAGNOSIS — D22 MELANOCYTIC NEVI: ICD-10-CM

## 2021-05-20 DIAGNOSIS — L57.0 ACTINIC KERATOSIS: ICD-10-CM

## 2021-05-20 PROBLEM — D22.5 MELANOCYTIC NEVI OF TRUNK: Status: ACTIVE | Noted: 2021-05-20

## 2021-05-20 LAB — INR PPP: 2.4 (ref 2–3.5)

## 2021-05-20 PROCEDURE — ? PRESCRIPTION

## 2021-05-20 PROCEDURE — ? SUNSCREEN TREATMENT REGIMEN

## 2021-05-20 PROCEDURE — ? COUNSELING

## 2021-05-20 PROCEDURE — 99213 OFFICE O/P EST LOW 20 MIN: CPT | Mod: 25

## 2021-05-20 PROCEDURE — ? LIQUID NITROGEN

## 2021-05-20 PROCEDURE — ? ADDITIONAL NOTES

## 2021-05-20 PROCEDURE — 17000 DESTRUCT PREMALG LESION: CPT

## 2021-05-20 PROCEDURE — 85610 PROTHROMBIN TIME: CPT | Performed by: INTERNAL MEDICINE

## 2021-05-20 PROCEDURE — ? EXTRACTIONS

## 2021-05-20 PROCEDURE — 17003 DESTRUCT PREMALG LES 2-14: CPT

## 2021-05-20 PROCEDURE — 93793 ANTICOAG MGMT PT WARFARIN: CPT | Performed by: INTERNAL MEDICINE

## 2021-05-20 RX ORDER — HYDROCORTISONE 25 MG/G
CREAM TOPICAL
Qty: 1 | Refills: 0 | Status: ERX

## 2021-05-20 ASSESSMENT — LOCATION SIMPLE DESCRIPTION DERM
LOCATION SIMPLE: RIGHT UPPER BACK
LOCATION SIMPLE: LEFT FOREHEAD
LOCATION SIMPLE: SCALP
LOCATION SIMPLE: CHEST
LOCATION SIMPLE: LEFT CHEEK
LOCATION SIMPLE: RIGHT FOREARM
LOCATION SIMPLE: RIGHT ANTERIOR NECK
LOCATION SIMPLE: RIGHT CHEEK
LOCATION SIMPLE: GENITALIA
LOCATION SIMPLE: LEFT FOREARM
LOCATION SIMPLE: POSTERIOR SCALP
LOCATION SIMPLE: UPPER BACK

## 2021-05-20 ASSESSMENT — LOCATION ZONE DERM
LOCATION ZONE: FACE
LOCATION ZONE: SCALP
LOCATION ZONE: ARM
LOCATION ZONE: GENITALIA
LOCATION ZONE: TRUNK
LOCATION ZONE: NECK

## 2021-05-20 ASSESSMENT — LOCATION DETAILED DESCRIPTION DERM
LOCATION DETAILED: LEFT CENTRAL MALAR CHEEK
LOCATION DETAILED: RIGHT SUPERIOR OCCIPITAL SCALP
LOCATION DETAILED: RIGHT CENTRAL MALAR CHEEK
LOCATION DETAILED: STERNAL NOTCH
LOCATION DETAILED: LEFT MEDIAL MALAR CHEEK
LOCATION DETAILED: LEFT VENTRAL PROXIMAL FOREARM
LOCATION DETAILED: SUPERIOR THORACIC SPINE
LOCATION DETAILED: RIGHT VENTRAL PROXIMAL FOREARM
LOCATION DETAILED: LEFT INFERIOR MEDIAL FOREHEAD
LOCATION DETAILED: GENITALIA
LOCATION DETAILED: RIGHT CLAVICULAR NECK
LOCATION DETAILED: RIGHT MEDIAL UPPER BACK
LOCATION DETAILED: LEFT SUPERIOR PARIETAL SCALP
LOCATION DETAILED: LEFT SUPERIOR FOREHEAD

## 2021-05-20 NOTE — PROCEDURE: ADDITIONAL NOTES
Render Risk Assessment In Note?: no
Detail Level: Detailed
Additional Notes: Due to incontinence from urine\\nAdvised patient to start desitin extra strength then with more severe flares ok to use the topical steroid for a short period

## 2021-05-20 NOTE — PROCEDURE: EXTRACTIONS
Prep Text (Optional): Prior to removal the treatment areas were prepped in the usual fashion. No charge for extraction
Acne Type: Comedonal Lesions
Render Number Of Lesions Treated: yes
Detail Level: Detailed
Consent was obtained and risks were reviewed including but not limited to scarring, infection, bleeding, scabbing, incomplete removal, and allergy to anesthesia.
Post-Care Instructions: I reviewed with the patient in detail post-care instructions. Patient is to keep the treatment areas dry overnight, and then apply bacitracin twice daily until healed. Patient may apply hydrogen peroxide soaks to remove any crusting.
Render Post-Care Instructions In Note?: no
Extraction Method: 11 blade and q-tip

## 2021-05-20 NOTE — PROGRESS NOTES
OP Anticoagulation Service Note    Date: 2021  There were no vitals filed for this visit.   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  Family Medicine [phone]        Anticoagulation Patient Findings      HPI:   Paulo Paredes seen in clinic today, on anticoagulation therapy with warfarin (a high risk medication) for hx of DVT and PE and TIA      Pt is here today to evaluate anticoagulation therapy    Previous INR was  2.7 on 21    Pt was instructed to continue current regimen    Confirmed warfarin dosing regimen, denies missed or extra doses of coumadin.   Diet has been consistent with foods rich in vitamin K: Yes  Changes in ETOH:  No  Changes in smoking status: No  Changes in medication: No   Pt is not on antiplatelet/NSAID therapy  Cost restriction: No  S/s of bleeding:  No  Falls or accidents since last visit No  Signs/symptoms  thrombosis since the last appt: No      A/P   INR  therapeutic today  Continue current warfarin regimen        Pt educated to  contact our clinic with any changes in medications or s/s of bleeding or thrombosis. Pt is aware to seek immediate medical attention for falls, head injury or deep cuts    Follow up appointment in 6 week(s) to reduce risk of adverse events from warfarin  Kelsey Junior, PharmD

## 2021-06-02 ENCOUNTER — OFFICE VISIT (OUTPATIENT)
Dept: NEPHROLOGY | Facility: MEDICAL CENTER | Age: 81
End: 2021-06-02
Payer: MEDICARE

## 2021-06-02 VITALS
DIASTOLIC BLOOD PRESSURE: 60 MMHG | TEMPERATURE: 97.9 F | HEART RATE: 72 BPM | HEIGHT: 68 IN | OXYGEN SATURATION: 99 % | SYSTOLIC BLOOD PRESSURE: 118 MMHG | WEIGHT: 175 LBS | BODY MASS INDEX: 26.52 KG/M2 | RESPIRATION RATE: 18 BRPM

## 2021-06-02 DIAGNOSIS — E11.22 TYPE 2 DIABETES MELLITUS WITH STAGE 3 CHRONIC KIDNEY DISEASE, UNSPECIFIED WHETHER LONG TERM INSULIN USE, UNSPECIFIED WHETHER STAGE 3A OR 3B CKD (HCC): ICD-10-CM

## 2021-06-02 DIAGNOSIS — I10 ESSENTIAL HYPERTENSION: ICD-10-CM

## 2021-06-02 DIAGNOSIS — N18.30 TYPE 2 DIABETES MELLITUS WITH STAGE 3 CHRONIC KIDNEY DISEASE, UNSPECIFIED WHETHER LONG TERM INSULIN USE, UNSPECIFIED WHETHER STAGE 3A OR 3B CKD (HCC): ICD-10-CM

## 2021-06-02 DIAGNOSIS — N18.30 STAGE 3 CHRONIC KIDNEY DISEASE, UNSPECIFIED WHETHER STAGE 3A OR 3B CKD: ICD-10-CM

## 2021-06-02 PROCEDURE — 99214 OFFICE O/P EST MOD 30 MIN: CPT | Performed by: INTERNAL MEDICINE

## 2021-06-02 ASSESSMENT — ENCOUNTER SYMPTOMS
SHORTNESS OF BREATH: 0
VOMITING: 0
CHILLS: 0
FEVER: 0
HYPERTENSION: 1
COUGH: 0
NAUSEA: 0

## 2021-06-02 ASSESSMENT — FIBROSIS 4 INDEX: FIB4 SCORE: 3.68

## 2021-06-02 NOTE — PROGRESS NOTES
"Subjective:      Kevin Paredes is a 80 y.o. male who presents with Hypertension and Chronic Kidney Disease            Hypertension  This is a chronic problem. The current episode started more than 1 year ago. The problem is unchanged. The problem is controlled. Pertinent negatives include no chest pain, malaise/fatigue or shortness of breath. Risk factors for coronary artery disease include male gender. Past treatments include angiotensin blockers. The current treatment provides significant improvement. There are no compliance problems.  Hypertensive end-organ damage includes kidney disease. Identifiable causes of hypertension include chronic renal disease.   Chronic Kidney Disease  This is a chronic problem. The current episode started more than 1 year ago. The problem occurs constantly. The problem has been unchanged. Pertinent negatives include no chest pain, chills, coughing, fever, nausea, urinary symptoms or vomiting.       Review of Systems   Constitutional: Negative for chills, fever and malaise/fatigue.   Respiratory: Negative for cough and shortness of breath.    Cardiovascular: Negative for chest pain and leg swelling.   Gastrointestinal: Negative for nausea and vomiting.   Genitourinary: Negative for dysuria, frequency and urgency.          Objective:     /60 (BP Location: Right arm, Patient Position: Sitting)   Pulse 72   Temp 36.6 °C (97.9 °F) (Temporal)   Resp 18   Ht 1.727 m (5' 8\")   Wt 79.4 kg (175 lb)   SpO2 99%   BMI 26.61 kg/m²      Physical Exam  Vitals and nursing note reviewed.   Constitutional:       General: He is not in acute distress.     Appearance: He is not ill-appearing.   HENT:      Head: Normocephalic and atraumatic.      Right Ear: External ear normal.      Left Ear: External ear normal.      Nose: Nose normal.   Eyes:      General:         Right eye: No discharge.         Left eye: No discharge.      Conjunctiva/sclera: Conjunctivae normal.   Cardiovascular:      Rate " and Rhythm: Normal rate and regular rhythm.      Heart sounds: No murmur heard.     Pulmonary:      Effort: Pulmonary effort is normal. No respiratory distress.      Breath sounds: Normal breath sounds. No wheezing.   Musculoskeletal:         General: No tenderness or deformity.      Right lower leg: No edema.      Left lower leg: No edema.   Skin:     General: Skin is warm.   Neurological:      General: No focal deficit present.      Mental Status: He is alert and oriented to person, place, and time.   Psychiatric:         Mood and Affect: Mood normal.         Behavior: Behavior normal.       Past Medical History:   Diagnosis Date   • Anesthesia     difficult intubation with surgery 2008,2010   • Basal cell carcinoma of skin    • CAD (coronary artery disease)    • Cancer (HCC)     rt  kidney 1989;  thyroid cancer 2012, prostate 2008, skin   • DVT (deep venous thrombosis) (HCC) 2014   • ED (erectile dysfunction)    • GERD (gastroesophageal reflux disease)    • Glaucoma    • Heart burn    • Hyperlipidemia 12/3/2012   • Hypertension    • Indigestion    • Multiple pulmonary emboli (HCC) 2014   • Multiple thyroid nodules 2009   • Papillary carcinoma of thyroid (HCC) 2014    R 2 foci / 4.5mm,0.25mm / no mets/ pT1pN0   • postsurgical hypothyroidism 2014   • Pre-diabetes    • Renal disorder     rt kidney cancer,nephrectomy   • S/P CABG x 4 2003   • S/P colectomy 2010    multiple colon polyps / no Ca   • S/p nephrectomy 1998    carcinoma   • S/P prostatectomy 2008    carcinoma   • Stroke (HCC)     'mild',no residual,'one doctor said it was a tia'   • Transient renal failure 2014    renal vein thrombosis   • Type II or unspecified type diabetes mellitus without mention of complication, not stated as uncontrolled     on no medications   • Unspecified urinary incontinence      Social History     Socioeconomic History   • Marital status:      Spouse name: Not on file   • Number of children: Not on file   • Years of  education: Not on file   • Highest education level: Not on file   Occupational History   • Not on file   Tobacco Use   • Smoking status: Never Smoker   • Smokeless tobacco: Never Used   Vaping Use   • Vaping Use: Never used   Substance and Sexual Activity   • Alcohol use: Yes     Comment: 1 PER WK   • Drug use: No   • Sexual activity: Not Currently     Comment: retired    Other Topics Concern   • Not on file   Social History Narrative   • Not on file     Social Determinants of Health     Financial Resource Strain:    • Difficulty of Paying Living Expenses:    Food Insecurity:    • Worried About Running Out of Food in the Last Year:    • Ran Out of Food in the Last Year:    Transportation Needs:    • Lack of Transportation (Medical):    • Lack of Transportation (Non-Medical):    Physical Activity:    • Days of Exercise per Week:    • Minutes of Exercise per Session:    Stress:    • Feeling of Stress :    Social Connections:    • Frequency of Communication with Friends and Family:    • Frequency of Social Gatherings with Friends and Family:    • Attends [employer] Services:    • Active Member of Clubs or Organizations:    • Attends Club or Organization Meetings:    • Marital Status:    Intimate Partner Violence:    • Fear of Current or Ex-Partner:    • Emotionally Abused:    • Physically Abused:    • Sexually Abused:      Family History   Problem Relation Age of Onset   • Diabetes Mother    • Heart Disease Mother    • Diabetes Father    • Cancer Father         pancreas   • Thyroid Sister         Cancer   • Cancer Sister         thyroid   • Diabetes Sister    • Cancer Brother 49        colon   • Diabetes Brother    • Diabetes Maternal Grandfather    • Diabetes Paternal Grandmother    • Diabetes Paternal Grandfather    • No Known Problems Maternal Grandmother      Recent Labs     08/24/20  0740 08/24/20  0740 12/04/20  0723 05/10/21  0717 05/10/21  0719   ALBUMIN 4.2  --  4.4  --  4.4   HDL 37*  --  37*   --  38*   TRIGLYCERIDE 202*  --  166*  --  180*   SODIUM 141   < > 142 141 142   POTASSIUM 5.2   < > 5.2 5.2 5.4   CHLORIDE 106   < > 108 105 108   CO2 26   < > 27 26 26   BUN 39*   < > 29* 31* 31*   CREATININE 2.25*   < > 1.97* 2.02* 1.99*    < > = values in this interval not displayed.                        Assessment/Plan:        1. Essential hypertension  Controlled  Continue same medication regimen  Continue low-sodium diet      2. Stage 3 chronic kidney disease, unspecified whether stage 3a or 3b CKD (HCC)  Stable  No uremic symptoms  Renal dose of medication  Avoid nephrotoxins  Continue same medication regimen      3. Type 2 diabetes mellitus with stage 3 chronic kidney disease, unspecified whether long term insulin use, unspecified whether stage 3a or 3b CKD (HCC)

## 2021-06-14 ENCOUNTER — TELEPHONE (OUTPATIENT)
Dept: MEDICAL GROUP | Facility: MEDICAL CENTER | Age: 81
End: 2021-06-14

## 2021-06-14 NOTE — TELEPHONE ENCOUNTER
Left message for patient to call back regarding Annual Wellness Visit. Please transfer call to [place] [phone].  OK to schedule patient with any available provider in office before June 30th to complete the AHA form.      Appointment request per [facility] for patient to be seen before 6/30/21.   IMPORTANT: AHA form needs completion.

## 2021-06-19 DIAGNOSIS — I10 ESSENTIAL HYPERTENSION: ICD-10-CM

## 2021-06-21 RX ORDER — LOSARTAN POTASSIUM 50 MG/1
50 TABLET ORAL DAILY
Qty: 100 TABLET | Refills: 0 | Status: SHIPPED | OUTPATIENT
Start: 2021-06-21 | End: 2021-07-14 | Stop reason: SDUPTHER

## 2021-07-01 ENCOUNTER — OFFICE VISIT (OUTPATIENT)
Dept: MEDICAL GROUP | Age: 81
End: 2021-07-01
Payer: MEDICARE

## 2021-07-01 ENCOUNTER — ANTICOAGULATION VISIT (OUTPATIENT)
Dept: MEDICAL GROUP | Facility: MEDICAL CENTER | Age: 81
End: 2021-07-01
Payer: MEDICARE

## 2021-07-01 VITALS
HEIGHT: 68 IN | TEMPERATURE: 97.9 F | OXYGEN SATURATION: 94 % | SYSTOLIC BLOOD PRESSURE: 92 MMHG | BODY MASS INDEX: 27.49 KG/M2 | DIASTOLIC BLOOD PRESSURE: 50 MMHG | HEART RATE: 70 BPM | WEIGHT: 181.4 LBS

## 2021-07-01 DIAGNOSIS — Z95.828 PRESENCE OF IVC FILTER: ICD-10-CM

## 2021-07-01 DIAGNOSIS — Z79.01 CHRONIC ANTICOAGULATION: Primary | ICD-10-CM

## 2021-07-01 DIAGNOSIS — M79.89 RIGHT LEG SWELLING: ICD-10-CM

## 2021-07-01 LAB — INR PPP: 1.7 (ref 2–3.5)

## 2021-07-01 PROCEDURE — 99214 OFFICE O/P EST MOD 30 MIN: CPT | Performed by: INTERNAL MEDICINE

## 2021-07-01 PROCEDURE — 99211 OFF/OP EST MAY X REQ PHY/QHP: CPT | Performed by: INTERNAL MEDICINE

## 2021-07-01 PROCEDURE — 85610 PROTHROMBIN TIME: CPT | Performed by: INTERNAL MEDICINE

## 2021-07-01 ASSESSMENT — ENCOUNTER SYMPTOMS
MUSCULOSKELETAL NEGATIVE: 1
CARDIOVASCULAR NEGATIVE: 1
GASTROINTESTINAL NEGATIVE: 1
RESPIRATORY NEGATIVE: 1
CONSTITUTIONAL NEGATIVE: 1
EYES NEGATIVE: 1
PSYCHIATRIC NEGATIVE: 1
NEUROLOGICAL NEGATIVE: 1

## 2021-07-01 ASSESSMENT — FIBROSIS 4 INDEX: FIB4 SCORE: 3.68

## 2021-07-01 ASSESSMENT — PATIENT HEALTH QUESTIONNAIRE - PHQ9: CLINICAL INTERPRETATION OF PHQ2 SCORE: 0

## 2021-07-01 NOTE — PROGRESS NOTES
"Subjective:      Keivn Paredes is a 80 y.o. male who presents with Leg Swelling (x2 wks right )  Patient returns from a long cross-country airplane trip including a week or so ago from which he developed acute markedly swollen right lower leg and ankle with cramps in his calf.  The patient is on Coumadin followed by the anticoagulation clinic for history of CVA and DVT and has an IVC filter in place.  His INR is have been monitored closely and were therapeutic until the last 1 yesterday which was slightly low at 1.7 (goal is 2-3).  Extra Coumadin dose that was prescribed by his pharmacist who is closely following this.  Since returned however and over the last few days the swelling is gone back down tremendously and is back nearly to normal now.  The pain and swelling have diminished significantly.  No left leg or ankle swelling.  Denies any chest pain or dyspnea.          HPI    Review of Systems   Constitutional: Negative.    HENT: Negative.    Eyes: Negative.    Respiratory: Negative.    Cardiovascular: Negative.    Gastrointestinal: Negative.    Genitourinary: Negative.    Musculoskeletal: Negative.    Skin: Negative.    Neurological: Negative.    Endo/Heme/Allergies: Negative.    Psychiatric/Behavioral: Negative.           Objective:     BP (!) 92/50 (BP Location: Left arm, Patient Position: Sitting, BP Cuff Size: Adult)   Pulse 70   Temp 36.6 °C (97.9 °F) (Temporal)   Ht 1.727 m (5' 8\")   Wt 82.3 kg (181 lb 6.4 oz)   SpO2 94%   BMI 27.58 kg/m²      Physical Exam  Constitutional:       General: He is not in acute distress.     Appearance: He is well-developed. He is not diaphoretic.   HENT:      Head: Normocephalic and atraumatic.      Right Ear: External ear normal.      Left Ear: External ear normal.      Nose: Nose normal.      Mouth/Throat:      Pharynx: No oropharyngeal exudate.   Eyes:      General: No scleral icterus.        Right eye: No discharge.         Left eye: No discharge.      " Conjunctiva/sclera: Conjunctivae normal.      Pupils: Pupils are equal, round, and reactive to light.   Neck:      Thyroid: No thyromegaly.      Vascular: No JVD.      Trachea: No tracheal deviation.   Cardiovascular:      Rate and Rhythm: Normal rate and regular rhythm.      Heart sounds: Normal heart sounds. No murmur heard.   No friction rub. No gallop.    Pulmonary:      Effort: Pulmonary effort is normal. No respiratory distress.      Breath sounds: Normal breath sounds. No stridor. No wheezing or rales.   Chest:      Chest wall: No tenderness.   Abdominal:      General: Bowel sounds are normal. There is no distension.      Palpations: Abdomen is soft. There is no mass.      Tenderness: There is no abdominal tenderness. There is no guarding or rebound.   Musculoskeletal:         General: Tenderness present. Normal range of motion.      Cervical back: Normal range of motion and neck supple.      Comments: Only very slight tenderness of the right calf is present and there is no edema except perhaps a's very small trace of the pretibial edema on the right.  No ankle swelling or foot swelling.  Negative Homans' sign.  Left leg is normal without any swelling or tenderness   Lymphadenopathy:      Cervical: No cervical adenopathy.   Skin:     General: Skin is warm and dry.      Coloration: Skin is not pale.      Findings: No erythema or rash.   Neurological:      Mental Status: He is alert and oriented to person, place, and time.      Motor: No abnormal muscle tone.      Coordination: Coordination normal.      Deep Tendon Reflexes: Reflexes are normal and symmetric. Reflexes normal.   Psychiatric:         Behavior: Behavior normal.         Thought Content: Thought content normal.         Judgment: Judgment normal.                        Assessment/Plan:        1. Right leg swelling-rule out DVT particular in view of the patient's most recent long-distance airplane trip in the air for several hours with concomitant leg  swelling and pain and subtherapeutic INR level documented recently.    If DVT is discovered on ultrasound, then patient should be strongly considered for switching to novel anticoagulant in place of Coumadin.  If the ultrasound is negative then continue on Coumadin per anticoagulant.    I ordered the ultrasound urgently.  Call results.  Follow-up next week     - US-EXTREMITY VENOUS LOWER UNILAT RIGHT; Future

## 2021-07-01 NOTE — PROGRESS NOTES
OP Anticoagulation Service Note    Date: 2021  There were no vitals filed for this visit.   pt declined vitals    Anticoagulation Summary  As of 2021    INR goal:  2.0-3.0   TTR:  78.3 % (3.4 y)   INR used for dosin.70 (2021)   Warfarin maintenance plan:  5 mg (5 mg x 1) every day   Weekly warfarin total:  35 mg   Plan last modified:  Kelsey Junior, PharmD (2020)   Next INR check:  7/15/2021   Priority:  Maintenance   Target end date:  Indefinite    Indications    Presence of IVC filter [Z95.828]  Transient ischemic attack [G45.9] (Resolved) [G45.9]  Deep vein thrombosis (DVT) of both lower extremities (HCC) (Resolved) [I82.403]  DVT (deep venous thrombosis) (HCC) (Resolved) [I82.409]  Multiple pulmonary emboli (HCC) (Resolved) [I26.99]  Chronic anticoagulation [Z79.01]             Anticoagulation Episode Summary     INR check location:      Preferred lab:      Send INR reminders to:      Comments:        Anticoagulation Care Providers     Provider Role Specialty Phone number    [facility]   [phone]    Edward Walters M.D.  Family Medicine [phone]        Anticoagulation Patient Findings  Patient Findings     Negatives:  Signs/symptoms of thrombosis, Signs/symptoms of bleeding, Laboratory test error suspected, Change in health, Change in alcohol use, Change in activity, Upcoming invasive procedure, Emergency department visit, Upcoming dental procedure, Missed doses, Extra doses, Change in medications, Change in diet/appetite, Hospital admission, Bruising, Other complaints          HPI:   Paulo Lena seen in clinic today, on anticoagulation therapy with warfarin (a high risk medication) for hx of DVT       Pt is here today to evaluate anticoagulation therapy    Previous INR was  2.4 on 21    Pt was instructed to continue current regimen    Confirmed warfarin dosing regimen, denies missed or extra doses of coumadin.   Diet has been consistent with  foods rich in vitamin K: Yes  Changes in ETOH:  No  Changes in smoking status: No  Changes in medication: No   Pt is not on antiplatelet/NSAID therapy  Cost restriction: No  S/s of bleeding:  No  Falls or accidents since last visit No  Signs/symptoms  thrombosis since the last appt: YES see above       A/P   INR  SUBtherapeutic today, will require dose adjust ment today to prevent recurrent DVT  and closer follow up.   Tonight 7.5 mg then continue current regimen.      Advised pt to seek immediate medical attention if swelling or pain occurs again in his lower leg. He does not want to go to urgent care or PCP at this time for evaluation.     Pt educated to contact our clinic with any changes in medications or s/s of bleeding or thrombosis. Pt is aware to seek immediate medical attention for falls, head injury or deep cuts    Follow up appointment in 2 week(s) to reduce risk of adverse events from warfarin  Kelsey Junior, PharmD

## 2021-07-02 ENCOUNTER — HOSPITAL ENCOUNTER (OUTPATIENT)
Dept: RADIOLOGY | Facility: MEDICAL CENTER | Age: 81
End: 2021-07-02
Attending: INTERNAL MEDICINE
Payer: MEDICARE

## 2021-07-02 DIAGNOSIS — M79.89 RIGHT LEG SWELLING: ICD-10-CM

## 2021-07-02 PROCEDURE — 93971 EXTREMITY STUDY: CPT | Mod: RT

## 2021-07-07 ENCOUNTER — TELEPHONE (OUTPATIENT)
Dept: MEDICAL GROUP | Age: 81
End: 2021-07-07

## 2021-07-07 NOTE — TELEPHONE ENCOUNTER
ESTABLISHED PATIENT PRE-VISIT PLANNING     Patient was NOT contacted to complete PVP.     Note: Patient will not be contacted if there is no indication to call.     1.  Reviewed notes from the last few office visits within the medical group: Yes    2.  If any orders were placed at last visit or intended to be done for this visit (i.e. 6 mos follow-up), do we have Results/Consult Notes?         •  Labs - Labs were not ordered at last office visit.  Note: If patient appointment is for lab review and patient did not complete labs, check with provider if OK to reschedule patient until labs completed.       •  Imaging - Imaging ordered, completed and results are in chart.       •  Referrals - No referrals were ordered at last office visit.    3. Is this appointment scheduled as a Hospital Follow-Up? No    4.  Immunizations were updated in Epic using Reconcile Outside Information activity? Yes    5.  Patient is due for the following Health Maintenance Topics:   Health Maintenance Due   Topic Date Due   • DIABETES MONOFILAMENT / LE EXAM  05/18/2021       - Patient is up-to-date on all Health Maintenance topics. No records have been requested at this time.    6.  AHA (Pulse[employer]) form printed for Provider? Yes

## 2021-07-08 ENCOUNTER — OFFICE VISIT (OUTPATIENT)
Dept: MEDICAL GROUP | Age: 81
End: 2021-07-08
Payer: MEDICARE

## 2021-07-08 VITALS
HEIGHT: 68 IN | WEIGHT: 178 LBS | TEMPERATURE: 98.2 F | DIASTOLIC BLOOD PRESSURE: 64 MMHG | HEART RATE: 68 BPM | BODY MASS INDEX: 26.98 KG/M2 | SYSTOLIC BLOOD PRESSURE: 116 MMHG | OXYGEN SATURATION: 96 %

## 2021-07-08 DIAGNOSIS — D69.6 THROMBOCYTOPENIA (HCC): ICD-10-CM

## 2021-07-08 DIAGNOSIS — H93.11 TINNITUS, RIGHT EAR: ICD-10-CM

## 2021-07-08 DIAGNOSIS — E11.29 MICROALBUMINURIA DUE TO TYPE 2 DIABETES MELLITUS (HCC): ICD-10-CM

## 2021-07-08 DIAGNOSIS — R60.0 LEG EDEMA, RIGHT: ICD-10-CM

## 2021-07-08 DIAGNOSIS — D68.9 COAGULATION DEFECT (HCC): ICD-10-CM

## 2021-07-08 DIAGNOSIS — N18.32 STAGE 3B CHRONIC KIDNEY DISEASE: ICD-10-CM

## 2021-07-08 DIAGNOSIS — I70.0 ATHEROSCLEROSIS OF AORTA (HCC): ICD-10-CM

## 2021-07-08 DIAGNOSIS — E11.8 CONTROLLED TYPE 2 DIABETES MELLITUS WITH COMPLICATION, WITHOUT LONG-TERM CURRENT USE OF INSULIN (HCC): ICD-10-CM

## 2021-07-08 DIAGNOSIS — R80.9 MICROALBUMINURIA DUE TO TYPE 2 DIABETES MELLITUS (HCC): ICD-10-CM

## 2021-07-08 PROBLEM — Z79.01 ANTICOAGULATED ON COUMADIN: Status: ACTIVE | Noted: 2021-07-08

## 2021-07-08 PROBLEM — N18.9 CKD (CHRONIC KIDNEY DISEASE): Status: ACTIVE | Noted: 2020-01-16

## 2021-07-08 PROCEDURE — 99214 OFFICE O/P EST MOD 30 MIN: CPT | Performed by: INTERNAL MEDICINE

## 2021-07-08 RX ORDER — ASCORBIC ACID 1000 MG
TABLET ORAL
COMMUNITY
End: 2022-08-24

## 2021-07-08 ASSESSMENT — ENCOUNTER SYMPTOMS
MUSCULOSKELETAL NEGATIVE: 1
RESPIRATORY NEGATIVE: 1
PSYCHIATRIC NEGATIVE: 1
NEUROLOGICAL NEGATIVE: 1
CARDIOVASCULAR NEGATIVE: 1
GASTROINTESTINAL NEGATIVE: 1
EYES NEGATIVE: 1
CONSTITUTIONAL NEGATIVE: 1

## 2021-07-08 ASSESSMENT — FIBROSIS 4 INDEX: FIB4 SCORE: 3.68

## 2021-07-09 NOTE — PROGRESS NOTES
"Subjective:      Kevin Paredes is a 80 y.o. male who presents with Follow-Up (right leg swellling, leg is better) and Ringing in Ear (\"woosh\" sound only in the right ear, regular pace)  Patient is here for follow-up of his right leg swelling which is much better with elevation ice compression and walking.  The edema is currently resolved.  His Doppler ultrasound was negative for DVT.    However since then he developed a \"whooshing sound in his right ear without pain or hearing loss.  No trauma.  No discharge.          HPI    Review of Systems   Constitutional: Negative.    HENT: Negative.    Eyes: Negative.    Respiratory: Negative.    Cardiovascular: Negative.    Gastrointestinal: Negative.    Genitourinary: Negative.    Musculoskeletal: Negative.    Skin: Negative.    Neurological: Negative.    Endo/Heme/Allergies: Negative.    Psychiatric/Behavioral: Negative.           Objective:     /64 (BP Location: Left arm, Patient Position: Sitting, BP Cuff Size: Adult)   Pulse 68   Temp 36.8 °C (98.2 °F) (Temporal)   Ht 1.727 m (5' 8\")   Wt 80.7 kg (178 lb)   SpO2 96%   BMI 27.06 kg/m²      Physical Exam  Constitutional:       General: He is not in acute distress.     Appearance: He is well-developed. He is not diaphoretic.   HENT:      Head: Normocephalic and atraumatic.      Right Ear: External ear normal.      Left Ear: External ear normal.      Nose: Nose normal.      Mouth/Throat:      Pharynx: No oropharyngeal exudate.   Eyes:      General: No scleral icterus.        Right eye: No discharge.         Left eye: No discharge.      Conjunctiva/sclera: Conjunctivae normal.      Pupils: Pupils are equal, round, and reactive to light.   Neck:      Thyroid: No thyromegaly.      Vascular: No JVD.      Trachea: No tracheal deviation.   Cardiovascular:      Rate and Rhythm: Normal rate and regular rhythm.      Heart sounds: Normal heart sounds. No murmur heard.   No friction rub. No gallop.    Pulmonary:      Effort: " Pulmonary effort is normal. No respiratory distress.      Breath sounds: Normal breath sounds. No stridor. No wheezing or rales.   Chest:      Chest wall: No tenderness.   Abdominal:      General: Bowel sounds are normal. There is no distension.      Palpations: Abdomen is soft. There is no mass.      Tenderness: There is no abdominal tenderness. There is no guarding or rebound.   Musculoskeletal:         General: No tenderness. Normal range of motion.      Cervical back: Normal range of motion and neck supple.   Lymphadenopathy:      Cervical: No cervical adenopathy.   Skin:     General: Skin is warm and dry.      Coloration: Skin is not pale.      Findings: No erythema or rash.   Neurological:      Mental Status: He is alert and oriented to person, place, and time.      Motor: No abnormal muscle tone.      Coordination: Coordination normal.      Deep Tendon Reflexes: Reflexes are normal and symmetric. Reflexes normal.   Psychiatric:         Behavior: Behavior normal.         Thought Content: Thought content normal.         Judgment: Judgment normal.       Anticoagulation Visit on 07/01/2021   Component Date Value   • INR 07/01/2021 1.70*      /  Lab Results   Component Value Date/Time    HBA1C 6.6 (H) 05/10/2021 07:19 AM    HBA1C 6.9 (H) 12/04/2020 07:23 AM     Lab Results   Component Value Date/Time    SODIUM 142 05/10/2021 07:19 AM    POTASSIUM 5.4 05/10/2021 07:19 AM    CHLORIDE 108 05/10/2021 07:19 AM    CO2 26 05/10/2021 07:19 AM    GLUCOSE 140 (H) 05/10/2021 07:19 AM    BUN 31 (H) 05/10/2021 07:19 AM    CREATININE 1.99 (H) 05/10/2021 07:19 AM    ALKPHOSPHAT 41 05/10/2021 07:19 AM    ASTSGOT 22 05/10/2021 07:19 AM    ALTSGPT 14 05/10/2021 07:19 AM    TBILIRUBIN 0.5 05/10/2021 07:19 AM     Lab Results   Component Value Date/Time    INR 1.70 (A) 07/01/2021 09:19 AM    INR 2.40 05/20/2021 09:21 AM    INR 2.70 04/19/2021 09:36 AM     Lab Results   Component Value Date/Time    CHOLSTRLTOT 150 05/10/2021 07:19 AM     LDL 76 05/10/2021 07:19 AM    HDL 38 (A) 05/10/2021 07:19 AM    TRIGLYCERIDE 180 (H) 05/10/2021 07:19 AM       No results found for: TESTOSTERONE  No results found for: TSH  Lab Results   Component Value Date/Time    FREET4 1.79 (H) 05/10/2021 07:19 AM    FREET4 1.32 08/24/2020 07:40 AM     Lab Results   Component Value Date/Time    URICACID 6.5 10/29/2015 09:32 AM     No components found for: VITB12  Lab Results   Component Value Date/Time    25HYDROXY 50 12/04/2020 07:23 AM    25HYDROXY 53 08/24/2020 07:40 AM                      Assessment/Plan:                   1. Leg edema, right-resolved.  Doppler ultrasound negative for DVT     Since the tenderness has almost completely resolved no further follow-up necessary.     2. Tinnitus, right ear-negative ear exam both external and internal auditory canal and TM.  However there is lack of adequate cerumen may contribute to symptoms.  Advised to avoid cleaning ears with Q-ti    If hearing loss or pain occurs he will let us know and we will refer him to ENT.      3. Stage 3b chronic kidney disease (HCC)       Under good control. Continue same regimen.    4. Controlled type 2 diabetes mellitus with complication, without long-term current use of insulin (HCC)      Under good control. Continue same regimen.    5. Microalbuminuria due to type 2 diabetes mellitus (HCC)      Under good control. Continue same regimen.  6. Thrombocytopenia (HCC)       Under good control. Continue same regimen.    7. Atherosclerosis of aorta (HCC)      Under good control. Continue same regimen.    8. Coagulation defect (HCC)- on warfarin      Under good control. Continue same regimen.

## 2021-07-09 NOTE — PROGRESS NOTES
Annual Health Assessment Questions:    1.  Are you currently engaging in any exercise or physical activity? Yes, walk treadmill everyday, mows his lawn    2.  How would you describe your mood or emotional well-being today? excellent    3.  Have you had any falls in the last year? No    4.  Have you noticed any problems with your balance or had difficulty walking? No    5.  In the last six months have you experienced any leakage of urine? Yes, every day the last 6-8 years, wears a pad    6. DPA/Advanced Directive: Patient has Advanced Directive on file.

## 2021-07-13 ENCOUNTER — TELEPHONE (OUTPATIENT)
Dept: MEDICAL GROUP | Age: 81
End: 2021-07-13

## 2021-07-13 NOTE — TELEPHONE ENCOUNTER
ESTABLISHED PATIENT PRE-VISIT PLANNING   Tuesday, 07/13/2021@9:56AM:    Patient was NOT contacted to complete PVP.     Note: Patient will not be contacted if there is no indication to call.     1.  Reviewed notes from the last few office visits within the medical group: Yes    2.  If any orders were placed at last visit or intended to be done for this visit (i.e. 6 mos follow-up), do we have Results/Consult Notes?         •  Labs - Labs ordered, completed on 05/10/2021 and results are in chart.  Note: If patient appointment is for lab review and patient did not complete labs, check with provider if OK to reschedule patient until labs completed.       •  Imaging - Imaging ordered, completed and results are in chart.       •  Referrals - Referral ordered, patient was seen and consult notes are in chart. Care Teams updated  YES.    -Anticoagulation: Patient was seen by Kelsey Junior PharmD on 07/01/2021. Progress Notes are in patient's chart.     3. Is this appointment scheduled as a Hospital Follow-Up? No    4.  Immunizations were updated in Epic using Reconcile Outside Information activity? Yes    5.  Patient is due for the following Health Maintenance Topics:   Health Maintenance Due   Topic Date Due   • DIABETES MONOFILAMENT / LE EXAM  05/18/2021     6.  AHA (Pulse8) form printed for Provider? Yes

## 2021-07-14 ENCOUNTER — OFFICE VISIT (OUTPATIENT)
Dept: MEDICAL GROUP | Age: 81
End: 2021-07-14
Payer: MEDICARE

## 2021-07-14 VITALS
HEIGHT: 68 IN | HEART RATE: 64 BPM | WEIGHT: 175 LBS | DIASTOLIC BLOOD PRESSURE: 60 MMHG | TEMPERATURE: 97.3 F | OXYGEN SATURATION: 97 % | SYSTOLIC BLOOD PRESSURE: 120 MMHG | BODY MASS INDEX: 26.52 KG/M2

## 2021-07-14 DIAGNOSIS — I70.0 ATHEROSCLEROSIS OF AORTA (HCC): ICD-10-CM

## 2021-07-14 DIAGNOSIS — I10 ESSENTIAL HYPERTENSION, BENIGN: ICD-10-CM

## 2021-07-14 DIAGNOSIS — E89.0 POSTSURGICAL HYPOTHYROIDISM: ICD-10-CM

## 2021-07-14 DIAGNOSIS — D68.9 COAGULATION DEFECT (HCC): ICD-10-CM

## 2021-07-14 DIAGNOSIS — N18.32 STAGE 3B CHRONIC KIDNEY DISEASE: ICD-10-CM

## 2021-07-14 DIAGNOSIS — R80.9 MICROALBUMINURIA DUE TO TYPE 2 DIABETES MELLITUS (HCC): ICD-10-CM

## 2021-07-14 DIAGNOSIS — D69.6 THROMBOCYTOPENIA (HCC): ICD-10-CM

## 2021-07-14 DIAGNOSIS — Z95.1 S/P CABG X 4: ICD-10-CM

## 2021-07-14 DIAGNOSIS — E11.8 CONTROLLED TYPE 2 DIABETES MELLITUS WITH COMPLICATION, WITHOUT LONG-TERM CURRENT USE OF INSULIN (HCC): ICD-10-CM

## 2021-07-14 DIAGNOSIS — E11.29 MICROALBUMINURIA DUE TO TYPE 2 DIABETES MELLITUS (HCC): ICD-10-CM

## 2021-07-14 DIAGNOSIS — E78.5 DYSLIPIDEMIA: ICD-10-CM

## 2021-07-14 DIAGNOSIS — D75.89 MACROCYTOSIS: ICD-10-CM

## 2021-07-14 DIAGNOSIS — Z86.718 HISTORY OF DVT IN ADULTHOOD: ICD-10-CM

## 2021-07-14 DIAGNOSIS — I25.10 CAD IN NATIVE ARTERY: ICD-10-CM

## 2021-07-14 DIAGNOSIS — Z86.73 H/O TIA (TRANSIENT ISCHEMIC ATTACK) AND STROKE: ICD-10-CM

## 2021-07-14 DIAGNOSIS — Z79.01 ANTICOAGULATED ON COUMADIN: ICD-10-CM

## 2021-07-14 PROCEDURE — 99214 OFFICE O/P EST MOD 30 MIN: CPT | Performed by: INTERNAL MEDICINE

## 2021-07-14 RX ORDER — ATORVASTATIN CALCIUM 20 MG/1
20 TABLET, FILM COATED ORAL DAILY
Qty: 100 TABLET | Refills: 3 | Status: SHIPPED | OUTPATIENT
Start: 2021-07-14 | End: 2021-10-26

## 2021-07-14 RX ORDER — ATENOLOL 25 MG/1
25 TABLET ORAL DAILY
Qty: 100 TABLET | Refills: 3 | Status: SHIPPED | OUTPATIENT
Start: 2021-07-14 | End: 2021-12-20

## 2021-07-14 RX ORDER — LEVOTHYROXINE SODIUM 137 UG/1
TABLET ORAL
Qty: 100 TABLET | Refills: 3 | Status: SHIPPED | OUTPATIENT
Start: 2021-07-14 | End: 2022-06-14 | Stop reason: SDUPTHER

## 2021-07-14 RX ORDER — FENOFIBRATE 160 MG/1
160 TABLET ORAL DAILY
Qty: 90 TABLET | Refills: 3 | Status: SHIPPED | OUTPATIENT
Start: 2021-07-14 | End: 2021-10-28 | Stop reason: SDUPTHER

## 2021-07-14 RX ORDER — LOSARTAN POTASSIUM 50 MG/1
50 TABLET ORAL DAILY
Qty: 100 TABLET | Refills: 5 | Status: SHIPPED | OUTPATIENT
Start: 2021-07-14 | End: 2022-06-14 | Stop reason: SDUPTHER

## 2021-07-14 ASSESSMENT — FIBROSIS 4 INDEX: FIB4 SCORE: 3.68

## 2021-07-14 NOTE — PROGRESS NOTES
Annual Health Assessment Questions:    1.  Are you currently engaging in any exercise or physical activity? Yes, 50 minutes on treadmill everyday    2.  How would you describe your mood or emotional well-being today? excellent    3.  Have you had any falls in the last year? No    4.  Have you noticed any problems with your balance or had difficulty walking? No    5.  In the last six months have you experienced any leakage of urine? Yes    6. DPA/Advanced Directive: Patient has Advanced Directive, but it is not on file. Instructed to bring in a copy to scan into their chart.

## 2021-07-14 NOTE — PROGRESS NOTES
CHIEF COMPLAINT  Chief Complaint   Patient presents with   • Lab Results   DM    HPI  Paulo Paredes is a 80 y.o. male who presents today for the following     DM 2, with microalbuminuria  Interval course  HBA1C 6.6 (H) 05/10/2021 07:19 AM   HBA1C 6.9 (H) 2020 07:23 AM   HBA1C 7.0 (H) 2020 07:30 AM     Onset/D  Diabetes education:  unknown     Medications:   · Trulicity 0.75 mg weekly  · ACE/ARB: losartan  · Statin: atorvastatin 10 mg QD  · ASA: No, on coumadin     Diet: regular  Exercise:  Walk daily  BMI:  26     DM complications:  · Peripheral neuropathy:           No numbness or tingling in feet.  · Retinopathy:                    Last eye exam: . No retinopathy.    · Nephropathy:                         CKD stage III-IV, microalbuminuria  · CVS:                                 Has CAD, on rx.   · GI:                                       No gastropathy.     FH of DM: mother     CKD stage IIII, (single kidney)  The patient had decreased GFR with normal creatinine and electrolytes.   No consistent NSAIDs use.   He has been followed up by nephrology.     Hypertension/CAD, st post CABG x 4  St post [facility] in  at [facility]  Meds: Valsartan 160 mg daily, atenolol 25 mg daily; no ASA, on coumadin.   Taking meds as prescribed.   He is measuring BP at home, it has been < 140/90  Denies:  -  headaches, vision problems, tinnitus.                 -  chest pain/pressure, palpitations, irregular heart beats, exertional, dyspnea, peripheral edema.  Low salt diet: Y  Diet / exercise / BMI: as above.   FH of HTN: unknown     Dyslipidemia  The patient is on atorvastatin 10 mg, taking daily, as prescribed. No myalgias, muscle cramps or pain.   Diet /Exercise/BMI:  As above       Hypothyroidism, postsurgical (H/o thyroid cancer)  Dg: in .   Status post total thyroidectomy.   On Levothyroxine, 137 mcg, taking daily early in am, before any po intake.  No temperature intolerance. No  change in weight,  hair/skin quality, BMs.   No tremors, weakness.  No peripheral swelling.  No mood changes.  FH: N  Review TSH.  He has been followed up by endocrinology     H/o TIA  (H/o DVT/PE)  Chronic anticoagulation, (IVC filter)  Dg/Onset: 5/2014.   Since, he has been on Coumadin, coumadin clinic f/u.   Denies:   - Leg swelling or pain  - Shortness of breath, palpitations.     Thrombocytopenia, macrocytosis  The patient has had borderline thrombocytopenia, stable.   Denies easy bleeding or bruising.   Reviewed medication list.     Reviewed PMH, PSH, FH, SH, ALL, HCM/IMM, no changes  Reviewed MEDS, no changes    Patient Active Problem List    Diagnosis Date Noted   • Atherosclerosis of aorta (HCC) 07/08/2021   • Anticoagulated on Coumadin 07/08/2021   • Essential hypertension, benign 10/27/2020   • H/O TIA (transient ischemic attack) and stroke 05/17/2020   • Thrombocytopenia ([facility]) 01/16/2020   • Macrocytosis 01/16/2020   • Stage 3b chronic kidney disease ([facility]) 01/16/2020   • Microalbuminuria due to type 2 diabetes mellitus ([facility]) 07/16/2019   • Hx pulmonary embolism 07/16/2019   • History of DVT in adulthood 07/16/2019   • Dyslipidemia 03/19/2018   • Coagulation defect ([facility])- on warfarin 04/23/2015   • Presence of IVC filter 06/02/2014   • CAD in native artery 01/22/2014   • GERD (gastroesophageal reflux disease) 12/03/2012   • Controlled type 2 diabetes mellitus with complication, without long-term current use of insulin ([facility]) 12/03/2012   • S/P CABG x 4 12/03/2012   • Single kidney 12/03/2012     CURRENT MEDICATIONS  Current Outpatient Medications   Medication Sig Dispense Refill   • Multiple Vitamins-Minerals (ZINC PO) Take  by mouth.     • Ginkgo Biloba 40 MG Tab Take  by mouth.     • hydrocortisone 2.5 % Cream topical cream APPLY TO RASH ON GROIN TWICE DAILY FOR 1 WEEK WITH FLARES     • losartan (COZAAR) 50 MG Tab Take 1 tablet by mouth every day. 100 tablet 0   • docusate sodium (COLACE) 100 MG Cap Take  1 Cap by mouth 2 times a day. 60 Cap 0   • EUTHYROX 137 MCG Tab TAKE 1 TABLET BY MOUTH ONCE DAILY IN THE MORNING ON  AN  EMPTY  STOMACH  ONE  HOUR  BEFORE  EATING 90 Tab 3   • atorvastatin (LIPITOR) 20 MG Tab Take 1 Tab by mouth every day. 100 Tab 3   • atenolol (TENORMIN) 25 MG Tab Take 1 Tab by mouth every day. 90 Tab 3   • fenofibrate (TRIGLIDE) 160 MG tablet Take 1 Tab by mouth every day. 90 Tab 3   • TRULICITY 0.75 MG/0.5ML Solution Pen-injector INJECT 1 SYRINGE SUBCUTANEOUSLY AS DIRECTED ONCE EVERY 7 DAYS 12 mL 3   • warfarin (COUMADIN) 5 MG Tab Take 1 Tab by mouth every evening. 120 Tab 3   • Calcium Carb-Cholecalciferol (CALCIUM 1000 + D PO) Take 1 Dose by mouth every day.     • Omega-3 Krill Oil 300 MG Cap Take 300 mg by mouth every day.     • Cinnamon 500 MG Cap Take 1,000 mg by mouth.     • Cyanocobalamin (VITAMIN B-12) 1000 MCG Tab Take 1,000 mcg by mouth every day.     • Coenzyme Q10 (CO Q-10 PO) Take 1 Dose by mouth every day. Unknown OTC Strength       No current facility-administered medications for this visit.     ALLERGIES  Allergies: Lactose and Seasonal  PAST MEDICAL HISTORY  Past Medical History:   Diagnosis Date   • Chronic kidney disease (CKD) stage G3b/A2, moderately decreased glomerular filtration rate (GFR) between 30-44 mL/min/1.73 square meter and albuminuria creatinine ratio between  mg/g ([facility]) 4/23/2015   • postsurgical hypothyroidism 2014   • Papillary carcinoma of thyroid (HCC) 2014    R 2 foci / 4.5mm,0.25mm / no mets/ pT1pN0   • DVT (deep venous thrombosis) ([facility]) 2014   • Multiple pulmonary emboli ([facility]) 2014   • Transient renal failure 2014    renal vein thrombosis   • Hyperlipidemia 12/3/2012   • S/P colectomy 2010    multiple colon polyps / no Ca   • Multiple thyroid nodules 2009   • S/P prostatectomy 2008    carcinoma   • S/P CABG x 4 2003   • S/p nephrectomy 1998    carcinoma   • Anesthesia     difficult intubation with surgery 2008,2010   • Basal cell carcinoma of skin     • CAD (coronary artery disease)    • Cancer (HCC)     rt  kidney ;  thyroid cancer , prostate 2008, skin   • ED (erectile dysfunction)    • GERD (gastroesophageal reflux disease)    • Glaucoma    • Heart burn    • Hypertension    • Indigestion    • Pre-diabetes    • Renal disorder     rt kidney cancer,nephrectomy   • Stroke (HCC)     'mild',no residual,'one doctor said it was a tia'   • Type II or unspecified type diabetes mellitus without mention of complication, not stated as uncontrolled     on no medications   • Unspecified urinary incontinence      SURGICAL HISTORY  He  has a past surgical history that includes colon resection; nephrectomy radical; multiple coronary artery bypass; prostatectomy, radical retro; other orthopedic surgery; thyroidectomy total (2014); and node dissection (2014).  SOCIAL HISTORY  Social History     Tobacco Use   • Smoking status: Never Smoker   • Smokeless tobacco: Never Used   Vaping Use   • Vaping Use: Never used   Substance Use Topics   • Alcohol use: Yes     Comment: 1 PER WK   • Drug use: No     Social History     Social History Narrative   • Not on file     FAMILY HISTORY  Family History   Problem Relation Age of Onset   • Diabetes Mother    • Heart Disease Mother    • Diabetes Father    • Cancer Father         pancreas   • Thyroid Sister         Cancer   • Cancer Sister         thyroid   • Diabetes Sister    • Cancer Brother 49        colon   • Diabetes Brother    • Diabetes Maternal Grandfather    • Diabetes Paternal Grandmother    • Diabetes Paternal Grandfather    • No Known Problems Maternal Grandmother      Family Status   Relation Name Status   • Mo     • Fa     • Sis     • Bro     • MGFa     • PGMo     • PGFa     • MGMo       ROS   Constitutional: Negative for fever, chills, fatigue.  HENT: Negative for congestion, sore throat.  Eyes: Negative for vision problems.   Respiratory: Negative  "for cough, shortness of breath.  Cardiovascular: Negative for chest pain, palpitations.   Gastrointestinal: Negative for heartburn, nausea, abdominal pain.   Genitourinary: Negative for dysuria.  Musculoskeletal: Negative for significant myalgia, back and joint pain.   Skin: Negative for rash.   Neuro: Negative for dizziness, weakness and headaches.   Endo/Heme/Allergies: Does not bruise/bleed easily.   Psychiatric/Behavioral: Negative for depression.    PHYSICAL EXAM   Blood Pressure 120/60 (BP Location: Left arm, Patient Position: Sitting, BP Cuff Size: Adult)   Pulse 64   Temperature 36.3 °C (97.3 °F) (Temporal)   Height 1.727 m (5' 8\")   Weight 79.4 kg (175 lb)   Oxygen Saturation 97%  Body mass index is 26.61 kg/m².  General:  NAD, well appearing  HEENT:   NC/AT, PERRLA, EOMI.  Cardiovascular: unlabored breathing, no peripheral cyanosis or swelling.  Lungs:   no respiratory distress.  Abdomen: non- distended.  Extremities:  No LE swelling.  Skin:  Warm, dry.  No erythema. No rash.   Neurologic: Alert & oriented x 3. CN II-XII grossly intact. No focal deficits.  Psychiatric:  Affect normal, mood normal, judgment normal.    Labs     Labs are reviewed and discussed with a patient  Lab Results   Component Value Date/Time    CHOLSTRLTOT 150 05/10/2021 07:19 AM    LDL 76 05/10/2021 07:19 AM    HDL 38 (A) 05/10/2021 07:19 AM    TRIGLYCERIDE 180 (H) 05/10/2021 07:19 AM       Lab Results   Component Value Date/Time    SODIUM 142 05/10/2021 07:19 AM    POTASSIUM 5.4 05/10/2021 07:19 AM    CHLORIDE 108 05/10/2021 07:19 AM    CO2 26 05/10/2021 07:19 AM    GLUCOSE 140 (H) 05/10/2021 07:19 AM    BUN 31 (H) 05/10/2021 07:19 AM    CREATININE 1.99 (H) 05/10/2021 07:19 AM     Lab Results   Component Value Date/Time    ALKPHOSPHAT 41 05/10/2021 07:19 AM    ASTSGOT 22 05/10/2021 07:19 AM    ALTSGPT 14 05/10/2021 07:19 AM    TBILIRUBIN 0.5 05/10/2021 07:19 AM      Lab Results   Component Value Date/Time    HBA1C 6.6 (H) " 05/10/2021 07:19 AM    HBA1C 6.9 (H) 12/04/2020 07:23 AM    HBA1C 7.0 (H) 05/07/2020 07:30 AM     No results found for: TSH  Lab Results   Component Value Date/Time    FREET4 1.79 (H) 05/10/2021 07:19 AM    FREET4 1.32 08/24/2020 07:40 AM       Lab Results   Component Value Date/Time    WBC 5.0 05/10/2021 07:19 AM    RBC 4.83 05/10/2021 07:19 AM    HEMOGLOBIN 15.0 05/10/2021 07:19 AM    HEMATOCRIT 47.1 05/10/2021 07:19 AM    MCV 97.5 05/10/2021 07:19 AM    MCH 31.1 05/10/2021 07:19 AM    MCHC 31.8 (L) 05/10/2021 07:19 AM    MPV 10.6 05/10/2021 07:19 AM    NEUTSPOLYS 54.90 05/10/2021 07:19 AM    LYMPHOCYTES 31.30 05/10/2021 07:19 AM    MONOCYTES 7.60 05/10/2021 07:19 AM    EOSINOPHILS 5.00 05/10/2021 07:19 AM    BASOPHILS 0.80 05/10/2021 07:19 AM      Imaging     Reviewed per HPI    Assessment and Plan     Paulo Paredes is a 80 y.o. male    1. Controlled type 2 diabetes mellitus with complication, without long-term current use of insulin (HCC)  - Controlled, continue with current management.  - HEMOGLOBIN A1C; Future  - Diabetic Monofilament LE Exam  2. Microalbuminuria due to type 2 diabetes mellitus (HCC)  Mild    3. Stage 3b chronic kidney disease (HCC)  Stable, continue nephrology f/u    4. Essential hypertension, benign  - Controlled, continue with current management, cardiology f/u  5. CAD in native artery  6. S/P CABG x 4  7. Atherosclerosis of aorta ([facility])  As above    8. Dyslipidemia  - Controlled, continue with current management.  - atorvastatin (LIPITOR) 20 MG Tab; Take 1 tablet by mouth every day.  Dispense: 100 tablet; Refill: 3  - Lipid Profile; Future    9. Postsurgical hypothyroidism  - Controlled, continue with current management.    10. H/O TIA (transient ischemic attack) and stroke  11. History of DVT in adulthood  12. Anticoagulated on Coumadin  13. Coagulation defect (HCC)- on warfarin  - No recurrence, continue current management.    14. Thrombocytopenia (HCC)  Borderline, follow-up  labs    15. Macrocytosis  B12 and folate were normal    Counseling:   - Smoking:  Nonsmoker    Followup: In 6 months, annual and labs    All questions are answered.    Please note that this dictation was created using voice recognition software, and that there might be errors of venkata and possibly content.

## 2021-07-15 ENCOUNTER — ANTICOAGULATION VISIT (OUTPATIENT)
Dept: MEDICAL GROUP | Facility: MEDICAL CENTER | Age: 81
End: 2021-07-15
Payer: MEDICARE

## 2021-07-15 VITALS — BODY MASS INDEX: 26.53 KG/M2 | WEIGHT: 175.04 LBS | HEIGHT: 68 IN

## 2021-07-15 DIAGNOSIS — Z95.828 PRESENCE OF IVC FILTER: ICD-10-CM

## 2021-07-15 DIAGNOSIS — Z79.01 CHRONIC ANTICOAGULATION: Primary | ICD-10-CM

## 2021-07-15 LAB — INR PPP: 2.8 (ref 2–3.5)

## 2021-07-15 PROCEDURE — 93793 ANTICOAG MGMT PT WARFARIN: CPT | Performed by: INTERNAL MEDICINE

## 2021-07-15 PROCEDURE — 85610 PROTHROMBIN TIME: CPT | Performed by: INTERNAL MEDICINE

## 2021-07-15 ASSESSMENT — FIBROSIS 4 INDEX: FIB4 SCORE: 3.68

## 2021-07-15 NOTE — PROGRESS NOTES
"  OP Anticoagulation Service Note    Date: 7/15/2021  Vitals:    07/15/21 0846   Weight: 79.4 kg (175 lb 0.7 oz)   Height: 1.727 m (5' 7.99\")       Anticoagulation Summary  As of 7/15/2021    INR goal:  2.0-3.0   TTR:  78.3 % (3.4 y)   INR used for dosin.80 (7/15/2021)   Warfarin maintenance plan:  5 mg (5 mg x 1) every day   Weekly warfarin total:  35 mg   Plan last modified:  Kelsey Junior, PharmD (2020)   Next INR check:  2021   Priority:  Maintenance   Target end date:  Indefinite    Indications    Presence of IVC filter [Z95.828]  Transient ischemic attack [G45.9] (Resolved) [G45.9]  Deep vein thrombosis (DVT) of both lower extremities (HCC) (Resolved) [I82.403]  DVT (deep venous thrombosis) (HCC) (Resolved) [I82.409]  Multiple pulmonary emboli (HCC) (Resolved) [I26.99]  Chronic anticoagulation (Resolved) [Z79.01]             Anticoagulation Episode Summary     INR check location:      Preferred lab:      Send INR reminders to:      Comments:        Anticoagulation Care Providers     Provider Role Specialty Phone number    [facility]   [phone]    Edward Walters M.D.  Family Medicine [phone]        Anticoagulation Patient Findings  Patient Findings     Negatives:  Signs/symptoms of thrombosis, Signs/symptoms of bleeding, Laboratory test error suspected, Change in health, Change in alcohol use, Change in activity, Upcoming invasive procedure, Emergency department visit, Upcoming dental procedure, Missed doses, Extra doses, Change in medications, Change in diet/appetite, Hospital admission, Bruising          HPI:   Paulo Paredes seen in clinic today, on anticoagulation therapy with warfarin (a high risk medication) for hx of DVT       Pt is here today to evaluate anticoagulation therapy    Previous INR was  1.7 on 21    Pt was instructed to increase x 1 then resume usual regimen    Confirmed warfarin dosing regimen, denies missed or extra doses of " coumadin.   Diet has been consistent with foods rich in vitamin K: Yes  Changes in ETOH:  No  Changes in smoking status: No  Changes in medication: No   Pt is not on antiplatelet/NSAID therapy  Cost restriction: No  S/s of bleeding:  No  Falls or accidents since last visit No  Signs/symptoms  thrombosis since the last appt: No      A/P   INR  therapeutic today,   Continue current warfarin regimen        1/22 check referral    Pt educated to contact our clinic with any changes in medications or s/s of bleeding or thrombosis. Pt is aware to seek immediate medical attention for falls, head injury or deep cuts    Follow up appointment in 2 week(s) to reduce risk of adverse events from warfarin  Kelsey Junior, PharmD

## 2021-08-19 ENCOUNTER — ANTICOAGULATION VISIT (OUTPATIENT)
Dept: MEDICAL GROUP | Facility: MEDICAL CENTER | Age: 81
End: 2021-08-19
Payer: MEDICARE

## 2021-08-19 DIAGNOSIS — Z79.01 CHRONIC ANTICOAGULATION: Primary | ICD-10-CM

## 2021-08-19 DIAGNOSIS — Z95.828 PRESENCE OF IVC FILTER: ICD-10-CM

## 2021-08-19 LAB — INR PPP: 2.3 (ref 2–3.5)

## 2021-08-19 PROCEDURE — 85610 PROTHROMBIN TIME: CPT | Performed by: INTERNAL MEDICINE

## 2021-08-19 PROCEDURE — 93793 ANTICOAG MGMT PT WARFARIN: CPT | Performed by: INTERNAL MEDICINE

## 2021-08-19 NOTE — PROGRESS NOTES
OP Anticoagulation Service Note    Date:   There were no vitals filed for this visit.   pt declined vitals    Anticoagulation Summary  As of 2021    INR goal:  2.0-3.0   TTR:  78.9 % (3.5 y)   INR used for dosin.30 (2021)   Warfarin maintenance plan:  5 mg (5 mg x 1) every day   Weekly warfarin total:  35 mg   Plan last modified:  Kelsey Junior, PharmD (2020)   Next INR check:  2021   Priority:  Maintenance   Target end date:  Indefinite    Indications    Presence of IVC filter [Z95.828]  Transient ischemic attack [G45.9] (Resolved) [G45.9]  Deep vein thrombosis (DVT) of both lower extremities (HCC) (Resolved) [I82.403]  DVT (deep venous thrombosis) (HCC) (Resolved) [I82.409]  Multiple pulmonary emboli (HCC) (Resolved) [I26.99]  Chronic anticoagulation (Resolved) [Z79.01]             Anticoagulation Episode Summary     INR check location:      Preferred lab:      Send INR reminders to:      Comments:        Anticoagulation Care Providers     Provider Role Specialty Phone number    [facility]   [phone]    Edward Walters M.D.  Family Medicine [phone]            HPI:   Paulo Paredes seen in clinic today, on anticoagulation therapy with warfarin (a high risk medication) for hx of DVT and PE       Pt is here today to evaluate anticoagulation therapy    Previous INR was  2.8 on 7/15/2021    Pt was instructed to continue current regimen    Anticoagulation Patient Findings  Patient Findings     Negatives:  Signs/symptoms of thrombosis, Signs/symptoms of bleeding, Laboratory test error suspected, Change in health, Change in alcohol use, Change in activity, Upcoming invasive procedure, Emergency department visit, Upcoming dental procedure, Missed doses, Extra doses, Change in medications, Change in diet/appetite, Hospital admission, Bruising, Other complaints          Confirmed warfarin dosing regimen    Pt is not on antiplatelet/NSAID therapy    Falls  or accidents since last visit No        A/P   INR  -therapeutic today,   Continue current warfarin regimen          Pt educated to contact our clinic with any changes in medications or s/s of bleeding or thrombosis. Pt is aware to seek immediate medical attention for falls, head injury or deep cuts    Follow up appointment in 6 week(s) to reduce risk of adverse events from warfarin  Kelsey Junior, PharmD

## 2021-09-24 RX ORDER — DULAGLUTIDE 0.75 MG/.5ML
INJECTION, SOLUTION SUBCUTANEOUS
Qty: 12 ML | Refills: 1 | Status: SHIPPED | OUTPATIENT
Start: 2021-09-24 | End: 2022-01-23 | Stop reason: SDUPTHER

## 2021-09-26 ENCOUNTER — HOSPITAL ENCOUNTER (EMERGENCY)
Facility: MEDICAL CENTER | Age: 81
End: 2021-09-26
Attending: EMERGENCY MEDICINE
Payer: MEDICARE

## 2021-09-26 VITALS
TEMPERATURE: 96.8 F | SYSTOLIC BLOOD PRESSURE: 158 MMHG | HEIGHT: 68 IN | OXYGEN SATURATION: 97 % | DIASTOLIC BLOOD PRESSURE: 83 MMHG | RESPIRATION RATE: 17 BRPM | WEIGHT: 177.69 LBS | BODY MASS INDEX: 26.93 KG/M2 | HEART RATE: 68 BPM

## 2021-09-26 DIAGNOSIS — M79.605 PAIN OF LEFT LOWER EXTREMITY: ICD-10-CM

## 2021-09-26 DIAGNOSIS — G62.9 PERIPHERAL NERVE DISORDER: ICD-10-CM

## 2021-09-26 LAB
ANION GAP SERPL CALC-SCNC: 8 MMOL/L (ref 7–16)
BASOPHILS # BLD AUTO: 0.6 % (ref 0–1.8)
BASOPHILS # BLD: 0.03 K/UL (ref 0–0.12)
BUN SERPL-MCNC: 31 MG/DL (ref 8–22)
CALCIUM SERPL-MCNC: 9.2 MG/DL (ref 8.4–10.2)
CHLORIDE SERPL-SCNC: 105 MMOL/L (ref 96–112)
CO2 SERPL-SCNC: 26 MMOL/L (ref 20–33)
CREAT SERPL-MCNC: 2.24 MG/DL (ref 0.5–1.4)
EOSINOPHIL # BLD AUTO: 0.21 K/UL (ref 0–0.51)
EOSINOPHIL NFR BLD: 4.1 % (ref 0–6.9)
ERYTHROCYTE [DISTWIDTH] IN BLOOD BY AUTOMATED COUNT: 47.8 FL (ref 35.9–50)
GLUCOSE SERPL-MCNC: 148 MG/DL (ref 65–99)
HCT VFR BLD AUTO: 46.1 % (ref 42–52)
HGB BLD-MCNC: 14.8 G/DL (ref 14–18)
IMM GRANULOCYTES # BLD AUTO: 0.01 K/UL (ref 0–0.11)
IMM GRANULOCYTES NFR BLD AUTO: 0.2 % (ref 0–0.9)
INR PPP: 3.19 (ref 0.87–1.13)
LYMPHOCYTES # BLD AUTO: 1.38 K/UL (ref 1–4.8)
LYMPHOCYTES NFR BLD: 26.9 % (ref 22–41)
MCH RBC QN AUTO: 31.8 PG (ref 27–33)
MCHC RBC AUTO-ENTMCNC: 32.1 G/DL (ref 33.7–35.3)
MCV RBC AUTO: 99.1 FL (ref 81.4–97.8)
MONOCYTES # BLD AUTO: 0.39 K/UL (ref 0–0.85)
MONOCYTES NFR BLD AUTO: 7.6 % (ref 0–13.4)
NEUTROPHILS # BLD AUTO: 3.11 K/UL (ref 1.82–7.42)
NEUTROPHILS NFR BLD: 60.6 % (ref 44–72)
NRBC # BLD AUTO: 0 K/UL
NRBC BLD-RTO: 0 /100 WBC
PLATELET # BLD AUTO: 119 K/UL (ref 164–446)
PMV BLD AUTO: 10.3 FL (ref 9–12.9)
POTASSIUM SERPL-SCNC: 5.2 MMOL/L (ref 3.6–5.5)
PROTHROMBIN TIME: 30.5 SEC (ref 12–14.6)
RBC # BLD AUTO: 4.65 M/UL (ref 4.7–6.1)
SODIUM SERPL-SCNC: 139 MMOL/L (ref 135–145)
WBC # BLD AUTO: 5.1 K/UL (ref 4.8–10.8)

## 2021-09-26 PROCEDURE — 85025 COMPLETE CBC W/AUTO DIFF WBC: CPT

## 2021-09-26 PROCEDURE — 85610 PROTHROMBIN TIME: CPT

## 2021-09-26 PROCEDURE — 36415 COLL VENOUS BLD VENIPUNCTURE: CPT

## 2021-09-26 PROCEDURE — 80048 BASIC METABOLIC PNL TOTAL CA: CPT

## 2021-09-26 PROCEDURE — 99284 EMERGENCY DEPT VISIT MOD MDM: CPT

## 2021-09-26 RX ORDER — METHYLPREDNISOLONE 4 MG/1
TABLET ORAL
Qty: 21 TABLET | Refills: 0 | Status: SHIPPED | OUTPATIENT
Start: 2021-09-26 | End: 2021-10-28

## 2021-09-26 ASSESSMENT — FIBROSIS 4 INDEX: FIB4 SCORE: 3.73

## 2021-09-26 ASSESSMENT — LIFESTYLE VARIABLES
DO YOU DRINK ALCOHOL: YES
HAVE YOU EVER FELT YOU SHOULD CUT DOWN ON YOUR DRINKING: NO

## 2021-09-26 NOTE — ED PROVIDER NOTES
ED Provider Note    Scribed for Mars Arteaga M.D. by Deepak March. 9/26/2021  7:37 AM    Primary care provider: Edward Walters M.D.  Means of arrival: Walk in  History obtained from: Patient  History limited by: None    CHIEF COMPLAINT  Chief Complaint   Patient presents with    Leg Pain     Left       hospitals  Paulo Paredes is a 81 y.o. male who presents to the Emergency Department for intermittent mild left lower leg pain onset around 11 PM last night. He describes the pain as shooting pains that feel like a shock and last momentarily. Per the patient, once he stands, the pain seems to temporarily cease. He denies trauma to the leg. No other exacerbating or alleviating factors were noted. He has a history of clots and is currently on Warfarin.    REVIEW OF SYSTEMS  Pertinent positives include left leg pain.   Pertinent negatives include no trauma to the leg.    All other systems reviewed and negative. See hospitals for further details.     PAST MEDICAL HISTORY   has a past medical history of Anesthesia, Basal cell carcinoma of skin, CAD (coronary artery disease), Cancer ([facility]), Chronic kidney disease (CKD) stage G3b/A2, moderately decreased glomerular filtration rate (GFR) between 30-44 mL/min/1.73 square meter and albuminuria creatinine ratio between  mg/g ([facility]) (4/23/2015), DVT (deep venous thrombosis) ([facility]) (2014), ED (erectile dysfunction), GERD (gastroesophageal reflux disease), Glaucoma, Heart burn, Hyperlipidemia (12/3/2012), Hypertension, Indigestion, Multiple pulmonary emboli ([facility]) (2014), Multiple thyroid nodules (2009), 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    SURGICAL HISTORY   has a past surgical history that includes colon resection; nephrectomy radical; multiple coronary artery bypass; prostatectomy, radical retro; other orthopedic surgery; thyroidectomy total (5/13/2014); and node dissection (5/13/2014).    SOCIAL HISTORY  Social History     Tobacco Use    Smoking status: Never Smoker    Smokeless tobacco: Never Used   Vaping Use    Vaping Use: Never used   Substance Use Topics    Alcohol use: Yes     Comment: 1 PER WK    Drug use: No      Social History     Substance and Sexual Activity   Drug Use No       FAMILY HISTORY  Family History   Problem Relation Age of Onset    Diabetes Mother     Heart Disease Mother     Diabetes Father     Cancer Father         pancreas    Thyroid Sister         Cancer    Cancer Sister         thyroid    Diabetes Sister     Cancer Brother 49        colon    Diabetes Brother     Diabetes Maternal Grandfather     Diabetes Paternal Grandmother     Diabetes Paternal Grandfather     No Known Problems Maternal Grandmother        CURRENT MEDICATIONS  Current Outpatient Medications   Medication Instructions    atenolol (TENORMIN) 25 mg, Oral, DAILY    atorvastatin (LIPITOR) 20 mg, Oral, DAILY    Calcium Carb-Cholecalciferol (CALCIUM 1000 + D PO) 1 Dose, Oral, DAILY    Cinnamon 1,000 mg, Oral    Coenzyme Q10 (CO Q-10 PO) 1 Dose, Oral, DAILY, Unknown OTC Strength    docusate sodium (COLACE) 100 mg, Oral, 2 TIMES DAILY    fenofibrate (TRIGLIDE) 160 mg, Oral, DAILY    Ginkgo Biloba 40 MG Tab Oral    hydrocortisone 2.5 % Cream topical cream APPLY TO RASH ON GROIN TWICE DAILY FOR 1 WEEK WITH FLARES    levothyroxine (EUTHYROX) 137 MCG Tab TAKE 1 TABLET BY MOUTH ONCE DAILY IN THE MORNING ON  AN  EMPTY  STOMACH  ONE  HOUR  BEFORE  EATING    losartan (COZAAR) 50 mg, Oral, DAILY    Multiple Vitamins-Minerals (ZINC PO) Oral    Omega-3 Krill Oil 300 mg, Oral, DAILY    TRULICITY 0.75 MG/0.5ML Solution Pen-injector INJECT 1 SYRINGE  "SUBCUTANEOUSLY ONCE A WEEK AS DIRECTED    vitamin B-12 1,000 mcg, Oral, DAILY    warfarin (COUMADIN) 5 mg, Oral, EVERY EVENING     ALLERGIES  Allergies   Allergen Reactions    Lactose     Seasonal        PHYSICAL EXAM  VITAL SIGNS: /83   Pulse 65   Temp 36.1 °C (96.9 °F) (Temporal)   Resp 18   Ht 1.727 m (5' 8\")   Wt 80.6 kg (177 lb 11.1 oz)   SpO2 97%   BMI 27.02 kg/m²     Nursing note and vitals reviewed.  Constitutional: Well-developed and well-nourished. No distress.   HENT: Head is normocephalic and atraumatic. Oropharynx is clear and moist without exudate or erythema.   Eyes: Pupils are equal, round, and reactive to light. Conjunctiva are normal.   Cardiovascular: Normal rate and regular rhythm. No murmur heard. Normal radial pulses.  Pulmonary/Chest: Breath sounds normal. No wheezes or rales.   Abdominal: Soft and non-tender. No distention    Musculoskeletal: Palpable dorsalis pedis pulse in the left foot, No palpable cords, No swelling, Extremities exhibit normal range of motion without edema or tenderness.   Neurological: Awake, alert and oriented to person, place, and time. No focal deficits noted.  Skin: Skin is warm and dry. No rash.   Psychiatric: Normal mood and affect. Appropriate for clinical situation.    DIAGNOSTIC STUDIES / PROCEDURES    LABS  Results for orders placed or performed during the hospital encounter of 09/26/21   CBC WITH DIFFERENTIAL   Result Value Ref Range    WBC 5.1 4.8 - 10.8 K/uL    RBC 4.65 (L) 4.70 - 6.10 M/uL    Hemoglobin 14.8 14.0 - 18.0 g/dL    Hematocrit 46.1 42.0 - 52.0 %    MCV 99.1 (H) 81.4 - 97.8 fL    MCH 31.8 27.0 - 33.0 pg    MCHC 32.1 (L) 33.7 - 35.3 g/dL    RDW 47.8 35.9 - 50.0 fL    Platelet Count 119 (L) 164 - 446 K/uL    MPV 10.3 9.0 - 12.9 fL    Neutrophils-Polys 60.60 44.00 - 72.00 %    Lymphocytes 26.90 22.00 - 41.00 %    Monocytes 7.60 0.00 - 13.40 %    Eosinophils 4.10 0.00 - 6.90 %    Basophils 0.60 0.00 - 1.80 %    Immature Granulocytes 0.20 " 0.00 - 0.90 %    Nucleated RBC 0.00 /100 WBC    Neutrophils (Absolute) 3.11 1.82 - 7.42 K/uL    Lymphs (Absolute) 1.38 1.00 - 4.80 K/uL    Monos (Absolute) 0.39 0.00 - 0.85 K/uL    Eos (Absolute) 0.21 0.00 - 0.51 K/uL    Baso (Absolute) 0.03 0.00 - 0.12 K/uL    Immature Granulocytes (abs) 0.01 0.00 - 0.11 K/uL    NRBC (Absolute) 0.00 K/uL   BASIC METABOLIC PANEL   Result Value Ref Range    Sodium 139 135 - 145 mmol/L    Potassium 5.2 3.6 - 5.5 mmol/L    Chloride 105 96 - 112 mmol/L    Co2 26 20 - 33 mmol/L    Glucose 148 (H) 65 - 99 mg/dL    Bun 31 (H) 8 - 22 mg/dL    Creatinine 2.24 (H) 0.50 - 1.40 mg/dL    Calcium 9.2 8.4 - 10.2 mg/dL    Anion Gap 8.0 7.0 - 16.0   PROTHROMBIN TIME (INR)   Result Value Ref Range    PT 30.5 (H) 12.0 - 14.6 sec    INR 3.19 (H) 0.87 - 1.13   ESTIMATED GFR   Result Value Ref Range    GFR If  34 (A) >60 mL/min/1.73 m 2    GFR If Non  28 (A) >60 mL/min/1.73 m 2     All labs reviewed by me.    COURSE & MEDICAL DECISION MAKING  Nursing notes, VS, PMSFHx reviewed in chart.      7:37 AM - Patient seen and examined at bedside. Ordered Prothrombin time (INR), CBC w/diff, and BMP to evaluate his symptoms. The differential diagnoses include but are not limited to: peripheral nerve impingement, electrolyte abnormality     9:29 AM - Patient re-evaluated at bedside. Patient reports feeling well. Discussed the patient's condition and treatment plan, including my plans for discharge with a prescription for Medrol. Patient's lab results discussed. Gave further discharge instructions and return precautions.The patient understood and is comfortable with discharge. At this time, the patient was given the opportunity to ask questions.     Patient presents today with left leg pain.  He has an isolated area of paresthesias in the left medial calf.  No evidence of ischemia.  No evidence of infection.  I feel it is likely that he has a peripheral nerve impingement.  Will be  treated with Medrol.  Patient is a diabetic.  Instructed to limit his carbohydrate intake and monitor his blood sugars closely while taking Medrol.  Patient verbalized understanding.    The patient will return for new or worsening symptoms and is stable at the time of discharge.    The patient is referred to a primary physician for blood pressure management, diabetic screening, and for all other preventative health concerns.    DISPOSITION:  Patient will be discharged home in stable condition.    FOLLOW UP:  Edward Walters M.D.  [address] [place] [address] [place] [id number] [phone]    Schedule an appointment as soon as possible for a visit       [facility] [place], Emergency Dept  [address]  [place] [address] [phone]    If symptoms worsen      OUTPATIENT MEDICATIONS:  Discharge Medication List as of 9/26/2021  9:09 AM        START taking these medications    Details   methylPREDNISolone (MEDROL) 4 MG Tab Take as per the package instructions., Disp-21 Tablet, R-0, Normal             FINAL IMPRESSION  1. Pain of left lower extremity    2. Peripheral nerve disorder          Deepak BARTLETT (El), brijesh scribing for, and in the presence of, Mars Arteaga M.D..    Electronically signed by: Deepak March (El), 9/26/2021    Mars BARTLETT M.D. personally performed the services described in this documentation, as scribed by eDepak March in my presence, and it is both accurate and complete.    C    The note accurately reflects work and decisions made by me.  Mars Arteaga M.D.  9/26/2021  1:16 PM

## 2021-09-26 NOTE — ED TRIAGE NOTES
Patient BiB wife for non traumatic episodic left lower leg pain since 11pm. He describes pain as pulsing. Patient took ibuprofen 3 hours ago without relief. Patient able to ambulate without difficulty. No swelling, redness, or heat observed in triage.  Patient is anticoagulated with coumadin and is currently therapeutic. Denies any other complaints

## 2021-09-30 ENCOUNTER — ANTICOAGULATION VISIT (OUTPATIENT)
Dept: MEDICAL GROUP | Facility: MEDICAL CENTER | Age: 81
End: 2021-09-30
Payer: MEDICARE

## 2021-09-30 DIAGNOSIS — Z95.828 PRESENCE OF IVC FILTER: ICD-10-CM

## 2021-09-30 LAB — INR PPP: 3.1 (ref 2–3.5)

## 2021-09-30 PROCEDURE — 99211 OFF/OP EST MAY X REQ PHY/QHP: CPT | Performed by: INTERNAL MEDICINE

## 2021-09-30 PROCEDURE — 85610 PROTHROMBIN TIME: CPT | Performed by: INTERNAL MEDICINE

## 2021-09-30 NOTE — PROGRESS NOTES
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  Family Medicine [phone]        Anticoagulation Patient Findings  Patient Findings     Positives:  Change in medications    Negatives:  Signs/symptoms of thrombosis, Signs/symptoms of bleeding, Laboratory test error suspected, Change in health, Change in alcohol use, Change in activity, Upcoming invasive procedure, Emergency department visit, Upcoming dental procedure, Missed doses, Extra doses, Change in diet/appetite, Hospital admission, Bruising, Other complaints              History of Present Illness: follow up appointment for chronic anticoagulation with the high risk medication, warfarin for DVT    Medications reconciled yes  Pt is not on antiplatelet therapy    Last INR was out of range, dosage adjusted: pt is supra therapeutic today.  He is on the last day of a short course of a medrol dose jen.  Pt to REDUCE tonight's dose to 2.5mg, then resume current dosing regimen. Follow up in 2 weeks, to reduce the risk of adverse events  related to this high risk medication, warfarin.    Marta Quintana, Clinical Pharmacist

## 2021-10-07 ENCOUNTER — OFFICE VISIT (OUTPATIENT)
Dept: URGENT CARE | Facility: PHYSICIAN GROUP | Age: 81
End: 2021-10-07
Payer: MEDICARE

## 2021-10-07 ENCOUNTER — HOSPITAL ENCOUNTER (OUTPATIENT)
Facility: MEDICAL CENTER | Age: 81
End: 2021-10-07
Attending: NURSE PRACTITIONER
Payer: MEDICARE

## 2021-10-07 VITALS
BODY MASS INDEX: 26.52 KG/M2 | DIASTOLIC BLOOD PRESSURE: 64 MMHG | RESPIRATION RATE: 18 BRPM | HEIGHT: 68 IN | WEIGHT: 175 LBS | TEMPERATURE: 97.1 F | HEART RATE: 78 BPM | SYSTOLIC BLOOD PRESSURE: 106 MMHG | OXYGEN SATURATION: 99 %

## 2021-10-07 DIAGNOSIS — R31.29 OTHER MICROSCOPIC HEMATURIA: ICD-10-CM

## 2021-10-07 DIAGNOSIS — N39.0 URINARY TRACT INFECTION WITH HEMATURIA, SITE UNSPECIFIED: ICD-10-CM

## 2021-10-07 DIAGNOSIS — R31.9 URINARY TRACT INFECTION WITH HEMATURIA, SITE UNSPECIFIED: ICD-10-CM

## 2021-10-07 LAB
APPEARANCE UR: NORMAL
BILIRUB UR STRIP-MCNC: NEGATIVE MG/DL
COLOR UR AUTO: NORMAL
GLUCOSE UR STRIP.AUTO-MCNC: NEGATIVE MG/DL
KETONES UR STRIP.AUTO-MCNC: NEGATIVE MG/DL
LEUKOCYTE ESTERASE UR QL STRIP.AUTO: NORMAL
NITRITE UR QL STRIP.AUTO: NEGATIVE
PH UR STRIP.AUTO: 5.5 [PH] (ref 5–8)
PROT UR QL STRIP: 30 MG/DL
RBC UR QL AUTO: NORMAL
SP GR UR STRIP.AUTO: 1.02
UROBILINOGEN UR STRIP-MCNC: 0.2 MG/DL

## 2021-10-07 PROCEDURE — 87186 SC STD MICRODIL/AGAR DIL: CPT

## 2021-10-07 PROCEDURE — 81002 URINALYSIS NONAUTO W/O SCOPE: CPT | Performed by: NURSE PRACTITIONER

## 2021-10-07 PROCEDURE — 99213 OFFICE O/P EST LOW 20 MIN: CPT | Performed by: NURSE PRACTITIONER

## 2021-10-07 PROCEDURE — 87077 CULTURE AEROBIC IDENTIFY: CPT

## 2021-10-07 PROCEDURE — 87086 URINE CULTURE/COLONY COUNT: CPT

## 2021-10-07 RX ORDER — SULFAMETHOXAZOLE AND TRIMETHOPRIM 800; 160 MG/1; MG/1
1 TABLET ORAL 2 TIMES DAILY
Qty: 14 TABLET | Refills: 0 | Status: SHIPPED | OUTPATIENT
Start: 2021-10-07 | End: 2021-10-14

## 2021-10-07 RX ORDER — WARFARIN SODIUM 5 MG/1
TABLET ORAL
Qty: 90 TABLET | Refills: 3 | Status: SHIPPED | OUTPATIENT
Start: 2021-10-07 | End: 2022-05-19

## 2021-10-07 ASSESSMENT — ENCOUNTER SYMPTOMS
ABDOMINAL PAIN: 0
BACK PAIN: 0
FLANK PAIN: 0
NAUSEA: 0
FEVER: 0
CHILLS: 0

## 2021-10-07 ASSESSMENT — FIBROSIS 4 INDEX: FIB4 SCORE: 4.1

## 2021-10-07 NOTE — PROGRESS NOTES
Subjective     Kevin Paredes is a 81 y.o. male who presents with Blood in Urine (blood in urine and trouble going to the bathroom. started this morning. )            HPI   New. 81 year old male with blood in urine, urinary frequency and urgency that started this morning. He denies fever, chills, myalgia, nausea or abdominal pain. States he does occasionally see blood. He has not taken any medication for this issue.  Lactose and Seasonal  Current Outpatient Medications on File Prior to Visit   Medication Sig Dispense Refill   • warfarin (COUMADIN) 5 MG Tab TAKE 1 TABLET BY MOUTH ONCE DAILY IN THE EVENING 90 Tablet 3   • methylPREDNISolone (MEDROL) 4 MG Tab Take as per the package instructions. 21 Tablet 0   • TRULICITY 0.75 MG/0.5ML Solution Pen-injector INJECT 1 SYRINGE SUBCUTANEOUSLY ONCE A WEEK AS DIRECTED 12 mL 1   • losartan (COZAAR) 50 MG Tab Take 1 tablet by mouth every day. 100 tablet 5   • levothyroxine (EUTHYROX) 137 MCG Tab TAKE 1 TABLET BY MOUTH ONCE DAILY IN THE MORNING ON  AN  EMPTY  STOMACH  ONE  HOUR  BEFORE  EATING 100 tablet 3   • atorvastatin (LIPITOR) 20 MG Tab Take 1 tablet by mouth every day. 100 tablet 3   • atenolol (TENORMIN) 25 MG Tab Take 1 tablet by mouth every day. 100 tablet 3   • fenofibrate (TRIGLIDE) 160 MG tablet Take 1 tablet by mouth every day. 90 tablet 3   • Multiple Vitamins-Minerals (ZINC PO) Take  by mouth.     • Ginkgo Biloba 40 MG Tab Take  by mouth.     • hydrocortisone 2.5 % Cream topical cream APPLY TO RASH ON GROIN TWICE DAILY FOR 1 WEEK WITH FLARES     • docusate sodium (COLACE) 100 MG Cap Take 1 Cap by mouth 2 times a day. 60 Cap 0   • Calcium Carb-Cholecalciferol (CALCIUM 1000 + D PO) Take 1 Dose by mouth every day.     • Omega-3 Krill Oil 300 MG Cap Take 300 mg by mouth every day.     • Cinnamon 500 MG Cap Take 1,000 mg by mouth.     • Cyanocobalamin (VITAMIN B-12) 1000 MCG Tab Take 1,000 mcg by mouth every day.     • Coenzyme Q10 (CO Q-10 PO) Take 1 Dose by mouth  every day. Unknown OTC Strength       No current facility-administered medications on file prior to visit.     Social History     Socioeconomic History   • Marital status:      Spouse name: Not on file   • Number of children: Not on file   • Years of education: Not on file   • Highest education level: Not on file   Occupational History   • Not on file   Tobacco Use   • Smoking status: Never Smoker   • Smokeless tobacco: Never Used   Vaping Use   • Vaping Use: Never used   Substance and Sexual Activity   • Alcohol use: Yes     Comment: 1 PER WK   • Drug use: No   • Sexual activity: Not Currently     Comment: retired    Other Topics Concern   • Not on file   Social History Narrative   • Not on file     Social Determinants of Health     Financial Resource Strain:    • Difficulty of Paying Living Expenses:    Food Insecurity:    • Worried About Running Out of Food in the Last Year:    • Ran Out of Food in the Last Year:    Transportation Needs:    • Lack of Transportation (Medical):    • Lack of Transportation (Non-Medical):    Physical Activity:    • Days of Exercise per Week:    • Minutes of Exercise per Session:    Stress:    • Feeling of Stress :    Social Connections:    • Frequency of Communication with Friends and Family:    • Frequency of Social Gatherings with Friends and Family:    • Attends Voodoo Services:    • Active Member of Clubs or Organizations:    • Attends Club or Organization Meetings:    • Marital Status:    Intimate Partner Violence:    • Fear of Current or Ex-Partner:    • Emotionally Abused:    • Physically Abused:    • Sexually Abused:      Breast Cancer-related family history is not on file.      Review of Systems   Constitutional: Negative for chills, fever and malaise/fatigue.   Gastrointestinal: Negative for abdominal pain and nausea.   Genitourinary: Positive for frequency, hematuria and urgency. Negative for dysuria and flank pain.   Musculoskeletal: Negative for  "back pain.              Objective     /64   Pulse 78   Temp 36.2 °C (97.1 °F) (Temporal)   Resp 18   Ht 1.727 m (5' 8\")   Wt 79.4 kg (175 lb)   SpO2 99%   BMI 26.61 kg/m²      Physical Exam  Vitals reviewed.   Constitutional:       General: He is not in acute distress.     Appearance: He is well-developed.   Cardiovascular:      Rate and Rhythm: Normal rate and regular rhythm.      Heart sounds: Normal heart sounds. No murmur heard.     Pulmonary:      Effort: Pulmonary effort is normal. No respiratory distress.      Breath sounds: Normal breath sounds.   Abdominal:      General: Bowel sounds are normal.      Palpations: Abdomen is soft.      Tenderness: There is abdominal tenderness in the suprapubic area. There is no right CVA tenderness or left CVA tenderness.   Musculoskeletal:         General: Normal range of motion.      Comments: Moves all 4 extremities normally   Skin:     General: Skin is warm and dry.   Neurological:      Mental Status: He is alert and oriented to person, place, and time.   Psychiatric:         Behavior: Behavior normal.         Thought Content: Thought content normal.                             Assessment & Plan        1. Urinary tract infection with hematuria, site unspecified  sulfamethoxazole-trimethoprim (BACTRIM DS) 800-160 MG tablet    POCT Urinalysis    URINE CULTURE(NEW)   2. Other microscopic hematuria       Differential diagnosis, natural history, supportive care, and indications for immediate follow-up discussed at length.               "

## 2021-10-10 LAB
BACTERIA UR CULT: ABNORMAL
BACTERIA UR CULT: ABNORMAL
SIGNIFICANT IND 70042: ABNORMAL
SITE SITE: ABNORMAL
SOURCE SOURCE: ABNORMAL

## 2021-10-11 ENCOUNTER — ANTICOAGULATION VISIT (OUTPATIENT)
Dept: MEDICAL GROUP | Facility: MEDICAL CENTER | Age: 81
End: 2021-10-11
Payer: MEDICARE

## 2021-10-11 DIAGNOSIS — Z79.01 CHRONIC ANTICOAGULATION: Primary | ICD-10-CM

## 2021-10-11 DIAGNOSIS — Z95.828 PRESENCE OF IVC FILTER: ICD-10-CM

## 2021-10-11 LAB — INR PPP: 4.6 (ref 2–3.5)

## 2021-10-11 PROCEDURE — 85610 PROTHROMBIN TIME: CPT | Performed by: INTERNAL MEDICINE

## 2021-10-11 PROCEDURE — 99211 OFF/OP EST MAY X REQ PHY/QHP: CPT | Performed by: INTERNAL MEDICINE

## 2021-10-11 NOTE — PROGRESS NOTES
OP Anticoagulation Service Note    Date: 10/11/2021  There were no vitals filed for this visit.   pt declined vitals    Anticoagulation Summary  As of 10/11/2021    INR goal:  2.0-3.0   TTR:  78.0 % (3.6 y)   INR used for dosin.60 (10/11/2021)   Warfarin maintenance plan:  5 mg (5 mg x 1) every day   Weekly warfarin total:  35 mg   Plan last modified:  Kelsey Junior, PharmD (2020)   Next INR check:  10/18/2021   Priority:  Maintenance   Target end date:  Indefinite    Indications    Presence of IVC filter [Z95.828]  Transient ischemic attack [G45.9] (Resolved) [G45.9]  Deep vein thrombosis (DVT) of both lower extremities (HCC) (Resolved) [I82.403]  DVT (deep venous thrombosis) (HCC) (Resolved) [I82.409]  Multiple pulmonary emboli (HCC) (Resolved) [I26.99]  Chronic anticoagulation (Resolved) [Z79.01]             Anticoagulation Episode Summary     INR check location:      Preferred lab:      Send INR reminders to:      Comments:        Anticoagulation Care Providers     Provider Role Specialty Phone number    [facility]   [phone]    Edward Walters M.D.  Family Medicine [phone]            HPI:   Paulo Paredes seen in clinic today, on anticoagulation therapy with warfarin (a high risk medication) for hx of DVT       Pt is here today to evaluate anticoagulation therapy    Previous INR was  3.1 on 2021    Pt was instructed to lower x 1 then resume usual regimen    Anticoagulation Patient Findings  Patient Findings     Positives:  Change in medications (on course of a bactrim)    Negatives:  Signs/symptoms of thrombosis, Signs/symptoms of bleeding, Laboratory test error suspected, Change in health, Change in alcohol use, Change in activity, Upcoming invasive procedure, Emergency department visit, Upcoming dental procedure, Missed doses, Extra doses, Change in diet/appetite, Hospital admission, Bruising, Other complaints          Confirmed warfarin dosing  regimen    Pt is not on antiplatelet/NSAID therapy  Falls or accidents since last visit No        A/P   INR  supratherapeutic today, will require dose adjust ment today to prevent bleeding complications  and closer follow up.   Tonight and tomorrow no warfarin, Wednesday 2.5 mg then resume usual regimen     1/22 check referral    Pt educated to contact our clinic with any changes in medications or s/s of bleeding or thrombosis. Pt is aware to seek immediate medical attention for falls, head injury or deep cuts    Follow up appointment in 1 week(s) to reduce risk of adverse events from warfarin  Kelsey Junior, PharmD

## 2021-10-18 ENCOUNTER — ANTICOAGULATION VISIT (OUTPATIENT)
Dept: MEDICAL GROUP | Facility: MEDICAL CENTER | Age: 81
End: 2021-10-18
Payer: MEDICARE

## 2021-10-18 DIAGNOSIS — Z95.828 PRESENCE OF IVC FILTER: ICD-10-CM

## 2021-10-18 DIAGNOSIS — Z79.01 CHRONIC ANTICOAGULATION: Primary | ICD-10-CM

## 2021-10-18 LAB — INR PPP: 1.9 (ref 2–3.5)

## 2021-10-18 PROCEDURE — 85610 PROTHROMBIN TIME: CPT | Performed by: INTERNAL MEDICINE

## 2021-10-18 PROCEDURE — 93793 ANTICOAG MGMT PT WARFARIN: CPT | Performed by: INTERNAL MEDICINE

## 2021-10-18 NOTE — PROGRESS NOTES
OP Anticoagulation Service Note    Date: 10/18/2021  There were no vitals filed for this visit.   pt declined vitals    Anticoagulation Summary  As of 10/18/2021    INR goal:  2.0-3.0   TTR:  77.8 % (3.7 y)   INR used for dosin.90 (10/18/2021)   Warfarin maintenance plan:  5 mg (5 mg x 1) every day   Weekly warfarin total:  35 mg   Plan last modified:  Kelsey Junior, PharmD (2020)   Next INR check:  2021   Priority:  Maintenance   Target end date:  Indefinite    Indications    Presence of IVC filter [Z95.828]  Transient ischemic attack [G45.9] (Resolved) [G45.9]  Deep vein thrombosis (DVT) of both lower extremities (HCC) (Resolved) [I82.403]  DVT (deep venous thrombosis) (HCC) (Resolved) [I82.409]  Multiple pulmonary emboli (HCC) (Resolved) [I26.99]  Chronic anticoagulation (Resolved) [Z79.01]             Anticoagulation Episode Summary     INR check location:      Preferred lab:      Send INR reminders to:      Comments:        Anticoagulation Care Providers     Provider Role Specialty Phone number    [facility]   [phone]    Edward Walters M.D.  Family Medicine [phone]            HPI:   Paulo Paredes seen in clinic today, on anticoagulation therapy with warfarin (a high risk medication) for hx of DVT       Pt is here today to evaluate anticoagulation therapy    Previous INR was  4.6 on 10/11/2021    Pt was instructed to HOLD x 2 then resume usual regimen    Anticoagulation Patient Findings  Patient Findings     Positives:  Change in medications (finished bactrim)    Negatives:  Signs/symptoms of thrombosis, Signs/symptoms of bleeding, Laboratory test error suspected, Change in health, Change in alcohol use, Change in activity, Upcoming invasive procedure, Emergency department visit, Upcoming dental procedure, Missed doses, Extra doses, Change in diet/appetite, Hospital admission, Bruising, Other complaints          Confirmed warfarin dosing regimen    Pt is  not on antiplatelet/NSAID therapy    Falls or accidents since last visit No        A/P   INR  SUBtherapeutic today, but close to range  Continue current warfarin regimen        1/22 check referral    Pt educated to contact our clinic with any changes in medications or s/s of bleeding or thrombosis. Pt is aware to seek immediate medical attention for falls, head injury or deep cuts    Follow up appointment in 2 week(s) to reduce risk of adverse events from warfarin  Kelsey Junior, PharmD

## 2021-10-26 ENCOUNTER — HOSPITAL ENCOUNTER (OUTPATIENT)
Dept: LAB | Facility: MEDICAL CENTER | Age: 81
End: 2021-10-26
Attending: INTERNAL MEDICINE
Payer: MEDICARE

## 2021-10-26 ENCOUNTER — TELEPHONE (OUTPATIENT)
Dept: CARDIOLOGY | Facility: MEDICAL CENTER | Age: 81
End: 2021-10-26

## 2021-10-26 DIAGNOSIS — E78.5 DYSLIPIDEMIA: ICD-10-CM

## 2021-10-26 DIAGNOSIS — E11.8 CONTROLLED TYPE 2 DIABETES MELLITUS WITH COMPLICATION, WITHOUT LONG-TERM CURRENT USE OF INSULIN (HCC): ICD-10-CM

## 2021-10-26 DIAGNOSIS — Z90.5 SINGLE KIDNEY: ICD-10-CM

## 2021-10-26 DIAGNOSIS — Z79.899 HIGH RISK MEDICATION USE: ICD-10-CM

## 2021-10-26 DIAGNOSIS — I25.10 CAD IN NATIVE ARTERY: ICD-10-CM

## 2021-10-26 DIAGNOSIS — Z95.1 S/P CABG X 4: ICD-10-CM

## 2021-10-26 DIAGNOSIS — E87.5 HYPERKALEMIA: ICD-10-CM

## 2021-10-26 DIAGNOSIS — N18.32 STAGE 3B CHRONIC KIDNEY DISEASE: ICD-10-CM

## 2021-10-26 LAB
ALBUMIN SERPL BCP-MCNC: 4.5 G/DL (ref 3.2–4.9)
ALBUMIN/GLOB SERPL: 1.6 G/DL
ALP SERPL-CCNC: 44 U/L (ref 30–99)
ALT SERPL-CCNC: 14 U/L (ref 2–50)
ANION GAP SERPL CALC-SCNC: 10 MMOL/L (ref 7–16)
AST SERPL-CCNC: 23 U/L (ref 12–45)
BILIRUB SERPL-MCNC: 0.5 MG/DL (ref 0.1–1.5)
BUN SERPL-MCNC: 33 MG/DL (ref 8–22)
CALCIUM SERPL-MCNC: 9.6 MG/DL (ref 8.4–10.2)
CHLORIDE SERPL-SCNC: 106 MMOL/L (ref 96–112)
CHOLEST SERPL-MCNC: 166 MG/DL (ref 100–199)
CHOLEST SERPL-MCNC: 167 MG/DL (ref 100–199)
CO2 SERPL-SCNC: 27 MMOL/L (ref 20–33)
CREAT SERPL-MCNC: 2.23 MG/DL (ref 0.5–1.4)
EST. AVERAGE GLUCOSE BLD GHB EST-MCNC: 169 MG/DL
FASTING STATUS PATIENT QL REPORTED: NORMAL
FASTING STATUS PATIENT QL REPORTED: NORMAL
GLOBULIN SER CALC-MCNC: 2.8 G/DL (ref 1.9–3.5)
GLUCOSE SERPL-MCNC: 146 MG/DL (ref 65–99)
HBA1C MFR BLD: 7.5 % (ref 4–5.6)
HDLC SERPL-MCNC: 33 MG/DL
HDLC SERPL-MCNC: 34 MG/DL
LDLC SERPL CALC-MCNC: 88 MG/DL
LDLC SERPL CALC-MCNC: 88 MG/DL
POTASSIUM SERPL-SCNC: 5.6 MMOL/L (ref 3.6–5.5)
PROT SERPL-MCNC: 7.3 G/DL (ref 6–8.2)
SODIUM SERPL-SCNC: 143 MMOL/L (ref 135–145)
TRIGL SERPL-MCNC: 224 MG/DL (ref 0–149)
TRIGL SERPL-MCNC: 224 MG/DL (ref 0–149)

## 2021-10-26 PROCEDURE — 80061 LIPID PANEL: CPT

## 2021-10-26 PROCEDURE — 36415 COLL VENOUS BLD VENIPUNCTURE: CPT

## 2021-10-26 PROCEDURE — 83036 HEMOGLOBIN GLYCOSYLATED A1C: CPT

## 2021-10-26 PROCEDURE — 80061 LIPID PANEL: CPT | Mod: 91

## 2021-10-26 PROCEDURE — 80053 COMPREHEN METABOLIC PANEL: CPT

## 2021-10-26 RX ORDER — ATORVASTATIN CALCIUM 40 MG/1
40 TABLET, FILM COATED ORAL DAILY
Qty: 100 TABLET | Refills: 3 | Status: SHIPPED | OUTPATIENT
Start: 2021-10-26 | End: 2022-06-14 | Stop reason: SDUPTHER

## 2021-10-26 NOTE — TELEPHONE ENCOUNTER
Notified patient of results and recommendations. He states he occasionally has leg cramps, but he says they go away. He is agreeable to plan.    Confirmed upcoming appointment with Dr. Damon 10/28.

## 2021-10-26 NOTE — TELEPHONE ENCOUNTER
----- Message from JOHN Garza sent at 10/26/2021  8:44 AM PDT -----  CKD with high K. Nephrologist Dr. Cloud. Make sure no symptoms of hyperkalemia.     Cholesterol high with CAD, recommend increase atorvastatin to 40 mg QPM, repeat lipid and cmp in 6 months.     Repeat bmp in 2 weeks with high K. If still high, recommend nephrologist or PCP review. SC

## 2021-10-28 ENCOUNTER — OFFICE VISIT (OUTPATIENT)
Dept: CARDIOLOGY | Facility: MEDICAL CENTER | Age: 81
End: 2021-10-28
Payer: MEDICARE

## 2021-10-28 VITALS
RESPIRATION RATE: 14 BRPM | HEART RATE: 62 BPM | BODY MASS INDEX: 26.78 KG/M2 | HEIGHT: 68 IN | OXYGEN SATURATION: 96 % | DIASTOLIC BLOOD PRESSURE: 80 MMHG | SYSTOLIC BLOOD PRESSURE: 118 MMHG | WEIGHT: 176.7 LBS

## 2021-10-28 DIAGNOSIS — Z95.1 S/P CABG X 4: ICD-10-CM

## 2021-10-28 DIAGNOSIS — E78.5 DYSLIPIDEMIA: ICD-10-CM

## 2021-10-28 DIAGNOSIS — I10 ESSENTIAL HYPERTENSION, BENIGN: ICD-10-CM

## 2021-10-28 DIAGNOSIS — I25.10 CAD IN NATIVE ARTERY: ICD-10-CM

## 2021-10-28 PROCEDURE — 99214 OFFICE O/P EST MOD 30 MIN: CPT | Performed by: INTERNAL MEDICINE

## 2021-10-28 RX ORDER — FENOFIBRATE 160 MG/1
160 TABLET ORAL DAILY
Qty: 90 TABLET | Refills: 3 | Status: SHIPPED | OUTPATIENT
Start: 2021-10-28 | End: 2022-06-14 | Stop reason: SDUPTHER

## 2021-10-28 RX ORDER — METHYLPREDNISOLONE 4 MG/1
TABLET ORAL
COMMUNITY
Start: 2021-09-26 | End: 2021-10-28

## 2021-10-28 ASSESSMENT — ENCOUNTER SYMPTOMS
CHILLS: 0
HEADACHES: 0
EYES NEGATIVE: 1
CLAUDICATION: 0
CARDIOVASCULAR NEGATIVE: 1
PALPITATIONS: 0
VOMITING: 0
MYALGIAS: 0
PSYCHIATRIC NEGATIVE: 1
COUGH: 0
WEAKNESS: 0
FEVER: 0
WEIGHT LOSS: 0
DIZZINESS: 0
DEPRESSION: 0
NAUSEA: 0
MUSCULOSKELETAL NEGATIVE: 1
NERVOUS/ANXIOUS: 0
NEUROLOGICAL NEGATIVE: 1
BRUISES/BLEEDS EASILY: 0
CONSTITUTIONAL NEGATIVE: 1
DOUBLE VISION: 0
RESPIRATORY NEGATIVE: 1
SHORTNESS OF BREATH: 0
ABDOMINAL PAIN: 0
GASTROINTESTINAL NEGATIVE: 1
BLURRED VISION: 0
FOCAL WEAKNESS: 0

## 2021-10-28 ASSESSMENT — FIBROSIS 4 INDEX: FIB4 SCORE: 4.29

## 2021-10-28 NOTE — PROGRESS NOTES
Chief Complaint   Patient presents with   • Coronary Artery Disease     F/V Dx: CAD in native artery   • Dyslipidemia   • Hypertension     F/V Dx: Essential hypertension, benign       Subjective     Al Lena is a 81 y.o. male who presents today for annual follow up of coronary artery bypass graft surgery.    Since the patient's last visit on 10/27/20, he has been doing well clinically. He denies chest pain, shortness of breath, palpitations, nausea/vomiting or diaphoresis. He keeps active walking on his treadmill daily for 50 minutes. He has big toe pain.     Past Medical History:   Diagnosis Date   • Anesthesia     difficult intubation with surgery 2008,2010   • Basal cell carcinoma of skin    • CAD (coronary artery disease)    • Cancer (HCC)     rt  kidney 1989;  thyroid cancer 2012, prostate 2008, skin   • Chronic kidney disease (CKD) stage G3b/A2, moderately decreased glomerular filtration rate (GFR) between 30-44 mL/min/1.73 square meter and albuminuria creatinine ratio between  mg/g (HCC) 4/23/2015   • DVT (deep venous thrombosis) (HCC) 2014   • ED (erectile dysfunction)    • GERD (gastroesophageal reflux disease)    • Glaucoma    • Heart burn    • Hyperlipidemia 12/3/2012   • Hypertension    • Indigestion    • Multiple pulmonary emboli (HCC) 2014   • Multiple thyroid nodules 2009   • Papillary carcinoma of thyroid (HCC) 2014    R 2 foci / 4.5mm,0.25mm / no mets/ pT1pN0   • postsurgical hypothyroidism 2014   • Pre-diabetes    • Renal disorder     rt kidney cancer,nephrectomy   • S/P CABG x 4 2003   • S/P colectomy 2010    multiple colon polyps / no Ca   • S/p nephrectomy 1998    carcinoma   • S/P prostatectomy 2008    carcinoma   • Stroke (HCC)     'mild',no residual,'one doctor said it was a tia'   • Transient renal failure 2014    renal vein thrombosis   • Type II or unspecified type diabetes mellitus without mention of complication, not stated as uncontrolled     on no medications   • Unspecified  urinary incontinence      Past Surgical History:   Procedure Laterality Date   • THYROIDECTOMY TOTAL  5/13/2014    Performed by Eliceo Bolanos M.D. at SURGERY SAME DAY [facility] ORS   • NODE DISSECTION  5/13/2014    Performed by Eliceo Bolanos M.D. at SURGERY SAME DAY [facility] ORS   • COLON RESECTION     • MULTIPLE CORONARY ARTERY BYPASS      x 4 11/2003   • NEPHRECTOMY RADICAL      right side 1998   • OTHER ORTHOPEDIC SURGERY      trotator cuff   • PROSTATECTOMY, RADICAL RETRO      cancer /january 2008     Family History   Problem Relation Age of Onset   • Diabetes Mother    • Heart Disease Mother    • Diabetes Father    • Cancer Father         pancreas   • Thyroid Sister         Cancer   • Cancer Sister         thyroid   • Diabetes Sister    • Cancer Brother 49        colon   • Diabetes Brother    • Diabetes Maternal Grandfather    • Diabetes Paternal Grandmother    • Diabetes Paternal Grandfather    • No Known Problems Maternal Grandmother      Social History     Socioeconomic History   • Marital status:      Spouse name: Not on file   • Number of children: Not on file   • Years of education: Not on file   • Highest education level: Not on file   Occupational History   • Not on file   Tobacco Use   • Smoking status: Never Smoker   • Smokeless tobacco: Never Used   Vaping Use   • Vaping Use: Never used   Substance and Sexual Activity   • Alcohol use: Yes     Comment: 1 PER WK   • Drug use: No   • Sexual activity: Not Currently     Comment: retired    Other Topics Concern   • Not on file   Social History Narrative   • Not on file     Social Determinants of Health     Financial Resource Strain:    • Difficulty of Paying Living Expenses:    Food Insecurity:    • Worried About Running Out of Food in the Last Year:    • Ran Out of Food in the Last Year:    Transportation Needs:    • Lack of Transportation (Medical):    • Lack of Transportation (Non-Medical):    Physical Activity:    • Days of  Exercise per Week:    • Minutes of Exercise per Session:    Stress:    • Feeling of Stress :    Social Connections:    • Frequency of Communication with Friends and Family:    • Frequency of Social Gatherings with Friends and Family:    • Attends [employer] Services:    • Active Member of Clubs or Organizations:    • Attends Club or Organization Meetings:    • Marital Status:    Intimate Partner Violence:    • Fear of Current or Ex-Partner:    • Emotionally Abused:    • Physically Abused:    • Sexually Abused:      Allergies   Allergen Reactions   • Lactose    • Seasonal      (Medications reviewed.)  Outpatient Encounter Medications as of 10/28/2021   Medication Sig Dispense Refill   • atorvastatin (LIPITOR) 40 MG Tab Take 1 Tablet by mouth every day. 100 Tablet 3   • warfarin (COUMADIN) 5 MG Tab TAKE 1 TABLET BY MOUTH ONCE DAILY IN THE EVENING 90 Tablet 3   • TRULICITY 0.75 MG/0.5ML Solution Pen-injector INJECT 1 SYRINGE SUBCUTANEOUSLY ONCE A WEEK AS DIRECTED 12 mL 1   • losartan (COZAAR) 50 MG Tab Take 1 tablet by mouth every day. 100 tablet 5   • levothyroxine (EUTHYROX) 137 MCG Tab TAKE 1 TABLET BY MOUTH ONCE DAILY IN THE MORNING ON  AN  EMPTY  STOMACH  ONE  HOUR  BEFORE  EATING 100 tablet 3   • atenolol (TENORMIN) 25 MG Tab Take 1 tablet by mouth every day. 100 tablet 3   • fenofibrate (TRIGLIDE) 160 MG tablet Take 1 tablet by mouth every day. 90 tablet 3   • Multiple Vitamins-Minerals (ZINC PO) Take  by mouth.     • Ginkgo Biloba 40 MG Tab Take  by mouth.     • hydrocortisone 2.5 % Cream topical cream APPLY TO RASH ON GROIN TWICE DAILY FOR 1 WEEK WITH FLARES     • Calcium Carb-Cholecalciferol (CALCIUM 1000 + D PO) Take 1 Dose by mouth every day.     • Omega-3 Krill Oil 300 MG Cap Take 300 mg by mouth every day.     • Cinnamon 500 MG Cap Take 1,000 mg by mouth.     • Cyanocobalamin (VITAMIN B-12) 1000 MCG Tab Take 1,000 mcg by mouth every day.     • Coenzyme Q10 (CO Q-10 PO) Take 1 Dose by mouth every day.  "Unknown OTC Strength     • [DISCONTINUED] methylPREDNISolone (MEDROL DOSEPAK) 4 MG Tablet Therapy Pack TAKE BY MOUTH AS DIRECTED ON INSIDE OF PACKAGE (Patient not taking: Reported on 10/28/2021)     • [DISCONTINUED] methylPREDNISolone (MEDROL) 4 MG Tab Take as per the package instructions. (Patient not taking: Reported on 10/28/2021) 21 Tablet 0   • [DISCONTINUED] docusate sodium (COLACE) 100 MG Cap Take 1 Cap by mouth 2 times a day. (Patient not taking: Reported on 10/28/2021) 60 Cap 0     No facility-administered encounter medications on file as of 10/28/2021.     Review of Systems   Constitutional: Negative.  Negative for chills, fever, malaise/fatigue and weight loss.   HENT: Negative.  Negative for hearing loss.    Eyes: Negative.  Negative for blurred vision and double vision.   Respiratory: Negative.  Negative for cough and shortness of breath.    Cardiovascular: Negative.  Negative for chest pain, palpitations, claudication and leg swelling.   Gastrointestinal: Negative.  Negative for abdominal pain, nausea and vomiting.   Genitourinary: Negative.  Negative for dysuria and urgency.   Musculoskeletal: Negative.  Negative for joint pain and myalgias.   Skin: Negative.  Negative for itching and rash.   Neurological: Negative.  Negative for dizziness, focal weakness, weakness and headaches.   Endo/Heme/Allergies: Negative.  Does not bruise/bleed easily.   Psychiatric/Behavioral: Negative.  Negative for depression. The patient is not nervous/anxious.               Objective     /80 (BP Location: Left arm, Patient Position: Sitting, BP Cuff Size: Adult)   Pulse 62   Resp 14   Ht 1.727 m (5' 8\")   Wt 80.2 kg (176 lb 11.2 oz)   SpO2 96%   BMI 26.87 kg/m²     Physical Exam  Constitutional:       Appearance: He is well-developed.   HENT:      Head: Normocephalic and atraumatic.   Neck:      Vascular: No JVD.   Cardiovascular:      Rate and Rhythm: Normal rate and regular rhythm.      Heart sounds: Normal " heart sounds.   Pulmonary:      Effort: Pulmonary effort is normal.      Breath sounds: Normal breath sounds.   Abdominal:      General: Bowel sounds are normal.      Palpations: Abdomen is soft.      Comments: No hepatosplenomegaly.   Musculoskeletal:         General: Normal range of motion.   Lymphadenopathy:      Cervical: No cervical adenopathy.   Skin:     General: Skin is warm and dry.   Neurological:      Mental Status: He is alert and oriented to person, place, and time.            CARDIAC STUDIES/PROCEDURES:     ABDOMINAL ULTRASOUND (06/04/14)  1. Ectatic aorta.  2. Atherosclerotic plaque.  3. Cholelithiasis.     CT OF CHEST (06/06/14)  1. There is thrombus in the IVC which extends into the distal left renal vein between the aorta and vena cava. The patient is on heparin for 36 hours approximately, and the improvement in left   renal edema and decrease in caliber of the left renal vein compared to previous CT probably reflects improved venous drainage related to the therapy .  2. The thrombus attached to the filter extends cephalad to the filter to the level of the caudate lobe of the liver.  3. There is DVT in both lower extremities.     ECHOCARDIOGRAM CONCLUSIONS (10/18/18)  Prior echo on 05/21/14. Compared to the report of the study done -   there has been no significant change.   Normal left ventricular systolic function.  Left ventricular ejection fraction is visually estimated to be 65%.  Mild tricuspid regurgitation.  Unable to estimate pulmonary artery pressure due to an inadequate   tricuspid regurgitant jet.     ECHOCARDIOGRAM CONCLUSIONS (05/21/14)  Normal left ventricular size. Sigmoid septum with increased outflow   velocity but not anterior motion of the mitral valve.  Basal inferior and apical septal hypokinesis. Left ventricular   ejection fraction is 50% to 55%.  Grade I diastolic dysfunction is present.  Normal left atrial size.  Aortic sclerosis without significant stenosis.  Trace mitral  regurgitation.     ECHOCARDIOGRAM CONCLUSIONS (01/25/14)  Normal left ventricular size and function.   Grade I diastolic dysfunction is present.   Normal left ventricular wall thickness.   Left ventricular ejection fraction is 60% to 65%.  Mild mitral regurgitation.   Aortic valve probably trileaflet. No stenosis or regurgitation seen.   AV MG 5.39 mmHg, PG 9.33 mmHg.  Mild tricuspid regurgitation. Right ventricular systolic pressure is   estimated to be 30 to 35 mmHg consistent with mild pulmonary hypertension.  No pericardial effusion seen.   Normal aortic diameter 3.2 cm  No prior study for comparison.     EKG performed on (01/23/19) EKG shows sinus bradycardia.  EKG performed on (05/15/14) EKG shows normal sinus rhythm with non-specific intra-ventricular conduction delay.     Laboratory results of (10/26/21) were reviewed. Cholesterol profile of 167/224/34/88 mg/dL noted.  Laboratory results of (08/24/20) Cholesterol profile of 199/202/37/122 mg/dl noted.  Laboratory results of (07/15/19) Cholesterol profile of 169/185/32/100 noted.  Laboratory results of (09/29/18) Cholesterol profile of 261/365/36/151 noted.  Laboratory results of (09/21/15) Cholesterol profile of 251/307/37/153 noted.  Laboratory results of (06/16/14) Cholesterol profile of 172/233/37/88 noted.     LOWER EXTREMITY VENOUS ULTRASOUND (07/19/19)  No evidence of RIGHT lower extremity DVT.      LOWER EXTREMITY VENOUS ULTRASOUND (06/05/14)  1. No evidence of acute right lower extremity deep venous thrombosis with   recannalized venous thrombosis throughout.  2. Normal left lower extremity superficial and deep venous examination.      MRA OF BRAIN (01/25/14)  1. MRA OF THE [place] WITHIN NORMAL LIMITS WITH NO EVIDENCE OF ANEURYSM OR CEREBROVASCULAR OCCLUSIVE DISEASE.  2. FIELD OF VIEW PARTIALLY EXCLUDES THE INFERIOR TEMPORAL M2 DIVISION LEFT MCA.     MRA OF NECK (01/25/14)  1. Unremarkable cervical vertebral arteries.  2. Right carotid bulb  and ICA origin minimal plaquing with up to about 10% stenosis. No flow limiting lesion.  3. Left carotid bulb and left ICA origin minimal plaquing with up to about 10-15% stenosis. No flow limiting lesion.     MYOCARDIAL PERFUSION STUDY CONCLUSIONS (10/18/18)  No evidence of significant jeopardized viable myocardium or prior myocardial infarction.  Apical thinning noted.  Normal wall motion, EF 59%.   TID absent.   Sinus rhythm.  Nondiagnostic ECG portion of a nuclear stress test.  ECG INTERPRETATION  Nondiagnostic ECG portion of a nuclear stress test.     STRESS ECHOCARDIOGRAM [facility] (03/14/12)  Stress echocardiogram showing no evidence for stress-induced ischemia.    Assessment & Plan     1. CAD in native artery     2. S/P CABG x 4     3. Essential hypertension, benign     4. Dyslipidemia         Medical Decision Making: Today's Assessment/Status/Plan:        1. Coronary artery disease with prior coronary bypass graft (4 vessel CABG at [facility] 2003): He is clinically doing well. I will continue with current medical care including aspirin, atenolol, losartan and atorvastatin.  2. Hypertension: Blood pressure is well controlled. We will continue with beta blockade therapy and angiotensin receptor blockade  3. Hyperlipidemia: He is doing well on statin therapy without myalgia symptoms. (Managed by primary care physician)  4. Chronic anticoagulation therapy (warfarin) and IVC filter with pulmonary embolism/deep venous thrombosis. He is clinically doing well without recurrence or chest pain or shortness of breath.  5. History of stroke (2004): He remains clinically stable without recurrence of stroke symptoms.  6. Renal insufficiency (Managed by Dr. Najjar, [place])  7. Health maintenance: Recovered large hematoma following thyroidectomy.     We will follow up the patient in one year.     CC Edward Mckoy

## 2021-10-29 ENCOUNTER — OFFICE VISIT (OUTPATIENT)
Dept: URGENT CARE | Facility: CLINIC | Age: 81
End: 2021-10-29
Payer: MEDICARE

## 2021-10-29 ENCOUNTER — APPOINTMENT (OUTPATIENT)
Dept: RADIOLOGY | Facility: IMAGING CENTER | Age: 81
End: 2021-10-29
Attending: EMERGENCY MEDICINE
Payer: MEDICARE

## 2021-10-29 VITALS
RESPIRATION RATE: 20 BRPM | HEIGHT: 68 IN | OXYGEN SATURATION: 96 % | SYSTOLIC BLOOD PRESSURE: 128 MMHG | HEART RATE: 66 BPM | DIASTOLIC BLOOD PRESSURE: 78 MMHG | TEMPERATURE: 97.8 F | WEIGHT: 175.6 LBS | BODY MASS INDEX: 26.61 KG/M2

## 2021-10-29 DIAGNOSIS — S99.922A INJURY OF LEFT FOOT, INITIAL ENCOUNTER: ICD-10-CM

## 2021-10-29 DIAGNOSIS — M10.9 PODAGRA: ICD-10-CM

## 2021-10-29 PROCEDURE — 73630 X-RAY EXAM OF FOOT: CPT | Mod: TC,FY,LT | Performed by: EMERGENCY MEDICINE

## 2021-10-29 PROCEDURE — 99203 OFFICE O/P NEW LOW 30 MIN: CPT | Performed by: EMERGENCY MEDICINE

## 2021-10-29 RX ORDER — PREDNISONE 20 MG/1
40 TABLET ORAL DAILY
Qty: 14 TABLET | Refills: 0 | Status: SHIPPED | OUTPATIENT
Start: 2021-10-29 | End: 2021-11-05

## 2021-10-29 ASSESSMENT — FIBROSIS 4 INDEX: FIB4 SCORE: 4.29

## 2021-10-29 ASSESSMENT — ENCOUNTER SYMPTOMS
JOINT SWELLING: 1
FEVER: 0

## 2021-10-30 NOTE — PROGRESS NOTES
"Caron Ramos is a 81 y.o. male who presents with Gout (swollen toes)            Foot Problem  This is a new problem. Episode onset: 2 days. Associated symptoms include joint swelling. Pertinent negatives include no fever. The symptoms are aggravated by walking and bending. He has tried rest for the symptoms.   Patient notes accidentally stubbed left second toe against a chair.  Subsequently noted left great toe MTP pain and swelling.  Notes blood sugar 111 today.  Prior tolerance of steroid for neuropathy treatment.  Due for INR in 3 days.    Review of Systems   Constitutional: Negative for fever.   Musculoskeletal: Positive for joint swelling.              Objective     /78 (BP Location: Right arm, Patient Position: Sitting, BP Cuff Size: Adult)   Pulse 66   Temp 36.6 °C (97.8 °F) (Temporal)   Resp 20   Ht 1.727 m (5' 8\")   Wt 79.7 kg (175 lb 9.6 oz)   SpO2 96%   BMI 26.70 kg/m²      Physical Exam  Constitutional:       Appearance: Normal appearance. He is well-developed. He is not ill-appearing.   Cardiovascular:      Pulses:           Dorsalis pedis pulses are 2+ on the left side.   Pulmonary:      Effort: Pulmonary effort is normal.   Musculoskeletal:      Left ankle: No swelling.      Left foot: Decreased range of motion. Swelling and tenderness present. No crepitus.        Legs:    Lymphadenopathy:      Comments: No lymphangitis proximally.   Skin:     General: Skin is warm and dry.      Findings: Erythema present. No rash or wound.   Neurological:      Mental Status: He is alert.      Comments: Distal motor function intact. Distal sensation to light touch and pressure intact.   Psychiatric:         Behavior: Behavior is cooperative.                             Assessment & Plan        1. Podagra  Rest, Tylenol prn  - predniSONE (DELTASONE) 20 MG Tab; Take 2 Tablets by mouth every day for 7 days.  Dispense: 14 Tablet; Refill: 0  REF PCP  2. Injury of left foot, initial encounter  - " DX-FOOT-COMPLETE 3+ LEFT; Interpretation per radiologist:   The alignment is grossly normal.  There is no evidence of displaced fracture or dislocation.  There is no focal soft tissue swelling.

## 2021-11-01 ENCOUNTER — ANTICOAGULATION VISIT (OUTPATIENT)
Dept: MEDICAL GROUP | Facility: MEDICAL CENTER | Age: 81
End: 2021-11-01
Payer: MEDICARE

## 2021-11-01 DIAGNOSIS — Z95.828 PRESENCE OF IVC FILTER: ICD-10-CM

## 2021-11-01 LAB — INR PPP: 3.8 (ref 2–3.5)

## 2021-11-01 PROCEDURE — 85610 PROTHROMBIN TIME: CPT | Performed by: FAMILY MEDICINE

## 2021-11-01 PROCEDURE — 99211 OFF/OP EST MAY X REQ PHY/QHP: CPT | Performed by: FAMILY MEDICINE

## 2021-11-01 NOTE — PROGRESS NOTES
OP Anticoagulation Service Note    Date: 11/1/2021  There were no vitals filed for this visit.   pt declined vitals    Anticoagulation Summary  As of 11/1/2021    INR goal:  2.0-3.0   TTR:  77.5 % (3.7 y)   INR used for dosing:  3.80 (11/1/2021)   Warfarin maintenance plan:  5 mg (5 mg x 1) every day   Weekly warfarin total:  35 mg   Plan last modified:  Kelsey Junior, PharmD (8/6/2020)   Next INR check:  11/15/2021   Priority:  Maintenance   Target end date:  Indefinite    Indications    Presence of IVC filter [Z95.828]  Transient ischemic attack [G45.9] (Resolved) [G45.9]  Deep vein thrombosis (DVT) of both lower extremities (HCC) (Resolved) [I82.403]  DVT (deep venous thrombosis) (HCC) (Resolved) [I82.409]  Multiple pulmonary emboli (HCC) (Resolved) [I26.99]  Chronic anticoagulation (Resolved) [Z79.01]             Anticoagulation Episode Summary     INR check location:      Preferred lab:      Send INR reminders to:      Comments:        Anticoagulation Care Providers     Provider Role Specialty Phone number    [facility]   [phone]    Edward Walters M.D.  Family Medicine [phone]            HPI:   Paulo Paredes seen in clinic today, on anticoagulation therapy with warfarin (a high risk medication) for hx of VTE    Pt is here today to evaluate anticoagulation therapy    Previous INR was  1.9 on 10/18/21    Pt was instructed to continue regimen    Anticoagulation Patient Findings  Patient Findings     Positives:  Change in medications (started steroids 4 days ago; last dose today)    Negatives:  Signs/symptoms of thrombosis, Signs/symptoms of bleeding, Laboratory test error suspected, Change in health, Change in alcohol use, Change in activity, Upcoming invasive procedure, Emergency department visit, Upcoming dental procedure, Missed doses, Extra doses, Change in diet/appetite, Hospital admission, Bruising, Other complaints          Confirmed warfarin dosing regimen    Pt  is not on antiplatelet/NSAID therapy    Falls or accidents since last visit No        A/P   INR  supra-therapeutic today, will require dose adjust ment today to prevent bleeding complications and closer follow up.     Hold x 1 day, then continue weekly regimen     01/22 check referral    Pt educated to contact our clinic with any changes in medications or s/s of bleeding or thrombosis. Pt is aware to seek immediate medical attention for falls, head injury or deep cuts    Follow up appointment in 2 week(s) to reduce risk of adverse events from warfarin  Leah Solo, ZayD

## 2021-11-15 ENCOUNTER — HOSPITAL ENCOUNTER (OUTPATIENT)
Dept: LAB | Facility: MEDICAL CENTER | Age: 81
End: 2021-11-15
Attending: NURSE PRACTITIONER
Payer: MEDICARE

## 2021-11-15 ENCOUNTER — ANTICOAGULATION VISIT (OUTPATIENT)
Dept: MEDICAL GROUP | Facility: MEDICAL CENTER | Age: 81
End: 2021-11-15
Payer: MEDICARE

## 2021-11-15 DIAGNOSIS — E87.5 HYPERKALEMIA: ICD-10-CM

## 2021-11-15 DIAGNOSIS — Z79.01 CHRONIC ANTICOAGULATION: Primary | ICD-10-CM

## 2021-11-15 DIAGNOSIS — N18.32 STAGE 3B CHRONIC KIDNEY DISEASE: ICD-10-CM

## 2021-11-15 DIAGNOSIS — Z95.828 PRESENCE OF IVC FILTER: ICD-10-CM

## 2021-11-15 DIAGNOSIS — Z90.5 SINGLE KIDNEY: ICD-10-CM

## 2021-11-15 LAB
ANION GAP SERPL CALC-SCNC: 9 MMOL/L (ref 7–16)
BUN SERPL-MCNC: 31 MG/DL (ref 8–22)
CALCIUM SERPL-MCNC: 9.1 MG/DL (ref 8.4–10.2)
CHLORIDE SERPL-SCNC: 104 MMOL/L (ref 96–112)
CO2 SERPL-SCNC: 27 MMOL/L (ref 20–33)
CREAT SERPL-MCNC: 2.22 MG/DL (ref 0.5–1.4)
GLUCOSE SERPL-MCNC: 230 MG/DL (ref 65–99)
INR PPP: 3.5 (ref 2–3.5)
POTASSIUM SERPL-SCNC: 4.6 MMOL/L (ref 3.6–5.5)
SODIUM SERPL-SCNC: 140 MMOL/L (ref 135–145)

## 2021-11-15 PROCEDURE — 99211 OFF/OP EST MAY X REQ PHY/QHP: CPT | Performed by: FAMILY MEDICINE

## 2021-11-15 PROCEDURE — 36415 COLL VENOUS BLD VENIPUNCTURE: CPT

## 2021-11-15 PROCEDURE — 80048 BASIC METABOLIC PNL TOTAL CA: CPT

## 2021-11-15 PROCEDURE — 85610 PROTHROMBIN TIME: CPT | Performed by: FAMILY MEDICINE

## 2021-11-15 NOTE — PROGRESS NOTES
OP Anticoagulation Service Note    Date: 11/15/2021  There were no vitals filed for this visit.   pt declined vitals    Anticoagulation Summary  As of 11/15/2021    INR goal:  2.0-3.0   TTR:  76.7 % (3.7 y)   INR used for dosing:  3.50 (11/15/2021)   Warfarin maintenance plan:  2.5 mg (5 mg x 0.5) every Mon; 5 mg (5 mg x 1) all other days   Weekly warfarin total:  32.5 mg   Plan last modified:  Kelsey Junior, PharmD (11/15/2021)   Next INR check:  12/2/2021   Priority:  Maintenance   Target end date:  Indefinite    Indications    Presence of IVC filter [Z95.828]  Transient ischemic attack [G45.9] (Resolved) [G45.9]  Deep vein thrombosis (DVT) of both lower extremities (HCC) (Resolved) [I82.403]  DVT (deep venous thrombosis) (HCC) (Resolved) [I82.409]  Multiple pulmonary emboli (HCC) (Resolved) [I26.99]  Chronic anticoagulation (Resolved) [Z79.01]             Anticoagulation Episode Summary     INR check location:      Preferred lab:      Send INR reminders to:      Comments:        Anticoagulation Care Providers     Provider Role Specialty Phone number    [facility]   [phone]    Edward Walters M.D.  [facility] [phone]            HPI:   Paulo Paredes seen in clinic today, on anticoagulation therapy with warfarin (a high risk medication) for hx of DVT and PE ,       Pt is here today to evaluate anticoagulation therapy    Previous INR was  3.8 on 11/1/21    Pt was instructed to HOLD then resume usual regimen    Anticoagulation Patient Findings  Patient Findings     Negatives:  Signs/symptoms of thrombosis, Signs/symptoms of bleeding, Laboratory test error suspected, Change in health, Change in alcohol use, Change in activity, Upcoming invasive procedure, Emergency department visit, Upcoming dental procedure, Missed doses, Extra doses, Change in medications, Change in diet/appetite, Hospital admission, Bruising, Other complaints          Confirmed warfarin dosing  regimen    Pt is not on antiplatelet/NSAID therapy    Falls or accidents since last visit No        A/P   INR  supra-therapeutic today, will require dose adjust ment today to prevent bleeding complications  and closer follow up.   Lower weekly regimen     1/22 check referral    Pt educated to contact our clinic with any changes in medications or s/s of bleeding or thrombosis. Pt is aware to seek immediate medical attention for falls, head injury or deep cuts    Follow up appointment in 2 week(s) to reduce risk of adverse events from warfarin  Kelsey Junior, PharmD

## 2021-12-02 ENCOUNTER — HOSPITAL ENCOUNTER (OUTPATIENT)
Dept: LAB | Facility: MEDICAL CENTER | Age: 81
End: 2021-12-02
Attending: INTERNAL MEDICINE
Payer: MEDICARE

## 2021-12-02 ENCOUNTER — ANTICOAGULATION VISIT (OUTPATIENT)
Dept: MEDICAL GROUP | Facility: MEDICAL CENTER | Age: 81
End: 2021-12-02
Payer: MEDICARE

## 2021-12-02 DIAGNOSIS — I10 ESSENTIAL HYPERTENSION: ICD-10-CM

## 2021-12-02 DIAGNOSIS — N18.30 STAGE 3 CHRONIC KIDNEY DISEASE, UNSPECIFIED WHETHER STAGE 3A OR 3B CKD: ICD-10-CM

## 2021-12-02 DIAGNOSIS — Z95.828 PRESENCE OF IVC FILTER: ICD-10-CM

## 2021-12-02 LAB
ANION GAP SERPL CALC-SCNC: 9 MMOL/L (ref 7–16)
BUN SERPL-MCNC: 30 MG/DL (ref 8–22)
CALCIUM SERPL-MCNC: 9.3 MG/DL (ref 8.4–10.2)
CHLORIDE SERPL-SCNC: 103 MMOL/L (ref 96–112)
CHOLEST SERPL-MCNC: 147 MG/DL (ref 100–199)
CO2 SERPL-SCNC: 29 MMOL/L (ref 20–33)
CREAT SERPL-MCNC: 1.99 MG/DL (ref 0.5–1.4)
CREAT UR-MCNC: 149.11 MG/DL
ERYTHROCYTE [DISTWIDTH] IN BLOOD BY AUTOMATED COUNT: 45.7 FL (ref 35.9–50)
EST. AVERAGE GLUCOSE BLD GHB EST-MCNC: 169 MG/DL
FASTING STATUS PATIENT QL REPORTED: NORMAL
GLUCOSE SERPL-MCNC: 216 MG/DL (ref 65–99)
HBA1C MFR BLD: 7.5 % (ref 4–5.6)
HCT VFR BLD AUTO: 45.5 % (ref 42–52)
HDLC SERPL-MCNC: 31 MG/DL
HGB BLD-MCNC: 14.7 G/DL (ref 14–18)
INR PPP: 2.2 (ref 2–3.5)
LDLC SERPL CALC-MCNC: 55 MG/DL
MCH RBC QN AUTO: 31.3 PG (ref 27–33)
MCHC RBC AUTO-ENTMCNC: 32.3 G/DL (ref 33.7–35.3)
MCV RBC AUTO: 96.8 FL (ref 81.4–97.8)
MICROALBUMIN UR-MCNC: 6.8 MG/DL
MICROALBUMIN/CREAT UR: 46 MG/G (ref 0–30)
PLATELET # BLD AUTO: 143 K/UL (ref 164–446)
PMV BLD AUTO: 10.5 FL (ref 9–12.9)
POTASSIUM SERPL-SCNC: 4.9 MMOL/L (ref 3.6–5.5)
RBC # BLD AUTO: 4.7 M/UL (ref 4.7–6.1)
SODIUM SERPL-SCNC: 141 MMOL/L (ref 135–145)
TRIGL SERPL-MCNC: 306 MG/DL (ref 0–149)
WBC # BLD AUTO: 5.6 K/UL (ref 4.8–10.8)

## 2021-12-02 PROCEDURE — 85610 PROTHROMBIN TIME: CPT | Performed by: FAMILY MEDICINE

## 2021-12-02 PROCEDURE — 82043 UR ALBUMIN QUANTITATIVE: CPT

## 2021-12-02 PROCEDURE — 83036 HEMOGLOBIN GLYCOSYLATED A1C: CPT

## 2021-12-02 PROCEDURE — 80061 LIPID PANEL: CPT

## 2021-12-02 PROCEDURE — 93793 ANTICOAG MGMT PT WARFARIN: CPT | Performed by: FAMILY MEDICINE

## 2021-12-02 PROCEDURE — 85027 COMPLETE CBC AUTOMATED: CPT

## 2021-12-02 PROCEDURE — 82570 ASSAY OF URINE CREATININE: CPT

## 2021-12-02 PROCEDURE — 36415 COLL VENOUS BLD VENIPUNCTURE: CPT

## 2021-12-02 PROCEDURE — 80048 BASIC METABOLIC PNL TOTAL CA: CPT

## 2021-12-02 PROCEDURE — 99999 PR NO CHARGE: CPT | Performed by: FAMILY MEDICINE

## 2021-12-02 NOTE — PROGRESS NOTES
Anticoagulation Summary  As of 2021    INR goal:  2.0-3.0   TTR:  76.5 % (3.8 y)   INR used for dosin.20 (2021)   Warfarin maintenance plan:  2.5 mg (5 mg x 0.5) every Mon; 5 mg (5 mg x 1) all other days   Weekly warfarin total:  32.5 mg   Plan last modified:  Kelsey Junior, PharmD (11/15/2021)   Next INR check:     Priority:  Maintenance   Target end date:  Indefinite    Indications    Presence of IVC filter [Z95.828]  Transient ischemic attack [G45.9] (Resolved) [G45.9]  Deep vein thrombosis (DVT) of both lower extremities (HCC) (Resolved) [I82.403]  DVT (deep venous thrombosis) (HCC) (Resolved) [I82.409]  Multiple pulmonary emboli (HCC) (Resolved) [I26.99]  Chronic anticoagulation (Resolved) [Z79.01]             Anticoagulation Episode Summary     INR check location:      Preferred lab:      Send INR reminders to:      Comments:        Anticoagulation Care Providers     Provider Role Specialty Phone number    [facility]   [phone]    Edward Walters M.D.  Family Medicine [phone]        Anticoagulation Patient Findings  Patient Findings     Negatives:  Signs/symptoms of thrombosis, Signs/symptoms of bleeding, Laboratory test error suspected, Change in health, Change in alcohol use, Change in activity, Upcoming invasive procedure, Emergency department visit, Upcoming dental procedure, Missed doses, Extra doses, Change in medications, Change in diet/appetite, Hospital admission, Bruising, Other complaints              History of Present Illness: follow up appointment for chronic anticoagulation with the high risk medication, warfarin for DVT    Medications reconciled yes   Pt is not on antiplatelet therapy    Last INR was out of range, dosage adjusted: pt is back at goal. Continue current dosing regimen.  Follow up in 4 weeks, to reduce the risk of adverse events related to this high risk medication, warfarin.    Marta Quintana, Clinical  Pharmacist

## 2021-12-08 ENCOUNTER — OFFICE VISIT (OUTPATIENT)
Dept: NEPHROLOGY | Facility: MEDICAL CENTER | Age: 81
End: 2021-12-08
Payer: MEDICARE

## 2021-12-08 VITALS
OXYGEN SATURATION: 97 % | HEIGHT: 68 IN | TEMPERATURE: 97.2 F | WEIGHT: 179 LBS | BODY MASS INDEX: 27.13 KG/M2 | SYSTOLIC BLOOD PRESSURE: 114 MMHG | DIASTOLIC BLOOD PRESSURE: 66 MMHG | HEART RATE: 68 BPM

## 2021-12-08 DIAGNOSIS — N18.30 STAGE 3 CHRONIC KIDNEY DISEASE, UNSPECIFIED WHETHER STAGE 3A OR 3B CKD: ICD-10-CM

## 2021-12-08 DIAGNOSIS — E03.9 HYPOTHYROIDISM, UNSPECIFIED TYPE: ICD-10-CM

## 2021-12-08 DIAGNOSIS — I10 ESSENTIAL HYPERTENSION: ICD-10-CM

## 2021-12-08 PROCEDURE — 99214 OFFICE O/P EST MOD 30 MIN: CPT | Performed by: INTERNAL MEDICINE

## 2021-12-08 ASSESSMENT — ENCOUNTER SYMPTOMS
CHILLS: 0
VOMITING: 0
COUGH: 0
NAUSEA: 0
FEVER: 0
SHORTNESS OF BREATH: 0
HYPERTENSION: 1

## 2021-12-08 ASSESSMENT — FIBROSIS 4 INDEX: FIB4 SCORE: 3.57

## 2021-12-08 NOTE — PROGRESS NOTES
"Caron Ramos is a 81 y.o. male who presents with Hypertension and Chronic Kidney Disease            Patient was diagnosed with acute gout arthritis couple months back, got a short course of prednisone, he feels better.    Hypertension  This is a chronic problem. The current episode started more than 1 year ago. The problem is unchanged. The problem is controlled. Pertinent negatives include no chest pain, malaise/fatigue, peripheral edema or shortness of breath. Agents associated with hypertension include thyroid hormones. Risk factors for coronary artery disease include male gender and diabetes mellitus. Past treatments include angiotensin blockers. The current treatment provides significant improvement. There are no compliance problems.  Hypertensive end-organ damage includes kidney disease. Identifiable causes of hypertension include chronic renal disease.   Chronic Kidney Disease  This is a chronic problem. The current episode started more than 1 year ago. The problem occurs constantly. The problem has been unchanged. Pertinent negatives include no chest pain, chills, coughing, fever, nausea, urinary symptoms or vomiting.       Review of Systems   Constitutional: Negative for chills, fever and malaise/fatigue.   Respiratory: Negative for cough and shortness of breath.    Cardiovascular: Negative for chest pain and leg swelling.   Gastrointestinal: Negative for nausea and vomiting.   Genitourinary: Negative for dysuria, frequency and urgency.              Objective     /66 (BP Location: Right arm, Patient Position: Sitting)   Pulse 68   Temp 36.2 °C (97.2 °F) (Temporal)   Ht 1.727 m (5' 8\")   Wt 81.2 kg (179 lb)   SpO2 97%   BMI 27.22 kg/m²      Physical Exam  Vitals and nursing note reviewed.   Constitutional:       General: He is not in acute distress.     Appearance: He is not ill-appearing.   HENT:      Head: Normocephalic and atraumatic.      Right Ear: External ear normal.      " Left Ear: External ear normal.      Nose: Nose normal.   Eyes:      General:         Right eye: No discharge.         Left eye: No discharge.      Conjunctiva/sclera: Conjunctivae normal.   Cardiovascular:      Rate and Rhythm: Normal rate and regular rhythm.      Heart sounds: No murmur heard.      Pulmonary:      Effort: Pulmonary effort is normal. No respiratory distress.      Breath sounds: Normal breath sounds. No wheezing.   Musculoskeletal:         General: No tenderness or deformity.      Right lower leg: No edema.      Left lower leg: No edema.   Skin:     General: Skin is warm.   Neurological:      General: No focal deficit present.      Mental Status: He is alert and oriented to person, place, and time.   Psychiatric:         Mood and Affect: Mood normal.         Behavior: Behavior normal.       Past Medical History:   Diagnosis Date   • Anesthesia     difficult intubation with surgery 2008,2010   • Basal cell carcinoma of skin    • CAD (coronary artery disease)    • Cancer (HCC)     rt  kidney 1989;  thyroid cancer 2012, prostate 2008, skin   • Chronic kidney disease (CKD) stage G3b/A2, moderately decreased glomerular filtration rate (GFR) between 30-44 mL/min/1.73 square meter and albuminuria creatinine ratio between  mg/g (HCC) 4/23/2015   • DVT (deep venous thrombosis) (HCC) 2014   • ED (erectile dysfunction)    • GERD (gastroesophageal reflux disease)    • Glaucoma    • Heart burn    • Hyperlipidemia 12/3/2012   • Hypertension    • Indigestion    • Multiple pulmonary emboli (HCC) 2014   • Multiple thyroid nodules 2009   • Papillary carcinoma of thyroid (HCC) 2014    R 2 foci / 4.5mm,0.25mm / no mets/ pT1pN0   • postsurgical hypothyroidism 2014   • Pre-diabetes    • Renal disorder     rt kidney cancer,nephrectomy   • S/P CABG x 4 2003   • S/P colectomy 2010    multiple colon polyps / no Ca   • S/p nephrectomy 1998    carcinoma   • S/P prostatectomy 2008    carcinoma   • Stroke (HCC)      'mild',no residual,'one doctor said it was a tia'   • Transient renal failure 2014    renal vein thrombosis   • Type II or unspecified type diabetes mellitus without mention of complication, not stated as uncontrolled     on no medications   • Unspecified urinary incontinence      Social History     Socioeconomic History   • Marital status:      Spouse name: Not on file   • Number of children: Not on file   • Years of education: Not on file   • Highest education level: Not on file   Occupational History   • Not on file   Tobacco Use   • Smoking status: Never Smoker   • Smokeless tobacco: Never Used   Vaping Use   • Vaping Use: Never used   Substance and Sexual Activity   • Alcohol use: Yes     Comment: 1 PER WK   • Drug use: No   • Sexual activity: Not Currently     Comment: retired    Other Topics Concern   • Not on file   Social History Narrative   • Not on file     Social Determinants of Health     Financial Resource Strain:    • Difficulty of Paying Living Expenses: Not on file   Food Insecurity:    • Worried About Running Out of Food in the Last Year: Not on file   • Ran Out of Food in the Last Year: Not on file   Transportation Needs:    • Lack of Transportation (Medical): Not on file   • Lack of Transportation (Non-Medical): Not on file   Physical Activity:    • Days of Exercise per Week: Not on file   • Minutes of Exercise per Session: Not on file   Stress:    • Feeling of Stress : Not on file   Social Connections:    • Frequency of Communication with Friends and Family: Not on file   • Frequency of Social Gatherings with Friends and Family: Not on file   • Attends Rastafari Services: Not on file   • Active Member of Clubs or Organizations: Not on file   • Attends Club or Organization Meetings: Not on file   • Marital Status: Not on file   Intimate Partner Violence:    • Fear of Current or Ex-Partner: Not on file   • Emotionally Abused: Not on file   • Physically Abused: Not on file    • Sexually Abused: Not on file   Housing Stability:    • Unable to Pay for Housing in the Last Year: Not on file   • Number of Places Lived in the Last Year: Not on file   • Unstable Housing in the Last Year: Not on file     Family History   Problem Relation Age of Onset   • Diabetes Mother    • Heart Disease Mother    • Diabetes Father    • Cancer Father         pancreas   • Thyroid Sister         Cancer   • Cancer Sister         thyroid   • Diabetes Sister    • Cancer Brother 49        colon   • Diabetes Brother    • Diabetes Maternal Grandfather    • Diabetes Paternal Grandmother    • Diabetes Paternal Grandfather    • No Known Problems Maternal Grandmother      Recent Labs     05/10/21  0719 09/26/21  0800 10/26/21  0747 10/26/21  0748 11/15/21  1305 12/02/21  1214 12/02/21  1215   ALBUMIN 4.4  --  4.5  --   --   --   --    HDL 38*  --  33* 34*  --  31*  --    TRIGLYCERIDE 180*  --  224* 224*  --  306*  --    SODIUM 142   < > 143  --  140  --  141   POTASSIUM 5.4   < > 5.6*  --  4.6  --  4.9   CHLORIDE 108   < > 106  --  104  --  103   CO2 26   < > 27  --  27  --  29   BUN 31*   < > 33*  --  31*  --  30*   CREATININE 1.99*   < > 2.23*  --  2.22*  --  1.99*    < > = values in this interval not displayed.                   Assessment & Plan        1. Essential hypertension  Controlled  Continue same medication regimen  Continue low-sodium diet      2. Stage 3 chronic kidney disease, unspecified whether stage 3a or 3b CKD (HCC)  Stable  No uremic symptoms  Renal dose of medication  Avoid nephrotoxins  Continue same medication regimen      3. Hypothyroidism, unspecified type  Check  TSH+FREE T4    4.  Gout  I advised the patient that if he gets any more acute gout arthritis we can use colchicine for pain management

## 2021-12-10 ENCOUNTER — HOSPITAL ENCOUNTER (OUTPATIENT)
Dept: LAB | Facility: MEDICAL CENTER | Age: 81
End: 2021-12-10
Attending: INTERNAL MEDICINE
Payer: MEDICARE

## 2021-12-10 DIAGNOSIS — N18.30 STAGE 3 CHRONIC KIDNEY DISEASE, UNSPECIFIED WHETHER STAGE 3A OR 3B CKD: ICD-10-CM

## 2021-12-10 DIAGNOSIS — I10 ESSENTIAL HYPERTENSION: ICD-10-CM

## 2021-12-10 LAB
T4 FREE SERPL-MCNC: 1.48 NG/DL (ref 0.93–1.7)
TSH SERPL DL<=0.005 MIU/L-ACNC: 0.75 UIU/ML (ref 0.38–5.33)

## 2021-12-10 PROCEDURE — 82043 UR ALBUMIN QUANTITATIVE: CPT

## 2021-12-10 PROCEDURE — 82570 ASSAY OF URINE CREATININE: CPT

## 2021-12-10 PROCEDURE — 84443 ASSAY THYROID STIM HORMONE: CPT

## 2021-12-10 PROCEDURE — 36415 COLL VENOUS BLD VENIPUNCTURE: CPT

## 2021-12-10 PROCEDURE — 84439 ASSAY OF FREE THYROXINE: CPT

## 2021-12-11 LAB
CREAT UR-MCNC: 124.68 MG/DL
MICROALBUMIN UR-MCNC: 12.5 MG/DL
MICROALBUMIN/CREAT UR: 100 MG/G (ref 0–30)

## 2021-12-14 ENCOUNTER — OFFICE VISIT (OUTPATIENT)
Dept: MEDICAL GROUP | Age: 81
End: 2021-12-14
Payer: MEDICARE

## 2021-12-14 VITALS
HEART RATE: 64 BPM | DIASTOLIC BLOOD PRESSURE: 58 MMHG | WEIGHT: 174 LBS | BODY MASS INDEX: 26.37 KG/M2 | SYSTOLIC BLOOD PRESSURE: 110 MMHG | OXYGEN SATURATION: 97 % | HEIGHT: 68 IN | TEMPERATURE: 97.3 F

## 2021-12-14 DIAGNOSIS — Z79.01 ANTICOAGULATED ON COUMADIN: ICD-10-CM

## 2021-12-14 DIAGNOSIS — Z00.00 MEDICARE ANNUAL WELLNESS VISIT, SUBSEQUENT: ICD-10-CM

## 2021-12-14 DIAGNOSIS — Z95.828 PRESENCE OF IVC FILTER: ICD-10-CM

## 2021-12-14 DIAGNOSIS — E11.29 MICROALBUMINURIA DUE TO TYPE 2 DIABETES MELLITUS (HCC): ICD-10-CM

## 2021-12-14 DIAGNOSIS — K21.9 GASTROESOPHAGEAL REFLUX DISEASE, UNSPECIFIED WHETHER ESOPHAGITIS PRESENT: ICD-10-CM

## 2021-12-14 DIAGNOSIS — E11.8 CONTROLLED TYPE 2 DIABETES MELLITUS WITH COMPLICATION, WITHOUT LONG-TERM CURRENT USE OF INSULIN (HCC): ICD-10-CM

## 2021-12-14 DIAGNOSIS — E78.5 DYSLIPIDEMIA: ICD-10-CM

## 2021-12-14 DIAGNOSIS — I25.10 CAD IN NATIVE ARTERY: ICD-10-CM

## 2021-12-14 DIAGNOSIS — Z86.718 HISTORY OF DVT IN ADULTHOOD: ICD-10-CM

## 2021-12-14 DIAGNOSIS — I10 ESSENTIAL HYPERTENSION, BENIGN: ICD-10-CM

## 2021-12-14 DIAGNOSIS — Z86.711 HX PULMONARY EMBOLISM: ICD-10-CM

## 2021-12-14 DIAGNOSIS — I70.0 ATHEROSCLEROSIS OF AORTA (HCC): ICD-10-CM

## 2021-12-14 DIAGNOSIS — N18.32 STAGE 3B CHRONIC KIDNEY DISEASE: ICD-10-CM

## 2021-12-14 DIAGNOSIS — Z00.00 HEALTH CARE MAINTENANCE: ICD-10-CM

## 2021-12-14 DIAGNOSIS — E89.0 POSTSURGICAL HYPOTHYROIDISM: ICD-10-CM

## 2021-12-14 DIAGNOSIS — R80.9 MICROALBUMINURIA DUE TO TYPE 2 DIABETES MELLITUS (HCC): ICD-10-CM

## 2021-12-14 DIAGNOSIS — Z90.5 SINGLE KIDNEY: ICD-10-CM

## 2021-12-14 DIAGNOSIS — Z95.1 S/P CABG X 4: ICD-10-CM

## 2021-12-14 DIAGNOSIS — Z86.73 H/O TIA (TRANSIENT ISCHEMIC ATTACK) AND STROKE: ICD-10-CM

## 2021-12-14 DIAGNOSIS — D68.9 COAGULATION DEFECT (HCC): ICD-10-CM

## 2021-12-14 DIAGNOSIS — D69.6 THROMBOCYTOPENIA (HCC): ICD-10-CM

## 2021-12-14 PROCEDURE — G0439 PPPS, SUBSEQ VISIT: HCPCS | Performed by: INTERNAL MEDICINE

## 2021-12-14 ASSESSMENT — FIBROSIS 4 INDEX: FIB4 SCORE: 3.57

## 2021-12-14 ASSESSMENT — PATIENT HEALTH QUESTIONNAIRE - PHQ9: CLINICAL INTERPRETATION OF PHQ2 SCORE: 0

## 2021-12-14 ASSESSMENT — ACTIVITIES OF DAILY LIVING (ADL): BATHING_REQUIRES_ASSISTANCE: 0

## 2021-12-14 ASSESSMENT — ENCOUNTER SYMPTOMS: GENERAL WELL-BEING: GOOD

## 2021-12-14 NOTE — PROGRESS NOTES
Annual exam, DM labs    HPI:  Paulo Paredes is a 81 y.o. here for Medicare Annual Wellness Visit     DM 2, with microalbuminuria  Interval course  A1c 7.5     Onset/D  Diabetes education:  unknown     Medications:   · Trulicity 0.75 mg weekly  · ACE/ARB: losartan  · Statin: atorvastatin 10 mg QD  · ASA: No, on coumadin     Checking feet daily/wear soft socks/shoes: advised     Diabetes ABCDE TARGETS  · Fingersticks:  occasionally  ? - range   · Blood Pressure: < 140/90  · Cholesterol-Lipid Panel: elevated triglycerides, low HDL  · Dysalbuminuria:  microalbumin pos     Diet: regular  Exercise:  Walk daily  BMI:  26     DM complications:  · No neuropathy  · Last eye exam: . No retinopathy.    · Nephropathy:  CKD stage III-IV, microalbuminuria  · CVS: Has CAD, on rx.   · No gastropathy.     FH of DM: mother     CKD stage IV, single kidney  The patient had decreased GFR with normal creatinine and electrolytes.   No consistent NSAIDs use.   He has been followed up by nephrology.     Hypertension/CAD, st post CABG x 4, atherosclerosis aorta  St post CABG [facility]4 in  at [facility]  Meds: Valsartan 160 mg daily, atenolol 25 mg daily; no ASA, on coumadin.   Taking meds as prescribed.   He is measuring BP at home, it has been < 140/90  Denies:  -  headaches, vision problems, tinnitus.                 -  chest pain/pressure, palpitations, irregular heart beats, exertional, dyspnea, peripheral edema.  Low salt diet: Y  Diet / exercise / BMI: as above.   FH of HTN: unknown     Echocardiography, 10/18/2018  Prior echo on 14. Compared to the report of the study done -   there has been no significant change.   Normal left ventricular systolic function.  Left ventricular ejection fraction is visually estimated to be 65%.  Mild tricuspid regurgitation.  Unable to estimate pulmonary artery pressure due to an inadequate   tricuspid regurgitant jet.     Dyslipidemia  The patient is on atorvastatin 10  mg, taking daily, as prescribed. No myalgias, muscle cramps or pain.   Diet /Exercise/BMI:  As above  FH: unknown      Hypothyroidism, postsurgical (H/o thyroid cancer)  Dg: in 2012.   Status post total thyroidectomy.   On Levothyroxine, 137 mcg, taking daily early in am, before any po intake.  No temperature intolerance. No change in weight,  hair/skin quality, BMs.   No tremors, weakness.  No peripheral swelling.  No mood changes.  FH: N  Review TSH.  He has been followed up by endocrinology     Thrombocytopenia  The patient has had borderline thrombocytopenia/elevated MCV, stable.   B12/folate were normal.  Denies easy bleeding or bruising.   Reviewed medication list.     GERD  On omeprazole, 20 mg QD; takes medication as prescribed, that controls sx.   Denies:   - heartburn, epigastric pain.   -  nausea, vomiting, or diarrhea  - dysphagia  - abnormal cough  - blood in the stool or dark tarry stools.  - early satiety  - tobacco use.    Patient Active Problem List    Diagnosis Date Noted   • Atherosclerosis of aorta (HCC) 07/08/2021   • Anticoagulated on Coumadin 07/08/2021   • Essential hypertension, benign 10/27/2020   • H/O TIA (transient ischemic attack) and stroke 05/17/2020   • Thrombocytopenia ([facility]) 01/16/2020   • Stage 3b chronic kidney disease ([facility]) 01/16/2020   • Microalbuminuria due to type 2 diabetes mellitus ([facility]) 07/16/2019   • Hx pulmonary embolism 07/16/2019   • History of DVT in adulthood 07/16/2019   • Dyslipidemia 03/19/2018   • Coagulation defect ([facility])- on warfarin 04/23/2015   • Presence of IVC filter 06/02/2014   • CAD in native artery 01/22/2014   • GERD (gastroesophageal reflux disease) 12/03/2012   • Controlled type 2 diabetes mellitus with complication, without long-term current use of insulin ([facility]) 12/03/2012   • S/P CABG x 4 12/03/2012   • Single kidney 12/03/2012       Current Outpatient Medications   Medication Sig Dispense Refill   • fenofibrate (TRIGLIDE) 160 MG tablet Take 1 Tablet  by mouth every day. 90 Tablet 3   • atorvastatin (LIPITOR) 40 MG Tab Take 1 Tablet by mouth every day. 100 Tablet 3   • warfarin (COUMADIN) 5 MG Tab TAKE 1 TABLET BY MOUTH ONCE DAILY IN THE EVENING 90 Tablet 3   • TRULICITY 0.75 MG/0.5ML Solution Pen-injector INJECT 1 SYRINGE SUBCUTANEOUSLY ONCE A WEEK AS DIRECTED 12 mL 1   • losartan (COZAAR) 50 MG Tab Take 1 tablet by mouth every day. 100 tablet 5   • levothyroxine (EUTHYROX) 137 MCG Tab TAKE 1 TABLET BY MOUTH ONCE DAILY IN THE MORNING ON  AN  EMPTY  STOMACH  ONE  HOUR  BEFORE  EATING 100 tablet 3   • atenolol (TENORMIN) 25 MG Tab Take 1 tablet by mouth every day. 100 tablet 3   • Multiple Vitamins-Minerals (ZINC PO) Take  by mouth.     • Ginkgo Biloba 40 MG Tab Take  by mouth.     • hydrocortisone 2.5 % Cream topical cream APPLY TO RASH ON GROIN TWICE DAILY FOR 1 WEEK WITH FLARES     • Calcium Carb-Cholecalciferol (CALCIUM 1000 + D PO) Take 1 Dose by mouth every day.     • Omega-3 Krill Oil 300 MG Cap Take 300 mg by mouth every day.     • Cinnamon 500 MG Cap Take 1,000 mg by mouth.     • Cyanocobalamin (VITAMIN B-12) 1000 MCG Tab Take 1,000 mcg by mouth every day.     • Coenzyme Q10 (CO Q-10 PO) Take 1 Dose by mouth every day. Unknown OTC Strength       No current facility-administered medications for this visit.          Current supplements as per medication list.     Allergies: Lactose and Seasonal    He  reports that he has never smoked. He has never used smokeless tobacco. He reports current alcohol use. He reports that he does not use drugs.  Counseling given: Not Answered    ROS:    Gait: Uses no assistive device  Ostomy: No  Other tubes: No  Amputations: No  Chronic oxygen use: No  Wears hearing aids: No   : Denies any urinary leakage during the last 6 months    Screening:    Depression Screening  Little interest or pleasure in doing things?  0 - not at all  Feeling down, depressed , or hopeless? 0 - not at all  Patient Health Questionnaire Score: 0      Screening for Cognitive Impairment  Three Minute Recall (captain, garden, picture) 3/3    Donal clock face with all 12 numbers and set the hands to show 5 past 8.  Yes    Cognitive concerns identified deferred for follow up unless specifically addressed in assessment and plan.    Fall Risk Assessment  Has the patient had two or more falls in the last year or any fall with injury in the last year?  No    Safety Assessment  Throw rugs on floor.  Yes  Handrails on all stairs.  Yes  Good lighting in all hallways.  Yes  Difficulty hearing.  No  Patient counseled about all safety risks that were identified.    Functional Assessment ADLs  Are there any barriers preventing you from cooking for yourself or meeting nutritional needs?  No.    Are there any barriers preventing you from driving safely or obtaining transportation?  No.    Are there any barriers preventing you from using a telephone or calling for help?  No.    Are there any barriers preventing you from shopping?  No.    Are there any barriers preventing you from taking care of your own finances?  No.    Are there any barriers preventing you from managing your medications?  No.    Are there any barriers preventing you from showering, bathing or dressing yourself?  No.    Are you currently engaging in any exercise or physical activity?  Yes.  Walk treadmill almost daily  What is your perception of your health?  Good.    Health Maintenance Summary          RETINAL SCREENING (Yearly) Due soon on 12/15/2021    12/15/2020  Done    06/11/2019  REFERRAL FOR RETINAL SCREENING EXAM    06/07/2019  POCT Retinal Eye Exam    06/07/2019  Done    04/15/2019  Done    Only the first 5 history entries have been loaded, but more history exists.          Postponed - IMM DTaP/Tdap/Td Vaccine (1 - Tdap) Postponed until 12/15/2021    03/04/2010  Imm Admin: TD Vaccine    01/01/2000  Imm Admin: TD Vaccine          Ordered - A1C SCREENING (Every 6 Months) Ordered on 12/14/2021     12/02/2021  HEMOGLOBIN A1C    10/26/2021  HEMOGLOBIN A1C    05/10/2021  HEMOGLOBIN A1C    12/04/2020  HEMOGLOBIN A1C    05/07/2020  HEMOGLOBIN A1C    Only the first 5 history entries have been loaded, but more history exists.          Ordered - FASTING LIPID PROFILE (Yearly) Ordered on 12/14/2021 12/02/2021  Lipid Profile    10/26/2021  Lipid Profile    10/26/2021  Lipid Profile    05/10/2021  Lipid Profile    12/04/2020  Lipid Profile    Only the first 5 history entries have been loaded, but more history exists.          Ordered - SERUM CREATININE (Yearly) Ordered on 12/14/2021 12/02/2021  Basic Metabolic Panel    11/15/2021  Basic Metabolic Panel    10/26/2021  Comp Metabolic Panel    09/26/2021  BASIC METABOLIC PANEL    05/10/2021  Basic Metabolic Panel    Only the first 5 history entries have been loaded, but more history exists.          Ordered - URINE ACR / MICROALBUMIN (Yearly) Ordered on 12/14/2021    12/10/2021  MICROALBUMIN CREAT RATIO URINE    12/02/2021  MICROALBUMIN CREAT RATIO URINE    05/10/2021  MICROALBUMIN CREAT RATIO URINE    05/10/2021  MICROALBUMIN CREAT RATIO URINE    12/04/2020  MICROALBUMIN CREAT RATIO URINE    Only the first 5 history entries have been loaded, but more history exists.          DIABETES MONOFILAMENT / LE EXAM (Yearly) Next due on 12/14/2022 12/14/2021  Done    07/14/2021  Diabetic Monofilament LE Exam    05/18/2020  Done    04/15/2019  Done    06/19/2018  Done    Only the first 5 history entries have been loaded, but more history exists.          Annual Wellness Visit (Every 366 Days) Next due on 12/15/2022    12/14/2021  Visit Dx: Medicare annual wellness visit, subsequent    12/14/2021  Subsequent Annual Wellness Visit - Includes PPPS ()    12/15/2020  Subsequent Annual Wellness Visit - Includes PPPS ()    12/15/2020  Visit Dx: Medicare annual wellness visit, subsequent    08/08/2018  Done    Only the first 5 history entries have been loaded, but more  history exists.          COLORECTAL CANCER SCREENING (COLONOSCOPY - Every 10 Years) Tentatively due on 11/11/2030 11/11/2020  REFERRAL TO GI FOR COLONOSCOPY    12/22/2017  COLONOSCOPY (Done)    10/08/2013  AMB REFERRAL TO GI FOR COLONOSCOPY          IMM PNEUMOCOCCAL VACCINE: 65+ Years (Series Information) Completed    09/03/2019  Imm Admin: Pneumococcal polysaccharide vaccine (PPSV-23)    08/08/2018  Imm Admin: Pneumococcal Conjugate Vaccine (Prevnar/PCV-13)    01/01/2009  Imm Admin: Pneumococcal Vaccine (UF) - HISTORICAL DATA    11/01/2004  Imm Admin: Pneumococcal polysaccharide vaccine (PPSV-23)    10/27/2003  Imm Admin: Pneumococcal polysaccharide vaccine (PPSV-23)          IMM HEP B VACCINE (Series Information) Aged Out    01/16/2020  Imm Admin: Hepatitis B Vaccine (Adol/Adult)          IMM ZOSTER VACCINES (Series Information) Completed    10/29/2020  Imm Admin: Zoster Vaccine Recombinant (RZV) (SHINGRIX)    08/28/2020  Imm Admin: Zoster Vaccine Recombinant (RZV) (SHINGRIX)    10/28/2008  Imm Admin: Zoster Vaccine Live (ZVL) (Zostavax) - HISTORICAL DATA          IMM INFLUENZA (Series Information) Completed    10/17/2021  Imm Admin: Influenza Vaccine Adult HD    08/28/2020  Imm Admin: Influenza Vaccine Quad Inj (Preserved)    09/03/2019  Imm Admin: Influenza Vaccine Adult HD    09/02/2019  Imm Admin: Influenza, Unspecified - HISTORICAL DATA    09/14/2018  Imm Admin: Influenza Vaccine Quad Inj (Pf)    Only the first 5 history entries have been loaded, but more history exists.          COVID-19 Vaccine (Series Information) Completed    10/17/2021  Imm Admin: Pfizer SARS-CoV-2 Vaccine    02/05/2021  Imm Admin: Pfizer SARS-CoV-2 Vaccine    01/14/2021  Imm Admin: Pfizer SARS-CoV-2 Vaccine          IMM MENINGOCOCCAL VACCINE (MCV4) (Series Information) Aged Out    No completion history exists for this topic.              Patient Care Team:  Edward Walters M.D. as PCP - General (Family Medicine)  Yunier LEMON  GABY Hughes. as Consulting Physician (Endocrinology)  [facility]  Abhi Damon M.D. as Consulting Physician (Cardiovascular Disease (Cardiology))  Heavenly Gordon, PT, DPT (Inactive) as Physical Therapist  Beth Garcia R.N. as Registered Nurse (Diabetic Education)  Fadi Najjar, M.D. as Attending Team Physician (Nephrology)    Social History     Tobacco Use   • Smoking status: Never Smoker   • Smokeless tobacco: Never Used   Vaping Use   • Vaping Use: Never used   Substance Use Topics   • Alcohol use: Yes     Comment: 1 PER WK   • Drug use: No     Family History   Problem Relation Age of Onset   • Diabetes Mother    • Heart Disease Mother    • Diabetes Father    • Cancer Father         pancreas   • Thyroid Sister         Cancer   • Cancer Sister         thyroid   • Diabetes Sister    • Cancer Brother 49        colon   • Diabetes Brother    • Diabetes Maternal Grandfather    • Diabetes Paternal Grandmother    • Diabetes Paternal Grandfather    • No Known Problems Maternal Grandmother      He  has a past medical history of Anesthesia, Basal cell carcinoma of skin, CAD (coronary artery disease), Cancer (HCC), Chronic kidney disease (CKD) stage G3b/A2, moderately decreased glomerular filtration rate (GFR) between 30-44 mL/min/1.73 square meter and albuminuria creatinine ratio between  mg/g ([facility]) (4/23/2015), DVT (deep venous thrombosis) ([facility]) (2014), ED (erectile dysfunction), GERD (gastroesophageal reflux disease), Glaucoma, Heart burn, Hyperlipidemia (12/3/2012), Hypertension, Indigestion, Multiple pulmonary emboli ([facility]) (2014), Multiple thyroid nodules (2009), Papillary carcinoma of thyroid ([facility]) (2014), postsurgical hypothyroidism (2014), Pre-diabetes, Renal disorder, S/P CABG x 4 (2003), S/P colectomy (2010), S/p nephrectomy (1998), S/P prostatectomy (2008), Stroke (HCC), Transient renal failure (2014), Type II or unspecified type diabetes mellitus without mention of  "complication, not stated as uncontrolled, and Unspecified urinary incontinence.   Past Surgical History:   Procedure Laterality Date   • THYROIDECTOMY TOTAL  5/13/2014    Performed by Eliceo Bolanos M.D. at SURGERY SAME DAY [facility] ORS   • NODE DISSECTION  5/13/2014    Performed by Eliceo Bolanos M.D. at SURGERY SAME DAY [facility] OR[facility]   • COLON RESECTION     • MULTIPLE CORONARY ARTERY BYPASS      x 4 11/2003   • NEPHRECTOMY RADICAL      right side 1998   • OTHER ORTHOPEDIC SURGERY      trotator cuff   • PROSTATECTOMY, RADICAL RETRO      cancer /january 2008     Exam:   Blood Pressure 110/58 (BP Location: Left arm, Patient Position: Sitting, BP Cuff Size: Adult)   Pulse 64   Temperature 36.3 °C (97.3 °F) (Temporal)   Height 1.727 m (5' 8\")   Weight 78.9 kg (174 lb)   Oxygen Saturation 97%  Body mass index is 26.46 kg/m².  Hearing  As above.    Dentition fair  Alert, oriented in no acute distress.  Eye contact is good, speech goal directed, affect calm    Labs  Reviewed and discussed  Lab Results   Component Value Date/Time    CHOLSTRLTOT 147 12/02/2021 12:14 PM    LDL 55 12/02/2021 12:14 PM    HDL 31 (A) 12/02/2021 12:14 PM    TRIGLYCERIDE 306 (H) 12/02/2021 12:14 PM       Lab Results   Component Value Date/Time    SODIUM 141 12/02/2021 12:15 PM    POTASSIUM 4.9 12/02/2021 12:15 PM    CHLORIDE 103 12/02/2021 12:15 PM    CO2 29 12/02/2021 12:15 PM    GLUCOSE 216 (H) 12/02/2021 12:15 PM    BUN 30 (H) 12/02/2021 12:15 PM    CREATININE 1.99 (H) 12/02/2021 12:15 PM     Lab Results   Component Value Date/Time    ALKPHOSPHAT 44 10/26/2021 07:47 AM    ASTSGOT 23 10/26/2021 07:47 AM    ALTSGPT 14 10/26/2021 07:47 AM    TBILIRUBIN 0.5 10/26/2021 07:47 AM      Lab Results   Component Value Date/Time    HBA1C 7.5 (H) 12/02/2021 12:14 PM    HBA1C 7.5 (H) 10/26/2021 07:48 AM    HBA1C 6.6 (H) 05/10/2021 07:19 AM     No results found for: TSH  Lab Results   Component Value Date/Time    FREET4 1.48 12/10/2021 12:17 PM    " FREET4 1.79 (H) 05/10/2021 07:19 AM     Lab Results   Component Value Date/Time    WBC 5.6 12/02/2021 12:15 PM    RBC 4.70 12/02/2021 12:15 PM    HEMOGLOBIN 14.7 12/02/2021 12:15 PM    HEMATOCRIT 45.5 12/02/2021 12:15 PM    MCV 96.8 12/02/2021 12:15 PM    MCH 31.3 12/02/2021 12:15 PM    MCHC 32.3 (L) 12/02/2021 12:15 PM    MPV 10.5 12/02/2021 12:15 PM    NEUTSPOLYS 60.60 09/26/2021 08:00 AM    LYMPHOCYTES 26.90 09/26/2021 08:00 AM    MONOCYTES 7.60 09/26/2021 08:00 AM    EOSINOPHILS 4.10 09/26/2021 08:00 AM    BASOPHILS 0.60 09/26/2021 08:00 AM      Assessment and Plan    1. Medicare annual wellness visit, subsequent  Reviewed PMH, PSH, FH, SH, ALL, MEDS, HCM/IMM.   - Subsequent Annual Wellness Visit - Includes PPPS ()    2. Health care maintenance  Per HPI  - Subsequent Annual Wellness Visit - Includes PPPS ()    3. Controlled type 2 diabetes mellitus with complication, without long-term current use of insulin (HCC)  - Controlled, continue with current management.  - Subsequent Annual Wellness Visit - Includes PPPS ()  - HEMOGLOBIN A1C; Standing  - MICROALBUMIN CREAT RATIO URINE; Future  - Comp Metabolic Panel; Standing  4. Microalbuminuria due to type 2 diabetes mellitus (HCC)  - Subsequent Annual Wellness Visit - Includes PPPS ()    5. Stage 3b chronic kidney disease (HCC)  Stable, continue nephrology f/u  - Subsequent Annual Wellness Visit - Includes PPPS ()  6. Single kidney  - Subsequent Annual Wellness Visit - Includes PPPS ()    7. Essential hypertension, benign  - Controlled, continue with current management.  - Subsequent Annual Wellness Visit - Includes PPPS ()  - HEMOGLOBIN A1C; Standing  8. CAD in native artery  - Subsequent Annual Wellness Visit - Includes PPPS ()  9. S/P CABG x 4  - Subsequent Annual Wellness Visit - Includes PPPS ()  10. Atherosclerosis of aorta (HCC)  - Subsequent Annual Wellness Visit - Includes PPPS ()    11. Dyslipidemia  -  Controlled, continue with current management.  - Subsequent Annual Wellness Visit - Includes PPPS ()  - HEMOGLOBIN A1C; Standing  - Lipid Profile; Standing    12. Postsurgical hypothyroidism  - Controlled, continue with current management.    13. Thrombocytopenia (HCC)  Borderline, follow-up labs  - Subsequent Annual Wellness Visit - Includes PPPS ()  - CBC WITH DIFFERENTIAL; Standing    14. Gastroesophageal reflux disease, unspecified whether esophagitis present  - Controlled, continue with current management.  - Subsequent Annual Wellness Visit - Includes PPPS ()    15. H/O TIA (transient ischemic attack) and stroke  - Subsequent Annual Wellness Visit - Includes PPPS ()  16. History of DVT in adulthood  - Subsequent Annual Wellness Visit - Includes PPPS ()  17. Hx pulmonary embolism  - Subsequent Annual Wellness Visit - Includes PPPS ()  18. Anticoagulated on Coumadin  - Subsequent Annual Wellness Visit - Includes PPPS ()  19. Coagulation defect (HCC)- on warfarin  - Subsequent Annual Wellness Visit - Includes PPPS ()  20. Presence of IVC filter  No recurrence, continue current treatment  - Subsequent Annual Wellness Visit - Includes PPPS ()    Services suggested: No services needed at this time  Health Care Screening: Age-appropriate preventive services recommended by USPTF and ACIP covered by Medicare were discussed today. Services ordered if indicated and agreed upon by the patient.  Referrals offered: Community-based lifestyle interventions to reduce health risks and promote self-management and wellness, fall prevention, nutrition, physical activity, tobacco-use cessation, weight loss, and mental health services as per orders if indicated.    Discussion today about general wellness and lifestyle habits:    · Prevent falls and reduce trip hazards; Cautioned about securing or removing rugs.  · Have a working fire alarm and carbon monoxide detector;   · Engage in  regular physical activity and social activities     Follow-up: Return in about 6 months (around 6/14/2022) for LABS.

## 2021-12-19 DIAGNOSIS — I10 ESSENTIAL HYPERTENSION, BENIGN: ICD-10-CM

## 2021-12-20 RX ORDER — ATENOLOL 25 MG/1
TABLET ORAL
Qty: 90 TABLET | Refills: 0 | Status: SHIPPED | OUTPATIENT
Start: 2021-12-20 | End: 2022-03-17

## 2021-12-27 ENCOUNTER — APPOINTMENT (OUTPATIENT)
Dept: MEDICAL GROUP | Facility: MEDICAL CENTER | Age: 81
End: 2021-12-27
Payer: MEDICARE

## 2021-12-30 ENCOUNTER — ANTICOAGULATION VISIT (OUTPATIENT)
Dept: MEDICAL GROUP | Facility: MEDICAL CENTER | Age: 81
End: 2021-12-30
Payer: MEDICARE

## 2021-12-30 DIAGNOSIS — Z95.828 PRESENCE OF IVC FILTER: ICD-10-CM

## 2021-12-30 DIAGNOSIS — Z79.01 CHRONIC ANTICOAGULATION: Primary | ICD-10-CM

## 2021-12-30 LAB — INR PPP: 3.7 (ref 2–3.5)

## 2021-12-30 PROCEDURE — 99211 OFF/OP EST MAY X REQ PHY/QHP: CPT | Performed by: FAMILY MEDICINE

## 2021-12-30 PROCEDURE — 85610 PROTHROMBIN TIME: CPT | Performed by: FAMILY MEDICINE

## 2021-12-31 NOTE — PROGRESS NOTES
OP Anticoagulation Service Note    Date: 12/30/2021  There were no vitals filed for this visit.   pt declined vitals    Anticoagulation Summary  As of 12/30/2021    INR goal:  2.0-3.0   TTR:  76.0 % (3.9 y)   INR used for dosing:  3.70 (12/30/2021)   Warfarin maintenance plan:  2.5 mg (5 mg x 0.5) every Mon; 5 mg (5 mg x 1) all other days   Weekly warfarin total:  32.5 mg   Plan last modified:  Kelsey Junior, PharmD (11/15/2021)   Next INR check:  1/17/2022   Priority:  Maintenance   Target end date:  Indefinite    Indications    Presence of IVC filter [Z95.828]  Transient ischemic attack [G45.9] (Resolved) [G45.9]  Deep vein thrombosis (DVT) of both lower extremities (HCC) (Resolved) [I82.403]  DVT (deep venous thrombosis) (HCC) (Resolved) [I82.409]  Multiple pulmonary emboli (HCC) (Resolved) [I26.99]  Chronic anticoagulation (Resolved) [Z79.01]             Anticoagulation Episode Summary     INR check location:      Preferred lab:      Send INR reminders to:      Comments:        Anticoagulation Care Providers     Provider Role Specialty Phone number    [facility]   [phone]    Edward Walters M.D.  [facility] [phone]            HPI:   Paulo Paredes seen in clinic today, on anticoagulation therapy with warfarin (a high risk medication) for DVT, PE and TIA     Pt is here today to evaluate anticoagulation therapy    Previous INR was  2.2 on 12/2/21    Pt was instructed to continue current regimen    Anticoagulation Patient Findings  Patient Findings     Positives:  Change in diet/appetite (traveling)    Negatives:  Signs/symptoms of thrombosis, Signs/symptoms of bleeding, Laboratory test error suspected, Change in health, Change in alcohol use, Change in activity, Upcoming invasive procedure, Emergency department visit, Upcoming dental procedure, Missed doses, Extra doses, Change in medications, Hospital admission, Bruising, Other complaints          Confirmed warfarin  dosing regimen    Pt is not on antiplatelet/NSAID therapy    Falls or accidents since last visit No        A/P   INR  supra-therapeutic today, will require dose adjust ment today to prevent bleeding complications  and closer follow up.   HOLD today then lower weekly regimen to previous dose       Pt educated to contact our clinic with any changes in medications or s/s of bleeding or thrombosis. Pt is aware to seek immediate medical attention for falls, head injury or deep cuts    Follow up appointment in 2 week(s) to reduce risk of adverse events from warfarin  Kelsey Junior, PharmD

## 2022-01-11 DIAGNOSIS — Z79.01 CHRONIC ANTICOAGULATION: ICD-10-CM

## 2022-01-17 ENCOUNTER — ANTICOAGULATION VISIT (OUTPATIENT)
Dept: MEDICAL GROUP | Facility: MEDICAL CENTER | Age: 82
End: 2022-01-17
Payer: MEDICARE

## 2022-01-17 DIAGNOSIS — Z79.01 CHRONIC ANTICOAGULATION: ICD-10-CM

## 2022-01-17 DIAGNOSIS — Z95.828 PRESENCE OF IVC FILTER: ICD-10-CM

## 2022-01-17 LAB — INR PPP: 2.2 (ref 2–3.5)

## 2022-01-17 PROCEDURE — 85610 PROTHROMBIN TIME: CPT | Performed by: FAMILY MEDICINE

## 2022-01-17 PROCEDURE — 93793 ANTICOAG MGMT PT WARFARIN: CPT | Performed by: FAMILY MEDICINE

## 2022-01-17 NOTE — PROGRESS NOTES
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  Family Medicine [phone]        Refer to Patient Findings for HPI:  Patient Findings     Negatives:  Signs/symptoms of thrombosis, Signs/symptoms of bleeding, Laboratory test error suspected, Change in health, Change in alcohol use, Change in activity, Upcoming invasive procedure, Emergency department visit, Upcoming dental procedure, Missed doses, Extra doses, Change in medications, Change in diet/appetite, Hospital admission, Bruising, Other complaints        There were no vitals filed for this visit.  The pt declined vitals today as he had on many layers    Patient seen in clinic today for follow up on anticoagulation therapy with warfarin (a high risk medication) for hx of DVT and hx of TIA  Verified current warfarin dosing schedule.  Patient confirmed current warfarin regimen.    Previous INR was 3.7 on 21  Patient was instructed to HOLD then begin reduced weekly regimen.    Medications  reconciled   Pt is not on antiplatelet therapy    A/P   INR is therapeutic today at 2.2.     Warfarin dosing recommendation: Continue with the current dosing regimen.   Patient will follow up again in 4 weeks.     Pt educated to contact our clinic with any changes in medications or s/s of bleeding or thrombosis. Pt is aware to seek immediate medical attention for falls, head injury or deep cuts.    Follow up appointment in 4 week(s).    Next appt: Mon, Feb 14 @ 9:15am     Julissa Swan PharmD

## 2022-01-23 RX ORDER — DULAGLUTIDE 0.75 MG/.5ML
INJECTION, SOLUTION SUBCUTANEOUS
Qty: 12 ML | Refills: 1 | Status: SHIPPED | OUTPATIENT
Start: 2022-01-23 | End: 2022-06-14 | Stop reason: SDUPTHER

## 2022-02-14 ENCOUNTER — ANTICOAGULATION VISIT (OUTPATIENT)
Dept: MEDICAL GROUP | Facility: MEDICAL CENTER | Age: 82
End: 2022-02-14
Payer: MEDICARE

## 2022-02-14 DIAGNOSIS — Z95.828 PRESENCE OF IVC FILTER: ICD-10-CM

## 2022-02-14 DIAGNOSIS — Z79.01 CHRONIC ANTICOAGULATION: Primary | ICD-10-CM

## 2022-02-14 LAB — INR PPP: 2.3 (ref 2–3.5)

## 2022-02-14 PROCEDURE — 93793 ANTICOAG MGMT PT WARFARIN: CPT | Performed by: FAMILY MEDICINE

## 2022-02-14 NOTE — PROGRESS NOTES
OP Anticoagulation Service Note    Date: 2022  There were no vitals filed for this visit.   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  Family Medicine [phone]            HPI:   Paulo Paredes seen in clinic today, on anticoagulation therapy with warfarin (a high risk medication) for DVT and PE       Pt is here today to evaluate anticoagulation therapy    Previous INR was  2.2 on 21    Pt was instructed to continue current regimen    Anticoagulation Patient Findings  Patient Findings     Positives:  Change in alcohol use (one beer yesterday)    Negatives:  Signs/symptoms of thrombosis, Signs/symptoms of bleeding, Laboratory test error suspected, Change in health, Change in activity, Upcoming invasive procedure, Emergency department visit, Upcoming dental procedure, Missed doses, Extra doses, Change in medications, Change in diet/appetite, Hospital admission, Bruising, Other complaints          Confirmed warfarin  dosing regimen    Pt is not on antiplatelet/NSAID therapy    Falls or accidents since last visit No        A/P   INR  therapeutic today,   Continue current warfarin regimen        1/23 check referral    Pt educated to contact our clinic with any changes in medications or s/s of bleeding or thrombosis. Pt is aware to seek immediate medical attention for falls, head injury or deep cuts    Follow up appointment in 6 week(s) to reduce risk of adverse events from warfarin  Kelsey Junior, PharmD

## 2022-03-16 DIAGNOSIS — I10 ESSENTIAL HYPERTENSION, BENIGN: ICD-10-CM

## 2022-03-17 RX ORDER — ATENOLOL 25 MG/1
TABLET ORAL
Qty: 100 TABLET | Refills: 1 | Status: SHIPPED | OUTPATIENT
Start: 2022-03-17 | End: 2022-06-14 | Stop reason: SDUPTHER

## 2022-03-24 ENCOUNTER — APPOINTMENT (RX ONLY)
Dept: URBAN - METROPOLITAN AREA CLINIC 22 | Facility: CLINIC | Age: 82
Setting detail: DERMATOLOGY
End: 2022-03-24

## 2022-03-24 DIAGNOSIS — L21.8 OTHER SEBORRHEIC DERMATITIS: ICD-10-CM

## 2022-03-24 DIAGNOSIS — L57.0 ACTINIC KERATOSIS: ICD-10-CM

## 2022-03-24 PROBLEM — L30.9 DERMATITIS, UNSPECIFIED: Status: ACTIVE | Noted: 2022-03-24

## 2022-03-24 PROCEDURE — 17000 DESTRUCT PREMALG LESION: CPT

## 2022-03-24 PROCEDURE — ? LIQUID NITROGEN

## 2022-03-24 PROCEDURE — 99214 OFFICE O/P EST MOD 30 MIN: CPT | Mod: 25

## 2022-03-24 PROCEDURE — ? PRESCRIPTION

## 2022-03-24 PROCEDURE — ? COUNSELING

## 2022-03-24 PROCEDURE — ? ADDITIONAL NOTES

## 2022-03-24 RX ORDER — CLOBETASOL PROPIONATE 0.5 MG/G
OINTMENT TOPICAL
Qty: 30 | Refills: 1 | Status: ERX

## 2022-03-24 ASSESSMENT — LOCATION SIMPLE DESCRIPTION DERM
LOCATION SIMPLE: POSTERIOR SCALP
LOCATION SIMPLE: LEFT CHEEK

## 2022-03-24 ASSESSMENT — LOCATION ZONE DERM
LOCATION ZONE: SCALP
LOCATION ZONE: FACE

## 2022-03-24 ASSESSMENT — LOCATION DETAILED DESCRIPTION DERM
LOCATION DETAILED: RIGHT INFERIOR OCCIPITAL SCALP
LOCATION DETAILED: LEFT SUPERIOR CENTRAL MALAR CHEEK

## 2022-03-24 NOTE — PROCEDURE: ADDITIONAL NOTES
Detail Level: Simple
Additional Notes: Exam is currently benign. \\nPatient notes pricking/irritated sensation like a back near hair line. Started after a haircut most recently.  \\nWife reports she felt she saw a “red line”   \\nWill trial topical steroid.  Consider occipital scalp dyesthesia however as well.
Render Risk Assessment In Note?: no

## 2022-03-24 NOTE — PROCEDURE: LIQUID NITROGEN
Duration Of Freeze Thaw-Cycle (Seconds): 3
Number Of Freeze-Thaw Cycles: 2 freeze-thaw cycles
Show Aperture Variable?: Yes
Render Note In Bullet Format When Appropriate: No
Post-Care Instructions: I reviewed with the patient in detail post-care instructions. Patient is to wear sunprotection, and avoid picking at any of the treated lesions. Pt may apply Vaseline to crusted or scabbing areas.
Detail Level: Detailed
Consent: The patient's consent was obtained including but not limited to risks of crusting, scabbing, blistering, scarring, darker or lighter pigmentary change, recurrence, incomplete removal and infection.

## 2022-03-24 NOTE — PROCEDURE: MIPS QUALITY
Detail Level: Detailed
Quality 402: Tobacco Use And Help With Quitting Among Adolescents: Patient screened for tobacco and never smoked
Quality 130: Documentation Of Current Medications In The Medical Record: Current Medications Documented
Quality 111:Pneumonia Vaccination Status For Older Adults: Pneumococcal Vaccination Previously Received
Quality 226: Preventive Care And Screening: Tobacco Use: Screening And Cessation Intervention: Patient screened for tobacco use and is an ex/non-smoker

## 2022-03-28 ENCOUNTER — ANTICOAGULATION VISIT (OUTPATIENT)
Dept: MEDICAL GROUP | Facility: MEDICAL CENTER | Age: 82
End: 2022-03-28
Payer: MEDICARE

## 2022-03-28 DIAGNOSIS — Z79.01 CHRONIC ANTICOAGULATION: Primary | ICD-10-CM

## 2022-03-28 DIAGNOSIS — Z95.828 PRESENCE OF IVC FILTER: ICD-10-CM

## 2022-03-28 LAB — INR PPP: 2.7 (ref 2–3.5)

## 2022-03-28 PROCEDURE — 85610 PROTHROMBIN TIME: CPT | Performed by: INTERNAL MEDICINE

## 2022-03-28 PROCEDURE — 93793 ANTICOAG MGMT PT WARFARIN: CPT | Performed by: INTERNAL MEDICINE

## 2022-03-28 NOTE — Clinical Note
Raymond calzada, this pt brought in a bill from his visit 2/14/22 (it is a bill, not an EOB). Its $11. Can you f/u with billing on this?   Thanks,  Kelsey

## 2022-03-28 NOTE — PROGRESS NOTES
OP Anticoagulation Service Note    Date: 3/28/2022  There were no vitals filed for this visit.   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  [facility] [phone]            HPI:   Paulo Paredes seen in clinic today, on anticoagulation therapy with warfarin (a high risk medication) for hx of DVT and TIA      Pt is here today to evaluate anticoagulation therapy    Previous INR was  2.3 on 22  Pt was instructed to continue current regimen    Anticoagulation Patient Findings  Patient Findings     Negatives:  Signs/symptoms of thrombosis, Signs/symptoms of bleeding, Laboratory test error suspected, Change in health, Change in alcohol use, Change in activity, Upcoming invasive procedure, Emergency department visit, Upcoming dental procedure, Missed doses, Extra doses, Change in medications, Change in diet/appetite, Hospital admission, Bruising, Other complaints          Confirmed warfarin dosing regimen    Pt is not  on antiplatelet/NSAID therapy    Falls or accidents since last visit No        A/P   INR  -therapeutic today,   Continue current warfarin regimen          Pt educated to contact our clinic with any changes in medications or s/s of bleeding or thrombosis. Pt is aware to seek immediate medical attention for falls, head injury or deep cuts    Follow up appointment in 8 week(s) to reduce risk of adverse events from warfarin  Kelsey Junior, PharmD

## 2022-03-29 ENCOUNTER — HOSPITAL ENCOUNTER (OUTPATIENT)
Facility: MEDICAL CENTER | Age: 82
End: 2022-03-29
Attending: PHYSICIAN ASSISTANT
Payer: MEDICARE

## 2022-03-29 ENCOUNTER — TELEPHONE (OUTPATIENT)
Dept: VASCULAR LAB | Facility: MEDICAL CENTER | Age: 82
End: 2022-03-29

## 2022-03-29 ENCOUNTER — OFFICE VISIT (OUTPATIENT)
Dept: URGENT CARE | Facility: CLINIC | Age: 82
End: 2022-03-29
Payer: MEDICARE

## 2022-03-29 VITALS
BODY MASS INDEX: 25.31 KG/M2 | DIASTOLIC BLOOD PRESSURE: 64 MMHG | WEIGHT: 167 LBS | RESPIRATION RATE: 14 BRPM | HEART RATE: 63 BPM | OXYGEN SATURATION: 98 % | SYSTOLIC BLOOD PRESSURE: 112 MMHG | TEMPERATURE: 98.1 F | HEIGHT: 68 IN

## 2022-03-29 DIAGNOSIS — R30.0 DYSURIA: ICD-10-CM

## 2022-03-29 DIAGNOSIS — N30.91 CYSTITIS WITH HEMATURIA: ICD-10-CM

## 2022-03-29 DIAGNOSIS — E11.69 TYPE 2 DIABETES MELLITUS WITH OTHER SPECIFIED COMPLICATION, UNSPECIFIED WHETHER LONG TERM INSULIN USE (HCC): ICD-10-CM

## 2022-03-29 DIAGNOSIS — M79.671 RIGHT FOOT PAIN: ICD-10-CM

## 2022-03-29 LAB
APPEARANCE UR: NORMAL
BILIRUB UR STRIP-MCNC: NORMAL MG/DL
COLOR UR AUTO: YELLOW
GLUCOSE UR STRIP.AUTO-MCNC: 1000 MG/DL
KETONES UR STRIP.AUTO-MCNC: NORMAL MG/DL
LEUKOCYTE ESTERASE UR QL STRIP.AUTO: NORMAL
NITRITE UR QL STRIP.AUTO: NORMAL
PH UR STRIP.AUTO: 5.5 [PH] (ref 5–8)
PROT UR QL STRIP: NORMAL MG/DL
RBC UR QL AUTO: NORMAL
SP GR UR STRIP.AUTO: 1.02
UROBILINOGEN UR STRIP-MCNC: 0.2 MG/DL

## 2022-03-29 PROCEDURE — 87186 SC STD MICRODIL/AGAR DIL: CPT

## 2022-03-29 PROCEDURE — 87086 URINE CULTURE/COLONY COUNT: CPT

## 2022-03-29 PROCEDURE — 99213 OFFICE O/P EST LOW 20 MIN: CPT | Performed by: PHYSICIAN ASSISTANT

## 2022-03-29 PROCEDURE — 81002 URINALYSIS NONAUTO W/O SCOPE: CPT | Performed by: PHYSICIAN ASSISTANT

## 2022-03-29 PROCEDURE — 87077 CULTURE AEROBIC IDENTIFY: CPT

## 2022-03-29 RX ORDER — SULFAMETHOXAZOLE AND TRIMETHOPRIM 800; 160 MG/1; MG/1
1 TABLET ORAL 2 TIMES DAILY
Qty: 10 TABLET | Refills: 0 | Status: SHIPPED | OUTPATIENT
Start: 2022-03-29 | End: 2022-05-19

## 2022-03-29 RX ORDER — CLOBETASOL PROPIONATE 0.5 MG/G
OINTMENT TOPICAL
COMMUNITY
Start: 2022-03-24 | End: 2022-06-14

## 2022-03-29 ASSESSMENT — FIBROSIS 4 INDEX: FIB4 SCORE: 3.57

## 2022-03-29 NOTE — PROGRESS NOTES
Subjective:   Paulo Paredes is a 81 y.o. male who presents for Dysuria (X4-5 days, Possible UTI, burning with urination comes and goes ) and Foot Pain (X2 weeks, Right foot pain, feels like cramps )   Frequent urination and dysuria.  Has been going on a couple of days.  No back pain, n/v.  No gross hematuria.  No fevers/chills.    Patient with history of UTI in [facility].  Treated in .  Urine cultures Proteus Mirabilis. Symptoms resolved after this.  Is diabetic and on coumadin.  On trulicity.  Chronic incontinence.    She also notes ongoing right foot pain.  He states this is only present when he crosses his legs in a certain fashion.  The pain is located to the arch of the foot.  Comes and goes.  There is no associated paresthesias.    Medications:  atenolol Tabs  atorvastatin Tabs  CALCIUM 1000 + D PO  Cinnamon Caps  clobetasol Oint  CO Q-10 PO  fenofibrate  Ginkgo Biloba Tabs  hydrocortisone Crea  levothyroxine Tabs  losartan Tabs  Omega-3 Krill Oil Caps  Trulicity Sopn  vitamin B-12 Tabs  warfarin Tabs  ZINC PO    Allergies:             Lactose and Seasonal    Surgical History:         Past Surgical History:   Procedure Laterality Date   • THYROIDECTOMY TOTAL  5/13/2014    Performed by Eliceo Bolanos M.D. at SURGERY SAME DAY [facility] ORS   • NODE DISSECTION  5/13/2014    Performed by Eliceo Bolanos M.D. at SURGERY SAME DAY [facility] ORS   • COLON RESECTION     • MULTIPLE CORONARY ARTERY BYPASS      x 4 11/2003   • NEPHRECTOMY RADICAL      right side 1998   • OTHER ORTHOPEDIC SURGERY      trotator cuff   • PROSTATECTOMY, RADICAL RETRO      cancer /january 2008       Past Social Hx:  Paulo Paredes  reports that he has never smoked. He has never used smokeless tobacco. He reports current alcohol use. He reports that he does not use drugs.     Past Family Hx:   Paulo Paredes family history includes Cancer in his father and sister; Cancer (age of onset: 49) in his brother; Diabetes in his brother, father,  "maternal grandfather, mother, paternal grandfather, paternal grandmother, and sister; Heart Disease in his mother; No Known Problems in his maternal grandmother; Thyroid in his sister.       Problem list, medications, and allergies reviewed by myself today in Epic.     Objective:     /64   Pulse 63   Temp 36.7 °C (98.1 °F) (Temporal)   Resp 14   Ht 1.727 m (5' 8\")   Wt 75.8 kg (167 lb)   SpO2 98%   BMI 25.39 kg/m²     Physical Exam  Vitals reviewed.   Constitutional:       General: He is not in acute distress.     Appearance: He is well-developed. He is not diaphoretic.   HENT:      Head: Normocephalic.   Eyes:      Conjunctiva/sclera: Conjunctivae normal.      Pupils: Pupils are equal, round, and reactive to light.   Cardiovascular:      Rate and Rhythm: Normal rate and regular rhythm.   Pulmonary:      Effort: Pulmonary effort is normal.      Breath sounds: Normal breath sounds.   Abdominal:      General: Abdomen is flat. Bowel sounds are normal. There is no distension.      Palpations: Abdomen is soft.      Tenderness: There is no abdominal tenderness. There is no right CVA tenderness, left CVA tenderness, guarding or rebound.   Musculoskeletal:         General: Normal range of motion.      Cervical back: Normal range of motion and neck supple.        Feet:    Feet:      Comments: Area of pain when crossing right foot.  Distal pulses are intact.  There are no vascular changes appreciated.  Feet are warm to touch.  Good capillary refill.  Skin:     General: Skin is warm and dry.   Neurological:      Mental Status: He is alert and oriented to person, place, and time.         Assessment/Plan:     Diagnosis and Associated Orders:     1. Dysuria  - URINE CULTURE(NEW); Future  - POCT Urinalysis    2. Right foot pain  - Referral to Podiatry    3. Type 2 diabetes mellitus with other specified complication, unspecified whether long term insulin use (HCC)  - Referral to Podiatry    4. Cystitis with " hematuria  - sulfamethoxazole-trimethoprim (BACTRIM DS) 800-160 MG tablet; Take 1 Tablet by mouth 2 times a day.  Dispense: 10 Tablet; Refill: 0            Comments/MDM:  Patient presents with acute onset dysuria.  History of UTI in the past.  The significant for leukocytes, trace blood.  Has had large amounts of blood in urine in past.  Known history of renal insufficiency with one kidney.  On Coumadin for clotting disorder.  Bactrim as above.  Did look at creatinine clearance and it seems to be safest option.  Recommend follow-up with urology if symptoms do not improve.  Referral placed to podiatry    Discussed with patient signs and symptoms are consistent with a simple urinary tract infection. They are overall very well-appearing with normal vital signs and benign examination findings.  Increase fluid intake, AZO per manufacturers instructions for burning urination.    Urine culture: will call back only if positive and if necessary change in therapy.     Advised to return to the Urgent Care or follow up with their PCP if symptoms are not improving in 2-3 days or sooner if any worsening symptoms such as fever, chills, abdominal pain, back/flank pain, nausea, vomiting, or any other concerns.                 I personally reviewed prior external notes and test results pertinent to today's visit.  Red flags discussed as well as indications to present to the Emergency Department.  Supportive care, natural history, differential diagnoses, and indications for immediate follow-up discussed.  Patient expresses understanding and agrees to plan.  Patient denies any other questions or concerns.    Follow-up with the primary care physician for recheck, reevaluation, and consideration of further management.      Please note that this dictation was created using voice recognition software. I have made a reasonable attempt to correct obvious errors, but I expect that there are errors of grammar and possibly content that I did  not discover before finalizing the note.    This note was electronically signed by Nani Orantes PA-C

## 2022-03-29 NOTE — TELEPHONE ENCOUNTER
Received notification that pt will be starting Bactrim which could increase INR d/t DDI with warfarin    LVM for pt to return my call to discuss    Leah Tillman, ZayD

## 2022-03-31 ENCOUNTER — ANTICOAGULATION VISIT (OUTPATIENT)
Dept: MEDICAL GROUP | Facility: MEDICAL CENTER | Age: 82
End: 2022-03-31
Payer: MEDICARE

## 2022-03-31 DIAGNOSIS — Z95.828 PRESENCE OF IVC FILTER: ICD-10-CM

## 2022-03-31 LAB
BACTERIA UR CULT: ABNORMAL
BACTERIA UR CULT: ABNORMAL
INR PPP: 3 (ref 2–3.5)
SIGNIFICANT IND 70042: ABNORMAL
SITE SITE: ABNORMAL
SOURCE SOURCE: ABNORMAL

## 2022-03-31 PROCEDURE — 99211 OFF/OP EST MAY X REQ PHY/QHP: CPT | Performed by: FAMILY MEDICINE

## 2022-03-31 PROCEDURE — 85610 PROTHROMBIN TIME: CPT | Performed by: INTERNAL MEDICINE

## 2022-03-31 NOTE — PROGRESS NOTES
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  Family Medicine [phone]        Anticoagulation Patient Findings  Patient Findings     Positives:  Change in medications (Started 5 day course of Bactrim yesterday)    Negatives:  Signs/symptoms of thrombosis, Signs/symptoms of bleeding, Laboratory test error suspected, Change in health, Change in alcohol use, Change in activity, Upcoming invasive procedure, Emergency department visit, Upcoming dental procedure, Missed doses, Extra doses, Change in diet/appetite, Hospital admission, Bruising, Other complaints          HPI:   Paulo Paredes is in the Anticoagulation Clinic today for a INR check on their anticoagulation therapy.     The reason for today's visit is to prevent morbidity and mortality from a blood clot and/or stroke and to reduce the risk of bleeding while on a anticoagulant.     PCP:  Edward Walters M.D.  [address] [place] [address] Dr Cruz SHANE  [id number]  [phone]    3 vitals included with today's appt-unless patient declined:  (BP, HR, weight, ht, RR)   There were no vitals filed for this visit.    Assessment:   INR  therapeutic.   Confirmed warfarin dosing regimen: Yes  Interval Changes with foods rich in vitamin K: No  Interval Changes in ETOH or cranberries:   No  Interval Changes in smoking status:  No  Interval Changes in medication:  Yes - started Bactrim   S/S of bleeding or bruising:  No  Signs/symptoms  thrombosis since the last appt:  No  Any upcoming procedures that require stopping warfarin and/or using bridge therapy: None    Pt is NOT on antiplatelet therapy.    Plan:  Instructed pt to take a decreased dose of 2.5 mg today and Sat to acct for DDI w/ Bactrim. Pt to otherwise continue on w/ his current regimen.    Follow up:  Follow up appointment in 3 day(s).       Other info:  Pt educated to contact our clinic with any changes in medications or s/s of bleeding or thrombosis.  Education was provided today regarding tips to reduce their bleed risk and dietary constraints while on a anticoagulant.    National Recommendations:  The CHEST guidelines recommends frequent INR monitoring at regular intervals (a few days up to a max of 12 weeks) to ensure patients are on the proper dose of warfarin, and patients are not having any complications from therapy.  INRs can dramatically change over a short time period due to diet, medications, and medical conditions.     Skip Dowling, PharmD, BCACP    [facility]  Phone [phone] fax [phone]

## 2022-04-04 ENCOUNTER — ANTICOAGULATION VISIT (OUTPATIENT)
Dept: MEDICAL GROUP | Facility: MEDICAL CENTER | Age: 82
End: 2022-04-04
Payer: MEDICARE

## 2022-04-04 DIAGNOSIS — Z95.828 PRESENCE OF IVC FILTER: ICD-10-CM

## 2022-04-04 LAB — INR PPP: 2.6 (ref 2–3.5)

## 2022-04-04 PROCEDURE — 85610 PROTHROMBIN TIME: CPT | Performed by: INTERNAL MEDICINE

## 2022-04-04 PROCEDURE — 93793 ANTICOAG MGMT PT WARFARIN: CPT | Performed by: INTERNAL MEDICINE

## 2022-04-04 NOTE — PROGRESS NOTES
OP Anticoagulation Service Note    Date: 2022  There were no vitals filed for this visit.   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  [facility] [phone]            HPI:   Paulo Paredes seen in clinic today, on anticoagulation therapy with warfarin (a high risk medication) for hx of PE and DVT      Pt is here today to evaluate anticoagulation therapy    Previous INR was  3.0 on 3/31/22    Pt was instructed to lower dose d/t Bactrim    Anticoagulation Patient Findings  Patient Findings     Positives:  Change in medications (finished Bactrim yesterday)    Negatives:  Signs/symptoms of thrombosis, Signs/symptoms of bleeding, Laboratory test error suspected, Change in health, Change in alcohol use, Change in activity, Upcoming invasive procedure, Emergency department visit, Upcoming dental procedure, Missed doses, Extra doses, Change in diet/appetite, Hospital admission, Bruising, Other complaints          Confirmed warfarin  dosing regimen    Pt is not on antiplatelet/NSAID therapy    Falls or accidents since last visit No        A/P   INR  -therapeutic today,     He is done with bactrim will resume usual regimen         Pt educated to contact our clinic with any changes in medications or s/s of bleeding or thrombosis. Pt is aware to seek immediate medical attention for falls, head injury or deep cuts    Follow up appointment in 8 week(s) to reduce risk of adverse events from warfarin  Kelsey Junior, PharmD

## 2022-04-07 ENCOUNTER — PATIENT MESSAGE (OUTPATIENT)
Dept: HEALTH INFORMATION MANAGEMENT | Facility: OTHER | Age: 82
End: 2022-04-07

## 2022-05-04 ENCOUNTER — TELEPHONE (OUTPATIENT)
Dept: VASCULAR LAB | Facility: MEDICAL CENTER | Age: 82
End: 2022-05-04
Payer: MEDICARE

## 2022-05-19 ENCOUNTER — ANTICOAGULATION VISIT (OUTPATIENT)
Dept: MEDICAL GROUP | Facility: MEDICAL CENTER | Age: 82
End: 2022-05-19
Payer: MEDICARE

## 2022-05-19 DIAGNOSIS — Z95.828 PRESENCE OF IVC FILTER: ICD-10-CM

## 2022-05-19 DIAGNOSIS — Z86.718 HISTORY OF DVT IN ADULTHOOD: ICD-10-CM

## 2022-05-19 DIAGNOSIS — Z95.1 S/P CABG X 4: ICD-10-CM

## 2022-05-19 DIAGNOSIS — D68.9 COAGULATION DEFECT (HCC): ICD-10-CM

## 2022-05-19 LAB — INR PPP: 2.1 (ref 2–3.5)

## 2022-05-19 PROCEDURE — 85610 PROTHROMBIN TIME: CPT | Performed by: FAMILY MEDICINE

## 2022-05-19 PROCEDURE — 93793 ANTICOAG MGMT PT WARFARIN: CPT | Performed by: FAMILY MEDICINE

## 2022-05-19 RX ORDER — WARFARIN SODIUM 5 MG/1
5 TABLET ORAL EVERY EVENING
Qty: 90 TABLET | Refills: 3 | Status: SHIPPED | OUTPATIENT
Start: 2022-05-19 | End: 2022-06-14 | Stop reason: SDUPTHER

## 2022-05-19 NOTE — PROGRESS NOTES
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  Family Medicine [phone]        Anticoagulation Patient Findings  Patient Findings     Negatives:  Signs/symptoms of thrombosis, Signs/symptoms of bleeding, Laboratory test error suspected, Change in health, Change in alcohol use, Change in activity, Upcoming invasive procedure, Emergency department visit, Upcoming dental procedure, Missed doses, Extra doses, Change in medications, Change in diet/appetite, Hospital admission, Bruising, Other complaints          HPI:   Paulo Paredes is in the Anticoagulation Clinic today for an INR check on their anticoagulation therapy.     The reason for today's visit is to prevent morbidity and mortality from a blood clot and/or stroke and to reduce the risk of bleeding while on a anticoagulant.     PCP:  Edward Walters M.D.  [address] [place] [address] Dr Cruz SHANE [id number] [phone]    3 vitals included with today's  appt-unless patient declined:  (BP, HR, weight, ht, RR)   There were no vitals filed for this visit.    Verified current warfarin dosing schedule.    Medications reconciled   Pt is not on antiplatelet therapy    Assessment:   INR  therapeutic.     Plan:  Instructed pt to continue on with current regimen.    Follow up:  Follow up appointment in 9 week(s).       Other info:  Pt educated to contact our clinic with any changes in medications or s/s of bleeding or thrombosis.  Education was provided today regarding tips to reduce their bleed risk and dietary constraints while on a anticoagulant.    National Recommendations:  The CHEST guidelines recommends frequent INR monitoring at regular intervals (a few days up to a max of 12 weeks) to ensure patients are on the proper dose of warfarin, and patients are not having any complications from therapy.  INRs can dramatically change over a short time period due to diet, medications, and medical conditions.     Skip Dowling, PharmD, BCACP    [facility]  Phone [phone] fax [phone]

## 2022-05-27 ENCOUNTER — APPOINTMENT (RX ONLY)
Dept: URBAN - METROPOLITAN AREA CLINIC 35 | Facility: CLINIC | Age: 82
Setting detail: DERMATOLOGY
End: 2022-05-27

## 2022-05-27 DIAGNOSIS — R20.2 PARESTHESIA OF SKIN: ICD-10-CM | Status: INADEQUATELY CONTROLLED

## 2022-05-27 DIAGNOSIS — L82.1 OTHER SEBORRHEIC KERATOSIS: ICD-10-CM

## 2022-05-27 PROCEDURE — ? PRESCRIPTION

## 2022-05-27 PROCEDURE — ? COUNSELING

## 2022-05-27 PROCEDURE — 99213 OFFICE O/P EST LOW 20 MIN: CPT

## 2022-05-27 RX ORDER — CAPSAICIN 0.025 %
CREAM (GRAM) TOPICAL
Qty: 45 | Refills: 3 | Status: ERX | COMMUNITY
Start: 2022-05-27

## 2022-05-27 RX ADMIN — Medication THIN LAYER: at 00:00

## 2022-05-27 ASSESSMENT — LOCATION SIMPLE DESCRIPTION DERM
LOCATION SIMPLE: RIGHT AXILLARY VAULT
LOCATION SIMPLE: RIGHT UPPER ARM
LOCATION SIMPLE: POSTERIOR NECK

## 2022-05-27 ASSESSMENT — LOCATION ZONE DERM
LOCATION ZONE: ARM
LOCATION ZONE: NECK
LOCATION ZONE: AXILLAE

## 2022-05-27 ASSESSMENT — LOCATION DETAILED DESCRIPTION DERM
LOCATION DETAILED: RIGHT SUPERIOR POSTERIOR NECK
LOCATION DETAILED: RIGHT ANTERIOR MEDIAL PROXIMAL UPPER ARM
LOCATION DETAILED: RIGHT AXILLARY VAULT

## 2022-06-04 ENCOUNTER — APPOINTMENT (OUTPATIENT)
Dept: RADIOLOGY | Facility: MEDICAL CENTER | Age: 82
End: 2022-06-04
Attending: EMERGENCY MEDICINE
Payer: MEDICARE

## 2022-06-04 ENCOUNTER — HOSPITAL ENCOUNTER (EMERGENCY)
Facility: MEDICAL CENTER | Age: 82
End: 2022-06-04
Attending: EMERGENCY MEDICINE
Payer: MEDICARE

## 2022-06-04 VITALS
DIASTOLIC BLOOD PRESSURE: 69 MMHG | HEIGHT: 68 IN | WEIGHT: 174.16 LBS | TEMPERATURE: 97.3 F | OXYGEN SATURATION: 96 % | SYSTOLIC BLOOD PRESSURE: 118 MMHG | RESPIRATION RATE: 16 BRPM | BODY MASS INDEX: 26.4 KG/M2 | HEART RATE: 67 BPM
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
## 2022-06-04 DIAGNOSIS — S09.90XA CLOSED HEAD INJURY, INITIAL ENCOUNTER: ICD-10-CM

## 2022-06-04 LAB
ALBUMIN SERPL BCP-MCNC: 4.2 G/DL (ref 3.2–4.9)
ALBUMIN/GLOB SERPL: 1.6 G/DL
ALP SERPL-CCNC: 50 U/L (ref 30–99)
ALT SERPL-CCNC: 16 U/L (ref 2–50)
ANION GAP SERPL CALC-SCNC: 11 MMOL/L (ref 7–16)
APTT PPP: 34.9 SEC (ref 24.7–36)
AST SERPL-CCNC: 27 U/L (ref 12–45)
BASOPHILS # BLD AUTO: 0.9 % (ref 0–1.8)
BASOPHILS # BLD: 0.04 K/UL (ref 0–0.12)
BILIRUB SERPL-MCNC: 0.6 MG/DL (ref 0.1–1.5)
BUN SERPL-MCNC: 34 MG/DL (ref 8–22)
CALCIUM SERPL-MCNC: 9.1 MG/DL (ref 8.4–10.2)
CHLORIDE SERPL-SCNC: 104 MMOL/L (ref 96–112)
CO2 SERPL-SCNC: 24 MMOL/L (ref 20–33)
CREAT SERPL-MCNC: 2.17 MG/DL (ref 0.5–1.4)
EOSINOPHIL # BLD AUTO: 0.21 K/UL (ref 0–0.51)
EOSINOPHIL NFR BLD: 4.6 % (ref 0–6.9)
ERYTHROCYTE [DISTWIDTH] IN BLOOD BY AUTOMATED COUNT: 48.3 FL (ref 35.9–50)
GFR SERPLBLD CREATININE-BSD FMLA CKD-EPI: 29 ML/MIN/1.73 M 2
GLOBULIN SER CALC-MCNC: 2.6 G/DL (ref 1.9–3.5)
GLUCOSE SERPL-MCNC: 235 MG/DL (ref 65–99)
HCT VFR BLD AUTO: 44.5 % (ref 42–52)
HGB BLD-MCNC: 14.3 G/DL (ref 14–18)
IMM GRANULOCYTES # BLD AUTO: 0.02 K/UL (ref 0–0.11)
IMM GRANULOCYTES NFR BLD AUTO: 0.4 % (ref 0–0.9)
INR PPP: 2.18 (ref 0.87–1.13)
LYMPHOCYTES # BLD AUTO: 1.63 K/UL (ref 1–4.8)
LYMPHOCYTES NFR BLD: 35.4 % (ref 22–41)
MCH RBC QN AUTO: 31.3 PG (ref 27–33)
MCHC RBC AUTO-ENTMCNC: 32.1 G/DL (ref 33.7–35.3)
MCV RBC AUTO: 97.4 FL (ref 81.4–97.8)
MONOCYTES # BLD AUTO: 0.32 K/UL (ref 0–0.85)
MONOCYTES NFR BLD AUTO: 7 % (ref 0–13.4)
NEUTROPHILS # BLD AUTO: 2.38 K/UL (ref 1.82–7.42)
NEUTROPHILS NFR BLD: 51.7 % (ref 44–72)
NRBC # BLD AUTO: 0 K/UL
NRBC BLD-RTO: 0 /100 WBC
PLATELET # BLD AUTO: 121 K/UL (ref 164–446)
PMV BLD AUTO: 10.8 FL (ref 9–12.9)
POTASSIUM SERPL-SCNC: 4.9 MMOL/L (ref 3.6–5.5)
PROT SERPL-MCNC: 6.8 G/DL (ref 6–8.2)
PROTHROMBIN TIME: 22.7 SEC (ref 12–14.6)
RBC # BLD AUTO: 4.57 M/UL (ref 4.7–6.1)
SODIUM SERPL-SCNC: 139 MMOL/L (ref 135–145)
WBC # BLD AUTO: 4.6 K/UL (ref 4.8–10.8)

## 2022-06-04 PROCEDURE — 70450 CT HEAD/BRAIN W/O DYE: CPT | Mod: ME

## 2022-06-04 PROCEDURE — 80053 COMPREHEN METABOLIC PANEL: CPT

## 2022-06-04 PROCEDURE — 36415 COLL VENOUS BLD VENIPUNCTURE: CPT

## 2022-06-04 PROCEDURE — 72125 CT NECK SPINE W/O DYE: CPT | Mod: ME

## 2022-06-04 PROCEDURE — 85025 COMPLETE CBC W/AUTO DIFF WBC: CPT

## 2022-06-04 PROCEDURE — 85610 PROTHROMBIN TIME: CPT

## 2022-06-04 PROCEDURE — 85730 THROMBOPLASTIN TIME PARTIAL: CPT

## 2022-06-04 PROCEDURE — 99284 EMERGENCY DEPT VISIT MOD MDM: CPT

## 2022-06-04 ASSESSMENT — FIBROSIS 4 INDEX: FIB4 SCORE: 3.57

## 2022-06-04 NOTE — ED TRIAGE NOTES
"Presents accompanied by family.  He C/O a GLF experienced earlier today.  He describes hitting his forehead on his lawn.  He denies any LOC, and states being on Coumadin, after a SVC filter placement approximately 10-15 years ago.   Chief Complaint   Patient presents with   • Fall   • Head Injury     /68   Pulse 66   Temp 36.2 °C (97.1 °F) (Temporal)   Resp 18   Ht 1.727 m (5' 8\")   Wt 79 kg (174 lb 2.6 oz)   SpO2 97%   BMI 26.48 kg/m²    Has this patient been vaccinated for COVID YES  If not, would they like to be vaccinated while in the ER if eligible?  N/A  Would the patient like to speak with the ERP about the possibility of receiving the COVID vaccine today before making a decision? N/A     "

## 2022-06-04 NOTE — DISCHARGE INSTRUCTIONS
Your CT scan shows no bleeding in the brain, or fractures or other dangerous injury.  There is a very small risk of a delayed bleed with your blood thinner that you take so if you start developing more severe headaches, vomiting or other concerns please seek immediate medical attention

## 2022-06-04 NOTE — ED PROVIDER NOTES
ED Provider Note    ER PROVIDER NOTE          CHIEF COMPLAINT  Chief Complaint   Patient presents with   • Fall   • Head Injury       HPI  Paulo Paredes is a 81 y.o. male who presents to the emergency department complaining of fall and head injury.  Patient reports 45 minutes ago, he was at home, working on a planter when he leaned too far forward and fell hitting his head.  He reports no LOC.  He is reporting some headache as well as tightness in his neck.  He reports no nausea or vomiting.  No focal weakness numbness or tingling.  No chest pain abdominal pain or extremity pain or other focal areas of pain.  Reports no syncope, lightheadedness or palpitations.  He does take Coumadin for DVT    REVIEW OF SYSTEMS  Pertinent positives include head injury. Pertinent negatives include no LOC. See HPI for details. All other systems reviewed and are negative.    PAST MEDICAL HISTORY   has a past medical history of Anesthesia, Basal cell carcinoma of skin, CAD (coronary artery disease), Cancer ([facility]), Chronic kidney disease (CKD) stage G3b/A2, moderately decreased glomerular filtration rate (GFR) between 30-44 mL/min/1.73 square meter and albuminuria creatinine ratio between  mg/g ([facility]) (4/23/2015), DVT (deep venous thrombosis) ([facility]) (2014), ED (erectile dysfunction), GERD (gastroesophageal reflux disease), Glaucoma, Heart burn, Hyperlipidemia (12/3/2012), Hypertension, Indigestion, Multiple pulmonary emboli ([facility]) (2014), Multiple thyroid nodules (2009), Papillary carcinoma of thyroid ([facility]) (2014), postsurgical hypothyroidism (2014), Pre-diabetes, Renal disorder, S/P CABG x 4 (2003), S/P colectomy (2010), S/p nephrectomy (1998), S/P prostatectomy (2008), Stroke ([facility]), Transient renal failure (2014), Type II or unspecified type diabetes mellitus without mention of complication, not stated as uncontrolled, and Unspecified urinary incontinence.    SURGICAL HISTORY   has a past surgical history that includes colon  "resection; nephrectomy radical; multiple coronary artery bypass; prostatectomy, radical retro; other orthopedic surgery; thyroidectomy total (5/13/2014); and node dissection (5/13/2014).    FAMILY HISTORY  Family History   Problem Relation Age of Onset   • Diabetes Mother    • Heart Disease Mother    • Diabetes Father    • Cancer Father         pancreas   • Thyroid Sister         Cancer   • Cancer Sister         thyroid   • Diabetes Sister    • Cancer Brother 49        colon   • Diabetes Brother    • Diabetes Maternal Grandfather    • Diabetes Paternal Grandmother    • Diabetes Paternal Grandfather    • No Known Problems Maternal Grandmother        SOCIAL HISTORY  Social History     Socioeconomic History   • Marital status:    Tobacco Use   • Smoking status: Never Smoker   • Smokeless tobacco: Never Used   Vaping Use   • Vaping Use: Never used   Substance and Sexual Activity   • Alcohol use: Yes     Comment: Occasionally   • Drug use: No   • Sexual activity: Not Currently     Comment: retired       Social History     Substance and Sexual Activity   Drug Use No       CURRENT MEDICATIONS  Home Medications    **Home medications have not yet been reviewed for this encounter**         ALLERGIES  Allergies   Allergen Reactions   • Lactose    • Seasonal        PHYSICAL EXAM  VITAL SIGNS: /69   Pulse 67   Temp 36.3 °C (97.3 °F)   Resp 16   Ht 1.727 m (5' 8\")   Wt 79 kg (174 lb 2.6 oz)   SpO2 96%   BMI 26.48 kg/m²   Pulse ox interpretation: I interpret this pulse ox as normal.    Constitutional: Alert in no apparent distress.  HENT: There is some slight bruising to the left forehead, no Miller's or raccoons, Bilateral external ears normal, Nose normal.   Eyes: Pupils are equal and reactive, Conjunctiva normal, Non-icteric.   Neck: Normal range of motion, there is some left sided tenderness no step-offs or deformities supple, No stridor.   Cardiovascular: Regular rate and rhythm, no " murmurs.   Thorax & Lungs: Normal breath sounds, No respiratory distress, No wheezing, No chest tenderness.   Abdomen: Bowel sounds normal, Soft, No tenderness, No masses, No pulsatile masses. No peritoneal signs.  Skin: Warm, Dry, No erythema, No rash.   Back: No bony tenderness, No CVA tenderness.   Extremities: Intact distal pulses, No edema, No tenderness, No cyanosis, Negative Nicole's sign.  Musculoskeletal: Good range of motion in all major joints. No tenderness to palpation or major deformities noted.   Neurologic: Alert , equal  strength, no drift, sensation is intact to light touch throughout upper and lower extremities, ambulates with steady gait normal motor function, Normal sensory function, No focal deficits noted.   Psychiatric: Affect normal, Judgment normal, Mood normal.     DIAGNOSTIC STUDIES / PROCEDURES        LABS  Labs Reviewed   CBC WITH DIFFERENTIAL - Abnormal; Notable for the following components:       Result Value    WBC 4.6 (*)     RBC 4.57 (*)     MCHC 32.1 (*)     Platelet Count 121 (*)     All other components within normal limits    Narrative:     Indicate which anticoagulants the patient is on:->UNKNOWN   COMP METABOLIC PANEL - Abnormal; Notable for the following components:    Glucose 235 (*)     Bun 34 (*)     Creatinine 2.17 (*)     All other components within normal limits    Narrative:     Indicate which anticoagulants the patient is on:->UNKNOWN   PROTHROMBIN TIME - Abnormal; Notable for the following components:    PT 22.7 (*)     INR 2.18 (*)     All other components within normal limits    Narrative:     Indicate which anticoagulants the patient is on:->UNKNOWN   ESTIMATED GFR - Abnormal; Notable for the following components:    GFR (CKD-EPI) 29 (*)     All other components within normal limits    Narrative:     Indicate which anticoagulants the patient is on:->UNKNOWN   APTT    Narrative:     Indicate which anticoagulants the patient is on:->UNKNOWN       All labs  reviewed by me.    RADIOLOGY  CT-HEAD W/O   Final Result         NO ACUTE ABNORMALITIES ARE NOTED ON CT SCAN OF THE HEAD.      Findings are consistent with atrophy.  Decreased attenuation in the periventricular white matter likely indicates microvascular ischemic disease.         CT-CSPINE WITHOUT PLUS RECONS   Final Result      No acute fracture or dislocation seen in the CT scan of the cervical spine.        The radiologist's interpretation of all radiological studies have been reviewed and images independently viewed by me.    COURSE & MEDICAL DECISION MAKING  Nursing notes, VS, PMSFHx reviewed in chart.    9:35 AM Patient seen and examined at bedside. . Ordered for CT head and cervical spine, coags to evaluate his symptoms.     10:33 AM  Patient is reevaluated, he is comfortable, well-appearing, no complaints at this time.  I discussed results with him and he is agreeable to discharge        Decision Making:  This is a 81 y.o. male presenting after a fall resulting in a head injury.  This was a fall from standing, and there was no LOC.  However patient is anticoagulated on Coumadin, so I did obtain expeditious CT scanning of his head and cervical spine.  There is no evidence of intracranial bleed or skull fracture or spinal fracture either.  Patient is overall quite well-appearing, neurologically intact.  No reports of other pain or tenderness on exam to suggest other traumatic injury at this time.  His INR is within therapeutic range.  His creatinine is slightly elevated although this is baseline for the patient.  I discussed with the patient home care as well as return precautions specifically addressing the potential of delayed bleed which he and his wife understand well     The patient will return for new or worsening symptoms and is stable at the time of discharge.    The patient is referred to a primary physician for blood pressure management, diabetic screening, and for all other preventative health  concerns.      DISPOSITION:  Patient will be discharged home in stable condition.    FOLLOW UP:  Edward Walters M.D.  [address] [place] [address] [place] [address] [phone]    In 1 week  As needed      OUTPATIENT MEDICATIONS:  New Prescriptions    No medications on file         FINAL IMPRESSION  1. Fall, initial encounter    2. Closed head injury, initial encounter         The note accurately reflects work and decisions made by me.  Pop Briggs M.D.  6/4/2022  10:39 AM

## 2022-06-06 ENCOUNTER — HOSPITAL ENCOUNTER (OUTPATIENT)
Dept: LAB | Facility: MEDICAL CENTER | Age: 82
End: 2022-06-06
Attending: INTERNAL MEDICINE
Payer: MEDICARE

## 2022-06-06 DIAGNOSIS — D69.6 THROMBOCYTOPENIA (HCC): ICD-10-CM

## 2022-06-06 DIAGNOSIS — I10 ESSENTIAL HYPERTENSION, BENIGN: ICD-10-CM

## 2022-06-06 DIAGNOSIS — E11.8 CONTROLLED TYPE 2 DIABETES MELLITUS WITH COMPLICATION, WITHOUT LONG-TERM CURRENT USE OF INSULIN (HCC): ICD-10-CM

## 2022-06-06 DIAGNOSIS — E78.5 DYSLIPIDEMIA: ICD-10-CM

## 2022-06-06 LAB
ALBUMIN SERPL BCP-MCNC: 4.1 G/DL (ref 3.2–4.9)
ALBUMIN/GLOB SERPL: 1.5 G/DL
ALP SERPL-CCNC: 46 U/L (ref 30–99)
ALT SERPL-CCNC: 17 U/L (ref 2–50)
ANION GAP SERPL CALC-SCNC: 10 MMOL/L (ref 7–16)
ANION GAP SERPL CALC-SCNC: 9 MMOL/L (ref 7–16)
AST SERPL-CCNC: 24 U/L (ref 12–45)
BASOPHILS # BLD AUTO: 0.8 % (ref 0–1.8)
BASOPHILS # BLD: 0.04 K/UL (ref 0–0.12)
BILIRUB SERPL-MCNC: 0.4 MG/DL (ref 0.1–1.5)
BUN SERPL-MCNC: 31 MG/DL (ref 8–22)
BUN SERPL-MCNC: 31 MG/DL (ref 8–22)
CALCIUM SERPL-MCNC: 9.1 MG/DL (ref 8.4–10.2)
CALCIUM SERPL-MCNC: 9.1 MG/DL (ref 8.4–10.2)
CHLORIDE SERPL-SCNC: 105 MMOL/L (ref 96–112)
CHLORIDE SERPL-SCNC: 105 MMOL/L (ref 96–112)
CHOLEST SERPL-MCNC: 142 MG/DL (ref 100–199)
CO2 SERPL-SCNC: 24 MMOL/L (ref 20–33)
CO2 SERPL-SCNC: 24 MMOL/L (ref 20–33)
CREAT SERPL-MCNC: 1.95 MG/DL (ref 0.5–1.4)
CREAT SERPL-MCNC: 1.99 MG/DL (ref 0.5–1.4)
CREAT UR-MCNC: 128.3 MG/DL
EOSINOPHIL # BLD AUTO: 0.19 K/UL (ref 0–0.51)
EOSINOPHIL NFR BLD: 4 % (ref 0–6.9)
ERYTHROCYTE [DISTWIDTH] IN BLOOD BY AUTOMATED COUNT: 47.7 FL (ref 35.9–50)
ERYTHROCYTE [DISTWIDTH] IN BLOOD BY AUTOMATED COUNT: 47.8 FL (ref 35.9–50)
EST. AVERAGE GLUCOSE BLD GHB EST-MCNC: 171 MG/DL
FASTING STATUS PATIENT QL REPORTED: NORMAL
FASTING STATUS PATIENT QL REPORTED: NORMAL
GFR SERPLBLD CREATININE-BSD FMLA CKD-EPI: 33 ML/MIN/1.73 M 2
GFR SERPLBLD CREATININE-BSD FMLA CKD-EPI: 33 ML/MIN/1.73 M 2
GLOBULIN SER CALC-MCNC: 2.7 G/DL (ref 1.9–3.5)
GLUCOSE SERPL-MCNC: 169 MG/DL (ref 65–99)
GLUCOSE SERPL-MCNC: 171 MG/DL (ref 65–99)
HBA1C MFR BLD: 7.6 % (ref 4–5.6)
HCT VFR BLD AUTO: 45.2 % (ref 42–52)
HCT VFR BLD AUTO: 45.7 % (ref 42–52)
HDLC SERPL-MCNC: 32 MG/DL
HGB BLD-MCNC: 14.4 G/DL (ref 14–18)
HGB BLD-MCNC: 14.6 G/DL (ref 14–18)
IMM GRANULOCYTES # BLD AUTO: 0.02 K/UL (ref 0–0.11)
IMM GRANULOCYTES NFR BLD AUTO: 0.4 % (ref 0–0.9)
LDLC SERPL CALC-MCNC: 73 MG/DL
LYMPHOCYTES # BLD AUTO: 1.66 K/UL (ref 1–4.8)
LYMPHOCYTES NFR BLD: 34.9 % (ref 22–41)
MCH RBC QN AUTO: 31 PG (ref 27–33)
MCH RBC QN AUTO: 31.1 PG (ref 27–33)
MCHC RBC AUTO-ENTMCNC: 31.9 G/DL (ref 33.7–35.3)
MCHC RBC AUTO-ENTMCNC: 31.9 G/DL (ref 33.7–35.3)
MCV RBC AUTO: 97.4 FL (ref 81.4–97.8)
MCV RBC AUTO: 97.4 FL (ref 81.4–97.8)
MICROALBUMIN UR-MCNC: 7.1 MG/DL
MICROALBUMIN/CREAT UR: 55 MG/G (ref 0–30)
MONOCYTES # BLD AUTO: 0.38 K/UL (ref 0–0.85)
MONOCYTES NFR BLD AUTO: 8 % (ref 0–13.4)
NEUTROPHILS # BLD AUTO: 2.46 K/UL (ref 1.82–7.42)
NEUTROPHILS NFR BLD: 51.9 % (ref 44–72)
NRBC # BLD AUTO: 0 K/UL
NRBC BLD-RTO: 0 /100 WBC
PLATELET # BLD AUTO: 117 K/UL (ref 164–446)
PLATELET # BLD AUTO: 120 K/UL (ref 164–446)
PMV BLD AUTO: 10.5 FL (ref 9–12.9)
PMV BLD AUTO: 10.6 FL (ref 9–12.9)
POTASSIUM SERPL-SCNC: 4.9 MMOL/L (ref 3.6–5.5)
POTASSIUM SERPL-SCNC: 5 MMOL/L (ref 3.6–5.5)
PROT SERPL-MCNC: 6.8 G/DL (ref 6–8.2)
RBC # BLD AUTO: 4.64 M/UL (ref 4.7–6.1)
RBC # BLD AUTO: 4.69 M/UL (ref 4.7–6.1)
SODIUM SERPL-SCNC: 138 MMOL/L (ref 135–145)
SODIUM SERPL-SCNC: 139 MMOL/L (ref 135–145)
TRIGL SERPL-MCNC: 186 MG/DL (ref 0–149)
WBC # BLD AUTO: 4.8 K/UL (ref 4.8–10.8)
WBC # BLD AUTO: 4.9 K/UL (ref 4.8–10.8)

## 2022-06-06 PROCEDURE — 85027 COMPLETE CBC AUTOMATED: CPT

## 2022-06-06 PROCEDURE — 80053 COMPREHEN METABOLIC PANEL: CPT

## 2022-06-06 PROCEDURE — 85025 COMPLETE CBC W/AUTO DIFF WBC: CPT

## 2022-06-06 PROCEDURE — 80048 BASIC METABOLIC PNL TOTAL CA: CPT

## 2022-06-06 PROCEDURE — 36415 COLL VENOUS BLD VENIPUNCTURE: CPT

## 2022-06-06 PROCEDURE — 83036 HEMOGLOBIN GLYCOSYLATED A1C: CPT

## 2022-06-06 PROCEDURE — 82043 UR ALBUMIN QUANTITATIVE: CPT

## 2022-06-06 PROCEDURE — 82570 ASSAY OF URINE CREATININE: CPT

## 2022-06-06 PROCEDURE — 80061 LIPID PANEL: CPT

## 2022-06-14 ENCOUNTER — OFFICE VISIT (OUTPATIENT)
Dept: MEDICAL GROUP | Age: 82
End: 2022-06-14
Payer: MEDICARE

## 2022-06-14 VITALS
OXYGEN SATURATION: 97 % | TEMPERATURE: 97 F | WEIGHT: 175.8 LBS | HEIGHT: 68 IN | HEART RATE: 62 BPM | SYSTOLIC BLOOD PRESSURE: 130 MMHG | DIASTOLIC BLOOD PRESSURE: 66 MMHG | BODY MASS INDEX: 26.64 KG/M2

## 2022-06-14 DIAGNOSIS — D68.9 COAGULATION DEFECT (HCC): ICD-10-CM

## 2022-06-14 DIAGNOSIS — I25.10 CAD IN NATIVE ARTERY: ICD-10-CM

## 2022-06-14 DIAGNOSIS — N18.32 STAGE 3B CHRONIC KIDNEY DISEASE: ICD-10-CM

## 2022-06-14 DIAGNOSIS — I70.0 ATHEROSCLEROSIS OF AORTA (HCC): ICD-10-CM

## 2022-06-14 DIAGNOSIS — Z79.01 ANTICOAGULATED ON COUMADIN: ICD-10-CM

## 2022-06-14 DIAGNOSIS — E78.5 DYSLIPIDEMIA: ICD-10-CM

## 2022-06-14 DIAGNOSIS — E89.0 POSTSURGICAL HYPOTHYROIDISM: ICD-10-CM

## 2022-06-14 DIAGNOSIS — I10 ESSENTIAL HYPERTENSION, BENIGN: ICD-10-CM

## 2022-06-14 DIAGNOSIS — D69.6 THROMBOCYTOPENIA (HCC): ICD-10-CM

## 2022-06-14 DIAGNOSIS — Z86.73 H/O TIA (TRANSIENT ISCHEMIC ATTACK) AND STROKE: ICD-10-CM

## 2022-06-14 DIAGNOSIS — E11.29 MICROALBUMINURIA DUE TO TYPE 2 DIABETES MELLITUS (HCC): ICD-10-CM

## 2022-06-14 DIAGNOSIS — Z13.79 GENETIC TESTING: ICD-10-CM

## 2022-06-14 DIAGNOSIS — Z95.1 S/P CABG X 4: ICD-10-CM

## 2022-06-14 DIAGNOSIS — E11.8 CONTROLLED TYPE 2 DIABETES MELLITUS WITH COMPLICATION, WITHOUT LONG-TERM CURRENT USE OF INSULIN (HCC): ICD-10-CM

## 2022-06-14 DIAGNOSIS — R80.9 MICROALBUMINURIA DUE TO TYPE 2 DIABETES MELLITUS (HCC): ICD-10-CM

## 2022-06-14 PROCEDURE — 99214 OFFICE O/P EST MOD 30 MIN: CPT | Performed by: INTERNAL MEDICINE

## 2022-06-14 RX ORDER — CAPSAICIN 0.025 %
CREAM (GRAM) TOPICAL
COMMUNITY
Start: 2022-06-01

## 2022-06-14 RX ORDER — WARFARIN SODIUM 5 MG/1
5 TABLET ORAL EVERY EVENING
Qty: 100 TABLET | Refills: 3 | Status: SHIPPED | OUTPATIENT
Start: 2022-06-14 | End: 2023-08-14 | Stop reason: SDUPTHER

## 2022-06-14 RX ORDER — LEVOTHYROXINE SODIUM 137 UG/1
TABLET ORAL
Qty: 100 TABLET | Refills: 3 | Status: SHIPPED | OUTPATIENT
Start: 2022-06-14 | End: 2023-09-11 | Stop reason: SDUPTHER

## 2022-06-14 RX ORDER — ATORVASTATIN CALCIUM 40 MG/1
40 TABLET, FILM COATED ORAL DAILY
Qty: 100 TABLET | Refills: 3 | Status: SHIPPED | OUTPATIENT
Start: 2022-06-14 | End: 2022-11-22

## 2022-06-14 RX ORDER — DULAGLUTIDE 0.75 MG/.5ML
INJECTION, SOLUTION SUBCUTANEOUS
Qty: 12 ML | Refills: 1 | Status: SHIPPED | OUTPATIENT
Start: 2022-06-14 | End: 2022-11-23 | Stop reason: SDUPTHER

## 2022-06-14 RX ORDER — FENOFIBRATE 160 MG/1
160 TABLET ORAL DAILY
Qty: 100 TABLET | Refills: 3 | Status: SHIPPED | OUTPATIENT
Start: 2022-06-14 | End: 2023-08-02 | Stop reason: SDUPTHER

## 2022-06-14 RX ORDER — ATENOLOL 25 MG/1
25 TABLET ORAL DAILY
Qty: 100 TABLET | Refills: 1 | Status: SHIPPED | OUTPATIENT
Start: 2022-06-14 | End: 2023-05-10

## 2022-06-14 RX ORDER — LOSARTAN POTASSIUM 50 MG/1
50 TABLET ORAL DAILY
Qty: 100 TABLET | Refills: 5 | Status: SHIPPED | OUTPATIENT
Start: 2022-06-14 | End: 2023-07-10 | Stop reason: SDUPTHER

## 2022-06-14 ASSESSMENT — PATIENT HEALTH QUESTIONNAIRE - PHQ9: CLINICAL INTERPRETATION OF PHQ2 SCORE: 0

## 2022-06-14 ASSESSMENT — FIBROSIS 4 INDEX: FIB4 SCORE: 4.13

## 2022-06-14 NOTE — PROGRESS NOTES
Annual Health Assessment Questions:    1.  Are you currently engaging in any exercise or physical activity? Yes    2.  How would you describe your mood or emotional well-being today? good    3.  Have you had any falls in the last year? Yes    4.  Have you noticed any problems with your balance or had difficulty walking? No    5.  In the last six months have you experienced any leakage of urine? Yes    6. DPA/Advanced Directive: Patient has Advanced Directive, Living Will and Durable Power of , but it is not on file. Instructed to bring in a copy to scan into their chart.

## 2022-06-14 NOTE — PROGRESS NOTES
CHIEF COMPLAINT  Chief Complaint   Patient presents with   • Lab Results   DM2    HPI  Paulo Paredes is a 81 y.o. male who presents today for the following chronic medical conditions:  · DM2, A1c 6.3  · Microalbuminuria  · CKD stage III  · Hypertension  · CAD  · Atherosclerosis aorta  · On Coumadin/coagulation defect  · History of TIA  · S/p CABG  · Dyslipidemia  · Thrombocytopenia  · Hypothyroidism    Reviewed PMH, PSH, FH, SH, ALL, HCM/IMM, no changes  Reviewed MEDS, no changes    Patient Active Problem List    Diagnosis Date Noted   • Atherosclerosis of aorta (HCC) 07/08/2021   • Anticoagulated on Coumadin 07/08/2021   • Essential hypertension, benign 10/27/2020   • H/O TIA (transient ischemic attack) and stroke 05/17/2020   • Thrombocytopenia ([facility]) 01/16/2020   • Stage 3b chronic kidney disease ([facility]) 01/16/2020   • Microalbuminuria due to type 2 diabetes mellitus ([facility]) 07/16/2019   • Hx pulmonary embolism 07/16/2019   • History of DVT in adulthood 07/16/2019   • Dyslipidemia 03/19/2018   • Postsurgical hypothyroidism 08/03/2015   • Coagulation defect ([facility])- on warfarin 04/23/2015   • Presence of IVC filter 06/02/2014   • CAD in native artery 01/22/2014   • GERD (gastroesophageal reflux disease) 12/03/2012   • Controlled type 2 diabetes mellitus with complication, without long-term current use of insulin ([facility]) 12/03/2012   • S/P CABG x 4 12/03/2012   • Single kidney 12/03/2012     CURRENT MEDICATIONS  Current Outpatient Medications   Medication Sig Dispense Refill   • warfarin (COUMADIN) 5 MG Tab Take 1 Tablet by mouth every evening. Take 0.5 to 1 tablet by mouth once daily. Take as directed by anticoagulation clinic. 100 Tablet 3   • atenolol (TENORMIN) 25 MG Tab Take 1 Tablet by mouth every day. 100 Tablet 1   • Dulaglutide (TRULICITY) 0.75 MG/0.5ML Solution Pen-injector INJECT 1 SYRINGE SUBCUTANEOUSLY ONCE A WEEK AS DIRECTED 12 mL 1   • fenofibrate (TRIGLIDE) 160 MG tablet Take 1 Tablet by mouth every day.  100 Tablet 3   • atorvastatin (LIPITOR) 40 MG Tab Take 1 Tablet by mouth every day. 100 Tablet 3   • losartan (COZAAR) 50 MG Tab Take 1 Tablet by mouth every day. 100 Tablet 5   • levothyroxine (EUTHYROX) 137 MCG Tab TAKE 1 TABLET BY MOUTH ONCE DAILY IN THE MORNING ON  AN  EMPTY  STOMACH  ONE  HOUR  BEFORE  EATING 100 Tablet 3   • capsaicin (ZOSTRIX) 0.025 % cream APPLY TO ITCHY AREA ON NECK UP TO 5 TIMES A DAY FOR 1 WEEK, THEN 3 TIMES A DAY FOR 3 TO 6 WEEKS. WASH HANDS THOROUGHLY AFTER APPLYING. DO TEST SPOT 1ST     • Multiple Vitamins-Minerals (ZINC PO) Take  by mouth.     • Ginkgo Biloba 40 MG Tab Take  by mouth.     • hydrocortisone 2.5 % Cream topical cream APPLY TO RASH ON GROIN TWICE DAILY FOR 1 WEEK WITH FLARES     • Calcium Carb-Cholecalciferol (CALCIUM 1000 + D PO) Take 1 Dose by mouth every day.     • Omega-3 Krill Oil 300 MG Cap Take 300 mg by mouth every day.     • Cinnamon 500 MG Cap Take 1,000 mg by mouth.     • Cyanocobalamin (VITAMIN B-12) 1000 MCG Tab Take 1,000 mcg by mouth every day.     • Coenzyme Q10 (CO Q-10 PO) Take 1 Dose by mouth every day. Unknown OTC Strength       No current facility-administered medications for this visit.     ALLERGIES  Allergies: Lactose and Seasonal  PAST MEDICAL HISTORY  Past Medical History:   Diagnosis Date   • Chronic kidney disease (CKD) stage G3b/A2, moderately decreased glomerular filtration rate (GFR) between 30-44 mL/min/1.73 square meter and albuminuria creatinine ratio between  mg/g ([facility]) 4/23/2015   • postsurgical hypothyroidism 2014   • Papillary carcinoma of thyroid (HCC) 2014    R 2 foci / 4.5mm,0.25mm / no mets/ pT1pN0   • DVT (deep venous thrombosis) ([facility]) 2014   • Multiple pulmonary emboli (HCC) 2014   • Transient renal failure 2014    renal vein thrombosis   • Hyperlipidemia 12/3/2012   • S/P colectomy 2010    multiple colon polyps / no Ca   • Multiple thyroid nodules 2009   • S/P prostatectomy 2008    carcinoma   • S/P CABG x 4 2003   • S/p  nephrectomy 1998    carcinoma   • Anesthesia     difficult intubation with surgery ,   • Basal cell carcinoma of skin    • CAD (coronary artery disease)    • Cancer (HCC)     rt  kidney ;  thyroid cancer , prostate 2008, skin   • ED (erectile dysfunction)    • GERD (gastroesophageal reflux disease)    • Glaucoma    • Heart burn    • Hypertension    • Indigestion    • Pre-diabetes    • Renal disorder     rt kidney cancer,nephrectomy   • Stroke (HCC)     'mild',no residual,'one doctor said it was a tia'   • Type II or unspecified type diabetes mellitus without mention of complication, not stated as uncontrolled     on no medications   • Unspecified urinary incontinence      SURGICAL HISTORY  He  has a past surgical history that includes colon resection; nephrectomy radical; multiple coronary artery bypass; prostatectomy, radical retro; other orthopedic surgery; thyroidectomy total (2014); and node dissection (2014).  SOCIAL HISTORY  Social History     Tobacco Use   • Smoking status: Never Smoker   • Smokeless tobacco: Never Used   Vaping Use   • Vaping Use: Never used   Substance Use Topics   • Alcohol use: Yes     Comment: Occasionally   • Drug use: No     Social History     Social History Narrative   • Not on file     FAMILY HISTORY  Family History   Problem Relation Age of Onset   • Diabetes Mother    • Heart Disease Mother    • Diabetes Father    • Cancer Father         pancreas   • Thyroid Sister         Cancer   • Cancer Sister         thyroid   • Diabetes Sister    • Cancer Brother 49        colon   • Diabetes Brother    • Diabetes Maternal Grandfather    • Diabetes Paternal Grandmother    • Diabetes Paternal Grandfather    • No Known Problems Maternal Grandmother      Family Status   Relation Name Status   • Mo     • Fa     • Sis     • Bro     • MGFa     • PGMo     • PGFa     • MGMo       ROS   Constitutional: Negative  "for fever, chills, fatigue.  HENT: Negative for congestion, sore throat.  Eyes: Negative for vision problems.   Respiratory: Negative for cough, shortness of breath.  Cardiovascular: Negative for chest pain, palpitations.   Gastrointestinal: Negative for heartburn, nausea, abdominal pain.   Genitourinary: Negative for dysuria.  Musculoskeletal: Negative for significant myalgia, back and joint pain.   Skin: Negative for rash.   Neuro: Negative for dizziness, weakness and headaches.   Endo/Heme/Allergies: Does not bruise/bleed easily.   Psychiatric/Behavioral: Negative for depression.    PHYSICAL EXAM   Blood Pressure 130/66 (BP Location: Right arm, Patient Position: Sitting, BP Cuff Size: Adult)   Pulse 62   Temperature 36.1 °C (97 °F) (Temporal)   Height 1.727 m (5' 8\")   Weight 79.7 kg (175 lb 12.8 oz)   Oxygen Saturation 97%  Body mass index is 26.73 kg/m².  General:  NAD, well appearing  HEENT:   NC/AT, PERRLA, EOMI.  Cardiovascular: unlabored breathing, no peripheral cyanosis or swelling.  Lungs:   no respiratory distress.  Abdomen: non- distended.  Extremities:  No LE swelling.  Skin:  Warm, dry.  No erythema. No rash.   Neurologic: Alert & oriented x 3. CN II-XII grossly intact. No focal deficits.  Psychiatric:  Affect normal, mood normal, judgment normal.    Labs     Labs are reviewed and discussed with a patient  Lab Results   Component Value Date/Time    CHOLSTRLTOT 142 06/06/2022 07:19 AM    LDL 73 06/06/2022 07:19 AM    HDL 32 (A) 06/06/2022 07:19 AM    TRIGLYCERIDE 186 (H) 06/06/2022 07:19 AM       Lab Results   Component Value Date/Time    SODIUM 139 06/06/2022 07:24 AM    POTASSIUM 4.9 06/06/2022 07:24 AM    CHLORIDE 105 06/06/2022 07:24 AM    CO2 24 06/06/2022 07:24 AM    GLUCOSE 171 (H) 06/06/2022 07:24 AM    BUN 31 (H) 06/06/2022 07:24 AM    CREATININE 1.99 (H) 06/06/2022 07:24 AM     Lab Results   Component Value Date/Time    ALKPHOSPHAT 46 06/06/2022 07:19 AM    ASTSGOT 24 06/06/2022 07:19 AM "    ALTSGPT 17 06/06/2022 07:19 AM    TBILIRUBIN 0.4 06/06/2022 07:19 AM      Lab Results   Component Value Date/Time    HBA1C 7.6 (H) 06/06/2022 07:19 AM    HBA1C 7.5 (H) 12/02/2021 12:14 PM    HBA1C 7.5 (H) 10/26/2021 07:48 AM     No results found for: TSH  Lab Results   Component Value Date/Time    FREET4 1.48 12/10/2021 12:17 PM    FREET4 1.79 (H) 05/10/2021 07:19 AM       Lab Results   Component Value Date/Time    WBC 4.9 06/06/2022 07:24 AM    RBC 4.64 (L) 06/06/2022 07:24 AM    HEMOGLOBIN 14.4 06/06/2022 07:24 AM    HEMATOCRIT 45.2 06/06/2022 07:24 AM    MCV 97.4 06/06/2022 07:24 AM    MCH 31.0 06/06/2022 07:24 AM    MCHC 31.9 (L) 06/06/2022 07:24 AM    MPV 10.5 06/06/2022 07:24 AM    NEUTSPOLYS 51.90 06/06/2022 07:19 AM    LYMPHOCYTES 34.90 06/06/2022 07:19 AM    MONOCYTES 8.00 06/06/2022 07:19 AM    EOSINOPHILS 4.00 06/06/2022 07:19 AM    BASOPHILS 0.80 06/06/2022 07:19 AM      Imaging     None    Assessment and Plan     Paulo Paredes is a 81 y.o. male    1. Controlled type 2 diabetes mellitus with complication, without long-term current use of insulin (HCC)  Stable and controlled chronic medical condition, continue current treatment  - Referral to Ophthalmology  - Comp Metabolic Panel; Future  - HEMOGLOBIN A1C; Future  - MICROALBUMIN CREAT RATIO URINE; Future  2. Microalbuminuria due to type 2 diabetes mellitus (HCC)  3. Stage 3b chronic kidney disease (HCC)  - Comp Metabolic Panel; Future    4. Essential hypertension, benign  Stable and controlled chronic medical condition, continue current treatment, cardiology f/u  - Referral to Ophthalmology  - Comp Metabolic Panel; Future  - atenolol (TENORMIN) 25 MG Tab; Take 1 Tablet by mouth every day.  Dispense: 100 Tablet; Refill: 1  - losartan (COZAAR) 50 MG Tab; Take 1 Tablet by mouth every day.  Dispense: 100 Tablet; Refill: 5  5. CAD in native artery  - atorvastatin (LIPITOR) 40 MG Tab; Take 1 Tablet by mouth every day.  Dispense: 100 Tablet; Refill: 3  6.  Atherosclerosis of aorta (HCC)  7. Anticoagulated on Coumadin  8. Coagulation defect (HCC)- on warfarin  - warfarin (COUMADIN) 5 MG Tab; Take 1 Tablet by mouth every evening. Take 0.5 to 1 tablet by mouth once daily. Take as directed by anticoagulation clinic.  Dispense: 100 Tablet; Refill: 3  9. H/O TIA (transient ischemic attack) and stroke  10. S/P CABG x 4  - warfarin (COUMADIN) 5 MG Tab; Take 1 Tablet by mouth every evening. Take 0.5 to 1 tablet by mouth once daily. Take as directed by anticoagulation clinic.  Dispense: 100 Tablet; Refill: 3    11. Dyslipidemia  Stable and controlled chronic medical condition, continue current treatment  - Referral to Ophthalmology  - Comp Metabolic Panel; Future  - Lipid Profile; Future  - fenofibrate (TRIGLIDE) 160 MG tablet; Take 1 Tablet by mouth every day.  Dispense: 100 Tablet; Refill: 3  - atorvastatin (LIPITOR) 40 MG Tab; Take 1 Tablet by mouth every day.  Dispense: 100 Tablet; Refill: 3    12. Thrombocytopenia (HCC)  Stable and controlled chronic medical condition, continue labs follow-up  - CBC WITH DIFFERENTIAL; Future    13. Postsurgical hypothyroidism  Stable and controlled chronic medical condition, continue current treatment  - levothyroxine (EUTHYROX) 137 MCG Tab; TAKE 1 TABLET BY MOUTH ONCE DAILY IN THE MORNING ON  AN  EMPTY  STOMACH  ONE  HOUR  BEFORE  EATING  Dispense: 100 Tablet; Refill: 3  - TSH WITH REFLEX TO FT4; Future    14. Genetic testing  - Referral to Genetic Research Studies    Counseling:   - Smoking:  Nonsmoker    Followup: Return in about 6 months (around 12/14/2022) for LABS.    All questions are answered.    Please note that this dictation was created using voice recognition software, and that there might be errors of venkata and possibly content.

## 2022-06-15 ENCOUNTER — APPOINTMENT (OUTPATIENT)
Dept: NEPHROLOGY | Facility: MEDICAL CENTER | Age: 82
End: 2022-06-15
Payer: MEDICARE

## 2022-06-17 ENCOUNTER — APPOINTMENT (OUTPATIENT)
Dept: RADIOLOGY | Facility: MEDICAL CENTER | Age: 82
End: 2022-06-17
Attending: EMERGENCY MEDICINE
Payer: MEDICARE

## 2022-06-17 ENCOUNTER — HOSPITAL ENCOUNTER (EMERGENCY)
Facility: MEDICAL CENTER | Age: 82
End: 2022-06-17
Attending: EMERGENCY MEDICINE
Payer: MEDICARE

## 2022-06-17 VITALS
SYSTOLIC BLOOD PRESSURE: 113 MMHG | TEMPERATURE: 99.3 F | HEART RATE: 72 BPM | OXYGEN SATURATION: 92 % | DIASTOLIC BLOOD PRESSURE: 53 MMHG | RESPIRATION RATE: 16 BRPM

## 2022-06-17 DIAGNOSIS — U07.1 COVID: ICD-10-CM

## 2022-06-17 LAB
ALBUMIN SERPL BCP-MCNC: 4.3 G/DL (ref 3.2–4.9)
ALBUMIN/GLOB SERPL: 1.4 G/DL
ALP SERPL-CCNC: 46 U/L (ref 30–99)
ALT SERPL-CCNC: 19 U/L (ref 2–50)
ANION GAP SERPL CALC-SCNC: 14 MMOL/L (ref 7–16)
APTT PPP: 36.2 SEC (ref 24.7–36)
AST SERPL-CCNC: 31 U/L (ref 12–45)
BASOPHILS # BLD AUTO: 0.4 % (ref 0–1.8)
BASOPHILS # BLD: 0.03 K/UL (ref 0–0.12)
BILIRUB SERPL-MCNC: 0.8 MG/DL (ref 0.1–1.5)
BUN SERPL-MCNC: 34 MG/DL (ref 8–22)
CALCIUM SERPL-MCNC: 9.2 MG/DL (ref 8.4–10.2)
CHLORIDE SERPL-SCNC: 97 MMOL/L (ref 96–112)
CO2 SERPL-SCNC: 20 MMOL/L (ref 20–33)
CREAT SERPL-MCNC: 2.1 MG/DL (ref 0.5–1.4)
EOSINOPHIL # BLD AUTO: 0.01 K/UL (ref 0–0.51)
EOSINOPHIL NFR BLD: 0.1 % (ref 0–6.9)
ERYTHROCYTE [DISTWIDTH] IN BLOOD BY AUTOMATED COUNT: 46.5 FL (ref 35.9–50)
FLUAV RNA SPEC QL NAA+PROBE: NEGATIVE
FLUBV RNA SPEC QL NAA+PROBE: NEGATIVE
GFR SERPLBLD CREATININE-BSD FMLA CKD-EPI: 31 ML/MIN/1.73 M 2
GLOBULIN SER CALC-MCNC: 3.1 G/DL (ref 1.9–3.5)
GLUCOSE SERPL-MCNC: 173 MG/DL (ref 65–99)
HCT VFR BLD AUTO: 45.9 % (ref 42–52)
HGB BLD-MCNC: 15.2 G/DL (ref 14–18)
IMM GRANULOCYTES # BLD AUTO: 0.03 K/UL (ref 0–0.11)
IMM GRANULOCYTES NFR BLD AUTO: 0.4 % (ref 0–0.9)
INR PPP: 1.82 (ref 0.87–1.13)
LACTATE BLD-SCNC: 1.9 MMOL/L (ref 0.5–2)
LYMPHOCYTES # BLD AUTO: 0.82 K/UL (ref 1–4.8)
LYMPHOCYTES NFR BLD: 10.9 % (ref 22–41)
MCH RBC QN AUTO: 31.2 PG (ref 27–33)
MCHC RBC AUTO-ENTMCNC: 33.1 G/DL (ref 33.7–35.3)
MCV RBC AUTO: 94.3 FL (ref 81.4–97.8)
MONOCYTES # BLD AUTO: 0.88 K/UL (ref 0–0.85)
MONOCYTES NFR BLD AUTO: 11.7 % (ref 0–13.4)
NEUTROPHILS # BLD AUTO: 5.78 K/UL (ref 1.82–7.42)
NEUTROPHILS NFR BLD: 76.5 % (ref 44–72)
NRBC # BLD AUTO: 0 K/UL
NRBC BLD-RTO: 0 /100 WBC
NT-PROBNP SERPL IA-MCNC: 306 PG/ML (ref 0–125)
PLATELET # BLD AUTO: 102 K/UL (ref 164–446)
PMV BLD AUTO: 9.9 FL (ref 9–12.9)
POTASSIUM SERPL-SCNC: 4.3 MMOL/L (ref 3.6–5.5)
PROT SERPL-MCNC: 7.4 G/DL (ref 6–8.2)
PROTHROMBIN TIME: 19.7 SEC (ref 12–14.6)
RBC # BLD AUTO: 4.87 M/UL (ref 4.7–6.1)
RSV RNA SPEC QL NAA+PROBE: NEGATIVE
SARS-COV-2 RNA RESP QL NAA+PROBE: DETECTED
SODIUM SERPL-SCNC: 131 MMOL/L (ref 135–145)
SPECIMEN SOURCE: ABNORMAL
TROPONIN T SERPL-MCNC: 21 NG/L (ref 6–19)
TSH SERPL DL<=0.005 MIU/L-ACNC: 0.5 UIU/ML (ref 0.38–5.33)
WBC # BLD AUTO: 7.6 K/UL (ref 4.8–10.8)

## 2022-06-17 PROCEDURE — C9803 HOPD COVID-19 SPEC COLLECT: HCPCS | Performed by: EMERGENCY MEDICINE

## 2022-06-17 PROCEDURE — 83605 ASSAY OF LACTIC ACID: CPT

## 2022-06-17 PROCEDURE — 84484 ASSAY OF TROPONIN QUANT: CPT

## 2022-06-17 PROCEDURE — 83880 ASSAY OF NATRIURETIC PEPTIDE: CPT

## 2022-06-17 PROCEDURE — 85610 PROTHROMBIN TIME: CPT

## 2022-06-17 PROCEDURE — 700105 HCHG RX REV CODE 258: Performed by: EMERGENCY MEDICINE

## 2022-06-17 PROCEDURE — 71045 X-RAY EXAM CHEST 1 VIEW: CPT

## 2022-06-17 PROCEDURE — 85730 THROMBOPLASTIN TIME PARTIAL: CPT

## 2022-06-17 PROCEDURE — 0241U HCHG SARS-COV-2 COVID-19 NFCT DS RESP RNA 4 TRGT MIC: CPT

## 2022-06-17 PROCEDURE — 85025 COMPLETE CBC W/AUTO DIFF WBC: CPT

## 2022-06-17 PROCEDURE — 99284 EMERGENCY DEPT VISIT MOD MDM: CPT

## 2022-06-17 PROCEDURE — 36415 COLL VENOUS BLD VENIPUNCTURE: CPT

## 2022-06-17 PROCEDURE — A9270 NON-COVERED ITEM OR SERVICE: HCPCS | Performed by: EMERGENCY MEDICINE

## 2022-06-17 PROCEDURE — 700102 HCHG RX REV CODE 250 W/ 637 OVERRIDE(OP): Performed by: EMERGENCY MEDICINE

## 2022-06-17 PROCEDURE — 84443 ASSAY THYROID STIM HORMONE: CPT

## 2022-06-17 PROCEDURE — 80053 COMPREHEN METABOLIC PANEL: CPT

## 2022-06-17 RX ORDER — ACETAMINOPHEN 500 MG
1000 TABLET ORAL ONCE
Status: COMPLETED | OUTPATIENT
Start: 2022-06-17 | End: 2022-06-17

## 2022-06-17 RX ORDER — SODIUM CHLORIDE, SODIUM LACTATE, POTASSIUM CHLORIDE, CALCIUM CHLORIDE 600; 310; 30; 20 MG/100ML; MG/100ML; MG/100ML; MG/100ML
INJECTION, SOLUTION INTRAVENOUS CONTINUOUS
Status: DISCONTINUED | OUTPATIENT
Start: 2022-06-17 | End: 2022-06-17 | Stop reason: HOSPADM

## 2022-06-17 RX ADMIN — SODIUM CHLORIDE, POTASSIUM CHLORIDE, SODIUM LACTATE AND CALCIUM CHLORIDE: 600; 310; 30; 20 INJECTION, SOLUTION INTRAVENOUS at 11:08

## 2022-06-17 RX ADMIN — ACETAMINOPHEN 1000 MG: 500 TABLET, FILM COATED ORAL at 11:37

## 2022-06-17 ASSESSMENT — LIFESTYLE VARIABLES
HAVE PEOPLE ANNOYED YOU BY CRITICIZING YOUR DRINKING: NO
ON A TYPICAL DAY WHEN YOU DRINK ALCOHOL HOW MANY DRINKS DO YOU HAVE: 0
AVERAGE NUMBER OF DAYS PER WEEK YOU HAVE A DRINK CONTAINING ALCOHOL: 0
TOTAL SCORE: 0
DO YOU DRINK ALCOHOL: NO
TOTAL SCORE: 0
HAVE YOU EVER FELT YOU SHOULD CUT DOWN ON YOUR DRINKING: NO
TOTAL SCORE: 0
EVER FELT BAD OR GUILTY ABOUT YOUR DRINKING: NO
CONSUMPTION TOTAL: NEGATIVE
EVER HAD A DRINK FIRST THING IN THE MORNING TO STEADY YOUR NERVES TO GET RID OF A HANGOVER: NO
HOW MANY TIMES IN THE PAST YEAR HAVE YOU HAD 5 OR MORE DRINKS IN A DAY: 0

## 2022-06-17 NOTE — ED PROVIDER NOTES
ED Provider Note    CHIEF COMPLAINT  Chief Complaint   Patient presents with   • Coronavirus Screening       HPI  Paulo Paredes is a 81 y.o. male who presents for evaluation of COVID.  The patient states that 2 days ago he began feeling poorly with low-grade fever and nonproductive cough along with generalized malaise and weakness and decreased appetite.  The patient went to the [facility] department and was notified he had a positive COVID test yesterday.  He presents today because of increasing weakness along with fatigue and dyspnea on exertion.  The patient has felt feverish but has not taken his temperature.  He denies: Hemoptysis, chest pain, abdominal pain, vomiting, diarrhea, rashes.  The patient was vaccinated in early 2021 and again last October 2021.  No other acute symptomatology or complaints.    REVIEW OF SYSTEMS  See HPI for further details. All other systems negative.    PAST MEDICAL HISTORY  Past Medical History:   Diagnosis Date   • Anesthesia     difficult intubation with surgery 2008,2010   • Basal cell carcinoma of skin    • CAD (coronary artery disease)    • Cancer (HCC)     rt  kidney 1989;  thyroid cancer 2012, prostate 2008, skin   • Chronic kidney disease (CKD) stage G3b/A2, moderately decreased glomerular filtration rate (GFR) between 30-44 mL/min/1.73 square meter and albuminuria creatinine ratio between  mg/g (HCC) 4/23/2015   • DVT (deep venous thrombosis) (HCC) 2014   • ED (erectile dysfunction)    • GERD (gastroesophageal reflux disease)    • Glaucoma    • Heart burn    • Hyperlipidemia 12/3/2012   • Hypertension    • Indigestion    • Multiple pulmonary emboli (HCC) 2014   • Multiple thyroid nodules 2009   • Papillary carcinoma of thyroid (HCC) 2014    R 2 foci / 4.5mm,0.25mm / no mets/ pT1pN0   • postsurgical hypothyroidism 2014   • Pre-diabetes    • Renal disorder     rt kidney cancer,nephrectomy   • S/P CABG x 4 2003   • S/P colectomy 2010    multiple colon polyps / no  Ca   • S/p nephrectomy 1998    carcinoma   • S/P prostatectomy 2008    carcinoma   • Stroke (HCC)     'mild',no residual,'one doctor said it was a tia'   • Transient renal failure 2014    renal vein thrombosis   • Type II or unspecified type diabetes mellitus without mention of complication, not stated as uncontrolled     on no medications   • Unspecified urinary incontinence        FAMILY HISTORY  Family History   Problem Relation Age of Onset   • Diabetes Mother    • Heart Disease Mother    • Diabetes Father    • Cancer Father         pancreas   • Thyroid Sister         Cancer   • Cancer Sister         thyroid   • Diabetes Sister    • Cancer Brother 49        colon   • Diabetes Brother    • Diabetes Maternal Grandfather    • Diabetes Paternal Grandmother    • Diabetes Paternal Grandfather    • No Known Problems Maternal Grandmother        SOCIAL HISTORY  Resides locally;    SURGICAL HISTORY  Past Surgical History:   Procedure Laterality Date   • THYROIDECTOMY TOTAL  5/13/2014    Performed [facility]ice[facility]   • NODE DISSECTION  5/13/2014    Performed by [facility] M.D. [facility]   • COLON RESECTION     • MULTIPLE CORONARY ARTERY BYPASS      x 4 11/2003   • NEPHRECTOMY RADICAL      right side 1998   • OTHER ORTHOPEDIC SURGERY      trotator cuff   • PROSTATECTOMY, RADICAL RETRO      cancer /january 2008       CURRENT MEDICATIONS  See nurses notes    ALLERGIES  Allergies   Allergen Reactions   • Lactose    • Seasonal        PHYSICAL EXAM  VITAL SIGNS: /73   Pulse 83   Temp 38 °C (100.4 °F) (Temporal)   Resp 18   SpO2 94%    Constitutional: 81-year-old male, awake, oriented x3, weak appearing  HENT:  Atraumatic, Bilateral external ears normal, tympanic membranes clear, Oropharynx mildly dry, No oral exudates, Nose normal.   Eyes: PERRL, EOMI, Conjunctiva normal, No discharge.   Neck: Normal range of motion, No tenderness, Supple, No stridor.    Lymphatic: No lymphadenopathy noted.   Cardiovascular: Normal heart rate, Normal rhythm, No murmurs, No rubs, No gallops.   Thorax & Lungs: Normal Equal breath sounds, No respiratory distress, No wheezing, no stridor, no rales. No chest tenderness.   Abdomen: Soft, nontender, nondistended, no organomegaly, positive bowel sounds normal in quality. No guarding or rebound.  Skin: Decreased skin turgor, pink, warm, dry. No rashes, petechiae, purpura. Normal capillary refill.   Back: No tenderness, No CVA tenderness.   Extremities: Intact distal pulses, No edema, No tenderness, No cyanosis, No clubbing. Vascular: Pulses are 2+, symmetric in the upper and lower extremities.  Musculoskeletal: Diffuse arthritic changes. No tenderness to palpation or major deformities noted.   Neurologic: Weak appearing and slow to answer questions, but oriented x 3, Normal motor function, Normal sensory function, No gross focal deficits noted.   Psychiatric: Affect normal, Judgment normal, Mood normal.     RADIOLOGY/PROCEDURES  DX-CHEST-PORTABLE (1 VIEW)   Final Result      No radiographic evidence of acute cardiopulmonary process.            COURSE & MEDICAL DECISION MAKING  Pertinent Labs & Imaging studies reviewed. (See chart for details)  1.  Monitor  2.  IV normal saline    Laboratory studies: CBC shows white count 7.6, 76% neutrophils, 10% lymphocytes, 11% monocytes, hemoglobin 15.2, hematocrit 45.9; CMP shows sodium 131, random glucose 173, BUN 34, creatinine 2.10, GFR 31, otherwise within normal limits; troponin 21; ; INR 1.82; PTT 36; TSH 0.497; lactic acid 1.9; COVID is positive; influenza RSV negative;    Discussion: At this time, the patient presents with findings consistent with COVID.  The patient is not hypoxic with a pulse oximetry of 93%.  We had him get up and walk and he did not desaturate below this level.  Based on these findings the patient  is a candidate for outpatient therapy.  I do believe he would benefit  from Paxlovid.   I discussed the risk and benefits with the patient.  He understands that it is an FDA emergency use authorization medication.  He will also get a decreased dose as he does have some underlying renal insufficiency.  The patient was instructed to get rechecked if his pulse oximetry drops below 90% and he is to monitor this closely at home.  He and his wife both indicate they understand the need for close monitoring and recheck should symptoms worsen.  The patient was discharged in stable condition.    FINAL IMPRESSION  1. COVID         PLAN  1.  Appropriate discharge instructions given  2. Paxlovid  3.  Recheck if change or worsening symptoms, as discussed    Electronically signed by: Guy G Gansert, M.D., 6/17/2022 10:22 AM

## 2022-06-17 NOTE — ED NOTES
Patient given discharge instructions questions and concerns addressed, patient taken out to car via wheel chair

## 2022-06-17 NOTE — ED TRIAGE NOTES
Chief Complaint   Patient presents with   • Coronavirus Screening     tested positive for covid last Wednesday, through [facility] department. Pt complains of increased weakness and generalized body aches. No signs of respiratory distress.

## 2022-06-17 NOTE — ED NOTES
Pt presents to ER:  Chief Complaint   Patient presents with   • Coronavirus Screening     STataes he tested positive for covid a few days ago and is still having generalized body aches and weakness. Pt A&Ox4, wife at bedside    Pt mckinley not appear to be in any respiratory distress at this time

## 2022-07-20 ENCOUNTER — OFFICE VISIT (OUTPATIENT)
Dept: NEPHROLOGY | Facility: MEDICAL CENTER | Age: 82
End: 2022-07-20
Payer: MEDICARE

## 2022-07-20 VITALS
TEMPERATURE: 97.7 F | BODY MASS INDEX: 26.3 KG/M2 | WEIGHT: 173 LBS | RESPIRATION RATE: 14 BRPM | HEART RATE: 70 BPM | SYSTOLIC BLOOD PRESSURE: 102 MMHG | DIASTOLIC BLOOD PRESSURE: 58 MMHG | OXYGEN SATURATION: 96 %

## 2022-07-20 DIAGNOSIS — N18.30 STAGE 3 CHRONIC KIDNEY DISEASE, UNSPECIFIED WHETHER STAGE 3A OR 3B CKD: ICD-10-CM

## 2022-07-20 DIAGNOSIS — I10 ESSENTIAL HYPERTENSION: ICD-10-CM

## 2022-07-20 PROCEDURE — 99214 OFFICE O/P EST MOD 30 MIN: CPT | Performed by: INTERNAL MEDICINE

## 2022-07-20 ASSESSMENT — ENCOUNTER SYMPTOMS
COUGH: 0
NAUSEA: 0
FEVER: 0
HYPERTENSION: 1
CHILLS: 0
VOMITING: 0
SHORTNESS OF BREATH: 0

## 2022-07-20 ASSESSMENT — FIBROSIS 4 INDEX: FIB4 SCORE: 5.79

## 2022-07-20 NOTE — PROGRESS NOTES
Subjective     Kevin Paredes is a 81 y.o. male who presents with Follow-Up            Patient was diagnosed with COVID-19 infection in June 2022  Is doing better    Hypertension  This is a chronic problem. The current episode started more than 1 year ago. The problem is unchanged. The problem is controlled. Pertinent negatives include no chest pain, malaise/fatigue, peripheral edema or shortness of breath. Risk factors for coronary artery disease include male gender and diabetes mellitus. Past treatments include angiotensin blockers and beta blockers. The current treatment provides significant improvement. There are no compliance problems.  Hypertensive end-organ damage includes kidney disease. Identifiable causes of hypertension include chronic renal disease.   Chronic Kidney Disease  This is a chronic problem. The current episode started more than 1 year ago. The problem occurs constantly. The problem has been unchanged. Pertinent negatives include no chest pain, chills, coughing, fever, nausea, urinary symptoms or vomiting.       Review of Systems   Constitutional: Negative for chills, fever and malaise/fatigue.   Respiratory: Negative for cough and shortness of breath.    Cardiovascular: Negative for chest pain and leg swelling.   Gastrointestinal: Negative for nausea and vomiting.   Genitourinary: Negative for dysuria, frequency and urgency.              Objective     /58   Pulse 70   Temp 36.5 °C (97.7 °F) (Temporal)   Resp 14   Wt 78.5 kg (173 lb)   SpO2 96%   BMI 26.30 kg/m²      Physical Exam  Vitals and nursing note reviewed.   Constitutional:       General: He is not in acute distress.     Appearance: He is not ill-appearing.   HENT:      Head: Normocephalic and atraumatic.      Right Ear: External ear normal.      Left Ear: External ear normal.      Nose: Nose normal.   Eyes:      General:         Right eye: No discharge.         Left eye: No discharge.      Conjunctiva/sclera: Conjunctivae  normal.   Cardiovascular:      Rate and Rhythm: Normal rate and regular rhythm.      Heart sounds: No murmur heard.  Pulmonary:      Effort: Pulmonary effort is normal. No respiratory distress.      Breath sounds: Normal breath sounds. No wheezing.   Musculoskeletal:         General: No tenderness or deformity.      Right lower leg: No edema.      Left lower leg: No edema.   Skin:     General: Skin is warm.   Neurological:      General: No focal deficit present.      Mental Status: He is alert and oriented to person, place, and time.   Psychiatric:         Mood and Affect: Mood normal.         Behavior: Behavior normal.       Past Medical History:   Diagnosis Date   • Anesthesia     difficult intubation with surgery 2008,2010   • Basal cell carcinoma of skin    • CAD (coronary artery disease)    • Cancer (HCC)     rt  kidney 1989;  thyroid cancer 2012, prostate 2008, skin   • Chronic kidney disease (CKD) stage G3b/A2, moderately decreased glomerular filtration rate (GFR) between 30-44 mL/min/1.73 square meter and albuminuria creatinine ratio between  mg/g (HCC) 4/23/2015   • DVT (deep venous thrombosis) (HCC) 2014   • ED (erectile dysfunction)    • GERD (gastroesophageal reflux disease)    • Glaucoma    • Heart burn    • Hyperlipidemia 12/3/2012   • Hypertension    • Indigestion    • Multiple pulmonary emboli (HCC) 2014   • Multiple thyroid nodules 2009   • Papillary carcinoma of thyroid (HCC) 2014    R 2 foci / 4.5mm,0.25mm / no mets/ pT1pN0   • postsurgical hypothyroidism 2014   • Pre-diabetes    • Renal disorder     rt kidney cancer,nephrectomy   • S/P CABG x 4 2003   • S/P colectomy 2010    multiple colon polyps / no Ca   • S/p nephrectomy 1998    carcinoma   • S/P prostatectomy 2008    carcinoma   • Stroke (HCC)     'mild',no residual,'one doctor said it was a tia'   • Transient renal failure 2014    renal vein thrombosis   • Type II or unspecified type diabetes mellitus without mention of complication,  not stated as uncontrolled     on no medications   • Unspecified urinary incontinence      Social History     Socioeconomic History   • Marital status:      Spouse name: Not on file   • Number of children: Not on file   • Years of education: Not on file   • Highest education level: Not on file   Occupational History   • Not on file   Tobacco Use   • Smoking status: Never Smoker   • Smokeless tobacco: Never Used   Vaping Use   • Vaping Use: Never used   Substance and Sexual Activity   • Alcohol use: Yes     Comment: Occasionally   • Drug use: No   • Sexual activity: Not Currently     Comment: retired    Other Topics Concern   • Not on file   Social History Narrative   • Not on file     Social Determinants of Health     Financial Resource Strain: Not on file   Food Insecurity: Not on file   Transportation Needs: Not on file   Physical Activity: Not on file   Stress: Not on file   Social Connections: Not on file   Intimate Partner Violence: Not on file   Housing Stability: Not on file     Family History   Problem Relation Age of Onset   • Diabetes Mother    • Heart Disease Mother    • Diabetes Father    • Cancer Father         pancreas   • Thyroid Sister         Cancer   • Cancer Sister         thyroid   • Diabetes Sister    • Cancer Brother 49        colon   • Diabetes Brother    • Diabetes Maternal Grandfather    • Diabetes Paternal Grandmother    • Diabetes Paternal Grandfather    • No Known Problems Maternal Grandmother      Recent Labs     10/26/21  0748 11/15/21  1305 12/02/21  1214 12/02/21  1215 06/04/22  1004 06/06/22  0719 06/06/22  0724 06/17/22  1045   ALBUMIN  --   --   --   --  4.2 4.1  --  4.3   HDL 34*  --  31*  --   --  32*  --   --    TRIGLYCERIDE 224*  --  306*  --   --  186*  --   --    SODIUM  --    < >  --    < > 139 138 139 131*   POTASSIUM  --    < >  --    < > 4.9 5.0 4.9 4.3   CHLORIDE  --    < >  --    < > 104 105 105 97   CO2  --    < >  --    < > 24 24 24 20   BUN  --     < >  --    < > 34* 31* 31* 34*   CREATININE  --    < >  --    < > 2.17* 1.95* 1.99* 2.10*    < > = values in this interval not displayed.                             Assessment & Plan        1. Essential hypertension  Controlled  Continue same medication regimen  Continue low-sodium diet      2. Stage 3 chronic kidney disease, unspecified whether stage 3a or 3b CKD (HCC)  Stable  No uremic symptoms  Renal dose of medication  Avoid nephrotoxins  Continue same medication regimen

## 2022-07-21 ENCOUNTER — ANTICOAGULATION VISIT (OUTPATIENT)
Dept: MEDICAL GROUP | Facility: MEDICAL CENTER | Age: 82
End: 2022-07-21
Payer: MEDICARE

## 2022-07-21 DIAGNOSIS — Z95.828 PRESENCE OF IVC FILTER: ICD-10-CM

## 2022-07-21 LAB — INR PPP: 1.5 (ref 2–3.5)

## 2022-07-21 PROCEDURE — 85610 PROTHROMBIN TIME: CPT | Performed by: FAMILY MEDICINE

## 2022-07-21 PROCEDURE — 99211 OFF/OP EST MAY X REQ PHY/QHP: CPT | Performed by: FAMILY MEDICINE

## 2022-07-21 NOTE — PROGRESS NOTES
OP Anticoagulation Service Note    Date: 2022  There were no vitals filed for this visit.   pt declined vitals    Anticoagulation Summary  As of 2022    INR goal:  2.0-3.0   TTR:  75.9 % (4.4 y)   INR used for dosin.50 (2022)   Warfarin maintenance plan:  2.5 mg (5 mg x 0.5) every Mon; 5 mg (5 mg x 1) all other days   Weekly warfarin total:  32.5 mg   Plan last modified:  Kelsey Junior, PharmD (11/15/2021)   Next INR check:  2022   Priority:  Maintenance   Target end date:  Indefinite    Indications    Presence of IVC filter [Z95.828]  Transient ischemic attack [G45.9] (Resolved) [G45.9]  Deep vein thrombosis (DVT) of both lower extremities (HCC) (Resolved) [I82.403]  DVT (deep venous thrombosis) (HCC) (Resolved) [I82.409]  Multiple pulmonary emboli (HCC) (Resolved) [I26.99]  Chronic anticoagulation (Resolved) [Z79.01]             Anticoagulation Episode Summary     INR check location:      Preferred lab:      Send INR reminders to:      Comments:        Anticoagulation Care Providers     Provider Role Specialty Phone number    [facility]   [phone]    Edward Walters M.D.  [facility] [phone]            HPI:   Paulo Paredes seen in clinic today, on anticoagulation therapy with warfarin (a high risk medication) for hx of DVT and PE       Pt is here today to evaluate anticoagulation therapy    Previous INR was  1.8 on 22 in ER     Pt was instructed to continue current regimen    Anticoagulation Patient Findings  Patient Findings     Positives:  Change in medications (Paxlovid x 5 days)    Negatives:  Signs/symptoms of thrombosis, Signs/symptoms of bleeding, Laboratory test error suspected, Change in health, Change in alcohol use, Change in activity, Upcoming invasive procedure, Emergency department visit, Upcoming dental procedure, Missed doses, Extra doses, Change in diet/appetite, Hospital admission, Bruising, Other complaints           Confirmed warfarin dosing regimen    Pt is not on antiplatelet/NSAID therapy          A/P   INR  sub-therapeutic today, will require dose adjust ment today to prevent thrombosis or stroke and closer follow up.   Tonight 7.5 mg then increase regimen to 5 mg daily this week         Pt educated to contact our clinic with any changes in medications or s/s of bleeding or thrombosis. Pt is aware to seek immediate medical attention for falls, head injury or deep cuts    Follow up appointment in 1 week(s) to reduce risk of adverse events from warfarin  Kelsey Junior, PharmD

## 2022-07-28 ENCOUNTER — ANTICOAGULATION VISIT (OUTPATIENT)
Dept: MEDICAL GROUP | Facility: MEDICAL CENTER | Age: 82
End: 2022-07-28
Payer: MEDICARE

## 2022-07-28 DIAGNOSIS — Z79.01 CHRONIC ANTICOAGULATION: Primary | ICD-10-CM

## 2022-07-28 DIAGNOSIS — Z95.828 PRESENCE OF IVC FILTER: ICD-10-CM

## 2022-07-28 LAB — INR PPP: 2.8 (ref 2–3.5)

## 2022-07-28 PROCEDURE — 85610 PROTHROMBIN TIME: CPT | Performed by: FAMILY MEDICINE

## 2022-07-28 PROCEDURE — 93793 ANTICOAG MGMT PT WARFARIN: CPT | Performed by: FAMILY MEDICINE

## 2022-07-28 NOTE — PROGRESS NOTES
OP Anticoagulation Service Note    Date: 2022  There were no vitals filed for this visit.   pt declined vitals    Anticoagulation Summary  As of 2022    INR goal:  2.0-3.0   TTR:  75.8 % (4.4 y)   INR used for dosin.80 (2022)   Warfarin maintenance plan:  2.5 mg (5 mg x 0.5) every Mon; 5 mg (5 mg x 1) all other days   Weekly warfarin total:  32.5 mg   Plan last modified:  Kelsey Junior, PharmD (11/15/2021)   Next INR check:  2022   Priority:  Maintenance   Target end date:  Indefinite    Indications    Presence of IVC filter [Z95.828]  Transient ischemic attack [G45.9] (Resolved) [G45.9]  Deep vein thrombosis (DVT) of both lower extremities (HCC) (Resolved) [I82.403]  DVT (deep venous thrombosis) (HCC) (Resolved) [I82.409]  Multiple pulmonary emboli (HCC) (Resolved) [I26.99]  Chronic anticoagulation (Resolved) [Z79.01]             Anticoagulation Episode Summary     INR check location:      Preferred lab:      Send INR reminders to:      Comments:        Anticoagulation Care Providers     Provider Role Specialty Phone number    [facility]   [phone]    Edward Walters M.D.  [facility] [phone]            HPI:   Paulo Paredes seen in clinic today, on anticoagulation therapy with warfarin (a high risk medication) for hx of DVT and PE       Pt is here today to evaluate anticoagulation therapy    Previous INR was  1.5 on     Pt was instructed to increase to 7.5 mg then 5 mg daily d/t paxlovid    Anticoagulation Patient Findings  Patient Findings     Negatives:  Signs/symptoms of thrombosis, Signs/symptoms of bleeding, Laboratory test error suspected, Change in health, Change in alcohol use, Change in activity, Upcoming invasive procedure, Emergency department visit, Upcoming dental procedure, Missed doses, Extra doses, Change in medications, Change in diet/appetite, Hospital admission, Bruising, Other complaints          Confirmed warfarin dosing  regimen    Pt is not on antiplatelet/NSAID therapy          A/P   INR therapeutic today,will require close follow up as previous INR was sub-therapeutic   Continue current warfarin regimen        Pt educated to contact our clinic with any changes in medications or s/s of bleeding or thrombosis. Pt is aware to seek immediate medical attention for falls, head injury or deep cuts    Follow up appointment in 3 week(s) to reduce risk of adverse events from warfarin  Kelsey Junior, PharmD

## 2022-07-29 ENCOUNTER — APPOINTMENT (RX ONLY)
Dept: URBAN - METROPOLITAN AREA CLINIC 35 | Facility: CLINIC | Age: 82
Setting detail: DERMATOLOGY
End: 2022-07-29

## 2022-07-29 DIAGNOSIS — R20.2 PARESTHESIA OF SKIN: ICD-10-CM | Status: IMPROVED

## 2022-07-29 PROBLEM — D48.5 NEOPLASM OF UNCERTAIN BEHAVIOR OF SKIN: Status: ACTIVE | Noted: 2022-07-29

## 2022-07-29 PROCEDURE — 11102 TANGNTL BX SKIN SINGLE LES: CPT

## 2022-07-29 PROCEDURE — ? COUNSELING

## 2022-07-29 PROCEDURE — 99212 OFFICE O/P EST SF 10 MIN: CPT | Mod: 25

## 2022-07-29 PROCEDURE — ? BIOPSY BY SHAVE METHOD

## 2022-07-29 RX ORDER — CAPSAICIN 0.025 %
CREAM (GRAM) TOPICAL
Qty: 45 | Refills: 3 | Status: CANCELLED
Stop reason: ENTERED-IN-ERROR

## 2022-07-29 ASSESSMENT — LOCATION ZONE DERM: LOCATION ZONE: NECK

## 2022-07-29 ASSESSMENT — LOCATION DETAILED DESCRIPTION DERM: LOCATION DETAILED: RIGHT SUPERIOR POSTERIOR NECK

## 2022-07-29 ASSESSMENT — LOCATION SIMPLE DESCRIPTION DERM: LOCATION SIMPLE: POSTERIOR NECK

## 2022-07-29 NOTE — PROCEDURE: BIOPSY BY SHAVE METHOD
Detail Level: Detailed
Depth Of Biopsy: dermis
Was A Bandage Applied: Yes
Size Of Lesion In Cm: 0.7
X Size Of Lesion In Cm: 0
Biopsy Type: H and E
Biopsy Method: Personna blade
Anesthesia Type: 1% lidocaine with 1:100,000 epinephrine and a 1:10 solution of 8.4% sodium bicarbonate
Anesthesia Volume In Cc: 1
Hemostasis: Aluminum Chloride
Wound Care: Petrolatum
Dressing: pressure dressing with telfa
Destruction After The Procedure: No
Type Of Destruction Used: Curettage
Curettage Text: The wound bed was treated with curettage after the biopsy was performed.
Cryotherapy Text: The wound bed was treated with cryotherapy after the biopsy was performed.
Electrodesiccation Text: The wound bed was treated with electrodesiccation after the biopsy was performed.
Electrodesiccation And Curettage Text: The wound bed was treated with electrodesiccation and curettage after the biopsy was performed.
Silver Nitrate Text: The wound bed was treated with silver nitrate after the biopsy was performed.
Lab: [id number]
Lab Facility: 
Consent: Written consent was obtained and risks were reviewed including but not limited to scarring, infection, bleeding, scabbing, incomplete removal, nerve damage and allergy to anesthesia.
Post-Care Instructions: I reviewed with the patient in detail post-care instructions. Patient is to keep the biopsy site dry overnight. Gentle cleansing daily.  Apply petroleum ointment daily until healed. Patient may apply hydrogen peroxide soaks to remove any crusting.
Notification Instructions: Patient will be notified of biopsy results. However, patient instructed to call the office if not contacted within 2 weeks.
Billing Type: Third-Party Bill
Information: Selecting Yes will display possible errors in your note based on the variables you have selected. This validation is only offered as a suggestion for you. PLEASE NOTE THAT THE VALIDATION TEXT WILL BE REMOVED WHEN YOU FINALIZE YOUR NOTE. IF YOU WANT TO FAX A PRELIMINARY NOTE YOU WILL NEED TO TOGGLE THIS TO 'NO' IF YOU DO NOT WANT IT IN YOUR FAXED NOTE.

## 2022-07-29 NOTE — PROCEDURE: COUNSELING
Detail Level: Detailed
Patient Specific Counseling (Will Not Stick From Patient To Patient): Continue with capsaicin cream prn

## 2022-08-12 ENCOUNTER — APPOINTMENT (RX ONLY)
Dept: URBAN - METROPOLITAN AREA CLINIC 35 | Facility: CLINIC | Age: 82
Setting detail: DERMATOLOGY
End: 2022-08-12

## 2022-08-12 PROBLEM — C44.519 BASAL CELL CARCINOMA OF SKIN OF OTHER PART OF TRUNK: Status: ACTIVE | Noted: 2022-08-12

## 2022-08-12 PROCEDURE — 17262 DSTRJ MAL LES T/A/L 1.1-2.0: CPT

## 2022-08-12 PROCEDURE — ? CURETTAGE AND DESTRUCTION

## 2022-08-12 NOTE — PROCEDURE: CURETTAGE AND DESTRUCTION
Detail Level: Detailed
Biopsy Photograph Reviewed: Yes
Accession # (Optional): [id number]
Number Of Curettages: 3
Size Of Lesion In Cm: 0.9
Size Of Lesion After Curettage: 1.3
Add Intralesional Injection: No
Total Volume (Ccs): 1
Anesthesia Type: 0.05% lidocaine without epinephrine
Cautery Type: electrodesiccation
What Was Performed First?: Curettage
Final Size Statement: The size of the lesion after curettage was
Additional Information: (Optional): The wound was cleaned, and a pressure dressing was applied.  The patient received detailed post-op instructions.
Consent was obtained from the patient. The risks, benefits and alternatives to therapy were discussed in detail. Specifically, the risks of infection, scarring, bleeding, prolonged wound healing, nerve injury, incomplete removal, allergy to anesthesia and recurrence were addressed. Alternatives to ED&C, such as: surgical removal and XRT were also discussed.  Prior to the procedure, the treatment site was clearly identified and confirmed by the patient. All components of Universal Protocol/PAUSE Rule completed.
Post-Care Instructions: I reviewed with the patient in detail post-care instructions. Patient is to keep the area dry for 48 hours, and not to engage in any swimming until the area is healed. Should the patient develop any fevers, chills, bleeding, severe pain patient will contact the office immediately.
Bill As A Line Item Or As Units: Line Item

## 2022-08-16 ENCOUNTER — TELEPHONE (OUTPATIENT)
Dept: MEDICAL GROUP | Facility: LAB | Age: 82
End: 2022-08-16
Payer: MEDICARE

## 2022-08-17 NOTE — TELEPHONE ENCOUNTER
NEW PATIENT VISIT PRE-VISIT PLANNING    1.  [employer] Patient is checked in Patient Demographics?Yes    2.  Immunizations were updated in Epic using Reconcile Outside Information activity? Yes         3.  Is this appointment scheduled as a Hospital Follow-Up? No    4.  Patient is due for the following Health Maintenance Topics:   Health Maintenance Due   Topic Date Due    IMM DTaP/Tdap/Td Vaccine (1 - Tdap) 03/05/2010    IMM HEP B VACCINE (2 of 3 - 3-dose series) 02/13/2020    COVID-19 Vaccine (4 - Booster for Pfizer series) 02/17/2022   5.  Reviewed/Updated the following with patient:          Preferred Pharmacy? Yes          Preferred Lab? Yes          Preferred Communication? Yes          Allergies? Yes          Medications? YES. Was Abstract Encounter opened and chart updated? YES     6.  Updated Care Team?          DME Company (gait device, O2, CPAP, etc.) N\A          Other Specialists (eye doctor, derm, GYN, cardiology, endo, etc): YES    7.  AHA (Puls8) form printed for Provider? N/A

## 2022-08-18 ENCOUNTER — ANTICOAGULATION VISIT (OUTPATIENT)
Dept: MEDICAL GROUP | Facility: MEDICAL CENTER | Age: 82
End: 2022-08-18
Payer: MEDICARE

## 2022-08-18 DIAGNOSIS — Z95.828 PRESENCE OF IVC FILTER: ICD-10-CM

## 2022-08-18 LAB — INR PPP: 1.5 (ref 2–3.5)

## 2022-08-18 PROCEDURE — 99211 OFF/OP EST MAY X REQ PHY/QHP: CPT | Performed by: FAMILY MEDICINE

## 2022-08-18 PROCEDURE — 85610 PROTHROMBIN TIME: CPT | Performed by: FAMILY MEDICINE

## 2022-08-18 NOTE — PROGRESS NOTES
OP Anticoagulation Service Note    Date: 2022    Anticoagulation Summary  As of 2022      INR goal:  2.0-3.0   TTR:  75.6 % (4.5 y)   INR used for dosin.50 (2022)   Warfarin maintenance plan:  5 mg (5 mg x 1) every day   Weekly warfarin total:  35 mg   Plan last modified:  Skip Dowling, PharmD (2022)   Next INR check:  2022   Priority:  Maintenance   Target end date:  Indefinite    Indications    Presence of IVC filter [Z95.828]  Transient ischemic attack [G45.9] (Resolved) [G45.9]  Deep vein thrombosis (DVT) of both lower extremities (HCC) (Resolved) [I82.403]  DVT (deep venous thrombosis) (HCC) (Resolved) [I82.409]  Multiple pulmonary emboli (HCC) (Resolved) [I26.99]  Chronic anticoagulation (Resolved) [Z79.01]                 Anticoagulation Episode Summary       INR check location:      Preferred lab:      Send INR reminders to:      Comments:            Anticoagulation Care Providers       Provider Role Specialty Phone number    [facility]   [phone]    Edward Walters M.D.  Family Medicine [phone]          Anticoagulation Patient Findings  Patient Findings       Positives:  Signs/symptoms of bleeding (D/t biopsy on his back.), Missed doses (One day prior to biopsy.)    Negatives:  Signs/symptoms of thrombosis, Laboratory test error suspected, Change in health, Change in alcohol use, Change in activity, Upcoming invasive procedure, Emergency department visit, Upcoming dental procedure, Extra doses, Change in medications, Change in diet/appetite, Hospital admission, Bruising, Other complaints            HPI:   Paulo Paredes is in the Anticoagulation Clinic today for an INR check on their anticoagulation therapy.     The reason for today's visit is to prevent morbidity and mortality from a blood clot and/or stroke and to reduce the risk of bleeding while on a anticoagulant.     PCP:  Edward Walters M.D.  [address] [place] [address] DR CUATE SHANE  [id number]  [phone]    3 vitals included with today's appt-unless patient declined:  (BP, HR, weight, ht, RR)   There were no vitals filed for this visit.    Verified current warfarin dosing schedule.    Medications reconciled   Pt is not on antiplatelet therapy    Assessment:   INR  SUB-therapeutic.     Plan:  Instructed pt to BOLUS x 1 dose w/ 10 mg and to then begin newly increased regimen of 5 mg daily.    Follow up:  Follow up appointment in 1 week(s).       Other info:  Pt educated to contact our clinic with any changes in medications or s/s of bleeding or thrombosis.  Education was provided today regarding tips to reduce their bleed risk and dietary constraints while on a anticoagulant.    National Recommendations:  The CHEST guidelines recommends frequent INR monitoring at regular intervals (a few days up to a max of 12 weeks) to ensure patients are on the proper dose of warfarin, and patients are not having any complications from therapy.  INRs can dramatically change over a short time period due to diet, medications, and medical conditions.     Skip Dowling, PharmD, BCACP    [facility]  Phone [phone] fax [phone]

## 2022-08-24 ENCOUNTER — TELEPHONE (OUTPATIENT)
Dept: HEALTH INFORMATION MANAGEMENT | Facility: OTHER | Age: 82
End: 2022-08-24

## 2022-08-24 ENCOUNTER — OFFICE VISIT (OUTPATIENT)
Dept: MEDICAL GROUP | Facility: LAB | Age: 82
End: 2022-08-24
Payer: MEDICARE

## 2022-08-24 VITALS
HEIGHT: 68 IN | WEIGHT: 173 LBS | OXYGEN SATURATION: 92 % | SYSTOLIC BLOOD PRESSURE: 106 MMHG | TEMPERATURE: 97.3 F | DIASTOLIC BLOOD PRESSURE: 56 MMHG | BODY MASS INDEX: 26.22 KG/M2 | RESPIRATION RATE: 15 BRPM | HEART RATE: 70 BPM

## 2022-08-24 DIAGNOSIS — I10 ESSENTIAL HYPERTENSION, BENIGN: ICD-10-CM

## 2022-08-24 DIAGNOSIS — N18.32 STAGE 3B CHRONIC KIDNEY DISEASE: ICD-10-CM

## 2022-08-24 DIAGNOSIS — E11.8 CONTROLLED TYPE 2 DIABETES MELLITUS WITH COMPLICATION, WITHOUT LONG-TERM CURRENT USE OF INSULIN (HCC): ICD-10-CM

## 2022-08-24 DIAGNOSIS — E78.5 DYSLIPIDEMIA: ICD-10-CM

## 2022-08-24 DIAGNOSIS — Z95.1 S/P CABG X 4: ICD-10-CM

## 2022-08-24 DIAGNOSIS — Z76.89 ENCOUNTER TO ESTABLISH CARE: ICD-10-CM

## 2022-08-24 DIAGNOSIS — D68.9 COAGULATION DEFECT (HCC): ICD-10-CM

## 2022-08-24 DIAGNOSIS — E89.0 POSTSURGICAL HYPOTHYROIDISM: ICD-10-CM

## 2022-08-24 PROCEDURE — 99214 OFFICE O/P EST MOD 30 MIN: CPT | Performed by: FAMILY MEDICINE

## 2022-08-24 ASSESSMENT — ENCOUNTER SYMPTOMS
BLURRED VISION: 0
FEVER: 0
WHEEZING: 0
SHORTNESS OF BREATH: 0
NERVOUS/ANXIOUS: 0
PALPITATIONS: 0
DEPRESSION: 0
CHILLS: 0

## 2022-08-24 ASSESSMENT — FIBROSIS 4 INDEX: FIB4 SCORE: 5.79

## 2022-08-24 NOTE — PROGRESS NOTES
Subjective:   Paulo Paredes is a 81 y.o. male here today for   No chief complaint on file.      #Establish care     #2 diabetes:  -Currently treated with Trulicity.  Patient is compliant with medication, denies any side effects.  -Diet: working on decreasing sugar/carbs from diet.  Working on daily exercise.  -Screenings, labs up-to-date.  -Patient has concomitant history of hypertension on losartan.  -Aspirin: Yes     #History of pulmonary embolism/DVT:  -Status post IVC filter  -Currently on warfarin.  Being followed by anticoagulation clinic.    #Hypertension:  -Currently on losartan, atenolol.  Compliant with medication.    #Hypothyroid:  -Status post surgical resection.  Currently on levothyroxine 137 mcg daily.    #Hyperlipidemia:  -History of mixed hyperlipidemia currently on fenofibrate, atorvastatin.    #History of CABG:  -Currently on statin, aspirin.  -Most recent echocardiogram completed 2018 showing action fraction 65.  Patient feeling well, no concerns at this time.    #CKD:  -Being followed by nephrologist.  Work on appropriate diet, avoidance of NSAIDs, low-sodium.  No concerns or questions at this time.      Allergies   Allergen Reactions    Lactose     Seasonal          Current medicines (including changes today)  Current Outpatient Medications   Medication Sig Dispense Refill    capsaicin (ZOSTRIX) 0.025 % cream APPLY TO ITCHY AREA ON NECK UP TO 5 TIMES A DAY FOR 1 WEEK, THEN 3 TIMES A DAY FOR 3 TO 6 WEEKS. WASH HANDS THOROUGHLY AFTER APPLYING. DO TEST SPOT 1ST      warfarin (COUMADIN) 5 MG Tab Take 1 Tablet by mouth every evening. Take 0.5 to 1 tablet by mouth once daily. Take as directed by anticoagulation clinic. 100 Tablet 3    atenolol (TENORMIN) 25 MG Tab Take 1 Tablet by mouth every day. 100 Tablet 1    Dulaglutide (TRULICITY) 0.75 MG/0.5ML Solution Pen-injector INJECT 1 SYRINGE SUBCUTANEOUSLY ONCE A WEEK AS DIRECTED 12 mL 1    fenofibrate (TRIGLIDE) 160 MG tablet Take 1 Tablet by mouth  every day. 100 Tablet 3    atorvastatin (LIPITOR) 40 MG Tab Take 1 Tablet by mouth every day. 100 Tablet 3    losartan (COZAAR) 50 MG Tab Take 1 Tablet by mouth every day. 100 Tablet 5    levothyroxine (EUTHYROX) 137 MCG Tab TAKE 1 TABLET BY MOUTH ONCE DAILY IN THE MORNING ON  AN  EMPTY  STOMACH  ONE  HOUR  BEFORE  EATING 100 Tablet 3    Multiple Vitamins-Minerals (ZINC PO) Take  by mouth.      hydrocortisone 2.5 % Cream topical cream APPLY TO RASH ON GROIN TWICE DAILY FOR 1 WEEK WITH FLARES      Calcium Carb-Cholecalciferol (CALCIUM 1000 + D PO) Take 1 Dose by mouth every day.      Omega-3 Krill Oil 300 MG Cap Take 300 mg by mouth every day.      Cinnamon 500 MG Cap Take 1,000 mg by mouth.      Cyanocobalamin (VITAMIN B-12) 1000 MCG Tab Take 1,000 mcg by mouth every day.      Coenzyme Q10 (CO Q-10 PO) Take 1 Dose by mouth every day. Unknown OTC Strength      Nirmatrelvir & Ritonavir 20 x 150 MG & 10 x 100MG Tablet Therapy Pack Take 150 mg nirmatrelvir with 100 mg ritonavir by mouthr twice daily for 5 days. 20 Each 0     No current facility-administered medications for this visit.     He  has a past medical history of Anesthesia, Basal cell carcinoma of skin, CAD (coronary artery disease), Cancer ([facility]), Chronic kidney disease (CKD) stage G3b/A2, moderately decreased glomerular filtration rate (GFR) between 30-44 mL/min/1.73 square meter and albuminuria creatinine ratio between  mg/g ([facility]) (4/23/2015), DVT (deep venous thrombosis) ([facility]) (2014), ED (erectile dysfunction), GERD (gastroesophageal reflux disease), Glaucoma, Heart burn, Hyperlipidemia (12/3/2012), Hypertension, Indigestion, Multiple pulmonary emboli ([facility]) (2014), Multiple thyroid nodules (2009), Papillary carcinoma of thyroid ([facility]) (2014), postsurgical hypothyroidism (2014), Pre-diabetes, Renal disorder, S/P CABG x 4 (2003), S/P colectomy (2010), S/p nephrectomy (1998), S/P prostatectomy (2008), Stroke ([facility]), Transient renal failure (2014), Type II  "or unspecified type diabetes mellitus without mention of complication, not stated as uncontrolled, and Unspecified urinary incontinence.    ROS   Review of Systems   Constitutional:  Negative for chills and fever.   HENT:  Negative for hearing loss.    Eyes:  Negative for blurred vision.   Respiratory:  Negative for shortness of breath and wheezing.    Cardiovascular:  Negative for chest pain and palpitations.   Psychiatric/Behavioral:  Negative for depression. The patient is not nervous/anxious.         Objective:     Physical Exam:  /56   Pulse 70   Temp 36.3 °C (97.3 °F) (Temporal)   Resp 15   Ht 1.727 m (5' 8\")   Wt 78.5 kg (173 lb)   SpO2 92%  Body mass index is 26.3 kg/m².   Constitutional: Alert, no distress.  Skin: Warm, dry, good turgor, no rashes in visible areas.  Eye: Equal, round and reactive, conjunctiva clear, lids normal.  Respiratory: Unlabored respiratory effort, lungs clear to auscultation, no wheezes, no rhonchi.  Cardiovascular: Normal S1, S2, no murmur, no edema.  Abdomen: Soft, non-tender, no masses, no hepatosplenomegaly.  Psych: Alert and oriented x3, normal affect and mood.    Assessment and Plan:     1. Encounter to establish care  -All questions concerns were answered at this time.  -Vaccinations/screenings needed at this time: Due for Tdap vaccine, will follow-up at next visit.  -Labs reviewed, no concerns, recheck labs again in 6 months.  -Discussed the importance of a healthy, Mediterranean style diet, routine exercise regimen.  -Discussed general safety measures including seatbelts, helmets, avoidance of smoking, sunscreen, hydration.  -Follow-up for general physical exam on a yearly basis, sooner if needed.    2. Controlled type 2 diabetes mellitus with complication, without long-term current use of insulin (HCC)  Stable.  Hemoglobin A1c is 7.6%.  We will continue the proper diet and exercise regimen.  Continue with Trulicity.  -Reviewed labs, up-to-date on screenings.  " We will continue with losartan, atorvastatin.  Continue with aspirin.  Follow-up for hemoglobin A1c every 6 months.    3. Coagulation defect (HCC)- on warfarin  -Stable, no concerns at this time.  We will continue to follow-up with anticoagulation clinic.    4. Essential hypertension, benign  -Blood pressure today slightly low at 106/56.  Patient does complain of some increased fatigue although did recently recover from COVID over a month ago.  Patient will be checking blood pressures at home to see if episodes of fatigue correlate with lower blood pressure at which time we may need to titrate off medications.    5. Dyslipidemia  -Stable.  Reviewed labs, no concerns, continue with atorvastatin, fenofibrate.    6. Postsurgical hypothyroidism  -Stable.  Reviewed labs, TSH at goal.  We will continue with current dose of levothyroxine.    7. S/P CABG x 4  -Stable.  Continue with aspirin, statin.  Continue with appropriate lifestyle modification.  Return precautions are given, patient will follow-up as needed.    8. Stage 3b chronic kidney disease (HCC)  -Stable.  No concerns at this time.  Continue with appropriate renal diet, avoidance of NSAIDs.  Follow-up with nephrologist.      Followup: No follow-ups on file.         PLEASE NOTE: This dictation was created using voice recognition software. I have made every reasonable attempt to correct obvious errors, but I expect that there are errors of grammar and possibly content that I did not discover before finalizing the note.

## 2022-08-25 ENCOUNTER — ANTICOAGULATION VISIT (OUTPATIENT)
Dept: MEDICAL GROUP | Facility: MEDICAL CENTER | Age: 82
End: 2022-08-25
Payer: MEDICARE

## 2022-08-25 DIAGNOSIS — Z95.828 PRESENCE OF IVC FILTER: ICD-10-CM

## 2022-08-25 LAB — INR PPP: 2 (ref 2–3.5)

## 2022-08-25 PROCEDURE — 85610 PROTHROMBIN TIME: CPT | Performed by: INTERNAL MEDICINE

## 2022-08-25 PROCEDURE — 99211 OFF/OP EST MAY X REQ PHY/QHP: CPT | Performed by: INTERNAL MEDICINE

## 2022-08-25 NOTE — PROGRESS NOTES
OP Anticoagulation Service Note    Date: 2022    Anticoagulation Summary  As of 2022      INR goal:  2.0-3.0   TTR:  75.3 % (4.5 y)   INR used for dosin.00 (2022)   Warfarin maintenance plan:  7.5 mg (5 mg x 1.5) every Mon, Fri; 5 mg (5 mg x 1) all other days   Weekly warfarin total:  40 mg   Plan last modified:  Skip Dowling, PharmD (2022)   Next INR check:  2022   Priority:  Maintenance   Target end date:  Indefinite    Indications    Presence of IVC filter [Z95.828]  Transient ischemic attack [G45.9] (Resolved) [G45.9]  Deep vein thrombosis (DVT) of both lower extremities (HCC) (Resolved) [I82.403]  DVT (deep venous thrombosis) (HCC) (Resolved) [I82.409]  Multiple pulmonary emboli (HCC) (Resolved) [I26.99]  Chronic anticoagulation (Resolved) [Z79.01]                 Anticoagulation Episode Summary       INR check location:      Preferred lab:      Send INR reminders to:      Comments:            Anticoagulation Care Providers       Provider Role Specialty Phone number    [facility]   [phone]    Edward Walters M.D.  Family Medicine [phone]          Anticoagulation Patient Findings      HPI:   Paulo Paredes is in the Anticoagulation Clinic today for an INR check on their anticoagulation therapy.     The reason for today's visit is to prevent morbidity and mortality from a blood clot and/or stroke and to reduce the risk of bleeding while on a anticoagulant.     PCP:  Damion Larose M.D.  [address] [id number] [phone]    3 vitals included with today's appt-unless patient declined:  (BP, HR, weight, ht, RR)   There were no vitals filed for this visit.    Verified current warfarin dosing schedule.    Medications reconciled   Pt is not on antiplatelet therapy    Assessment:   INR  therapeutic.     Plan:  Instructed pt to begin newly increased regimen of 7.5 mg M/F and 5 mg ROW.    Follow up:  Follow up  appointment in 2 week(s).       Other info:  Pt educated to contact our clinic with any changes in medications or s/s of bleeding or thrombosis.  Education was provided today regarding tips to reduce their bleed risk and dietary constraints while on a anticoagulant.    National Recommendations:  The CHEST guidelines recommends frequent INR monitoring at regular intervals (a few days up to a max of 12 weeks) to ensure patients are on the proper dose of warfarin, and patients are not having any complications from therapy.  INRs can dramatically change over a short time period due to diet, medications, and medical conditions.     Skip Dowling, PharmD, BCACP    [facility]  Phone [phone] fax [phone]

## 2022-09-08 ENCOUNTER — ANTICOAGULATION VISIT (OUTPATIENT)
Dept: MEDICAL GROUP | Facility: MEDICAL CENTER | Age: 82
End: 2022-09-08
Payer: MEDICARE

## 2022-09-08 DIAGNOSIS — Z79.01 CHRONIC ANTICOAGULATION: Primary | ICD-10-CM

## 2022-09-08 DIAGNOSIS — Z95.828 PRESENCE OF IVC FILTER: ICD-10-CM

## 2022-09-08 LAB — INR PPP: 2.3 (ref 2–3.5)

## 2022-09-08 PROCEDURE — 93793 ANTICOAG MGMT PT WARFARIN: CPT | Performed by: FAMILY MEDICINE

## 2022-09-08 PROCEDURE — 85610 PROTHROMBIN TIME: CPT | Performed by: INTERNAL MEDICINE

## 2022-09-08 NOTE — PROGRESS NOTES
OP Anticoagulation Service Note    Date: 2022  There were no vitals filed for this visit.   pt declined vitals    Anticoagulation Summary  As of 2022      INR goal:  2.0-3.0   TTR:  75.6 % (4.5 y)   INR used for dosin.30 (2022)   Warfarin maintenance plan:  7.5 mg (5 mg x 1.5) every Mon, Fri; 5 mg (5 mg x 1) all other days   Weekly warfarin total:  40 mg   Plan last modified:  Skip Dowling, PharmD (2022)   Next INR check:  2022   Priority:  Maintenance   Target end date:  Indefinite    Indications    Presence of IVC filter [Z95.828]  Transient ischemic attack [G45.9] (Resolved) [G45.9]  Deep vein thrombosis (DVT) of both lower extremities (HCC) (Resolved) [I82.403]  DVT (deep venous thrombosis) (HCC) (Resolved) [I82.409]  Multiple pulmonary emboli (HCC) (Resolved) [I26.99]  Chronic anticoagulation (Resolved) [Z79.01]                 Anticoagulation Episode Summary       INR check location:      Preferred lab:      Send INR reminders to:      Comments:            Anticoagulation Care Providers       Provider Role Specialty Phone number    [facility]   [phone]    Edward Walters M.D.  Family Medicine [phone]              HPI:   Paulo Paredes seen in clinic today, on anticoagulation therapy with warfarin (a high risk medication) for hx of DVT and PE       Pt is here today to evaluate anticoagulation therapy    Previous INR was  2.0 on 22    Pt was instructed to increase weekly regimen    Anticoagulation Patient Findings  Patient Findings       Negatives:  Signs/symptoms of thrombosis, Signs/symptoms of bleeding, Laboratory test error suspected, Change in health, Change in alcohol use, Change in activity, Upcoming invasive procedure, Emergency department visit, Upcoming dental procedure, Missed doses, Extra doses, Change in medications, Change in diet/appetite, Hospital admission, Bruising, Other complaints            Confirmed warfarin dosing  regimen    Pt is not on antiplatelet/NSAID therapy          A/P   INR  -therapeutic today,   Continue current warfarin regimen        Pt educated to contact our clinic with any changes in medications or s/s of bleeding or thrombosis. Pt is aware to seek immediate medical attention for falls, head injury or deep cuts    Follow up appointment in 3 week(s) to reduce risk of adverse events from warfarin  Kelsey Junior, PharmD

## 2022-09-29 ENCOUNTER — ANTICOAGULATION VISIT (OUTPATIENT)
Dept: MEDICAL GROUP | Facility: MEDICAL CENTER | Age: 82
End: 2022-09-29
Payer: MEDICARE

## 2022-09-29 DIAGNOSIS — Z95.828 PRESENCE OF IVC FILTER: ICD-10-CM

## 2022-09-29 LAB — INR PPP: 3.4 (ref 2–3.5)

## 2022-09-29 PROCEDURE — 85610 PROTHROMBIN TIME: CPT | Performed by: INTERNAL MEDICINE

## 2022-09-29 PROCEDURE — 99211 OFF/OP EST MAY X REQ PHY/QHP: CPT | Performed by: INTERNAL MEDICINE

## 2022-09-29 NOTE — PROGRESS NOTES
Anticoagulation Summary  As of 9/29/2022      INR goal:  2.0-3.0   TTR:  75.4 % (4.6 y)   INR used for dosing:  3.40 (9/29/2022)   Warfarin maintenance plan:  7.5 mg (5 mg x 1.5) every Mon; 5 mg (5 mg x 1) all other days   Weekly warfarin total:  37.5 mg   Plan last modified:  Madhavi Elizabeth PharmD (9/29/2022)   Next INR check:  10/13/2022   Priority:  Maintenance   Target end date:  Indefinite    Indications    Presence of IVC filter [Z95.828]  Transient ischemic attack [G45.9] (Resolved) [G45.9]  Deep vein thrombosis (DVT) of both lower extremities (HCC) (Resolved) [I82.403]  DVT (deep venous thrombosis) (HCC) (Resolved) [I82.409]  Multiple pulmonary emboli (HCC) (Resolved) [I26.99]  Chronic anticoagulation (Resolved) [Z79.01]                 Anticoagulation Episode Summary       INR check location:      Preferred lab:      Send INR reminders to:      Comments:            Anticoagulation Care Providers       Provider Role Specialty Phone number    [facility]   [phone]    Edward Walters M.D.  [facility] [phone]                  Refer to Patient Findings for HPI:      There were no vitals filed for this visit.   pt declined vitals    Verified current warfarin dosing schedule.    Medications reconciled   Pt is not on antiplatelet therapy      A/P   INR  SUPRA-therapeutic.     Warfarin dosing recommendation: Reduce weekly regimen to 7.5mg Monday and 5 mg AOD (~6% reduction)     Pt educated to contact our clinic with any changes in medications or s/s of bleeding or thrombosis. Pt is aware to seek immediate medical attention for falls, head injury or deep cuts.    Follow up appointment in 2 week(s).    Madhavi Elizabeth, ZayD

## 2022-10-13 ENCOUNTER — ANTICOAGULATION VISIT (OUTPATIENT)
Dept: MEDICAL GROUP | Facility: MEDICAL CENTER | Age: 82
End: 2022-10-13
Payer: MEDICARE

## 2022-10-13 DIAGNOSIS — Z95.828 PRESENCE OF IVC FILTER: ICD-10-CM

## 2022-10-13 LAB — INR PPP: 2.3 (ref 2–3.5)

## 2022-10-13 PROCEDURE — 85610 PROTHROMBIN TIME: CPT | Performed by: INTERNAL MEDICINE

## 2022-10-13 PROCEDURE — 93793 ANTICOAG MGMT PT WARFARIN: CPT | Performed by: INTERNAL MEDICINE

## 2022-10-13 NOTE — PROGRESS NOTES
OP Anticoagulation Service Note    Date: 10/13/2022    Anticoagulation Summary  As of 10/13/2022      INR goal:  2.0-3.0   TTR:  75.3 % (4.6 y)   INR used for dosin.30 (10/13/2022)   Warfarin maintenance plan:  7.5 mg (5 mg x 1.5) every Mon; 5 mg (5 mg x 1) all other days   Weekly warfarin total:  37.5 mg   Plan last modified:  Zay EckertD (2022)   Next INR check:  2022   Priority:  Maintenance   Target end date:  Indefinite    Indications    Presence of IVC filter [Z95.828]  Transient ischemic attack [G45.9] (Resolved) [G45.9]  Deep vein thrombosis (DVT) of both lower extremities (HCC) (Resolved) [I82.403]  DVT (deep venous thrombosis) (HCC) (Resolved) [I82.409]  Multiple pulmonary emboli (HCC) (Resolved) [I26.99]  Chronic anticoagulation (Resolved) [Z79.01]                 Anticoagulation Episode Summary       INR check location:      Preferred lab:      Send INR reminders to:      Comments:            Anticoagulation Care Providers       Provider Role Specialty Phone number    [facility]   [phone]    Edward Walters M.D.  Family Medicine [phone]          Anticoagulation Patient Findings  Patient Findings       Negatives:  Signs/symptoms of thrombosis, Signs/symptoms of bleeding, Laboratory test error suspected, Change in health, Change in alcohol use, Change in activity, Upcoming invasive procedure, Emergency department visit, Upcoming dental procedure, Missed doses, Extra doses, Change in medications, Change in diet/appetite, Hospital admission, Bruising, Other complaints            HPI:   Paulo Paredes is in the Anticoagulation Clinic today for an INR check on their anticoagulation therapy.     The reason for today's visit is to prevent morbidity and mortality from a blood clot and/or stroke and to reduce the risk of bleeding while on a anticoagulant.     PCP:  Damion Larose M.D.  [address]  [id number] [phone]    3 vitals included with today's appt-unless patient declined:  (BP, HR, weight, ht, RR)   There were no vitals filed for this visit.    Verified current warfarin dosing schedule.    Medications reconciled   Pt is not on antiplatelet therapy    Assessment:   INR  therapeutic.     Plan:  Instructed pt to continue on with current regimen.      Follow up:  Follow up appointment in 3 week(s).       Other info:  Pt educated to contact our clinic with any changes in medications or s/s of bleeding or thrombosis.  Education was provided today regarding tips to reduce their bleed risk and dietary constraints while on a anticoagulant.    National Recommendations:  The CHEST guidelines recommends frequent INR monitoring at regular intervals (a few days up to a max of 12 weeks) to ensure patients are on the proper dose of warfarin, and patients are not having any complications from therapy.  INRs can dramatically change over a short time period due to diet, medications, and medical conditions.     Skip Dowling, PharmD, BCACP    [facility]  Phone [phone] fax [phone]

## 2022-11-07 ENCOUNTER — ANTICOAGULATION VISIT (OUTPATIENT)
Dept: MEDICAL GROUP | Facility: MEDICAL CENTER | Age: 82
End: 2022-11-07
Payer: MEDICARE

## 2022-11-07 DIAGNOSIS — Z95.828 PRESENCE OF IVC FILTER: ICD-10-CM

## 2022-11-07 LAB — INR PPP: 3.1 (ref 2–3.5)

## 2022-11-07 PROCEDURE — 85610 PROTHROMBIN TIME: CPT | Performed by: INTERNAL MEDICINE

## 2022-11-07 PROCEDURE — 99211 OFF/OP EST MAY X REQ PHY/QHP: CPT | Performed by: INTERNAL MEDICINE

## 2022-11-07 NOTE — PROGRESS NOTES
Anticoagulation Summary  As of 11/7/2022      INR goal:  2.0-3.0   TTR:  75.4 % (4.7 y)   INR used for dosing:  3.10 (11/7/2022)   Warfarin maintenance plan:  7.5 mg (5 mg x 1.5) every Mon; 5 mg (5 mg x 1) all other days; Starting 11/7/2022   Weekly warfarin total:  37.5 mg   Plan last modified:  Madhavi Elizabeth PharmD (9/29/2022)   Next INR check:  12/5/2022   Priority:  Maintenance   Target end date:  Indefinite    Indications    Presence of IVC filter [Z95.828]  Transient ischemic attack [G45.9] (Resolved) [G45.9]  Deep vein thrombosis (DVT) of both lower extremities (HCC) (Resolved) [I82.403]  DVT (deep venous thrombosis) (HCC) (Resolved) [I82.409]  Multiple pulmonary emboli (HCC) (Resolved) [I26.99]  Chronic anticoagulation (Resolved) [Z79.01]                 Anticoagulation Episode Summary       INR check location:      Preferred lab:      Send INR reminders to:      Comments:            Anticoagulation Care Providers       Provider Role Specialty Phone number    [facility]   [phone]    Edward Walters M.D.  Family Medicine [phone]                  Refer to Patient Findings for HPI:  Patient Findings       Negatives:  Signs/symptoms of thrombosis, Signs/symptoms of bleeding, Laboratory test error suspected, Change in health, Change in alcohol use, Change in activity, Upcoming invasive procedure, Emergency department visit, Upcoming dental procedure, Missed doses, Extra doses, Change in medications, Change in diet/appetite, Hospital admission, Bruising, Other complaints            There were no vitals filed for this visit.  pt declined vitals    Verified current warfarin dosing schedule.    Medications reconciled   Pt is not on antiplatelet therapy      A/P   INR  SUPRA-therapeutic. Slightly above goal. Last INR therapeutic at 2.3.     Warfarin dosing recommendation: Continue current warfarin regimen as above without changes, increase vitaminK intake tonight.       Pt educated  to contact our clinic with any changes in medications or s/s of bleeding or thrombosis. Pt is aware to seek immediate medical attention for falls, head injury or deep cuts.    Follow up appointment in 4 week(s).    Zay EckertD

## 2022-11-21 DIAGNOSIS — I25.10 CAD IN NATIVE ARTERY: ICD-10-CM

## 2022-11-21 DIAGNOSIS — E78.5 DYSLIPIDEMIA: ICD-10-CM

## 2022-11-22 RX ORDER — ATORVASTATIN CALCIUM 40 MG/1
TABLET, FILM COATED ORAL
Qty: 100 TABLET | Refills: 0 | Status: SHIPPED | OUTPATIENT
Start: 2022-11-22 | End: 2023-08-30 | Stop reason: SDUPTHER

## 2022-11-22 NOTE — TELEPHONE ENCOUNTER
Is the patient due for a refill? No    Was the patient seen the past year? Yes    Date of last office visit: 10/28/2021    Does the patient have an upcoming appointment?  No       Provider to refill:ELIZABETH    Does the patients insurance require a 100 day supply?  Yes

## 2022-11-23 RX ORDER — DULAGLUTIDE 0.75 MG/.5ML
INJECTION, SOLUTION SUBCUTANEOUS
Qty: 12 ML | Refills: 1 | Status: SHIPPED | OUTPATIENT
Start: 2022-11-23 | End: 2023-05-05 | Stop reason: SDUPTHER

## 2022-11-23 NOTE — TELEPHONE ENCOUNTER
Received request via: Pharmacy    Was the patient seen in the last year in this department? Yes  8/24/22  Does the patient have an active prescription (recently filled or refills available) for medication(s) requested? No    Does the patient have assisted Plus and need 100 day supply (blood pressure, diabetes and cholesterol meds only)? Yes, quantity updated to 100 days

## 2022-12-02 ENCOUNTER — TELEPHONE (OUTPATIENT)
Dept: SCHEDULING | Facility: IMAGING CENTER | Age: 82
End: 2022-12-02

## 2022-12-05 ENCOUNTER — ANTICOAGULATION VISIT (OUTPATIENT)
Dept: MEDICAL GROUP | Facility: MEDICAL CENTER | Age: 82
End: 2022-12-05
Payer: MEDICARE

## 2022-12-05 DIAGNOSIS — Z95.828 PRESENCE OF IVC FILTER: ICD-10-CM

## 2022-12-05 LAB — INR PPP: 3.6 (ref 2–3.5)

## 2022-12-05 PROCEDURE — 85610 PROTHROMBIN TIME: CPT | Performed by: INTERNAL MEDICINE

## 2022-12-05 PROCEDURE — 99211 OFF/OP EST MAY X REQ PHY/QHP: CPT | Performed by: INTERNAL MEDICINE

## 2022-12-05 NOTE — PROGRESS NOTES
Anticoagulation Summary  As of 12/5/2022      INR goal:  2.0-3.0   TTR:  74.2 % (4.8 y)   INR used for dosing:  3.60 (12/5/2022)   Warfarin maintenance plan:  5 mg (5 mg x 1) every day; Starting 12/5/2022   Weekly warfarin total:  35 mg   Plan last modified:  Madhavi Elizabeth PharmD (12/5/2022)   Next INR check:  12/26/2022   Priority:  Maintenance   Target end date:  Indefinite    Indications    Presence of IVC filter [Z95.828]  Transient ischemic attack [G45.9] (Resolved) [G45.9]  Deep vein thrombosis (DVT) of both lower extremities (HCC) (Resolved) [I82.403]  DVT (deep venous thrombosis) (HCC) (Resolved) [I82.409]  Multiple pulmonary emboli (HCC) (Resolved) [I26.99]  Chronic anticoagulation (Resolved) [Z79.01]                 Anticoagulation Episode Summary       INR check location:      Preferred lab:      Send INR reminders to:      Comments:            Anticoagulation Care Providers       Provider Role Specialty Phone number    [facility]   [phone]    Edward Walters M.D.  Family Medicine [phone]                  Refer to Patient Findings for HPI:  Patient Findings       Positives:  Change in diet/appetite (slightly decreased)    Negatives:  Signs/symptoms of thrombosis, Signs/symptoms of bleeding, Laboratory test error suspected, Change in health, Change in alcohol use, Change in activity, Upcoming invasive procedure, Emergency department visit, Upcoming dental procedure, Missed doses, Extra doses, Change in medications, Hospital admission, Bruising, Other complaints            There were no vitals filed for this visit.   pt declined vitals    Verified current warfarin dosing schedule.    Medications reconciled   Pt is not on antiplatelet therapy      A/P   INR  SUPRA-therapeutic. Last INR 3.1.      Warfarin dosing recommendation: Decrease weekly regimen to 5 mg daily . (6.7% decrease).     Pt educated to contact our clinic with any changes in medications or s/s of bleeding or  thrombosis. Pt is aware to seek immediate medical attention for falls, head injury or deep cuts.    Follow up appointment in 3 week(s). Per patient preference.     Zay EckertD

## 2022-12-29 ENCOUNTER — ANTICOAGULATION VISIT (OUTPATIENT)
Dept: MEDICAL GROUP | Facility: MEDICAL CENTER | Age: 82
End: 2022-12-29
Payer: MEDICARE

## 2022-12-29 DIAGNOSIS — Z95.828 PRESENCE OF IVC FILTER: ICD-10-CM

## 2022-12-29 LAB — INR PPP: 3.4 (ref 2–3.5)

## 2022-12-29 PROCEDURE — 85610 PROTHROMBIN TIME: CPT | Performed by: FAMILY MEDICINE

## 2022-12-29 PROCEDURE — 99211 OFF/OP EST MAY X REQ PHY/QHP: CPT | Performed by: FAMILY MEDICINE

## 2022-12-29 NOTE — PROGRESS NOTES
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  Family Medicine [phone]          Anticoagulation Patient Findings  Patient Findings       Negatives:  Signs/symptoms of thrombosis, Signs/symptoms of bleeding, Laboratory test error suspected, Change in health, Change in alcohol use, Change in activity, Upcoming invasive procedure, Emergency department visit, Upcoming dental procedure, Missed doses, Extra doses, Change in medications, Change in diet/appetite, Hospital admission, Bruising, Other complaints            HPI:   Paulo Paredes is in the Anticoagulation Clinic today for an INR check on their anticoagulation therapy.     The reason for today's visit is to prevent morbidity and mortality from a blood clot and/or stroke and to reduce the risk of bleeding while on a anticoagulant.     PCP:  Damion Larose M.D.  [address]  [id number] [phone]    3 vitals included with today's appt-unless patient declined:  (BP, HR, weight, ht, RR)   There were no vitals filed for this visit.    Verified current warfarin dosing schedule.    Medications reconciled   Pt is not on antiplatelet therapy    Assessment:   INR  SUPRA-therapeutic.     Plan:  Instructed pt to begin newly decreased regimen of 2.5 mg Thurs and 5 mg ROW.    Follow up:  Follow up appointment in 3 week(s).       Other info:  Pt educated to contact our clinic with any changes in medications or s/s of bleeding or thrombosis.  Education was provided today regarding tips to reduce their bleed risk and dietary constraints while on a anticoagulant.    National Recommendations:  The CHEST guidelines recommends frequent INR monitoring at regular intervals (a few days up to a max of 12 weeks) to ensure patients are on the proper dose of warfarin, and patients are not having any complications from therapy.  INRs can dramatically change over a short time period due to diet, medications, and medical conditions.     Skip Dowling, PharmD, BCACP    [facility]  Phone [phone] fax [phone]

## 2023-01-04 ENCOUNTER — HOSPITAL ENCOUNTER (OUTPATIENT)
Dept: LAB | Facility: MEDICAL CENTER | Age: 83
End: 2023-01-04
Attending: INTERNAL MEDICINE
Payer: MEDICARE

## 2023-01-04 DIAGNOSIS — D69.6 THROMBOCYTOPENIA (HCC): ICD-10-CM

## 2023-01-04 DIAGNOSIS — I10 ESSENTIAL HYPERTENSION, BENIGN: ICD-10-CM

## 2023-01-04 DIAGNOSIS — I10 ESSENTIAL HYPERTENSION: ICD-10-CM

## 2023-01-04 DIAGNOSIS — N18.32 STAGE 3B CHRONIC KIDNEY DISEASE: ICD-10-CM

## 2023-01-04 DIAGNOSIS — E11.8 CONTROLLED TYPE 2 DIABETES MELLITUS WITH COMPLICATION, WITHOUT LONG-TERM CURRENT USE OF INSULIN (HCC): ICD-10-CM

## 2023-01-04 DIAGNOSIS — E78.5 DYSLIPIDEMIA: ICD-10-CM

## 2023-01-04 DIAGNOSIS — E89.0 POSTSURGICAL HYPOTHYROIDISM: ICD-10-CM

## 2023-01-04 DIAGNOSIS — N18.30 STAGE 3 CHRONIC KIDNEY DISEASE, UNSPECIFIED WHETHER STAGE 3A OR 3B CKD: ICD-10-CM

## 2023-01-04 LAB
ALBUMIN SERPL BCP-MCNC: 4.3 G/DL (ref 3.2–4.9)
ALBUMIN/GLOB SERPL: 2 G/DL
ALP SERPL-CCNC: 42 U/L (ref 30–99)
ALT SERPL-CCNC: 18 U/L (ref 2–50)
ANION GAP SERPL CALC-SCNC: 8 MMOL/L (ref 7–16)
ANION GAP SERPL CALC-SCNC: 9 MMOL/L (ref 7–16)
AST SERPL-CCNC: 29 U/L (ref 12–45)
BASOPHILS # BLD AUTO: 0.9 % (ref 0–1.8)
BASOPHILS # BLD: 0.04 K/UL (ref 0–0.12)
BILIRUB SERPL-MCNC: 0.4 MG/DL (ref 0.1–1.5)
BUN SERPL-MCNC: 25 MG/DL (ref 8–22)
BUN SERPL-MCNC: 26 MG/DL (ref 8–22)
CALCIUM ALBUM COR SERPL-MCNC: 9.2 MG/DL (ref 8.5–10.5)
CALCIUM SERPL-MCNC: 9.4 MG/DL (ref 8.5–10.5)
CALCIUM SERPL-MCNC: 9.4 MG/DL (ref 8.5–10.5)
CHLORIDE SERPL-SCNC: 106 MMOL/L (ref 96–112)
CHLORIDE SERPL-SCNC: 106 MMOL/L (ref 96–112)
CHOLEST SERPL-MCNC: 175 MG/DL (ref 100–199)
CO2 SERPL-SCNC: 25 MMOL/L (ref 20–33)
CO2 SERPL-SCNC: 25 MMOL/L (ref 20–33)
CREAT SERPL-MCNC: 2.06 MG/DL (ref 0.5–1.4)
CREAT SERPL-MCNC: 2.08 MG/DL (ref 0.5–1.4)
CREAT UR-MCNC: 131.01 MG/DL
CREAT UR-MCNC: 131.29 MG/DL
EOSINOPHIL # BLD AUTO: 0.24 K/UL (ref 0–0.51)
EOSINOPHIL NFR BLD: 5.2 % (ref 0–6.9)
ERYTHROCYTE [DISTWIDTH] IN BLOOD BY AUTOMATED COUNT: 48 FL (ref 35.9–50)
ERYTHROCYTE [DISTWIDTH] IN BLOOD BY AUTOMATED COUNT: 49.3 FL (ref 35.9–50)
EST. AVERAGE GLUCOSE BLD GHB EST-MCNC: 183 MG/DL
FASTING STATUS PATIENT QL REPORTED: NORMAL
FASTING STATUS PATIENT QL REPORTED: NORMAL
GFR SERPLBLD CREATININE-BSD FMLA CKD-EPI: 31 ML/MIN/1.73 M 2
GFR SERPLBLD CREATININE-BSD FMLA CKD-EPI: 31 ML/MIN/1.73 M 2
GLOBULIN SER CALC-MCNC: 2.2 G/DL (ref 1.9–3.5)
GLUCOSE SERPL-MCNC: 163 MG/DL (ref 65–99)
GLUCOSE SERPL-MCNC: 169 MG/DL (ref 65–99)
HBA1C MFR BLD: 8 % (ref 4–5.6)
HCT VFR BLD AUTO: 45.2 % (ref 42–52)
HCT VFR BLD AUTO: 45.2 % (ref 42–52)
HDLC SERPL-MCNC: 31 MG/DL
HGB BLD-MCNC: 14.4 G/DL (ref 14–18)
HGB BLD-MCNC: 14.5 G/DL (ref 14–18)
IMM GRANULOCYTES # BLD AUTO: 0.01 K/UL (ref 0–0.11)
IMM GRANULOCYTES NFR BLD AUTO: 0.2 % (ref 0–0.9)
LDLC SERPL CALC-MCNC: 93 MG/DL
LYMPHOCYTES # BLD AUTO: 1.47 K/UL (ref 1–4.8)
LYMPHOCYTES NFR BLD: 32.1 % (ref 22–41)
MCH RBC QN AUTO: 30.9 PG (ref 27–33)
MCH RBC QN AUTO: 31.2 PG (ref 27–33)
MCHC RBC AUTO-ENTMCNC: 31.9 G/DL (ref 33.7–35.3)
MCHC RBC AUTO-ENTMCNC: 32.1 G/DL (ref 33.7–35.3)
MCV RBC AUTO: 97 FL (ref 81.4–97.8)
MCV RBC AUTO: 97.2 FL (ref 81.4–97.8)
MICROALBUMIN UR-MCNC: 10.2 MG/DL
MICROALBUMIN UR-MCNC: 10.3 MG/DL
MICROALBUMIN/CREAT UR: 78 MG/G (ref 0–30)
MICROALBUMIN/CREAT UR: 79 MG/G (ref 0–30)
MONOCYTES # BLD AUTO: 0.36 K/UL (ref 0–0.85)
MONOCYTES NFR BLD AUTO: 7.9 % (ref 0–13.4)
NEUTROPHILS # BLD AUTO: 2.46 K/UL (ref 1.82–7.42)
NEUTROPHILS NFR BLD: 53.7 % (ref 44–72)
NRBC # BLD AUTO: 0 K/UL
NRBC BLD-RTO: 0 /100 WBC
PLATELET # BLD AUTO: 102 K/UL (ref 164–446)
PLATELET # BLD AUTO: 105 K/UL (ref 164–446)
PMV BLD AUTO: 11.4 FL (ref 9–12.9)
PMV BLD AUTO: 11.9 FL (ref 9–12.9)
POTASSIUM SERPL-SCNC: 5.1 MMOL/L (ref 3.6–5.5)
POTASSIUM SERPL-SCNC: 5.1 MMOL/L (ref 3.6–5.5)
PROT SERPL-MCNC: 6.5 G/DL (ref 6–8.2)
RBC # BLD AUTO: 4.65 M/UL (ref 4.7–6.1)
RBC # BLD AUTO: 4.66 M/UL (ref 4.7–6.1)
SODIUM SERPL-SCNC: 139 MMOL/L (ref 135–145)
SODIUM SERPL-SCNC: 140 MMOL/L (ref 135–145)
TRIGL SERPL-MCNC: 256 MG/DL (ref 0–149)
TSH SERPL DL<=0.005 MIU/L-ACNC: 0.85 UIU/ML (ref 0.38–5.33)
WBC # BLD AUTO: 4.5 K/UL (ref 4.8–10.8)
WBC # BLD AUTO: 4.6 K/UL (ref 4.8–10.8)

## 2023-01-04 PROCEDURE — 80048 BASIC METABOLIC PNL TOTAL CA: CPT

## 2023-01-04 PROCEDURE — 36415 COLL VENOUS BLD VENIPUNCTURE: CPT

## 2023-01-04 PROCEDURE — 82043 UR ALBUMIN QUANTITATIVE: CPT | Mod: 91

## 2023-01-04 PROCEDURE — 80061 LIPID PANEL: CPT

## 2023-01-04 PROCEDURE — 83036 HEMOGLOBIN GLYCOSYLATED A1C: CPT

## 2023-01-04 PROCEDURE — 82043 UR ALBUMIN QUANTITATIVE: CPT

## 2023-01-04 PROCEDURE — 80053 COMPREHEN METABOLIC PANEL: CPT

## 2023-01-04 PROCEDURE — 82570 ASSAY OF URINE CREATININE: CPT

## 2023-01-04 PROCEDURE — 85027 COMPLETE CBC AUTOMATED: CPT | Mod: XU

## 2023-01-04 PROCEDURE — 82570 ASSAY OF URINE CREATININE: CPT | Mod: 91

## 2023-01-04 PROCEDURE — 85025 COMPLETE CBC W/AUTO DIFF WBC: CPT

## 2023-01-04 PROCEDURE — 84443 ASSAY THYROID STIM HORMONE: CPT

## 2023-01-13 ENCOUNTER — APPOINTMENT (RX ONLY)
Dept: URBAN - METROPOLITAN AREA CLINIC 35 | Facility: CLINIC | Age: 83
Setting detail: DERMATOLOGY
End: 2023-01-13

## 2023-01-13 DIAGNOSIS — Z85.828 PERSONAL HISTORY OF OTHER MALIGNANT NEOPLASM OF SKIN: ICD-10-CM

## 2023-01-13 DIAGNOSIS — L57.0 ACTINIC KERATOSIS: ICD-10-CM

## 2023-01-13 DIAGNOSIS — D22 MELANOCYTIC NEVI: ICD-10-CM

## 2023-01-13 DIAGNOSIS — L81.4 OTHER MELANIN HYPERPIGMENTATION: ICD-10-CM

## 2023-01-13 DIAGNOSIS — L91.0 HYPERTROPHIC SCAR: ICD-10-CM

## 2023-01-13 DIAGNOSIS — L82.1 OTHER SEBORRHEIC KERATOSIS: ICD-10-CM

## 2023-01-13 PROBLEM — D22.5 MELANOCYTIC NEVI OF TRUNK: Status: ACTIVE | Noted: 2023-01-13

## 2023-01-13 PROCEDURE — ? LIQUID NITROGEN

## 2023-01-13 PROCEDURE — ? COUNSELING

## 2023-01-13 PROCEDURE — ? PRESCRIPTION

## 2023-01-13 PROCEDURE — ? SUNSCREEN TREATMENT REGIMEN

## 2023-01-13 PROCEDURE — 17000 DESTRUCT PREMALG LESION: CPT

## 2023-01-13 PROCEDURE — 99213 OFFICE O/P EST LOW 20 MIN: CPT | Mod: 25

## 2023-01-13 RX ORDER — CLOBETASOL PROPIONATE 0.5 MG/G
CREAM TOPICAL BID
Qty: 30 | Refills: 0 | Status: ERX | COMMUNITY
Start: 2023-01-13

## 2023-01-13 RX ADMIN — CLOBETASOL PROPIONATE: 0.5 CREAM TOPICAL at 00:00

## 2023-01-13 ASSESSMENT — LOCATION SIMPLE DESCRIPTION DERM
LOCATION SIMPLE: LEFT FOREHEAD
LOCATION SIMPLE: LEFT FOREARM
LOCATION SIMPLE: LEFT CHEEK
LOCATION SIMPLE: UPPER BACK
LOCATION SIMPLE: RIGHT UPPER BACK
LOCATION SIMPLE: CHEST
LOCATION SIMPLE: RIGHT FOREARM
LOCATION SIMPLE: RIGHT CHEEK

## 2023-01-13 ASSESSMENT — LOCATION DETAILED DESCRIPTION DERM
LOCATION DETAILED: LEFT INFERIOR MEDIAL FOREHEAD
LOCATION DETAILED: LEFT SUPERIOR FOREHEAD
LOCATION DETAILED: RIGHT MEDIAL UPPER BACK
LOCATION DETAILED: LEFT MEDIAL MALAR CHEEK
LOCATION DETAILED: LEFT VENTRAL PROXIMAL FOREARM
LOCATION DETAILED: LEFT CENTRAL MALAR CHEEK
LOCATION DETAILED: SUPERIOR THORACIC SPINE
LOCATION DETAILED: STERNAL NOTCH
LOCATION DETAILED: RIGHT CENTRAL MALAR CHEEK
LOCATION DETAILED: RIGHT VENTRAL PROXIMAL FOREARM

## 2023-01-13 ASSESSMENT — LOCATION ZONE DERM
LOCATION ZONE: ARM
LOCATION ZONE: FACE
LOCATION ZONE: TRUNK

## 2023-01-13 NOTE — PROCEDURE: LIQUID NITROGEN
Show Applicator Variable?: Yes
Duration Of Freeze Thaw-Cycle (Seconds): 0
Detail Level: Detailed
Render Note In Bullet Format When Appropriate: No
Consent: The patient's consent was obtained including but not limited to risks of crusting, scabbing, blistering, scarring, darker or lighter pigmentary change, recurrence, incomplete removal and infection.
Post-Care Instructions: I reviewed with the patient in detail post-care instructions. Patient is to wear sunprotection, and avoid picking at any of the treated lesions. Pt may apply Vaseline to crusted or scabbing areas.
Number Of Freeze-Thaw Cycles: 1 freeze-thaw cycle

## 2023-01-18 ENCOUNTER — OFFICE VISIT (OUTPATIENT)
Dept: NEPHROLOGY | Facility: MEDICAL CENTER | Age: 83
End: 2023-01-18
Payer: MEDICARE

## 2023-01-18 VITALS
RESPIRATION RATE: 16 BRPM | BODY MASS INDEX: 26.52 KG/M2 | WEIGHT: 175 LBS | SYSTOLIC BLOOD PRESSURE: 122 MMHG | HEIGHT: 68 IN | OXYGEN SATURATION: 97 % | HEART RATE: 68 BPM | DIASTOLIC BLOOD PRESSURE: 78 MMHG

## 2023-01-18 DIAGNOSIS — N18.30 TYPE 2 DIABETES MELLITUS WITH STAGE 3 CHRONIC KIDNEY DISEASE, UNSPECIFIED WHETHER LONG TERM INSULIN USE, UNSPECIFIED WHETHER STAGE 3A OR 3B CKD (HCC): ICD-10-CM

## 2023-01-18 DIAGNOSIS — E11.22 TYPE 2 DIABETES MELLITUS WITH STAGE 3 CHRONIC KIDNEY DISEASE, UNSPECIFIED WHETHER LONG TERM INSULIN USE, UNSPECIFIED WHETHER STAGE 3A OR 3B CKD (HCC): ICD-10-CM

## 2023-01-18 DIAGNOSIS — N18.30 STAGE 3 CHRONIC KIDNEY DISEASE, UNSPECIFIED WHETHER STAGE 3A OR 3B CKD: ICD-10-CM

## 2023-01-18 DIAGNOSIS — I10 ESSENTIAL HYPERTENSION: ICD-10-CM

## 2023-01-18 PROCEDURE — 99214 OFFICE O/P EST MOD 30 MIN: CPT | Performed by: INTERNAL MEDICINE

## 2023-01-18 ASSESSMENT — ENCOUNTER SYMPTOMS
HYPERTENSION: 1
SHORTNESS OF BREATH: 0
NAUSEA: 0
CHILLS: 0
VOMITING: 0
FEVER: 0
COUGH: 0

## 2023-01-18 ASSESSMENT — FIBROSIS 4 INDEX: FIB4 SCORE: 5.47

## 2023-01-18 NOTE — PROGRESS NOTES
"Caron Ramos is a 82 y.o. male who presents with Chronic Kidney Disease and Hypertension            Chronic Kidney Disease  This is a chronic problem. The current episode started more than 1 year ago. The problem occurs constantly. The problem has been unchanged. Pertinent negatives include no chest pain, chills, coughing, fever, nausea, urinary symptoms or vomiting.   Hypertension  This is a chronic problem. The current episode started more than 1 year ago. The problem is unchanged. The problem is controlled. Pertinent negatives include no chest pain, malaise/fatigue, peripheral edema or shortness of breath. Risk factors for coronary artery disease include diabetes mellitus and male gender. Past treatments include angiotensin blockers and beta blockers. The current treatment provides significant improvement. There are no compliance problems.  Hypertensive end-organ damage includes kidney disease. Identifiable causes of hypertension include chronic renal disease.     Review of Systems   Constitutional:  Negative for chills, fever and malaise/fatigue.   Respiratory:  Negative for cough and shortness of breath.    Cardiovascular:  Negative for chest pain and leg swelling.   Gastrointestinal:  Negative for nausea and vomiting.   Genitourinary:  Negative for dysuria, frequency and urgency.            Objective     /78 (BP Location: Right arm, Patient Position: Sitting, BP Cuff Size: Adult)   Pulse 68   Resp 16   Ht 1.727 m (5' 8\")   Wt 79.4 kg (175 lb)   SpO2 97%   BMI 26.61 kg/m²      Physical Exam  Vitals and nursing note reviewed.   Constitutional:       General: He is not in acute distress.     Appearance: He is not ill-appearing.   HENT:      Head: Normocephalic and atraumatic.      Right Ear: External ear normal.      Left Ear: External ear normal.      Nose: Nose normal.   Eyes:      General:         Right eye: No discharge.         Left eye: No discharge.      Conjunctiva/sclera: " Conjunctivae normal.   Cardiovascular:      Rate and Rhythm: Normal rate and regular rhythm.      Heart sounds: No murmur heard.  Pulmonary:      Effort: Pulmonary effort is normal. No respiratory distress.      Breath sounds: Normal breath sounds. No wheezing.   Musculoskeletal:         General: No tenderness or deformity.      Right lower leg: No edema.      Left lower leg: No edema.   Skin:     General: Skin is warm.   Neurological:      General: No focal deficit present.      Mental Status: He is alert and oriented to person, place, and time.   Psychiatric:         Mood and Affect: Mood normal.         Behavior: Behavior normal.     Past Medical History:   Diagnosis Date    Anesthesia     difficult intubation with surgery 2008,2010    Basal cell carcinoma of skin     CAD (coronary artery disease)     Cancer (HCC)     rt  kidney 1989;  thyroid cancer 2012, prostate 2008, skin    Chronic kidney disease (CKD) stage G3b/A2, moderately decreased glomerular filtration rate (GFR) between 30-44 mL/min/1.73 square meter and albuminuria creatinine ratio between  mg/g (HCC) 4/23/2015    DVT (deep venous thrombosis) ([facility]) 2014    ED (erectile dysfunction)     GERD (gastroesophageal reflux disease)     Glaucoma     Heart burn     Hyperlipidemia 12/3/2012    Hypertension     Indigestion     Multiple pulmonary emboli (HCC) 2014    Multiple thyroid nodules 2009    Papillary carcinoma of thyroid (HCC) 2014    R 2 foci / 4.5mm,0.25mm / no mets/ pT1pN0    postsurgical hypothyroidism 2014    Pre-diabetes     Renal disorder     rt kidney cancer,nephrectomy    S/P CABG x 4 2003    S/P colectomy 2010    multiple colon polyps / no Ca    S/p nephrectomy 1998    carcinoma    S/P prostatectomy 2008    carcinoma    Stroke (HCC)     'mild',no residual,'one doctor said it was a tia'    Transient renal failure 2014    renal vein thrombosis    Type II or unspecified type diabetes mellitus without mention of complication, not stated as  uncontrolled     on no medications    Unspecified urinary incontinence      Social History     Socioeconomic History    Marital status:      Spouse name: Not on file    Number of children: Not on file    Years of education: Not on file    Highest education level: Not on file   Occupational History    Not on file   Tobacco Use    Smoking status: Never    Smokeless tobacco: Never   Vaping Use    Vaping Use: Never used   Substance and Sexual Activity    Alcohol use: Yes     Comment: Occasionally    Drug use: No    Sexual activity: Not Currently     Comment: retired    Other Topics Concern    Not on file   Social History Narrative    Not on file     Social Determinants of Health     Financial Resource Strain: Not on file   Food Insecurity: Not on file   Transportation Needs: Not on file   Physical Activity: Not on file   Stress: Not on file   Social Connections: Not on file   Intimate Partner Violence: Not on file   Housing Stability: Not on file     Family History   Problem Relation Age of Onset    Diabetes Mother     Heart Disease Mother     Diabetes Father     Cancer Father         pancreas    Thyroid Sister         Cancer    Cancer Sister         thyroid    Diabetes Sister     Cancer Brother 49        colon    Diabetes Brother     Diabetes Maternal Grandfather     Diabetes Paternal Grandmother     Diabetes Paternal Grandfather     No Known Problems Maternal Grandmother      Recent Labs     06/06/22  0719 06/06/22  0724 06/17/22  1045 01/04/23  0652 01/04/23  0653   ALBUMIN 4.1  --  4.3  --  4.3   HDL 32*  --   --   --  31*   TRIGLYCERIDE 186*  --   --   --  256*   SODIUM 138   < > 131* 140 139   POTASSIUM 5.0   < > 4.3 5.1 5.1   CHLORIDE 105   < > 97 106 106   CO2 24   < > 20 25 25   BUN 31*   < > 34* 25* 26*   CREATININE 1.95*   < > 2.10* 2.06* 2.08*    < > = values in this interval not displayed.                           Assessment & Plan        1. Essential  hypertension  Controlled  Continue same medication regimen  Continue low-sodium diet      2. Stage 3 chronic kidney disease, unspecified whether stage 3a or 3b CKD (HCC)  Stable  No uremic symptoms  Renal dose of medication  Avoid nephrotoxins  Continue same medication regimen  Patient was advised to call us if symptoms worsen      3. Type 2 diabetes mellitus with stage 3 chronic kidney disease, unspecified whether long term insulin use, unspecified whether stage 3a or 3b CKD (HCC)  Continue to optimize diabetes control for hemoglobin A1c below 7%

## 2023-01-19 ENCOUNTER — ANTICOAGULATION VISIT (OUTPATIENT)
Dept: MEDICAL GROUP | Facility: MEDICAL CENTER | Age: 83
End: 2023-01-19
Payer: MEDICARE

## 2023-01-19 DIAGNOSIS — Z95.828 PRESENCE OF IVC FILTER: ICD-10-CM

## 2023-01-19 LAB — INR PPP: 2.3 (ref 2–3.5)

## 2023-01-19 PROCEDURE — 85610 PROTHROMBIN TIME: CPT | Performed by: INTERNAL MEDICINE

## 2023-01-19 PROCEDURE — 93793 ANTICOAG MGMT PT WARFARIN: CPT | Performed by: INTERNAL MEDICINE

## 2023-01-19 NOTE — PROGRESS NOTES
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  Family Medicine [phone]          Anticoagulation Patient Findings  Patient Findings       Negatives:  Signs/symptoms of thrombosis, Signs/symptoms of bleeding, Laboratory test error suspected, Change in health, Change in alcohol use, Change in activity, Upcoming invasive procedure, Emergency department visit, Upcoming dental procedure, Missed doses, Extra doses, Change in medications, Change in diet/appetite, Hospital admission, Bruising, Other complaints            HPI:   Paulo Paredes is in the Anticoagulation Clinic today for an INR check on their anticoagulation therapy.     The reason for today's visit is to prevent morbidity and mortality from a blood clot and/or stroke and to reduce the risk of bleeding while on a anticoagulant.     PCP:  Damion Larose M.D.  [address]  [phone]    3 vitals included with today's appt-unless patient declined:  (BP, HR, weight, ht, RR)   There were no vitals filed for this visit.    Verified current warfarin dosing schedule.    Medications reconciled   Pt is not on antiplatelet therapy    Assessment:   INR  therapeutic.     Plan:  Instructed pt to continue on with current regimen.    Follow up:  Follow up appointment in 4 week(s).       Other info:  Pt educated to contact our clinic with any changes in medications or s/s of bleeding or thrombosis.  Education was provided today regarding tips to reduce their bleed risk and dietary constraints while on a anticoagulant.    National Recommendations:  The CHEST guidelines recommends frequent INR monitoring at regular intervals (a few days up to a max of 12 weeks) to ensure patients are on the proper dose of warfarin, and patients are not having any complications from therapy.  INRs can dramatically change over a short time period due to diet, medications, and medical conditions.     Skip Dowling, PharmD, BCACP    [facility]  Phone [phone] fax [phone]

## 2023-01-23 NOTE — TELEPHONE ENCOUNTER
Received request via: Pharmacy    Was the patient seen in the last year in this department? Yes    Does the patient have an active prescription (recently filled or refills available) for medication(s) requested? No   Remains stable off medication, results of PHQ-9 reviewed and discussed with patient, continue follow-up with behavioral health

## 2023-01-30 DIAGNOSIS — Z79.01 CHRONIC ANTICOAGULATION: ICD-10-CM

## 2023-02-14 ENCOUNTER — TELEPHONE (OUTPATIENT)
Dept: MEDICAL GROUP | Facility: LAB | Age: 83
End: 2023-02-14
Payer: MEDICARE

## 2023-02-14 NOTE — TELEPHONE ENCOUNTER
ESTABLISHED PATIENT PRE-VISIT PLANNING     Patient was NOT contacted to complete PVP.     Note: Patient will not be contacted if there is no indication to call.     1.  Reviewed notes from the last few office visits within the medical group: Yes    2.  If any orders were placed at last visit or intended to be done for this visit (i.e. 6 mos follow-up), do we have Results/Consult Notes?           Labs - Labs were not ordered at last office visit.  Note: If patient appointment is for lab review and patient did not complete labs, check with provider if OK to reschedule patient until labs completed.         Imaging - Imaging was not ordered at last office visit.         Referrals - Referral ordered, patient was seen and consult notes are in chart. Care Teams updated  NO.    3. Is this appointment scheduled as a Hospital Follow-Up? No    4.  Immunizations were updated in Epic using Reconcile Outside Information activity? Yes    5.  Patient is due for the following Health Maintenance Topics:   Health Maintenance Due   Topic Date Due    IMM DTaP/Tdap/Td Vaccine (1 - Tdap) 03/05/2010    IMM HEP B VACCINE (2 of 3 - 19+ 3-dose series) 02/13/2020    COVID-19 Vaccine (4 - Booster for Pfizer series) 12/12/2021    DIABETES MONOFILAMENT / LE EXAM  12/14/2022    Annual Wellness Visit  12/15/2022   6.  AHA (Pulse8) form printed for Provider? N/A

## 2023-02-17 ENCOUNTER — APPOINTMENT (OUTPATIENT)
Dept: RADIOLOGY | Facility: MEDICAL CENTER | Age: 83
End: 2023-02-17
Attending: EMERGENCY MEDICINE
Payer: MEDICARE

## 2023-02-17 ENCOUNTER — HOSPITAL ENCOUNTER (EMERGENCY)
Facility: MEDICAL CENTER | Age: 83
End: 2023-02-17
Attending: EMERGENCY MEDICINE
Payer: MEDICARE

## 2023-02-17 VITALS
WEIGHT: 171.96 LBS | OXYGEN SATURATION: 98 % | DIASTOLIC BLOOD PRESSURE: 68 MMHG | SYSTOLIC BLOOD PRESSURE: 142 MMHG | HEART RATE: 63 BPM | RESPIRATION RATE: 17 BRPM | TEMPERATURE: 97 F | HEIGHT: 68 IN | BODY MASS INDEX: 26.06 KG/M2

## 2023-02-17 DIAGNOSIS — S09.90XA CLOSED HEAD INJURY, INITIAL ENCOUNTER: ICD-10-CM

## 2023-02-17 DIAGNOSIS — Z79.01 ANTICOAGULATED ON WARFARIN: ICD-10-CM

## 2023-02-17 LAB
INR PPP: 2.4 (ref 0.87–1.13)
PROTHROMBIN TIME: 25.4 SEC (ref 12–14.6)

## 2023-02-17 PROCEDURE — 36415 COLL VENOUS BLD VENIPUNCTURE: CPT

## 2023-02-17 PROCEDURE — 99284 EMERGENCY DEPT VISIT MOD MDM: CPT

## 2023-02-17 PROCEDURE — 70450 CT HEAD/BRAIN W/O DYE: CPT

## 2023-02-17 PROCEDURE — 85610 PROTHROMBIN TIME: CPT

## 2023-02-17 ASSESSMENT — FIBROSIS 4 INDEX: FIB4 SCORE: 5.47

## 2023-02-17 NOTE — ED TRIAGE NOTES
Pt amb to triage c/o t5000 glf/head injury x3d ago. Pt tripped on a carpet and fel onto the right side of his face. Pt was visiting his daughter in [place] at the time of the injury and returned to [place] last noc. Pt denies LOC. He c/o headache and upper lip numbness since this am. Pt reports vision changes at the time of the incident, resolved at this time. Pt currently taking coumadin tx.

## 2023-02-17 NOTE — ED PROVIDER NOTES
ED Provider Note    CHIEF COMPLAINT  Chief Complaint   Patient presents with    T-5000 GLF    T-5000 Head Injury       EXTERNAL RECORDS REVIEWED  Outpatient Notes anticoagulation clinic 1/19/2023, on Coumadin, target INR 2.0-3.0, INR that day was 2.3, history of DVT and PE with IVC filter    LILLIE/DARLEEN    Paulo Paredes is a 82 y.o. male who presents for evaluation of a head injury 3 days ago, anticoagulant Coumadin with a history of DVT, PE and IVC filter.  He fell while going down some stairs striking his head against the wall.  Struck the right side of his head.  Had a headache, had some double vision in his right eye and had some right-sided facial numbness, all that really resolved prior to today.  He just got back in town and would like to be evaluated since he is anticoagulated.  Currently not having any focal weakness numbness or tingling or headache, currently having no visual changes, no chest pain, neck pain or any other concerns.    PAST MEDICAL HISTORY   has a past medical history of Anesthesia, Basal cell carcinoma of skin, CAD (coronary artery disease), Cancer ([facility]), Chronic kidney disease (CKD) stage G3b/A2, moderately decreased glomerular filtration rate (GFR) between 30-44 mL/min/1.73 square meter and albuminuria creatinine ratio between  mg/g ([facility]) (4/23/2015), DVT (deep venous thrombosis) ([facility]) (2014), ED (erectile dysfunction), GERD (gastroesophageal reflux disease), Glaucoma, Heart burn, Hyperlipidemia (12/3/2012), Hypertension, Indigestion, Multiple pulmonary emboli ([facility]) (2014), Multiple thyroid nodules (2009), Papillary carcinoma of thyroid ([facility]) (2014), postsurgical hypothyroidism (2014), Pre-diabetes, Renal disorder, S/P CABG x 4 (2003), S/P colectomy (2010), S/p nephrectomy (1998), S/P prostatectomy (2008), Stroke ([facility]), Transient renal failure (2014), Type II or unspecified type diabetes mellitus without mention of complication, not stated as uncontrolled, and Unspecified urinary  "incontinence.    SURGICAL HISTORY   has a past surgical history that includes colon resection; nephrectomy radical; multiple coronary artery bypass; prostatectomy, radical retro; other orthopedic surgery; thyroidectomy total (5/13/2014); and node dissection (5/13/2014).    FAMILY HISTORY  Family History   Problem Relation Age of Onset    Diabetes Mother     Heart Disease Mother     Diabetes Father     Cancer Father         pancreas    Thyroid Sister         Cancer    Cancer Sister         thyroid    Diabetes Sister     Cancer Brother 49        colon    Diabetes Brother     Diabetes Maternal Grandfather     Diabetes Paternal Grandmother     Diabetes Paternal Grandfather     No Known Problems Maternal Grandmother        SOCIAL HISTORY  Social History     Tobacco Use    Smoking status: Never    Smokeless tobacco: Never   Vaping Use    Vaping Use: Never used   Substance and Sexual Activity    Alcohol use: Yes     Comment: Occasionally    Drug use: No    Sexual activity: Not Currently     Comment: retired        CURRENT MEDICATIONS  Home Medications    **Home medications have not yet been reviewed for this encounter**         ALLERGIES  Allergies   Allergen Reactions    Lactose     Seasonal        PHYSICAL EXAM  VITAL SIGNS: /76   Pulse 69   Temp 36 °C (96.8 °F)   Resp 17   Ht 1.727 m (5' 8\")   Wt 78 kg (171 lb 15.3 oz)   SpO2 96%   BMI 26.15 kg/m²    Constitutional: Alert in no apparent distress.  HENT: No signs of significant acute trauma.  No facial tenderness, no ecchymosis.  Normal sensation of the face.  Eyes: Conjunctiva normal, non-icteric.  Extraocular motion is full and intact.  Pupils are equal and reactive to light and accommodation.  Chest: Normal nonlabored respirations  Skin: No appreciable rash on the exposed skin  Musculoskeletal: No obvious acute trauma appreciated  Neurologic: Alert, no obvious focal deficits noted.        DIAGNOSTIC STUDIES / PROCEDURES    LABS  Results " for orders placed or performed during the hospital encounter of 02/17/23   PT/INR   Result Value Ref Range    PT 25.4 (H) 12.0 - 14.6 sec    INR 2.40 (H) 0.87 - 1.13        RADIOLOGY  I have independently interpreted the diagnostic imaging associated with this visit and am waiting the final reading from the radiologist.   My preliminary interpretation is a follows: No ICH  Radiologist interpretation:   CT-HEAD W/O   Final Result         1.  No acute intracranial findings. No evidence of acute hemorrhage or mass lesion.      2. Cerebral atrophy.      3.  White matter lucencies most consistent with chronic small vessel ischemic change.                           COURSE & MEDICAL DECISION MAKING      INITIAL ASSESSMENT, COURSE AND PLAN  Care Narrative: 82-year-old anticoagulated male with a head injury 3 days ago, subsequent headache, vision changes and right-sided facial numbness all of which have resolved.  His examination here is normal, no sign of trauma, no focal neurologic complaints or findings.  Given his anticoagulation and injury with no symptoms however head CT is obtained here which is negative for acute traumatic injury, no ICH.  He also has an INR performed here, therapeutic at 2.4.  At this point the patient is stable and appropriate for discharge.  Strict return instructions have been provided        FINAL DIAGNOSIS  1. Closed head injury, initial encounter    2. Anticoagulated on warfarin           Electronically signed by: Madi Beasley M.D., 2/17/2023 1:33 PM

## 2023-02-23 ENCOUNTER — ANTICOAGULATION VISIT (OUTPATIENT)
Dept: MEDICAL GROUP | Facility: MEDICAL CENTER | Age: 83
End: 2023-02-23
Payer: MEDICARE

## 2023-02-23 DIAGNOSIS — Z95.828 PRESENCE OF IVC FILTER: ICD-10-CM

## 2023-02-23 LAB — INR PPP: 2.1 (ref 2–3.5)

## 2023-02-23 PROCEDURE — 85610 PROTHROMBIN TIME: CPT | Performed by: NURSE PRACTITIONER

## 2023-02-23 PROCEDURE — 93793 ANTICOAG MGMT PT WARFARIN: CPT | Performed by: NURSE PRACTITIONER

## 2023-02-23 NOTE — PROGRESS NOTES
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  Family Medicine [phone]          Anticoagulation Patient Findings  Patient Findings       Positives:  Emergency department visit (D/t GLF - workup unremarkable.)    Negatives:  Signs/symptoms of thrombosis, Signs/symptoms of bleeding, Laboratory test error suspected, Change in health, Change in alcohol use, Change in activity, Upcoming invasive procedure, Upcoming dental procedure, Missed doses, Extra doses, Change in medications, Change in diet/appetite, Hospital admission, Bruising, Other complaints            HPI:   Paulo Paredes is in the Anticoagulation Clinic today for an INR check on their anticoagulation therapy.     The reason for today's visit is to prevent morbidity and mortality from a blood clot and/or stroke and to reduce the risk of bleeding while on a anticoagulant.     PCP:  Damion Larose M.D.  [address]  [address]  [place] [id number] [phone]    3 vitals included with today's appt-unless patient declined:  (BP, HR, weight, ht, RR)   There were no vitals filed for this visit.    Verified current warfarin dosing schedule.    Medications reconciled   Pt is not on antiplatelet therapy    Assessment:   INR  therapeutic.     Plan:  Instructed pt to continue on with current regimen.    Follow up:  Follow up appointment in 6 week(s).       Other info:  Pt educated to contact our clinic with any changes in medications or s/s of bleeding or thrombosis.  Education was provided today regarding tips to reduce their bleed risk and dietary constraints while on a anticoagulant.    National Recommendations:  The CHEST guidelines recommends frequent INR monitoring at regular intervals (a few days up to a max of 12 weeks) to ensure patients are on the proper dose of warfarin, and patients are not having any complications from therapy.  INRs can dramatically change over a short time period due to diet, medications, and medical conditions.     Skip Dowling, PharmD, BCACP    [facility]  Phone [phone] fax [phone]

## 2023-03-01 ENCOUNTER — TELEPHONE (OUTPATIENT)
Dept: MEDICAL GROUP | Facility: LAB | Age: 83
End: 2023-03-01
Payer: MEDICARE

## 2023-03-02 NOTE — TELEPHONE ENCOUNTER
ESTABLISHED PATIENT PRE-VISIT PLANNING     Patient was NOT contacted to complete PVP.     Note: Patient will not be contacted if there is no indication to call.     1.  Reviewed notes from the last few office visits within the medical group: Yes    2.  If any orders were placed at last visit or intended to be done for this visit (i.e. 6 mos follow-up), do we have Results/Consult Notes?           Labs - Labs were not ordered at last office visit.  Note: If patient appointment is for lab review and patient did not complete labs, check with provider if OK to reschedule patient until labs completed.         Imaging - Imaging was not ordered at last office visit.         Referrals - No referrals were ordered at last office visit.    3. Is this appointment scheduled as a Hospital Follow-Up? Yes, visit was at [facility].     4.  Immunizations were updated in Epic using Reconcile Outside Information activity? Yes    5.  Patient is due for the following Health Maintenance Topics:   Health Maintenance Due   Topic Date Due    IMM DTaP/Tdap/Td Vaccine (1 - Tdap) 03/05/2010    IMM HEP B VACCINE (2 of 3 - 19+ 3-dose series) 02/13/2020    COVID-19 Vaccine (4 - Booster for Pfizer series) 12/12/2021    DIABETES MONOFILAMENT / LE EXAM  12/14/2022    Annual Wellness Visit  12/15/2022   6.  AHA (Pulse8) form printed for Provider? N/A

## 2023-03-09 ENCOUNTER — OFFICE VISIT (OUTPATIENT)
Dept: MEDICAL GROUP | Facility: LAB | Age: 83
End: 2023-03-09
Payer: MEDICARE

## 2023-03-09 VITALS
HEIGHT: 68 IN | TEMPERATURE: 97 F | HEART RATE: 71 BPM | DIASTOLIC BLOOD PRESSURE: 66 MMHG | RESPIRATION RATE: 15 BRPM | SYSTOLIC BLOOD PRESSURE: 108 MMHG | BODY MASS INDEX: 25.46 KG/M2 | WEIGHT: 168 LBS | OXYGEN SATURATION: 98 %

## 2023-03-09 DIAGNOSIS — R21 RASH: ICD-10-CM

## 2023-03-09 DIAGNOSIS — Z79.01 ANTICOAGULATED ON COUMADIN: ICD-10-CM

## 2023-03-09 DIAGNOSIS — S09.90XD INJURY OF HEAD, SUBSEQUENT ENCOUNTER: ICD-10-CM

## 2023-03-09 PROCEDURE — 99214 OFFICE O/P EST MOD 30 MIN: CPT | Performed by: FAMILY MEDICINE

## 2023-03-09 RX ORDER — TRIAMCINOLONE ACETONIDE 0.25 MG/G
CREAM TOPICAL
Qty: 15 G | Refills: 1 | Status: SHIPPED | OUTPATIENT
Start: 2023-03-09

## 2023-03-09 ASSESSMENT — PATIENT HEALTH QUESTIONNAIRE - PHQ9: CLINICAL INTERPRETATION OF PHQ2 SCORE: 0

## 2023-03-09 ASSESSMENT — FIBROSIS 4 INDEX: FIB4 SCORE: 5.47

## 2023-03-09 NOTE — PROGRESS NOTES
Subjective:   Paulo Paredes is a 82 y.o. male here today for   Chief Complaint   Patient presents with    Hospital Follow-up     #GLF/Head injury   -Patient recently seenin ER after GLF w/ head injury occurred 02/17/2023. Work up including CY completed. CT negative.   -Patient has hx of VTE, IVD filter, currently on coumadin.  No concerns of bleeding.  -Feeling well with no significant symptoms he denies any headaches, changes in vision, injury to upper or lower extremities, neck, back.  -Patient is still very much able to complete all ADLs, working out on treadmill daily.  No significant concerns as far as weakness.  -Other discussion shows that patient had new shoes, new shoes were caught in the carpet causing fall, striking head on sheet rock.    #Rash:  -For the last week patient's been noticing red, pruritic rash on anterior tib-fib, stopping where the end of sock level stops.  Patient did note wife's changed laundry detergents.  Treating with topical lotion with no significant improvement.  Significant atopic history    Allergies   Allergen Reactions    Lactose     Seasonal          Current medicines (including changes today)  Current Outpatient Medications   Medication Sig Dispense Refill    Dulaglutide (TRULICITY) 0.75 MG/0.5ML Solution Pen-injector INJECT 1 SYRINGE SUBCUTANEOUSLY ONCE A WEEK AS DIRECTED 12 mL 1    atorvastatin (LIPITOR) 40 MG Tab Take 1 tablet by mouth once daily 100 Tablet 0    capsaicin (ZOSTRIX) 0.025 % cream APPLY TO ITCHY AREA ON NECK UP TO 5 TIMES A DAY FOR 1 WEEK, THEN 3 TIMES A DAY FOR 3 TO 6 WEEKS. WASH HANDS THOROUGHLY AFTER APPLYING. DO TEST SPOT 1ST      warfarin (COUMADIN) 5 MG Tab Take 1 Tablet by mouth every evening. Take 0.5 to 1 tablet by mouth once daily. Take as directed by anticoagulation clinic. 100 Tablet 3    atenolol (TENORMIN) 25 MG Tab Take 1 Tablet by mouth every day. 100 Tablet 1    fenofibrate (TRIGLIDE) 160 MG tablet Take 1 Tablet by mouth every day. 100  Tablet 3    losartan (COZAAR) 50 MG Tab Take 1 Tablet by mouth every day. 100 Tablet 5    levothyroxine (EUTHYROX) 137 MCG Tab TAKE 1 TABLET BY MOUTH ONCE DAILY IN THE MORNING ON  AN  EMPTY  STOMACH  ONE  HOUR  BEFORE  EATING 100 Tablet 3    Multiple Vitamins-Minerals (ZINC PO) Take  by mouth.      hydrocortisone 2.5 % Cream topical cream APPLY TO RASH ON GROIN TWICE DAILY FOR 1 WEEK WITH FLARES      Calcium Carb-Cholecalciferol (CALCIUM 1000 + D PO) Take 1 Dose by mouth every day.      Omega-3 Krill Oil 300 MG Cap Take 300 mg by mouth every day.      Cinnamon 500 MG Cap Take 1,000 mg by mouth.      Cyanocobalamin (VITAMIN B-12) 1000 MCG Tab Take 1,000 mcg by mouth every day.      Coenzyme Q10 (CO Q-10 PO) Take 1 Dose by mouth every day. Unknown OTC Strength       No current facility-administered medications for this visit.     He  has a past medical history of Anesthesia, Basal cell carcinoma of skin, CAD (coronary artery disease), Cancer ([facility]), Chronic kidney disease (CKD) stage G3b/A2, moderately decreased glomerular filtration rate (GFR) between 30-44 mL/min/1.73 square meter and albuminuria creatinine ratio between  mg/g ([facility]) (4/23/2015), DVT (deep venous thrombosis) ([facility]) (2014), ED (erectile dysfunction), GERD (gastroesophageal reflux disease), Glaucoma, Heart burn, Hyperlipidemia (12/3/2012), Hypertension, Indigestion, Multiple pulmonary emboli ([facility]) (2014), Multiple thyroid nodules (2009), Papillary carcinoma of thyroid ([facility]) (2014), postsurgical hypothyroidism (2014), Pre-diabetes, Renal disorder, S/P CABG x 4 (2003), S/P colectomy (2010), S/p nephrectomy (1998), S/P prostatectomy (2008), Stroke ([facility]), Transient renal failure (2014), Type II or unspecified type diabetes mellitus without mention of complication, not stated as uncontrolled, and Unspecified urinary incontinence.    ROS   See HPI     Objective:     Physical Exam:  /66   Pulse 71   Temp 36.1 °C (97 °F) (Temporal)   Resp 15   Ht  "1.727 m (5' 7.99\")   Wt 76.2 kg (168 lb)   SpO2 98%  Body mass index is 25.55 kg/m².   Constitutional: Alert, no distress.  Skin: Warm, dry, good turgor, no rashes in visible areas.  Erythematous nonconfluent macular rash noted on anterior tib-fib bilaterally.  Definite line where the rash ends which correlates with end of sock.  Eye: Equal, round and reactive, conjunctiva clear, lids normal.  Respiratory: Unlabored respiratory effort  Psych: Alert and oriented x3, normal affect and mood.    Assessment and Plan:     1. Injury of head, subsequent encounter  -Status: Resolved.  No concerns at this time.  We did discuss safety both in the home as well as completing tasks.  Discussed remaining safe and avoidance of certain passages getting up on ladders.  Patient is currently not using any cane or walker.  I encourage patient to continue working on appropriate daily exercise and physical activity.  We will follow-up as needed.    2. Rash  -Concern for atopic reaction to tensional new allergen in new detergent.  We will treat at this time with Kenalog cream.  Patient will follow-up if symptoms persist.  - triamcinolone acetonide (KENALOG) 0.025 % Cream; Apply to the affected area twice a day on legs.  Dispense: 15 g; Refill: 1    3. Anticoagulated on Coumadin  -Patient continues to follow-up with Coumadin clinic.  During hospitalization INR was checked, above 2.  Most recent check patient remains therapeutic.  Continue with warfarin at this time.      Followup: No follow-ups on file.         PLEASE NOTE: This dictation was created using voice recognition software. I have made every reasonable attempt to correct obvious errors, but I expect that there are errors of grammar and possibly content that I did not discover before finalizing the note.  "

## 2023-03-27 ENCOUNTER — OFFICE VISIT (OUTPATIENT)
Dept: URGENT CARE | Facility: CLINIC | Age: 83
End: 2023-03-27
Payer: MEDICARE

## 2023-03-27 VITALS
SYSTOLIC BLOOD PRESSURE: 120 MMHG | HEIGHT: 68 IN | BODY MASS INDEX: 25.31 KG/M2 | WEIGHT: 167 LBS | RESPIRATION RATE: 16 BRPM | OXYGEN SATURATION: 97 % | TEMPERATURE: 97 F | HEART RATE: 66 BPM | DIASTOLIC BLOOD PRESSURE: 64 MMHG

## 2023-03-27 DIAGNOSIS — R06.02 SHORTNESS OF BREATH: ICD-10-CM

## 2023-03-27 DIAGNOSIS — M54.6 ACUTE MIDLINE THORACIC BACK PAIN: ICD-10-CM

## 2023-03-27 PROCEDURE — 93000 ELECTROCARDIOGRAM COMPLETE: CPT

## 2023-03-27 PROCEDURE — 99214 OFFICE O/P EST MOD 30 MIN: CPT

## 2023-03-27 RX ORDER — OXYMETAZOLINE HYDROCHLORIDE 0.05 G/100ML
2 SPRAY NASAL 2 TIMES DAILY
Qty: 15 ML | Refills: 0 | Status: SHIPPED | OUTPATIENT
Start: 2023-03-27

## 2023-03-27 RX ORDER — LIDOCAINE 4 G/G
PATCH TOPICAL
Qty: 15 PATCH | Refills: 0 | Status: SHIPPED | OUTPATIENT
Start: 2023-03-27 | End: 2023-11-29

## 2023-03-27 ASSESSMENT — FIBROSIS 4 INDEX: FIB4 SCORE: 5.47

## 2023-03-27 NOTE — PROGRESS NOTES
"Chief Complaint   Patient presents with    Breathing Problem     X 2-3 days \" I am having trouble breathing, worse at night. Pain below my shoulders.\"       HISTORY OF PRESENT ILLNESS: Patient is a pleasant 82 y.o. male who presents to urgent care today some sob at night, especially while trying to sleep, he states he feels \"dry\" does better when he breathes thru his mouth, he denies any cough, no swelling to his BLE, no noted fevers.  Per wife patient does not snore.  He does have a complex and complicated history to include cardiac disease and a PE for which he is on medication for.  This started 4 days ago.      The pain to his back he believes is from sleeping wrong.  No numbness or tingling.  Able to ambulate, no loss of bowel or bladder.  This started 4 days ago.     Patient Active Problem List    Diagnosis Date Noted    Atherosclerosis of aorta ([facility]) 07/08/2021    Anticoagulated on Coumadin 07/08/2021    Essential hypertension, benign 10/27/2020    H/O TIA (transient ischemic attack) and stroke 05/17/2020    Thrombocytopenia ([facility]) 01/16/2020    Stage 3b chronic kidney disease ([facility]) 01/16/2020    Microalbuminuria due to type 2 diabetes mellitus ([facility]) 07/16/2019    Hx pulmonary embolism 07/16/2019    History of DVT in adulthood 07/16/2019    Dyslipidemia 03/19/2018    Postsurgical hypothyroidism 08/03/2015    Coagulation defect ([facility])- on warfarin 04/23/2015    Presence of IVC filter 06/02/2014    CAD in native artery 01/22/2014    GERD (gastroesophageal reflux disease) 12/03/2012    Controlled type 2 diabetes mellitus with complication, without long-term current use of insulin ([facility]) 12/03/2012    S/P CABG x 4 12/03/2012    Single kidney 12/03/2012       Allergies:Lactose and Seasonal    Current Outpatient Medications Ordered in Epic   Medication Sig Dispense Refill    triamcinolone acetonide (KENALOG) 0.025 % Cream Apply to the affected area twice a day on legs. 15 g 1    Dulaglutide (TRULICITY) 0.75 MG/0.5ML " Solution Pen-injector INJECT 1 SYRINGE SUBCUTANEOUSLY ONCE A WEEK AS DIRECTED 12 mL 1    atorvastatin (LIPITOR) 40 MG Tab Take 1 tablet by mouth once daily 100 Tablet 0    capsaicin (ZOSTRIX) 0.025 % cream APPLY TO ITCHY AREA ON NECK UP TO 5 TIMES A DAY FOR 1 WEEK, THEN 3 TIMES A DAY FOR 3 TO 6 WEEKS. WASH HANDS THOROUGHLY AFTER APPLYING. DO TEST SPOT 1ST      warfarin (COUMADIN) 5 MG Tab Take 1 Tablet by mouth every evening. Take 0.5 to 1 tablet by mouth once daily. Take as directed by anticoagulation clinic. 100 Tablet 3    atenolol (TENORMIN) 25 MG Tab Take 1 Tablet by mouth every day. 100 Tablet 1    fenofibrate (TRIGLIDE) 160 MG tablet Take 1 Tablet by mouth every day. 100 Tablet 3    losartan (COZAAR) 50 MG Tab Take 1 Tablet by mouth every day. 100 Tablet 5    levothyroxine (EUTHYROX) 137 MCG Tab TAKE 1 TABLET BY MOUTH ONCE DAILY IN THE MORNING ON  AN  EMPTY  STOMACH  ONE  HOUR  BEFORE  EATING 100 Tablet 3    Multiple Vitamins-Minerals (ZINC PO) Take  by mouth.      hydrocortisone 2.5 % Cream topical cream APPLY TO RASH ON GROIN TWICE DAILY FOR 1 WEEK WITH FLARES      Calcium Carb-Cholecalciferol (CALCIUM 1000 + D PO) Take 1 Dose by mouth every day.      Omega-3 Krill Oil 300 MG Cap Take 300 mg by mouth every day.      Cinnamon 500 MG Cap Take 1,000 mg by mouth.      Cyanocobalamin (VITAMIN B-12) 1000 MCG Tab Take 1,000 mcg by mouth every day.      Coenzyme Q10 (CO Q-10 PO) Take 1 Dose by mouth every day. Unknown OTC Strength       No current Epic-ordered facility-administered medications on file.       Past Medical History:   Diagnosis Date    Anesthesia     difficult intubation with surgery 2008,2010    Basal cell carcinoma of skin     CAD (coronary artery disease)     Cancer (HCC)     rt  kidney 1989;  thyroid cancer 2012, prostate 2008, skin    Chronic kidney disease (CKD) stage G3b/A2, moderately decreased glomerular filtration rate (GFR) between 30-44 mL/min/1.73 square meter and albuminuria creatinine  ratio between  mg/g (HCC) 2015    DVT (deep venous thrombosis) (HCC)     ED (erectile dysfunction)     GERD (gastroesophageal reflux disease)     Glaucoma     Heart burn     Hyperlipidemia 12/3/2012    Hypertension     Indigestion     Multiple pulmonary emboli (HCC)     Multiple thyroid nodules 2009    Papillary carcinoma of thyroid (HCC)     R 2 foci / 4.5mm,0.25mm / no mets/ pT1pN0    postsurgical hypothyroidism 2014    Pre-diabetes     Renal disorder     rt kidney cancer,nephrectomy    S/P CABG x 4     S/P colectomy     multiple colon polyps / no Ca    S/p nephrectomy     carcinoma    S/P prostatectomy     carcinoma    Stroke (HCC)     'mild',no residual,'one doctor said it was a tia'    Transient renal failure     renal vein thrombosis    Type II or unspecified type diabetes mellitus without mention of complication, not stated as uncontrolled     on no medications    Unspecified urinary incontinence        Social History     Tobacco Use    Smoking status: Never    Smokeless tobacco: Never   Vaping Use    Vaping Use: Never used   Substance Use Topics    Alcohol use: Yes     Comment: Occasionally    Drug use: No       Family Status   Relation Name Status    Mo      Fa      Sis      Bro      MGFa      PGMo      PGFa      MGMo       Family History   Problem Relation Age of Onset    Diabetes Mother     Heart Disease Mother     Diabetes Father     Cancer Father         pancreas    Thyroid Sister         Cancer    Cancer Sister         thyroid    Diabetes Sister     Cancer Brother 49        colon    Diabetes Brother     Diabetes Maternal Grandfather     Diabetes Paternal Grandmother     Diabetes Paternal Grandfather     No Known Problems Maternal Grandmother        ROS:  Review of Systems   Constitutional: Negative for fever, chills, weight loss, malaise, and fatigue.   HENT: Negative for ear pain, nosebleeds,  "congestion, sore throat and neck pain.  States his sinuses feel dry especially at night when laying down in bed.  Eyes: Negative for vision changes.   Neuro: Negative for headache, sensory changes, weakness, seizure, LOC.   Cardiovascular: Negative for chest pain, palpitations, orthopnea and leg swelling.   Respiratory: Negative for cough, sputum production, shortness of breath and wheezing.   Gastrointestinal: Negative for abdominal pain, nausea, vomiting or diarrhea.   Genitourinary: Negative for dysuria, urgency and frequency.  Musculoskeletal: Negative for falls, neck pain, positive back pain, negative joint pain, myalgias.   Skin: Negative for rash, diaphoresis.     Exam:  /64 (BP Location: Left arm, Patient Position: Sitting, BP Cuff Size: Adult)   Pulse 66   Temp 36.1 °C (97 °F) (Temporal)   Resp 16   Ht 1.727 m (5' 8\")   Wt 75.8 kg (167 lb)   SpO2 97%   General: well-nourished, well-developed male in NAD  Head: normocephalic, atraumatic  Eyes: PERRLA, no conjunctival injection, acuity grossly intact, lids normal.  Ears: normal shape and symmetry, no tenderness, no discharge. External canals are without any significant edema or erythema. Tympanic membranes are without any inflammation, no effusion. Gross auditory acuity is intact.  Nose: symmetrical without tenderness, no discharge.  Mouth/Throat: reasonable hygiene, no erythema, exudates or tonsillar enlargement.  Neck: no masses, range of motion within normal limits, no tracheal deviation. No obvious thyroid enlargement.   Lymph: no cervical adenopathy. No supraclavicular adenopathy.   Neuro: alert and oriented. Cranial nerves 1-12 grossly intact. No sensory deficit.   Cardiovascular: regular rate and rhythm. No edema.  Pulmonary: no distress. Chest is symmetrical with respiration, no wheezes, crackles, or rhonchi.   Abdomen: soft, non-tender, no guarding, no hepatosplenomegaly.  Musculoskeletal: no clubbing, appropriate muscle tone, gait is " stable.  Skin: warm, dry, intact, no clubbing, no cyanosis, no rashes.   Psych: appropriate mood, affect, judgement.         Assessment/Plan:  1. Shortness of breath        2. Acute midline thoracic back pain        This is a pleasant 82-year-old male who comes in today with complaints of feeling short of breath that started 3 to 4 days ago.  He has no noted cough, no temperature, he does not smoke.  He is not coughing anything up.  He does state that these symptoms of shortness of breath are especially at night when he is laying down, he notes his heater is on high in the air feels dry.  He has not attempted any interventions for this.  Of note patient does have a complex history including cardiac disease, PE on anticoagulation.  In the office today his physical exam is otherwise within normal limits.  I did do an EKG to rule out potential cardiac causes it did show sinus rhythm with a prolonged MA interval nonspecific T abnormalities however I do believe that this may be more related to conduction than cardiac issues at this time.  Due to the and nature of the description of shortness of breath along with his physical exam I did declined to do a chest x-ray at this time the patient's pulse rate is 66 his blood pressure today in the office today is 120/64 and he is satting 97% on room air.  His lung sounds are clear to auscultation.  I do believe this is related potentially to the dry air, Afrin was prescribed and sent to the pharmacy.  Patient was advised that if he continues to get more short of breath, develops chest pain, increasing back pain or any other signs or symptoms to please seek care in the emergency room.  Patient states that his back pain is probably more related to the fact that he lays on his left side at night.  He has taken Tylenol and noted some relief with this.  I did prescribe lidocaine patches for this.  Patient denies any chest pain as said previously, no swelling is noted to his legs, no  nausea no vomiting.    Supportive care, differential diagnoses, and indications for immediate follow-up discussed with patient.   Pathogenesis of diagnosis discussed including typical length and natural progression.   Instructed to return to clinic or nearest emergency department for any change in condition, further concerns, or worsening of symptoms.  Patient states understanding of the plan of care and discharge instructions.  Instructed to make an appointment, for follow up, with primary care provider.        Please note that this dictation was created using voice recognition software. I have made every reasonable attempt to correct obvious errors, but I expect that there are errors of grammar and possibly content that I did not discover before finalizing the note.      Neda LANDA

## 2023-04-06 ENCOUNTER — ANTICOAGULATION VISIT (OUTPATIENT)
Dept: MEDICAL GROUP | Facility: MEDICAL CENTER | Age: 83
End: 2023-04-06
Payer: MEDICARE

## 2023-04-06 DIAGNOSIS — Z95.828 PRESENCE OF IVC FILTER: ICD-10-CM

## 2023-04-06 LAB — INR PPP: 3.4 (ref 2–3.5)

## 2023-04-06 PROCEDURE — 99211 OFF/OP EST MAY X REQ PHY/QHP: CPT | Performed by: NURSE PRACTITIONER

## 2023-04-06 PROCEDURE — 85610 PROTHROMBIN TIME: CPT | Performed by: NURSE PRACTITIONER

## 2023-04-06 NOTE — PROGRESS NOTES
OP Anticoagulation Service Note    Date: 4/6/2023    Anticoagulation Summary  As of 4/6/2023      INR goal:  2.0-3.0   TTR:  73.5 % (5.1 y)   INR used for dosing:  3.40 (4/6/2023)   Warfarin maintenance plan:  2.5 mg (5 mg x 0.5) every Thu; 5 mg (5 mg x 1) all other days   Weekly warfarin total:  32.5 mg   Plan last modified:  Skip Dowling, PharmD (12/29/2022)   Next INR check:  4/20/2023   Priority:  Maintenance   Target end date:  Indefinite    Indications    Presence of IVC filter [Z95.828]  Transient ischemic attack [G45.9] (Resolved) [G45.9]  Deep vein thrombosis (DVT) of both lower extremities (HCC) (Resolved) [I82.403]  DVT (deep venous thrombosis) (HCC) (Resolved) [I82.409]  Multiple pulmonary emboli (HCC) (Resolved) [I26.99]  Chronic anticoagulation (Resolved) [Z79.01]                 Anticoagulation Episode Summary       INR check location:      Preferred lab:      Send INR reminders to:      Comments:            Anticoagulation Care Providers       Provider Role Specialty Phone number    [facility]   [phone]    Edward Walters M.D.  Family Medicine [phone]          Anticoagulation Patient Findings  Patient Findings       Positives:  Change in diet/appetite (Less salad lately - plans to return to baseline.)    Negatives:  Signs/symptoms of thrombosis, Signs/symptoms of bleeding, Laboratory test error suspected, Change in health, Change in alcohol use, Change in activity, Upcoming invasive procedure, Emergency department visit, Upcoming dental procedure, Missed doses, Extra doses, Change in medications, Hospital admission, Bruising, Other complaints            HPI:   Paulo Paredes is in the Anticoagulation Clinic today for an INR check on their anticoagulation therapy.     The reason for today's visit is to prevent morbidity and mortality from a blood clot and/or stroke and to reduce the risk of bleeding while on a anticoagulant.     PCP:  Damion Larose,  M.D.  [address] [address]  [place] [id number] [phone]    3 vitals included with today's appt-unless patient declined:  (BP, HR, weight, ht, RR)   There were no vitals filed for this visit.    Verified current warfarin dosing schedule.    Medications reconciled   Pt is not on antiplatelet therapy    Assessment:   INR  SUPRA-therapeutic.     Plan:  Instructed pt to HOLD x 1 dose today and to then continue on w/ his current regimen.    Follow up:  Follow up appointment in 2 week(s).       Other info:  Pt educated to contact our clinic with any changes in medications or s/s of bleeding or thrombosis.  Education was provided today regarding tips to reduce their bleed risk and dietary constraints while on a anticoagulant.    National Recommendations:  The CHEST guidelines recommends frequent INR monitoring at regular intervals (a few days up to a max of 12 weeks) to ensure patients are on the proper dose of warfarin, and patients are not having any complications from therapy.  INRs can dramatically change over a short time period due to diet, medications, and medical conditions.     Skip Dowling, PharmD, BCACP    [facility]  Phone [phone] fax [phone]

## 2023-04-20 ENCOUNTER — ANTICOAGULATION VISIT (OUTPATIENT)
Dept: MEDICAL GROUP | Facility: MEDICAL CENTER | Age: 83
End: 2023-04-20
Payer: MEDICARE

## 2023-04-20 DIAGNOSIS — Z95.828 PRESENCE OF IVC FILTER: ICD-10-CM

## 2023-04-20 LAB — INR PPP: 2.8 (ref 2–3.5)

## 2023-04-20 PROCEDURE — 93793 ANTICOAG MGMT PT WARFARIN: CPT | Performed by: NURSE PRACTITIONER

## 2023-04-20 PROCEDURE — 85610 PROTHROMBIN TIME: CPT | Performed by: NURSE PRACTITIONER

## 2023-04-20 PROCEDURE — 99999 PR NO CHARGE: CPT | Performed by: NURSE PRACTITIONER

## 2023-04-20 NOTE — PROGRESS NOTES
Anticoagulation Summary  As of 2023      INR goal:  2.0-3.0   TTR:  73.2 % (5.2 y)   INR used for dosin.80 (2023)   Warfarin maintenance plan:  2.5 mg (5 mg x 0.5) every Thu; 5 mg (5 mg x 1) all other days   Weekly warfarin total:  32.5 mg   Plan last modified:  Zay CottonD (2022)   Next INR check:  2023   Priority:  Maintenance   Target end date:  Indefinite    Indications    Presence of IVC filter [Z95.828]  Transient ischemic attack [G45.9] (Resolved) [G45.9]  Deep vein thrombosis (DVT) of both lower extremities (HCC) (Resolved) [I82.403]  DVT (deep venous thrombosis) (HCC) (Resolved) [I82.409]  Multiple pulmonary emboli (HCC) (Resolved) [I26.99]  Chronic anticoagulation (Resolved) [Z79.01]                 Anticoagulation Episode Summary       INR check location:      Preferred lab:      Send INR reminders to:      Comments:            Anticoagulation Care Providers       Provider Role Specialty Phone number    [facility]   [phone]    Edward Walters M.D.  Family Medicine [phone]                  Refer to Patient Findings for HPI:       pt declined vitals    Verified current warfarin dosing schedule.    Pt is not on antiplatelet therapy      A/P   INR  therapeutic.     Warfarin dosing recommendation: Pt is to continue with current warfarin dosing regimen.     Pt educated to contact our clinic with any changes in medications or s/s of bleeding or thrombosis. Pt is aware to seek immediate medical attention for falls, head injury or deep cuts.    Request for him to return in 3 weeks, he declines.     Follow up appointment in 5 week(s).    John Clement, PharmD

## 2023-04-27 ENCOUNTER — HOSPITAL ENCOUNTER (EMERGENCY)
Facility: MEDICAL CENTER | Age: 83
End: 2023-04-27
Attending: EMERGENCY MEDICINE
Payer: MEDICARE

## 2023-04-27 ENCOUNTER — TELEPHONE (OUTPATIENT)
Dept: HEALTH INFORMATION MANAGEMENT | Facility: OTHER | Age: 83
End: 2023-04-27

## 2023-04-27 ENCOUNTER — APPOINTMENT (OUTPATIENT)
Dept: RADIOLOGY | Facility: MEDICAL CENTER | Age: 83
End: 2023-04-27
Attending: EMERGENCY MEDICINE
Payer: MEDICARE

## 2023-04-27 VITALS
WEIGHT: 170.19 LBS | OXYGEN SATURATION: 95 % | RESPIRATION RATE: 16 BRPM | DIASTOLIC BLOOD PRESSURE: 75 MMHG | HEART RATE: 78 BPM | SYSTOLIC BLOOD PRESSURE: 128 MMHG | TEMPERATURE: 98 F | BODY MASS INDEX: 25.79 KG/M2 | HEIGHT: 68 IN

## 2023-04-27 DIAGNOSIS — S09.90XA CLOSED HEAD INJURY, INITIAL ENCOUNTER: ICD-10-CM

## 2023-04-27 DIAGNOSIS — S16.1XXA STRAIN OF NECK MUSCLE, INITIAL ENCOUNTER: ICD-10-CM

## 2023-04-27 LAB
ALBUMIN SERPL BCP-MCNC: 4.2 G/DL (ref 3.2–4.9)
ALBUMIN/GLOB SERPL: 1.8 G/DL
ALP SERPL-CCNC: 49 U/L (ref 30–99)
ALT SERPL-CCNC: 19 U/L (ref 2–50)
ANION GAP SERPL CALC-SCNC: 8 MMOL/L (ref 7–16)
AST SERPL-CCNC: 27 U/L (ref 12–45)
BASOPHILS # BLD AUTO: 0.9 % (ref 0–1.8)
BASOPHILS # BLD: 0.05 K/UL (ref 0–0.12)
BILIRUB SERPL-MCNC: 0.3 MG/DL (ref 0.1–1.5)
BUN SERPL-MCNC: 40 MG/DL (ref 8–22)
CALCIUM ALBUM COR SERPL-MCNC: 9.1 MG/DL (ref 8.5–10.5)
CALCIUM SERPL-MCNC: 9.3 MG/DL (ref 8.4–10.2)
CHLORIDE SERPL-SCNC: 107 MMOL/L (ref 96–112)
CO2 SERPL-SCNC: 25 MMOL/L (ref 20–33)
CREAT SERPL-MCNC: 2.17 MG/DL (ref 0.5–1.4)
EOSINOPHIL # BLD AUTO: 0.22 K/UL (ref 0–0.51)
EOSINOPHIL NFR BLD: 4.2 % (ref 0–6.9)
ERYTHROCYTE [DISTWIDTH] IN BLOOD BY AUTOMATED COUNT: 46.5 FL (ref 35.9–50)
GFR SERPLBLD CREATININE-BSD FMLA CKD-EPI: 29 ML/MIN/1.73 M 2
GLOBULIN SER CALC-MCNC: 2.3 G/DL (ref 1.9–3.5)
GLUCOSE SERPL-MCNC: 214 MG/DL (ref 65–99)
HCT VFR BLD AUTO: 43.3 % (ref 42–52)
HGB BLD-MCNC: 14.5 G/DL (ref 14–18)
IMM GRANULOCYTES # BLD AUTO: 0.01 K/UL (ref 0–0.11)
IMM GRANULOCYTES NFR BLD AUTO: 0.2 % (ref 0–0.9)
INR PPP: 3.08 (ref 0.87–1.13)
LYMPHOCYTES # BLD AUTO: 1.77 K/UL (ref 1–4.8)
LYMPHOCYTES NFR BLD: 33.4 % (ref 22–41)
MCH RBC QN AUTO: 31.9 PG (ref 27–33)
MCHC RBC AUTO-ENTMCNC: 33.5 G/DL (ref 33.7–35.3)
MCV RBC AUTO: 95.4 FL (ref 81.4–97.8)
MONOCYTES # BLD AUTO: 0.39 K/UL (ref 0–0.85)
MONOCYTES NFR BLD AUTO: 7.4 % (ref 0–13.4)
NEUTROPHILS # BLD AUTO: 2.86 K/UL (ref 1.82–7.42)
NEUTROPHILS NFR BLD: 53.9 % (ref 44–72)
NRBC # BLD AUTO: 0 K/UL
NRBC BLD-RTO: 0 /100 WBC
PLATELET # BLD AUTO: 126 K/UL (ref 164–446)
PMV BLD AUTO: 10.5 FL (ref 9–12.9)
POTASSIUM SERPL-SCNC: 5 MMOL/L (ref 3.6–5.5)
PROT SERPL-MCNC: 6.5 G/DL (ref 6–8.2)
PROTHROMBIN TIME: 30.7 SEC (ref 12–14.6)
RBC # BLD AUTO: 4.54 M/UL (ref 4.7–6.1)
SODIUM SERPL-SCNC: 140 MMOL/L (ref 135–145)
WBC # BLD AUTO: 5.3 K/UL (ref 4.8–10.8)

## 2023-04-27 PROCEDURE — 85610 PROTHROMBIN TIME: CPT

## 2023-04-27 PROCEDURE — 99284 EMERGENCY DEPT VISIT MOD MDM: CPT

## 2023-04-27 PROCEDURE — 70450 CT HEAD/BRAIN W/O DYE: CPT

## 2023-04-27 PROCEDURE — 80053 COMPREHEN METABOLIC PANEL: CPT

## 2023-04-27 PROCEDURE — 85025 COMPLETE CBC W/AUTO DIFF WBC: CPT

## 2023-04-27 PROCEDURE — 36415 COLL VENOUS BLD VENIPUNCTURE: CPT

## 2023-04-27 PROCEDURE — 72125 CT NECK SPINE W/O DYE: CPT

## 2023-04-27 ASSESSMENT — FIBROSIS 4 INDEX: FIB4 SCORE: 5.47

## 2023-04-27 NOTE — ED TRIAGE NOTES
Chief Complaint   Patient presents with    T-5000 FALL     Getting up from a garden and fell hitting his back and head, NLOC.     During triage he did report he has some neck pain without numbness or tingling in arms. He hit the edge of a garden cart.  Hard collar applied in triage.

## 2023-04-28 NOTE — ED PROVIDER NOTES
ED Provider Note    CHIEF COMPLAINT  Chief Complaint   Patient presents with    T-5000 FALL     Getting up from a garden and fell hitting his back and head, NLOC.        EXTERNAL RECORDS REVIEWED  Reviewed outpatient clinic visits medication list recent INR levels    HPI/ROS  LIMITATION TO HISTORY   None   OUTSIDE HISTORIAN(S):  Wife provided additional history    Paulo Paredes is a 82 y.o. male who presents for evaluation of head and neck injury.  Patient is accompanied by his wife.  He is a very pleasant active 82-year-old gentleman who has a complex medical history including anticoagulant therapy.  He is on Coumadin.  He was gardening and apparently had a ground-level fall and struck the back of his head and his high part of his neck.  There is no report of loss of consciousness.  No reported injury to the upper or lower extremities chest abdomen or pelvis.  No numbness weakness or tingling.  No focal neurological complaints or deficits such as seizure, paresthesias.  He does report mild headache    PAST MEDICAL HISTORY   has a past medical history of Anesthesia, Basal cell carcinoma of skin, CAD (coronary artery disease), Cancer ([facility]), Chronic kidney disease (CKD) stage G3b/A2, moderately decreased glomerular filtration rate (GFR) between 30-44 mL/min/1.73 square meter and albuminuria creatinine ratio between  mg/g ([facility]) (4/23/2015), DVT (deep venous thrombosis) ([facility]) (2014), ED (erectile dysfunction), GERD (gastroesophageal reflux disease), Glaucoma, Heart burn, Hyperlipidemia (12/3/2012), Hypertension, Indigestion, Multiple pulmonary emboli ([facility]) (2014), Multiple thyroid nodules (2009), Papillary carcinoma of thyroid ([facility]) (2014), postsurgical hypothyroidism (2014), Pre-diabetes, Renal disorder, S/P CABG x 4 (2003), S/P colectomy (2010), S/p nephrectomy (1998), S/P prostatectomy (2008), Stroke ([facility]), Transient renal failure (2014), Type II or unspecified type diabetes mellitus without mention of  complication, not stated as uncontrolled, and Unspecified urinary incontinence.    SURGICAL HISTORY   has a past surgical history that includes colon resection; nephrectomy radical; multiple coronary artery bypass; prostatectomy, radical retro; other orthopedic surgery; thyroidectomy total (5/13/2014); and node dissection (5/13/2014).    FAMILY HISTORY  Family History   Problem Relation Age of Onset    Diabetes Mother     Heart Disease Mother     Diabetes Father     Cancer Father         pancreas    Thyroid Sister         Cancer    Cancer Sister         thyroid    Diabetes Sister     Cancer Brother 49        colon    Diabetes Brother     Diabetes Maternal Grandfather     Diabetes Paternal Grandmother     Diabetes Paternal Grandfather     No Known Problems Maternal Grandmother        SOCIAL HISTORY  Social History     Tobacco Use    Smoking status: Never    Smokeless tobacco: Never   Vaping Use    Vaping Use: Never used   Substance and Sexual Activity    Alcohol use: Yes     Comment: Occasionally    Drug use: No    Sexual activity: Not Currently     Comment: retired        CURRENT MEDICATIONS  Home Medications       Reviewed by Jasmyne Goins R.N. (Registered Nurse) on 04/27/23 at 1653  Med List Status: Not Addressed     Medication Last Dose Status   atenolol (TENORMIN) 25 MG Tab  Active   atorvastatin (LIPITOR) 40 MG Tab  Active   Calcium Carb-Cholecalciferol (CALCIUM 1000 + D PO)  Active   capsaicin (ZOSTRIX) 0.025 % cream  Active   Cinnamon 500 MG Cap  Active   Coenzyme Q10 (CO Q-10 PO)  Active   Cyanocobalamin (VITAMIN B-12) 1000 MCG Tab  Active   Dulaglutide (TRULICITY) 0.75 MG/0.5ML Solution Pen-injector  Active   fenofibrate (TRIGLIDE) 160 MG tablet  Active   hydrocortisone 2.5 % Cream topical cream  Active   levothyroxine (EUTHYROX) 137 MCG Tab  Active   Lidocaine 4 % Patch  Active   losartan (COZAAR) 50 MG Tab  Active   Multiple Vitamins-Minerals (ZINC PO)  Active   Omega-3 Krill Oil 300 MG  "Cap  Active   oxymetazoline (AFRIN 12 HOUR) 0.05 % Solution  Active   triamcinolone acetonide (KENALOG) 0.025 % Cream  Active   warfarin (COUMADIN) 5 MG Tab  Active                    ALLERGIES  Allergies   Allergen Reactions    Lactose     Seasonal        PHYSICAL EXAM  VITAL SIGNS: /69   Pulse 76   Temp 36.6 °C (97.8 °F) (Temporal)   Resp 16   Ht 1.727 m (5' 8\")   Wt 77.2 kg (170 lb 3.1 oz)   SpO2 94%   BMI 25.88 kg/m²    Pulse ox interpretation: I interpret this pulse ox as normal.  Constitutional: Alert and oriented x 3, no acute distress  HEENT: No hemotympanum negative churchill sign small hematoma noted over the occiput without laceration or skull step-off pupils are equal round reactive to light extraocular movements are intact. The nares is clear, external ears are normal, mouth shows moist mucous membranes normal dentition for age  Neck: Supple, no JVD no tracheal deviation  Cardiovascular: Regular rate and rhythm no murmur rub or gallop 2+ pulses peripherally x4  Thorax & Lungs: No respiratory distress, no wheezes rales or rhonchi, No chest tenderness.   GI: Soft nontender nondistended positive bowel sounds, no peritoneal signs  Skin: Warm dry no acute rash or lesion  Musculoskeletal: Moving all extremities with full range and 5 of 5 strength no acute  deformity  Neurologic: Cranial nerves III through XII are grossly intact no sensory deficit no cerebellar dysfunction no focal motor or sensory deficit GCS 15 NIH stroke scale score of 0  Psychiatric: Appropriate affect for situation at this time          DIAGNOSTIC STUDIES / PROCEDURES    LABS  Results for orders placed or performed during the hospital encounter of 04/27/23   CBC WITH DIFFERENTIAL   Result Value Ref Range    WBC 5.3 4.8 - 10.8 K/uL    RBC 4.54 (L) 4.70 - 6.10 M/uL    Hemoglobin 14.5 14.0 - 18.0 g/dL    Hematocrit 43.3 42.0 - 52.0 %    MCV 95.4 81.4 - 97.8 fL    MCH 31.9 27.0 - 33.0 pg    MCHC 33.5 (L) 33.7 - 35.3 g/dL    RDW 46.5 " 35.9 - 50.0 fL    Platelet Count 126 (L) 164 - 446 K/uL    MPV 10.5 9.0 - 12.9 fL    Neutrophils-Polys 53.90 44.00 - 72.00 %    Lymphocytes 33.40 22.00 - 41.00 %    Monocytes 7.40 0.00 - 13.40 %    Eosinophils 4.20 0.00 - 6.90 %    Basophils 0.90 0.00 - 1.80 %    Immature Granulocytes 0.20 0.00 - 0.90 %    Nucleated RBC 0.00 /100 WBC    Neutrophils (Absolute) 2.86 1.82 - 7.42 K/uL    Lymphs (Absolute) 1.77 1.00 - 4.80 K/uL    Monos (Absolute) 0.39 0.00 - 0.85 K/uL    Eos (Absolute) 0.22 0.00 - 0.51 K/uL    Baso (Absolute) 0.05 0.00 - 0.12 K/uL    Immature Granulocytes (abs) 0.01 0.00 - 0.11 K/uL    NRBC (Absolute) 0.00 K/uL   Comp Metabolic Panel   Result Value Ref Range    Sodium 140 135 - 145 mmol/L    Potassium 5.0 3.6 - 5.5 mmol/L    Chloride 107 96 - 112 mmol/L    Co2 25 20 - 33 mmol/L    Anion Gap 8.0 7.0 - 16.0    Glucose 214 (H) 65 - 99 mg/dL    Bun 40 (H) 8 - 22 mg/dL    Creatinine 2.17 (H) 0.50 - 1.40 mg/dL    Calcium 9.3 8.4 - 10.2 mg/dL    AST(SGOT) 27 12 - 45 U/L    ALT(SGPT) 19 2 - 50 U/L    Alkaline Phosphatase 49 30 - 99 U/L    Total Bilirubin 0.3 0.1 - 1.5 mg/dL    Albumin 4.2 3.2 - 4.9 g/dL    Total Protein 6.5 6.0 - 8.2 g/dL    Globulin 2.3 1.9 - 3.5 g/dL    A-G Ratio 1.8 g/dL   Prothrombin Time   Result Value Ref Range    PT 30.7 (H) 12.0 - 14.6 sec    INR 3.08 (H) 0.87 - 1.13   CORRECTED CALCIUM   Result Value Ref Range    Correct Calcium 9.1 8.5 - 10.5 mg/dL   ESTIMATED GFR   Result Value Ref Range    GFR (CKD-EPI) 29 (A) >60 mL/min/1.73 m 2         RADIOLOGY  I have independently interpreted the diagnostic imaging associated with this visit and am waiting the final reading from the radiologist.   My preliminary interpretation is as follows: No acute intracranial hemorrhage skull fracture or cervical spine fracture  Radiologist interpretation:   CT-CSPINE WITHOUT PLUS RECONS   Final Result      1.  There is no acute fracture of the cervical spine.   2.  Multilevel degenerative change.          CT-HEAD W/O   Final Result      1.  There is no acute intracranial hemorrhage or infarct.      2.  White matter lucencies most consistent with small vessel ischemic change versus demyelination or gliosis.      3.  There is cerebral atrophy.             COURSE & MEDICAL DECISION MAKING    ED Observation Status? Yes; I am placing the patient in to an observation status due to a diagnostic uncertainty as well as therapeutic intensity. Patient placed in observation status at 5:17 PM, 4/27/2023.     Observation plan is as follows: Perform diagnostic imaging including CT scan of the head and cervical spine.  Perform blood testing including CBC metabolic panel.  Review imaging studies review results and plan of care with patient.  Perform serial neurological exams    Upon Reevaluation, the patient's condition has: Improved; and will be discharged.    Patient discharged from ED Observation status at 1845 (Time) 4/27 (Date).     INITIAL ASSESSMENT, COURSE AND PLAN  Care Narrative:  This is a very pleasant 82-year-old gentleman who is on Coumadin and accompanied by his wife.  He had a ground-level slip and fall struck the back of his head while gardening.  Differential diagnosis was extensive including skull fracture intracranial hemorrhage subdural epidural hemorrhage, subarachnoid hemorrhage spinal fracture.  He had a reassuring and stable neurological exam.  Specifically his NIH stroke scale score is 0 and his GCS is 15.  CT scan of the head and cervical spine were negative for acute intracranial hemorrhage or spinal fracture.  Laboratory studies are at baseline.  His BUN and creatinine are chronically elevated and his INR is therapeutic at 3.08.  I performed serial neurological exams and he has no deterioration.  I counseled the patient and his wife to keep an eye out for a delayed oozing phenomenon that we do occasionally seeing Senior citizens especially on anticoagulant therapy.  I considered the feel was mandatory  to admit the patient for observation.  Return precautions have been reviewed      ADDITIONAL PROBLEM LIST    DISPOSITION AND DISCUSSIONS  I have discussed management of the patient with the following physicians and JORGE's: None    Discussion of management with other QHP or appropriate source(s): None    Escalation of care considered, and ultimately not performed:acute inpatient care management, however at this time, the patient is most appropriate for outpatient management    Barriers to care at this time, including but not limited to: None    Decision tools and prescription drugs considered including, but not limited to: NIH Stroke Scale is 0 .    FINAL DIAGNOSIS  1. Closed head injury, initial encounter        2. Strain of neck muscle, initial encounter                 Electronically signed by: Mac Beverly M.D., 4/27/2023 5:45 PM

## 2023-05-05 RX ORDER — DULAGLUTIDE 0.75 MG/.5ML
INJECTION, SOLUTION SUBCUTANEOUS
Qty: 12 ML | Refills: 1 | Status: SHIPPED | OUTPATIENT
Start: 2023-05-05 | End: 2023-07-10

## 2023-05-05 NOTE — TELEPHONE ENCOUNTER
Received request via: Pharmacy    Was the patient seen in the last year in this department? Yes    Does the patient have an active prescription (recently filled or refills available) for medication(s) requested? No    Does the patient have FPC Plus and need 100 day supply (blood pressure, diabetes and cholesterol meds only)? Yes, quantity updated to 100 days    Dulaglutide (TRULICITY) 0.75 MG/0.5ML Solution Pen-injector

## 2023-05-25 ENCOUNTER — ANTICOAGULATION VISIT (OUTPATIENT)
Dept: MEDICAL GROUP | Facility: MEDICAL CENTER | Age: 83
End: 2023-05-25
Payer: MEDICARE

## 2023-05-25 DIAGNOSIS — Z95.828 PRESENCE OF IVC FILTER: ICD-10-CM

## 2023-05-25 LAB — INR PPP: 2.9 (ref 2–3.5)

## 2023-05-25 PROCEDURE — 93793 ANTICOAG MGMT PT WARFARIN: CPT | Performed by: NURSE PRACTITIONER

## 2023-05-25 PROCEDURE — 85610 PROTHROMBIN TIME: CPT | Performed by: NURSE PRACTITIONER

## 2023-05-25 NOTE — PROGRESS NOTES
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  Endocrinology [phone]          Anticoagulation Patient Findings  Patient Findings       Positives:  Emergency department visit (D/t GLF - workup unremarkable for ICH)    Negatives:  Signs/symptoms of thrombosis, Signs/symptoms of bleeding, Laboratory test error suspected, Change in health, Change in alcohol use, Change in activity, Upcoming invasive procedure, Upcoming dental procedure, Missed doses, Extra doses, Change in medications, Change in diet/appetite, Hospital admission, Bruising, Other complaints            HPI:   Paulo Paredes is in the Anticoagulation Clinic today for an INR check on their anticoagulation therapy.     The reason for today's visit is to prevent morbidity and mortality from a blood clot and/or stroke and to reduce the risk of bleeding while on a anticoagulant.     PCP:  Damion Larose M.D.  [address] [facility] [address]  [facility] [id number] [phone]    3 vitals included with today's appt-unless patient declined:  (BP, HR, weight, ht, RR)   There were no vitals filed for this visit.    Verified current warfarin dosing schedule.    Medications reconciled   Pt is not on antiplatelet therapy    Assessment:   INR  therapeutic.     Plan:  Instructed pt to continue on with current regimen.      Follow up:  Follow up appointment in 6 week(s).       Other info:  Pt educated to contact our clinic with any changes in medications or s/s of bleeding or thrombosis.  Education was provided today regarding tips to reduce their bleed risk and dietary constraints while on a anticoagulant.    National Recommendations:  The CHEST guidelines recommends frequent INR monitoring at regular intervals (a few days up to a max of 12 weeks) to ensure patients are on the proper dose of warfarin, and patients are not having any complications from therapy.  INRs can dramatically change over a short time period due to diet, medications, and medical conditions.     Skip Dowling, PharmD, BCACP    [facility]  Phone [phone] fax [phone]

## 2023-06-02 ENCOUNTER — APPOINTMENT (RX ONLY)
Dept: URBAN - METROPOLITAN AREA CLINIC 15 | Facility: CLINIC | Age: 83
Setting detail: DERMATOLOGY
End: 2023-06-02

## 2023-06-02 DIAGNOSIS — L82.1 OTHER SEBORRHEIC KERATOSIS: ICD-10-CM

## 2023-06-02 DIAGNOSIS — L82.0 INFLAMED SEBORRHEIC KERATOSIS: ICD-10-CM

## 2023-06-02 PROBLEM — D48.5 NEOPLASM OF UNCERTAIN BEHAVIOR OF SKIN: Status: ACTIVE | Noted: 2023-06-02

## 2023-06-02 PROCEDURE — 11102 TANGNTL BX SKIN SINGLE LES: CPT | Mod: 59

## 2023-06-02 PROCEDURE — ? LIQUID NITROGEN

## 2023-06-02 PROCEDURE — 99212 OFFICE O/P EST SF 10 MIN: CPT | Mod: 25

## 2023-06-02 PROCEDURE — ? BIOPSY BY SHAVE METHOD

## 2023-06-02 PROCEDURE — 17110 DESTRUCTION B9 LES UP TO 14: CPT

## 2023-06-02 PROCEDURE — ? COUNSELING

## 2023-06-02 ASSESSMENT — LOCATION ZONE DERM: LOCATION ZONE: FACE

## 2023-06-02 ASSESSMENT — LOCATION DETAILED DESCRIPTION DERM
LOCATION DETAILED: LEFT MEDIAL MALAR CHEEK
LOCATION DETAILED: LEFT INFERIOR CENTRAL MALAR CHEEK

## 2023-06-02 ASSESSMENT — LOCATION SIMPLE DESCRIPTION DERM: LOCATION SIMPLE: LEFT CHEEK

## 2023-06-02 NOTE — PROCEDURE: LIQUID NITROGEN
Spray Paint Text: The liquid nitrogen was applied to the skin utilizing a spray paint frosting technique.
Spray Paint Technique: No
Medical Necessity Clause: This procedure was medically necessary because the lesions that were treated were:
Show Aperture Variable?: Yes
Number Of Freeze-Thaw Cycles: 2 freeze-thaw cycles
Detail Level: Detailed
Post-Care Instructions: I reviewed with the patient in detail post-care instructions. Patient is to wear sunprotection, and avoid picking at any of the treated lesions. Pt may apply Vaseline to crusted or scabbing areas.
Pared With?: Dermablade
Medical Necessity Information: It is in your best interest to select a reason for this procedure from the list below. All of these items fulfill various CMS LCD requirements except the new and changing color options.
Consent: The patient's consent was obtained including but not limited to risks of crusting, scabbing, blistering, scarring, darker or lighter pigmentary change, recurrence, incomplete removal and infection.

## 2023-06-02 NOTE — PROCEDURE: BIOPSY BY SHAVE METHOD
Detail Level: Detailed
Depth Of Biopsy: dermis
Was A Bandage Applied: Yes
Size Of Lesion In Cm: 0.6
X Size Of Lesion In Cm: 0
Biopsy Type: H and E
Biopsy Method: Personna blade
Anesthesia Type: 0.5% lidocaine without epinephrine
Anesthesia Volume In Cc: 1
Hemostasis: Aluminum Chloride
Wound Care: Petrolatum
Dressing: pressure dressing with telfa
Destruction After The Procedure: No
Type Of Destruction Used: Curettage
Curettage Text: The wound bed was treated with curettage after the biopsy was performed.
Cryotherapy Text: The wound bed was treated with cryotherapy after the biopsy was performed.
Electrodesiccation Text: The wound bed was treated with electrodesiccation after the biopsy was performed.
Electrodesiccation And Curettage Text: The wound bed was treated with electrodesiccation and curettage after the biopsy was performed.
Silver Nitrate Text: The wound bed was treated with silver nitrate after the biopsy was performed.
Lab: [id number]
Lab Facility: 
Consent: Written consent was obtained and risks were reviewed including but not limited to scarring, infection, bleeding, scabbing, incomplete removal, nerve damage and allergy to anesthesia.
Post-Care Instructions: I reviewed with the patient in detail post-care instructions. Patient is to keep the biopsy site dry overnight. Gentle cleansing daily.  Apply petroleum ointment daily until healed. Patient may apply hydrogen peroxide soaks to remove any crusting.
Notification Instructions: Patient will be notified of biopsy results. However, patient instructed to call the office if not contacted within 2 weeks.
Billing Type: Third-Party Bill
Information: Selecting Yes will display possible errors in your note based on the variables you have selected. This validation is only offered as a suggestion for you. PLEASE NOTE THAT THE VALIDATION TEXT WILL BE REMOVED WHEN YOU FINALIZE YOUR NOTE. IF YOU WANT TO FAX A PRELIMINARY NOTE YOU WILL NEED TO TOGGLE THIS TO 'NO' IF YOU DO NOT WANT IT IN YOUR FAXED NOTE.

## 2023-06-27 ENCOUNTER — TELEPHONE (OUTPATIENT)
Dept: MEDICAL GROUP | Facility: LAB | Age: 83
End: 2023-06-27
Payer: MEDICARE

## 2023-06-27 NOTE — TELEPHONE ENCOUNTER
ESTABLISHED PATIENT PRE-VISIT PLANNING     Patient was NOT contacted to complete PVP.     Note: Patient will not be contacted if there is no indication to call.     1.  Reviewed notes from the last few office visits within the medical group: Yes    2.  If any orders were placed at last visit or intended to be done for this visit (i.e. 6 mos follow-up), do we have Results/Consult Notes?           Labs - Labs were not ordered at last office visit.  Note: If patient appointment is for lab review and patient did not complete labs, check with provider if OK to reschedule patient until labs completed.         Imaging - Imaging was not ordered at last office visit.         Referrals - No referrals were ordered at last office visit.    3. Is this appointment scheduled as a Hospital Follow-Up? No    4.  Immunizations were updated in Epic using Reconcile Outside Information activity? Yes    5.  Patient is due for the following Health Maintenance Topics:   Health Maintenance Due   Topic Date Due    IMM DTaP/Tdap/Td Vaccine (1 - Tdap) 03/05/2010    IMM HEP B VACCINE (2 of 3 - 19+ 3-dose series) 02/13/2020    COVID-19 Vaccine (4 - Pfizer series) 12/12/2021    DIABETES MONOFILAMENT / LE EXAM  12/14/2022    Annual Wellness Visit  12/15/2022    A1C SCREENING  07/04/2023    RETINAL SCREENING  07/07/2023     6.  AHA (Pulse8) form printed for Provider? N/A

## 2023-07-06 ENCOUNTER — ANTICOAGULATION VISIT (OUTPATIENT)
Dept: MEDICAL GROUP | Facility: MEDICAL CENTER | Age: 83
End: 2023-07-06
Payer: MEDICARE

## 2023-07-06 DIAGNOSIS — Z95.828 PRESENCE OF IVC FILTER: ICD-10-CM

## 2023-07-06 LAB — INR PPP: 2.5 (ref 2–3.5)

## 2023-07-06 PROCEDURE — 85610 PROTHROMBIN TIME: CPT | Performed by: STUDENT IN AN ORGANIZED HEALTH CARE EDUCATION/TRAINING PROGRAM

## 2023-07-06 PROCEDURE — 93793 ANTICOAG MGMT PT WARFARIN: CPT | Performed by: STUDENT IN AN ORGANIZED HEALTH CARE EDUCATION/TRAINING PROGRAM

## 2023-07-06 RX ORDER — ASPIRIN 81 MG/1
81 TABLET ORAL DAILY
COMMUNITY

## 2023-07-06 NOTE — PROGRESS NOTES
OP Anticoagulation Service Note    Date: 2023    Anticoagulation Summary  As of 2023      INR goal:  2.0-3.0   TTR:  73.5 % (5.4 y)   INR used for dosin.50 (2023)   Warfarin maintenance plan:  2.5 mg (5 mg x 0.5) every Thu; 5 mg (5 mg x 1) all other days   Weekly warfarin total:  32.5 mg   Plan last modified:  Skip Dowling, PharmD (2022)   Next INR check:  2023   Priority:  Maintenance   Target end date:  Indefinite    Indications    Presence of IVC filter [Z95.828]  Transient ischemic attack [G45.9] (Resolved) [G45.9]  Deep vein thrombosis (DVT) of both lower extremities (HCC) (Resolved) [I82.403]  DVT (deep venous thrombosis) (HCC) (Resolved) [I82.409]  Multiple pulmonary emboli (HCC) (Resolved) [I26.99]  Chronic anticoagulation (Resolved) [Z79.01]                 Anticoagulation Episode Summary       INR check location:      Preferred lab:      Send INR reminders to:      Comments:            Anticoagulation Care Providers       Provider Role Specialty Phone number    [facility]   [phone]    Edward Walters M.D.  Endocrinology [phone]          Anticoagulation Patient Findings  Patient Findings       Negatives:  Signs/symptoms of thrombosis, Signs/symptoms of bleeding, Laboratory test error suspected, Change in health, Change in alcohol use, Change in activity, Upcoming invasive procedure, Emergency department visit, Upcoming dental procedure, Missed doses, Extra doses, Change in medications, Change in diet/appetite, Hospital admission, Bruising, Other complaints            HPI:   Paulo Paredes is in the Anticoagulation Clinic today for an INR check on their anticoagulation therapy.     The reason for today's visit is to prevent morbidity and mortality from a blood clot and/or stroke and to reduce the risk of bleeding while on a anticoagulant.     PCP:  Damion Larose M.D.  [address] [address]  [place] [id number] [phone]    3  vitals included with today's appt-unless patient declined:  (BP, HR, weight, ht, RR)   There were no vitals filed for this visit.    Verified current warfarin dosing schedule.    Medications reconciled   Pt is on ASA 81 mg once daily as antiplatelet therapy for hx of CAD & CABG and must be reviewed again on next cards f/u.    Assessment:   INR  therapeutic.     Plan:  Instructed pt to continue on with current regimen.    Follow up:  Follow up appointment in 8 week(s).       Other info:  Pt educated to contact our clinic with any changes in medications or s/s of bleeding or thrombosis.  Education was provided today regarding tips to reduce their bleed risk and dietary constraints while on a anticoagulant.    National Recommendations:  The CHEST guidelines recommends frequent INR monitoring at regular intervals (a few days up to a max of 12 weeks) to ensure patients are on the proper dose of warfarin, and patients are not having any complications from therapy.  INRs can dramatically change over a short time period due to diet, medications, and medical conditions.     Skip Dowling, PharmD, BCACP    [facility]  Phone [phone] fax [phone]

## 2023-07-10 ENCOUNTER — OFFICE VISIT (OUTPATIENT)
Dept: MEDICAL GROUP | Facility: LAB | Age: 83
End: 2023-07-10
Payer: MEDICARE

## 2023-07-10 ENCOUNTER — PATIENT OUTREACH (OUTPATIENT)
Dept: HEALTH INFORMATION MANAGEMENT | Facility: OTHER | Age: 83
End: 2023-07-10

## 2023-07-10 VITALS
BODY MASS INDEX: 25.01 KG/M2 | SYSTOLIC BLOOD PRESSURE: 114 MMHG | OXYGEN SATURATION: 95 % | HEIGHT: 68 IN | HEART RATE: 78 BPM | RESPIRATION RATE: 16 BRPM | TEMPERATURE: 97.6 F | DIASTOLIC BLOOD PRESSURE: 62 MMHG | WEIGHT: 165 LBS

## 2023-07-10 DIAGNOSIS — R80.9 TYPE 2 DIABETES MELLITUS WITH MICROALBUMINURIA, WITHOUT LONG-TERM CURRENT USE OF INSULIN (HCC): ICD-10-CM

## 2023-07-10 DIAGNOSIS — I10 ESSENTIAL HYPERTENSION, BENIGN: ICD-10-CM

## 2023-07-10 DIAGNOSIS — E11.29 TYPE 2 DIABETES MELLITUS WITH MICROALBUMINURIA, WITHOUT LONG-TERM CURRENT USE OF INSULIN (HCC): ICD-10-CM

## 2023-07-10 LAB
HBA1C MFR BLD: 10.2 % (ref ?–5.8)
POCT INT CON NEG: NEGATIVE
POCT INT CON POS: POSITIVE

## 2023-07-10 PROCEDURE — 99214 OFFICE O/P EST MOD 30 MIN: CPT | Performed by: FAMILY MEDICINE

## 2023-07-10 PROCEDURE — 83036 HEMOGLOBIN GLYCOSYLATED A1C: CPT | Performed by: FAMILY MEDICINE

## 2023-07-10 PROCEDURE — 3074F SYST BP LT 130 MM HG: CPT | Performed by: FAMILY MEDICINE

## 2023-07-10 PROCEDURE — 3078F DIAST BP <80 MM HG: CPT | Performed by: FAMILY MEDICINE

## 2023-07-10 RX ORDER — GLIPIZIDE 10 MG/1
TABLET ORAL
COMMUNITY
End: 2024-02-01

## 2023-07-10 RX ORDER — LOSARTAN POTASSIUM 25 MG/1
25 TABLET ORAL DAILY
Qty: 90 TABLET | Refills: 3 | Status: SHIPPED | OUTPATIENT
Start: 2023-07-10 | End: 2023-07-12 | Stop reason: SDUPTHER

## 2023-07-10 ASSESSMENT — FIBROSIS 4 INDEX: FIB4 SCORE: 4.13

## 2023-07-10 NOTE — PROGRESS NOTES
Subjective:   Paulo Paredes is a 82 y.o. male here today for   No chief complaint on file.      #DM  Presents for follow up of diabetes mellitus. He indicates that he is feeling well and denies any symptoms referable to this diagnoses.  Specifically denies chest pain, palpitations, dyspnea, orthopnea, PND or peripheral edema, poyluria, polydipsia, urinary complaints, abdominal complaints, myalgias, numbness, weakness or other related symptoms.   Current medications: Trulicity, glipizide,  Last A1c:   Glycohemoglobin   Date Value Ref Range Status   01/04/2023 8.0 (H) 4.0 - 5.6 % Final     Comment:     Increased risk for diabetes:  5.7 -6.4%  Diabetes:  >6.4%  Glycemic control for adults with diabetes:  <7.0%    The above interpretations are per ADA guidelines.  Diagnosis  of diabetes mellitus on the basis of elevated Hemoglobin A1c  should be confirmed by repeating the Hb A1c test.       Last Microalb/Cr ratio:   Microalbumin, Urine Random   Date Value Ref Range Status   01/04/2023 10.2 mg/dL Final     Creatinine, Urine   Date Value Ref Range Status   01/04/2023 131.29 mg/dL Final     Micro Alb Creat Ratio   Date Value Ref Range Status   01/04/2023 78 (H) 0 - 30 mg/g Final     Last lipid:   Cholesterol,Tot   Date Value Ref Range Status   01/04/2023 175 100 - 199 mg/dL Final     HDL   Date Value Ref Range Status   01/04/2023 31 (A) >=40 mg/dL Final     LDL   Date Value Ref Range Status   01/04/2023 93 <100 mg/dL Final     Triglycerides   Date Value Ref Range Status   01/04/2023 256 (H) 0 - 149 mg/dL Final     Fasting sugars: above 200  Diabetic foot exam: up to date  Retinal eye exam: up to date  ACEi/ARB? Losartan 50mg  Statin? yes  Aspirin? yes  Concomitant HTN? At goal  Nightly foot checks? Yes, daily.     #HTN  -Chronic longstanding history currently trying losartan.  Also treated with atenolol to concomitantly treat hypertension as well as history of CAD status post CABG x4.  Patient is compliant with  medication, check blood pressure regularly.  States blood pressure is very similar today, in the 1 teens over 60s.  He has had some episodes over the last year of lightheadedness, loss of consciousness.  He notices both when working in garden, going from squatting to standing.  Denies any palpitations, chest pain, dizziness, headaches, SOB.     Allergies   Allergen Reactions    Lactose     Seasonal          Current medicines (including changes today)  Current Outpatient Medications   Medication Sig Dispense Refill    aspirin 81 MG EC tablet Take 81 mg by mouth every day.      atenolol (TENORMIN) 25 MG Tab Take 1 tablet by mouth once daily 100 Tablet 3    Dulaglutide (TRULICITY) 0.75 MG/0.5ML Solution Pen-injector INJECT 1 SYRINGE SUBCUTANEOUSLY ONCE A WEEK AS DIRECTED 12 mL 1    oxymetazoline (AFRIN 12 HOUR) 0.05 % Solution Administer 2 Sprays into affected nostril(S) 2 times a day. Discontinue after 2 days. 15 mL 0    Lidocaine 4 % Patch Apply patch to painful area. Patch may remain in place for up to 12 hours in any 24-hour period. No more than 1 patch can be used in a 24-hour period. 15 Patch 0    triamcinolone acetonide (KENALOG) 0.025 % Cream Apply to the affected area twice a day on legs. 15 g 1    atorvastatin (LIPITOR) 40 MG Tab Take 1 tablet by mouth once daily 100 Tablet 0    capsaicin (ZOSTRIX) 0.025 % cream APPLY TO ITCHY AREA ON NECK UP TO 5 TIMES A DAY FOR 1 WEEK, THEN 3 TIMES A DAY FOR 3 TO 6 WEEKS. WASH HANDS THOROUGHLY AFTER APPLYING. DO TEST SPOT 1ST      warfarin (COUMADIN) 5 MG Tab Take 1 Tablet by mouth every evening. Take 0.5 to 1 tablet by mouth once daily. Take as directed by anticoagulation clinic. 100 Tablet 3    fenofibrate (TRIGLIDE) 160 MG tablet Take 1 Tablet by mouth every day. 100 Tablet 3    losartan (COZAAR) 50 MG Tab Take 1 Tablet by mouth every day. 100 Tablet 5    levothyroxine (EUTHYROX) 137 MCG Tab TAKE 1 TABLET BY MOUTH ONCE DAILY IN THE MORNING ON  AN  EMPTY  STOMACH  ONE   "HOUR  BEFORE  EATING 100 Tablet 3    Multiple Vitamins-Minerals (ZINC PO) Take  by mouth.      hydrocortisone 2.5 % Cream topical cream APPLY TO RASH ON GROIN TWICE DAILY FOR 1 WEEK WITH FLARES      Calcium Carb-Cholecalciferol (CALCIUM 1000 + D PO) Take 1 Dose by mouth every day.      Omega-3 Krill Oil 300 MG Cap Take 300 mg by mouth every day.      Cinnamon 500 MG Cap Take 1,000 mg by mouth.      Cyanocobalamin (VITAMIN B-12) 1000 MCG Tab Take 1,000 mcg by mouth every day.      Coenzyme Q10 (CO Q-10 PO) Take 1 Dose by mouth every day. Unknown OTC Strength       No current facility-administered medications for this visit.     He  has a past medical history of Anesthesia, Basal cell carcinoma of skin, CAD (coronary artery disease), Cancer ([facility]), Chronic kidney disease (CKD) stage G3b/A2, moderately decreased glomerular filtration rate (GFR) between 30-44 mL/min/1.73 square meter and albuminuria creatinine ratio between  mg/g ([facility]) (4/23/2015), DVT (deep venous thrombosis) ([facility]) (2014), ED (erectile dysfunction), GERD (gastroesophageal reflux disease), Glaucoma, Heart burn, Hyperlipidemia (12/3/2012), Hypertension, Indigestion, Multiple pulmonary emboli ([facility]) (2014), Multiple thyroid nodules (2009), Papillary carcinoma of thyroid ([facility]) (2014), postsurgical hypothyroidism (2014), Pre-diabetes, Renal disorder, S/P CABG x 4 (2003), S/P colectomy (2010), S/p nephrectomy (1998), S/P prostatectomy (2008), Stroke ([facility]), Transient renal failure (2014), Type II or unspecified type diabetes mellitus without mention of complication, not stated as uncontrolled, and Unspecified urinary incontinence.    ROS   -See HPI       Objective:     Physical Exam:  /62   Pulse 78   Temp 36.4 °C (97.6 °F) (Temporal)   Resp 16   Ht 1.727 m (5' 7.99\")   Wt 74.8 kg (165 lb)   SpO2 95%  Body mass index is 25.09 kg/m².   Constitutional: Alert, no distress.  Skin: Warm, dry, good turgor, no rashes in visible areas.  Respiratory: " Unlabored respiratory effort, lungs clear to auscultation, no wheezes, no rhonchi.  Cardiovascular: Normal S1, S2, no murmur, no edema.  Abdomen: Soft, non-tender, no masses, no hepatosplenomegaly.  Psych: Alert and oriented x3, normal affect and mood.  Monofilament testing with a 10 gram force: sensation intact: intact bilaterally  Visual Inspection: Feet without maceration, ulcers, fissures.  Pedal pulses: intact bilaterally      Assessment and Plan:     1. Type 2 diabetes mellitus with microalbuminuria, without long-term current use of insulin (HCC)  -Significant increase in hemoglobin A1c.  We will go ahead at this time and increase Trulicity to 1.5 mg.  We may need to continue to augment therapy; however, patient will continue work on appropriate diet and exercise regiment.  We will follow-up for medication and blood sugar check in 1 month.  - POCT Hemoglobin A1C  - Dulaglutide 1.5 MG/0.5ML Solution Pen-injector; Inject 0.5 mL under the skin every 7 days for 360 days.  Dispense: 12 Each; Refill: 3  - Diabetic Monofilament LE Exam    2. Essential hypertension, benign  -Given episodes of syncope that do appear to be the static we will go ahead at this time and slightly decrease losartan to 25 mg.  Discussed continuation of appropriate diet and exercise regimen, follow-up of blood pressure is above 130/80.  - losartan (COZAAR) 25 MG Tab; Take 1 Tablet by mouth every day for 360 days.  Dispense: 90 Tablet; Refill: 3        Followup: No follow-ups on file.         PLEASE NOTE: This dictation was created using voice recognition software. I have made every reasonable attempt to correct obvious errors, but I expect that there are errors of grammar and possibly content that I did not discover before finalizing the note.

## 2023-07-12 DIAGNOSIS — I10 ESSENTIAL HYPERTENSION, BENIGN: ICD-10-CM

## 2023-07-12 RX ORDER — LOSARTAN POTASSIUM 25 MG/1
25 TABLET ORAL DAILY
Qty: 100 TABLET | Refills: 2 | Status: SHIPPED | OUTPATIENT
Start: 2023-07-12 | End: 2024-07-06

## 2023-07-13 ENCOUNTER — APPOINTMENT (RX ONLY)
Dept: URBAN - METROPOLITAN AREA CLINIC 15 | Facility: CLINIC | Age: 83
Setting detail: DERMATOLOGY
End: 2023-07-13

## 2023-07-13 DIAGNOSIS — L81.4 OTHER MELANIN HYPERPIGMENTATION: ICD-10-CM

## 2023-07-13 DIAGNOSIS — L82.1 OTHER SEBORRHEIC KERATOSIS: ICD-10-CM

## 2023-07-13 DIAGNOSIS — D18.0 HEMANGIOMA: ICD-10-CM

## 2023-07-13 DIAGNOSIS — D22 MELANOCYTIC NEVI: ICD-10-CM

## 2023-07-13 DIAGNOSIS — L57.0 ACTINIC KERATOSIS: ICD-10-CM

## 2023-07-13 DIAGNOSIS — Z85.828 PERSONAL HISTORY OF OTHER MALIGNANT NEOPLASM OF SKIN: ICD-10-CM

## 2023-07-13 DIAGNOSIS — I78.8 OTHER DISEASES OF CAPILLARIES: ICD-10-CM

## 2023-07-13 PROBLEM — D22.5 MELANOCYTIC NEVI OF TRUNK: Status: ACTIVE | Noted: 2023-07-13

## 2023-07-13 PROBLEM — D22.39 MELANOCYTIC NEVI OF OTHER PARTS OF FACE: Status: ACTIVE | Noted: 2023-07-13

## 2023-07-13 PROBLEM — C44.319 BASAL CELL CARCINOMA OF SKIN OF OTHER PARTS OF FACE: Status: ACTIVE | Noted: 2023-07-13

## 2023-07-13 PROBLEM — D18.01 HEMANGIOMA OF SKIN AND SUBCUTANEOUS TISSUE: Status: ACTIVE | Noted: 2023-07-13

## 2023-07-13 PROCEDURE — ? LIQUID NITROGEN

## 2023-07-13 PROCEDURE — 17000 DESTRUCT PREMALG LESION: CPT

## 2023-07-13 PROCEDURE — ? COUNSELING

## 2023-07-13 PROCEDURE — 99213 OFFICE O/P EST LOW 20 MIN: CPT | Mod: 25

## 2023-07-13 PROCEDURE — ? SUNSCREEN TREATMENT REGIMEN

## 2023-07-13 ASSESSMENT — LOCATION DETAILED DESCRIPTION DERM
LOCATION DETAILED: RIGHT DISTAL CALF
LOCATION DETAILED: RIGHT VENTRAL PROXIMAL FOREARM
LOCATION DETAILED: LEFT INFERIOR MEDIAL FOREHEAD
LOCATION DETAILED: RIGHT LATERAL ABDOMEN
LOCATION DETAILED: LEFT VENTRAL PROXIMAL FOREARM
LOCATION DETAILED: LEFT LATERAL FOREHEAD
LOCATION DETAILED: LEFT CENTRAL MALAR CHEEK
LOCATION DETAILED: SUPERIOR THORACIC SPINE
LOCATION DETAILED: LEFT MEDIAL MALAR CHEEK
LOCATION DETAILED: RIGHT MEDIAL UPPER BACK
LOCATION DETAILED: STERNAL NOTCH
LOCATION DETAILED: RIGHT CENTRAL MALAR CHEEK
LOCATION DETAILED: RIGHT FOREHEAD

## 2023-07-13 ASSESSMENT — LOCATION SIMPLE DESCRIPTION DERM
LOCATION SIMPLE: CHEST
LOCATION SIMPLE: RIGHT FOREHEAD
LOCATION SIMPLE: LEFT FOREARM
LOCATION SIMPLE: UPPER BACK
LOCATION SIMPLE: LEFT CHEEK
LOCATION SIMPLE: RIGHT FOREARM
LOCATION SIMPLE: ABDOMEN
LOCATION SIMPLE: RIGHT CHEEK
LOCATION SIMPLE: RIGHT CALF
LOCATION SIMPLE: LEFT FOREHEAD
LOCATION SIMPLE: RIGHT UPPER BACK

## 2023-07-13 ASSESSMENT — LOCATION ZONE DERM
LOCATION ZONE: LEG
LOCATION ZONE: FACE
LOCATION ZONE: TRUNK
LOCATION ZONE: ARM

## 2023-07-13 NOTE — PROGRESS NOTES
CHW Sarah reached out to pt to offer GCM services. CHW unable to reach pt at this time. CHW LVM with contact information. CHW will attempt to contact pt next week.

## 2023-07-13 NOTE — PROCEDURE: LIQUID NITROGEN
Number Of Freeze-Thaw Cycles: 2 freeze-thaw cycles
Post-Care Instructions: I reviewed with the patient in detail post-care instructions. Patient is to wear sunprotection, and avoid picking at any of the treated lesions. Pt may apply Vaseline to crusted or scabbing areas.
Show Applicator Variable?: Yes
Duration Of Freeze Thaw-Cycle (Seconds): 0
Render Note In Bullet Format When Appropriate: No
Consent: The patient's consent was obtained including but not limited to risks of crusting, scabbing, blistering, scarring, darker or lighter pigmentary change, recurrence, incomplete removal and infection.
Detail Level: Detailed

## 2023-07-13 NOTE — PROCEDURE: COUNSELING
Detail Level: Detailed
Detail Level: Zone
Detail Level: Simple
Patient Specific Counseling (Will Not Stick From Patient To Patient): Pt has appointment scheduled with Dr. Dowd on 07/19/23 for treatment

## 2023-07-19 ENCOUNTER — APPOINTMENT (RX ONLY)
Dept: URBAN - METROPOLITAN AREA CLINIC 36 | Facility: CLINIC | Age: 83
Setting detail: DERMATOLOGY
End: 2023-07-19

## 2023-07-19 PROBLEM — C44.319 BASAL CELL CARCINOMA OF SKIN OF OTHER PARTS OF FACE: Status: ACTIVE | Noted: 2023-07-19

## 2023-07-19 PROCEDURE — ? MOHS SURGERY

## 2023-07-19 PROCEDURE — 17311 MOHS 1 STAGE H/N/HF/G: CPT | Mod: 79

## 2023-07-19 PROCEDURE — 13132 CMPLX RPR F/C/C/M/N/AX/G/H/F: CPT | Mod: 79

## 2023-07-19 NOTE — PROCEDURE: MOHS SURGERY
Include Anticoagulation In The Validation Algorithm?: No
Wound Care: Vaseline
Why Was The Change Made?: Please Select the Appropriate Response
Simple / Intermediate / Complex Repair - Final Wound Length In Cm: 6.2
Double M-Plasty Complex Repair Preamble Text (Leave Blank If You Do Not Want): Extensive wide undermining was performed.
Purse String (Intermediate) Text: Given the location of the defect and the characteristics of the surrounding skin a purse string intermediate closure was deemed most appropriate.  Undermining was performed circumfirentially around the surgical defect.  A purse string suture was then placed and tightened.
Skin Substitute Text: The defect edges were debeveled with a #15 scalpel blade.  Given the location of the defect, shape of the defect and the proximity to free margins a skin substitute graft was deemed most appropriate.  The graft material was trimmed to fit the size of the defect. The graft was then placed in the primary defect and oriented appropriately.
Stage 2: Additional Anesthesia Type: 1% lidocaine with epinephrine
O-T Plasty Text: The defect edges were debeveled with a #15 scalpel blade.  Given the location of the defect, shape of the defect and the proximity to free margins an O-T plasty was deemed most appropriate.  Using a sterile surgical marker, an appropriate O-T plasty was drawn incorporating the defect and placing the expected incisions within the relaxed skin tension lines where possible.    The area thus outlined was incised deep to adipose tissue with a #15 scalpel blade.  The skin margins were undermined to an appropriate distance in all directions utilizing iris scissors.
Complex Repair And Flap Additional Text (Will Appearing After The Standard Complex Repair Text): The complex repair was not sufficient to completely close the primary defect. The remaining additional defect was repaired with the flap mentioned below.
Xenograft Text: The defect edges were debeveled with a #15 scalpel blade.  Given the location of the defect, shape of the defect and the proximity to free margins a xenograft was deemed most appropriate.  The graft was then trimmed to fit the size of the defect.  The graft was then placed in the primary defect and oriented appropriately.
Asc Procedure Text (B): After obtaining clear surgical margins the patient was sent to an ASC for surgical repair.  The patient understands they will receive post-surgical care and follow-up from the ASC physician.
Stage 6: Additional Anesthesia Volume In Cc: 0
Referred To Otolaryngology For Closure Text (Leave Blank If You Do Not Want): After obtaining clear surgical margins the patient was sent to otolaryngology for surgical repair.  The patient understands they will receive post-surgical care and follow-up from the referring physician's office.
Bilobed Transposition Flap Text: The defect edges were debeveled with a #15 scalpel blade.  Given the location of the defect and the proximity to free margins a bilobed transposition flap was deemed most appropriate.  Using a sterile surgical marker, an appropriate bilobe flap drawn around the defect.    The area thus outlined was incised deep to adipose tissue with a #15 scalpel blade.  The skin margins were undermined to an appropriate distance in all directions utilizing iris scissors.
Referred To Oculoplastics For Closure Text (Leave Blank If You Do Not Want): After obtaining clear surgical margins the patient was sent to oculoplastics for surgical repair.  The patient understands they will receive post-surgical care and follow-up from the referring physician's office.
Modified Advancement Flap Text: The defect edges were debeveled with a #15 scalpel blade.  Given the location of the defect, shape of the defect and the proximity to free margins a modified advancement flap was deemed most appropriate.  Using a sterile surgical marker, an appropriate advancement flap was drawn incorporating the defect and placing the expected incisions within the relaxed skin tension lines where possible.    The area thus outlined was incised deep to adipose tissue with a #15 scalpel blade.  The skin margins were undermined to an appropriate distance in all directions utilizing iris scissors.
No Residual Tumor Seen Histology Text: There were no malignant cells seen in the sections examined.
Graft Cartilage Fenestration Text: The cartilage was fenestrated with a 2mm punch biopsy to help facilitate graft survival and healing.
Special Stains Stage 7 - Results: Base On Clearance Noted Above
Rhomboid Transposition Flap Text: The defect edges were debeveled with a #15 scalpel blade.  Given the location of the defect and the proximity to free margins a rhomboid transposition flap was deemed most appropriate.  Using a sterile surgical marker, an appropriate rhomboid flap was drawn incorporating the defect.    The area thus outlined was incised deep to adipose tissue with a #15 scalpel blade.  The skin margins were undermined to an appropriate distance in all directions utilizing iris scissors.
Date Of Previous Biopsy (Optional): 6/2/23
Depth Of Tumor Invasion (For Histology): dermis
Show Previous Accession Variable: Yes
Provider Procedure Text (B): After obtaining clear surgical margins the defect was repaired by another provider.
Consent (Temporal Branch)/Introductory Paragraph: The rationale for Mohs was explained to the patient and consent was obtained. The risks, benefits and alternatives to therapy were discussed in detail. Specifically, the risks of damage to the temporal branch of the facial nerve, infection, scarring, bleeding, prolonged wound healing, incomplete removal, allergy to anesthesia, and recurrence were addressed. Prior to the procedure, the treatment site was clearly identified and confirmed by the patient. All components of Universal Protocol/PAUSE Rule completed.
Intermediate Repair And Flap Additional Text (Will Appearing After The Standard Complex Repair Text): The intermediate repair was not sufficient to completely close the primary defect. The remaining additional defect was repaired with the flap mentioned below.
Closure 3 Information: This tab is for additional flaps and grafts above and beyond our usual structured repairs.  Please note if you enter information here it will not currently bill and you will need to add the billing information manually.
Dorsal Nasal Flap Text: The defect edges were debeveled with a #15 scalpel blade.  Given the location of the defect and the proximity to free margins a dorsal nasal flap was deemed most appropriate.  Using a sterile surgical marker, an appropriate dorsal nasal flap was drawn around the defect.    The area thus outlined was incised deep to adipose tissue with a #15 scalpel blade.  The skin margins were undermined to an appropriate distance in all directions utilizing iris scissors.
Double M-Plasty Intermediate Repair Preamble Text (Leave Blank If You Do Not Want): Undermining was performed with blunt dissection.
Secondary Intention Text (Leave Blank If You Do Not Want): The defect will heal with secondary intention.
Ear Star Wedge Flap Text: The defect edges were debeveled with a #15 blade scalpel.  Given the location of the defect and the proximity to free margins (helical rim) an ear star wedge flap was deemed most appropriate.  Using a sterile surgical marker, the appropriate flap was drawn incorporating the defect and placing the expected incisions between the helical rim and antihelix where possible.  The area thus outlined was incised through and through with a #15 scalpel blade.
Post-Care Instructions: I reviewed with the patient in detail post-care instructions. Patient is not to engage in any heavy lifting, exercise, or swimming for the next 14 days. Should the patient develop any fevers, chills, bleeding, severe pain patient will contact the office immediately.
O-L Flap Text: The defect edges were debeveled with a #15 scalpel blade.  Given the location of the defect, shape of the defect and the proximity to free margins an O-L flap was deemed most appropriate.  Using a sterile surgical marker, an appropriate advancement flap was drawn incorporating the defect and placing the expected incisions within the relaxed skin tension lines where possible.    The area thus outlined was incised deep to adipose tissue with a #15 scalpel blade.  The skin margins were undermined to an appropriate distance in all directions utilizing iris scissors.
Double O-Z Flap Text: The defect edges were debeveled with a #15 scalpel blade.  Given the location of the defect, shape of the defect and the proximity to free margins a Double O-Z flap was deemed most appropriate.  Using a sterile surgical marker, an appropriate transposition flap was drawn incorporating the defect and placing the expected incisions within the relaxed skin tension lines where possible. The area thus outlined was incised deep to adipose tissue with a #15 scalpel blade.  The skin margins were undermined to an appropriate distance in all directions utilizing iris scissors.
Consent (Lip)/Introductory Paragraph: The rationale for Mohs was explained to the patient and consent was obtained. The risks, benefits and alternatives to therapy were discussed in detail. Specifically, the risks of lip deformity, changes in the oral aperture, infection, scarring, bleeding, prolonged wound healing, incomplete removal, allergy to anesthesia, nerve injury and recurrence were addressed. Prior to the procedure, the treatment site was clearly identified and confirmed by the patient. All components of Universal Protocol/PAUSE Rule completed.
Mart-1 - Positive Histology Text: MART-1 staining demonstrates areas of higher density and clustering of melanocytes with Pagetoid spread upwards within the epidermis. The surgical margins are positive for tumor cells.
Referred To Mid-Level For Closure Text (Leave Blank If You Do Not Want): After obtaining clear surgical margins the patient was sent to a mid-level provider for surgical repair.  The patient understands they will receive post-surgical care and follow-up from the mid-level provider.
Cheiloplasty (Less Than 50%) Text: A decision was made to reconstruct the defect with a  cheiloplasty.  The defect was undermined extensively.  Additional obicularis oris muscle was excised with a 15 blade scalpel.  The defect was converted into a full thickness wedge, of less than 50% of the vertical height of the lip, to facilite a better cosmetic result.  Small vessels were then tied off with 5-0 monocyrl. The obicularis oris, superficial fascia, adipose and dermis were then reapproximated.  After the deeper layers were approximated the epidermis was reapproximated with particular care given to realign the vermilion border.
Suture Removal: 9 days
Wound Care (No Sutures): Petrolatum
Number Of Stages: 1
Bi-Rhombic Flap Text: The defect edges were debeveled with a #15 scalpel blade.  Given the location of the defect and the proximity to free margins a bi-rhombic flap was deemed most appropriate.  Using a sterile surgical marker, an appropriate rhombic flap was drawn incorporating the defect. The area thus outlined was incised deep to adipose tissue with a #15 scalpel blade.  The skin margins were undermined to an appropriate distance in all directions utilizing iris scissors.
Surgical Defect Width In Cm (Optional): 1.1
Primary Defect Length In Cm (Final Defect Size - Required For Flaps/Grafts): 2.2
Nasalis-Muscle-Based Myocutaneous Island Pedicle Flap Text: Using a #15 blade, an incision was made around the donor flap to the level of the nasalis muscle. Wide lateral undermining was then performed in both the subcutaneous plane above the nasalis muscle, and in a submuscular plane just above periosteum. This allowed the formation of a free nasalis muscle axial pedicle (based on the angular artery) which was still attached to the actual cutaneous flap, increasing its mobility and vascular viability. Hemostasis was obtained with pinpoint electrocoagulation. The flap was mobilized into position and the pivotal anchor points positioned and stabilized with buried interrupted sutures. Subcutaneous and dermal tissues were closed in a multilayered fashion with sutures. Tissue redundancies were excised, and the epidermal edges were apposed without significant tension and sutured with sutures.
Hatchet Flap Text: The defect edges were debeveled with a #15 scalpel blade.  Given the location of the defect, shape of the defect and the proximity to free margins a hatchet flap was deemed most appropriate.  Using a sterile surgical marker, an appropriate hatchet flap was drawn incorporating the defect and placing the expected incisions within the relaxed skin tension lines where possible.    The area thus outlined was incised deep to adipose tissue with a #15 scalpel blade.  The skin margins were undermined to an appropriate distance in all directions utilizing iris scissors.
Plastic Surgeon Procedure Text (D): After obtaining clear surgical margins the patient was sent to plastics for surgical repair.  The patient understands they will receive post-surgical care and follow-up from the referring physician's office.
Tumor Depth: Less than 6mm from granular layer and no invasion beyond the subcutaneous fat
Chonodrocutaneous Helical Advancement Flap Text: The defect edges were debeveled with a #15 scalpel blade.  Given the location of the defect and the proximity to free margins a chondrocutaneous helical advancement flap was deemed most appropriate.  Using a sterile surgical marker, the appropriate advancement flap was drawn incorporating the defect and placing the expected incisions within the relaxed skin tension lines where possible.    The area thus outlined was incised deep to adipose tissue with a #15 scalpel blade.  The skin margins were undermined to an appropriate distance in all directions utilizing iris scissors.
W Plasty Text: The lesion was extirpated to the level of the fat with a #15 scalpel blade.  Given the location of the defect, shape of the defect and the proximity to free margins a W-plasty was deemed most appropriate for repair.  Using a sterile surgical marker, the appropriate transposition arms of the W-plasty were drawn incorporating the defect and placing the expected incisions within the relaxed skin tension lines where possible.    The area thus outlined was incised deep to adipose tissue with a #15 scalpel blade.  The skin margins were undermined to an appropriate distance in all directions utilizing iris scissors.  The opposing transposition arms were then transposed into place in opposite direction and anchored with interrupted buried subcutaneous sutures.
No Repair - Repaired With Adjacent Surgical Defect Text (Leave Blank If You Do Not Want): After obtaining clear surgical margins the defect was repaired concurrently with another surgical defect which was in close approximation.
Island Pedicle Flap With Canthal Suspension Text: The defect edges were debeveled with a #15 scalpel blade.  Given the location of the defect, shape of the defect and the proximity to free margins an island pedicle advancement flap was deemed most appropriate.  Using a sterile surgical marker, an appropriate advancement flap was drawn incorporating the defect, outlining the appropriate donor tissue and placing the expected incisions within the relaxed skin tension lines where possible. The area thus outlined was incised deep to adipose tissue with a #15 scalpel blade.  The skin margins were undermined to an appropriate distance in all directions around the primary defect and laterally outward around the island pedicle utilizing iris scissors.  There was minimal undermining beneath the pedicle flap. A suspension suture was placed in the canthal tendon to prevent tension and prevent ectropion.
Banner Transposition Flap Text: The defect edges were debeveled with a #15 scalpel blade.  Given the location of the defect and the proximity to free margins a Banner transposition flap was deemed most appropriate.  Using a sterile surgical marker, an appropriate flap drawn around the defect. The area thus outlined was incised deep to adipose tissue with a #15 scalpel blade.  The skin margins were undermined to an appropriate distance in all directions utilizing iris scissors.
Estimated Blood Loss (Cc): minimal
Posterior Auricular Interpolation Flap Text: A decision was made to reconstruct the defect utilizing an interpolation axial flap and a staged reconstruction.  A telfa template was made of the defect.  This telfa template was then used to outline the posterior auricular interpolation flap.  The donor area for the pedicle flap was then injected with anesthesia.  The flap was excised through the skin and subcutaneous tissue down to the layer of the underlying musculature.  The pedicle flap was carefully excised within this deep plane to maintain its blood supply.  The edges of the donor site were undermined.   The donor site was closed in a primary fashion.  The pedicle was then rotated into position and sutured.  Once the tube was sutured into place, adequate blood supply was confirmed with blanching and refill.  The pedicle was then wrapped with xeroform gauze and dressed appropriately with a telfa and gauze bandage to ensure continued blood supply and protect the attached pedicle.
Staged Advancement Flap Text: The defect edges were debeveled with a #15 scalpel blade.  Given the location of the defect, shape of the defect and the proximity to free margins a staged advancement flap was deemed most appropriate.  Using a sterile surgical marker, an appropriate advancement flap was drawn incorporating the defect and placing the expected incisions within the relaxed skin tension lines where possible. The area thus outlined was incised deep to adipose tissue with a #15 scalpel blade.  The skin margins were undermined to an appropriate distance in all directions utilizing iris scissors.
Rhombic Flap Text: The defect edges were debeveled with a #15 scalpel blade.  Given the location of the defect and the proximity to free margins a rhombic flap was deemed most appropriate.  Using a sterile surgical marker, an appropriate rhombic flap was drawn incorporating the defect.    The area thus outlined was incised deep to adipose tissue with a #15 scalpel blade.  The skin margins were undermined to an appropriate distance in all directions utilizing iris scissors.
Partial Purse String (Simple) Text: Given the location of the defect and the characteristics of the surrounding skin a simple purse string closure was deemed most appropriate.  Undermining was performed circumfirentially around the surgical defect.  A purse string suture was then placed and tightened. Wound tension only allowed a partial closure of the circular defect.
Perineural Invasion (For Histology - Be Specific If Possible): absent
Bilobed Flap Text: The defect edges were debeveled with a #15 scalpel blade.  Given the location of the defect and the proximity to free margins a bilobe flap was deemed most appropriate.  Using a sterile surgical marker, an appropriate bilobe flap drawn around the defect.    The area thus outlined was incised deep to adipose tissue with a #15 scalpel blade.  The skin margins were undermined to an appropriate distance in all directions utilizing iris scissors.
Intermediate Repair And Graft Additional Text (Will Appearing After The Standard Complex Repair Text): The intermediate repair was not sufficient to completely close the primary defect. The remaining additional defect was repaired with the graft mentioned below.
O-T Advancement Flap Text: The defect edges were debeveled with a #15 scalpel blade.  Given the location of the defect, shape of the defect and the proximity to free margins an O-T advancement flap was deemed most appropriate.  Using a sterile surgical marker, an appropriate advancement flap was drawn incorporating the defect and placing the expected incisions within the relaxed skin tension lines where possible.    The area thus outlined was incised deep to adipose tissue with a #15 scalpel blade.  The skin margins were undermined to an appropriate distance in all directions utilizing iris scissors.
Nasal Turnover Hinge Flap Text: The defect edges were debeveled with a #15 scalpel blade.  Given the size, depth, location of the defect and the defect being full thickness a nasal turnover hinge flap was deemed most appropriate.  Using a sterile surgical marker, an appropriate hinge flap was drawn incorporating the defect. The area thus outlined was incised with a #15 scalpel blade. The flap was designed to recreate the nasal mucosal lining and the alar rim. The skin margins were undermined to an appropriate distance in all directions utilizing iris scissors.
Keystone Flap Text: The defect edges were debeveled with a #15 scalpel blade.  Given the location of the defect, shape of the defect a keystone flap was deemed most appropriate.  Using a sterile surgical marker, an appropriate keystone flap was drawn incorporating the defect, outlining the appropriate donor tissue and placing the expected incisions within the relaxed skin tension lines where possible. The area thus outlined was incised deep to adipose tissue with a #15 scalpel blade.  The skin margins were undermined to an appropriate distance in all directions around the primary defect and laterally outward around the flap utilizing iris scissors.
Mercedes Flap Text: The defect edges were debeveled with a #15 scalpel blade.  Given the location of the defect, shape of the defect and the proximity to free margins a Mercedes flap was deemed most appropriate.  Using a sterile surgical marker, an appropriate advancement flap was drawn incorporating the defect and placing the expected incisions within the relaxed skin tension lines where possible. The area thus outlined was incised deep to adipose tissue with a #15 scalpel blade.  The skin margins were undermined to an appropriate distance in all directions utilizing iris scissors.
Mucosal Advancement Flap Text: Given the location of the defect, shape of the defect and the proximity to free margins a mucosal advancement flap was deemed most appropriate. Incisions were made with a 15 blade scalpel in the appropriate fashion along the cutaneous vermilion border and the mucosal lip. The remaining actinically damaged mucosal tissue was excised.  The mucosal advancement flap was then elevated to the gingival sulcus with care taken to preserve the neurovascular structures and advanced into the primary defect. Care was taken to ensure that precise realignment of the vermilion border was achieved.
Tissue Cultured Epidermal Autograft Text: The defect edges were debeveled with a #15 scalpel blade.  Given the location of the defect, shape of the defect and the proximity to free margins a tissue cultured epidermal autograft was deemed most appropriate.  The graft was then trimmed to fit the size of the defect.  The graft was then placed in the primary defect and oriented appropriately.
Trilobed Flap Text: The defect edges were debeveled with a #15 scalpel blade.  Given the location of the defect and the proximity to free margins a trilobed flap was deemed most appropriate.  Using a sterile surgical marker, an appropriate trilobed flap drawn around the defect.    The area thus outlined was incised deep to adipose tissue with a #15 scalpel blade.  The skin margins were undermined to an appropriate distance in all directions utilizing iris scissors.
Same Histology In Subsequent Stages Text: The pattern and morphology of the tumor is as described in the first stage.
Cartilage Graft Text: The defect edges were debeveled with a #15 scalpel blade.  Given the location of the defect, shape of the defect, the fact the defect involved a full thickness cartilage defect a cartilage graft was deemed most appropriate.  An appropriate donor site was identified, cleansed, and anesthetized. The cartilage graft was then harvested and transferred to the recipient site, oriented appropriately and then sutured into place.  The secondary defect was then repaired using a primary closure.
Advancement-Rotation Flap Text: The defect edges were debeveled with a #15 scalpel blade.  Given the location of the defect, shape of the defect and the proximity to free margins an advancement-rotation flap was deemed most appropriate.  Using a sterile surgical marker, an appropriate flap was drawn incorporating the defect and placing the expected incisions within the relaxed skin tension lines where possible. The area thus outlined was incised deep to adipose tissue with a #15 scalpel blade.  The skin margins were undermined to an appropriate distance in all directions utilizing iris scissors.
Hemostasis: Electrocautery
Dermal Autograft Text: The defect edges were debeveled with a #15 scalpel blade.  Given the location of the defect, shape of the defect and the proximity to free margins a dermal autograft was deemed most appropriate.  Using a sterile surgical marker, the primary defect shape was transferred to the donor site. The area thus outlined was incised deep to adipose tissue with a #15 scalpel blade.  The harvested graft was then trimmed of adipose and epidermal tissue until only dermis was left.  The skin graft was then placed in the primary defect and oriented appropriately.
Double Island Pedicle Flap Text: The defect edges were debeveled with a #15 scalpel blade.  Given the location of the defect, shape of the defect and the proximity to free margins a double island pedicle advancement flap was deemed most appropriate.  Using a sterile surgical marker, an appropriate advancement flap was drawn incorporating the defect, outlining the appropriate donor tissue and placing the expected incisions within the relaxed skin tension lines where possible.    The area thus outlined was incised deep to adipose tissue with a #15 scalpel blade.  The skin margins were undermined to an appropriate distance in all directions around the primary defect and laterally outward around the island pedicle utilizing iris scissors.  There was minimal undermining beneath the pedicle flap.
Repair Type: Complex Repair
Consent 3/Introductory Paragraph: I gave the patient a chance to ask questions they had about the procedure.  Following this I explained the Mohs procedure and consent was obtained. The risks, benefits and alternatives to therapy were discussed in detail. Specifically, the risks of infection, scarring, bleeding, prolonged wound healing, incomplete removal, allergy to anesthesia, nerve injury and recurrence were addressed. Prior to the procedure, the treatment site was clearly identified and confirmed by the patient. All components of Universal Protocol/PAUSE Rule completed.
Manual Repair Warning Statement: We plan on removing the manually selected variable below in favor of our much easier automatic structured text blocks found in the previous tab. We decided to do this to help make the flow better and give you the full power of structured data. Manual selection is never going to be ideal in our platform and I would encourage you to avoid using manual selection from this point on, especially since I will be sunsetting this feature. It is important that you do one of two things with the customized text below. First, you can save all of the text in a word file so you can have it for future reference. Second, transfer the text to the appropriate area in the Library tab. Lastly, if there is a flap or graft type which we do not have you need to let us know right away so I can add it in before the variable is hidden. No need to panic, we plan to give you roughly 6 months to make the change.
Hemigard Postcare Instructions: The HEMIGARD strips are to remain completely dry for at least 5-7 days.
Consent (Spinal Accessory)/Introductory Paragraph: The rationale for Mohs was explained to the patient and consent was obtained. The risks, benefits and alternatives to therapy were discussed in detail. Specifically, the risks of damage to the spinal accessory nerve, infection, scarring, bleeding, prolonged wound healing, incomplete removal, allergy to anesthesia, and recurrence were addressed. Prior to the procedure, the treatment site was clearly identified and confirmed by the patient. All components of Universal Protocol/PAUSE Rule completed.
Vermilion Border Text: The closure involved the vermilion border.
Deep Sutures: 5-0 Maxon
Anesthesia Volume In Cc: 3
Unna Boot Text: An Unna boot was placed to help immobilize the limb and facilitate more rapid healing.
Inflammation Suggestive Of Cancer Camouflage Histology Text: There was a dense lymphocytic infiltrate which prevented adequate histologic evaluation of adjacent structures.
Partial Purse String (Intermediate) Text: Given the location of the defect and the characteristics of the surrounding skin an intermediate purse string closure was deemed most appropriate.  Undermining was performed circumfirentially around the surgical defect.  A purse string suture was then placed and tightened. Wound tension only allowed a partial closure of the circular defect.
X Size Of Lesion In Cm (Optional): 0.8
Consent (Ear)/Introductory Paragraph: The rationale for Mohs was explained to the patient and consent was obtained. The risks, benefits and alternatives to therapy were discussed in detail. Specifically, the risks of ear deformity, infection, scarring, bleeding, prolonged wound healing, incomplete removal, allergy to anesthesia, nerve injury and recurrence were addressed. Prior to the procedure, the treatment site was clearly identified and confirmed by the patient. All components of Universal Protocol/PAUSE Rule completed.
Alar Island Pedicle Flap Text: The defect edges were debeveled with a #15 scalpel blade.  Given the location of the defect, shape of the defect and the proximity to the alar rim an island pedicle advancement flap was deemed most appropriate.  Using a sterile surgical marker, an appropriate advancement flap was drawn incorporating the defect, outlining the appropriate donor tissue and placing the expected incisions within the nasal ala running parallel to the alar rim. The area thus outlined was incised with a #15 scalpel blade.  The skin margins were undermined minimally to an appropriate distance in all directions around the primary defect and laterally outward around the island pedicle utilizing iris scissors.  There was minimal undermining beneath the pedicle flap.
Eye Protection Verbiage: Before proceeding with the stage, a plastic scleral shield was inserted. The globe was anesthetized with a few drops of 1% lidocaine with 1:100,000 epinephrine. Then, an appropriate sized scleral shield was chosen and coated with lacrilube ointment. The shield was gently inserted and left in place for the duration of each stage. After the stage was completed, the shield was gently removed.
Consent Type: Consent 1 (Standard)
Medical Necessity Statement: Based on my medical judgement, Mohs surgery is the most appropriate treatment for this cancer compared to other treatments.
Brow Lift Text: A midfrontal incision was made medially to the defect to allow access to the tissues just superior to the left eyebrow. Following careful dissection inferiorly in a supraperiosteal plane to the level of the left eyebrow, several 3-0 monocryl sutures were used to resuspend the eyebrow orbicularis oculi muscular unit to the superior frontal bone periosteum. This resulted in an appropriate reapproximation of static eyebrow symmetry and correction of the left brow ptosis.
Epidermal Sutures: 5-0 Surgipro
Information: Selecting Yes will display possible errors in your note based on the variables you have selected. This validation is only offered as a suggestion for you. PLEASE NOTE THAT THE VALIDATION TEXT WILL BE REMOVED WHEN YOU FINALIZE YOUR NOTE. IF YOU WANT TO FAX A PRELIMINARY NOTE YOU WILL NEED TO TOGGLE THIS TO 'NO' IF YOU DO NOT WANT IT IN YOUR FAXED NOTE.
Anesthesia Volume In Cc: 5
Double O-Z Plasty Text: The defect edges were debeveled with a #15 scalpel blade.  Given the location of the defect, shape of the defect and the proximity to free margins a Double O-Z plasty (double transposition flap) was deemed most appropriate.  Using a sterile surgical marker, the appropriate transposition flaps were drawn incorporating the defect and placing the expected incisions within the relaxed skin tension lines where possible. The area thus outlined was incised deep to adipose tissue with a #15 scalpel blade.  The skin margins were undermined to an appropriate distance in all directions utilizing iris scissors.  Hemostasis was achieved with electrocautery.  The flaps were then transposed into place, one clockwise and the other counterclockwise, and anchored with interrupted buried subcutaneous sutures.
Mohs Method Verbiage: An incision at a 45 degree angle following the standard Mohs approach was done and the specimen was harvested as a microscopic controlled layer.
Cheek-To-Nose Interpolation Flap Text: A decision was made to reconstruct the defect utilizing an interpolation axial flap and a staged reconstruction.  A telfa template was made of the defect.  This telfa template was then used to outline the Cheek-To-Nose Interpolation flap.  The donor area for the pedicle flap was then injected with anesthesia.  The flap was excised through the skin and subcutaneous tissue down to the layer of the underlying musculature.  The interpolation flap was carefully excised within this deep plane to maintain its blood supply.  The edges of the donor site were undermined.   The donor site was closed in a primary fashion.  The pedicle was then rotated into position and sutured.  Once the tube was sutured into place, adequate blood supply was confirmed with blanching and refill.  The pedicle was then wrapped with xeroform gauze and dressed appropriately with a telfa and gauze bandage to ensure continued blood supply and protect the attached pedicle.
Surgeon Performing Repair (Optional): Yanni Dowd MD
Spiral Flap Text: The defect edges were debeveled with a #15 scalpel blade.  Given the location of the defect, shape of the defect and the proximity to free margins a spiral flap was deemed most appropriate.  Using a sterile surgical marker, an appropriate rotation flap was drawn incorporating the defect and placing the expected incisions within the relaxed skin tension lines where possible. The area thus outlined was incised deep to adipose tissue with a #15 scalpel blade.  The skin margins were undermined to an appropriate distance in all directions utilizing iris scissors.
Hemigard Retention Suture: 0-0 Nylon
Estlander Flap (Upper To Lower Lip) Text: The defect of the lower lip was assessed and measured.  Given the location and size of the defect, an Estlander flap was deemed most appropriate.  Using a sterile surgical marker, an appropriate Estlander flap was measured and drawn on the upper lip. Local anesthesia was then infiltrated. A scalpel was then used to incise the lateral aspect of the flap, through skin, muscle and mucosa, leaving the flap pedicled medially.  The flap was then rotated and positioned to fill the lower lip defect.  The flap was then sutured into place with a three layer technique, closing the orbicularis oris muscle layer with subcutaneous buried sutures, followed by a mucosal layer and an epidermal layer.
Crescentic Advancement Flap Text: The defect edges were debeveled with a #15 scalpel blade.  Given the location of the defect and the proximity to free margins a crescentic advancement flap was deemed most appropriate.  Using a sterile surgical marker, the appropriate advancement flap was drawn incorporating the defect and placing the expected incisions within the relaxed skin tension lines where possible.    The area thus outlined was incised deep to adipose tissue with a #15 scalpel blade.  The skin margins were undermined to an appropriate distance in all directions utilizing iris scissors.
Subsequent Stages Histo Method Verbiage: Using a similar technique to that described above, a thin layer of tissue was removed from all areas where tumor was visible on the previous stage.  The tissue was again oriented, mapped, dyed, and processed as above.
Pain Refusal Text: I offered to prescribe pain medication but the patient refused to take this medication.
Epidermal Closure: running cuticular
Composite Graft Text: The defect edges were debeveled with a #15 scalpel blade.  Given the location of the defect, shape of the defect, the proximity to free margins and the fact the defect was full thickness a composite graft was deemed most appropriate.  The defect was outline and then transferred to the donor site.  A full thickness graft was then excised from the donor site. The graft was then placed in the primary defect, oriented appropriately and then sutured into place.  The secondary defect was then repaired using a primary closure.
Retention Suture Bite Size: 1 mm
Wound Check: 6 weeks
Paramedian Forehead Flap Text: A decision was made to reconstruct the defect utilizing an interpolation axial flap and a staged reconstruction.  A telfa template was made of the defect.  This telfa template was then used to outline the paramedian forehead pedicle flap.  The donor area for the pedicle flap was then injected with anesthesia.  The flap was excised through the skin and subcutaneous tissue down to the layer of the underlying musculature.  The pedicle flap was carefully excised within this deep plane to maintain its blood supply.  The edges of the donor site were undermined.   The donor site was closed in a primary fashion.  The pedicle was then rotated into position and sutured.  Once the tube was sutured into place, adequate blood supply was confirmed with blanching and refill.  The pedicle was then wrapped with xeroform gauze and dressed appropriately with a telfa and gauze bandage to ensure continued blood supply and protect the attached pedicle.
Split-Thickness Skin Graft Text: The defect edges were debeveled with a #15 scalpel blade.  Given the location of the defect, shape of the defect and the proximity to free margins a split thickness skin graft was deemed most appropriate.  Using a sterile surgical marker, the primary defect shape was transferred to the donor site. The split thickness graft was then harvested.  The skin graft was then placed in the primary defect and oriented appropriately.
Advancement Flap (Double) Text: The defect edges were debeveled with a #15 scalpel blade.  Given the location of the defect and the proximity to free margins a double advancement flap was deemed most appropriate.  Using a sterile surgical marker, the appropriate advancement flaps were drawn incorporating the defect and placing the expected incisions within the relaxed skin tension lines where possible.    The area thus outlined was incised deep to adipose tissue with a #15 scalpel blade.  The skin margins were undermined to an appropriate distance in all directions utilizing iris scissors.
A-T Advancement Flap Text: The defect edges were debeveled with a #15 scalpel blade.  Given the location of the defect, shape of the defect and the proximity to free margins an A-T advancement flap was deemed most appropriate.  Using a sterile surgical marker, an appropriate advancement flap was drawn incorporating the defect and placing the expected incisions within the relaxed skin tension lines where possible.    The area thus outlined was incised deep to adipose tissue with a #15 scalpel blade.  The skin margins were undermined to an appropriate distance in all directions utilizing iris scissors.
Previous Accession (Optional): [id number]
Burow's Advancement Flap Text: The defect edges were debeveled with a #15 scalpel blade.  Given the location of the defect and the proximity to free margins a Burow's advancement flap was deemed most appropriate.  Using a sterile surgical marker, the appropriate advancement flap was drawn incorporating the defect and placing the expected incisions within the relaxed skin tension lines where possible.    The area thus outlined was incised deep to adipose tissue with a #15 scalpel blade.  The skin margins were undermined to an appropriate distance in all directions utilizing iris scissors.
Burow's Graft Text: The defect edges were debeveled with a #15 scalpel blade.  Given the location of the defect, shape of the defect, the proximity to free margins and the presence of a standing cone deformity a Burow's skin graft was deemed most appropriate. The standing cone was removed and this tissue was then trimmed to the shape of the primary defect. The adipose tissue was also removed until only dermis and epidermis were left.  The skin margins of the secondary defect were undermined to an appropriate distance in all directions utilizing iris scissors.  The secondary defect was closed with interrupted buried subcutaneous sutures.  The skin edges were then re-apposed with running  sutures.  The skin graft was then placed in the primary defect and oriented appropriately.
Melolabial Interpolation Flap Text: A decision was made to reconstruct the defect utilizing an interpolation axial flap and a staged reconstruction.  A telfa template was made of the defect.  This telfa template was then used to outline the melolabial interpolation flap.  The donor area for the pedicle flap was then injected with anesthesia.  The flap was excised through the skin and subcutaneous tissue down to the layer of the underlying musculature.  The pedicle flap was carefully excised within this deep plane to maintain its blood supply.  The edges of the donor site were undermined.   The donor site was closed in a primary fashion.  The pedicle was then rotated into position and sutured.  Once the tube was sutured into place, adequate blood supply was confirmed with blanching and refill.  The pedicle was then wrapped with xeroform gauze and dressed appropriately with a telfa and gauze bandage to ensure continued blood supply and protect the attached pedicle.
Width Of Defect Perpendicular To Closure In Cm (Required): 1.3
Graft Donor Site Dermal Sutures (Optional): 5-0 Polysorb
Bcc Infiltrative Histology Text: There were numerous aggregates of basaloid cells demonstrating an infiltrative pattern.
Zygomaticofacial Flap Text: Given the location of the defect, shape of the defect and the proximity to free margins a zygomaticofacial flap was deemed most appropriate for repair.  Using a sterile surgical marker, the appropriate flap was drawn incorporating the defect and placing the expected incisions within the relaxed skin tension lines where possible. The area thus outlined was incised deep to adipose tissue with a #15 scalpel blade with preservation of a vascular pedicle.  The skin margins were undermined to an appropriate distance in all directions utilizing iris scissors.  The flap was then placed into the defect and anchored with interrupted buried subcutaneous sutures.
Abbe Flap (Upper To Lower Lip) Text: The defect of the lower lip was assessed and measured.  Given the location and size of the defect, an Abbe flap was deemed most appropriate.  Using a sterile surgical marker, an appropriate Abbe flap was measured and drawn on the upper lip. Local anesthesia was then infiltrated.  A scalpel was then used to incise the upper lip through and through the skin, vermilion, muscle and mucosa, leaving the flap pedicled on the opposite side.  The flap was then rotated and transferred to the lower lip defect.  The flap was then sutured into place with a three layer technique, closing the orbicularis oris muscle layer with subcutaneous buried sutures, followed by a mucosal layer and an epidermal layer.
Area H Indication Text: Tumors in this location are included in Area H (eyelids, eyebrows, nose, lips, chin, ear, pre-auricular, post-auricular, temple, genitalia, hands, feet, ankles and areola).  Tissue conservation is critical in these anatomic locations.
Star Wedge Flap Text: The defect edges were debeveled with a #15 scalpel blade.  Given the location of the defect, shape of the defect and the proximity to free margins a star wedge flap was deemed most appropriate.  Using a sterile surgical marker, an appropriate rotation flap was drawn incorporating the defect and placing the expected incisions within the relaxed skin tension lines where possible. The area thus outlined was incised deep to adipose tissue with a #15 scalpel blade.  The skin margins were undermined to an appropriate distance in all directions utilizing iris scissors.
Additional Anesthesia Volume In Cc: 6
Mastoid Interpolation Flap Text: A decision was made to reconstruct the defect utilizing an interpolation axial flap and a staged reconstruction.  A telfa template was made of the defect.  This telfa template was then used to outline the mastoid interpolation flap.  The donor area for the pedicle flap was then injected with anesthesia.  The flap was excised through the skin and subcutaneous tissue down to the layer of the underlying musculature.  The pedicle flap was carefully excised within this deep plane to maintain its blood supply.  The edges of the donor site were undermined.   The donor site was closed in a primary fashion.  The pedicle was then rotated into position and sutured.  Once the tube was sutured into place, adequate blood supply was confirmed with blanching and refill.  The pedicle was then wrapped with xeroform gauze and dressed appropriately with a telfa and gauze bandage to ensure continued blood supply and protect the attached pedicle.
Mohs Case Number: [id number]
O-Z Flap Text: The defect edges were debeveled with a #15 scalpel blade.  Given the location of the defect, shape of the defect and the proximity to free margins an O-Z flap was deemed most appropriate.  Using a sterile surgical marker, an appropriate transposition flap was drawn incorporating the defect and placing the expected incisions within the relaxed skin tension lines where possible. The area thus outlined was incised deep to adipose tissue with a #15 scalpel blade.  The skin margins were undermined to an appropriate distance in all directions utilizing iris scissors.
Island Pedicle Flap-Requiring Vessel Identification Text: The defect edges were debeveled with a #15 scalpel blade.  Given the location of the defect, shape of the defect and the proximity to free margins an island pedicle advancement flap was deemed most appropriate.  Using a sterile surgical marker, an appropriate advancement flap was drawn, based on the axial vessel mentioned above, incorporating the defect, outlining the appropriate donor tissue and placing the expected incisions within the relaxed skin tension lines where possible.    The area thus outlined was incised deep to adipose tissue with a #15 scalpel blade.  The skin margins were undermined to an appropriate distance in all directions around the primary defect and laterally outward around the island pedicle utilizing iris scissors.  There was minimal undermining beneath the pedicle flap.
Undermining Type: Entire Wound
Consent 1/Introductory Paragraph: The rationale for Mohs was explained to the patient and consent was obtained. The risks, benefits and alternatives to therapy were discussed in detail. Specifically, the risks of infection, scarring, bleeding, prolonged wound healing, incomplete removal, allergy to anesthesia, nerve injury and recurrence were addressed. Prior to the procedure, the treatment site was clearly identified and confirmed by the patient. All components of Universal Protocol/PAUSE Rule completed.
Epidermal Autograft Text: The defect edges were debeveled with a #15 scalpel blade.  Given the location of the defect, shape of the defect and the proximity to free margins an epidermal autograft was deemed most appropriate.  Using a sterile surgical marker, the primary defect shape was transferred to the donor site. The epidermal graft was then harvested.  The skin graft was then placed in the primary defect and oriented appropriately.
Abbe Flap (Lower To Upper Lip) Text: The defect of the upper lip was assessed and measured.  Given the location and size of the defect, an Abbe flap was deemed most appropriate.  Using a sterile surgical marker, an appropriate Abbe flap was measured and drawn on the lower lip. Local anesthesia was then infiltrated. A scalpel was then used to incise the upper lip through and through the skin, vermilion, muscle and mucosa, leaving the flap pedicled on the opposite side.  The flap was then rotated and transferred to the lower lip defect.  The flap was then sutured into place with a three layer technique, closing the orbicularis oris muscle layer with subcutaneous buried sutures, followed by a mucosal layer and an epidermal layer.
Home Suture Removal Text: Patient was provided instructions on removing sutures and will remove their sutures at home.  If they have any questions or difficulties they will call the office.
Cheiloplasty (Complex) Text: A decision was made to reconstruct the defect with a  cheiloplasty.  The defect was undermined extensively.  Additional obicularis oris muscle was excised with a 15 blade scalpel.  The defect was converted into a full thickness wedge to facilite a better cosmetic result.  Small vessels were then tied off with 5-0 monocyrl. The obicularis oris, superficial fascia, adipose and dermis were then reapproximated.  After the deeper layers were approximated the epidermis was reapproximated with particular care given to realign the vermilion border.
Non-Graft Cartilage Fenestration Text: The cartilage was fenestrated with a 2mm punch biopsy to help facilitate healing.
V-Y Plasty Text: The defect edges were debeveled with a #15 scalpel blade.  Given the location of the defect, shape of the defect and the proximity to free margins an V-Y advancement flap was deemed most appropriate.  Using a sterile surgical marker, an appropriate advancement flap was drawn incorporating the defect and placing the expected incisions within the relaxed skin tension lines where possible.    The area thus outlined was incised deep to adipose tissue with a #15 scalpel blade.  The skin margins were undermined to an appropriate distance in all directions utilizing iris scissors.
Closure 2 Information: This tab is for additional flaps and grafts, including complex repair and grafts and complex repair and flaps. You can also specify a different location for the additional defect, if the location is the same you do not need to select a new one. We will insert the automated text for the repair you select below just as we do for solitary flaps and grafts. Please note that at this time if you select a location with a different insurance zone you will need to override the ICD10 and CPT if appropriate.
Adjacent Tissue Transfer Text: The defect edges were debeveled with a #15 scalpel blade.  Given the location of the defect and the proximity to free margins an adjacent tissue transfer was deemed most appropriate.  Using a sterile surgical marker, an appropriate flap was drawn incorporating the defect and placing the expected incisions within the relaxed skin tension lines where possible.    The area thus outlined was incised deep to adipose tissue with a #15 scalpel blade.  The skin margins were undermined to an appropriate distance in all directions utilizing iris scissors.
Retention Suture Text: Retention sutures were placed to support the closure and prevent dehiscence.
O-Z Plasty Text: The defect edges were debeveled with a #15 scalpel blade.  Given the location of the defect, shape of the defect and the proximity to free margins an O-Z plasty (double transposition flap) was deemed most appropriate.  Using a sterile surgical marker, the appropriate transposition flaps were drawn incorporating the defect and placing the expected incisions within the relaxed skin tension lines where possible.    The area thus outlined was incised deep to adipose tissue with a #15 scalpel blade.  The skin margins were undermined to an appropriate distance in all directions utilizing iris scissors.  Hemostasis was achieved with electrocautery.  The flaps were then transposed into place, one clockwise and the other counterclockwise, and anchored with interrupted buried subcutaneous sutures.
Helical Rim Text: The closure involved the helical rim.
Consent 2/Introductory Paragraph: Mohs surgery was explained to the patient and consent was obtained. The risks, benefits and alternatives to therapy were discussed in detail. Specifically, the risks of infection, scarring, bleeding, prolonged wound healing, incomplete removal, allergy to anesthesia, nerve injury and recurrence were addressed. Prior to the procedure, the treatment site was clearly identified and confirmed by the patient. All components of Universal Protocol/PAUSE Rule completed.
Donor Site Anesthesia Type: same as repair anesthesia
Mart-1 - Negative Histology Text: MART-1 staining demonstrates a normal density and pattern of melanocytes along the dermal-epidermal junction. The surgical margins are negative for tumor cells.
Interpolation Flap Text: A decision was made to reconstruct the defect utilizing an interpolation axial flap and a staged reconstruction.  A telfa template was made of the defect.  This telfa template was then used to outline the interpolation flap.  The donor area for the pedicle flap was then injected with anesthesia.  The flap was excised through the skin and subcutaneous tissue down to the layer of the underlying musculature.  The interpolation flap was carefully excised within this deep plane to maintain its blood supply.  The edges of the donor site were undermined.   The donor site was closed in a primary fashion.  The pedicle was then rotated into position and sutured.  Once the tube was sutured into place, adequate blood supply was confirmed with blanching and refill.  The pedicle was then wrapped with xeroform gauze and dressed appropriately with a telfa and gauze bandage to ensure continued blood supply and protect the attached pedicle.
Cheek Interpolation Flap Text: A decision was made to reconstruct the defect utilizing an interpolation axial flap and a staged reconstruction.  A telfa template was made of the defect.  This telfa template was then used to outline the Cheek Interpolation flap.  The donor area for the pedicle flap was then injected with anesthesia.  The flap was excised through the skin and subcutaneous tissue down to the layer of the underlying musculature.  The interpolation flap was carefully excised within this deep plane to maintain its blood supply.  The edges of the donor site were undermined.   The donor site was closed in a primary fashion.  The pedicle was then rotated into position and sutured.  Once the tube was sutured into place, adequate blood supply was confirmed with blanching and refill.  The pedicle was then wrapped with xeroform gauze and dressed appropriately with a telfa and gauze bandage to ensure continued blood supply and protect the attached pedicle.
Epidermal Closure Graft Donor Site (Optional): running
Ear Wedge Repair Text: A wedge excision was completed by carrying down an excision through the full thickness of the ear and cartilage with an inward facing Burow's triangle. The wound was then closed in a layered fashion.
Surgeon/Pathologist Verbiage (Will Incorporate Name Of Surgeon From Intro If Not Blank): operated in two distinct and integrated capacities as the surgeon and pathologist.
Melolabial Transposition Flap Text: The defect edges were debeveled with a #15 scalpel blade.  Given the location of the defect and the proximity to free margins a melolabial flap was deemed most appropriate.  Using a sterile surgical marker, an appropriate melolabial transposition flap was drawn incorporating the defect.    The area thus outlined was incised deep to adipose tissue with a #15 scalpel blade.  The skin margins were undermined to an appropriate distance in all directions utilizing iris scissors.
Z Plasty Text: The lesion was extirpated to the level of the fat with a #15 scalpel blade.  Given the location of the defect, shape of the defect and the proximity to free margins a Z-plasty was deemed most appropriate for repair.  Using a sterile surgical marker, the appropriate transposition arms of the Z-plasty were drawn incorporating the defect and placing the expected incisions within the relaxed skin tension lines where possible.    The area thus outlined was incised deep to adipose tissue with a #15 scalpel blade.  The skin margins were undermined to an appropriate distance in all directions utilizing iris scissors.  The opposing transposition arms were then transposed into place in opposite direction and anchored with interrupted buried subcutaneous sutures.
Suturegard Body: The suture ends were repeatedly re-tightened and re-clamped to achieve the desired tissue expansion.
Helical Rim Advancement Flap Text: The defect edges were debeveled with a #15 blade scalpel.  Given the location of the defect and the proximity to free margins (helical rim) a double helical rim advancement flap was deemed most appropriate.  Using a sterile surgical marker, the appropriate advancement flaps were drawn incorporating the defect and placing the expected incisions between the helical rim and antihelix where possible.  The area thus outlined was incised through and through with a #15 scalpel blade.  With a skin hook and iris scissors, the flaps were gently and sharply undermined and freed up.
Bcc Histology Text: There were numerous aggregates of basaloid cells.
Mustarde Flap Text: The defect edges were debeveled with a #15 scalpel blade.  Given the size, depth and location of the defect and the proximity to free margins a Mustarde flap was deemed most appropriate.  Using a sterile surgical marker, an appropriate flap was drawn incorporating the defect. The area thus outlined was incised with a #15 scalpel blade.  The skin margins were undermined to an appropriate distance in all directions utilizing iris scissors.
Where Do You Want The Question To Include Opioid Counseling Located?: Case Summary Tab
Undermining Location (Optional): in the superficial subcutaneous fat
Consent (Marginal Mandibular)/Introductory Paragraph: The rationale for Mohs was explained to the patient and consent was obtained. The risks, benefits and alternatives to therapy were discussed in detail. Specifically, the risks of damage to the marginal mandibular branch of the facial nerve, infection, scarring, bleeding, prolonged wound healing, incomplete removal, allergy to anesthesia, and recurrence were addressed. Prior to the procedure, the treatment site was clearly identified and confirmed by the patient. All components of Universal Protocol/PAUSE Rule completed.
Tarsorrhaphy Text: A tarsorrhaphy was performed using Frost sutures.
Consent (Nose)/Introductory Paragraph: The rationale for Mohs was explained to the patient and consent was obtained. The risks, benefits and alternatives to therapy were discussed in detail. Specifically, the risks of nasal deformity, changes in the flow of air through the nose, infection, scarring, bleeding, prolonged wound healing, incomplete removal, allergy to anesthesia, nerve injury and recurrence were addressed. Prior to the procedure, the treatment site was clearly identified and confirmed by the patient. All components of Universal Protocol/PAUSE Rule completed.
Full Thickness Lip Wedge Repair (Flap) Text: Given the location of the defect and the proximity to free margins a full thickness wedge repair was deemed most appropriate.  Using a sterile surgical marker, the appropriate repair was drawn incorporating the defect and placing the expected incisions perpendicular to the vermilion border.  The vermilion border was also meticulously outlined to ensure appropriate reapproximation during the repair.  The area thus outlined was incised through and through with a #15 scalpel blade.  The muscularis and dermis were reaproximated with deep sutures following hemostasis. Care was taken to realign the vermilion border before proceeding with the superficial closure.  Once the vermilion was realigned the superfical and mucosal closure was finished.
Alternatives Discussed Intro (Do Not Add Period): I discussed alternative treatments to Mohs surgery and specifically discussed the risks and benefits of
Complex Repair And Graft Additional Text (Will Appearing After The Standard Complex Repair Text): The complex repair was not sufficient to completely close the primary defect. The remaining additional defect was repaired with the graft mentioned below.
Detail Level: Detailed
Purse String (Simple) Text: Given the location of the defect and the characteristics of the surrounding skin a purse string closure was deemed most appropriate.  Undermining was performed circumfirentially around the surgical defect.  A purse string suture was then placed and tightened.
H Plasty Text: Given the location of the defect, shape of the defect and the proximity to free margins a H-plasty was deemed most appropriate for repair.  Using a sterile surgical marker, the appropriate advancement arms of the H-plasty were drawn incorporating the defect and placing the expected incisions within the relaxed skin tension lines where possible. The area thus outlined was incised deep to adipose tissue with a #15 scalpel blade. The skin margins were undermined to an appropriate distance in all directions utilizing iris scissors.  The opposing advancement arms were then advanced into place in opposite direction and anchored with interrupted buried subcutaneous sutures.
Peng Advancement Flap Text: The defect edges were debeveled with a #15 scalpel blade.  Given the location of the defect, shape of the defect and the proximity to free margins a Peng advancement flap was deemed most appropriate.  Using a sterile surgical marker, an appropriate advancement flap was drawn incorporating the defect and placing the expected incisions within the relaxed skin tension lines where possible. The area thus outlined was incised deep to adipose tissue with a #15 scalpel blade.  The skin margins were undermined to an appropriate distance in all directions utilizing iris scissors.
Mohs Rapid Report Verbiage: The area of clinically evident tumor was marked with skin marking ink and appropriately hatched.  The initial incision was made following the Mohs approach through the skin.  The specimen was taken to the lab, divided into the necessary number of pieces, chromacoded and processed according to the Mohs protocol.  This was repeated in successive stages until a tumor free defect was achieved.
Graft Donor Site Bandage (Optional-Leave Blank If You Don't Want In Note): Aquaplast was fitted to the graft site and sewn into place. A pressure bandage were applied to the donor site and over the aquaplast bolster.
Ftsg Text: The defect edges were debeveled with a #15 scalpel blade.  Given the location of the defect, shape of the defect and the proximity to free margins a full thickness skin graft was deemed most appropriate.  Using a sterile surgical marker, the primary defect shape was transferred to the donor site. The area thus outlined was incised deep to adipose tissue with a #15 scalpel blade.  The harvested graft was then trimmed of adipose tissue until only dermis and epidermis was left.  The skin margins of the secondary defect were undermined to an appropriate distance in all directions utilizing iris scissors.  The secondary defect was closed with interrupted buried subcutaneous sutures.  The skin edges were then re-apposed with running  sutures.  The skin graft was then placed in the primary defect and oriented appropriately.
Muscle Hinge Flap Text: The defect edges were debeveled with a #15 scalpel blade.  Given the size, depth and location of the defect and the proximity to free margins a muscle hinge flap was deemed most appropriate.  Using a sterile surgical marker, an appropriate hinge flap was drawn incorporating the defect. The area thus outlined was incised with a #15 scalpel blade.  The skin margins were undermined to an appropriate distance in all directions utilizing iris scissors.
Mauc Instructions: By selecting yes to the question below the MAUC number will be added into the note.  This will be calculated automatically based on the diagnosis chosen, the size entered, the body zone selected (H,M,L) and the specific indications you chose. You will also have the option to override the Mohs AUC if you disagree with the automatically calculated number and this option is found in the Case Summary tab.
Area L Indication Text: Tumors in this location are included in Area L (trunk and extremities).  Mohs surgery is indicated for larger tumors, or tumors with aggressive histologic features, in these anatomic locations.
Advancement Flap (Single) Text: The defect edges were debeveled with a #15 scalpel blade.  Given the location of the defect and the proximity to free margins a single advancement flap was deemed most appropriate.  Using a sterile surgical marker, an appropriate advancement flap was drawn incorporating the defect and placing the expected incisions within the relaxed skin tension lines where possible.    The area thus outlined was incised deep to adipose tissue with a #15 scalpel blade.  The skin margins were undermined to an appropriate distance in all directions utilizing iris scissors.
Bilateral Helical Rim Advancement Flap Text: The defect edges were debeveled with a #15 blade scalpel.  Given the location of the defect and the proximity to free margins (helical rim) a bilateral helical rim advancement flap was deemed most appropriate.  Using a sterile surgical marker, the appropriate advancement flaps were drawn incorporating the defect and placing the expected incisions between the helical rim and antihelix where possible.  The area thus outlined was incised through and through with a #15 scalpel blade.  With a skin hook and iris scissors, the flaps were gently and sharply undermined and freed up.
Location Indication Override (Is Already Calculated Based On Selected Body Location): Area M
Consent (Near Eyelid Margin)/Introductory Paragraph: The rationale for Mohs was explained to the patient and consent was obtained. The risks, benefits and alternatives to therapy were discussed in detail. Specifically, the risks of ectropion or eyelid deformity, infection, scarring, bleeding, prolonged wound healing, incomplete removal, allergy to anesthesia, nerve injury and recurrence were addressed. Prior to the procedure, the treatment site was clearly identified and confirmed by the patient. All components of Universal Protocol/PAUSE Rule completed.
Area M Indication Text: Tumors in this location are included in Area M (cheek, forehead, scalp, neck, jawline and pretibial skin).  Mohs surgery is indicated for tumors in these anatomic locations.
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
Orbicularis Oris Muscle Flap Text: The defect edges were debeveled with a #15 scalpel blade.  Given that the defect affected the competency of the oral sphincter an orbicularis oris muscle flap was deemed most appropriate to restore this competency and normal muscle function.  Using a sterile surgical marker, an appropriate flap was drawn incorporating the defect. The area thus outlined was incised with a #15 scalpel blade.
Nostril Rim Text: The closure involved the nostril rim.
Transposition Flap Text: The defect edges were debeveled with a #15 scalpel blade.  Given the location of the defect and the proximity to free margins a transposition flap was deemed most appropriate.  Using a sterile surgical marker, an appropriate transposition flap was drawn incorporating the defect.    The area thus outlined was incised deep to adipose tissue with a #15 scalpel blade.  The skin margins were undermined to an appropriate distance in all directions utilizing iris scissors.
Initial Size Of Lesion: 1.9
Staging Info: By selecting yes to the question above you will include information on AJCC 8 tumor staging in your Mohs note. Information on tumor staging will be automatically added for SCCs on the head and neck. AJCC 8 includes tumor size, tumor depth, perineural involvement and bone invasion.
Island Pedicle Flap Text: The defect edges were debeveled with a #15 scalpel blade.  Given the location of the defect, shape of the defect and the proximity to free margins an island pedicle advancement flap was deemed most appropriate.  Using a sterile surgical marker, an appropriate advancement flap was drawn incorporating the defect, outlining the appropriate donor tissue and placing the expected incisions within the relaxed skin tension lines where possible.    The area thus outlined was incised deep to adipose tissue with a #15 scalpel blade.  The skin margins were undermined to an appropriate distance in all directions around the primary defect and laterally outward around the island pedicle utilizing iris scissors.  There was minimal undermining beneath the pedicle flap.
Consent (Scalp)/Introductory Paragraph: The rationale for Mohs was explained to the patient and consent was obtained. The risks, benefits and alternatives to therapy were discussed in detail. Specifically, the risks of changes in hair growth pattern secondary to repair, infection, scarring, bleeding, prolonged wound healing, incomplete removal, allergy to anesthesia, nerve injury and recurrence were addressed. Prior to the procedure, the treatment site was clearly identified and confirmed by the patient. All components of Universal Protocol/PAUSE Rule completed.
Repair Hemostasis (Optional): Pinpoint electrocautery
Suturegard Intro: Intraoperative tissue expansion was performed, utilizing the SUTUREGARD device, in order to reduce wound tension.
Tumor Debulked?: curette
V-Y Flap Text: The defect edges were debeveled with a #15 scalpel blade.  Given the location of the defect, shape of the defect and the proximity to free margins a V-Y flap was deemed most appropriate.  Using a sterile surgical marker, an appropriate advancement flap was drawn incorporating the defect and placing the expected incisions within the relaxed skin tension lines where possible.    The area thus outlined was incised deep to adipose tissue with a #15 scalpel blade.  The skin margins were undermined to an appropriate distance in all directions utilizing iris scissors.
Mohs Histo Method Verbiage: Each section was then chromacoded and processed in the Mohs lab using the Mohs protocol and submitted for frozen section.
Rotation Flap Text: The defect edges were debeveled with a #15 scalpel blade.  Given the location of the defect, shape of the defect and the proximity to free margins a rotation flap was deemed most appropriate.  Using a sterile surgical marker, an appropriate rotation flap was drawn incorporating the defect and placing the expected incisions within the relaxed skin tension lines where possible.    The area thus outlined was incised deep to adipose tissue with a #15 scalpel blade.  The skin margins were undermined to an appropriate distance in all directions utilizing iris scissors.
Dressing: pressure dressing with telfa
Repair Anesthesia Method: local infiltration
Debridement Text: The wound edges were debrided prior to proceeding with the closure to facilitate wound healing.
Localized Dermabrasion With Wire Brush Text: The patient was draped in routine manner.  Localized dermabrasion using 3 x 17 mm wire brush was performed in routine manner to papillary dermis. This spot dermabrasion is being performed to complete skin cancer reconstruction. It also will eliminate the other sun damaged precancerous cells that are known to be part of the regional effect of a lifetime's worth of sun exposure. This localized dermabrasion is therapeutic and should not be considered cosmetic in any regard.
Postop Diagnosis: same
Hemigard Intro: Due to skin fragility and wound tension, it was decided to use HEMIGARD adhesive retention suture devices to permit a linear closure. The skin was cleaned and dried for a 6cm distance away from the wound. Excessive hair, if present, was removed to allow for adhesion.
Bilateral Rotation Flap Text: The defect edges were debeveled with a #15 scalpel blade. Given the location of the defect, shape of the defect and the proximity to free margins a bilateral rotation flap was deemed most appropriate. Using a sterile surgical marker, an appropriate rotation flap was drawn incorporating the defect and placing the expected incisions within the relaxed skin tension lines where possible. The area thus outlined was incised deep to adipose tissue with a #15 scalpel blade. The skin margins were undermined to an appropriate distance in all directions utilizing iris scissors. Following this, the designed flap was carried over into the primary defect and sutured into place.
Double Z Plasty Text: The lesion was extirpated to the level of the fat with a #15 scalpel blade. Given the location of the defect, shape of the defect and the proximity to free margins a double Z-plasty was deemed most appropriate for repair. Using a sterile surgical marker, the appropriate transposition arms of the double Z-plasty were drawn incorporating the defect and placing the expected incisions within the relaxed skin tension lines where possible. The area thus outlined was incised deep to adipose tissue with a #15 scalpel blade. The skin margins were undermined to an appropriate distance in all directions utilizing iris scissors. The opposing transposition arms were then transposed and carried over into place in opposite direction and anchored with interrupted buried subcutaneous sutures.
Pinch Graft Text: The defect edges were debeveled with a #15 scalpel blade. Given the location of the defect, shape of the defect and the proximity to free margins a pinch graft was deemed most appropriate. Using a sterile surgical marker, the primary defect shape was transferred to the donor site. The area thus outlined was incised deep to adipose tissue with a #15 scalpel blade.  The harvested graft was then trimmed of adipose tissue until only dermis and epidermis was left. The skin margins of the secondary defect were undermined to an appropriate distance in all directions utilizing iris scissors.  The secondary defect was closed with interrupted buried subcutaneous sutures.  The skin edges were then re-apposed with running  sutures.  The skin graft was then placed in the primary defect and oriented appropriately.

## 2023-07-21 NOTE — PROGRESS NOTES
CHW Sarah reached out to pt to offer GCM services. CHW unable to reach pt at this time. CHW LVM with contact information. CHW will attempt to contact pt next week.   Breath sounds clear and equal bilaterally. no resp distress. speaks in full word sentences

## 2023-07-27 NOTE — PROGRESS NOTES
CHW Sarah reached out to pt to offer GCM services. CHW unable to reach pt at this time. CHW LVM with contact information and encouraged pt to reach out with any needs. CHW will not follow at this time due to 3x attempts.

## 2023-07-28 ENCOUNTER — APPOINTMENT (RX ONLY)
Dept: URBAN - METROPOLITAN AREA CLINIC 36 | Facility: CLINIC | Age: 83
Setting detail: DERMATOLOGY
End: 2023-07-28

## 2023-07-28 DIAGNOSIS — Z48.02 ENCOUNTER FOR REMOVAL OF SUTURES: ICD-10-CM

## 2023-07-28 PROCEDURE — ? SUTURE REMOVAL (GLOBAL PERIOD)

## 2023-07-28 ASSESSMENT — LOCATION SIMPLE DESCRIPTION DERM: LOCATION SIMPLE: LEFT CHEEK

## 2023-07-28 ASSESSMENT — LOCATION ZONE DERM: LOCATION ZONE: FACE

## 2023-07-28 ASSESSMENT — LOCATION DETAILED DESCRIPTION DERM: LOCATION DETAILED: LEFT SUPERIOR CENTRAL MALAR CHEEK

## 2023-07-28 NOTE — PROCEDURE: SUTURE REMOVAL (GLOBAL PERIOD)
Detail Level: Detailed
Add 86158 Cpt? (Important Note: In 2017 The Use Of 83089 Is Being Tracked By Cms To Determine Future Global Period Reimbursement For Global Periods): no

## 2023-08-02 DIAGNOSIS — E78.5 DYSLIPIDEMIA: ICD-10-CM

## 2023-08-02 RX ORDER — FENOFIBRATE 160 MG/1
160 TABLET ORAL DAILY
Qty: 100 TABLET | Refills: 3 | Status: SHIPPED | OUTPATIENT
Start: 2023-08-02 | End: 2023-08-14 | Stop reason: SDUPTHER

## 2023-08-08 ENCOUNTER — TELEPHONE (OUTPATIENT)
Dept: MEDICAL GROUP | Facility: LAB | Age: 83
End: 2023-08-08
Payer: MEDICARE

## 2023-08-08 NOTE — TELEPHONE ENCOUNTER
ESTABLISHED PATIENT PRE-VISIT PLANNING     Patient was NOT contacted to complete PVP.     Note: Patient will not be contacted if there is no indication to call.     1.  Reviewed notes from the last few office visits within the medical group: Yes    2.  If any orders were placed at last visit or intended to be done for this visit (i.e. 6 mos follow-up), do we have Results/Consult Notes?           Labs - Labs ordered, completed on 7/10/23 and results are in chart.  Note: If patient appointment is for lab review and patient did not complete labs, check with provider if OK to reschedule patient until labs completed.         Imaging - Imaging was not ordered at last office visit.         Referrals - No referrals were ordered at last office visit.    3. Is this appointment scheduled as a Hospital Follow-Up? No    4.  Immunizations were updated in Epic using Reconcile Outside Information activity? Yes    5.  Patient is due for the following Health Maintenance Topics:   Health Maintenance Due   Topic Date Due    IMM DTaP/Tdap/Td Vaccine (1 - Tdap) 03/05/2010    IMM HEP B VACCINE (2 of 3 - 19+ 3-dose series) 02/13/2020    COVID-19 Vaccine (4 - Pfizer series) 12/12/2021    Annual Wellness Visit  12/15/2022    RETINAL SCREENING  07/07/2023     6.  AHA (Pulse8) form printed for Provider? N/A

## 2023-08-09 ENCOUNTER — HOSPITAL ENCOUNTER (OUTPATIENT)
Dept: LAB | Facility: MEDICAL CENTER | Age: 83
End: 2023-08-09
Attending: INTERNAL MEDICINE
Payer: MEDICARE

## 2023-08-09 DIAGNOSIS — D64.9 ANEMIA, UNSPECIFIED TYPE: ICD-10-CM

## 2023-08-09 DIAGNOSIS — I10 ESSENTIAL HYPERTENSION, BENIGN: ICD-10-CM

## 2023-08-09 DIAGNOSIS — N18.30 STAGE 3 CHRONIC KIDNEY DISEASE, UNSPECIFIED WHETHER STAGE 3A OR 3B CKD: ICD-10-CM

## 2023-08-09 DIAGNOSIS — I10 ESSENTIAL HYPERTENSION: ICD-10-CM

## 2023-08-09 LAB
ANION GAP SERPL CALC-SCNC: 9 MMOL/L (ref 7–16)
BASOPHILS # BLD AUTO: 0.7 % (ref 0–1.8)
BASOPHILS # BLD AUTO: NORMAL % (ref 0–1.8)
BASOPHILS # BLD: 0.03 K/UL (ref 0–0.12)
BASOPHILS # BLD: NORMAL K/UL (ref 0–0.12)
BUN SERPL-MCNC: 33 MG/DL (ref 8–22)
CALCIUM SERPL-MCNC: 9.6 MG/DL (ref 8.4–10.2)
CHLORIDE SERPL-SCNC: 105 MMOL/L (ref 96–112)
CO2 SERPL-SCNC: 27 MMOL/L (ref 20–33)
CREAT SERPL-MCNC: 1.97 MG/DL (ref 0.5–1.4)
CREAT UR-MCNC: 131.2 MG/DL
EOSINOPHIL # BLD AUTO: 0.22 K/UL (ref 0–0.51)
EOSINOPHIL # BLD AUTO: NORMAL K/UL (ref 0–0.51)
EOSINOPHIL NFR BLD: 4.8 % (ref 0–6.9)
EOSINOPHIL NFR BLD: NORMAL % (ref 0–6.9)
ERYTHROCYTE [DISTWIDTH] IN BLOOD BY AUTOMATED COUNT: 46.3 FL (ref 35.9–50)
ERYTHROCYTE [DISTWIDTH] IN BLOOD BY AUTOMATED COUNT: NORMAL FL (ref 35.9–50)
GFR SERPLBLD CREATININE-BSD FMLA CKD-EPI: 33 ML/MIN/1.73 M 2
GLUCOSE SERPL-MCNC: 181 MG/DL (ref 65–99)
HCT VFR BLD AUTO: 44.8 % (ref 42–52)
HCT VFR BLD AUTO: NORMAL % (ref 42–52)
HGB BLD-MCNC: 14.4 G/DL (ref 14–18)
HGB BLD-MCNC: NORMAL G/DL (ref 14–18)
IMM GRANULOCYTES # BLD AUTO: 0.02 K/UL (ref 0–0.11)
IMM GRANULOCYTES # BLD AUTO: NORMAL K/UL (ref 0–0.11)
IMM GRANULOCYTES NFR BLD AUTO: 0.4 % (ref 0–0.9)
IMM GRANULOCYTES NFR BLD AUTO: NORMAL % (ref 0–0.9)
LYMPHOCYTES # BLD AUTO: 1.72 K/UL (ref 1–4.8)
LYMPHOCYTES # BLD AUTO: NORMAL K/UL (ref 1–4.8)
LYMPHOCYTES NFR BLD: 37.3 % (ref 22–41)
LYMPHOCYTES NFR BLD: NORMAL % (ref 22–41)
MCH RBC QN AUTO: 31.4 PG (ref 27–33)
MCH RBC QN AUTO: NORMAL PG (ref 27–33)
MCHC RBC AUTO-ENTMCNC: 32.1 G/DL (ref 32.3–36.5)
MCHC RBC AUTO-ENTMCNC: NORMAL G/DL (ref 32.3–36.5)
MCV RBC AUTO: 97.8 FL (ref 81.4–97.8)
MCV RBC AUTO: NORMAL FL (ref 81.4–97.8)
MICROALBUMIN UR-MCNC: 6.8 MG/DL
MICROALBUMIN/CREAT UR: 52 MG/G (ref 0–30)
MONOCYTES # BLD AUTO: 0.34 K/UL (ref 0–0.85)
MONOCYTES # BLD AUTO: NORMAL K/UL (ref 0–0.85)
MONOCYTES NFR BLD AUTO: 7.4 % (ref 0–13.4)
MONOCYTES NFR BLD AUTO: NORMAL % (ref 0–13.4)
NEUTROPHILS # BLD AUTO: 2.28 K/UL (ref 1.82–7.42)
NEUTROPHILS # BLD AUTO: NORMAL K/UL (ref 1.82–7.42)
NEUTROPHILS NFR BLD: 49.4 % (ref 44–72)
NEUTROPHILS NFR BLD: NORMAL % (ref 44–72)
NRBC # BLD AUTO: 0 K/UL
NRBC # BLD AUTO: NORMAL K/UL
NRBC BLD-RTO: 0 /100 WBC (ref 0–0.2)
NRBC BLD-RTO: NORMAL /100 WBC (ref 0–0.2)
PLATELET # BLD AUTO: 115 K/UL (ref 164–446)
PLATELET # BLD AUTO: NORMAL K/UL (ref 164–446)
PMV BLD AUTO: 10.4 FL (ref 9–12.9)
PMV BLD AUTO: NORMAL FL (ref 9–12.9)
POTASSIUM SERPL-SCNC: 5.1 MMOL/L (ref 3.6–5.5)
RBC # BLD AUTO: 4.58 M/UL (ref 4.7–6.1)
RBC # BLD AUTO: NORMAL M/UL (ref 4.7–6.1)
SODIUM SERPL-SCNC: 141 MMOL/L (ref 135–145)
WBC # BLD AUTO: 4.6 K/UL (ref 4.8–10.8)
WBC # BLD AUTO: NORMAL K/UL (ref 4.8–10.8)

## 2023-08-09 PROCEDURE — 80048 BASIC METABOLIC PNL TOTAL CA: CPT

## 2023-08-09 PROCEDURE — 82043 UR ALBUMIN QUANTITATIVE: CPT

## 2023-08-09 PROCEDURE — 36415 COLL VENOUS BLD VENIPUNCTURE: CPT

## 2023-08-09 PROCEDURE — 82570 ASSAY OF URINE CREATININE: CPT

## 2023-08-09 PROCEDURE — 85025 COMPLETE CBC W/AUTO DIFF WBC: CPT | Mod: 91

## 2023-08-09 PROCEDURE — 85025 COMPLETE CBC W/AUTO DIFF WBC: CPT

## 2023-08-14 DIAGNOSIS — D68.9 COAGULATION DEFECT (HCC): ICD-10-CM

## 2023-08-14 DIAGNOSIS — Z95.1 S/P CABG X 4: ICD-10-CM

## 2023-08-14 DIAGNOSIS — E78.5 DYSLIPIDEMIA: ICD-10-CM

## 2023-08-15 DIAGNOSIS — D68.9 COAGULATION DEFECT (HCC): ICD-10-CM

## 2023-08-15 DIAGNOSIS — Z95.1 S/P CABG X 4: ICD-10-CM

## 2023-08-15 RX ORDER — FENOFIBRATE 160 MG/1
160 TABLET ORAL DAILY
Qty: 100 TABLET | Refills: 3 | Status: SHIPPED | OUTPATIENT
Start: 2023-08-15

## 2023-08-15 RX ORDER — WARFARIN SODIUM 5 MG/1
5 TABLET ORAL EVERY EVENING
Qty: 100 TABLET | Refills: 3 | Status: CANCELLED | OUTPATIENT
Start: 2023-08-15

## 2023-08-15 RX ORDER — WARFARIN SODIUM 5 MG/1
5 TABLET ORAL EVERY EVENING
Qty: 100 TABLET | Refills: 3 | Status: SHIPPED | OUTPATIENT
Start: 2023-08-15

## 2023-08-21 ENCOUNTER — TELEPHONE (OUTPATIENT)
Dept: HEALTH INFORMATION MANAGEMENT | Facility: OTHER | Age: 83
End: 2023-08-21
Payer: MEDICARE

## 2023-08-22 ENCOUNTER — OFFICE VISIT (OUTPATIENT)
Dept: NEPHROLOGY | Facility: MEDICAL CENTER | Age: 83
End: 2023-08-22
Payer: MEDICARE

## 2023-08-22 VITALS
HEART RATE: 65 BPM | RESPIRATION RATE: 18 BRPM | BODY MASS INDEX: 25.91 KG/M2 | HEIGHT: 68 IN | DIASTOLIC BLOOD PRESSURE: 78 MMHG | TEMPERATURE: 97.7 F | SYSTOLIC BLOOD PRESSURE: 112 MMHG | OXYGEN SATURATION: 96 % | WEIGHT: 171 LBS

## 2023-08-22 DIAGNOSIS — E11.22 TYPE 2 DIABETES MELLITUS WITH STAGE 3B CHRONIC KIDNEY DISEASE, UNSPECIFIED WHETHER LONG TERM INSULIN USE (HCC): ICD-10-CM

## 2023-08-22 DIAGNOSIS — N18.32 TYPE 2 DIABETES MELLITUS WITH STAGE 3B CHRONIC KIDNEY DISEASE, UNSPECIFIED WHETHER LONG TERM INSULIN USE (HCC): ICD-10-CM

## 2023-08-22 DIAGNOSIS — N18.32 STAGE 3B CHRONIC KIDNEY DISEASE: ICD-10-CM

## 2023-08-22 DIAGNOSIS — I10 ESSENTIAL HYPERTENSION, BENIGN: ICD-10-CM

## 2023-08-22 PROCEDURE — 3078F DIAST BP <80 MM HG: CPT | Performed by: INTERNAL MEDICINE

## 2023-08-22 PROCEDURE — 3074F SYST BP LT 130 MM HG: CPT | Performed by: INTERNAL MEDICINE

## 2023-08-22 PROCEDURE — 99214 OFFICE O/P EST MOD 30 MIN: CPT | Performed by: INTERNAL MEDICINE

## 2023-08-22 ASSESSMENT — ENCOUNTER SYMPTOMS
COUGH: 0
NAUSEA: 0
HYPERTENSION: 1
CHILLS: 0
DIZZINESS: 1
VOMITING: 0
SHORTNESS OF BREATH: 0
FEVER: 0

## 2023-08-22 ASSESSMENT — FIBROSIS 4 INDEX: FIB4 SCORE: 4.52

## 2023-08-22 NOTE — PROGRESS NOTES
"Caron Ramos is a 82 y.o. male who presents with Hypertension and Chronic Kidney Disease            Patient describing occasional lightheadedness, orthostatic hypotension symptoms.  He also describes an incident where he fell to the ground while working in the garden.    Hypertension  This is a chronic problem. The current episode started more than 1 year ago. The problem is unchanged. Pertinent negatives include no chest pain, malaise/fatigue, peripheral edema or shortness of breath. Risk factors for coronary artery disease include diabetes mellitus and male gender. Past treatments include angiotensin blockers and beta blockers. The current treatment provides significant improvement. There are no compliance problems.  Hypertensive end-organ damage includes kidney disease. Identifiable causes of hypertension include chronic renal disease.   Chronic Kidney Disease  This is a chronic problem. The current episode started more than 1 year ago. The problem occurs constantly. The problem has been unchanged. Pertinent negatives include no chest pain, chills, coughing, fever, nausea, urinary symptoms or vomiting.       Review of Systems   Constitutional:  Negative for chills, fever and malaise/fatigue.   Respiratory:  Negative for cough and shortness of breath.    Cardiovascular:  Negative for chest pain and leg swelling.   Gastrointestinal:  Negative for nausea and vomiting.   Genitourinary:  Negative for dysuria, frequency and urgency.   Neurological:  Positive for dizziness.              Objective     /78 (BP Location: Right arm, Patient Position: Sitting, BP Cuff Size: Adult)   Pulse 65   Temp 36.5 °C (97.7 °F) (Temporal)   Resp 18   Ht 1.727 m (5' 8\")   Wt 77.6 kg (171 lb)   SpO2 96%   BMI 26.00 kg/m²      Physical Exam  Vitals and nursing note reviewed.   Constitutional:       General: He is not in acute distress.     Appearance: He is not ill-appearing.   HENT:      Head: Normocephalic and " atraumatic.      Right Ear: External ear normal.      Left Ear: External ear normal.      Nose: Nose normal.   Eyes:      General:         Right eye: No discharge.         Left eye: No discharge.      Conjunctiva/sclera: Conjunctivae normal.   Cardiovascular:      Rate and Rhythm: Normal rate and regular rhythm.      Heart sounds: No murmur heard.  Pulmonary:      Effort: Pulmonary effort is normal. No respiratory distress.      Breath sounds: Normal breath sounds. No wheezing.   Musculoskeletal:         General: No tenderness or deformity.      Right lower leg: No edema.      Left lower leg: No edema.   Skin:     General: Skin is warm.   Neurological:      General: No focal deficit present.      Mental Status: He is alert and oriented to person, place, and time.   Psychiatric:         Mood and Affect: Mood normal.         Behavior: Behavior normal.       Past Medical History:   Diagnosis Date    Anesthesia     difficult intubation with surgery 2008,2010    Basal cell carcinoma of skin     CAD (coronary artery disease)     Cancer (HCC)     rt  kidney 1989;  thyroid cancer 2012, prostate 2008, skin    Chronic kidney disease (CKD) stage G3b/A2, moderately decreased glomerular filtration rate (GFR) between 30-44 mL/min/1.73 square meter and albuminuria creatinine ratio between  mg/g ([facility]) 4/23/2015    DVT (deep venous thrombosis) ([facility]) 2014    ED (erectile dysfunction)     GERD (gastroesophageal reflux disease)     Glaucoma     Heart burn     Hyperlipidemia 12/3/2012    Hypertension     Indigestion     Multiple pulmonary emboli ([facility]) 2014    Multiple thyroid nodules 2009    Papillary carcinoma of thyroid (HCC) 2014    R 2 foci / 4.5mm,0.25mm / no mets/ pT1pN0    postsurgical hypothyroidism 2014    Pre-diabetes     Renal disorder     rt kidney cancer,nephrectomy    S/P CABG x 4 2003    S/P colectomy 2010    multiple colon polyps / no Ca    S/p nephrectomy 1998    carcinoma    S/P prostatectomy 2008    carcinoma     Stroke (HCC)     'mild',no residual,'one doctor said it was a tia'    Transient renal failure 2014    renal vein thrombosis    Type II or unspecified type diabetes mellitus without mention of complication, not stated as uncontrolled     on no medications    Unspecified urinary incontinence      Social History     Socioeconomic History    Marital status:      Spouse name: Not on file    Number of children: Not on file    Years of education: Not on file    Highest education level: Not on file   Occupational History    Not on file   Tobacco Use    Smoking status: Never    Smokeless tobacco: Never   Vaping Use    Vaping Use: Never used   Substance and Sexual Activity    Alcohol use: Yes     Comment: Occasionally    Drug use: No    Sexual activity: Not Currently     Comment: retired    Other Topics Concern    Not on file   Social History Narrative    Not on file     Social Determinants of Health     Financial Resource Strain: Not on file   Food Insecurity: Not on file   Transportation Needs: Not on file   Physical Activity: Not on file   Stress: Not on file   Social Connections: Not on file   Intimate Partner Violence: Not on file   Housing Stability: Not on file     Family History   Problem Relation Age of Onset    Diabetes Mother     Heart Disease Mother     Diabetes Father     Cancer Father         pancreas    Thyroid Sister         Cancer    Cancer Sister         thyroid    Diabetes Sister     Cancer Brother 49        colon    Diabetes Brother     Diabetes Maternal Grandfather     Diabetes Paternal Grandmother     Diabetes Paternal Grandfather     No Known Problems Maternal Grandmother      Recent Labs     01/04/23  0653 04/27/23  1740 08/09/23  0716   ALBUMIN 4.3 4.2  --    HDL 31*  --   --    TRIGLYCERIDE 256*  --   --    SODIUM 139 140 141   POTASSIUM 5.1 5.0 5.1   CHLORIDE 106 107 105   CO2 25 25 27   BUN 26* 40* 33*   CREATININE 2.08* 2.17* 1.97*                             Assessment &  Plan        1. Essential hypertension, benign  Blood pressure on the low side  I advised the patient to continue low-sodium diet  Discontinue losartan  Patient check blood pressure at home regularly and call us with the results    2. Stage 3b chronic kidney disease (HCC)  Stable  No uremic symptoms  Renal dose of medication  Avoid nephrotoxins  Continue same medication regimen  Patient was advised to call us if symptoms worsen    3 Diabetes mellitus  Uncontrolled  Consider SGLT2 inhibitor

## 2023-08-23 ENCOUNTER — OFFICE VISIT (OUTPATIENT)
Dept: CARDIOLOGY | Facility: MEDICAL CENTER | Age: 83
End: 2023-08-23
Attending: NURSE PRACTITIONER
Payer: MEDICARE

## 2023-08-23 VITALS
OXYGEN SATURATION: 94 % | WEIGHT: 162 LBS | RESPIRATION RATE: 14 BRPM | SYSTOLIC BLOOD PRESSURE: 94 MMHG | BODY MASS INDEX: 24.55 KG/M2 | HEART RATE: 74 BPM | HEIGHT: 68 IN | DIASTOLIC BLOOD PRESSURE: 56 MMHG

## 2023-08-23 DIAGNOSIS — R55 SYNCOPE AND COLLAPSE: ICD-10-CM

## 2023-08-23 DIAGNOSIS — I25.10 CAD IN NATIVE ARTERY: ICD-10-CM

## 2023-08-23 DIAGNOSIS — I10 ESSENTIAL HYPERTENSION, BENIGN: ICD-10-CM

## 2023-08-23 DIAGNOSIS — Z95.1 HX OF CABG: ICD-10-CM

## 2023-08-23 LAB — EKG IMPRESSION: NORMAL

## 2023-08-23 PROCEDURE — 93005 ELECTROCARDIOGRAM TRACING: CPT | Performed by: NURSE PRACTITIONER

## 2023-08-23 PROCEDURE — 3078F DIAST BP <80 MM HG: CPT | Performed by: NURSE PRACTITIONER

## 2023-08-23 PROCEDURE — 99214 OFFICE O/P EST MOD 30 MIN: CPT | Mod: 25 | Performed by: NURSE PRACTITIONER

## 2023-08-23 PROCEDURE — 93010 ELECTROCARDIOGRAM REPORT: CPT | Performed by: INTERNAL MEDICINE

## 2023-08-23 PROCEDURE — 99213 OFFICE O/P EST LOW 20 MIN: CPT | Performed by: NURSE PRACTITIONER

## 2023-08-23 PROCEDURE — 3074F SYST BP LT 130 MM HG: CPT | Performed by: NURSE PRACTITIONER

## 2023-08-23 ASSESSMENT — ENCOUNTER SYMPTOMS
DIZZINESS: 0
SPEECH CHANGE: 0
PND: 0
TREMORS: 0
PALPITATIONS: 0
SENSORY CHANGE: 0
SPUTUM PRODUCTION: 0
FEVER: 0
SHORTNESS OF BREATH: 0
CHILLS: 0
WEIGHT LOSS: 0
WHEEZING: 0
ORTHOPNEA: 0
HEADACHES: 0
COUGH: 0
FOCAL WEAKNESS: 0
VOMITING: 0
HEMOPTYSIS: 0
HEARTBURN: 0
NAUSEA: 0
TINGLING: 0

## 2023-08-23 ASSESSMENT — FIBROSIS 4 INDEX: FIB4 SCORE: 4.52

## 2023-08-23 NOTE — PROGRESS NOTES
Chief Complaint   Patient presents with    Follow-Up     F/v Dx:CAD in native artery       Subjective     Kevin Paredes is a 82 y.o. male who presents today for follow-up.    He is a patient of general cardiology, Abhi Damon and June LANDA.  Last seen in office approximately 2 years ago, lost to follow-up after that.      Additional medical history significant for CAD, status post prior coronary artery bypass x4 in 2003, hypertension, dyslipidemia, pulmonary emboli with IVC filter in place, CKD, type 2 diabetes.    Today in follow-up he is accompanied by his wife.  Reports 2 syncopal events in the last year, last occurring in April.  Reports working out in his garden bed when he stood up.  States that he eventually started to feel dizzy and blacked out fell and hit his head.  He does not recall feeling palpitations prior to this.  No significant nausea vomiting.  No fusion or postictal state post.  He has been to the ER each time.  Suspected to likely be in secondary to orthostasis.    States that he is able to walk on his treadmill most days of the week without chest pain or angina.  Otherwise no shortness of breath.  No lower extremity edema.  Past Medical History:   Diagnosis Date    Anesthesia     difficult intubation with surgery 2008,2010    Basal cell carcinoma of skin     CAD (coronary artery disease)     Cancer (HCC)     rt  kidney 1989;  thyroid cancer 2012, prostate 2008, skin    Chronic kidney disease (CKD) stage G3b/A2, moderately decreased glomerular filtration rate (GFR) between 30-44 mL/min/1.73 square meter and albuminuria creatinine ratio between  mg/g ([facility]) 4/23/2015    DVT (deep venous thrombosis) ([facility]) 2014    ED (erectile dysfunction)     GERD (gastroesophageal reflux disease)     Glaucoma     Heart burn     Hyperlipidemia 12/3/2012    Hypertension     Indigestion     Multiple pulmonary emboli (HCC) 2014    Multiple thyroid nodules 2009    Papillary carcinoma of thyroid (HCC)  2014    R 2 foci / 4.5mm,0.25mm / no mets/ pT1pN0    postsurgical hypothyroidism 2014    Pre-diabetes     Renal disorder     rt kidney cancer,nephrectomy    S/P CABG x 4 2003    S/P colectomy 2010    multiple colon polyps / no Ca    S/p nephrectomy 1998    carcinoma    S/P prostatectomy 2008    carcinoma    Stroke (HCC)     'mild',no residual,'one doctor said it was a tia'    Transient renal failure 2014    renal vein thrombosis    Type II or unspecified type diabetes mellitus without mention of complication, not stated as uncontrolled     on no medications    Unspecified urinary incontinence      Past Surgical History:   Procedure Laterality Date    THYROIDECTOMY TOTAL  5/13/2014    Performed by Eliceo Bolanos M.D. at [facility]    NODE DISSECTION  5/13/2014    Performed by Eliceo Bolanos M.D. at [facility]    COLON RESECTION      MULTIPLE CORONARY ARTERY BYPASS      x 4 11/2003    NEPHRECTOMY RADICAL      right side 1998    OTHER ORTHOPEDIC SURGERY      trotator cuff    PROSTATECTOMY, RADICAL RETRO      cancer /january 2008     Family History   Problem Relation Age of Onset    Diabetes Mother     Heart Disease Mother     Diabetes Father     Cancer Father         pancreas    Thyroid Sister         Cancer    Cancer Sister         thyroid    Diabetes Sister     Cancer Brother 49        colon    Diabetes Brother     Diabetes Maternal Grandfather     Diabetes Paternal Grandmother     Diabetes Paternal Grandfather     No Known Problems Maternal Grandmother      Social History     Socioeconomic History    Marital status:      Spouse name: Not on file    Number of children: Not on file    Years of education: Not on file    Highest education level: Not on file   Occupational History    Not on file   Tobacco Use    Smoking status: Never    Smokeless tobacco: Never   Vaping Use    Vaping Use: Never used   Substance and Sexual Activity    Alcohol use: Yes     Comment:  Occasionally    Drug use: No    Sexual activity: Not Currently     Comment: retired    Other Topics Concern    Not on file   Social History Narrative    Not on file     Social Determinants of Health     Financial Resource Strain: Not on file   Food Insecurity: Not on file   Transportation Needs: Not on file   Physical Activity: Not on file   Stress: Not on file   Social Connections: Not on file   Intimate Partner Violence: Not on file   Housing Stability: Not on file     Allergies   Allergen Reactions    Lactose     Seasonal      Outpatient Encounter Medications as of 8/23/2023   Medication Sig Dispense Refill    fenofibrate (TRIGLIDE) 160 MG tablet Take 1 Tablet by mouth every day. 100 Tablet 3    warfarin (COUMADIN) 5 MG Tab Take 1 Tablet by mouth every evening. Take 0.5 to 1 tablet by mouth once daily. Take as directed by anticoagulation clinic. 100 Tablet 3    losartan (COZAAR) 25 MG Tab Take 1 Tablet by mouth every day for 360 days. 100 Tablet 2    glipiZIDE (GLUCOTROL) 10 MG Tab  (Patient not taking: Reported on 8/22/2023)      Dulaglutide 1.5 MG/0.5ML Solution Pen-injector Inject 0.5 mL under the skin every 7 days for 360 days. 12 Each 3    aspirin 81 MG EC tablet Take 81 mg by mouth every day.      atenolol (TENORMIN) 25 MG Tab Take 1 tablet by mouth once daily 100 Tablet 3    oxymetazoline (AFRIN 12 HOUR) 0.05 % Solution Administer 2 Sprays into affected nostril(S) 2 times a day. Discontinue after 2 days. 15 mL 0    Lidocaine 4 % Patch Apply patch to painful area. Patch may remain in place for up to 12 hours in any 24-hour period. No more than 1 patch can be used in a 24-hour period. 15 Patch 0    triamcinolone acetonide (KENALOG) 0.025 % Cream Apply to the affected area twice a day on legs. 15 g 1    atorvastatin (LIPITOR) 40 MG Tab Take 1 tablet by mouth once daily 100 Tablet 0    capsaicin (ZOSTRIX) 0.025 % cream APPLY TO ITCHY AREA ON NECK UP TO 5 TIMES A DAY FOR 1 WEEK, THEN 3 TIMES A  "DAY FOR 3 TO 6 WEEKS. WASH HANDS THOROUGHLY AFTER APPLYING. DO TEST SPOT 1ST      levothyroxine (EUTHYROX) 137 MCG Tab TAKE 1 TABLET BY MOUTH ONCE DAILY IN THE MORNING ON  AN  EMPTY  STOMACH  ONE  HOUR  BEFORE  EATING 100 Tablet 3    Multiple Vitamins-Minerals (ZINC PO) Take  by mouth.      hydrocortisone 2.5 % Cream topical cream APPLY TO RASH ON GROIN TWICE DAILY FOR 1 WEEK WITH FLARES      Calcium Carb-Cholecalciferol (CALCIUM 1000 + D PO) Take 1 Dose by mouth every day.      Omega-3 Krill Oil 300 MG Cap Take 300 mg by mouth every day.      Cinnamon 500 MG Cap Take 1,000 mg by mouth.      Cyanocobalamin (VITAMIN B-12) 1000 MCG Tab Take 1,000 mcg by mouth every day.      Coenzyme Q10 (CO Q-10 PO) Take 1 Dose by mouth every day. Unknown OTC Strength       No facility-administered encounter medications on file as of 8/23/2023.     Review of Systems   Constitutional:  Negative for chills, fever, malaise/fatigue and weight loss.   HENT:  Negative for congestion and tinnitus.    Respiratory:  Negative for cough, hemoptysis, sputum production, shortness of breath and wheezing.    Cardiovascular:  Negative for chest pain, palpitations, orthopnea, leg swelling and PND.   Gastrointestinal:  Negative for heartburn, nausea and vomiting.   Skin:  Negative for rash.   Neurological:  Negative for dizziness, tingling, tremors, sensory change, speech change, focal weakness and headaches.        Reports 2 syncopal events in the last year.  Last April 2023              Objective     BP 94/56 (BP Location: Left arm, Patient Position: Sitting, BP Cuff Size: Adult)   Pulse 74   Resp 14   Ht 1.727 m (5' 8\")   Wt 73.5 kg (162 lb)   SpO2 94%   BMI 24.63 kg/m²     Physical Exam  Vitals reviewed.   Constitutional:       Appearance: Normal appearance. He is well-developed.   HENT:      Head: Normocephalic and atraumatic.      Mouth/Throat:      Mouth: Mucous membranes are moist.      Pharynx: Oropharynx is clear.   Eyes:      " Extraocular Movements: Extraocular movements intact.      Conjunctiva/sclera: Conjunctivae normal.      Pupils: Pupils are equal, round, and reactive to light.   Neck:      Vascular: No JVD.   Cardiovascular:      Rate and Rhythm: Normal rate and regular rhythm.      Pulses: Normal pulses.      Heart sounds: Normal heart sounds. No murmur heard.     No friction rub. No gallop.   Pulmonary:      Effort: Pulmonary effort is normal.      Breath sounds: Normal breath sounds. No wheezing or rales.   Chest:      Chest wall: No tenderness.   Musculoskeletal:         General: Normal range of motion.      Cervical back: Normal range of motion and neck supple.      Right lower leg: No edema.      Left lower leg: No edema.   Skin:     General: Skin is warm and dry.      Capillary Refill: Capillary refill takes 2 to 3 seconds.   Neurological:      Mental Status: He is alert and oriented to person, place, and time.   Psychiatric:         Mood and Affect: Mood normal.         Behavior: Behavior normal.         Thought Content: Thought content normal.         Judgment: Judgment normal.                Assessment & Plan     1. [facility]  [facility]    [facility]-ECHOCARDIOGRAM COMPLETE W/O CONT    CANCELED: EC-ECHOCARDIOGRAM COMPLETE W/O CONT      2. CAD in native artery        3. Hx of CABG  EKG      4. Essential hypertension, benign            Medical Decision Making: Today's Assessment/Status/Plan:   1.  Syncope  - Reports 2 episodes of syncope in the last year.  Last involving fall outside with head strike.  -After extensive discussion regarding syncopal events today it does sound most likely that it is possibly related to episodes of hypotension.  However, given cardiac history I do think it is reasonable to evaluate for cardiac etiology as well.  I would like to recheck echocardiogram for structural/functional changes as last echo was approximately 5 years ago and evaluate a [employer] ambulatory monitor to evaluate  for potential transient episodes of bradycardia/heart block/arrhythmias that could be a contributing factor.  -We will have testing completed and he will be notified of results and any further recommendations.  -Losartan stopped by nephrology.  Agree and to remain off.  Discussed holding parameters for atenolol at this time, may come to discontinuation of atenolol as well.    2.  CAD  3.  History of 4V CABG  -No reported angina.  He is due for repeat lipid testing.  -Continue low-dose atenolol at this time.  Continue atorvastatin 40.  Continue aspirin 81 mg.    4.  Hypertension  - See discussion #1.      Turn to clinic in 2 months for review, sooner if clinical condition changes.    PLEASE NOTE: This Note was created using voice recognition Software. I have made every reasonable attempt to correct obvious errors, but I expect that there are errors of grammar and possibly content that I did not discover before finalizing the note

## 2023-08-25 ENCOUNTER — OFFICE VISIT (OUTPATIENT)
Dept: MEDICAL GROUP | Facility: LAB | Age: 83
End: 2023-08-25
Payer: MEDICARE

## 2023-08-25 VITALS
HEIGHT: 68 IN | HEART RATE: 70 BPM | OXYGEN SATURATION: 97 % | BODY MASS INDEX: 25.01 KG/M2 | TEMPERATURE: 97.4 F | RESPIRATION RATE: 15 BRPM | SYSTOLIC BLOOD PRESSURE: 112 MMHG | WEIGHT: 165 LBS | DIASTOLIC BLOOD PRESSURE: 66 MMHG

## 2023-08-25 DIAGNOSIS — R80.9 CONTROLLED TYPE 2 DIABETES MELLITUS WITH MICROALBUMINURIA, WITHOUT LONG-TERM CURRENT USE OF INSULIN (HCC): ICD-10-CM

## 2023-08-25 DIAGNOSIS — N18.32 STAGE 3B CHRONIC KIDNEY DISEASE: ICD-10-CM

## 2023-08-25 DIAGNOSIS — E11.29 CONTROLLED TYPE 2 DIABETES MELLITUS WITH MICROALBUMINURIA, WITHOUT LONG-TERM CURRENT USE OF INSULIN (HCC): ICD-10-CM

## 2023-08-25 DIAGNOSIS — I10 ESSENTIAL HYPERTENSION, BENIGN: ICD-10-CM

## 2023-08-25 PROCEDURE — 3078F DIAST BP <80 MM HG: CPT | Performed by: FAMILY MEDICINE

## 2023-08-25 PROCEDURE — 3074F SYST BP LT 130 MM HG: CPT | Performed by: FAMILY MEDICINE

## 2023-08-25 PROCEDURE — 99214 OFFICE O/P EST MOD 30 MIN: CPT | Performed by: FAMILY MEDICINE

## 2023-08-25 ASSESSMENT — FIBROSIS 4 INDEX: FIB4 SCORE: 4.52

## 2023-08-25 NOTE — PROGRESS NOTES
Subjective:   Paulo Paredes is a 82 y.o. male here today for   Chief Complaint   Patient presents with    Medication Follow-up     #DM2  -Recently increased Trulicty to 1.5 mg. Patient has been compliant and tolerating medication well.   -Is working on appropriate diet and exercise regimen.  Denies increased fatigue, GI symptoms including nausea, diarrhea, constipation, numbness/ting/pain in lower extremities.    #Low BP   -Hx of CKD, CAD w/ CABG x4   -Recently being followed by cardiology as well as nephrology, d/c losartan after low bp and episodes of syncope throughout the year.   -Cardiology has also recommended atenolol perameters she is following.  Has had some episodes of elevated blood pressure but he is treated with atenolol.  He denies any lightheadedness, dizziness, syncope or presyncopal episodes.    Allergies   Allergen Reactions    Lactose     Seasonal          Current medicines (including changes today)  Current Outpatient Medications   Medication Sig Dispense Refill    fenofibrate (TRIGLIDE) 160 MG tablet Take 1 Tablet by mouth every day. 100 Tablet 3    warfarin (COUMADIN) 5 MG Tab Take 1 Tablet by mouth every evening. Take 0.5 to 1 tablet by mouth once daily. Take as directed by anticoagulation clinic. 100 Tablet 3    losartan (COZAAR) 25 MG Tab Take 1 Tablet by mouth every day for 360 days. 100 Tablet 2    glipiZIDE (GLUCOTROL) 10 MG Tab       Dulaglutide 1.5 MG/0.5ML Solution Pen-injector Inject 0.5 mL under the skin every 7 days for 360 days. 12 Each 3    aspirin 81 MG EC tablet Take 81 mg by mouth every day.      atenolol (TENORMIN) 25 MG Tab Take 1 tablet by mouth once daily 100 Tablet 3    oxymetazoline (AFRIN 12 HOUR) 0.05 % Solution Administer 2 Sprays into affected nostril(S) 2 times a day. Discontinue after 2 days. 15 mL 0    Lidocaine 4 % Patch Apply patch to painful area. Patch may remain in place for up to 12 hours in any 24-hour period. No more than 1 patch can be used in a 24-hour  period. 15 Patch 0    triamcinolone acetonide (KENALOG) 0.025 % Cream Apply to the affected area twice a day on legs. 15 g 1    atorvastatin (LIPITOR) 40 MG Tab Take 1 tablet by mouth once daily 100 Tablet 0    capsaicin (ZOSTRIX) 0.025 % cream APPLY TO ITCHY AREA ON NECK UP TO 5 TIMES A DAY FOR 1 WEEK, THEN 3 TIMES A DAY FOR 3 TO 6 WEEKS. WASH HANDS THOROUGHLY AFTER APPLYING. DO TEST SPOT 1ST      levothyroxine (EUTHYROX) 137 MCG Tab TAKE 1 TABLET BY MOUTH ONCE DAILY IN THE MORNING ON  AN  EMPTY  STOMACH  ONE  HOUR  BEFORE  EATING 100 Tablet 3    Multiple Vitamins-Minerals (ZINC PO) Take  by mouth.      hydrocortisone 2.5 % Cream topical cream APPLY TO RASH ON GROIN TWICE DAILY FOR 1 WEEK WITH FLARES      Calcium Carb-Cholecalciferol (CALCIUM 1000 + D PO) Take 1 Dose by mouth every day.      Omega-3 Krill Oil 300 MG Cap Take 300 mg by mouth every day.      Cinnamon 500 MG Cap Take 1,000 mg by mouth.      Cyanocobalamin (VITAMIN B-12) 1000 MCG Tab Take 1,000 mcg by mouth every day.      Coenzyme Q10 (CO Q-10 PO) Take 1 Dose by mouth every day. Unknown OTC Strength       No current facility-administered medications for this visit.     He  has a past medical history of Anesthesia, Basal cell carcinoma of skin, CAD (coronary artery disease), Cancer ([facility]), Chronic kidney disease (CKD) stage G3b/A2, moderately decreased glomerular filtration rate (GFR) between 30-44 mL/min/1.73 square meter and albuminuria creatinine ratio between  mg/g ([facility]) (4/23/2015), DVT (deep venous thrombosis) ([facility]) (2014), ED (erectile dysfunction), GERD (gastroesophageal reflux disease), Glaucoma, Heart burn, Hyperlipidemia (12/3/2012), Hypertension, Indigestion, Multiple pulmonary emboli ([facility]) (2014), Multiple thyroid nodules (2009), Papillary carcinoma of thyroid ([facility]) (2014), postsurgical hypothyroidism (2014), Pre-diabetes, Renal disorder, S/P CABG x 4 (2003), S/P colectomy (2010), S/p nephrectomy (1998), S/P prostatectomy (2008),  "Stroke (HCC), Transient renal failure (2014), Type II or unspecified type diabetes mellitus without mention of complication, not stated as uncontrolled, and Unspecified urinary incontinence.    ROS   -See HPI       Objective:     Physical Exam:  /66   Pulse 70   Temp 36.3 °C (97.4 °F) (Temporal)   Resp 15   Ht 1.727 m (5' 7.99\")   Wt 74.8 kg (165 lb)   SpO2 97%  Body mass index is 25.09 kg/m².   Constitutional: Alert, no distress.  Skin: Warm, dry, good turgor, no rashes in visible areas.  Eye: Equal, round and reactive, conjunctiva clear, lids normal.  Respiratory: Unlabored respiratory effort, lungs clear to auscultation, no wheezes, no rhonchi.  Cardiovascular: Normal S1, S2, no murmur, no edema.  Abdomen: Soft, non-tender, no masses, no hepatosplenomegaly.  Psych: Alert and oriented x3, normal affect and mood.    Assessment and Plan:     1. Controlled type 2 diabetes mellitus with microalbuminuria, without long-term current use of insulin ([facility])  -Patient doing well with new dose of Trulicity.  We will continue with Trulicity, glipizide.  We will do the proper diet and exercise regimen.  We will plan on rechecking hemoglobin A1c in 3 months.  Given patient's blood sugars I am concerned that he is becoming insulin resistant and we may need to discuss starting insulin.  Patient will follow-up in 3 months.    2. Stage 3b chronic kidney disease (HCC)  -Reviewed note from nephrology.  Patient is only on losartan due to hypotensive episodes.  In the note they did recommend SGLT2 inhibitor for uncontrolled diabetes; however, we will hold off on at this time given the fact that he has had low blood pressure in the current insert and that SGLT2 could perhaps lowered even more.  -He will continue follow-up with nephrology, continued appropriate hydration, avoidance of NSAIDs, low-sodium diet.    3. Essential hypertension, benign  -Today blood pressure is controlled.  At next appoint patient will bring blood " pressure cuff to assure that blood pressure is reading correctly.  We will continue to monitor it, treat with atenolol per recommendations of cardiology.    My total time spent caring for the patient on the day of the encounter was 30 minutes.   This does not include time spent on separately billable procedures/tests.      Followup: No follow-ups on file.         PLEASE NOTE: This dictation was created using voice recognition software. I have made every reasonable attempt to correct obvious errors, but I expect that there are errors of grammar and possibly content that I did not discover before finalizing the note.

## 2023-08-28 ENCOUNTER — NON-PROVIDER VISIT (OUTPATIENT)
Dept: CARDIOLOGY | Facility: MEDICAL CENTER | Age: 83
End: 2023-08-28
Attending: NURSE PRACTITIONER
Payer: MEDICARE

## 2023-08-28 DIAGNOSIS — R55 SYNCOPE AND COLLAPSE: ICD-10-CM

## 2023-08-28 DIAGNOSIS — I49.3 PVC'S (PREMATURE VENTRICULAR CONTRACTIONS): ICD-10-CM

## 2023-08-28 PROCEDURE — 93270 REMOTE 30 DAY ECG REV/REPORT: CPT

## 2023-08-28 NOTE — PROGRESS NOTES
Message to  on 10/06/23    Patient enrolled in the 30 day [employer] heart monitoring program per CORDELL Tomas. In clinic hook up, monitor s/n [id number]. Baseline Transmitted. Pending EOS.   [facility].

## 2023-08-30 DIAGNOSIS — E78.5 DYSLIPIDEMIA: ICD-10-CM

## 2023-08-30 DIAGNOSIS — I25.10 CAD IN NATIVE ARTERY: ICD-10-CM

## 2023-08-30 RX ORDER — ATORVASTATIN CALCIUM 40 MG/1
40 TABLET, FILM COATED ORAL DAILY
Qty: 100 TABLET | Refills: 3 | Status: SHIPPED | OUTPATIENT
Start: 2023-08-30 | End: 2023-10-18

## 2023-08-31 ENCOUNTER — HOSPITAL ENCOUNTER (OUTPATIENT)
Dept: LAB | Facility: MEDICAL CENTER | Age: 83
End: 2023-08-31
Attending: NURSE PRACTITIONER
Payer: MEDICARE

## 2023-08-31 ENCOUNTER — ANTICOAGULATION VISIT (OUTPATIENT)
Dept: MEDICAL GROUP | Facility: MEDICAL CENTER | Age: 83
End: 2023-08-31
Payer: MEDICARE

## 2023-08-31 DIAGNOSIS — I25.10 CAD IN NATIVE ARTERY: ICD-10-CM

## 2023-08-31 DIAGNOSIS — Z95.828 PRESENCE OF IVC FILTER: ICD-10-CM

## 2023-08-31 LAB
CHOLEST SERPL-MCNC: 156 MG/DL (ref 100–199)
FASTING STATUS PATIENT QL REPORTED: NORMAL
HDLC SERPL-MCNC: 31 MG/DL
INR PPP: 1.6 (ref 2–3.5)
LDLC SERPL CALC-MCNC: 77 MG/DL
TRIGL SERPL-MCNC: 239 MG/DL (ref 0–149)

## 2023-08-31 PROCEDURE — 36415 COLL VENOUS BLD VENIPUNCTURE: CPT

## 2023-08-31 PROCEDURE — 85610 PROTHROMBIN TIME: CPT | Performed by: NURSE PRACTITIONER

## 2023-08-31 PROCEDURE — 80061 LIPID PANEL: CPT

## 2023-08-31 PROCEDURE — 99211 OFF/OP EST MAY X REQ PHY/QHP: CPT | Performed by: NURSE PRACTITIONER

## 2023-08-31 NOTE — PROGRESS NOTES
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  Endocrinology [phone]          Anticoagulation Patient Findings  Patient Findings       Positives:  Missed doses (Pt thinks he missed a dose in the last week), Change in medications (Increased Trulicity dose & decreased losartan)    Negatives:  Signs/symptoms of thrombosis, Signs/symptoms of bleeding, Laboratory test error suspected, Change in health, Change in alcohol use, Change in activity, Upcoming invasive procedure, Emergency department visit, Upcoming dental procedure, Extra doses, Change in diet/appetite, Hospital admission, Bruising, Other complaints            HPI:   Paulo Paredes is in the Anticoagulation Clinic today for an INR check on their anticoagulation therapy.     The reason for today's visit is to prevent morbidity and mortality from a blood clot and/or stroke and to reduce the risk of bleeding while on a  anticoagulant.     PCP:  Damion Larose M.D.  [address] [address]  [place] [id number] [phone]    3 vitals included with today's appt-unless patient declined:  (BP, HR, weight, ht, RR)   There were no vitals filed for this visit.    Verified current warfarin dosing schedule.    Medications reconciled: Yes  Pt is on ASA 81 mg once daily as antiplatelet therapy for hx of CAD & CABG and must be reviewed again on next cards f/u.    Assessment:   INR is subtherapeutic    Plan:  Instructed pt to BOLUS x 1 dose w/ 7.5 mg today and to then continue on w/ his current regimen.    Follow up:  Follow up appointment in 1 week(s).       Other info:  Pt educated to contact our clinic with any changes in medications or s/s of bleeding or thrombosis.  Education was provided today regarding tips to reduce their bleed risk and dietary constraints while on a anticoagulant.    National Recommendations:  The CHEST guidelines recommends frequent INR monitoring at regular intervals (a few days up to a max of 12 weeks) to ensure patients are on the proper dose of warfarin, and patients are not having any complications from therapy.  INRs can dramatically change over a short time period due to diet, medications, and medical conditions.     Skip Dowling, PharmD, BCACP    [facility]  Phone [phone] fax [phone]

## 2023-09-07 ENCOUNTER — ANTICOAGULATION VISIT (OUTPATIENT)
Dept: MEDICAL GROUP | Facility: MEDICAL CENTER | Age: 83
End: 2023-09-07
Payer: MEDICARE

## 2023-09-07 DIAGNOSIS — Z95.828 PRESENCE OF IVC FILTER: ICD-10-CM

## 2023-09-07 LAB — INR PPP: 2.6 (ref 2–3.5)

## 2023-09-07 PROCEDURE — 93793 ANTICOAG MGMT PT WARFARIN: CPT | Performed by: NURSE PRACTITIONER

## 2023-09-07 PROCEDURE — 85610 PROTHROMBIN TIME: CPT | Performed by: NURSE PRACTITIONER

## 2023-09-07 NOTE — PROGRESS NOTES
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  Endocrinology [phone]          Anticoagulation Patient Findings  Patient Findings       Negatives:  Signs/symptoms of thrombosis, Signs/symptoms of bleeding, Laboratory test error suspected, Change in health, Change in alcohol use, Change in activity, Upcoming invasive procedure, Emergency department visit, Upcoming dental procedure, Missed doses, Extra doses, Change in medications, Change in diet/appetite, Hospital admission, Bruising, Other complaints                  HPI:   Paulo Paredes seen in clinic today, on anticoagulation therapy with warfarin due to history of TIA, DVT, and PE.    Patient's previous INR was subtherapeutic at 1.6 on 2023, at which time patient was instructed to bolus with 7.5mg. Patient returns to clinic today to recheck INR to ensure it is therapeutic and thus preventing possible clotting and/or bleeding/bruising complications.    CHADS-VASc = N/A    Does  patient have any changes to current medical/health status since last appt: No  Does patient have any signs/symptoms of bleeding and/or thrombosis since the last appt: No  Does patient have any interval changes to diet or medications since last appt: No  Are there any complications or cost restrictions with current therapy: No     Does patient have [facility] PCP? Yes, Damion Larose M.D. (If not, please document discussion that patient must be seen at [facility])     There were no vitals filed for this visit.     Asssessment:      INR is therapeutic at 2.6, therefore decreasing risk of stroke/VTE and bleeding  Reason(s) for out of range INR today: N/A      Pt is on antiplatelet therapy with aspirin 81mg for history of multiple thromboembolisms.    Medication reconciliation completed today.    Warfarin dosing recommendation: Continue regimen as listed above.     Follow up:  Because warfarin is a high risk medication and current CHEST guidelines recommend regular monitoring intervals (few days up to 12 weeks), will have patient return to clinic in 2 weeks to recheck INR.    Katharina Vigil, pharmacy student   Leah Tillman, PharmD

## 2023-09-11 ENCOUNTER — HOSPITAL ENCOUNTER (OUTPATIENT)
Dept: CARDIOLOGY | Facility: MEDICAL CENTER | Age: 83
End: 2023-09-11
Attending: NURSE PRACTITIONER
Payer: MEDICARE

## 2023-09-11 DIAGNOSIS — R55 SYNCOPE AND COLLAPSE: ICD-10-CM

## 2023-09-11 DIAGNOSIS — E89.0 POSTSURGICAL HYPOTHYROIDISM: ICD-10-CM

## 2023-09-11 LAB
LV EJECT FRACT  99904: 60
LV EJECT FRACT MOD 2C 99903: 66.27
LV EJECT FRACT MOD 4C 99902: 60.44
LV EJECT FRACT MOD BP 99901: 59.99

## 2023-09-11 PROCEDURE — 93306 TTE W/DOPPLER COMPLETE: CPT

## 2023-09-11 PROCEDURE — 93306 TTE W/DOPPLER COMPLETE: CPT | Mod: 26 | Performed by: INTERNAL MEDICINE

## 2023-09-11 RX ORDER — LEVOTHYROXINE SODIUM 137 UG/1
TABLET ORAL
Qty: 100 TABLET | Refills: 3 | Status: SHIPPED | OUTPATIENT
Start: 2023-09-11

## 2023-09-13 ENCOUNTER — TELEPHONE (OUTPATIENT)
Dept: CARDIOLOGY | Facility: MEDICAL CENTER | Age: 83
End: 2023-09-13
Payer: MEDICARE

## 2023-09-13 NOTE — TELEPHONE ENCOUNTER
Echo showed overall heart function is normal/stable and no significant structural changes.  Will see what his monitor shows.     Thanks  Mayra

## 2023-09-21 ENCOUNTER — ANTICOAGULATION VISIT (OUTPATIENT)
Dept: MEDICAL GROUP | Facility: MEDICAL CENTER | Age: 83
End: 2023-09-21
Payer: MEDICARE

## 2023-09-21 DIAGNOSIS — Z95.828 PRESENCE OF IVC FILTER: ICD-10-CM

## 2023-09-21 LAB — INR PPP: 2.7 (ref 2–3.5)

## 2023-09-21 PROCEDURE — 93793 ANTICOAG MGMT PT WARFARIN: CPT | Performed by: STUDENT IN AN ORGANIZED HEALTH CARE EDUCATION/TRAINING PROGRAM

## 2023-09-21 PROCEDURE — 85610 PROTHROMBIN TIME: CPT | Performed by: STUDENT IN AN ORGANIZED HEALTH CARE EDUCATION/TRAINING PROGRAM

## 2023-09-21 NOTE — PROGRESS NOTES
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  Endocrinology [phone]          Anticoagulation Patient Findings  Patient Findings       Negatives:  Signs/symptoms of thrombosis, Signs/symptoms of bleeding, Laboratory test error suspected, Change in health, Change in alcohol use, Change in activity, Upcoming invasive procedure, Emergency department visit, Upcoming dental procedure, Missed doses, Extra doses, Change in medications, Change in diet/appetite, Hospital admission, Bruising, Other complaints            HPI:   Paulo Paredes is in the Anticoagulation Clinic today for an INR check on their anticoagulation therapy.     The reason for today's visit is to prevent morbidity and mortality from a blood clot and/or stroke and to reduce the risk of bleeding while on a anticoagulant.     PCP:  Damion Larose M.D.  [address] [address]  [place]  [id number] [phone]    3 vitals included with today's appt-unless patient declined:  (BP, HR, weight, ht, RR)   There were no vitals filed for this visit.    Verified current warfarin dosing schedule.    Medications reconciled: Yes  Pt is on ASA 81 mg once daily as antiplatelet therapy for hx of CAD & CABG.    Assessment:   INR is therapeutic    Plan:  Instructed pt to continue on with current regimen.    Follow up:  Follow up appointment in 4 week(s).       Other info:  Pt educated to contact our clinic with any changes in medications or s/s of bleeding or thrombosis.  Education was provided today regarding tips to reduce their bleed risk and dietary constraints while on a anticoagulant.    National Recommendations:  The CHEST guidelines recommends frequent INR monitoring at regular intervals (a few days up to a max of 12 weeks) to ensure patients are on the proper dose of warfarin, and patients are not having any complications from therapy.  INRs can dramatically change over a short time period due to diet, medications, and medical conditions.     Skip Dowling, PharmD, BCACP    [facility]  Phone [phone] fax [phone]

## 2023-09-29 ENCOUNTER — TELEPHONE (OUTPATIENT)
Dept: CARDIOLOGY | Facility: MEDICAL CENTER | Age: 83
End: 2023-09-29
Payer: MEDICARE

## 2023-10-11 PROCEDURE — 93228 REMOTE 30 DAY ECG REV/REPORT: CPT | Performed by: INTERNAL MEDICINE

## 2023-10-18 ENCOUNTER — OFFICE VISIT (OUTPATIENT)
Dept: CARDIOLOGY | Facility: MEDICAL CENTER | Age: 83
End: 2023-10-18
Attending: INTERNAL MEDICINE
Payer: MEDICARE

## 2023-10-18 VITALS
SYSTOLIC BLOOD PRESSURE: 110 MMHG | BODY MASS INDEX: 25.31 KG/M2 | RESPIRATION RATE: 14 BRPM | DIASTOLIC BLOOD PRESSURE: 60 MMHG | OXYGEN SATURATION: 96 % | WEIGHT: 167 LBS | HEIGHT: 68 IN | HEART RATE: 71 BPM

## 2023-10-18 DIAGNOSIS — Z95.1 S/P CABG X 4: ICD-10-CM

## 2023-10-18 DIAGNOSIS — I25.10 CAD IN NATIVE ARTERY: ICD-10-CM

## 2023-10-18 DIAGNOSIS — E78.5 DYSLIPIDEMIA: ICD-10-CM

## 2023-10-18 DIAGNOSIS — I10 ESSENTIAL HYPERTENSION, BENIGN: ICD-10-CM

## 2023-10-18 PROBLEM — R55 SYNCOPE AND COLLAPSE: Status: RESOLVED | Noted: 2023-10-18 | Resolved: 2023-10-18

## 2023-10-18 PROBLEM — R55 SYNCOPE AND COLLAPSE: Status: ACTIVE | Noted: 2023-10-18

## 2023-10-18 PROCEDURE — 99213 OFFICE O/P EST LOW 20 MIN: CPT | Performed by: INTERNAL MEDICINE

## 2023-10-18 PROCEDURE — 99214 OFFICE O/P EST MOD 30 MIN: CPT | Performed by: INTERNAL MEDICINE

## 2023-10-18 PROCEDURE — 3078F DIAST BP <80 MM HG: CPT | Performed by: INTERNAL MEDICINE

## 2023-10-18 PROCEDURE — 3074F SYST BP LT 130 MM HG: CPT | Performed by: INTERNAL MEDICINE

## 2023-10-18 RX ORDER — ATORVASTATIN CALCIUM 80 MG/1
80 TABLET, FILM COATED ORAL NIGHTLY
Qty: 90 TABLET | Refills: 3 | Status: SHIPPED | OUTPATIENT
Start: 2023-10-18 | End: 2023-12-13

## 2023-10-18 ASSESSMENT — ENCOUNTER SYMPTOMS
COUGH: 0
MYALGIAS: 0
FOCAL WEAKNESS: 0
HEADACHES: 0
PALPITATIONS: 0
WEAKNESS: 0
PSYCHIATRIC NEGATIVE: 1
NEUROLOGICAL NEGATIVE: 1
GASTROINTESTINAL NEGATIVE: 1
CHILLS: 0
EYES NEGATIVE: 1
VOMITING: 0
DIZZINESS: 0
FEVER: 0
SHORTNESS OF BREATH: 0
DOUBLE VISION: 0
MUSCULOSKELETAL NEGATIVE: 1
NERVOUS/ANXIOUS: 0
NAUSEA: 0
CLAUDICATION: 0
WEIGHT LOSS: 0
DEPRESSION: 0
CONSTITUTIONAL NEGATIVE: 1
BRUISES/BLEEDS EASILY: 0
CARDIOVASCULAR NEGATIVE: 1
ABDOMINAL PAIN: 0
RESPIRATORY NEGATIVE: 1
BLURRED VISION: 0

## 2023-10-18 ASSESSMENT — FIBROSIS 4 INDEX: FIB4 SCORE: 4.58

## 2023-10-18 NOTE — PROGRESS NOTES
Chief Complaint   Patient presents with    Coronary Artery Disease     F/V Dx: CAD in native artery    Dyslipidemia    Hypertension       Subjective     Kevin Paredes is a 83 y.o. male who presents today for annual follow up of coronary artery disease with prior coronary artery bypass graft surgery.    Since the patient's last visit on 10/28/21, he has been doing well clinically. He denies chest pain, shortness of breath, palpitations, nausea/vomiting or diaphoresis. He suffered positional syncope 6 months ago. He keeps active walking on his treadmill and gardening.      Past Medical History:   Diagnosis Date    Anesthesia     difficult intubation with surgery 2008,2010    Basal cell carcinoma of skin     CAD (coronary artery disease)     Cancer (HCC)     rt  kidney 1989;  thyroid cancer 2012, prostate 2008, skin    Chronic kidney disease (CKD) stage G3b/A2, moderately decreased glomerular filtration rate (GFR) between 30-44 mL/min/1.73 square meter and albuminuria creatinine ratio between  mg/g ([facility]) 4/23/2015    DVT (deep venous thrombosis) ([facility]) 2014    ED (erectile dysfunction)     GERD (gastroesophageal reflux disease)     Glaucoma     Heart burn     Hyperlipidemia 12/3/2012    Hypertension     Indigestion     Multiple pulmonary emboli (HCC) 2014    Multiple thyroid nodules 2009    Papillary carcinoma of thyroid (HCC) 2014    R 2 foci / 4.5mm,0.25mm / no mets/ pT1pN0    postsurgical hypothyroidism 2014    Pre-diabetes     Renal disorder     rt kidney cancer,nephrectomy    S/P CABG x 4 2003    S/P colectomy 2010    multiple colon polyps / no Ca    S/p nephrectomy 1998    carcinoma    S/P prostatectomy 2008    carcinoma    Stroke (HCC)     'mild',no residual,'one doctor said it was a tia'    Transient renal failure 2014    renal vein thrombosis    Type II or unspecified type diabetes mellitus without mention of complication, not stated as uncontrolled     on no medications    Unspecified urinary incontinence       Past Surgical History:   Procedure Laterality Date    THYROIDECTOMY TOTAL  5/13/2014    Performed by Eliceo Bolanos M.D. at SURGERY SAME DAY [facility] ORS    NODE DISSECTION  5/13/2014    Performed by Eliceo Bolanos M.D. at SURGERY SAME DAY [facility] ORS    COLON RESECTION      MULTIPLE CORONARY ARTERY BYPASS      x 4 11/2003    NEPHRECTOMY RADICAL      right side 1998    OTHER ORTHOPEDIC SURGERY      trotator cuff    PROSTATECTOMY, RADICAL RETRO      cancer /january 2008     Family History   Problem Relation Age of Onset    Diabetes Mother     Heart Disease Mother     Diabetes Father     Cancer Father         pancreas    Thyroid Sister         Cancer    Cancer Sister         thyroid    Diabetes Sister     Cancer Brother 49        colon    Diabetes Brother     Diabetes Maternal Grandfather     Diabetes Paternal Grandmother     Diabetes Paternal Grandfather     No Known Problems Maternal Grandmother      Social History     Socioeconomic History    Marital status:      Spouse name: Not on file    Number of children: Not on file    Years of education: Not on file    Highest education level: Not on file   Occupational History    Not on file   Tobacco Use    Smoking status: Never    Smokeless tobacco: Never   Vaping Use    Vaping Use: Never used   Substance and Sexual Activity    Alcohol use: Yes     Comment: Occasionally    Drug use: No    Sexual activity: Not Currently     Comment: retired    Other Topics Concern    Not on file   Social History Narrative    Not on file     Social Determinants of Health     Financial Resource Strain: Not on file   Food Insecurity: Not on file   Transportation Needs: Not on file   Physical Activity: Not on file   Stress: Not on file   Social Connections: Not on file   Intimate Partner Violence: Not on file   Housing Stability: Not on file     Allergies   Allergen Reactions    Lactose     Seasonal      (Medications reviewed.)  Outpatient Encounter Medications as  of 10/18/2023   Medication Sig Dispense Refill    levothyroxine (EUTHYROX) 137 MCG Tab TAKE 1 TABLET BY MOUTH ONCE DAILY IN THE MORNING ON  AN  EMPTY  STOMACH  ONE  HOUR  BEFORE  EATING 100 Tablet 3    atorvastatin (LIPITOR) 40 MG Tab Take 1 Tablet by mouth every day. 100 Tablet 3    fenofibrate (TRIGLIDE) 160 MG tablet Take 1 Tablet by mouth every day. 100 Tablet 3    warfarin (COUMADIN) 5 MG Tab Take 1 Tablet by mouth every evening. Take 0.5 to 1 tablet by mouth once daily. Take as directed by anticoagulation clinic. 100 Tablet 3    losartan (COZAAR) 25 MG Tab Take 1 Tablet by mouth every day for 360 days. 100 Tablet 2    Dulaglutide 1.5 MG/0.5ML Solution Pen-injector Inject 0.5 mL under the skin every 7 days for 360 days. 12 Each 3    aspirin 81 MG EC tablet Take 81 mg by mouth every day.      atenolol (TENORMIN) 25 MG Tab Take 1 tablet by mouth once daily 100 Tablet 3    oxymetazoline (AFRIN 12 HOUR) 0.05 % Solution Administer 2 Sprays into affected nostril(S) 2 times a day. Discontinue after 2 days. 15 mL 0    triamcinolone acetonide (KENALOG) 0.025 % Cream Apply to the affected area twice a day on legs. 15 g 1    capsaicin (ZOSTRIX) 0.025 % cream APPLY TO ITCHY AREA ON NECK UP TO 5 TIMES A DAY FOR 1 WEEK, THEN 3 TIMES A DAY FOR 3 TO 6 WEEKS. WASH HANDS THOROUGHLY AFTER APPLYING. DO TEST SPOT 1ST      Multiple Vitamins-Minerals (ZINC PO) Take  by mouth.      hydrocortisone 2.5 % Cream topical cream APPLY TO RASH ON GROIN TWICE DAILY FOR 1 WEEK WITH FLARES      Calcium Carb-Cholecalciferol (CALCIUM 1000 + D PO) Take 1 Dose by mouth every day.      Omega-3 Krill Oil 300 MG Cap Take 300 mg by mouth every day.      Cinnamon 500 MG Cap Take 1,000 mg by mouth.      Cyanocobalamin (VITAMIN B-12) 1000 MCG Tab Take 1,000 mcg by mouth every day.      Coenzyme Q10 (CO Q-10 PO) Take 1 Dose by mouth every day. Unknown OTC Strength      glipiZIDE (GLUCOTROL) 10 MG Tab  (Patient not taking: Reported on 10/18/2023)       "Lidocaine 4 % Patch Apply patch to painful area. Patch may remain in place for up to 12 hours in any 24-hour period. No more than 1 patch can be used in a 24-hour period. (Patient not taking: Reported on 10/18/2023) 15 Patch 0     No facility-administered encounter medications on file as of 10/18/2023.     Review of Systems   Constitutional: Negative.  Negative for chills, fever, malaise/fatigue and weight loss.   HENT: Negative.  Negative for hearing loss.    Eyes: Negative.  Negative for blurred vision and double vision.   Respiratory: Negative.  Negative for cough and shortness of breath.    Cardiovascular: Negative.  Negative for chest pain, palpitations, claudication and leg swelling.   Gastrointestinal: Negative.  Negative for abdominal pain, nausea and vomiting.   Genitourinary: Negative.  Negative for dysuria and urgency.   Musculoskeletal: Negative.  Negative for joint pain and myalgias.   Skin: Negative.  Negative for itching and rash.   Neurological: Negative.  Negative for dizziness, focal weakness, weakness and headaches.   Endo/Heme/Allergies: Negative.  Does not bruise/bleed easily.   Psychiatric/Behavioral: Negative.  Negative for depression. The patient is not nervous/anxious.               Objective     /60 (BP Location: Left arm, Patient Position: Sitting, BP Cuff Size: Adult)   Pulse 71   Resp 14   Ht 1.727 m (5' 8\")   Wt 75.8 kg (167 lb)   SpO2 96%   BMI 25.39 kg/m²     Physical Exam  Constitutional:       Appearance: Normal appearance. He is well-developed and normal weight.   HENT:      Head: Normocephalic and atraumatic.   Neck:      Vascular: No JVD.   Cardiovascular:      Rate and Rhythm: Normal rate and regular rhythm.      Heart sounds: Normal heart sounds.   Pulmonary:      Effort: Pulmonary effort is normal.      Breath sounds: Normal breath sounds.   Abdominal:      General: Bowel sounds are normal.      Palpations: Abdomen is soft.      Comments: No hepatosplenomegaly. "   Musculoskeletal:         General: Normal range of motion.   Lymphadenopathy:      Cervical: No cervical adenopathy.   Skin:     General: Skin is warm and dry.   Neurological:      Mental Status: He is alert and oriented to person, place, and time.            CARDIAC STUDIES/PROCEDURES:     ABDOMINAL ULTRASOUND (06/04/14)  1. Ectatic aorta.  2. Atherosclerotic plaque.  3. Cholelithiasis.     CT OF CHEST (06/06/14)  1. There is thrombus in the IVC which extends into the distal left renal vein between the aorta and vena cava. The patient is on heparin for 36 hours approximately, and the improvement in left   renal edema and decrease in caliber of the left renal vein compared to previous CT probably reflects improved venous drainage related to the therapy .  2. The thrombus attached to the filter extends cephalad to the filter to the level of the caudate lobe of the liver.  3. There is DVT in both lower extremities.     ECHOCARDIOGRAM CONCLUSIONS (09/11/23)  Compared to the images of the prior study 10/18/18, there has been no   significant change.   Normal left ventricular size and systolic function.  The left ventricular ejection fraction is visually estimated to be 60%.  Normal diastolic function.  Normal right ventricular size and systolic function.  Mild mitral regurgitation.  (study result reviewed)     ECHOCARDIOGRAM CONCLUSIONS (10/18/18)  Prior echo on 05/21/14. Compared to the report of the study done -   there has been no significant change.   Normal left ventricular systolic function.  Left ventricular ejection fraction is visually estimated to be 65%.  Mild tricuspid regurgitation.  Unable to estimate pulmonary artery pressure due to an inadequate   tricuspid regurgitant jet.     ECHOCARDIOGRAM CONCLUSIONS (05/21/14)  Normal left ventricular size. Sigmoid septum with increased outflow   velocity but not anterior motion of the mitral valve.  Basal inferior and apical septal hypokinesis. Left ventricular    ejection fraction is 50% to 55%.  Grade I diastolic dysfunction is present.  Normal left atrial size.  Aortic sclerosis without significant stenosis.  Trace mitral regurgitation.     ECHOCARDIOGRAM CONCLUSIONS (01/25/14)  Normal left ventricular size and function.   Grade I diastolic dysfunction is present.   Normal left ventricular wall thickness.   Left ventricular ejection fraction is 60% to 65%.  Mild mitral regurgitation.   Aortic valve probably trileaflet. No stenosis or regurgitation seen.   AV MG 5.39 mmHg, PG 9.33 mmHg.  Mild tricuspid regurgitation. Right ventricular systolic pressure is   estimated to be 30 to 35 mmHg consistent with mild pulmonary hypertension.  No pericardial effusion seen.   Normal aortic diameter 3.2 cm  No prior study for comparison.     EKG performed on (08/23/23) was reviewed: EKG personally interpreted shows sinus rhythm.   EKG performed on (01/23/19) EKG shows sinus bradycardia.  EKG performed on (05/15/14) EKG shows normal sinus rhythm with non-specific intra-ventricular conduction delay.     Laboratory results of (08/31/23) were reviewed. Cholesterol profile of 156/239/31/77 mg/dL noted.   Laboratory results of (10/26/21) Cholesterol profile of 167/224/34/88 mg/dL noted.  Laboratory results of (08/24/20) Cholesterol profile of 199/202/37/122 mg/dl noted.  Laboratory results of (07/15/19) Cholesterol profile of 169/185/32/100 noted.  Laboratory results of (09/29/18) Cholesterol profile of 261/365/36/151 noted.  Laboratory results of (09/21/15) Cholesterol profile of 251/307/37/153 noted.  Laboratory results of (06/16/14) Cholesterol profile of 172/233/37/88 noted.     LOWER EXTREMITY VENOUS ULTRASOUND (07/19/19)  No evidence of RIGHT lower extremity DVT.      LOWER EXTREMITY VENOUS ULTRASOUND (06/05/14)  1. No evidence of acute right lower extremity deep venous thrombosis with   recannalized venous thrombosis throughout.  2. Normal left lower extremity superficial and deep  venous examination.      MRA OF BRAIN (01/25/14)  1. MRA OF THE [place] WITHIN NORMAL LIMITS WITH NO EVIDENCE OF ANEURYSM OR CEREBROVASCULAR OCCLUSIVE DISEASE.  2. FIELD OF VIEW PARTIALLY EXCLUDES THE INFERIOR TEMPORAL M2 DIVISION LEFT MCA.     MRA OF NECK (01/25/14)  1. Unremarkable cervical vertebral arteries.  2. Right carotid bulb and ICA origin minimal plaquing with up to about 10% stenosis. No flow limiting lesion.  3. Left carotid bulb and left ICA origin minimal plaquing with up to about 10-15% stenosis. No flow limiting lesion.     MYOCARDIAL PERFUSION STUDY CONCLUSIONS (10/18/18)  No evidence of significant jeopardized viable myocardium or prior myocardial infarction.  Apical thinning noted.  Normal wall motion, EF 59%.   TID absent.   Sinus rhythm.  Nondiagnostic ECG portion of a nuclear stress test.  ECG INTERPRETATION  Nondiagnostic ECG portion of a nuclear stress test.     STRESS ECHOCARDIOGRAM [facility] (03/14/12)  Stress echocardiogram showing no evidence for stress-induced ischemia.     ONEIL Lawrence  DOS: 10/11/23   Summary:   Sinus rhythm without significant tachy- or kaylee-arrhythmias.   Assessment & Plan     1. CAD in native artery        2. S/P CABG x 4        3. Essential hypertension, benign        4. Dyslipidemia            Medical Decision Making: Today's Assessment/Status/Plan:        Coronary artery disease with prior coronary bypass graft (4 vessel CABG at [facility] 2003): He is clinically doing well. I will continue with current medical care including aspirin, atenolol, losartan and atorvastatin.  Hypertension: Blood pressure is well controlled. We will continue with beta blockade therapy and ace inhibitor therapy.  Hyperlipidemia: He is doing well on statin therapy without myalgia symptoms. His LDL level is not at target. We will increase atorvastatin dose to 40 mg. We will repeat labs including fasting lipid profile. (Managed by primary care physician)     Chronic anticoagulation therapy (warfarin) and IVC filter with pulmonary embolism/deep venous thrombosis. He is clinically doing well without recurrence or chest pain or shortness of breath.  History of stroke (2004): He remains clinically stable without recurrence of stroke symptoms.  Renal insufficiency (Managed by Dr. Najjar, F[place])  Health maintenance: Recovered large hematoma following thyroidectomy.    We will follow up in 3 months.    CC Damion Snow

## 2023-10-19 ENCOUNTER — ANTICOAGULATION VISIT (OUTPATIENT)
Dept: MEDICAL GROUP | Facility: MEDICAL CENTER | Age: 83
End: 2023-10-19
Payer: MEDICARE

## 2023-10-19 DIAGNOSIS — Z95.828 PRESENCE OF IVC FILTER: ICD-10-CM

## 2023-10-19 LAB — INR PPP: 2.7 (ref 2–3.5)

## 2023-10-19 PROCEDURE — 93793 ANTICOAG MGMT PT WARFARIN: CPT | Performed by: NURSE PRACTITIONER

## 2023-10-19 PROCEDURE — 85610 PROTHROMBIN TIME: CPT | Performed by: NURSE PRACTITIONER

## 2023-10-19 NOTE — PROGRESS NOTES
Anticoagulation Summary  As of 10/19/2023      INR goal:  2.0-3.0   TTR:  73.7 % (5.7 y)   INR used for dosin.70 (10/19/2023)   Warfarin maintenance plan:  2.5 mg (5 mg x 0.5) every Thu; 5 mg (5 mg x 1) all other days   Weekly warfarin total:  32.5 mg   Plan last modified:  Skip Dowling PharmD (2022)   Next INR check:  2023   Priority:  Maintenance   Target end date:  Indefinite    Indications    Presence of IVC filter [Z95.828]  Transient ischemic attack [G45.9] (Resolved) [G45.9]  Deep vein thrombosis (DVT) of both lower extremities (HCC) (Resolved) [I82.403]  DVT (deep venous thrombosis) (HCC) (Resolved) [I82.409]  Multiple pulmonary emboli (HCC) (Resolved) [I26.99]  Chronic anticoagulation (Resolved) [Z79.01]                 Anticoagulation Episode Summary       INR check location:      Preferred lab:      Send INR reminders to:      Comments:            Anticoagulation Care Providers       Provider Role Specialty Phone number    [facility]   [phone]    Edward Walters M.D.  Endocrinology [phone]                  Refer to Patient Findings for HPI:  Patient Findings       Negatives:  Signs/symptoms of thrombosis, Signs/symptoms of bleeding, Laboratory test error suspected, Change in health, Change in alcohol use, Change in activity, Upcoming invasive procedure, Emergency department visit, Upcoming dental procedure, Missed doses, Extra doses, Change in medications, Change in diet/appetite, Hospital admission, Bruising, Other complaints            There were no vitals filed for this visit.  pt declined vitals    Verified current warfarin dosing schedule.    Medications reconciled: No  Pt is on antiplatelet therapy with aspirin 81mg for history of multiple thromboembolisms.      A/P   INR is therapeutic    Warfarin dosing recommendation: Continue regimen as listed above.    Pt educated to contact our clinic with any changes in medications or s/s of bleeding or  thrombosis. Pt is aware to seek immediate medical attention for falls, head injury or deep cuts.    Follow up appointment in 6 week(s).    Zay EckertD

## 2023-10-25 ENCOUNTER — TELEPHONE (OUTPATIENT)
Dept: CARDIOLOGY | Facility: MEDICAL CENTER | Age: 83
End: 2023-10-25
Payer: MEDICARE

## 2023-10-25 NOTE — TELEPHONE ENCOUNTER
Ambulatory monitor was without significant kaylee or tachy arrhthymias, overall looked good.    Thanks  Mayra

## 2023-11-28 ENCOUNTER — HOSPITAL ENCOUNTER (OUTPATIENT)
Dept: LAB | Facility: MEDICAL CENTER | Age: 83
End: 2023-11-28
Attending: INTERNAL MEDICINE
Payer: MEDICARE

## 2023-11-28 DIAGNOSIS — E78.5 DYSLIPIDEMIA: ICD-10-CM

## 2023-11-28 LAB
ALBUMIN SERPL BCP-MCNC: 4.4 G/DL (ref 3.2–4.9)
ALBUMIN/GLOB SERPL: 1.8 G/DL
ALP SERPL-CCNC: 44 U/L (ref 30–99)
ALT SERPL-CCNC: 27 U/L (ref 2–50)
ANION GAP SERPL CALC-SCNC: 9 MMOL/L (ref 7–16)
AST SERPL-CCNC: 43 U/L (ref 12–45)
BILIRUB SERPL-MCNC: 0.6 MG/DL (ref 0.1–1.5)
BUN SERPL-MCNC: 30 MG/DL (ref 8–22)
CALCIUM ALBUM COR SERPL-MCNC: 9 MG/DL (ref 8.5–10.5)
CALCIUM SERPL-MCNC: 9.3 MG/DL (ref 8.4–10.2)
CHLORIDE SERPL-SCNC: 106 MMOL/L (ref 96–112)
CHOLEST SERPL-MCNC: 173 MG/DL (ref 100–199)
CO2 SERPL-SCNC: 27 MMOL/L (ref 20–33)
CREAT SERPL-MCNC: 1.87 MG/DL (ref 0.5–1.4)
FASTING STATUS PATIENT QL REPORTED: NORMAL
GFR SERPLBLD CREATININE-BSD FMLA CKD-EPI: 35 ML/MIN/1.73 M 2
GLOBULIN SER CALC-MCNC: 2.4 G/DL (ref 1.9–3.5)
GLUCOSE SERPL-MCNC: 195 MG/DL (ref 65–99)
HDLC SERPL-MCNC: 27 MG/DL
LDLC SERPL CALC-MCNC: 88 MG/DL
POTASSIUM SERPL-SCNC: 4.9 MMOL/L (ref 3.6–5.5)
PROT SERPL-MCNC: 6.8 G/DL (ref 6–8.2)
SODIUM SERPL-SCNC: 142 MMOL/L (ref 135–145)
TRIGL SERPL-MCNC: 288 MG/DL (ref 0–149)

## 2023-11-28 PROCEDURE — 36415 COLL VENOUS BLD VENIPUNCTURE: CPT

## 2023-11-28 PROCEDURE — 80061 LIPID PANEL: CPT

## 2023-11-28 PROCEDURE — 80053 COMPREHEN METABOLIC PANEL: CPT

## 2023-11-29 ENCOUNTER — OFFICE VISIT (OUTPATIENT)
Dept: MEDICAL GROUP | Facility: LAB | Age: 83
End: 2023-11-29
Payer: MEDICARE

## 2023-11-29 VITALS
RESPIRATION RATE: 16 BRPM | HEART RATE: 68 BPM | SYSTOLIC BLOOD PRESSURE: 126 MMHG | TEMPERATURE: 95.9 F | BODY MASS INDEX: 25.01 KG/M2 | DIASTOLIC BLOOD PRESSURE: 74 MMHG | WEIGHT: 165 LBS | HEIGHT: 68 IN | OXYGEN SATURATION: 96 %

## 2023-11-29 DIAGNOSIS — E78.5 DYSLIPIDEMIA: ICD-10-CM

## 2023-11-29 DIAGNOSIS — Z23 NEED FOR VACCINATION: ICD-10-CM

## 2023-11-29 DIAGNOSIS — E11.65 UNCONTROLLED TYPE 2 DIABETES MELLITUS WITH HYPERGLYCEMIA (HCC): ICD-10-CM

## 2023-11-29 LAB
HBA1C MFR BLD: 8.8 % (ref ?–5.8)
POCT INT CON NEG: NEGATIVE
POCT INT CON POS: POSITIVE

## 2023-11-29 PROCEDURE — 99214 OFFICE O/P EST MOD 30 MIN: CPT | Mod: 25 | Performed by: FAMILY MEDICINE

## 2023-11-29 PROCEDURE — 3078F DIAST BP <80 MM HG: CPT | Performed by: FAMILY MEDICINE

## 2023-11-29 PROCEDURE — 90715 TDAP VACCINE 7 YRS/> IM: CPT | Performed by: FAMILY MEDICINE

## 2023-11-29 PROCEDURE — 3074F SYST BP LT 130 MM HG: CPT | Performed by: FAMILY MEDICINE

## 2023-11-29 PROCEDURE — 90471 IMMUNIZATION ADMIN: CPT | Performed by: FAMILY MEDICINE

## 2023-11-29 PROCEDURE — 83036 HEMOGLOBIN GLYCOSYLATED A1C: CPT | Performed by: FAMILY MEDICINE

## 2023-11-29 ASSESSMENT — FIBROSIS 4 INDEX: FIB4 SCORE: 6.12

## 2023-11-29 NOTE — PROGRESS NOTES
Subjective:   Paulo Paredes is a 83 y.o. male here today for   Chief Complaint   Patient presents with    Diabetes Mellitus    Seasonal Allergies       #DM  Presents for follow up of diabetes mellitus. He indicates that he is feeling well and denies any symptoms referable to this diagnoses.  Specifically denies chest pain, palpitations, dyspnea, orthopnea, PND or peripheral edema, poyluria, polydipsia, urinary complaints, abdominal complaints, myalgias, numbness, weakness or other related symptoms.   Current medications: Trulicity, Glipizide.  Recently increased dose of Trulicity.  Last A1c:   Glycohemoglobin   Date Value Ref Range Status   07/10/2023 10.2 (A) 5.8 % Final     Last Microalb/Cr ratio:   Microalbumin, Urine Random   Date Value Ref Range Status   08/09/2023 6.8 mg/dL Final     Creatinine, Urine   Date Value Ref Range Status   08/09/2023 131.20 mg/dL Final     Micro Alb Creat Ratio   Date Value Ref Range Status   08/09/2023 52 (H) 0 - 30 mg/g Final     Last lipid:   Cholesterol,Tot   Date Value Ref Range Status   11/28/2023 173 100 - 199 mg/dL Final     HDL   Date Value Ref Range Status   11/28/2023 27 (A) >=40 mg/dL Final     LDL   Date Value Ref Range Status   11/28/2023 88 <100 mg/dL Final     Triglycerides   Date Value Ref Range Status   11/28/2023 288 (H) 0 - 149 mg/dL Final     Fasting sugars: Currently not checking  Diabetic foot exam: up to date  Retinal eye exam: up to date (requesting records from ophthalmologist)  ACEi/ARB?  Yes  Statin?  Yes, recently increased to atorvastatin 80 mg.  Aspirin?  Yes  Concomitant HTN?  At goal  Nightly foot checks?  No concerns    #Hyperlipidemia:  -Patient has history of elevated cholesterol.  Does have a history of CABG x4.  Is being followed by cardiology who recently increased statin to 80 mg.  Tolerating medication well, no concerns at this time.  Work on appropriate diet and exercise regimen.  Denies any chest pain, shortness of breath, difficulty  breathing.    Allergies   Allergen Reactions    Lactose     Seasonal          Current medicines (including changes today)  Current Outpatient Medications   Medication Sig Dispense Refill    atorvastatin (LIPITOR) 80 MG tablet Take 1 Tablet by mouth every evening. 90 Tablet 3    levothyroxine (EUTHYROX) 137 MCG Tab TAKE 1 TABLET BY MOUTH ONCE DAILY IN THE MORNING ON  AN  EMPTY  STOMACH  ONE  HOUR  BEFORE  EATING 100 Tablet 3    fenofibrate (TRIGLIDE) 160 MG tablet Take 1 Tablet by mouth every day. 100 Tablet 3    warfarin (COUMADIN) 5 MG Tab Take 1 Tablet by mouth every evening. Take 0.5 to 1 tablet by mouth once daily. Take as directed by anticoagulation clinic. 100 Tablet 3    losartan (COZAAR) 25 MG Tab Take 1 Tablet by mouth every day for 360 days. 100 Tablet 2    glipiZIDE (GLUCOTROL) 10 MG Tab  (Patient not taking: Reported on 10/18/2023)      Dulaglutide 1.5 MG/0.5ML Solution Pen-injector Inject 0.5 mL under the skin every 7 days for 360 days. 12 Each 3    aspirin 81 MG EC tablet Take 81 mg by mouth every day.      atenolol (TENORMIN) 25 MG Tab Take 1 tablet by mouth once daily 100 Tablet 3    oxymetazoline (AFRIN 12 HOUR) 0.05 % Solution Administer 2 Sprays into affected nostril(S) 2 times a day. Discontinue after 2 days. 15 mL 0    triamcinolone acetonide (KENALOG) 0.025 % Cream Apply to the affected area twice a day on legs. 15 g 1    capsaicin (ZOSTRIX) 0.025 % cream APPLY TO ITCHY AREA ON NECK UP TO 5 TIMES A DAY FOR 1 WEEK, THEN 3 TIMES A DAY FOR 3 TO 6 WEEKS. WASH HANDS THOROUGHLY AFTER APPLYING. DO TEST SPOT 1ST      Multiple Vitamins-Minerals (ZINC PO) Take  by mouth.      hydrocortisone 2.5 % Cream topical cream APPLY TO RASH ON GROIN TWICE DAILY FOR 1 WEEK WITH FLARES      Calcium Carb-Cholecalciferol (CALCIUM 1000 + D PO) Take 1 Dose by mouth every day.      Omega-3 Krill Oil 300 MG Cap Take 300 mg by mouth every day.      Cinnamon 500 MG Cap Take 1,000 mg by mouth.      Cyanocobalamin (VITAMIN  "B-12) 1000 MCG Tab Take 1,000 mcg by mouth every day.      Coenzyme Q10 (CO Q-10 PO) Take 1 Dose by mouth every day. Unknown OTC Strength       No current facility-administered medications for this visit.     He  has a past medical history of Anesthesia, Basal cell carcinoma of skin, CAD (coronary artery disease), Cancer ([facility]), Chronic kidney disease (CKD) stage G3b/A2, moderately decreased glomerular filtration rate (GFR) between 30-44 mL/min/1.73 square meter and albuminuria creatinine ratio between  mg/g ([facility]) (4/23/2015), DVT (deep venous thrombosis) ([facility]) (2014), ED (erectile dysfunction), GERD (gastroesophageal reflux disease), Glaucoma, Heart burn, Hyperlipidemia (12/3/2012), Hypertension, Indigestion, Multiple pulmonary emboli ([facility]) (2014), Multiple thyroid nodules (2009), Papillary carcinoma of thyroid ([facility]) (2014), postsurgical hypothyroidism (2014), Pre-diabetes, Renal disorder, S/P CABG x 4 (2003), S/P colectomy (2010), S/p nephrectomy (1998), S/P prostatectomy (2008), Stroke ([facility]), Transient renal failure (2014), Type II or unspecified type diabetes mellitus without mention of complication, not stated as uncontrolled, and Unspecified urinary incontinence.    ROS   -See HPI       Objective:     Physical Exam:  /74   Pulse 68   Temp (!) 35.5 °C (95.9 °F) (Temporal)   Resp 16   Ht 1.727 m (5' 7.99\")   Wt 74.8 kg (165 lb)   SpO2 96%  Body mass index is 25.09 kg/m².   Constitutional: Alert, no distress, well-groomed.  Skin: No rashes in visible areas.  Eye: Round. Conjunctiva clear, lids normal. No icterus.   ENMT: Lips pink without lesions, good dentition, moist mucous membranes. Phonation normal.  Neck: No masses, no thyromegaly. Moves freely without pain.  Respiratory: Unlabored respiratory effort, no cough or audible wheeze  Psych: Alert and oriented x3, normal affect and mood.   Latest Reference Range & Units Most Recent   Sodium 135 - 145 mmol/L 142  11/28/23 06:36   Potassium 3.6 - " 5.5 mmol/L 4.9  11/28/23 06:36   Chloride 96 - 112 mmol/L 106  11/28/23 06:36   Co2 20 - 33 mmol/L 27  11/28/23 06:36   Anion Gap 7.0 - 16.0  9.0  11/28/23 06:36   Glucose 65 - 99 mg/dL 195 (H)  11/28/23 06:36   Bun 8 - 22 mg/dL 30 (H)  11/28/23 06:36   Creatinine 0.50 - 1.40 mg/dL 1.87 (H)  11/28/23 06:36   GFR (CKD-EPI) >60 mL/min/1.73 m 2 35 !  11/28/23 06:36   Calcium 8.4 - 10.2 mg/dL 9.3  11/28/23 06:36   Correct Calcium 8.5 - 10.5 mg/dL 9.0  11/28/23 06:36   AST(SGOT) 12 - 45 U/L 43  11/28/23 06:36   ALT(SGPT) 2 - 50 U/L 27  11/28/23 06:36   Alkaline Phosphatase 30 - 99 U/L 44  11/28/23 06:36   Total Bilirubin 0.1 - 1.5 mg/dL 0.6  11/28/23 06:36   Albumin 3.2 - 4.9 g/dL 4.4  11/28/23 06:36   Total Protein 6.0 - 8.2 g/dL 6.8  11/28/23 06:36   Globulin 1.9 - 3.5 g/dL 2.4  11/28/23 06:36   A-G Ratio g/dL 1.8  11/28/23 06:36   Glycohemoglobin 5.8 % 8.8 !  11/29/23 08:10   Fasting Status  Fasting  11/28/23 06:36   Cholesterol,Tot 100 - 199 mg/dL 173  11/28/23 06:36   Triglycerides 0 - 149 mg/dL 288 (H)  11/28/23 06:36   HDL >=40 mg/dL 27 !  11/28/23 06:36   LDL <100 mg/dL 88  11/28/23 06:36   (H): Data is abnormally high  !: Data is abnormal  Assessment and Plan:     1. Uncontrolled type 2 diabetes mellitus with hyperglycemia (HCC)  -Status: Improving.  Hemoglobin A1c now at 8.8%.  Reviewed CMP as above.  Encourage patient continue with new dose of Trulicity, glimepiride.  Patient will continue with appropriate diet and exercise regimen.  -We will secure records from recent retinal exam.  -Patient will follow-up for repeat globin A1c in 6 months.  - POCT Hemoglobin A1C    2. Dyslipidemia  -Chronic condition, unstable.  Agree with change to medication.  Reviewed lipid profile as above.  Patient will continue with high-dose statin.  Can do the proper diet and exercise regimen.    3. Need for vaccination  - Tdap Vaccine =>8YO IM      Followup: No follow-ups on file.         PLEASE NOTE: This dictation was created  using voice recognition software. I have made every reasonable attempt to correct obvious errors, but I expect that there are errors of grammar and possibly content that I did not discover before finalizing the note.

## 2023-11-30 ENCOUNTER — TELEPHONE (OUTPATIENT)
Dept: CARDIOLOGY | Facility: MEDICAL CENTER | Age: 83
End: 2023-11-30

## 2023-11-30 ENCOUNTER — ANTICOAGULATION VISIT (OUTPATIENT)
Dept: MEDICAL GROUP | Facility: MEDICAL CENTER | Age: 83
End: 2023-11-30
Payer: MEDICARE

## 2023-11-30 DIAGNOSIS — Z95.828 PRESENCE OF IVC FILTER: ICD-10-CM

## 2023-11-30 DIAGNOSIS — E78.5 DYSLIPIDEMIA: ICD-10-CM

## 2023-11-30 LAB — INR PPP: 3.5 (ref 2–3.5)

## 2023-11-30 PROCEDURE — 85610 PROTHROMBIN TIME: CPT | Performed by: STUDENT IN AN ORGANIZED HEALTH CARE EDUCATION/TRAINING PROGRAM

## 2023-11-30 PROCEDURE — 99211 OFF/OP EST MAY X REQ PHY/QHP: CPT | Performed by: STUDENT IN AN ORGANIZED HEALTH CARE EDUCATION/TRAINING PROGRAM

## 2023-11-30 NOTE — TELEPHONE ENCOUNTER
Upon chart review per JI last OV note, to follow up 3 months.    Called pt, [phone], to review JI recommendations.  unable to reach.  Left voicemail to return this call at their earliest convenience.    Pt is active on [employer]. Last login today 11/29/2023.  [facility] message sent to pt regarding JI recommendations.  Awaiting response.

## 2023-11-30 NOTE — PROGRESS NOTES
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  Endocrinology [phone]          Anticoagulation Patient Findings  Patient Findings       Positives:  Change in diet/appetite (More cranberry in the last week)    Negatives:  Signs/symptoms of thrombosis, Signs/symptoms of bleeding, Laboratory test error suspected, Change in health, Change in alcohol use, Change in activity, Upcoming invasive procedure, Emergency department visit, Upcoming dental procedure, Missed doses, Extra doses, Change in medications, Hospital admission, Bruising, Other complaints            HPI:   Paulo Paredes is in the Anticoagulation Clinic today for an INR check on their anticoagulation therapy.     The reason for today's visit is to prevent morbidity and mortality from a blood clot and/or stroke and to reduce the risk of bleeding while on a anticoagulant.     PCP:  Damion Larose M.D.  [address]  [facility] [id number] [phone]    3 vitals included with today's appt-unless patient declined:  (BP, HR, weight, ht, RR)   There were no vitals filed for this visit.    Verified current warfarin dosing schedule.    Medications reconciled: Yes  Pt is on ASA 81 mg once daily as antiplatelet therapy for hx of CAD & CABG and must be reviewed again on next cards f/u.    Assessment:   INR is supratherapeutic    Plan:  Instructed pt to HOLD x 1 dose and to then continue on w/ his current regimen.    Follow up:  Follow up appointment in 2 week(s).       Other info:  Pt educated to contact our clinic with any changes in medications or s/s of bleeding or thrombosis.  Education was provided today regarding tips to reduce their bleed risk and dietary constraints while on a anticoagulant.    National Recommendations:  The CHEST guidelines recommends frequent INR monitoring at regular intervals (a few days up to a max of 12 weeks) to ensure patients are on the proper dose of warfarin, and patients are not having any complications from therapy.  INRs can dramatically change over a short time period due to diet, medications, and medical conditions.     Skip Dolwing, PharmD, BCACP    [facility]  Phone [phone] fax [phone]

## 2023-11-30 NOTE — TELEPHONE ENCOUNTER
----- Message from Abhi Damon M.D. sent at 11/29/2023  8:00 AM PST -----  Please call with labs with cholesterol levels not at target for coronary artery disease. Please change atorvastatin to rosuvastatin 40 mg and repeat labs prior to next visit.  Joan.  REJI

## 2023-12-12 NOTE — TELEPHONE ENCOUNTER
Caller:   Ursula (spouse)    Topic/issue: Ursula would like a call back, they would like to change the medication, and need clarification.     She also believes that labs need to be ordered.    Callback Number: [phone]    Thank you,   Waleska RIVERA

## 2023-12-13 RX ORDER — ROSUVASTATIN CALCIUM 40 MG/1
40 TABLET, COATED ORAL DAILY
Qty: 90 TABLET | Refills: 3 | Status: SHIPPED | OUTPATIENT
Start: 2023-12-13

## 2023-12-13 NOTE — TELEPHONE ENCOUNTER
Noted mychart response sent by pt.    Medication sent to preferred pharmacy as requested.  Lab ordered.

## 2023-12-14 ENCOUNTER — ANTICOAGULATION VISIT (OUTPATIENT)
Dept: MEDICAL GROUP | Facility: MEDICAL CENTER | Age: 83
End: 2023-12-14
Payer: MEDICARE

## 2023-12-14 DIAGNOSIS — Z95.828 PRESENCE OF IVC FILTER: ICD-10-CM

## 2023-12-14 LAB — INR PPP: 2.9 (ref 2–3.5)

## 2023-12-14 PROCEDURE — 93793 ANTICOAG MGMT PT WARFARIN: CPT | Performed by: NURSE PRACTITIONER

## 2023-12-14 PROCEDURE — 85610 PROTHROMBIN TIME: CPT | Performed by: NURSE PRACTITIONER

## 2023-12-14 NOTE — PROGRESS NOTES
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  Endocrinology [phone]          Anticoagulation Patient Findings  Patient Findings       Positives:  Change in medications (Switched atorvastatin to rosuvastatin)    Negatives:  Signs/symptoms of thrombosis, Signs/symptoms of bleeding, Laboratory test error suspected, Change in health, Change in alcohol use, Change in activity, Upcoming invasive procedure, Emergency department visit, Upcoming dental procedure, Missed doses, Extra doses, Change in diet/appetite, Hospital admission, Bruising, Other complaints            HPI:   Paulo Paredes is in the Anticoagulation Clinic today for an INR check on their anticoagulation therapy.     The reason for today's visit is to prevent morbidity and mortality from a blood clot and/or stroke and to reduce the risk of bleeding while on a anticoagulant.     PCP:  Damion Larose M.D.  [address] [facility] [address]  [facility] [id number] [phone]    3 vitals included with today's appt-unless patient declined:  (BP, HR, weight, ht, RR)   There were no vitals filed for this visit.    Verified current warfarin dosing schedule.    Medications reconciled: Yes  Pt is on ASA 81 mg once daily as antiplatelet therapy for hx of CAD & CABG and must be reviewed again on next cards f/u.     Assessment:   INR is therapeutic    Plan:  Instructed pt to continue on with current regimen.      Follow up:  Follow up appointment in 3 week(s).       Other info:  Pt educated to contact our clinic with any changes in medications or s/s of bleeding or thrombosis.  Education was provided today regarding tips to reduce their bleed risk and dietary constraints while on a anticoagulant.    National Recommendations:  The CHEST guidelines recommends frequent INR monitoring at regular intervals (a few days up to a max of 12 weeks) to ensure patients are on the proper dose of warfarin, and patients are not having any complications from therapy.  INRs can dramatically change over a short time period due to diet, medications, and medical conditions.     Skip Dowling, PharmD, BCACP    [facility]  Phone [phone] fax [phone]

## 2024-01-04 ENCOUNTER — ANTICOAGULATION VISIT (OUTPATIENT)
Dept: MEDICAL GROUP | Facility: MEDICAL CENTER | Age: 84
End: 2024-01-04
Payer: MEDICARE

## 2024-01-04 DIAGNOSIS — Z95.828 PRESENCE OF IVC FILTER: ICD-10-CM

## 2024-01-04 LAB — INR PPP: 2.3 (ref 2–3.5)

## 2024-01-04 PROCEDURE — 93793 ANTICOAG MGMT PT WARFARIN: CPT | Performed by: STUDENT IN AN ORGANIZED HEALTH CARE EDUCATION/TRAINING PROGRAM

## 2024-01-04 PROCEDURE — 85610 PROTHROMBIN TIME: CPT | Performed by: STUDENT IN AN ORGANIZED HEALTH CARE EDUCATION/TRAINING PROGRAM

## 2024-01-04 NOTE — PROGRESS NOTES
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  Endocrinology [phone]          Anticoagulation Patient Findings  Patient Findings       Negatives:  Signs/symptoms of thrombosis, Signs/symptoms of bleeding, Laboratory test error suspected, Change in health, Change in alcohol use, Change in activity, Upcoming invasive procedure, Emergency department visit, Upcoming dental procedure, Missed doses, Extra doses, Change in medications, Change in diet/appetite, Hospital admission, Bruising, Other complaints            HPI:   Paulo Paredes is in the Anticoagulation Clinic today for an INR check on their anticoagulation therapy.     The reason for today's visit is to prevent morbidity and mortality from a blood clot and/or stroke and to reduce the risk of bleeding while on a anticoagulant.     PCP:  Damion Larose M.D.  [address] [address]  [place] [id number] [phone]    3  vitals included with today's appt-unless patient declined:  (BP, HR, weight, ht, RR)   There were no vitals filed for this visit.    Verified current warfarin dosing schedule.    Medications reconciled: Yes  Pt is on ASA 81 mg once daily as antiplatelet therapy for hx of CAD & CABG and must be reviewed again on next cards f/u.      Assessment:   INR is therapeutic    Plan:  Instructed pt to continue on with current regimen.    Follow up:  Follow up appointment in 4 week(s).       Other info:  Pt educated to contact our clinic with any changes in medications or s/s of bleeding or thrombosis.  Education was provided today regarding tips to reduce their bleed risk and dietary constraints while on a anticoagulant.    National Recommendations:  The CHEST guidelines recommends frequent INR monitoring at regular intervals (a few days up to a max of 12 weeks) to ensure patients are on the proper dose of warfarin, and patients are not having any complications from therapy.  INRs can dramatically change over a short time period due to diet, medications, and medical conditions.     Skip Dowling, PharmD, BCACP    [facility]  Phone [phone] fax [phone]

## 2024-01-08 PROCEDURE — RXMED WILLOW AMBULATORY MEDICATION CHARGE: Performed by: FAMILY MEDICINE

## 2024-01-09 ENCOUNTER — PHARMACY VISIT (OUTPATIENT)
Dept: PHARMACY | Facility: MEDICAL CENTER | Age: 84
End: 2024-01-09
Payer: COMMERCIAL

## 2024-01-24 ENCOUNTER — HOSPITAL ENCOUNTER (OUTPATIENT)
Dept: LAB | Facility: MEDICAL CENTER | Age: 84
End: 2024-01-24
Attending: INTERNAL MEDICINE
Payer: MEDICARE

## 2024-01-24 DIAGNOSIS — E78.5 DYSLIPIDEMIA: ICD-10-CM

## 2024-01-24 DIAGNOSIS — I10 ESSENTIAL HYPERTENSION, BENIGN: ICD-10-CM

## 2024-01-24 DIAGNOSIS — N18.32 STAGE 3B CHRONIC KIDNEY DISEASE: ICD-10-CM

## 2024-01-24 LAB
ANION GAP SERPL CALC-SCNC: 9 MMOL/L (ref 7–16)
BUN SERPL-MCNC: 30 MG/DL (ref 8–22)
CALCIUM SERPL-MCNC: 9.8 MG/DL (ref 8.4–10.2)
CHLORIDE SERPL-SCNC: 109 MMOL/L (ref 96–112)
CHOLEST SERPL-MCNC: 146 MG/DL (ref 100–199)
CO2 SERPL-SCNC: 27 MMOL/L (ref 20–33)
CREAT SERPL-MCNC: 1.8 MG/DL (ref 0.5–1.4)
CREAT UR-MCNC: 107.87 MG/DL
ERYTHROCYTE [DISTWIDTH] IN BLOOD BY AUTOMATED COUNT: 47.7 FL (ref 35.9–50)
FASTING STATUS PATIENT QL REPORTED: NORMAL
FASTING STATUS PATIENT QL REPORTED: NORMAL
GFR SERPLBLD CREATININE-BSD FMLA CKD-EPI: 36 ML/MIN/1.73 M 2
GLUCOSE SERPL-MCNC: 204 MG/DL (ref 65–99)
HCT VFR BLD AUTO: 44.3 % (ref 42–52)
HDLC SERPL-MCNC: 35 MG/DL
HGB BLD-MCNC: 14.3 G/DL (ref 14–18)
LDLC SERPL CALC-MCNC: 73 MG/DL
MCH RBC QN AUTO: 31.4 PG (ref 27–33)
MCHC RBC AUTO-ENTMCNC: 32.3 G/DL (ref 32.3–36.5)
MCV RBC AUTO: 97.4 FL (ref 81.4–97.8)
MICROALBUMIN UR-MCNC: 10.2 MG/DL
MICROALBUMIN/CREAT UR: 95 MG/G (ref 0–30)
PLATELET # BLD AUTO: 114 K/UL (ref 164–446)
PMV BLD AUTO: 10.1 FL (ref 9–12.9)
POTASSIUM SERPL-SCNC: 5.3 MMOL/L (ref 3.6–5.5)
RBC # BLD AUTO: 4.55 M/UL (ref 4.7–6.1)
SODIUM SERPL-SCNC: 145 MMOL/L (ref 135–145)
TRIGL SERPL-MCNC: 189 MG/DL (ref 0–149)
WBC # BLD AUTO: 5 K/UL (ref 4.8–10.8)

## 2024-01-24 PROCEDURE — 80061 LIPID PANEL: CPT

## 2024-01-24 PROCEDURE — 80048 BASIC METABOLIC PNL TOTAL CA: CPT

## 2024-01-24 PROCEDURE — 36415 COLL VENOUS BLD VENIPUNCTURE: CPT

## 2024-01-24 PROCEDURE — 82043 UR ALBUMIN QUANTITATIVE: CPT

## 2024-01-24 PROCEDURE — 85027 COMPLETE CBC AUTOMATED: CPT

## 2024-01-24 PROCEDURE — 82570 ASSAY OF URINE CREATININE: CPT

## 2024-01-30 ENCOUNTER — PHARMACY VISIT (OUTPATIENT)
Dept: PHARMACY | Facility: MEDICAL CENTER | Age: 84
End: 2024-01-30
Payer: COMMERCIAL

## 2024-01-30 PROCEDURE — RXOTC WILLOW AMBULATORY OTC CHARGE

## 2024-01-30 PROCEDURE — RXMED WILLOW AMBULATORY MEDICATION CHARGE: Performed by: FAMILY MEDICINE

## 2024-01-31 DIAGNOSIS — Z79.01 CHRONIC ANTICOAGULATION: ICD-10-CM

## 2024-02-01 ENCOUNTER — OFFICE VISIT (OUTPATIENT)
Dept: CARDIOLOGY | Facility: MEDICAL CENTER | Age: 84
End: 2024-02-01
Attending: INTERNAL MEDICINE
Payer: MEDICARE

## 2024-02-01 ENCOUNTER — ANTICOAGULATION VISIT (OUTPATIENT)
Dept: MEDICAL GROUP | Facility: MEDICAL CENTER | Age: 84
End: 2024-02-01
Payer: MEDICARE

## 2024-02-01 VITALS
WEIGHT: 166 LBS | BODY MASS INDEX: 26.06 KG/M2 | DIASTOLIC BLOOD PRESSURE: 62 MMHG | SYSTOLIC BLOOD PRESSURE: 128 MMHG | HEART RATE: 65 BPM | HEIGHT: 67 IN | RESPIRATION RATE: 16 BRPM | OXYGEN SATURATION: 97 %

## 2024-02-01 DIAGNOSIS — E78.5 DYSLIPIDEMIA: ICD-10-CM

## 2024-02-01 DIAGNOSIS — Z95.828 PRESENCE OF IVC FILTER: ICD-10-CM

## 2024-02-01 DIAGNOSIS — I10 ESSENTIAL HYPERTENSION, BENIGN: ICD-10-CM

## 2024-02-01 DIAGNOSIS — I25.10 CAD IN NATIVE ARTERY: ICD-10-CM

## 2024-02-01 DIAGNOSIS — I70.0 ATHEROSCLEROSIS OF AORTA (HCC): ICD-10-CM

## 2024-02-01 DIAGNOSIS — Z95.1 S/P CABG X 4: ICD-10-CM

## 2024-02-01 LAB — INR PPP: 2.3 (ref 2–3.5)

## 2024-02-01 PROCEDURE — 3074F SYST BP LT 130 MM HG: CPT | Performed by: INTERNAL MEDICINE

## 2024-02-01 PROCEDURE — 85610 PROTHROMBIN TIME: CPT | Performed by: STUDENT IN AN ORGANIZED HEALTH CARE EDUCATION/TRAINING PROGRAM

## 2024-02-01 PROCEDURE — 3078F DIAST BP <80 MM HG: CPT | Performed by: INTERNAL MEDICINE

## 2024-02-01 PROCEDURE — 99214 OFFICE O/P EST MOD 30 MIN: CPT | Performed by: INTERNAL MEDICINE

## 2024-02-01 PROCEDURE — 93793 ANTICOAG MGMT PT WARFARIN: CPT | Performed by: STUDENT IN AN ORGANIZED HEALTH CARE EDUCATION/TRAINING PROGRAM

## 2024-02-01 PROCEDURE — 99213 OFFICE O/P EST LOW 20 MIN: CPT | Performed by: INTERNAL MEDICINE

## 2024-02-01 ASSESSMENT — ENCOUNTER SYMPTOMS
DIZZINESS: 0
COUGH: 0
VOMITING: 0
ABDOMINAL PAIN: 0
BRUISES/BLEEDS EASILY: 0
MUSCULOSKELETAL NEGATIVE: 1
MYALGIAS: 0
WEAKNESS: 0
HEADACHES: 0
SHORTNESS OF BREATH: 0
NERVOUS/ANXIOUS: 0
CARDIOVASCULAR NEGATIVE: 1
CLAUDICATION: 0
PSYCHIATRIC NEGATIVE: 1
DOUBLE VISION: 0
RESPIRATORY NEGATIVE: 1
CHILLS: 0
NEUROLOGICAL NEGATIVE: 1
BLURRED VISION: 0
DEPRESSION: 0
NAUSEA: 0
FOCAL WEAKNESS: 0
FEVER: 0
PALPITATIONS: 0
WEIGHT LOSS: 0
EYES NEGATIVE: 1
GASTROINTESTINAL NEGATIVE: 1
CONSTITUTIONAL NEGATIVE: 1

## 2024-02-01 ASSESSMENT — FIBROSIS 4 INDEX: FIB4 SCORE: 6.17

## 2024-02-01 NOTE — PROGRESS NOTES
Chief Complaint   Patient presents with    Coronary Artery Disease    Syncope           Transient Ischemic Attack       Caron Ramos is a 83 y.o. male who presents today for follow up of coronary artery disease with prior coronary artery bypass graft surgery.    Since the patient's last visit on 10/18/23, he has been doing well clinically. He denies chest pain, shortness of breath, palpitations, nausea/vomiting or diaphoresis. He keeps active exercising on his treadmill.     Past Medical History:   Diagnosis Date    Anesthesia     difficult intubation with surgery 2008,2010    Basal cell carcinoma of skin     CAD (coronary artery disease)     Cancer (HCC)     rt  kidney 1989;  thyroid cancer 2012, prostate 2008, skin    Chronic kidney disease (CKD) stage G3b/A2, moderately decreased glomerular filtration rate (GFR) between 30-44 mL/min/1.73 square meter and albuminuria creatinine ratio between  mg/g ([facility]) 4/23/2015    DVT (deep venous thrombosis) ([facility]) 2014    ED (erectile dysfunction)     GERD (gastroesophageal reflux disease)     Glaucoma     Heart burn     Hyperlipidemia 12/3/2012    Hypertension     Indigestion     Multiple pulmonary emboli (HCC) 2014    Multiple thyroid nodules 2009    Papillary carcinoma of thyroid (HCC) 2014    R 2 foci / 4.5mm,0.25mm / no mets/ pT1pN0    postsurgical hypothyroidism 2014    Pre-diabetes     Renal disorder     rt kidney cancer,nephrectomy    S/P CABG x 4 2003    S/P colectomy 2010    multiple colon polyps / no Ca    S/p nephrectomy 1998    carcinoma    S/P prostatectomy 2008    carcinoma    Stroke (HCC)     'mild',no residual,'one doctor said it was a tia'    Transient renal failure 2014    renal vein thrombosis    Type II or unspecified type diabetes mellitus without mention of complication, not stated as uncontrolled     on no medications    Unspecified urinary incontinence      Past Surgical History:   Procedure Laterality Date    THYROIDECTOMY TOTAL   5/13/2014    Performed by Eliceo Bolanos M.D. at [facility]    NODE DISSECTION  5/13/2014    Performed by Eliceo Bolanos M.D. at [facility]    COLON RESECTION      MULTIPLE CORONARY ARTERY BYPASS      x 4 11/2003    NEPHRECTOMY RADICAL      right side 1998    OTHER ORTHOPEDIC SURGERY      trotator cuff    PROSTATECTOMY, RADICAL RETRO      cancer /january 2008     Family History   Problem Relation Age of Onset    Diabetes Mother     Heart Disease Mother     Diabetes Father     Cancer Father         pancreas    Thyroid Sister         Cancer    Cancer Sister         thyroid    Diabetes Sister     Cancer Brother 49        colon    Diabetes Brother     Diabetes Maternal Grandfather     Diabetes Paternal Grandmother     Diabetes Paternal Grandfather     No Known Problems Maternal Grandmother      Social History     Socioeconomic History    Marital status:      Spouse name: Not on file    Number of children: Not on file    Years of education: Not on file    Highest education level: Not on file   Occupational History    Not on file   Tobacco Use    Smoking status: Never    Smokeless tobacco: Never   Vaping Use    Vaping Use: Never used   Substance and Sexual Activity    Alcohol use: Yes     Comment: Occasionally    Drug use: No    Sexual activity: Not Currently     Comment: retired    Other Topics Concern    Not on file   Social History Narrative    Not on file     Social Determinants of Health     Financial Resource Strain: Not on file   Food Insecurity: Not on file   Transportation Needs: Not on file   Physical Activity: Not on file   Stress: Not on file   Social Connections: Not on file   Intimate Partner Violence: Not on file   Housing Stability: Not on file     Allergies   Allergen Reactions    Lactose     Seasonal      Outpatient Encounter Medications as of 2/1/2024   Medication Sig Dispense Refill    rosuvastatin (CRESTOR) 40 MG tablet Take 1 Tablet by  mouth every day. 90 Tablet 3    levothyroxine (EUTHYROX) 137 MCG Tab TAKE 1 TABLET BY MOUTH ONCE DAILY IN THE MORNING ON  AN  EMPTY  STOMACH  ONE  HOUR  BEFORE  EATING 100 Tablet 3    fenofibrate (TRIGLIDE) 160 MG tablet Take 1 Tablet by mouth every day. 100 Tablet 3    warfarin (COUMADIN) 5 MG Tab Take 1 Tablet by mouth every evening. Take 0.5 to 1 tablet by mouth once daily. Take as directed by anticoagulation clinic. 100 Tablet 3    losartan (COZAAR) 25 MG Tab Take 1 Tablet by mouth every day for 360 days. 100 Tablet 2    Dulaglutide 1.5 MG/0.5ML Solution Pen-injector Inject 0.5 mL under the skin every 7 days for 360 days. 24 mL 3    aspirin 81 MG EC tablet Take 81 mg by mouth every day.      atenolol (TENORMIN) 25 MG Tab Take 1 tablet by mouth once daily 100 Tablet 3    oxymetazoline (AFRIN 12 HOUR) 0.05 % Solution Administer 2 Sprays into affected nostril(S) 2 times a day. Discontinue after 2 days. 15 mL 0    triamcinolone acetonide (KENALOG) 0.025 % Cream Apply to the affected area twice a day on legs. 15 g 1    capsaicin (ZOSTRIX) 0.025 % cream APPLY TO ITCHY AREA ON NECK UP TO 5 TIMES A DAY FOR 1 WEEK, THEN 3 TIMES A DAY FOR 3 TO 6 WEEKS. WASH HANDS THOROUGHLY AFTER APPLYING. DO TEST SPOT 1ST      Multiple Vitamins-Minerals (ZINC PO) Take  by mouth.      hydrocortisone 2.5 % Cream topical cream APPLY TO RASH ON GROIN TWICE DAILY FOR 1 WEEK WITH FLARES      Calcium Carb-Cholecalciferol (CALCIUM 1000 + D PO) Take 1 Dose by mouth every day.      Omega-3 Krill Oil 300 MG Cap Take 300 mg by mouth every day.      Cinnamon 500 MG Cap Take 1,000 mg by mouth.      Cyanocobalamin (VITAMIN B-12) 1000 MCG Tab Take 1,000 mcg by mouth every day.      Coenzyme Q10 (CO Q-10 PO) Take 1 Dose by mouth every day. Unknown OTC Strength      [DISCONTINUED] glipiZIDE (GLUCOTROL) 10 MG Tab  (Patient not taking: Reported on 10/18/2023)       No facility-administered encounter medications on file as of 2/1/2024.     Review of Systems  "  Constitutional: Negative.  Negative for chills, fever, malaise/fatigue and weight loss.   HENT: Negative.  Negative for hearing loss.    Eyes: Negative.  Negative for blurred vision and double vision.   Respiratory: Negative.  Negative for cough and shortness of breath.    Cardiovascular: Negative.  Negative for chest pain, palpitations, claudication and leg swelling.   Gastrointestinal: Negative.  Negative for abdominal pain, nausea and vomiting.   Genitourinary: Negative.  Negative for dysuria and urgency.   Musculoskeletal: Negative.  Negative for joint pain and myalgias.   Skin: Negative.  Negative for itching and rash.   Neurological: Negative.  Negative for dizziness, focal weakness, weakness and headaches.   Endo/Heme/Allergies: Negative.  Does not bruise/bleed easily.   Psychiatric/Behavioral: Negative.  Negative for depression. The patient is not nervous/anxious.               Objective     /62 (BP Location: Left arm, Patient Position: Sitting, BP Cuff Size: Adult)   Pulse 65   Resp 16   Ht 1.702 m (5' 7\")   Wt 75.3 kg (166 lb)   SpO2 97%   BMI 26.00 kg/m²     Physical Exam  Constitutional:       Appearance: Normal appearance. He is well-developed and normal weight.   HENT:      Head: Normocephalic and atraumatic.   Neck:      Vascular: No JVD.   Cardiovascular:      Rate and Rhythm: Normal rate and regular rhythm.      Heart sounds: Normal heart sounds.   Pulmonary:      Effort: Pulmonary effort is normal.      Breath sounds: Normal breath sounds.   Abdominal:      General: Bowel sounds are normal.      Palpations: Abdomen is soft.      Comments: No hepatosplenomegaly.   Musculoskeletal:         General: Normal range of motion.   Lymphadenopathy:      Cervical: No cervical adenopathy.   Skin:     General: Skin is warm and dry.   Neurological:      Mental Status: He is alert and oriented to person, place, and time.            CARDIAC STUDIES/PROCEDURES:     ABDOMINAL ULTRASOUND (06/04/14)  1. " Ectatic aorta.  2. Atherosclerotic plaque.  3. Cholelithiasis.     CT OF CHEST (06/06/14)  1. There is thrombus in the IVC which extends into the distal left renal vein between the aorta and vena cava. The patient is on heparin for 36 hours approximately, and the improvement in left   renal edema and decrease in caliber of the left renal vein compared to previous CT probably reflects improved venous drainage related to the therapy .  2. The thrombus attached to the filter extends cephalad to the filter to the level of the caudate lobe of the liver.  3. There is DVT in both lower extremities.     ECHOCARDIOGRAM CONCLUSIONS (09/11/23)  Compared to the images of the prior study 10/18/18, there has been no   significant change.   Normal left ventricular size and systolic function.  The left ventricular ejection fraction is visually estimated to be 60%.  Normal diastolic function.  Normal right ventricular size and systolic function.  Mild mitral regurgitation.    ECHOCARDIOGRAM CONCLUSIONS (10/18/18)  Prior echo on 05/21/14. Compared to the report of the study done -   there has been no significant change.   Normal left ventricular systolic function.  Left ventricular ejection fraction is visually estimated to be 65%.  Mild tricuspid regurgitation.  Unable to estimate pulmonary artery pressure due to an inadequate   tricuspid regurgitant jet.     ECHOCARDIOGRAM CONCLUSIONS (05/21/14)  Normal left ventricular size. Sigmoid septum with increased outflow   velocity but not anterior motion of the mitral valve.  Basal inferior and apical septal hypokinesis. Left ventricular   ejection fraction is 50% to 55%.  Grade I diastolic dysfunction is present.  Normal left atrial size.  Aortic sclerosis without significant stenosis.  Trace mitral regurgitation.     ECHOCARDIOGRAM CONCLUSIONS (01/25/14)  Normal left ventricular size and function.   Grade I diastolic dysfunction is present.   Normal left ventricular wall thickness.    Left ventricular ejection fraction is 60% to 65%.  Mild mitral regurgitation.   Aortic valve probably trileaflet. No stenosis or regurgitation seen.   AV MG 5.39 mmHg, PG 9.33 mmHg.  Mild tricuspid regurgitation. Right ventricular systolic pressure is   estimated to be 30 to 35 mmHg consistent with mild pulmonary hypertension.  No pericardial effusion seen.   Normal aortic diameter 3.2 cm  No prior study for comparison.     EKG performed on (08/23/23) EKG personally interpreted shows sinus rhythm.   EKG performed on (01/23/19) EKG shows sinus bradycardia.  EKG performed on (05/15/14) EKG shows normal sinus rhythm with non-specific intra-ventricular conduction delay.     Laboratory results of (01/24/24) were reviewed. Cholesterol profile of 146/189/35/73 mg/dL noted.   Laboratory results of (11/28/23) were reviewed. Cholesterol profile of 173/288/27/88 mg/dL noted.  Laboratory results of (08/31/23) Cholesterol profile of 156/239/31/77 mg/dL noted.   Laboratory results of (10/26/21) Cholesterol profile of 167/224/34/88 mg/dL noted.  Laboratory results of (08/24/20) Cholesterol profile of 199/202/37/122 mg/dl noted.  Laboratory results of (07/15/19) Cholesterol profile of 169/185/32/100 noted.  Laboratory results of (09/29/18) Cholesterol profile of 261/365/36/151 noted.  Laboratory results of (09/21/15) Cholesterol profile of 251/307/37/153 noted.  Laboratory results of (06/16/14) Cholesterol profile of 172/233/37/88 noted.     LOWER EXTREMITY VENOUS ULTRASOUND (07/19/19)  No evidence of RIGHT lower extremity DVT.      LOWER EXTREMITY VENOUS ULTRASOUND (06/05/14)  1. No evidence of acute right lower extremity deep venous thrombosis with   recannalized venous thrombosis throughout.  2. Normal left lower extremity superficial and deep venous examination.      MRA OF BRAIN (01/25/14)  1. MRA OF THE [place] OF [place] WITHIN NORMAL LIMITS WITH NO EVIDENCE OF ANEURYSM OR CEREBROVASCULAR OCCLUSIVE DISEASE.  2. FIELD OF VIEW  PARTIALLY EXCLUDES THE INFERIOR TEMPORAL M2 DIVISION LEFT MCA.     MRA OF NECK (01/25/14)  1. Unremarkable cervical vertebral arteries.  2. Right carotid bulb and ICA origin minimal plaquing with up to about 10% stenosis. No flow limiting lesion.  3. Left carotid bulb and left ICA origin minimal plaquing with up to about 10-15% stenosis. No flow limiting lesion.     MYOCARDIAL PERFUSION STUDY CONCLUSIONS (10/18/18)  No evidence of significant jeopardized viable myocardium or prior myocardial infarction.  Apical thinning noted.  Normal wall motion, EF 59%.   TID absent.   Sinus rhythm.  Nondiagnostic ECG portion of a nuclear stress test.  ECG INTERPRETATION  Nondiagnostic ECG portion of a nuclear stress test.     STRESS ECHOCARDIOGRAM [facility] (03/14/12)  Stress echocardiogram showing no evidence for stress-induced ischemia.     ONEIL Lawrence  DOS: 10/11/23   Summary:   Sinus rhythm without significant tachy- or kaylee-arrhythmias.     Assessment & Plan     1. CAD in native artery        2. S/P CABG x 4        3. Essential hypertension, benign        4. Dyslipidemia        5. Atherosclerosis of aorta (HCC)            Medical Decision Making: Today's Assessment/Status/Plan:        Coronary artery disease with prior coronary bypass graft (4 vessel CABG at [facility] 2003): He is clinically doing well. I will continue with current medical care including aspirin, atenolol, losartan and rosuvastatin  Hypertension: Blood pressure is well controlled. We will continue with beta blockade therapy and angiotensin receptor blockade.  Hyperlipidemia: He is doing well on statin therapy without myalgia symptoms and fenofibrate. He is working on diet modification.  Chronic anticoagulation therapy (warfarin) and IVC filter with pulmonary embolism/deep venous thrombosis. He is clinically doing well without recurrence or chest pain or shortness of breath.  History of stroke (2004): He remains clinically stable without  recurrence of stroke symptoms.  Renal insufficiency (Managed by Dr. Najjar, [place])  Health maintenance: Recovered large hematoma following thyroidectomy.    We will follow up the patient in one year.     CC Damion Snow

## 2024-02-01 NOTE — PROGRESS NOTES
OP Anticoagulation Service Note    Date: 2024    Anticoagulation Summary  As of 2024      INR goal:  2.0-3.0   TTR:  73.2 % (5.9 y)   INR used for dosin.30 (2024)   Warfarin maintenance plan:  2.5 mg (5 mg x 0.5) every Thu; 5 mg (5 mg x 1) all other days   Weekly warfarin total:  32.5 mg   Plan last modified:  Skip Dowling, PharmD (2022)   Next INR check:  3/14/2024   Priority:  Maintenance   Target end date:  Indefinite    Indications    Presence of IVC filter [Z95.828]  Transient ischemic attack [G45.9] (Resolved) [G45.9]  Deep vein thrombosis (DVT) of both lower extremities (HCC) (Resolved) [I82.403]  DVT (deep venous thrombosis) (HCC) (Resolved) [I82.409]  Multiple pulmonary emboli (HCC) (Resolved) [I26.99]  Chronic anticoagulation (Resolved) [Z79.01]                 Anticoagulation Episode Summary       INR check location:      Preferred lab:      Send INR reminders to:      Comments:            Anticoagulation Care Providers       Provider Role Specialty Phone number    [facility]   [phone]    Edward Walters M.D.  Endocrinology [phone]          Anticoagulation Patient Findings  Patient Findings       Negatives:  Signs/symptoms of thrombosis, Signs/symptoms of bleeding, Laboratory test error suspected, Change in health, Change in alcohol use, Change in activity, Upcoming invasive procedure, Emergency department visit, Upcoming dental procedure, Missed doses, Extra doses, Change in medications, Change in diet/appetite, Hospital admission, Bruising, Other complaints            HPI:   Paulo Paredes is in the Anticoagulation Clinic today for an INR check on their anticoagulation therapy.     The reason for today's visit is to prevent morbidity and mortality from a blood clot and/or stroke and to reduce the risk of bleeding while on a anticoagulant.     PCP:  Damion Larose M.D.  [address] [address]  [place] [id number] [phone]    3  vitals included with today's appt-unless patient declined:  (BP, HR, weight, ht, RR)   There were no vitals filed for this visit.    Verified current warfarin dosing schedule.    Medications reconciled: Yes  Pt is on ASA 81 mg once daily as antiplatelet therapy for hx of CAD & CABG and must be reviewed again on next cards f/u.     Assessment:   INR is therapeutic    Plan:  Instructed pt to continue on with current regimen.    Follow up:  Follow up appointment in 6 week(s).       Other info:  Pt educated to contact our clinic with any changes in medications or s/s of bleeding or thrombosis.  Education was provided today regarding tips to reduce their bleed risk and dietary constraints while on an anticoagulant.    National Recommendations:  The CHEST guidelines recommends frequent INR monitoring at regular intervals (a few days up to a max of 12 weeks) to ensure patients are on the proper dose of warfarin, and patients are not having any complications from therapy.  INRs can dramatically change over a short time period due to diet, medications, and medical conditions.     Skip Dowling, PharmD, BCACP    [facility]  Phone [phone] fax [phone]

## 2024-02-12 ENCOUNTER — APPOINTMENT (RX ONLY)
Dept: URBAN - METROPOLITAN AREA CLINIC 22 | Facility: CLINIC | Age: 84
Setting detail: DERMATOLOGY
End: 2024-02-12

## 2024-02-12 DIAGNOSIS — L81.7 PIGMENTED PURPURIC DERMATOSIS: ICD-10-CM

## 2024-02-12 DIAGNOSIS — L73.8 OTHER SPECIFIED FOLLICULAR DISORDERS: ICD-10-CM

## 2024-02-12 DIAGNOSIS — Z85.828 PERSONAL HISTORY OF OTHER MALIGNANT NEOPLASM OF SKIN: ICD-10-CM

## 2024-02-12 PROBLEM — D48.5 NEOPLASM OF UNCERTAIN BEHAVIOR OF SKIN: Status: ACTIVE | Noted: 2024-02-12

## 2024-02-12 PROCEDURE — ? COUNSELING

## 2024-02-12 PROCEDURE — ? BIOPSY BY SHAVE METHOD

## 2024-02-12 PROCEDURE — 11102 TANGNTL BX SKIN SINGLE LES: CPT

## 2024-02-12 PROCEDURE — 99213 OFFICE O/P EST LOW 20 MIN: CPT | Mod: 25

## 2024-02-12 PROCEDURE — ? PRESCRIPTION

## 2024-02-12 RX ORDER — TRIAMCINOLONE ACETONIDE 1 MG/G
1 CREAM TOPICAL BID
Qty: 45 | Refills: 0 | Status: ERX | COMMUNITY
Start: 2024-02-12

## 2024-02-12 RX ADMIN — TRIAMCINOLONE ACETONIDE 1: 1 CREAM TOPICAL at 00:00

## 2024-02-12 ASSESSMENT — LOCATION SIMPLE DESCRIPTION DERM
LOCATION SIMPLE: RIGHT CALF
LOCATION SIMPLE: LEFT CHEEK
LOCATION SIMPLE: RIGHT PRETIBIAL REGION
LOCATION SIMPLE: LEFT LIP
LOCATION SIMPLE: LEFT PRETIBIAL REGION

## 2024-02-12 ASSESSMENT — LOCATION DETAILED DESCRIPTION DERM
LOCATION DETAILED: LEFT UPPER CUTANEOUS LIP
LOCATION DETAILED: RIGHT DISTAL LATERAL CALF
LOCATION DETAILED: LEFT DISTAL PRETIBIAL REGION
LOCATION DETAILED: RIGHT DISTAL PRETIBIAL REGION
LOCATION DETAILED: LEFT SUPERIOR CENTRAL MALAR CHEEK

## 2024-02-12 ASSESSMENT — LOCATION ZONE DERM
LOCATION ZONE: FACE
LOCATION ZONE: LEG
LOCATION ZONE: LIP

## 2024-02-12 NOTE — PROCEDURE: BIOPSY BY SHAVE METHOD
Detail Level: Detailed
Depth Of Biopsy: dermis
Was A Bandage Applied: Yes
Size Of Lesion In Cm: 0.3
X Size Of Lesion In Cm: 0
Biopsy Type: H and E
Biopsy Method: Personna blade
Anesthesia Type: 0.5% lidocaine without epinephrine
Anesthesia Volume In Cc: 0.5
Hemostasis: Aluminum Chloride and Electrocautery
Wound Care: Petrolatum
Dressing: pressure dressing with telfa
Destruction After The Procedure: No
Type Of Destruction Used: Curettage
Curettage Text: The wound bed was treated with curettage after the biopsy was performed.
Cryotherapy Text: The wound bed was treated with cryotherapy after the biopsy was performed.
Electrodesiccation Text: The wound bed was treated with electrodesiccation after the biopsy was performed.
Electrodesiccation And Curettage Text: The wound bed was treated with electrodesiccation and curettage after the biopsy was performed.
Silver Nitrate Text: The wound bed was treated with silver nitrate after the biopsy was performed.
Lab: [id number]
Lab Facility: 
Consent: Written consent was obtained and risks were reviewed including but not limited to scarring, infection, bleeding, scabbing, incomplete removal, nerve damage and allergy to anesthesia.
Post-Care Instructions: I reviewed with the patient in detail post-care instructions. Patient is to keep the biopsy site dry overnight. Gentle cleansing daily.  Apply petroleum ointment daily until healed. Patient may apply hydrogen peroxide soaks to remove any crusting.
Notification Instructions: Patient will be notified of biopsy results. However, patient instructed to call the office if not contacted within 2 weeks.
Billing Type: Third-Party Bill
Information: Selecting Yes will display possible errors in your note based on the variables you have selected. This validation is only offered as a suggestion for you. PLEASE NOTE THAT THE VALIDATION TEXT WILL BE REMOVED WHEN YOU FINALIZE YOUR NOTE. IF YOU WANT TO FAX A PRELIMINARY NOTE YOU WILL NEED TO TOGGLE THIS TO 'NO' IF YOU DO NOT WANT IT IN YOUR FAXED NOTE.

## 2024-02-16 ENCOUNTER — PATIENT MESSAGE (OUTPATIENT)
Dept: HEALTH INFORMATION MANAGEMENT | Facility: OTHER | Age: 84
End: 2024-02-16

## 2024-02-26 ENCOUNTER — OFFICE VISIT (OUTPATIENT)
Dept: MEDICAL GROUP | Facility: LAB | Age: 84
End: 2024-02-26
Payer: MEDICARE

## 2024-02-26 VITALS
HEART RATE: 74 BPM | TEMPERATURE: 96.6 F | WEIGHT: 163 LBS | SYSTOLIC BLOOD PRESSURE: 114 MMHG | HEIGHT: 68 IN | BODY MASS INDEX: 24.71 KG/M2 | RESPIRATION RATE: 16 BRPM | OXYGEN SATURATION: 95 % | DIASTOLIC BLOOD PRESSURE: 62 MMHG

## 2024-02-26 DIAGNOSIS — E11.29 CONTROLLED TYPE 2 DIABETES MELLITUS WITH MICROALBUMINURIA, WITHOUT LONG-TERM CURRENT USE OF INSULIN (HCC): ICD-10-CM

## 2024-02-26 DIAGNOSIS — R80.9 MICROALBUMINURIA DUE TO TYPE 2 DIABETES MELLITUS (HCC): ICD-10-CM

## 2024-02-26 DIAGNOSIS — D68.9 COAGULATION DEFECT (HCC): ICD-10-CM

## 2024-02-26 DIAGNOSIS — I10 ESSENTIAL HYPERTENSION, BENIGN: ICD-10-CM

## 2024-02-26 DIAGNOSIS — D69.6 THROMBOCYTOPENIA (HCC): ICD-10-CM

## 2024-02-26 DIAGNOSIS — R80.9 CONTROLLED TYPE 2 DIABETES MELLITUS WITH MICROALBUMINURIA, WITHOUT LONG-TERM CURRENT USE OF INSULIN (HCC): ICD-10-CM

## 2024-02-26 DIAGNOSIS — E11.29 MICROALBUMINURIA DUE TO TYPE 2 DIABETES MELLITUS (HCC): ICD-10-CM

## 2024-02-26 PROCEDURE — 3074F SYST BP LT 130 MM HG: CPT | Performed by: FAMILY MEDICINE

## 2024-02-26 PROCEDURE — 3078F DIAST BP <80 MM HG: CPT | Performed by: FAMILY MEDICINE

## 2024-02-26 PROCEDURE — 99214 OFFICE O/P EST MOD 30 MIN: CPT | Performed by: FAMILY MEDICINE

## 2024-02-26 ASSESSMENT — ENCOUNTER SYMPTOMS
VOMITING: 0
ABDOMINAL PAIN: 0
SHORTNESS OF BREATH: 0
NAUSEA: 0
WHEEZING: 0
PALPITATIONS: 0

## 2024-02-26 ASSESSMENT — PATIENT HEALTH QUESTIONNAIRE - PHQ9: CLINICAL INTERPRETATION OF PHQ2 SCORE: 0

## 2024-02-26 ASSESSMENT — FIBROSIS 4 INDEX: FIB4 SCORE: 6.17

## 2024-02-26 NOTE — PROGRESS NOTES
Verbal consent was acquired by the patient to use [employer] ambient listening note generation during this visit Yes    Subjective:   Paulo Paredes is a 83 y.o. male here today for   Chief Complaint   Patient presents with    Medication Follow-up     History of Present Illness  The patient presents to the office for follow-up on diabetes. He is accompanied by an adult female.    He has been doing well on the new dosages of medications. He has been exercising normally. His diet has been good, but not much change. His weight is staying about the same. He is still taking Trulicity every 7 days. He has not been on glipizide for 2 to 3 years since he went on Trulicity. He has lost probably 20 pounds since he has been on the Trulicity.    His blood pressure this morning was 114/62. He denies any lightheadedness, dizziness, or feeling like he is going to pass out. He checks his blood pressure at home and it is comparable to the readings here. His systolic blood pressure ranges from 122 to 124 and his diastolic blood pressure is normal. He is on losartan and atenolol.   He is on warfarin. He scratched his skin and has some bleeding.      Allergies   Allergen Reactions    Lactose     Seasonal          Current medicines (including changes today)  Current Outpatient Medications   Medication Sig Dispense Refill    rosuvastatin (CRESTOR) 40 MG tablet Take 1 Tablet by mouth every day. 90 Tablet 3    levothyroxine (EUTHYROX) 137 MCG Tab TAKE 1 TABLET BY MOUTH ONCE DAILY IN THE MORNING ON  AN  EMPTY  STOMACH  ONE  HOUR  BEFORE  EATING 100 Tablet 3    fenofibrate (TRIGLIDE) 160 MG tablet Take 1 Tablet by mouth every day. 100 Tablet 3    warfarin (COUMADIN) 5 MG Tab Take 1 Tablet by mouth every evening. Take 0.5 to 1 tablet by mouth once daily. Take as directed by anticoagulation clinic. 100 Tablet 3    losartan (COZAAR) 25 MG Tab Take 1 Tablet by mouth every day for 360 days. 100 Tablet 2    Dulaglutide 1.5 MG/0.5ML Solution  Pen-injector Inject 0.5 mL under the skin every 7 days for 360 days. 24 mL 3    aspirin 81 MG EC tablet Take 81 mg by mouth every day.      atenolol (TENORMIN) 25 MG Tab Take 1 tablet by mouth once daily 100 Tablet 3    oxymetazoline (AFRIN 12 HOUR) 0.05 % Solution Administer 2 Sprays into affected nostril(S) 2 times a day. Discontinue after 2 days. 15 mL 0    triamcinolone acetonide (KENALOG) 0.025 % Cream Apply to the affected area twice a day on legs. 15 g 1    capsaicin (ZOSTRIX) 0.025 % cream APPLY TO ITCHY AREA ON NECK UP TO 5 TIMES A DAY FOR 1 WEEK, THEN 3 TIMES A DAY FOR 3 TO 6 WEEKS. WASH HANDS THOROUGHLY AFTER APPLYING. DO TEST SPOT 1ST      Multiple Vitamins-Minerals (ZINC PO) Take  by mouth.      hydrocortisone 2.5 % Cream topical cream APPLY TO RASH ON GROIN TWICE DAILY FOR 1 WEEK WITH FLARES      Calcium Carb-Cholecalciferol (CALCIUM 1000 + D PO) Take 1 Dose by mouth every day.      Omega-3 Krill Oil 300 MG Cap Take 300 mg by mouth every day.      Cinnamon 500 MG Cap Take 1,000 mg by mouth.      Cyanocobalamin (VITAMIN B-12) 1000 MCG Tab Take 1,000 mcg by mouth every day.      Coenzyme Q10 (CO Q-10 PO) Take 1 Dose by mouth every day. Unknown OTC Strength       No current facility-administered medications for this visit.     He  has a past medical history of Anesthesia, Basal cell carcinoma of skin, CAD (coronary artery disease), Cancer ([facility]), Chronic kidney disease (CKD) stage G3b/A2, moderately decreased glomerular filtration rate (GFR) between 30-44 mL/min/1.73 square meter and albuminuria creatinine ratio between  mg/g ([facility]) (4/23/2015), DVT (deep venous thrombosis) ([facility]) (2014), ED (erectile dysfunction), GERD (gastroesophageal reflux disease), Glaucoma, Heart burn, Hyperlipidemia (12/3/2012), Hypertension, Indigestion, Multiple pulmonary emboli ([facility]) (2014), Multiple thyroid nodules (2009), Papillary carcinoma of thyroid ([facility]) (2014), postsurgical hypothyroidism (2014), Pre-diabetes, Renal  "disorder, S/P CABG x 4 (2003), S/P colectomy (2010), S/p nephrectomy (1998), S/P prostatectomy (2008), Stroke (HCC), Transient renal failure (2014), Type II or unspecified type diabetes mellitus without mention of complication, not stated as uncontrolled, and Unspecified urinary incontinence.    ROS   Review of Systems   Respiratory:  Negative for shortness of breath and wheezing.    Cardiovascular:  Negative for chest pain and palpitations.   Gastrointestinal:  Negative for abdominal pain, nausea and vomiting.      Objective:     Physical Exam:  /62   Pulse 74   Temp 35.9 °C (96.6 °F) (Temporal)   Resp 16   Ht 1.727 m (5' 8\")   Wt 73.9 kg (163 lb)   SpO2 95%  Body mass index is 24.78 kg/m².   Constitutional: Alert, no distress, well-groomed.  Skin: No rashes in visible areas.  Eye: Round. Conjunctiva clear, lids normal. No icterus.   ENMT: Lips pink without lesions, good dentition, moist mucous membranes. Phonation normal.  Neck: No masses, no thyromegaly. Moves freely without pain.  Respiratory: Unlabored respiratory effort, no cough or audible wheeze  Psych: Alert and oriented x3, normal affect and mood.   Results      Assessment and Plan:     Assessment & Plan  1. Diabetes.  Chronic condition, stable  I will recheck his A1c. I will call him with the results.  Patient having difficulty finding Trulicity.  Hopefully will build to continue Trulicity.  If it is unavailable patient we may need to discuss switching back to glimepiride or starting new medication regiment.  Partners will depend on A1c and how well-controlled we are.  Encourage patient get appropriate diet and exercise regiment.  Will follow patient later this week with A1c results    2. Hypertension.  Chronic condition, stable  His blood pressure is 114/62 today. He will continue losartan and atenolol.    [facility] Form    Diagnosis to address: E11.29, R80.9 - Controlled type 2 diabetes mellitus with microalbuminuria, without long-term current " use of insulin (HCC)  Assessment and plan: Chronic, stable. Continue with current defined treatment plan: Due to Trulicity.  Continue the proper diet and exercise regiment.. Follow-up at least annually.  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 Continue with current defined treatment plan: Continue with losartan.  Continue with yearly screening.. Follow-up at least annually.  Diagnosis: D68.9 - Coagulation defect (HCC)  Assessment and plan: Chronic, stable. Continue with current defined treatment plan: Secondary to warfarin use in the setting of atrial fibrillation.  Will continue the warfarin.  Patient stable with no concerns.. Follow-up at least annually.  Diagnosis: D69.6 - Thrombocytopenia (HCC)  Assessment and plan: Chronic, stable. Continue with current defined treatment plan: Continue monitoring symptoms.  Discussed appropriate wound care if wounds occur and how to stop bleeding. Follow-up at least annually.  Last edited 02/26/24 10:51 PST by Damion Larose M.D.           Follow-up  He will follow up as needed.    Orders:  1. Controlled type 2 diabetes mellitus with microalbuminuria, without long-term current use of insulin (HCC)  - HEMOGLOBIN A1C; Future    2. Essential hypertension, benign        Followup: No follow-ups on file.         PLEASE NOTE: This dictation was created using voice recognition and [employer] ambient listening software. I have made every reasonable attempt to correct obvious errors, but I expect that there are errors of grammar and possibly content that I did not discover before finalizing the note.

## 2024-02-27 ENCOUNTER — OFFICE VISIT (OUTPATIENT)
Dept: NEPHROLOGY | Facility: MEDICAL CENTER | Age: 84
End: 2024-02-27
Payer: MEDICARE

## 2024-02-27 VITALS
TEMPERATURE: 97.6 F | HEIGHT: 68 IN | DIASTOLIC BLOOD PRESSURE: 72 MMHG | WEIGHT: 166 LBS | OXYGEN SATURATION: 98 % | SYSTOLIC BLOOD PRESSURE: 118 MMHG | BODY MASS INDEX: 25.16 KG/M2 | HEART RATE: 66 BPM

## 2024-02-27 DIAGNOSIS — I10 ESSENTIAL HYPERTENSION, BENIGN: ICD-10-CM

## 2024-02-27 DIAGNOSIS — N18.32 STAGE 3B CHRONIC KIDNEY DISEASE: ICD-10-CM

## 2024-02-27 DIAGNOSIS — N18.32 TYPE 2 DIABETES MELLITUS WITH STAGE 3B CHRONIC KIDNEY DISEASE, UNSPECIFIED WHETHER LONG TERM INSULIN USE (HCC): ICD-10-CM

## 2024-02-27 DIAGNOSIS — E11.22 TYPE 2 DIABETES MELLITUS WITH STAGE 3B CHRONIC KIDNEY DISEASE, UNSPECIFIED WHETHER LONG TERM INSULIN USE (HCC): ICD-10-CM

## 2024-02-27 PROCEDURE — 3074F SYST BP LT 130 MM HG: CPT | Performed by: INTERNAL MEDICINE

## 2024-02-27 PROCEDURE — 99214 OFFICE O/P EST MOD 30 MIN: CPT | Performed by: INTERNAL MEDICINE

## 2024-02-27 PROCEDURE — 3078F DIAST BP <80 MM HG: CPT | Performed by: INTERNAL MEDICINE

## 2024-02-27 ASSESSMENT — ENCOUNTER SYMPTOMS
COUGH: 0
CHILLS: 0
FEVER: 0
NAUSEA: 0
SHORTNESS OF BREATH: 0
HYPERTENSION: 1
VOMITING: 0

## 2024-02-27 ASSESSMENT — FIBROSIS 4 INDEX: FIB4 SCORE: 6.17

## 2024-02-27 NOTE — PROGRESS NOTES
"Caron Ramos is a 83 y.o. male who presents with Hypertension and Chronic Kidney Disease            Hypertension  This is a chronic problem. The current episode started more than 1 year ago. The problem is unchanged. The problem is controlled. Pertinent negatives include no chest pain, malaise/fatigue, peripheral edema or shortness of breath. Risk factors for coronary artery disease include diabetes mellitus, male gender and dyslipidemia. Past treatments include angiotensin blockers. The current treatment provides significant improvement. There are no compliance problems.  Hypertensive end-organ damage includes kidney disease. Identifiable causes of hypertension include chronic renal disease.   Chronic Kidney Disease  This is a chronic problem. The current episode started more than 1 year ago. The problem occurs constantly. The problem has been unchanged. Pertinent negatives include no chest pain, chills, coughing, fever, nausea, urinary symptoms or vomiting.       Review of Systems   Constitutional:  Negative for chills, fever and malaise/fatigue.   Respiratory:  Negative for cough and shortness of breath.    Cardiovascular:  Negative for chest pain and leg swelling.   Gastrointestinal:  Negative for nausea and vomiting.   Genitourinary:  Negative for dysuria, frequency and urgency.              Objective     /72 (BP Location: Right arm, Patient Position: Sitting, BP Cuff Size: Adult)   Pulse 66   Temp 36.4 °C (97.6 °F) (Temporal)   Ht 1.727 m (5' 8\")   Wt 75.3 kg (166 lb)   SpO2 98%   BMI 25.24 kg/m²      Physical Exam  Vitals and nursing note reviewed.   Constitutional:       General: He is not in acute distress.     Appearance: He is not ill-appearing.   HENT:      Head: Normocephalic and atraumatic.      Right Ear: External ear normal.      Left Ear: External ear normal.      Nose: Nose normal.   Eyes:      General:         Right eye: No discharge.         Left eye: No discharge.      " Conjunctiva/sclera: Conjunctivae normal.   Cardiovascular:      Rate and Rhythm: Normal rate and regular rhythm.      Heart sounds: No murmur heard.  Pulmonary:      Effort: Pulmonary effort is normal. No respiratory distress.      Breath sounds: Normal breath sounds. No wheezing.   Musculoskeletal:         General: No tenderness or deformity.      Right lower leg: No edema.      Left lower leg: No edema.   Skin:     General: Skin is warm.   Neurological:      General: No focal deficit present.      Mental Status: He is alert and oriented to person, place, and time.   Psychiatric:         Mood and Affect: Mood normal.         Behavior: Behavior normal.       Past Medical History:   Diagnosis Date    Anesthesia     difficult intubation with surgery 2008,2010    Basal cell carcinoma of skin     CAD (coronary artery disease)     Cancer (HCC)     rt  kidney 1989;  thyroid cancer 2012, prostate 2008, skin    Chronic kidney disease (CKD) stage G3b/A2, moderately decreased glomerular filtration rate (GFR) between 30-44 mL/min/1.73 square meter and albuminuria creatinine ratio between  mg/g (HCC) 4/23/2015    DVT (deep venous thrombosis) ([facility]) 2014    ED (erectile dysfunction)     GERD (gastroesophageal reflux disease)     Glaucoma     Heart burn     Hyperlipidemia 12/3/2012    Hypertension     Indigestion     Multiple pulmonary emboli (HCC) 2014    Multiple thyroid nodules 2009    Papillary carcinoma of thyroid (HCC) 2014    R 2 foci / 4.5mm,0.25mm / no mets/ pT1pN0    postsurgical hypothyroidism 2014    Pre-diabetes     Renal disorder     rt kidney cancer,nephrectomy    S/P CABG x 4 2003    S/P colectomy 2010    multiple colon polyps / no Ca    S/p nephrectomy 1998    carcinoma    S/P prostatectomy 2008    carcinoma    Stroke (HCC)     'mild',no residual,'one doctor said it was a tia'    Transient renal failure 2014    renal vein thrombosis    Type II or unspecified type diabetes mellitus without mention of  complication, not stated as uncontrolled     on no medications    Unspecified urinary incontinence      Social History     Socioeconomic History    Marital status:      Spouse name: Not on file    Number of children: Not on file    Years of education: Not on file    Highest education level: Not on file   Occupational History    Not on file   Tobacco Use    Smoking status: Never    Smokeless tobacco: Never   Vaping Use    Vaping Use: Never used   Substance and Sexual Activity    Alcohol use: Yes     Comment: Occasionally    Drug use: No    Sexual activity: Not Currently     Comment: retired    Other Topics Concern    Not on file   Social History Narrative    Not on file     Social Determinants of Health     Financial Resource Strain: Not on file   Food Insecurity: Not on file   Transportation Needs: Not on file   Physical Activity: Not on file   Stress: Not on file   Social Connections: Not on file   Intimate Partner Violence: Not on file   Housing Stability: Not on file     Family History   Problem Relation Age of Onset    Diabetes Mother     Heart Disease Mother     Diabetes Father     Cancer Father         pancreas    Thyroid Sister         Cancer    Cancer Sister         thyroid    Diabetes Sister     Cancer Brother 49        colon    Diabetes Brother     Diabetes Maternal Grandfather     Diabetes Paternal Grandmother     Diabetes Paternal Grandfather     No Known Problems Maternal Grandmother      Recent Labs     04/27/23  1740 08/09/23  0716 08/31/23  0639 11/28/23  0636 01/24/24  0640   ALBUMIN 4.2  --   --  4.4  --    HDL  --   --  31* 27* 35*   TRIGLYCERIDE  --   --  239* 288* 189*   SODIUM 140 141  --  142 145   POTASSIUM 5.0 5.1  --  4.9 5.3   CHLORIDE 107 105  --  106 109   CO2 25 27  --  27 27   BUN 40* 33*  --  30* 30*   CREATININE 2.17* 1.97*  --  1.87* 1.80*                             Assessment & Plan        1. Essential hypertension, benign  Controlled  Continue same  medication regimen  Continue low-sodium diet\    2. Stage 3b chronic kidney disease (HCC)  Stable  No uremic symptoms  Renal dose of medication  Avoid nephrotoxins  Continue same medication regimen  Patient was advised to call us if symptoms worsen      3. Type 2 diabetes mellitus with stage 3b chronic kidney disease, unspecified whether long term insulin use (HCC)  Uncontrolled  Patient is having difficulty finding Trulicity, also cost might be an issue  Plan to get endocrinology evaluation  Consider SGLT2 inhibitor

## 2024-03-01 ENCOUNTER — HOSPITAL ENCOUNTER (OUTPATIENT)
Dept: LAB | Facility: MEDICAL CENTER | Age: 84
End: 2024-03-01
Attending: FAMILY MEDICINE
Payer: MEDICARE

## 2024-03-01 DIAGNOSIS — R80.9 CONTROLLED TYPE 2 DIABETES MELLITUS WITH MICROALBUMINURIA, WITHOUT LONG-TERM CURRENT USE OF INSULIN (HCC): ICD-10-CM

## 2024-03-01 DIAGNOSIS — E11.29 CONTROLLED TYPE 2 DIABETES MELLITUS WITH MICROALBUMINURIA, WITHOUT LONG-TERM CURRENT USE OF INSULIN (HCC): ICD-10-CM

## 2024-03-01 LAB
EST. AVERAGE GLUCOSE BLD GHB EST-MCNC: 197 MG/DL
HBA1C MFR BLD: 8.5 % (ref 4–5.6)

## 2024-03-01 PROCEDURE — 36415 COLL VENOUS BLD VENIPUNCTURE: CPT

## 2024-03-01 PROCEDURE — 83036 HEMOGLOBIN GLYCOSYLATED A1C: CPT

## 2024-03-03 ENCOUNTER — PATIENT MESSAGE (OUTPATIENT)
Dept: MEDICAL GROUP | Facility: LAB | Age: 84
End: 2024-03-03
Payer: MEDICARE

## 2024-03-03 DIAGNOSIS — R80.9 CONTROLLED TYPE 2 DIABETES MELLITUS WITH MICROALBUMINURIA, WITHOUT LONG-TERM CURRENT USE OF INSULIN (HCC): ICD-10-CM

## 2024-03-03 DIAGNOSIS — E11.29 CONTROLLED TYPE 2 DIABETES MELLITUS WITH MICROALBUMINURIA, WITHOUT LONG-TERM CURRENT USE OF INSULIN (HCC): ICD-10-CM

## 2024-03-05 NOTE — PATIENT COMMUNICATION
Alternatives to Trulicity  liraglutide (Victoza)  lixisenatide (Adlyxin)  exenatide (Bydureon, Byetta)  semaglutide (Ozempic)

## 2024-03-06 ENCOUNTER — PATIENT MESSAGE (OUTPATIENT)
Dept: MEDICAL GROUP | Facility: LAB | Age: 84
End: 2024-03-06
Payer: MEDICARE

## 2024-03-06 DIAGNOSIS — R80.9 CONTROLLED TYPE 2 DIABETES MELLITUS WITH MICROALBUMINURIA, WITHOUT LONG-TERM CURRENT USE OF INSULIN (HCC): ICD-10-CM

## 2024-03-06 DIAGNOSIS — E11.29 CONTROLLED TYPE 2 DIABETES MELLITUS WITH MICROALBUMINURIA, WITHOUT LONG-TERM CURRENT USE OF INSULIN (HCC): ICD-10-CM

## 2024-03-08 NOTE — TELEPHONE ENCOUNTER
Called patient.  Patient is not interested in any injectable medication at this time.  Will switch patient to Jardiance.  He will follow-up with any other questions or concerns.  Thank you.

## 2024-03-14 ENCOUNTER — ANTICOAGULATION VISIT (OUTPATIENT)
Dept: MEDICAL GROUP | Facility: MEDICAL CENTER | Age: 84
End: 2024-03-14
Payer: MEDICARE

## 2024-03-14 VITALS — HEART RATE: 60 BPM | SYSTOLIC BLOOD PRESSURE: 115 MMHG | DIASTOLIC BLOOD PRESSURE: 60 MMHG | OXYGEN SATURATION: 100 %

## 2024-03-14 DIAGNOSIS — Z95.828 PRESENCE OF IVC FILTER: ICD-10-CM

## 2024-03-14 LAB — INR PPP: 3.2 (ref 2–3.5)

## 2024-03-14 PROCEDURE — 99211 OFF/OP EST MAY X REQ PHY/QHP: CPT | Performed by: STUDENT IN AN ORGANIZED HEALTH CARE EDUCATION/TRAINING PROGRAM

## 2024-03-14 PROCEDURE — 3074F SYST BP LT 130 MM HG: CPT | Performed by: STUDENT IN AN ORGANIZED HEALTH CARE EDUCATION/TRAINING PROGRAM

## 2024-03-14 PROCEDURE — 85610 PROTHROMBIN TIME: CPT | Performed by: STUDENT IN AN ORGANIZED HEALTH CARE EDUCATION/TRAINING PROGRAM

## 2024-03-14 PROCEDURE — 3078F DIAST BP <80 MM HG: CPT | Performed by: STUDENT IN AN ORGANIZED HEALTH CARE EDUCATION/TRAINING PROGRAM

## 2024-03-14 NOTE — PROGRESS NOTES
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  Endocrinology [phone]                  Refer to Patient Findings for HPI:  Patient Findings       Positives:  Change in alcohol use (additional ETOH last night)    Negatives:  Signs/symptoms of thrombosis, Signs/symptoms of bleeding, Laboratory test error suspected, Change in health, Change in activity, Upcoming invasive procedure, Emergency department visit, Upcoming dental procedure, Missed doses, Extra doses, Change in medications, Change in diet/appetite, Hospital admission, Bruising, Other complaints            Vitals:    03/14/24 0911   BP: 115/60   Pulse: 60   SpO2: 100%       Verified current warfarin dosing schedule.    Medications reconciled: No  Pt is on antiplatelet therapy with asa 81 mg for per cardiology.      A/P   INR is slightly supratherapeutic    Warfarin dosing recommendation: Continue regimen as listed above. Patient will increase greens today and tomorrow.     Pt  educated to contact our clinic with any changes in medications or s/s of bleeding or thrombosis. Pt is aware to seek immediate medical attention for falls, head injury or deep cuts.    Request pt to return in 6 week(s). Pt agrees.    Zay EckertD

## 2024-04-01 ENCOUNTER — TELEPHONE (OUTPATIENT)
Dept: VASCULAR LAB | Facility: MEDICAL CENTER | Age: 84
End: 2024-04-01
Payer: MEDICARE

## 2024-04-01 ENCOUNTER — ANTICOAGULATION VISIT (OUTPATIENT)
Dept: MEDICAL GROUP | Facility: MEDICAL CENTER | Age: 84
End: 2024-04-01
Payer: MEDICARE

## 2024-04-01 VITALS — OXYGEN SATURATION: 97 % | HEART RATE: 59 BPM | SYSTOLIC BLOOD PRESSURE: 119 MMHG | DIASTOLIC BLOOD PRESSURE: 56 MMHG

## 2024-04-01 DIAGNOSIS — Z95.828 PRESENCE OF IVC FILTER: ICD-10-CM

## 2024-04-01 LAB — INR PPP: 2.4 (ref 2–3.5)

## 2024-04-01 PROCEDURE — 3078F DIAST BP <80 MM HG: CPT | Performed by: STUDENT IN AN ORGANIZED HEALTH CARE EDUCATION/TRAINING PROGRAM

## 2024-04-01 PROCEDURE — 3074F SYST BP LT 130 MM HG: CPT | Performed by: STUDENT IN AN ORGANIZED HEALTH CARE EDUCATION/TRAINING PROGRAM

## 2024-04-01 PROCEDURE — 93793 ANTICOAG MGMT PT WARFARIN: CPT | Performed by: STUDENT IN AN ORGANIZED HEALTH CARE EDUCATION/TRAINING PROGRAM

## 2024-04-01 PROCEDURE — 85610 PROTHROMBIN TIME: CPT | Performed by: STUDENT IN AN ORGANIZED HEALTH CARE EDUCATION/TRAINING PROGRAM

## 2024-04-01 NOTE — PROGRESS NOTES
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  Endocrinology [phone]                  Refer to Patient Findings for HPI:  Patient Findings       Positives:  Signs/symptoms of bleeding (epistaxis x 2)    Negatives:  Signs/symptoms of thrombosis, Laboratory test error suspected, Change in health, Change in alcohol use, Change in activity, Upcoming invasive procedure, Emergency department visit, Upcoming dental procedure, Missed doses, Extra doses, Change in medications, Change in diet/appetite, Hospital admission, Bruising, Other complaints            Vitals:    24 1441   BP: 119/56   Pulse: (!) 59   SpO2: 97%       Verified current warfarin dosing schedule.    Medications reconciled: No  Pt is not on antiplatelet therapy.      A/P   INR is therapeutic - nose bleeds may be due to dryness, patient is now taking Jardiance which is causing dry mouth he will try reducing tablet to 12.5 (1/2 tablet) for the next week to see if it improves.  Has f/u with Endocrinology after next INR appt.     Warfarin dosing recommendation: Continue regimen as listed above.    Pt educated to contact our clinic with any changes in medications or s/s of bleeding or thrombosis. Pt is aware to seek immediate medical attention for falls, head injury or deep cuts.    Request pt to return in 3 week(s). Pt agrees.    Zay EckertD

## 2024-04-01 NOTE — TELEPHONE ENCOUNTER
Patient states bloody nose today was slight and lasted a short amount of time. Bloody nose previously had more blood and lasted ~5 minutes. Patient will come into clinic to have INR tested and further instructions.     Madhavi Elizabeth, ZayD

## 2024-04-01 NOTE — TELEPHONE ENCOUNTER
Caller: Paulo Paredes    Topic/issue: Patient called because he has been getting a bloody nose the last few days and is inquiring if he should stop his medication if his blood is too thin.     Please advise.     Callback Number: [phone]    Thank you,     Wanda HARRIS

## 2024-04-24 NOTE — PROGRESS NOTES
Chief Complaint:  Consult requested by Fadi Najjar, M.D./nephrologist  for initial evaluation of Type 2 Diabetes Mellitus    HPI:   Paulo Paredes is a 83 y.o. male was referred to endocrinology service by PCP to establish care and T2DM management    DM history:  - diagnosed 20 years ago, diagnosed on screening test,  220 lb  - hospitalizations for DKA/HHS/severe hyperglycemia/severe hypoglycemia in the past: none  - prior therapy: Trulicity started 2 years ago, couldn't get Trulicity -> Jardiance - 3 weeks ago  - known DM complications: CKD 3b, single kidney, s/p nephrectomy,  Hx of TIA, CAD, s/p CABG x 4  - latest HbA1C - 8.5% in 3/2024  - pertinent PMHx:   -- dyslipidemia, GERD, PTC, s/p thyroidectomy in 2014, hypothyroidism, Hx of PTC, Hx of PE, s/p IVF filter placement, HTN  -- denies NAFLD, PAD/CHF   - pertinent FHx:  DM 2:     Current therapy:  Trulicity 1.5 mg - off 2 mo ago due to availability  Jardiance 25 mg     Tolerates medications: Jardiance - fatigue, difficulty walking, lost weight, falls asleep on his chair during the day, frequent urination  Compliant: yes    Prior used medications:       Other important medications:  ASA 81 mg  Warfarin  Rosuvastatin 40 mg  Fenofibrate 160 mg  Atenolol 25 mg  Capsaicin cream  Levothyroxine 137 mcg/  Losartan 25 mg    Typical day:  Wakes up -   Breakfast -  Lunch  Dinner  Snacks -  Desserts  -   Goes to sleep      BG control:  SBGM x   FBGs -    CGM -      Hypoglycemic episodes:  Hypoglycemic symptoms: NA  Hypoglycemic unawareness: NA  Treatment: NA  Severe hypoglycemia: NA  Has medical bracelet/necklace:NA  Has glucagon emergency kit: NA    Diabetes education/classes:  none     Diet:       Exercise:      Past Medical History:  Patient Active Problem List    Diagnosis Date Noted    Atherosclerosis of aorta (HCC) 07/08/2021    Anticoagulated on Coumadin 07/08/2021    Essential hypertension, benign 10/27/2020    H/O TIA (transient ischemic attack) and stroke  05/17/2020    Thrombocytopenia ([facility]) 01/16/2020    Stage 3b chronic kidney disease 01/16/2020    Microalbuminuria due to type 2 diabetes mellitus ([facility]) 07/16/2019    Hx pulmonary embolism 07/16/2019    History of DVT in adulthood 07/16/2019    Dyslipidemia 03/19/2018    Postsurgical hypothyroidism 08/03/2015    Coagulation defect ([facility])- on warfarin 04/23/2015    Presence of IVC filter 06/02/2014    CAD in native artery 01/22/2014    GERD (gastroesophageal reflux disease) 12/03/2012    Controlled type 2 diabetes mellitus with microalbuminuria, without long-term current use of insulin ([facility]) 12/03/2012    S/P CABG x 4 12/03/2012    Single kidney 12/03/2012     Past Surgical History:  Past Surgical History:   Procedure Laterality Date    THYROIDECTOMY TOTAL  5/13/2014    Performed by Eliceo Bolanos M.D. at SURGERY SAME DAY [facility] ORS    NODE DISSECTION  5/13/2014    Performed by Eliceo Bolanos M.D. at SURGERY SAME DAY [facility]    COLON RESECTION      MULTIPLE CORONARY ARTERY BYPASS      x 4 11/2003    NEPHRECTOMY RADICAL      right side 1998    OTHER ORTHOPEDIC SURGERY      trotator cuff    PROSTATECTOMY, RADICAL RETRO      cancer /january 2008      Allergies:  Lactose and Seasonal     Social History:  Social History     Socioeconomic History    Marital status:      Spouse name: Not on file    Number of children: Not on file    Years of education: Not on file    Highest education level: Not on file   Occupational History    Not on file   Tobacco Use    Smoking status: Never    Smokeless tobacco: Never   Vaping Use    Vaping Use: Never used   Substance and Sexual Activity    Alcohol use: Yes     Comment: Occasionally    Drug use: No    Sexual activity: Not Currently     Comment: retired    Other Topics Concern    Not on file   Social History Narrative    Not on file     Social Determinants of Health     Financial Resource Strain: Not on file   Food Insecurity: Not on file   Transportation  Needs: Not on file   Physical Activity: Not on file   Stress: Not on file   Social Connections: Not on file   Intimate Partner Violence: Not on file   Housing Stability: Not on file      Family History:   Family History   Problem Relation Age of Onset    Diabetes Mother     Heart Disease Mother     Diabetes Father     Cancer Father         pancreas    Thyroid Sister         Cancer    Cancer Sister         thyroid    Diabetes Sister     Cancer Brother 49        colon    Diabetes Brother     Diabetes Maternal Grandfather     Diabetes Paternal Grandmother     Diabetes Paternal Grandfather     No Known Problems Maternal Grandmother      Current Medications:  Current Outpatient Medications:     Empagliflozin 25 MG Tab, Take 1 Tablet by mouth every day., Disp: 90 Tablet, Rfl: 3    liraglutide (VICTOZA) 18 MG/3ML Solution Pen-injector, Inject 0.6 mg under the skin every day., Disp: 6 mL, Rfl: 4    rosuvastatin (CRESTOR) 40 MG tablet, Take 1 Tablet by mouth every day., Disp: 90 Tablet, Rfl: 3    levothyroxine (EUTHYROX) 137 MCG Tab, TAKE 1 TABLET BY MOUTH ONCE DAILY IN THE MORNING ON  AN  EMPTY  STOMACH  ONE  HOUR  BEFORE  EATING, Disp: 100 Tablet, Rfl: 3    fenofibrate (TRIGLIDE) 160 MG tablet, Take 1 Tablet by mouth every day., Disp: 100 Tablet, Rfl: 3    warfarin (COUMADIN) 5 MG Tab, Take 1 Tablet by mouth every evening. Take 0.5 to 1 tablet by mouth once daily. Take as directed by anticoagulation clinic., Disp: 100 Tablet, Rfl: 3    losartan (COZAAR) 25 MG Tab, Take 1 Tablet by mouth every day for 360 days., Disp: 100 Tablet, Rfl: 2    Dulaglutide 1.5 MG/0.5ML Solution Pen-injector, Inject 0.5 mL under the skin every 7 days for 360 days., Disp: 24 mL, Rfl: 3    aspirin 81 MG EC tablet, Take 81 mg by mouth every day., Disp: , Rfl:     atenolol (TENORMIN) 25 MG Tab, Take 1 tablet by mouth once daily, Disp: 100 Tablet, Rfl: 3    oxymetazoline (AFRIN 12 HOUR) 0.05 % Solution, Administer 2 Sprays into affected nostril(S) 2  times a day. Discontinue after 2 days., Disp: 15 mL, Rfl: 0    triamcinolone acetonide (KENALOG) 0.025 % Cream, Apply to the affected area twice a day on legs., Disp: 15 g, Rfl: 1    capsaicin (ZOSTRIX) 0.025 % cream, APPLY TO ITCHY AREA ON NECK UP TO 5 TIMES A DAY FOR 1 WEEK, THEN 3 TIMES A DAY FOR 3 TO 6 WEEKS. WASH HANDS THOROUGHLY AFTER APPLYING. DO TEST SPOT 1ST, Disp: , Rfl:     Multiple Vitamins-Minerals (ZINC PO), Take  by mouth., Disp: , Rfl:     hydrocortisone 2.5 % Cream topical cream, APPLY TO RASH ON GROIN TWICE DAILY FOR 1 WEEK WITH FLARES, Disp: , Rfl:     Calcium Carb-Cholecalciferol (CALCIUM 1000 + D PO), Take 1 Dose by mouth every day., Disp: , Rfl:     Omega-3 Krill Oil 300 MG Cap, Take 300 mg by mouth every day., Disp: , Rfl:     Cinnamon 500 MG Cap, Take 1,000 mg by mouth., Disp: , Rfl:     Cyanocobalamin (VITAMIN B-12) 1000 MCG Tab, Take 1,000 mcg by mouth every day., Disp: , Rfl:     Coenzyme Q10 (CO Q-10 PO), Take 1 Dose by mouth every day. Unknown OTC Strength, Disp: , Rfl:       PHYSICAL EXAM:   Vital signs: There were no vitals taken for this visit.  GENERAL: Well-developed, well-nourished  in no apparent distress.   EYE: No ocular and eyelid asymmetry, Anicteric sclerae,  PERRL, No exophthalmos or lidlag  HENT: Hearing grossly intact, Normocephalic, atraumatic.  NECK: Supple. Trachea midline. {thyroid:5702}  CARDIOVASCULAR: Regular rate and rhythm.   LUNGS: No dyspnea  ABDOMEN: Soft, nondistended  EXTREMITIES: No clubbing, cyanosis, or edema.   NEUROLOGICAL: Cranial nerves II-XII are grossly intact No visible tremor with both outstretched hands  SKIN: No rashes, lesions. Turgor is normal.    Labs:  - reviewed  HbA1C/BG/Hb:  Lab Results   Component Value Date/Time    HBA1C 8.5 (H) 03/01/2024 0820    HBA1C 8.8 (A) 11/29/2023 0810    HBA1C 10.2 (A) 07/10/2023 1045    HBA1C 8.0 (H) 01/04/2023 0653    HBA1C 7.6 (H) 06/06/2022 0719    AVGLUC 197 03/01/2024 0820    AVGLUC 183 01/04/2023 0653     AVGLUC 171 06/06/2022 0719    AVGLUC 169 12/02/2021 1214    AVGLUC 169 10/26/2021 0748     Lab Results   Component Value Date/Time    HEMOGLOBIN 14.3 01/24/2024 06:40 AM    HEMOGLOBIN 14.4 08/09/2023 07:17 AM    HEMOGLOBIN RR 08/09/2023 07:16 AM     Lab Results   Component Value Date/Time    GLUCOSE 204 (H) 01/24/2024 06:40 AM    GLUCOSE 195 (H) 11/28/2023 06:36 AM    GLUCOSE 181 (H) 08/09/2023 07:16 AM    GLUCOSE 214 (H) 04/27/2023 05:40 PM    GLUCOSE 163 (H) 01/04/2023 06:53 AM     Kidney function/MACR:   Latest Reference Range & Units 01/24/24 06:40   Creatinine 0.50 - 1.40 mg/dL 1.80 (H)   GFR (CKD-EPI) >60 mL/min/1.73 m 2 36 !   Micro Alb Creat Ratio 0 - 30 mg/g 95 (H)     LFTs:  Lab Results   Component Value Date/Time    ASTSGOT 43 11/28/2023 06:36 AM    ASTSGOT 27 04/27/2023 05:40 PM    ASTSGOT 29 01/04/2023 06:53 AM    ALTSGPT 27 11/28/2023 06:36 AM    ALTSGPT 19 04/27/2023 05:40 PM    ALTSGPT 18 01/04/2023 06:53 AM     Lipid panel:  Lab Results   Component Value Date/Time    TRIGLYCERIDE 189 (H) 01/24/2024 06:40 AM    TRIGLYCERIDE 288 (H) 11/28/2023 06:36 AM    TRIGLYCERIDE 239 (H) 08/31/2023 06:39 AM    HDL 35 (A) 01/24/2024 06:40 AM    HDL 27 (A) 11/28/2023 06:36 AM    HDL 31 (A) 08/31/2023 06:39 AM    LDL 73 01/24/2024 06:40 AM    LDL 88 11/28/2023 06:36 AM    LDL 77 08/31/2023 06:39 AM     Hypothyroidism screening:  Lab Results   Component Value Date/Time    TSHULTRASEN 0.850 01/04/2023 0653       ASSESSMENT/PLAN:   1. Type 2 diabetes mellitus with hyperglycemia, without long-term current use of insulin (HCC)  - duration x 20 years  - on SGTL-2 inhibitor  - suboptimally controlled, last HbA1C - 8.5% in 3/2024     Microvascular complications: nephropathy with CKD 3b, microalbuminuria, ??? peripheral neuropathy, nephropathy  Macrovascular complications: Hx of TIA, CAD, s/p CABG x 4  Associated comorbidities: dyslipidemia, GERD, hypothyroidism, Hx of PTC, Hx of PE, s/p IVF filter placement, HTN, single  kidney, s/p nephrectomy,    DM complication screening:  Labs reviewed:  MACR - 95 (+), last checked in 1/2024  Creat/GFR  1.8/36, consistent with CKD 3b, last checked in 1/2024  LDL - 73 - close to target, last checked in 1/2024     Patient is taking statin: on  Rosuvastatin 40 mg + fenofibrate 160 mg  Patient is taking ASA: yes  Patient is taking ACE/ARB: on Losartan 25 mg    Last eye exam:  Last foot exam:      Home medications:       Discussion:       Plan:  Discussed with the patient goals for DM management:  Fasting BG 90 - 130  2 hrs postprandial  - 180  HbA1C < 7.0%     Therapy adjustments:      CKD3b  Microalbuminuria      Hx of PTC  Postoperative hypothyroidism    RTC:    Total time (face-to-face and non-face-to face time):  min - discussion of diagnoses, treatment, prognosis, medical charts, lab, imaging, pathology review, documentation.     Plan reviewed with the patient and agreed with plan.  All questions answered to patient's satisfaction.  Thank you kindly for allowing me to participate in the diabetes care plan for this patient.    Erni Orta MD      CC:   Damion Larose M.D.   GABY Larose.

## 2024-04-25 ENCOUNTER — TELEPHONE (OUTPATIENT)
Dept: ENDOCRINOLOGY | Facility: MEDICAL CENTER | Age: 84
End: 2024-04-25

## 2024-04-25 ENCOUNTER — OFFICE VISIT (OUTPATIENT)
Dept: ENDOCRINOLOGY | Facility: MEDICAL CENTER | Age: 84
End: 2024-04-25
Attending: STUDENT IN AN ORGANIZED HEALTH CARE EDUCATION/TRAINING PROGRAM
Payer: MEDICARE

## 2024-04-25 ENCOUNTER — ANTICOAGULATION VISIT (OUTPATIENT)
Dept: MEDICAL GROUP | Facility: MEDICAL CENTER | Age: 84
End: 2024-04-25
Payer: MEDICARE

## 2024-04-25 VITALS
HEIGHT: 68 IN | OXYGEN SATURATION: 97 % | HEART RATE: 49 BPM | WEIGHT: 163 LBS | SYSTOLIC BLOOD PRESSURE: 138 MMHG | BODY MASS INDEX: 24.71 KG/M2 | DIASTOLIC BLOOD PRESSURE: 60 MMHG

## 2024-04-25 VITALS — HEIGHT: 68 IN | BODY MASS INDEX: 25.16 KG/M2 | WEIGHT: 166 LBS | RESPIRATION RATE: 14 BRPM

## 2024-04-25 DIAGNOSIS — Z95.828 PRESENCE OF IVC FILTER: ICD-10-CM

## 2024-04-25 DIAGNOSIS — C80.1 PAPILLARY ADENOCARCINOMA (HCC): ICD-10-CM

## 2024-04-25 DIAGNOSIS — E11.65 TYPE 2 DIABETES MELLITUS WITH HYPERGLYCEMIA, WITHOUT LONG-TERM CURRENT USE OF INSULIN (HCC): ICD-10-CM

## 2024-04-25 DIAGNOSIS — E89.0 POSTOPERATIVE HYPOTHYROIDISM: ICD-10-CM

## 2024-04-25 LAB — INR PPP: 2.5 (ref 2–3.5)

## 2024-04-25 PROCEDURE — 3075F SYST BP GE 130 - 139MM HG: CPT | Performed by: STUDENT IN AN ORGANIZED HEALTH CARE EDUCATION/TRAINING PROGRAM

## 2024-04-25 PROCEDURE — 93793 ANTICOAG MGMT PT WARFARIN: CPT | Performed by: STUDENT IN AN ORGANIZED HEALTH CARE EDUCATION/TRAINING PROGRAM

## 2024-04-25 PROCEDURE — 85610 PROTHROMBIN TIME: CPT | Performed by: STUDENT IN AN ORGANIZED HEALTH CARE EDUCATION/TRAINING PROGRAM

## 2024-04-25 PROCEDURE — 99205 OFFICE O/P NEW HI 60 MIN: CPT | Performed by: STUDENT IN AN ORGANIZED HEALTH CARE EDUCATION/TRAINING PROGRAM

## 2024-04-25 PROCEDURE — 3078F DIAST BP <80 MM HG: CPT | Performed by: STUDENT IN AN ORGANIZED HEALTH CARE EDUCATION/TRAINING PROGRAM

## 2024-04-25 PROCEDURE — RXMED WILLOW AMBULATORY MEDICATION CHARGE: Performed by: STUDENT IN AN ORGANIZED HEALTH CARE EDUCATION/TRAINING PROGRAM

## 2024-04-25 RX ORDER — SEMAGLUTIDE 0.68 MG/ML
0.5 INJECTION, SOLUTION SUBCUTANEOUS
Qty: 3 ML | Refills: 0 | Status: SHIPPED | OUTPATIENT
Start: 2024-04-25 | End: 2024-05-28

## 2024-04-25 RX ORDER — BLOOD-GLUCOSE SENSOR
1 EACH MISCELLANEOUS
Qty: 2 EACH | Refills: 11 | Status: SHIPPED | OUTPATIENT
Start: 2024-04-25

## 2024-04-25 ASSESSMENT — FIBROSIS 4 INDEX
FIB4 SCORE: 6.17
FIB4 SCORE: 6.17

## 2024-04-25 NOTE — PROGRESS NOTES
OP Anticoagulation Service Note    Date: 2024    Anticoagulation Summary  As of 2024      INR goal:  2.0-3.0   TTR:  73.6% (6.2 y)   INR used for dosin.50 (2024)   Warfarin maintenance plan:  2.5 mg (5 mg x 0.5) every Thu; 5 mg (5 mg x 1) all other days   Weekly warfarin total:  32.5 mg   Plan last modified:  Skip Dowling, PharmD (2022)   Next INR check:  2024   Priority:  Maintenance   Target end date:  Indefinite    Indications    Presence of IVC filter [Z95.828]  Transient ischemic attack [G45.9] (Resolved) [G45.9]  Deep vein thrombosis (DVT) of both lower extremities (HCC) (Resolved) [I82.403]  DVT (deep venous thrombosis) (HCC) (Resolved) [I82.409]  Multiple pulmonary emboli (HCC) (Resolved) [I26.99]  Chronic anticoagulation (Resolved) [Z79.01]                 Anticoagulation Episode Summary       INR check location:      Preferred lab:      Send INR reminders to:      Comments:            Anticoagulation Care Providers       Provider Role Specialty Phone number    [facility]   [phone]    Edward Walters M.D.  Endocrinology [phone]          Anticoagulation Patient Findings  Patient Findings       Positives:  Signs/symptoms of bleeding (Episode of epistaxis x 2-3 min. Counseled regarding preventative measures.)    Negatives:  Signs/symptoms of thrombosis, Laboratory test error suspected, Change in health, Change in alcohol use, Change in activity, Upcoming invasive procedure, Emergency department visit, Upcoming dental procedure, Missed doses, Extra doses, Change in medications, Change in diet/appetite, Hospital admission, Bruising, Other complaints            HPI:   Paulo Paredes is in the Anticoagulation Clinic today for an INR check on their anticoagulation therapy.     The reason for today's visit is to prevent morbidity and mortality from a blood clot and/or stroke and to reduce the risk of bleeding while on a anticoagulant.  "    PCP:  Damion Larose M.D.  [id number]  [place] [id number] [phone]    3 vitals included with today's appt-unless patient declined:  (BP, HR, weight, ht, RR)   Vitals:    04/25/24 1015   Resp: 14   Weight: 75.3 kg (166 lb)   Height: 1.727 m (5' 8\")       Verified current warfarin dosing schedule.    Medications reconciled: Yes  Pt is on ASA 81 mg once daily as antiplatelet therapy for hx of CAD & CABG and must be reviewed again on next cards f/u.     Assessment:   INR is therapeutic    Plan:  Instructed pt to continue on with current regimen.    Follow up:  Follow up appointment in 4 week(s).       Other info:  Pt educated to contact our clinic with any changes in medications or s/s of bleeding or thrombosis.  Education was provided today regarding tips to reduce their bleed risk and dietary constraints while on an anticoagulant.    National Recommendations:  The CHEST guidelines recommends frequent INR monitoring at regular intervals (a few days up to a max of 12 weeks) to ensure patients are on the proper dose of warfarin, and patients are not having any complications from therapy.  INRs can dramatically change over a short time period due to diet, medications, and medical conditions.     Skip Dowling, PharmD, BCACP    [facility]  Phone: [phone], Fax: [phone]    "

## 2024-04-25 NOTE — TELEPHONE ENCOUNTER
Patient and wife would like to know cost for an out of pocket CGM for lilliana 3 and if too high can you run dexcom g7 also. Would you be able to run this by chance?    Thank you,

## 2024-04-25 NOTE — PATIENT INSTRUCTIONS
Start Ozempic 0.25 mg weekly, if well tolerated for 2 weeks -> increase dose to 0.5 mg weekly        - possible side effects - nausea, vomiting, abdominal discomfort, diarrhea/constipation    2.   Jardiance 25 mg daily        - you can hold medication for a few day /week to see if you feel better or go down on 1/2 pill

## 2024-04-29 ENCOUNTER — PHARMACY VISIT (OUTPATIENT)
Dept: PHARMACY | Facility: MEDICAL CENTER | Age: 84
End: 2024-04-29
Payer: COMMERCIAL

## 2024-05-03 ENCOUNTER — HOSPITAL ENCOUNTER (OUTPATIENT)
Facility: MEDICAL CENTER | Age: 84
End: 2024-05-03
Attending: NURSE PRACTITIONER
Payer: MEDICARE

## 2024-05-03 ENCOUNTER — OFFICE VISIT (OUTPATIENT)
Dept: URGENT CARE | Facility: CLINIC | Age: 84
End: 2024-05-03
Payer: MEDICARE

## 2024-05-03 VITALS
WEIGHT: 153 LBS | HEIGHT: 68 IN | HEART RATE: 62 BPM | SYSTOLIC BLOOD PRESSURE: 116 MMHG | OXYGEN SATURATION: 98 % | DIASTOLIC BLOOD PRESSURE: 72 MMHG | RESPIRATION RATE: 14 BRPM | BODY MASS INDEX: 23.19 KG/M2 | TEMPERATURE: 97.3 F

## 2024-05-03 DIAGNOSIS — Z79.01 ANTICOAGULATED: ICD-10-CM

## 2024-05-03 DIAGNOSIS — N39.0 URINARY TRACT INFECTION WITHOUT HEMATURIA, SITE UNSPECIFIED: ICD-10-CM

## 2024-05-03 DIAGNOSIS — Z86.39 HISTORY OF DIABETES MELLITUS: ICD-10-CM

## 2024-05-03 DIAGNOSIS — R30.0 DYSURIA: ICD-10-CM

## 2024-05-03 DIAGNOSIS — Z90.5 SINGLE KIDNEY: ICD-10-CM

## 2024-05-03 LAB
APPEARANCE UR: CLEAR
BILIRUB UR STRIP-MCNC: NEGATIVE MG/DL
COLOR UR AUTO: YELLOW
GLUCOSE UR STRIP.AUTO-MCNC: 500 MG/DL
KETONES UR STRIP.AUTO-MCNC: NEGATIVE MG/DL
LEUKOCYTE ESTERASE UR QL STRIP.AUTO: NEGATIVE
NITRITE UR QL STRIP.AUTO: NEGATIVE
PH UR STRIP.AUTO: 5.5 [PH] (ref 5–8)
PROT UR QL STRIP: NEGATIVE MG/DL
RBC UR QL AUTO: NEGATIVE
SP GR UR STRIP.AUTO: 1.02
UROBILINOGEN UR STRIP-MCNC: 0.2 MG/DL

## 2024-05-03 PROCEDURE — 3074F SYST BP LT 130 MM HG: CPT | Performed by: NURSE PRACTITIONER

## 2024-05-03 PROCEDURE — 81002 URINALYSIS NONAUTO W/O SCOPE: CPT | Performed by: NURSE PRACTITIONER

## 2024-05-03 PROCEDURE — 3078F DIAST BP <80 MM HG: CPT | Performed by: NURSE PRACTITIONER

## 2024-05-03 PROCEDURE — 99214 OFFICE O/P EST MOD 30 MIN: CPT | Performed by: NURSE PRACTITIONER

## 2024-05-03 RX ORDER — SULFAMETHOXAZOLE AND TRIMETHOPRIM 800; 160 MG/1; MG/1
1 TABLET ORAL EVERY 12 HOURS
Qty: 14 TABLET | Refills: 0 | Status: SHIPPED | OUTPATIENT
Start: 2024-05-03 | End: 2024-05-10

## 2024-05-03 ASSESSMENT — ENCOUNTER SYMPTOMS: FEVER: 0

## 2024-05-03 ASSESSMENT — FIBROSIS 4 INDEX: FIB4 SCORE: 6.17

## 2024-05-04 DIAGNOSIS — N39.0 URINARY TRACT INFECTION WITHOUT HEMATURIA, SITE UNSPECIFIED: ICD-10-CM

## 2024-05-04 NOTE — PROGRESS NOTES
Subjective     Kevin Paredes is a 83 y.o. male who presents with UTI (X 2 days, pain with urination, foamy urine.)            UTI  This is a new problem. Episode onset: pt reports new onset of burning with urination that started this afternoon. he also notes his urine appears foamy. Associated symptoms include urinary symptoms. Pertinent negatives include no fever. Associated symptoms comments: As a baseline he is incontinent of urine. He has tried nothing for the symptoms.       Review of Systems   Constitutional:  Negative for fever.   Genitourinary:  Positive for dysuria.   All other systems reviewed and are negative.         Past Medical History:   Diagnosis Date    Anesthesia     difficult intubation with surgery 2008,2010    Basal cell carcinoma of skin     CAD (coronary artery disease)     Cancer (HCC)     rt  kidney 1989;  thyroid cancer 2012, prostate 2008, skin    Chronic kidney disease (CKD) stage G3b/A2, moderately decreased glomerular filtration rate (GFR) between 30-44 mL/min/1.73 square meter and albuminuria creatinine ratio between  mg/g 4/23/2015    DVT (deep venous thrombosis) (HCC) 2014    ED (erectile dysfunction)     GERD (gastroesophageal reflux disease)     Glaucoma     Heart burn     Hyperlipidemia 12/3/2012    Hypertension     Indigestion     Multiple pulmonary emboli (HCC) 2014    Multiple thyroid nodules 2009    Papillary carcinoma of thyroid (HCC) 2014    R 2 foci / 4.5mm,0.25mm / no mets/ pT1pN0    postsurgical hypothyroidism 2014    Pre-diabetes     Renal disorder     rt kidney cancer,nephrectomy    S/P CABG x 4 2003    S/P colectomy 2010    multiple colon polyps / no Ca    S/p nephrectomy 1998    carcinoma    S/P prostatectomy 2008    carcinoma    Stroke (HCC)     'mild',no residual,'one doctor said it was a tia'    Transient renal failure 2014    renal vein thrombosis    Type II or unspecified type diabetes mellitus without mention of complication, not stated as uncontrolled      "on no medications    Unspecified urinary incontinence       Past Surgical History:   Procedure Laterality Date    THYROIDECTOMY TOTAL  5/13/2014    Performed by Eliceo Bolanos M.D. at SURGERY SAME DAY [facility] ORS    NODE DISSECTION  5/13/2014    Performed by Eliceo Bolanos M.D. at SURGERY SAME DAY [facility] ORS    COLON RESECTION      MULTIPLE CORONARY ARTERY BYPASS      x 4 11/2003    NEPHRECTOMY RADICAL      right side 1998    OTHER ORTHOPEDIC SURGERY      trotator cuff    PROSTATECTOMY, RADICAL RETRO      cancer /january 2008      Social History     Socioeconomic History    Marital status:      Spouse name: Not on file    Number of children: Not on file    Years of education: Not on file    Highest education level: Not on file   Occupational History    Not on file   Tobacco Use    Smoking status: Never    Smokeless tobacco: Never   Vaping Use    Vaping Use: Never used   Substance and Sexual Activity    Alcohol use: Yes     Comment: Occasionally    Drug use: No    Sexual activity: Not Currently     Comment: retired    Other Topics Concern    Not on file   Social History Narrative    Not on file     Social Determinants of Health     Financial Resource Strain: Not on file   Food Insecurity: Not on file   Transportation Needs: Not on file   Physical Activity: Not on file   Stress: Not on file   Social Connections: Not on file   Intimate Partner Violence: Not on file   Housing Stability: Not on file         Objective     /72   Pulse 62   Temp 36.3 °C (97.3 °F) (Temporal)   Resp 14   Ht 1.727 m (5' 8\")   Wt 69.4 kg (153 lb)   SpO2 98%   BMI 23.26 kg/m²      Physical Exam  Vitals and nursing note reviewed.   Constitutional:       Appearance: Normal appearance.   HENT:      Head: Normocephalic and atraumatic.      Nose: Nose normal.      Mouth/Throat:      Mouth: Mucous membranes are moist.      Pharynx: Oropharynx is clear.   Eyes:      Extraocular Movements: Extraocular movements " intact.      Pupils: Pupils are equal, round, and reactive to light.   Cardiovascular:      Rate and Rhythm: Normal rate and regular rhythm.   Pulmonary:      Effort: Pulmonary effort is normal.   Abdominal:      Tenderness: There is no right CVA tenderness or left CVA tenderness.   Musculoskeletal:         General: Normal range of motion.      Cervical back: Normal range of motion and neck supple.   Skin:     General: Skin is warm and dry.      Capillary Refill: Capillary refill takes less than 2 seconds.   Neurological:      General: No focal deficit present.      Mental Status: He is alert and oriented to person, place, and time. Mental status is at baseline.   Psychiatric:         Mood and Affect: Mood normal.         Thought Content: Thought content normal.         Judgment: Judgment normal.                  Lab Results   Component Value Date/Time    POCCOLOR Yellow 05/03/2024 05:32 PM    POCAPPEAR Clear 05/03/2024 05:32 PM    POCLEUKEST Negative 05/03/2024 05:32 PM    POCNITRITE Negative 05/03/2024 05:32 PM    POCUROBILIGE 0.2 05/03/2024 05:32 PM    POCPROTEIN Negative 05/03/2024 05:32 PM    POCURPH 5.5 05/03/2024 05:32 PM    POCBLOOD Negative 05/03/2024 05:32 PM    POCSPGRV 1.025 05/03/2024 05:32 PM    POCKETONES Negative 05/03/2024 05:32 PM    POCBILIRUBIN Negative 05/03/2024 05:32 PM    POCGLUCUA 500 05/03/2024 05:32 PM                   Assessment & Plan        1. Dysuria  - POCT Urinalysis    2. Urinary tract infection without hematuria, site unspecified  - Urine Culture; Future  - sulfamethoxazole-trimethoprim (BACTRIM DS) 800-160 MG tablet; Take 1 Tablet by mouth every 12 hours for 7 days.  Dispense: 14 Tablet; Refill: 0    3. History of diabetes mellitus    4. Anticoagulated    5. Single kidney    Discussed with patient urine looks unremarkable this evening. No bacteria or leuks present. Advised to wait on starting abx unless symptoms become very intense over the weekend   Contingent antibiotic  prescription given to patient to fill upon meeting criteria of guidelines discussed.     Will contact patient via [employer] if I need to change abx based on urine culture result  Hx of single kidney and CKD  CrCl is 30, no dosage adjustment necessary  He drinks 80 ounces of water per day  May be side effect of Jardiance or Ozempic he recently started taking  Please follow up with PCP  Supportive care, differential diagnoses, and indications for immediate follow-up discussed with patient.    Pathogenesis of diagnosis discussed including typical length and natural progression.    Instructed to return to  or nearest emergency department if symptoms fail to improve, for any change in condition, further concerns, or new concerning symptoms.  Patient states understanding of the plan of care and discharge instructions.

## 2024-05-06 ENCOUNTER — TELEPHONE (OUTPATIENT)
Dept: VASCULAR LAB | Facility: MEDICAL CENTER | Age: 84
End: 2024-05-06
Payer: MEDICARE

## 2024-05-06 LAB
BACTERIA UR CULT: NORMAL
SIGNIFICANT IND 70042: NORMAL
SITE SITE: NORMAL
SOURCE SOURCE: NORMAL

## 2024-05-06 NOTE — TELEPHONE ENCOUNTER
Noted pt Rx'd Bactrim d/t possible UTI - pt waiting to start until culture results (currently negative, but not finalized).     Pt instructed to call [facility] if he is going to start Bactrim pending finalization of culture. He will require warfarin dose adjustment if so.    Skip Dowling, ZayD, BCACP

## 2024-05-06 NOTE — TELEPHONE ENCOUNTER
Caller: Ursula - spouse    Topic/issue: Ursula called to give an update that the patient will not be going on any antibiotics.     Callback Number: [phone]    Thank you,     Wanda HARRIS

## 2024-05-16 PROCEDURE — RXMED WILLOW AMBULATORY MEDICATION CHARGE: Performed by: STUDENT IN AN ORGANIZED HEALTH CARE EDUCATION/TRAINING PROGRAM

## 2024-05-17 ENCOUNTER — PHARMACY VISIT (OUTPATIENT)
Dept: PHARMACY | Facility: MEDICAL CENTER | Age: 84
End: 2024-05-17
Payer: COMMERCIAL

## 2024-05-23 ENCOUNTER — ANTICOAGULATION VISIT (OUTPATIENT)
Dept: MEDICAL GROUP | Facility: MEDICAL CENTER | Age: 84
End: 2024-05-23
Payer: MEDICARE

## 2024-05-23 VITALS
WEIGHT: 151 LBS | OXYGEN SATURATION: 95 % | DIASTOLIC BLOOD PRESSURE: 66 MMHG | SYSTOLIC BLOOD PRESSURE: 131 MMHG | BODY MASS INDEX: 22.96 KG/M2 | HEART RATE: 64 BPM

## 2024-05-23 DIAGNOSIS — Z95.828 PRESENCE OF IVC FILTER: ICD-10-CM

## 2024-05-23 PROBLEM — C80.1 PAPILLARY ADENOCARCINOMA (HCC): Status: ACTIVE | Noted: 2024-05-23

## 2024-05-23 LAB — INR PPP: 2.6 (ref 2–3.5)

## 2024-05-23 PROCEDURE — 93793 ANTICOAG MGMT PT WARFARIN: CPT | Performed by: STUDENT IN AN ORGANIZED HEALTH CARE EDUCATION/TRAINING PROGRAM

## 2024-05-23 PROCEDURE — 3075F SYST BP GE 130 - 139MM HG: CPT | Performed by: STUDENT IN AN ORGANIZED HEALTH CARE EDUCATION/TRAINING PROGRAM

## 2024-05-23 PROCEDURE — 3078F DIAST BP <80 MM HG: CPT | Performed by: STUDENT IN AN ORGANIZED HEALTH CARE EDUCATION/TRAINING PROGRAM

## 2024-05-23 PROCEDURE — 85610 PROTHROMBIN TIME: CPT | Performed by: STUDENT IN AN ORGANIZED HEALTH CARE EDUCATION/TRAINING PROGRAM

## 2024-05-23 ASSESSMENT — FIBROSIS 4 INDEX: FIB4 SCORE: 6.17

## 2024-05-23 NOTE — PROGRESS NOTES
Anticoagulation Summary  As of 2024      INR goal:  2.0-3.0   TTR:  73.9% (6.3 y)   INR used for dosin.60 (2024)   Warfarin maintenance plan:  2.5 mg (5 mg x 0.5) every Thu; 5 mg (5 mg x 1) all other days   Weekly warfarin total:  32.5 mg   Plan last modified:  Zay CottonD (2022)   Next INR check:  2024   Priority:  Maintenance   Target end date:  Indefinite    Indications    Presence of IVC filter [Z95.828]  Transient ischemic attack [G45.9] (Resolved) [G45.9]  Deep vein thrombosis (DVT) of both lower extremities (HCC) (Resolved) [I82.403]  DVT (deep venous thrombosis) (HCC) (Resolved) [I82.409]  Multiple pulmonary emboli (HCC) (Resolved) [I26.99]  Chronic anticoagulation (Resolved) [Z79.01]                 Anticoagulation Episode Summary       INR check location:      Preferred lab:      Send INR reminders to:      Comments:            Anticoagulation Care Providers       Provider Role Specialty Phone number    [facility]   [phone]    Edward Walters M.D.  Endocrinology [phone]                  Refer to Patient Findings for HPI:  Patient Findings       Negatives:  Signs/symptoms of thrombosis, Signs/symptoms of bleeding, Laboratory test error suspected, Change in health, Change in alcohol use, Change in activity, Upcoming invasive procedure, Emergency department visit, Upcoming dental procedure, Missed doses, Extra doses, Change in medications, Change in diet/appetite, Hospital admission, Bruising, Other complaints            Vitals:    24 0933   BP: 131/66   Pulse: 64   SpO2: 95%   Weight: 68.5 kg (151 lb)       Verified current warfarin dosing schedule.    Medications reconciled: No  Pt is on ASA 81 mg once daily as antiplatelet therapy for hx of CAD & CABG and must be reviewed again on next cards f/u.       A/P   INR is therapeutic    Warfarin dosing recommendation: Continue regimen as listed above.    Pt educated to contact our clinic  with any changes in medications or s/s of bleeding or thrombosis. Pt is aware to seek immediate medical attention for falls, head injury or deep cuts.    Request pt to return in 6 week(s). Pt agrees.    Zay EckertD

## 2024-05-28 DIAGNOSIS — E11.65 TYPE 2 DIABETES MELLITUS WITH HYPERGLYCEMIA, WITHOUT LONG-TERM CURRENT USE OF INSULIN (HCC): ICD-10-CM

## 2024-05-28 PROCEDURE — RXMED WILLOW AMBULATORY MEDICATION CHARGE: Performed by: STUDENT IN AN ORGANIZED HEALTH CARE EDUCATION/TRAINING PROGRAM

## 2024-05-28 RX ORDER — SEMAGLUTIDE 0.68 MG/ML
0.5 INJECTION, SOLUTION SUBCUTANEOUS
Qty: 9 ML | Refills: 1 | Status: SHIPPED | OUTPATIENT
Start: 2024-05-28

## 2024-05-29 ENCOUNTER — PHARMACY VISIT (OUTPATIENT)
Dept: PHARMACY | Facility: MEDICAL CENTER | Age: 84
End: 2024-05-29
Payer: COMMERCIAL

## 2024-06-03 ENCOUNTER — NON-PROVIDER VISIT (OUTPATIENT)
Dept: INTERNAL MEDICINE | Facility: OTHER | Age: 84
End: 2024-06-03
Payer: MEDICARE

## 2024-06-03 VITALS — BODY MASS INDEX: 23.79 KG/M2 | WEIGHT: 157 LBS | HEIGHT: 68 IN

## 2024-06-03 DIAGNOSIS — N18.30 STAGE 3 CHRONIC KIDNEY DISEASE, UNSPECIFIED WHETHER STAGE 3A OR 3B CKD: ICD-10-CM

## 2024-06-03 DIAGNOSIS — E11.8 CONTROLLED TYPE 2 DIABETES MELLITUS WITH COMPLICATION, WITHOUT LONG-TERM CURRENT USE OF INSULIN (HCC): ICD-10-CM

## 2024-06-03 DIAGNOSIS — E78.5 HYPERLIPIDEMIA, UNSPECIFIED HYPERLIPIDEMIA TYPE: ICD-10-CM

## 2024-06-03 PROCEDURE — 97802 MEDICAL NUTRITION INDIV IN: CPT | Performed by: DIETITIAN, REGISTERED

## 2024-06-03 ASSESSMENT — FIBROSIS 4 INDEX: FIB4 SCORE: 6.17

## 2024-06-03 NOTE — PATIENT INSTRUCTIONS
I will balance my plate and add in more proteins  - plant forward variations for CKD3 management    Maintain activity level within safety

## 2024-06-03 NOTE — PROGRESS NOTES
"Paulo Paredes is a 83 y.o. year old, male initial nutrition evaluation for T2DM, diagnosed in 2004, managed with diet and lifestyle. Recent rise in A1c and wearing a CGM.    ROS: lost weight in last 3-4 years, continues to walk outside and go on treadmill. Single kidney, h/o nephrectomy, CAD, CABG x 4 v., GERD, HLD, h/o PE, HTN, thyroidectomy and colon resection d/t polyps in [place] states <12 inces left in colon.    Allergies: lactose intolerance    Wellness Vision:  I want to lower my A1c    My Health Profile:    PHYSICAL ASSESSMENT  Current Height:  68\"  Ht Readings from Last 1 Encounters:   05/03/24 1.727 m (5' 8\")     Current Weight:  157#  Wt Readings from Last 1 Encounters:   05/23/24 68.5 kg (151 lb)     BMI:  23    Usual Weight:  175-180 lbs  HABW: 220#    FLUID INTAKE:  56 oz. Water+16 oz  Typical Beverages: coffee, water, lemonade-small glass w/ice, no juices  Servings Alcohol/D/WK/MO: 1 beer per month    ACTIVITY REVIEW: walks every day on treadmill  Current Exercise: walked after CABG, but no strength training until recently: 5 lbs lifting in his day; strong legs from walking  Hobbies: gardening    PERTINENT LABS:    Latest Reference Range & Units 12/04/20 07:23 05/10/21 07:19 10/26/21 07:48 12/02/21 12:14 06/06/22 07:19 01/04/23 06:53 07/10/23 10:45 11/29/23 08:10 03/01/24 08:20   Glycohemoglobin 4.0 - 5.6 % 6.9 (H) 6.6 (H) 7.5 (H) 7.5 (H) 7.6 (H) 8.0 (H) 10.2 ! 8.8 ! 8.5 (H)      Latest Reference Range & Units 01/04/23 06:53 04/27/23 17:40 08/09/23 07:16 11/28/23 06:36 01/24/24 06:40   GFR (CKD-EPI) >60 mL/min/1.73 m 2 31 ! 29 ! 33 ! 35 ! 36 !      Latest Reference Range & Units 11/28/23 06:36 01/24/24 06:40   Cholesterol,Tot 100 - 199 mg/dL 173 146   Triglycerides 0 - 149 mg/dL 288 (H) 189 (H)   HDL >=40 mg/dL 27 ! 35 !   LDL <100 mg/dL 88 73       Patient Behaviors (indicate frequency)  Meal/Snack Pattern:     B: whole wheat and oat toast, fresh ground PB or butter, 2-cups black coffee+ collagen " peptides, and/or protein drink- diabetes drink    L: 1/2 sandwich+meat, cheese, lettuce, tomato or egg salad sandwich, water    1630/1700: salad+tomato, cucumber, onion, radishes, lettuce or antonella slaw; meat- breaded pork chop, cod, 1/2 corn on the cob, summer squash+onions, tomato; onions+cucumber+vinegar, sourdough toast    Or Buffet 1-2x/month: salad+protein     Fruits: strawberries, apples, p/a, melon for medication  Vegetables: daily intake  Nuts/seeds: available  Legumes: eagle beans, garbanzo beans in salad  Grains: wild/brown rice  Cultural foods: Pazole w/hominy at restaurant at [facility]- comfort foods    Al grew up with rice, beans, tortilla    Added sugars: cookies-once a week    Dines away from Home: 1-2 x/month  Locations:  [place] buff[place]  Who lives in home: wife, two RACHEL, self  Additional Patient Behavior Information: RACHEL are great cooks    PES: Controlled T2DM, CKD3, HLD with weight loss r/t low appetite, frequent dining out, loss of muscle mass as evidenced by A1c 8.5%, GFR 36 and frequent falls     NUTRITION COMMENTS:    Al is 84 yo who has lost weight over the last year, 20 lbs weight loss; Trulicity x two years which most likely r/t weight loss, lack of appetite. Stopped in January 2024- due to unavailability.    Started Ozempic and Jardiance    Al and wife present, feels he has lost his strength  Falls x 3 in last two years  Advised to limit his high-risk activities    CGM:  TIR: 78%  Hypoglycemia: two occurrences in middle of night- treats with 1/2 cup p/a juice or glucose tablets.    Reviewed DSM targets for 84 yo with significant co-morbidities.  Eating practices fairly healthy with less attention to protein needs.     Reinforced plant-based proteins to incorporate into menus for CKD and muscle mass retention with frequent falls and lack of strength. GLP-1 may be contributing to muscle mass decline, no strength training d/t CVD and advice from cardiologist to avoid exertion.    RTC x next  available    Time Spent: 60 minutes

## 2024-06-04 ENCOUNTER — HOSPITAL ENCOUNTER (OUTPATIENT)
Dept: LAB | Facility: MEDICAL CENTER | Age: 84
End: 2024-06-04
Attending: STUDENT IN AN ORGANIZED HEALTH CARE EDUCATION/TRAINING PROGRAM
Payer: MEDICARE

## 2024-06-04 DIAGNOSIS — C80.1 PAPILLARY ADENOCARCINOMA (HCC): ICD-10-CM

## 2024-06-04 DIAGNOSIS — E89.0 POSTOPERATIVE HYPOTHYROIDISM: ICD-10-CM

## 2024-06-04 DIAGNOSIS — I10 ESSENTIAL HYPERTENSION, BENIGN: ICD-10-CM

## 2024-06-04 LAB
T4 FREE SERPL-MCNC: 1.57 NG/DL (ref 0.93–1.7)
TSH SERPL DL<=0.005 MIU/L-ACNC: 0.1 UIU/ML (ref 0.38–5.33)

## 2024-06-04 PROCEDURE — 36415 COLL VENOUS BLD VENIPUNCTURE: CPT

## 2024-06-04 PROCEDURE — 84443 ASSAY THYROID STIM HORMONE: CPT

## 2024-06-04 PROCEDURE — 86800 THYROGLOBULIN ANTIBODY: CPT

## 2024-06-04 PROCEDURE — 84432 ASSAY OF THYROGLOBULIN: CPT

## 2024-06-04 PROCEDURE — 84439 ASSAY OF FREE THYROXINE: CPT

## 2024-06-05 RX ORDER — ATENOLOL 25 MG/1
TABLET ORAL
Qty: 100 TABLET | Refills: 2 | Status: SHIPPED | OUTPATIENT
Start: 2024-06-05

## 2024-06-05 NOTE — TELEPHONE ENCOUNTER
Is the patient due for a refill? Yes    Was the patient seen the past year? Yes    Date of last office visit: 2/01/24    Does the patient have an upcoming appointment?  No   If yes, When?     Provider to refill:ELIZABETH    Does the patients insurance require a 100 day supply?  Yes

## 2024-06-06 NOTE — PROGRESS NOTES
Follow up Endocrinology Visit  Initial consult/last visit on: 4/25/24  Referred by: Fadi Najjar, M.D./nephrologist     Chief complaint:  Paulo Paredes, 83 y.o., male, who is here for follow up for T2DM management    Interval history:   - patient has a hip pain, but he still can walk, he states that lately he was able to walk more and experienced less fatigue, even overall he lost weight but  lately was able to regain some weight  - reports some constipation but improved with treatment  - reviewed recent labs  Wt Readings from Last 5 Encounters:   06/03/24 71.2 kg (157 lb)   05/23/24 68.5 kg (151 lb)   05/03/24 69.4 kg (153 lb)   04/25/24 73.9 kg (163 lb)   04/25/24 75.3 kg (166 lb)     Current therapy:  Ozempic 0.5 mg weekly  Jardiance 25 mg     Tolerates medications: Jardiance - fatigue, difficulty walking, lost weight, falls asleep on his chair during the day, frequent urination  Compliant: yes    Prior used medications:   Trulicity - previously tolerated, was stopped due to difficulty to get the medication    Other important medications:  ASA 81 mg  Warfarin  Rosuvastatin 40 mg  Fenofibrate 160 mg  Atenolol 25 mg  Capsaicin cream  Levothyroxine 137 mcg/  Losartan 25 mg     BG control:  CGM type - Michelle 3  Period -  5/28/24 - 6/10/24  Data were reviewed and discussed with the patient  Active time - 96%  ABG - 152 mg/dl  GV - 20.3%  GMI - 6.9%  TIR - 82%  TAR - high - 18%, very high - 0%  TBR - 0%  5/28/24 - 6/10/24      DM history:  - diagnosed 20 years ago, diagnosed on screening test,  220 lb  - hospitalizations for DKA/HHS/severe hyperglycemia/severe hypoglycemia in the past: none  - prior therapy: Trulicity started 2 years ago, couldn't get Trulicity -> Jardiance - 3 weeks ago  - known DM complications: CKD 3b, single kidney, s/p nephrectomy,  Hx of TIA, CAD, s/p CABG x 4  - latest HbA1C - 8.5% in 3/2024  - pertinent PMHx:   -- dyslipidemia, GERD, PTC, s/p thyroidectomy in 2014, hypothyroidism, Hx of PTC, Hx  of PE, s/p IVF filter placement, HTN  -- denies NAFLD, PAD/CHF      PTC Hx, s/p thyroidectomy in 5/2014:  - currently on LT4 137, euthyroid    Current Medications:  Current Outpatient Medications:     Empagliflozin 25 MG Tab, Take 1 Tablet by mouth every day., Disp: 90 Tablet, Rfl: 3    liraglutide (VICTOZA) 18 MG/3ML Solution Pen-injector, Inject 0.6 mg under the skin every day., Disp: 6 mL, Rfl: 4    rosuvastatin (CRESTOR) 40 MG tablet, Take 1 Tablet by mouth every day., Disp: 90 Tablet, Rfl: 3    levothyroxine (EUTHYROX) 137 MCG Tab, TAKE 1 TABLET BY MOUTH ONCE DAILY IN THE MORNING ON  AN  EMPTY  STOMACH  ONE  HOUR  BEFORE  EATING, Disp: 100 Tablet, Rfl: 3    fenofibrate (TRIGLIDE) 160 MG tablet, Take 1 Tablet by mouth every day., Disp: 100 Tablet, Rfl: 3    warfarin (COUMADIN) 5 MG Tab, Take 1 Tablet by mouth every evening. Take 0.5 to 1 tablet by mouth once daily. Take as directed by anticoagulation clinic., Disp: 100 Tablet, Rfl: 3    losartan (COZAAR) 25 MG Tab, Take 1 Tablet by mouth every day for 360 days., Disp: 100 Tablet, Rfl: 2    Dulaglutide 1.5 MG/0.5ML Solution Pen-injector, Inject 0.5 mL under the skin every 7 days for 360 days., Disp: 24 mL, Rfl: 3    aspirin 81 MG EC tablet, Take 81 mg by mouth every day., Disp: , Rfl:     atenolol (TENORMIN) 25 MG Tab, Take 1 tablet by mouth once daily, Disp: 100 Tablet, Rfl: 3    oxymetazoline (AFRIN 12 HOUR) 0.05 % Solution, Administer 2 Sprays into affected nostril(S) 2 times a day. Discontinue after 2 days., Disp: 15 mL, Rfl: 0    triamcinolone acetonide (KENALOG) 0.025 % Cream, Apply to the affected area twice a day on legs., Disp: 15 g, Rfl: 1    capsaicin (ZOSTRIX) 0.025 % cream, APPLY TO ITCHY AREA ON NECK UP TO 5 TIMES A DAY FOR 1 WEEK, THEN 3 TIMES A DAY FOR 3 TO 6 WEEKS. WASH HANDS THOROUGHLY AFTER APPLYING. DO TEST SPOT 1ST, Disp: , Rfl:     Multiple Vitamins-Minerals (ZINC PO), Take  by mouth., Disp: , Rfl:     hydrocortisone 2.5 % Cream topical  "cream, APPLY TO RASH ON GROIN TWICE DAILY FOR 1 WEEK WITH FLARES, Disp: , Rfl:     Calcium Carb-Cholecalciferol (CALCIUM 1000 + D PO), Take 1 Dose by mouth every day., Disp: , Rfl:     Omega-3 Krill Oil 300 MG Cap, Take 300 mg by mouth every day., Disp: , Rfl:     Cinnamon 500 MG Cap, Take 1,000 mg by mouth., Disp: , Rfl:     Cyanocobalamin (VITAMIN B-12) 1000 MCG Tab, Take 1,000 mcg by mouth every day., Disp: , Rfl:     Coenzyme Q10 (CO Q-10 PO), Take 1 Dose by mouth every day. Unknown OTC Strength, Disp: , Rfl:     PHYSICAL EXAM:   Vital signs: /70 (Patient Position: Sitting)   Pulse 70   Ht 1.727 m (5' 8\")   Wt 72.8 kg (160 lb 8 oz)   SpO2 99%   BMI 24.40 kg/m²   GENERAL: Well-developed, well-nourished  in no apparent distress.   CARDIOVASCULAR: Regular rate and rhythm.   LUNGS: No dyspnea  ABDOMEN: Soft, nondistended  NEUROLOGICAL: Cranial nerves II-XII are grossly intact No visible tremor with both outstretched hands  SKIN: No rashes, lesions. Turgor is normal.    Labs:  - reviewed  HbA1C/BG/Hb:   Reference Range  10/26/21  12/02/21  06/06/22  01/04/23  07/10/23  11/29/23  03/01/24  6/10/24   HbA1C 4.0 - 5.6 % 7.5 (H) 7.5 (H) 7.6 (H) 8.0 (H) 10.2 ! 8.8 ! 8.5 (H) 7.6 (H)      Reference Range  01/24/24    Hb 14.0 - 18.0 g/dL 14.3   Hct 42.0 - 52.0 % 44.3     Kidney function/MACR:   Reference Range 01/24/24    Creatinine 0.50 - 1.40 mg/dL 1.80 (H)   GFR (CKD-EPI) >60 mL/min/1.73 m 2 36 !   MACR 0 - 30 mg/g 95 (H)     LFTs:   Reference Range 11/28/23    AST(SGOT) 12 - 45 U/L 43   ALT(SGPT) 2 - 50 U/L 27   ALP 30 - 99 U/L 44     Lipid panel:   Reference Range 01/24/24    Triglycerides 0 - 149 mg/dL 189 (H)   HDL >=40 mg/dL 35 !   LDL <100 mg/dL 73     Hypothyroidism:   Reference Range  01/04/23 06/04/24    TSH 0.380 - 5.330 uIU/mL 0.850 0.103 (L)   fT4 0.93 - 1.70 ng/dL  1.57   Tg 1.3 - 31.8 ng/mL  0.3 (L)   Anti-Tg Abs 0.0 - 4.0 IU/mL  <0.9     Pathology report:  - reviewed  Pathology report after " thyroidectomy with excision of deep cervical lymph node with jugular dissection of right neck on 5/13/2014:  FINAL DIAGNOSIS:  A. Right cervical lymph node: One benign lymph node negative for metastatic carcinoma. (0/1)  B. 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  Benign hyperplastic nodule of left lobe with hemorrhage and fibrosis.    -Specimen Size:  Right Lobe: 4.8 x 2.5 x 2 cm  Left Lobe: 5 x 3 x 2.5 cm  Isthmus: 2.5 x 2.2 x 1.2 cm  Central Compartment: Not applicable.  Right neck dissection: Not applicable.  Left neck dissection: Not applicable.  -Specimen Weight: 33.4 gram  -Tumor Focality: Multifocal, ipsilateral  -Dominant Tumor:  Tumor Laterality: Right lobe  Tumor Size: 4.5 mm  Histologic Type: Papillary carcinoma  Variant: Classical (usual)  Architecture: Classical (usual)  Cytomorphology: Classical  Margins: Margins uninvolved by carcinoma  Distance of invasive carcinoma to closest margin: 3 mm.  Tumor Capsule: Partially encapsulated  Tumor Capsule Invasion: Present  Lymph-Vascular Invasion: Not identified  Extrathyroidal Extension: Not identified  -Second Tumor:  Tumor Laterality: Right lobe  Tumor Size: 0.25 mm  Histologic Type: Papillary carcinoma (usual)  Variant: Classical (usual)  Architecture: Classical (papillary)  Cytomorphology: Classical  Margins: Margins uninvolved by carcinoma  Distance of invasive carcinoma to closest margin: 1 mm  Tumor Capsule: Totally unencapsulated  Tumor Capsule Invasion: Not applicable  Lymph-Vascular Invasion: Not identified  Extrathyroidal Extension: Not identified  -Pathologic Staging:  Primary Tumor: *pT1(m)  Regional Lymph Nodes: *pN0  Number examined: 1  Number involved: 0  Lymph Node, Extranodal Extension: Not applicable  Distant Metastasis: Not applicable.  -Additional Pathologic Findings: None    ASSESSMENT/PLAN:   1. Type 2 diabetes mellitus with hyperglycemia, without long-term current use  of insulin (HCC)  - duration x 20 years  - on SGTL-2 inhibitor  - suboptimally controlled, last HbA1C - 8.5% in 3/2024     Microvascular complications: nephropathy with CKD 3b, microalbuminuria, denies peripheral neuropathy, retinopathy  Macrovascular complications: Hx of TIA, CAD, s/p CABG x 4  Associated comorbidities: dyslipidemia, GERD, hypothyroidism, Hx of PTC, Hx of PE, s/p IVF filter placement, HTN, single kidney, s/p nephrectomy,    DM complication screening:  Labs reviewed:  MACR - 95 (+), last checked in 1/2024  Creat/GFR  1.8/36, consistent with CKD 3b, last checked in 1/2024  LDL - 73 - close to target, last checked in 1/2024     Patient is taking statin: on  Rosuvastatin 40 mg + fenofibrate 160 mg  Patient is taking ASA: yes  Patient is taking ACE/ARB: on Losartan 25 mg    Last eye exam: 12/4/23, no evidence of DR  Last foot exam: on 4/25/24 by me, normal monofilament test     Home medications:  Ozempic 0.5 mg weekly  Jardiance 25 mg    Discussion:  - noted excellent glycemic control  - will decrease dose of SGLT-2 inhibitor as GFR is low and no additional glycemic management benefit from higher dose  - continue low dose GLP-1 agonist as long as no significant weight loss or difficult to manage constipation     Plan:  Discussed with the patient goals for DM management:  Fasting BG 90 - 130  2 hrs postprandial  - 180  HbA1C < 7.0%     Therapy adjustments:  - continue Ozempic 0.5 mg weekly       -- given instructions for constipation management       -- report our office if continues to lose weight  - decrease dose of  Jardiance 25  ->10 mg daily        - you can hold medication for a few day /week to see if you feel better or go down on 1/2 pill    3. Continue use of Michelle 3 as long as it is not expensive, ok to do 1 mo breaks off CGM.  4. Given foot care instructions on prior visit.     # CKD3b  # Microalbuminuria  - already on ARB and SGLT-2 inhibitor  - keep BP < 130/80 mmHg    # Hx of PTC  #  Postoperative hypothyroidism  # Iatrogenic subclinical hyperthyroidism  - s/p thyroidectomy in 2014, pT1N0  - LOW risk as per pathology report  - indeterminate response as Tg> 1 but < 10,but given period of 10 years after the surgery and relatively stable level of Tg - TSH goal 0.4 - 2.0  - recent TSH suppression on same dose of LT4 likely to be explained by weight loss  Plan:  Decrease dose of LT4 137 -> 125 mcg/day.  Repeat TFTs  + Tg/Tg-Abs in 3 mo.   Obtain neck US.     # CAD, s/p CABG x 4  # Dyslipidemia  - managed by cardiology/PCP  - on high dose statin  - LDL close to goal, defer management to cardiology    RTC: 2 mo    Total time (face-to-face and non-face-to face time): 60  min - extensive discussion of diagnoses, treatment, prognosis, lab, pathology review, documentation.  Plan reviewed with the patient and agreed with plan.  All questions answered to patient's satisfaction.  Thank you kindly for allowing me to participate in the diabetes care plan for this patient.    Erin Orta MD    CC:   Damion Larose M.D.

## 2024-06-10 ENCOUNTER — OFFICE VISIT (OUTPATIENT)
Dept: ENDOCRINOLOGY | Facility: MEDICAL CENTER | Age: 84
End: 2024-06-10
Attending: STUDENT IN AN ORGANIZED HEALTH CARE EDUCATION/TRAINING PROGRAM
Payer: MEDICARE

## 2024-06-10 VITALS
DIASTOLIC BLOOD PRESSURE: 70 MMHG | WEIGHT: 160.5 LBS | HEIGHT: 68 IN | BODY MASS INDEX: 24.32 KG/M2 | HEART RATE: 70 BPM | OXYGEN SATURATION: 99 % | SYSTOLIC BLOOD PRESSURE: 110 MMHG

## 2024-06-10 DIAGNOSIS — E11.65 TYPE 2 DIABETES MELLITUS WITH HYPERGLYCEMIA, WITHOUT LONG-TERM CURRENT USE OF INSULIN (HCC): ICD-10-CM

## 2024-06-10 DIAGNOSIS — C80.1 PAPILLARY ADENOCARCINOMA (HCC): ICD-10-CM

## 2024-06-10 DIAGNOSIS — E89.0 POSTOPERATIVE HYPOTHYROIDISM: ICD-10-CM

## 2024-06-10 PROCEDURE — 99213 OFFICE O/P EST LOW 20 MIN: CPT | Performed by: STUDENT IN AN ORGANIZED HEALTH CARE EDUCATION/TRAINING PROGRAM

## 2024-06-10 PROCEDURE — 3078F DIAST BP <80 MM HG: CPT | Performed by: STUDENT IN AN ORGANIZED HEALTH CARE EDUCATION/TRAINING PROGRAM

## 2024-06-10 PROCEDURE — 99215 OFFICE O/P EST HI 40 MIN: CPT | Performed by: STUDENT IN AN ORGANIZED HEALTH CARE EDUCATION/TRAINING PROGRAM

## 2024-06-10 PROCEDURE — 3074F SYST BP LT 130 MM HG: CPT | Performed by: STUDENT IN AN ORGANIZED HEALTH CARE EDUCATION/TRAINING PROGRAM

## 2024-06-10 RX ORDER — LEVOTHYROXINE SODIUM 0.12 MG/1
125 TABLET ORAL
Qty: 90 TABLET | Refills: 5 | Status: SHIPPED | OUTPATIENT
Start: 2024-06-10

## 2024-06-10 ASSESSMENT — FIBROSIS 4 INDEX: FIB4 SCORE: 6.17

## 2024-06-10 NOTE — PATIENT INSTRUCTIONS
For diabetes:  Continue Ozempic 0.5 mg weekly        -- let us know if you continue losing weight    2. Jardiance - finish 25 mg pills -> go to 10 mg pills daily      --  make sure you are well hydrated     -- make sure you are having carbs in your diet    For thyroid:  Decrease dose of Levothyroxine 137 -> 125 mcg  Repeat thyroid labs in 3 mo.   I ordered thyroid US for you.       For the constipation I would recommend followin. Increase water intake, at least 100 ounces a day  2. Increase fiber intake through vegetables.  3. Add fiber supplement - over the counter in pharmacy or grocery store - starting from 1 teaspoon a day, but can further increase up to 3 teaspoons a day .  4. Add more movement - exercise stimulates bowel movement.   5. If all above does not help -> try Miralax  (over the counter)  - 1 cup in 4-8 ounces of water once a day.   6. Let us know if nothing works.

## 2024-06-19 PROCEDURE — RXMED WILLOW AMBULATORY MEDICATION CHARGE: Performed by: STUDENT IN AN ORGANIZED HEALTH CARE EDUCATION/TRAINING PROGRAM

## 2024-06-24 PROCEDURE — RXMED WILLOW AMBULATORY MEDICATION CHARGE: Performed by: STUDENT IN AN ORGANIZED HEALTH CARE EDUCATION/TRAINING PROGRAM

## 2024-06-25 ENCOUNTER — PHARMACY VISIT (OUTPATIENT)
Dept: PHARMACY | Facility: MEDICAL CENTER | Age: 84
End: 2024-06-25
Payer: COMMERCIAL

## 2024-07-03 ENCOUNTER — HOSPITAL ENCOUNTER (OUTPATIENT)
Dept: RADIOLOGY | Facility: MEDICAL CENTER | Age: 84
End: 2024-07-03
Attending: STUDENT IN AN ORGANIZED HEALTH CARE EDUCATION/TRAINING PROGRAM
Payer: MEDICARE

## 2024-07-03 DIAGNOSIS — C80.1 PAPILLARY ADENOCARCINOMA (HCC): ICD-10-CM

## 2024-07-03 PROCEDURE — 76536 US EXAM OF HEAD AND NECK: CPT

## 2024-07-11 ENCOUNTER — ANTICOAGULATION VISIT (OUTPATIENT)
Dept: MEDICAL GROUP | Facility: MEDICAL CENTER | Age: 84
End: 2024-07-11
Payer: MEDICARE

## 2024-07-11 VITALS
DIASTOLIC BLOOD PRESSURE: 59 MMHG | HEART RATE: 65 BPM | BODY MASS INDEX: 24.33 KG/M2 | WEIGHT: 160 LBS | OXYGEN SATURATION: 97 % | SYSTOLIC BLOOD PRESSURE: 101 MMHG

## 2024-07-11 DIAGNOSIS — Z95.828 PRESENCE OF IVC FILTER: ICD-10-CM

## 2024-07-11 LAB — INR PPP: 3.8 (ref 2–3.5)

## 2024-07-11 PROCEDURE — 85610 PROTHROMBIN TIME: CPT | Performed by: NURSE PRACTITIONER

## 2024-07-11 PROCEDURE — 99211 OFF/OP EST MAY X REQ PHY/QHP: CPT | Performed by: NURSE PRACTITIONER

## 2024-07-11 PROCEDURE — 3078F DIAST BP <80 MM HG: CPT | Performed by: NURSE PRACTITIONER

## 2024-07-11 PROCEDURE — 3074F SYST BP LT 130 MM HG: CPT | Performed by: NURSE PRACTITIONER

## 2024-07-11 ASSESSMENT — FIBROSIS 4 INDEX: FIB4 SCORE: 6.17

## 2024-07-17 DIAGNOSIS — I10 ESSENTIAL HYPERTENSION, BENIGN: ICD-10-CM

## 2024-07-17 RX ORDER — LOSARTAN POTASSIUM 25 MG/1
25 TABLET ORAL DAILY
Qty: 100 TABLET | Refills: 0 | Status: SHIPPED | OUTPATIENT
Start: 2024-07-17

## 2024-07-18 PROCEDURE — RXMED WILLOW AMBULATORY MEDICATION CHARGE: Performed by: STUDENT IN AN ORGANIZED HEALTH CARE EDUCATION/TRAINING PROGRAM

## 2024-07-19 ENCOUNTER — PHARMACY VISIT (OUTPATIENT)
Dept: PHARMACY | Facility: MEDICAL CENTER | Age: 84
End: 2024-07-19
Payer: COMMERCIAL

## 2024-07-25 ENCOUNTER — ANTICOAGULATION VISIT (OUTPATIENT)
Dept: MEDICAL GROUP | Facility: MEDICAL CENTER | Age: 84
End: 2024-07-25
Payer: MEDICARE

## 2024-07-25 VITALS
HEART RATE: 59 BPM | SYSTOLIC BLOOD PRESSURE: 122 MMHG | WEIGHT: 151 LBS | DIASTOLIC BLOOD PRESSURE: 61 MMHG | BODY MASS INDEX: 22.96 KG/M2 | OXYGEN SATURATION: 99 %

## 2024-07-25 DIAGNOSIS — Z95.828 PRESENCE OF IVC FILTER: ICD-10-CM

## 2024-07-25 LAB — INR PPP: 1.6 (ref 2–3.5)

## 2024-07-25 PROCEDURE — 99211 OFF/OP EST MAY X REQ PHY/QHP: CPT | Performed by: NURSE PRACTITIONER

## 2024-07-25 PROCEDURE — 3074F SYST BP LT 130 MM HG: CPT | Performed by: NURSE PRACTITIONER

## 2024-07-25 PROCEDURE — 3078F DIAST BP <80 MM HG: CPT | Performed by: NURSE PRACTITIONER

## 2024-07-25 PROCEDURE — 85610 PROTHROMBIN TIME: CPT | Performed by: NURSE PRACTITIONER

## 2024-07-25 ASSESSMENT — FIBROSIS 4 INDEX: FIB4 SCORE: 6.17

## 2024-08-01 ENCOUNTER — APPOINTMENT (OUTPATIENT)
Dept: MEDICAL GROUP | Facility: MEDICAL CENTER | Age: 84
End: 2024-08-01
Payer: MEDICARE

## 2024-08-01 VITALS
BODY MASS INDEX: 22.96 KG/M2 | WEIGHT: 151 LBS | HEART RATE: 56 BPM | OXYGEN SATURATION: 100 % | SYSTOLIC BLOOD PRESSURE: 111 MMHG | DIASTOLIC BLOOD PRESSURE: 61 MMHG

## 2024-08-01 DIAGNOSIS — Z95.828 PRESENCE OF IVC FILTER: ICD-10-CM

## 2024-08-01 LAB — INR PPP: 1.8 (ref 2–3.5)

## 2024-08-01 PROCEDURE — 3074F SYST BP LT 130 MM HG: CPT | Performed by: STUDENT IN AN ORGANIZED HEALTH CARE EDUCATION/TRAINING PROGRAM

## 2024-08-01 PROCEDURE — 3078F DIAST BP <80 MM HG: CPT | Performed by: STUDENT IN AN ORGANIZED HEALTH CARE EDUCATION/TRAINING PROGRAM

## 2024-08-01 PROCEDURE — 85610 PROTHROMBIN TIME: CPT | Performed by: STUDENT IN AN ORGANIZED HEALTH CARE EDUCATION/TRAINING PROGRAM

## 2024-08-01 PROCEDURE — 99211 OFF/OP EST MAY X REQ PHY/QHP: CPT | Performed by: STUDENT IN AN ORGANIZED HEALTH CARE EDUCATION/TRAINING PROGRAM

## 2024-08-01 ASSESSMENT — FIBROSIS 4 INDEX: FIB4 SCORE: 6.17

## 2024-08-01 NOTE — PROGRESS NOTES
Anticoagulation Summary  As of 2024      INR goal:  2.0-3.0   TTR:  72.7% (6.4 y)   INR used for dosin.80 (2024)   Warfarin maintenance plan:  2.5 mg (5 mg x 0.5) every Thu; 5 mg (5 mg x 1) all other days   Weekly warfarin total:  32.5 mg   Plan last modified:  Zay CottonD (2022)   Next INR check:  8/15/2024   Priority:  Maintenance   Target end date:  Indefinite    Indications    Presence of IVC filter [Z95.828]  Transient ischemic attack [G45.9] (Resolved) [G45.9]  Deep vein thrombosis (DVT) of both lower extremities (HCC) (Resolved) [I82.403]  DVT (deep venous thrombosis) (HCC) (Resolved) [I82.409]  Multiple pulmonary emboli (HCC) (Resolved) [I26.99]  Chronic anticoagulation (Resolved) [Z79.01]                 Anticoagulation Episode Summary       INR check location:      Preferred lab:      Send INR reminders to:      Comments:            Anticoagulation Care Providers       Provider Role Specialty Phone number    [facility]   [phone]    Edward Walters M.D.  Endocrinology [phone]                  Refer to Patient Findings for HPI:  Patient Findings       Negatives:  Signs/symptoms of thrombosis, Signs/symptoms of bleeding, Laboratory test error suspected, Change in health, Change in alcohol use, Change in activity, Upcoming invasive procedure, Emergency department visit, Upcoming dental procedure, Missed doses, Extra doses, Change in medications, Change in diet/appetite, Hospital admission, Bruising, Other complaints            Vitals:    24 0948   BP: 111/61   Pulse: (!) 56   SpO2: 100%   Weight: 68.5 kg (151 lb)       Verified current warfarin dosing schedule.    Medications reconciled: No  Pt is on antiplatelet therapy with aspirin 81mg for history of multiple thromboembolisms.       A/P   INR is subtherapeutic    Warfarin dosing recommendation: Increase to Warfarin 5 mg daily until next INR.     Pt educated to contact our clinic with any  changes in medications or s/s of bleeding or thrombosis. Pt is aware to seek immediate medical attention for falls, head injury or deep cuts.    Request pt to return in 2 week(s). Pt agrees.    Zay EckertD

## 2024-08-12 ENCOUNTER — NON-PROVIDER VISIT (OUTPATIENT)
Dept: INTERNAL MEDICINE | Facility: OTHER | Age: 84
End: 2024-08-12
Payer: MEDICARE

## 2024-08-12 VITALS — BODY MASS INDEX: 24.02 KG/M2 | WEIGHT: 158 LBS

## 2024-08-12 DIAGNOSIS — N18.9 CHRONIC KIDNEY DISEASE, UNSPECIFIED CKD STAGE: ICD-10-CM

## 2024-08-12 DIAGNOSIS — E78.5 HYPERLIPIDEMIA, UNSPECIFIED HYPERLIPIDEMIA TYPE: ICD-10-CM

## 2024-08-12 DIAGNOSIS — E11.9 CONTROLLED TYPE 2 DIABETES MELLITUS WITHOUT COMPLICATION, WITHOUT LONG-TERM CURRENT USE OF INSULIN (HCC): ICD-10-CM

## 2024-08-12 PROCEDURE — 97803 MED NUTRITION INDIV SUBSEQ: CPT | Performed by: DIETITIAN, REGISTERED

## 2024-08-12 ASSESSMENT — FIBROSIS 4 INDEX: FIB4 SCORE: 6.17

## 2024-08-12 NOTE — PATIENT INSTRUCTIONS
Maintain optimal DSM with 82 yo standards of practice to maintain A1c 7.5-8% with CVD, CKD   - realistic range for Al  - avoid further weight loss  - quality of life

## 2024-08-12 NOTE — PROGRESS NOTES
Paulo Paredes is a 83 y.o. year old, male returns for diabetes self management follow up.    Positive changes since last visit:    A1c in Endo clinic down from 8.5% to 7.6% in 3 months.  Weight steady, no further weight loss seen.    Al reports he knows what foods elevate his BG and how he can manage portions, carb choices, pairing of foods and maintain quality of life practices- WIN!    Walks daily on the TM, 50 min/d; when BG above target, uses TM to level number out when BG>200 mg/dl; maintains controlled DM.    Constipation and diarrhea issues resolved with d/c of Ozempic    Meal/Eating/Environment Pattern:    Michelle Sensor continues to work well:  TIR: 79% x 3 months  No below target occurrences x 90 days    Two cooks- wife and RACHEL, sometimes will go out; sensible meals: 3-4 oz fish, green beans/zucchini+salad  Breakfast: thin spread of PB+homemade jam  Now walks 30-50 min and feeling stronger, 2.6 mph on TM  Lunch is on his own: 1/2 sandwich-deli meat, cheese+tomatoes from garden    Comments:    Much progress in Al and Ursula's lifestyle practices to bring his DSM in optimal management.     GMI: 6.9% over 90 days  Off Ozempic per patient intolerance- will see Endo next month. Maintains near 80% TIR without injectable.    Reinforced work Al is engaged in with his DSM, provided more menu planning ideas to balance his carbs with his healthy fats and lean proteins, continue plant-forward proteins with CKD.    RTC x 6 months  Time Spent:  60 minutes

## 2024-08-13 PROCEDURE — RXMED WILLOW AMBULATORY MEDICATION CHARGE: Performed by: STUDENT IN AN ORGANIZED HEALTH CARE EDUCATION/TRAINING PROGRAM

## 2024-08-15 ENCOUNTER — ANTICOAGULATION VISIT (OUTPATIENT)
Dept: MEDICAL GROUP | Facility: MEDICAL CENTER | Age: 84
End: 2024-08-15
Payer: MEDICARE

## 2024-08-15 ENCOUNTER — PHARMACY VISIT (OUTPATIENT)
Dept: PHARMACY | Facility: MEDICAL CENTER | Age: 84
End: 2024-08-15
Payer: COMMERCIAL

## 2024-08-15 DIAGNOSIS — Z95.828 PRESENCE OF IVC FILTER: ICD-10-CM

## 2024-08-15 LAB — INR PPP: 2.4 (ref 2–3.5)

## 2024-08-15 PROCEDURE — 93793 ANTICOAG MGMT PT WARFARIN: CPT | Performed by: STUDENT IN AN ORGANIZED HEALTH CARE EDUCATION/TRAINING PROGRAM

## 2024-08-15 PROCEDURE — 85610 PROTHROMBIN TIME: CPT | Performed by: STUDENT IN AN ORGANIZED HEALTH CARE EDUCATION/TRAINING PROGRAM

## 2024-08-15 NOTE — PROGRESS NOTES
Anticoagulation Summary  As of 8/15/2024      INR goal:  2.0-3.0   TTR:  72.6% (6.5 y)   INR used for dosin.40 (8/15/2024)   Warfarin maintenance plan:  2.5 mg (5 mg x 0.5) every Thu; 5 mg (5 mg x 1) all other days   Weekly warfarin total:  32.5 mg   Plan last modified:  Skip Dowling PharmD (2022)   Next INR check:  2024   Priority:  Maintenance   Target end date:  Indefinite    Indications    Presence of IVC filter [Z95.828]  Transient ischemic attack [G45.9] (Resolved) [G45.9]  Deep vein thrombosis (DVT) of both lower extremities (HCC) (Resolved) [I82.403]  DVT (deep venous thrombosis) (HCC) (Resolved) [I82.409]  Multiple pulmonary emboli (HCC) (Resolved) [I26.99]  Chronic anticoagulation (Resolved) [Z79.01]                 Anticoagulation Episode Summary       INR check location:  --    Preferred lab:  --    Send INR reminders to:  --    Comments:  --          Anticoagulation Care Providers       Provider Role Specialty Phone number    [facility]   [phone]    Edward Walters M.D.  Endocrinology [phone]                  Refer to Patient Findings for HPI:  Patient Findings       Negatives:  Signs/symptoms of thrombosis, Signs/symptoms of bleeding, Laboratory test error suspected, Change in health, Change in alcohol use, Change in activity, Upcoming invasive procedure, Emergency department visit, Upcoming dental procedure, Missed doses, Extra doses, Change in medications, Change in diet/appetite, Hospital admission, Bruising, Other complaints            There were no vitals filed for this visit.  Pt declined vitals    Verified current warfarin dosing schedule.    Medications reconciled.  Pt is on antiplatelet therapy with aspirin 81mg for history of multiple thromboembolisms.      A/P   INR is therapeutic  Reason(s) for out of range INR today: N/A      Warfarin dosing recommendation: Continue regimen as listed above.    Pt educated to contact our clinic with any  changes in medications or s/s of bleeding or thrombosis. Pt is aware to seek immediate medical attention for falls, head injury or deep cuts.    Request pt to return in 2 week(s). Pt agrees.    Zay EckertD

## 2024-08-26 ENCOUNTER — APPOINTMENT (OUTPATIENT)
Dept: MEDICAL GROUP | Facility: MEDICAL CENTER | Age: 84
End: 2024-08-26
Payer: MEDICARE

## 2024-08-30 ENCOUNTER — HOSPITAL ENCOUNTER (OUTPATIENT)
Dept: LAB | Facility: MEDICAL CENTER | Age: 84
End: 2024-08-30
Attending: STUDENT IN AN ORGANIZED HEALTH CARE EDUCATION/TRAINING PROGRAM
Payer: MEDICARE

## 2024-08-30 DIAGNOSIS — C80.1 PAPILLARY ADENOCARCINOMA (HCC): ICD-10-CM

## 2024-08-30 DIAGNOSIS — E89.0 POSTOPERATIVE HYPOTHYROIDISM: ICD-10-CM

## 2024-08-30 DIAGNOSIS — I10 ESSENTIAL HYPERTENSION, BENIGN: ICD-10-CM

## 2024-08-30 DIAGNOSIS — N18.32 STAGE 3B CHRONIC KIDNEY DISEASE: ICD-10-CM

## 2024-08-30 LAB
ANION GAP SERPL CALC-SCNC: 8 MMOL/L (ref 7–16)
BUN SERPL-MCNC: 40 MG/DL (ref 8–22)
CALCIUM SERPL-MCNC: 9.4 MG/DL (ref 8.4–10.2)
CHLORIDE SERPL-SCNC: 105 MMOL/L (ref 96–112)
CO2 SERPL-SCNC: 26 MMOL/L (ref 20–33)
CREAT SERPL-MCNC: 1.9 MG/DL (ref 0.5–1.4)
CREAT UR-MCNC: 57.62 MG/DL
ERYTHROCYTE [DISTWIDTH] IN BLOOD BY AUTOMATED COUNT: 52 FL (ref 35.9–50)
GFR SERPLBLD CREATININE-BSD FMLA CKD-EPI: 34 ML/MIN/1.73 M 2
GLUCOSE SERPL-MCNC: 198 MG/DL (ref 65–99)
HCT VFR BLD AUTO: 43.9 % (ref 42–52)
HGB BLD-MCNC: 14.3 G/DL (ref 14–18)
MCH RBC QN AUTO: 31.8 PG (ref 27–33)
MCHC RBC AUTO-ENTMCNC: 32.6 G/DL (ref 32.3–36.5)
MCV RBC AUTO: 97.8 FL (ref 81.4–97.8)
MICROALBUMIN UR-MCNC: 5 MG/DL
MICROALBUMIN/CREAT UR: 87 MG/G (ref 0–30)
PLATELET # BLD AUTO: 102 K/UL (ref 164–446)
PMV BLD AUTO: 10.6 FL (ref 9–12.9)
POTASSIUM SERPL-SCNC: 5.3 MMOL/L (ref 3.6–5.5)
RBC # BLD AUTO: 4.49 M/UL (ref 4.7–6.1)
SODIUM SERPL-SCNC: 139 MMOL/L (ref 135–145)
T4 FREE SERPL-MCNC: 1.07 NG/DL (ref 0.93–1.7)
TSH SERPL DL<=0.005 MIU/L-ACNC: 1.26 UIU/ML (ref 0.38–5.33)
WBC # BLD AUTO: 5.9 K/UL (ref 4.8–10.8)

## 2024-08-30 PROCEDURE — 85027 COMPLETE CBC AUTOMATED: CPT

## 2024-08-30 PROCEDURE — 36415 COLL VENOUS BLD VENIPUNCTURE: CPT

## 2024-08-30 PROCEDURE — 84432 ASSAY OF THYROGLOBULIN: CPT

## 2024-08-30 PROCEDURE — 82043 UR ALBUMIN QUANTITATIVE: CPT

## 2024-08-30 PROCEDURE — 84443 ASSAY THYROID STIM HORMONE: CPT

## 2024-08-30 PROCEDURE — 84439 ASSAY OF FREE THYROXINE: CPT

## 2024-08-30 PROCEDURE — 86800 THYROGLOBULIN ANTIBODY: CPT

## 2024-08-30 PROCEDURE — 80048 BASIC METABOLIC PNL TOTAL CA: CPT

## 2024-08-30 PROCEDURE — 82570 ASSAY OF URINE CREATININE: CPT

## 2024-08-31 LAB
THYROGLOB AB SERPL-ACNC: <0.9 IU/ML (ref 0–4)
THYROGLOB SERPL-MCNC: 0.4 NG/ML (ref 1.3–31.8)
THYROGLOB SERPL-MCNC: ABNORMAL NG/ML (ref 1.3–31.8)

## 2024-09-04 ENCOUNTER — PATIENT MESSAGE (OUTPATIENT)
Dept: HEALTH INFORMATION MANAGEMENT | Facility: OTHER | Age: 84
End: 2024-09-04

## 2024-09-06 DIAGNOSIS — D68.9 COAGULATION DEFECT (HCC): ICD-10-CM

## 2024-09-06 DIAGNOSIS — Z95.1 S/P CABG X 4: ICD-10-CM

## 2024-09-06 RX ORDER — WARFARIN SODIUM 5 MG/1
TABLET ORAL
Qty: 100 TABLET | Refills: 3 | Status: SHIPPED | OUTPATIENT
Start: 2024-09-06 | End: 2024-12-15

## 2024-09-06 NOTE — TELEPHONE ENCOUNTER
Received request via: Pharmacy    Was the patient seen in the last year in this department? Yes    Does the patient have an active prescription (recently filled or refills available) for medication(s) requested? No    Pharmacy Name: [facility]    Does the patient have correction Plus and need 100-day supply? (This applies to ALL medications) Patient does not have SCP

## 2024-09-10 ENCOUNTER — OFFICE VISIT (OUTPATIENT)
Dept: ENDOCRINOLOGY | Facility: MEDICAL CENTER | Age: 84
End: 2024-09-10
Attending: STUDENT IN AN ORGANIZED HEALTH CARE EDUCATION/TRAINING PROGRAM
Payer: MEDICARE

## 2024-09-10 VITALS
OXYGEN SATURATION: 98 % | WEIGHT: 157.6 LBS | HEIGHT: 68 IN | DIASTOLIC BLOOD PRESSURE: 60 MMHG | HEART RATE: 51 BPM | SYSTOLIC BLOOD PRESSURE: 110 MMHG | BODY MASS INDEX: 23.89 KG/M2

## 2024-09-10 DIAGNOSIS — E89.0 POSTOPERATIVE HYPOTHYROIDISM: ICD-10-CM

## 2024-09-10 DIAGNOSIS — C80.1 PAPILLARY ADENOCARCINOMA (HCC): ICD-10-CM

## 2024-09-10 DIAGNOSIS — E11.65 TYPE 2 DIABETES MELLITUS WITH HYPERGLYCEMIA, WITHOUT LONG-TERM CURRENT USE OF INSULIN (HCC): ICD-10-CM

## 2024-09-10 LAB
HBA1C MFR BLD: 6.5 % (ref ?–5.8)
POCT INT CON NEG: NEGATIVE
POCT INT CON POS: POSITIVE

## 2024-09-10 PROCEDURE — 3078F DIAST BP <80 MM HG: CPT | Performed by: STUDENT IN AN ORGANIZED HEALTH CARE EDUCATION/TRAINING PROGRAM

## 2024-09-10 PROCEDURE — 99214 OFFICE O/P EST MOD 30 MIN: CPT | Mod: 25 | Performed by: STUDENT IN AN ORGANIZED HEALTH CARE EDUCATION/TRAINING PROGRAM

## 2024-09-10 PROCEDURE — 95251 CONT GLUC MNTR ANALYSIS I&R: CPT | Performed by: STUDENT IN AN ORGANIZED HEALTH CARE EDUCATION/TRAINING PROGRAM

## 2024-09-10 PROCEDURE — 95250 CONT GLUC MNTR PHYS/QHP EQP: CPT | Performed by: STUDENT IN AN ORGANIZED HEALTH CARE EDUCATION/TRAINING PROGRAM

## 2024-09-10 PROCEDURE — 83036 HEMOGLOBIN GLYCOSYLATED A1C: CPT | Performed by: STUDENT IN AN ORGANIZED HEALTH CARE EDUCATION/TRAINING PROGRAM

## 2024-09-10 PROCEDURE — 3074F SYST BP LT 130 MM HG: CPT | Performed by: STUDENT IN AN ORGANIZED HEALTH CARE EDUCATION/TRAINING PROGRAM

## 2024-09-10 PROCEDURE — 99213 OFFICE O/P EST LOW 20 MIN: CPT | Mod: 27 | Performed by: STUDENT IN AN ORGANIZED HEALTH CARE EDUCATION/TRAINING PROGRAM

## 2024-09-10 ASSESSMENT — FIBROSIS 4 INDEX: FIB4 SCORE: 6.98

## 2024-09-10 NOTE — PROGRESS NOTES
Follow up Endocrinology Visit  Initial consult/last visit on: 4/25/24  Last visit on: 6/10/24  Referred by: Fadi Najjar, M.D./nephrologist     Chief complaint:  Paulo Paredes, 84 y.o., male, who is here for follow up for T2DM management. Here with his spouse - Ursula    Interval history:   -  had recent COVID-19 infection  - he couldn't tolerate Ozempic due to side effects  - reviewed recent labs, neck US, CGM report  Prior notes:  6/10/24  - patient has a hip pain, but he still can walk, he states that lately he was able to walk more and experienced less fatigue, even overall he lost weight but  lately was able to regain some weight  - reports some constipation but improved with treatment  - reviewed recent labs    Current therapy:  Jardiance 10 mg    Tolerates medications: well  Compliant: yes    Prior used medications:   Trulicity - previously tolerated, was stopped due to difficulty to get the medication  Ozempic -> diarrhea - stopped 2-3 weeks ago  Jardiance 25 mg - fatigue, difficulty walking, lost weight, falls asleep on his chair during the day, frequent urination    Other important medications:  ASA 81 mg  Warfarin  Rosuvastatin 40 mg  Fenofibrate 160 mg  Atenolol 25 mg  Capsaicin cream  Levothyroxine 137 mcg/  Losartan 25 mg     BG control:  CGM type - Michelle 3  Period -  5/28/24 - 6/10/24 -> 8/27/24 - 9/9/24  Data were reviewed and discussed with the patient  Active time - 96 -> 99%  ABG - 152  -> 148 mg/dl  GV - 20.3 -> 24.4%  GMI - 6.9 -> 6.9%  TIR - 82 -> 80%  TAR - high - 18 ->  20%, very high - 0 -> 0%  TBR - 0-> 0%  8/27/24 - 9/9/24 5/28/24 - 6/10/24      DM history:  - diagnosed 20 years ago, diagnosed on screening test,  220 lb  - hospitalizations for DKA/HHS/severe hyperglycemia/severe hypoglycemia in the past: none  - prior therapy: Trulicity started 2 years ago, couldn't get Trulicity -> Jardiance - 3 weeks ago  - known DM complications: CKD 3b, single kidney, s/p nephrectomy,  Hx of TIA,  CAD, s/p CABG x 4  - latest HbA1C - 8.5% in 3/2024  - pertinent PMHx:   -- dyslipidemia, GERD, PTC, s/p thyroidectomy in 2014, hypothyroidism, Hx of PTC, Hx of PE, s/p IVF filter placement, HTN  -- denies NAFLD, PAD/CHF      PTC Hx, s/p thyroidectomy in 5/2014:  - currently on LT4 137, euthyroid    Current Medications:  Current Outpatient Medications:     Empagliflozin 25 MG Tab, Take 1 Tablet by mouth every day., Disp: 90 Tablet, Rfl: 3    liraglutide (VICTOZA) 18 MG/3ML Solution Pen-injector, Inject 0.6 mg under the skin every day., Disp: 6 mL, Rfl: 4    rosuvastatin (CRESTOR) 40 MG tablet, Take 1 Tablet by mouth every day., Disp: 90 Tablet, Rfl: 3    levothyroxine (EUTHYROX) 137 MCG Tab, TAKE 1 TABLET BY MOUTH ONCE DAILY IN THE MORNING ON  AN  EMPTY  STOMACH  ONE  HOUR  BEFORE  EATING, Disp: 100 Tablet, Rfl: 3    fenofibrate (TRIGLIDE) 160 MG tablet, Take 1 Tablet by mouth every day., Disp: 100 Tablet, Rfl: 3    warfarin (COUMADIN) 5 MG Tab, Take 1 Tablet by mouth every evening. Take 0.5 to 1 tablet by mouth once daily. Take as directed by anticoagulation clinic., Disp: 100 Tablet, Rfl: 3    losartan (COZAAR) 25 MG Tab, Take 1 Tablet by mouth every day for 360 days., Disp: 100 Tablet, Rfl: 2    Dulaglutide 1.5 MG/0.5ML Solution Pen-injector, Inject 0.5 mL under the skin every 7 days for 360 days., Disp: 24 mL, Rfl: 3    aspirin 81 MG EC tablet, Take 81 mg by mouth every day., Disp: , Rfl:     atenolol (TENORMIN) 25 MG Tab, Take 1 tablet by mouth once daily, Disp: 100 Tablet, Rfl: 3    oxymetazoline (AFRIN 12 HOUR) 0.05 % Solution, Administer 2 Sprays into affected nostril(S) 2 times a day. Discontinue after 2 days., Disp: 15 mL, Rfl: 0    triamcinolone acetonide (KENALOG) 0.025 % Cream, Apply to the affected area twice a day on legs., Disp: 15 g, Rfl: 1    capsaicin (ZOSTRIX) 0.025 % cream, APPLY TO ITCHY AREA ON NECK UP TO 5 TIMES A DAY FOR 1 WEEK, THEN 3 TIMES A DAY FOR 3 TO 6 WEEKS. WASH HANDS THOROUGHLY AFTER  "APPLYING. DO TEST SPOT 1ST, Disp: , Rfl:     Multiple Vitamins-Minerals (ZINC PO), Take  by mouth., Disp: , Rfl:     hydrocortisone 2.5 % Cream topical cream, APPLY TO RASH ON GROIN TWICE DAILY FOR 1 WEEK WITH FLARES, Disp: , Rfl:     Calcium Carb-Cholecalciferol (CALCIUM 1000 + D PO), Take 1 Dose by mouth every day., Disp: , Rfl:     Omega-3 Krill Oil 300 MG Cap, Take 300 mg by mouth every day., Disp: , Rfl:     Cinnamon 500 MG Cap, Take 1,000 mg by mouth., Disp: , Rfl:     Cyanocobalamin (VITAMIN B-12) 1000 MCG Tab, Take 1,000 mcg by mouth every day., Disp: , Rfl:     Coenzyme Q10 (CO Q-10 PO), Take 1 Dose by mouth every day. Unknown OTC Strength, Disp: , Rfl:     PHYSICAL EXAM:   Vital signs: /60   Pulse (!) 51   Ht 1.727 m (5' 8\") Comment: pt stated  Wt 71.5 kg (157 lb 9.6 oz)   SpO2 98%   BMI 23.96 kg/m²   GENERAL: Well-developed, well-nourished  in no apparent distress.   CARDIOVASCULAR: Regular rate and rhythm.   LUNGS: No dyspnea  ABDOMEN: Soft, nondistended  NEUROLOGICAL: Cranial nerves II-XII are grossly intact No visible tremor with both outstretched hands  SKIN: No rashes, lesions. Turgor is normal.    Labs:  - reviewed  HbA1C/BG/Hb:   Reference Range  10/26/21  12/02/21  06/06/22  01/04/23  07/10/23  11/29/23  03/01/24  6/10/24 9/10/24   HbA1C 4.0 - 5.6 % 7.5 (H) 7.5 (H) 7.6 (H) 8.0 (H) 10.2 ! 8.8 ! 8.5 (H) 7.6 (H) 6.5 !      Reference Range  01/24/24    Hb 14.0 - 18.0 g/dL 14.3   Hct 42.0 - 52.0 % 44.3     Kidney function/MACR:   Reference Range 01/24/24    Creatinine 0.50 - 1.40 mg/dL 1.80 (H)   GFR (CKD-EPI) >60 mL/min/1.73 m 2 36 !   MACR 0 - 30 mg/g 95 (H)     LFTs:   Reference Range 11/28/23    AST(SGOT) 12 - 45 U/L 43   ALT(SGPT) 2 - 50 U/L 27   ALP 30 - 99 U/L 44     Lipid panel:   Reference Range 01/24/24    Triglycerides 0 - 149 mg/dL 189 (H)   HDL >=40 mg/dL 35 !   LDL <100 mg/dL 73     Hypothyroidism, s/p thyroidectomy in 2014, LOW RISK:   Reference Range  01/04/23 06/04/24  " 8/30/24   TSH 0.380 - 5.330 uIU/mL 0.850 0.103 (L) 1.260   fT4 0.93 - 1.70 ng/dL  1.57 1.07   Tg 1.3 - 31.8 ng/mL  0.3 (L) 0.4 (L)   Anti-Tg Abs 0.0 - 4.0 IU/mL  <0.9 <0.9   LT4 Mcg/day  137 125     Tg trend:   Reference Range 05/02/14 06/26/14 09/25/14 02/05/15  07/17/15  09/21/15 01/26/16  03/19/18  04/12/19  06/04/24  08/30/24    Tg 1.3 - 31.8 ng/mL 47.2 (H) 0.2 (L) 0.2 (L) 0.1 (L) 0.2 (L) 0.3 (L) 0.6 (L) 0.3 (L) 0.2 (L) 0.3 (L) 0.4 (L)     Imaging:  - reviewed  Neck US on 7/3/24:  HISTORY/REASON FOR EXAM:  Hx of papillary thyroid cancer, Tg in grey zone   TECHNIQUE/EXAM DESCRIPTION: Ultrasound of the soft tissues of the head and neck.  COMPARISON:  7/1/2015  FINDINGS:  The thyroid is surgically absent. No residual or recurrent thyroid tissue is evident. No pathologically enlarged cervical lymphadenopathy is appreciated.  IMPRESSION:  Status post thyroidectomy.    Pathology report:  - reviewed  Pathology report after thyroidectomy with excision of deep cervical lymph node with jugular dissection of right neck on 5/13/2014:  FINAL DIAGNOSIS:  A. Right cervical lymph node: One benign lymph node negative for metastatic carcinoma. (0/1)  B. 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  Benign hyperplastic nodule of left lobe with hemorrhage and fibrosis.    -Specimen Size:  Right Lobe: 4.8 x 2.5 x 2 cm  Left Lobe: 5 x 3 x 2.5 cm  Isthmus: 2.5 x 2.2 x 1.2 cm  Central Compartment: Not applicable.  Right neck dissection: Not applicable.  Left neck dissection: Not applicable.  -Specimen Weight: 33.4 gram  -Tumor Focality: Multifocal, ipsilateral  -Dominant Tumor:  Tumor Laterality: Right lobe  Tumor Size: 4.5 mm  Histologic Type: Papillary carcinoma  Variant: Classical (usual)  Architecture: Classical (usual)  Cytomorphology: Classical  Margins: Margins uninvolved by carcinoma  Distance of invasive carcinoma to closest margin: 3 mm.  Tumor Capsule: Partially  encapsulated  Tumor Capsule Invasion: Present  Lymph-Vascular Invasion: Not identified  Extrathyroidal Extension: Not identified  -Second Tumor:  Tumor Laterality: Right lobe  Tumor Size: 0.25 mm  Histologic Type: Papillary carcinoma (usual)  Variant: Classical (usual)  Architecture: Classical (papillary)  Cytomorphology: Classical  Margins: Margins uninvolved by carcinoma  Distance of invasive carcinoma to closest margin: 1 mm  Tumor Capsule: Totally unencapsulated  Tumor Capsule Invasion: Not applicable  Lymph-Vascular Invasion: Not identified  Extrathyroidal Extension: Not identified  -Pathologic Staging:  Primary Tumor: *pT1(m)  Regional Lymph Nodes: *pN0  Number examined: 1  Number involved: 0  Lymph Node, Extranodal Extension: Not applicable  Distant Metastasis: Not applicable.  -Additional Pathologic Findings: None    ASSESSMENT/PLAN:   1. Type 2 diabetes mellitus with hyperglycemia, without long-term current use of insulin (HCC)  - duration x 20 years  - on SGTL-2 inhibitor + GLP-1 inhibitor  - suboptimally controlled, last HbA1C - 8.5% in 3/2024     Microvascular complications: nephropathy with CKD 3b, microalbuminuria, denies peripheral neuropathy, retinopathy  Macrovascular complications: Hx of TIA, CAD, s/p CABG x 4  Associated comorbidities: dyslipidemia, GERD, hypothyroidism, Hx of PTC, Hx of PE, s/p IVF filter placement, HTN, single kidney, s/p nephrectomy,    DM complication screening:  Labs reviewed:  MACR - 95 (+), last checked in 1/2024  Creat/GFR  1.8/36, consistent with CKD 3b, last checked in 1/2024  LDL - 73 - close to target, last checked in 1/2024     Patient is taking statin: on  Rosuvastatin 40 mg + fenofibrate 160 mg  Patient is taking ASA: yes  Patient is taking ACE/ARB: on Losartan 25 mg    Last eye exam: 12/4/23, no evidence of DR  Last foot exam: on 4/25/24 by me, normal monofilament test     Home medications:  Ozempic 0.5 mg weekly  Jardiance 25 mg    Discussion:  - reasonable  glycemic control  - off Ozempic due to side effects  - discussed that SGLT-2 inhibitor with CKD3b has minimal antihyperglycemic effect, but helpful for kidney protection  - if worsening of glycemic control consider using DPP-4 inhibitor  Prior notes:  6/10/24  - noted excellent glycemic control  - will decrease dose of SGLT-2 inhibitor as GFR is low and no additional glycemic management benefit from higher dose  - continue low dose GLP-1 agonist as long as no significant weight loss or difficult to manage constipation     Plan:  Discussed with the patient goals for DM management:  Fasting BG 90 - 130  2 hrs postprandial  - 180  HbA1C < 7.0%     Therapy adjustments:  - none    3. Continue use of Michelle 3 as long as it is not expensive, ok to do 1 mo breaks off CGM.  4. Given foot care instructions on prior visit.     # CKD3b  # Microalbuminuria  - already on ARB and SGLT-2 inhibitor  - keep BP < 130/80 mmHg    # Hx of PTC  # Postoperative hypothyroidism, intermediate reponse  - TSH < 2.0, noted slight raise of Tg  - neck US didn't show any residual/recurrent disease -> no evidence of structural disease  - ok to continue current therapy -> repeat TFTs, Tg  Prior notes:  6/10/24  - s/p thyroidectomy in 2014, pT1N0  - LOW risk as per pathology report  - indeterminate response as Tg> 1 but < 10,but given period of 10 years after the surgery and relatively stable level of Tg - TSH goal 0.4 - 2.0  - recent TSH suppression on same dose of LT4 likely to be explained by weight loss  Plan:  Continue Levothyroxine 125 mcg/day.  Repeat TFTs  + Tg/Tg-Abs in 3 mo.     # CAD, s/p CABG x 4  # Dyslipidemia  - managed by cardiology/PCP  - on high dose statin  - LDL close to goal, defer management to cardiology    RTC: 4 mo    Total time (face-to-face and non-face-to face time): 40  min - extensive discussion of diagnoses, treatment, prognosis, lab review, documentation.  Plan reviewed with the patient and agreed with plan.  All  questions answered to patient's satisfaction.  Thank you kindly for allowing me to participate in the diabetes care plan for this patient.    Erin Orta MD    CC:   Damion Larose M.D.

## 2024-09-12 ENCOUNTER — APPOINTMENT (OUTPATIENT)
Dept: MEDICAL GROUP | Facility: MEDICAL CENTER | Age: 84
End: 2024-09-12
Payer: MEDICARE

## 2024-09-12 VITALS
OXYGEN SATURATION: 97 % | HEART RATE: 53 BPM | BODY MASS INDEX: 23.11 KG/M2 | SYSTOLIC BLOOD PRESSURE: 123 MMHG | DIASTOLIC BLOOD PRESSURE: 62 MMHG | WEIGHT: 152 LBS

## 2024-09-12 DIAGNOSIS — Z95.828 PRESENCE OF IVC FILTER: ICD-10-CM

## 2024-09-12 LAB — INR PPP: 3.1 (ref 2–3.5)

## 2024-09-12 PROCEDURE — 99211 OFF/OP EST MAY X REQ PHY/QHP: CPT | Performed by: FAMILY MEDICINE

## 2024-09-12 PROCEDURE — 85610 PROTHROMBIN TIME: CPT | Performed by: FAMILY MEDICINE

## 2024-09-12 PROCEDURE — 3078F DIAST BP <80 MM HG: CPT | Performed by: FAMILY MEDICINE

## 2024-09-12 PROCEDURE — RXMED WILLOW AMBULATORY MEDICATION CHARGE: Performed by: STUDENT IN AN ORGANIZED HEALTH CARE EDUCATION/TRAINING PROGRAM

## 2024-09-12 PROCEDURE — 3074F SYST BP LT 130 MM HG: CPT | Performed by: FAMILY MEDICINE

## 2024-09-12 ASSESSMENT — FIBROSIS 4 INDEX: FIB4 SCORE: 6.98

## 2024-09-12 NOTE — PROGRESS NOTES
Anticoagulation Summary  As of 9/12/2024      INR goal:  2.0-3.0   TTR:  72.8% (6.6 y)   INR used for dosing:  3.10 (9/12/2024)   Warfarin maintenance plan:  2.5 mg (5 mg x 0.5) every Thu; 5 mg (5 mg x 1) all other days   Weekly warfarin total:  32.5 mg   Plan last modified:  Zay CottonD (12/29/2022)   Next INR check:  10/3/2024   Priority:  Maintenance   Target end date:  Indefinite    Indications    Presence of IVC filter [Z95.828]  Transient ischemic attack [G45.9] (Resolved) [G45.9]  Deep vein thrombosis (DVT) of both lower extremities (HCC) (Resolved) [I82.403]  DVT (deep venous thrombosis) (HCC) (Resolved) [I82.409]  Multiple pulmonary emboli (HCC) (Resolved) [I26.99]  Chronic anticoagulation (Resolved) [Z79.01]                 Anticoagulation Episode Summary       INR check location:  --    Preferred lab:  --    Send INR reminders to:  --    Comments:  --          Anticoagulation Care Providers       Provider Role Specialty Phone number    [facility]   [phone]    Edward Walters M.D.  Endocrinology [phone]                  Refer to Patient Findings for HPI:  Patient Findings       Positives:  Change in health (Recently had COVID), Change in diet/appetite (decreased appetite during illness)    Negatives:  Signs/symptoms of thrombosis, Signs/symptoms of bleeding, Laboratory test error suspected, Change in alcohol use, Change in activity, Upcoming invasive procedure, Emergency department visit, Upcoming dental procedure, Missed doses, Extra doses, Change in medications, Hospital admission, Bruising, Other complaints            Vitals:    09/12/24 0935   BP: 123/62   Pulse: (!) 53   SpO2: 97%   Weight: 68.9 kg (152 lb)       Verified current warfarin dosing schedule.    Medications reconciled.  Pt is on antiplatelet therapy with aspirin 81mg for history of multiple thromboembolisms.      A/P   INR is slightly supratherapeutic  Reason(s) for out of range INR today:   Decreased appetite       Warfarin dosing recommendation: Continue regimen as listed above.    Pt educated to contact our clinic with any changes in medications or s/s of bleeding or thrombosis. Pt is aware to seek immediate medical attention for falls, head injury or deep cuts.    Request pt to return in 3 week(s). Pt agrees.    Zay EckertD

## 2024-09-16 ENCOUNTER — PHARMACY VISIT (OUTPATIENT)
Dept: PHARMACY | Facility: MEDICAL CENTER | Age: 84
End: 2024-09-16
Payer: COMMERCIAL

## 2024-09-16 PROCEDURE — RXMED WILLOW AMBULATORY MEDICATION CHARGE: Performed by: INTERNAL MEDICINE

## 2024-09-16 RX ORDER — INFLUENZA A VIRUS A/VICTORIA/4897/2022 IVR-238 (H1N1) ANTIGEN (FORMALDEHYDE INACTIVATED), INFLUENZA A VIRUS A/CALIFORNIA/122/2022 SAN-022 (H3N2) ANTIGEN (FORMALDEHYDE INACTIVATED), AND INFLUENZA B VIRUS B/MICHIGAN/01/2021 ANTIGEN (FORMALDEHYDE INACTIVATED) 60; 60; 60 UG/.5ML; UG/.5ML; UG/.5ML
0.5 INJECTION, SUSPENSION INTRAMUSCULAR
Qty: 0.5 ML | Refills: 0 | OUTPATIENT
Start: 2024-09-16

## 2024-09-17 ENCOUNTER — OFFICE VISIT (OUTPATIENT)
Dept: NEPHROLOGY | Facility: MEDICAL CENTER | Age: 84
End: 2024-09-17
Payer: MEDICARE

## 2024-09-17 VITALS
OXYGEN SATURATION: 98 % | BODY MASS INDEX: 24.1 KG/M2 | HEIGHT: 68 IN | TEMPERATURE: 96.5 F | WEIGHT: 159 LBS | HEART RATE: 56 BPM | DIASTOLIC BLOOD PRESSURE: 74 MMHG | SYSTOLIC BLOOD PRESSURE: 136 MMHG

## 2024-09-17 DIAGNOSIS — I10 ESSENTIAL HYPERTENSION, BENIGN: ICD-10-CM

## 2024-09-17 DIAGNOSIS — N18.32 TYPE 2 DIABETES MELLITUS WITH STAGE 3B CHRONIC KIDNEY DISEASE, UNSPECIFIED WHETHER LONG TERM INSULIN USE (HCC): ICD-10-CM

## 2024-09-17 DIAGNOSIS — N18.32 STAGE 3B CHRONIC KIDNEY DISEASE: ICD-10-CM

## 2024-09-17 DIAGNOSIS — E11.22 TYPE 2 DIABETES MELLITUS WITH STAGE 3B CHRONIC KIDNEY DISEASE, UNSPECIFIED WHETHER LONG TERM INSULIN USE (HCC): ICD-10-CM

## 2024-09-17 PROCEDURE — 3075F SYST BP GE 130 - 139MM HG: CPT | Performed by: INTERNAL MEDICINE

## 2024-09-17 PROCEDURE — 3078F DIAST BP <80 MM HG: CPT | Performed by: INTERNAL MEDICINE

## 2024-09-17 PROCEDURE — 99213 OFFICE O/P EST LOW 20 MIN: CPT | Performed by: INTERNAL MEDICINE

## 2024-09-17 PROCEDURE — G2211 COMPLEX E/M VISIT ADD ON: HCPCS | Performed by: INTERNAL MEDICINE

## 2024-09-17 ASSESSMENT — ENCOUNTER SYMPTOMS
COUGH: 0
HYPERTENSION: 1
SHORTNESS OF BREATH: 0
VOMITING: 0
CHILLS: 0
FEVER: 0
NAUSEA: 0

## 2024-09-17 ASSESSMENT — FIBROSIS 4 INDEX: FIB4 SCORE: 6.98

## 2024-09-17 NOTE — ASSESSMENT & PLAN NOTE
Stable  No uremic symptoms  Renal dose of medication  Avoid nephrotoxins  Continue same medication regimen  Patient was advised to call us if symptoms worsen

## 2024-09-17 NOTE — PROGRESS NOTES
"Caron Ramos is a 84 y.o. male who presents with Hypertension and Chronic Kidney Disease            Patient was recently diagnosed with COVID-19 infection, he feels better now.    Hypertension  This is a chronic problem. The current episode started more than 1 year ago. The problem is unchanged. The problem is controlled. Pertinent negatives include no chest pain, malaise/fatigue, peripheral edema or shortness of breath. Risk factors for coronary artery disease include male gender. Past treatments include angiotensin blockers. The current treatment provides significant improvement. There are no compliance problems.  Hypertensive end-organ damage includes kidney disease. Identifiable causes of hypertension include chronic renal disease.   Chronic Kidney Disease  This is a chronic problem. The current episode started more than 1 year ago. The problem occurs constantly. The problem has been unchanged. Pertinent negatives include no chest pain, chills, coughing, fever, nausea, urinary symptoms or vomiting.       Review of Systems   Constitutional:  Negative for chills, fever and malaise/fatigue.   Respiratory:  Negative for cough and shortness of breath.    Cardiovascular:  Negative for chest pain and leg swelling.   Gastrointestinal:  Negative for nausea and vomiting.   Genitourinary:  Negative for dysuria, frequency and urgency.              Objective     /74 (BP Location: Right arm, Patient Position: Sitting, BP Cuff Size: Adult)   Pulse (!) 56   Temp 35.8 °C (96.5 °F) (Temporal)   Ht 1.727 m (5' 8\")   Wt 72.1 kg (159 lb)   SpO2 98%   BMI 24.18 kg/m²      Physical Exam  Vitals and nursing note reviewed.   Constitutional:       General: He is not in acute distress.     Appearance: He is not ill-appearing.   HENT:      Head: Normocephalic and atraumatic.      Right Ear: External ear normal.      Left Ear: External ear normal.      Nose: Nose normal.   Eyes:      General:         Right eye: No " discharge.         Left eye: No discharge.      Conjunctiva/sclera: Conjunctivae normal.   Cardiovascular:      Rate and Rhythm: Normal rate and regular rhythm.      Heart sounds: No murmur heard.  Pulmonary:      Effort: Pulmonary effort is normal. No respiratory distress.      Breath sounds: Normal breath sounds. No wheezing.   Musculoskeletal:         General: No tenderness or deformity.      Right lower leg: No edema.      Left lower leg: No edema.   Skin:     General: Skin is warm.   Neurological:      General: No focal deficit present.      Mental Status: He is alert and oriented to person, place, and time.   Psychiatric:         Mood and Affect: Mood normal.         Behavior: Behavior normal.       Past Medical History:   Diagnosis Date    Anesthesia     difficult intubation with surgery 2008,2010    Basal cell carcinoma of skin     CAD (coronary artery disease)     Cancer (HCC)     rt  kidney 1989;  thyroid cancer 2012, prostate 2008, skin    Chronic kidney disease (CKD) stage G3b/A2, moderately decreased glomerular filtration rate (GFR) between 30-44 mL/min/1.73 square meter and albuminuria creatinine ratio between  mg/g 4/23/2015    DVT (deep venous thrombosis) (HCC) 2014    ED (erectile dysfunction)     GERD (gastroesophageal reflux disease)     Glaucoma     Heart burn     Hyperlipidemia 12/3/2012    Hypertension     Indigestion     Multiple pulmonary emboli (HCC) 2014    Multiple thyroid nodules 2009    Papillary carcinoma of thyroid (HCC) 2014    R 2 foci / 4.5mm,0.25mm / no mets/ pT1pN0    postsurgical hypothyroidism 2014    Pre-diabetes     Renal disorder     rt kidney cancer,nephrectomy    S/P CABG x 4 2003    S/P colectomy 2010    multiple colon polyps / no Ca    S/p nephrectomy 1998    carcinoma    S/P prostatectomy 2008    carcinoma    Stroke (HCC)     'mild',no residual,'one doctor said it was a tia'    Transient renal failure 2014    renal vein thrombosis    Type II or unspecified type  diabetes mellitus without mention of complication, not stated as uncontrolled     on no medications    Unspecified urinary incontinence      Social History     Socioeconomic History    Marital status:      Spouse name: Not on file    Number of children: Not on file    Years of education: Not on file    Highest education level: Not on file   Occupational History    Not on file   Tobacco Use    Smoking status: Never    Smokeless tobacco: Never   Vaping Use    Vaping status: Never Used   Substance and Sexual Activity    Alcohol use: Yes     Comment: Occasionally    Drug use: No    Sexual activity: Not Currently     Comment: retired    Other Topics Concern    Not on file   Social History Narrative    Not on file     Social Determinants of Health     Financial Resource Strain: Not on file   Food Insecurity: Not on file   Transportation Needs: Not on file   Physical Activity: Not on file   Stress: Not on file   Social Connections: Not on file   Intimate Partner Violence: Not on file   Housing Stability: Not on file     Family History   Problem Relation Age of Onset    Diabetes Mother     Heart Disease Mother     Diabetes Father     Cancer Father         pancreas    Thyroid Sister         Cancer    Cancer Sister         thyroid    Diabetes Sister     Cancer Brother 49        colon    Diabetes Brother     Diabetes Maternal Grandfather     Diabetes Paternal Grandmother     Diabetes Paternal Grandfather     No Known Problems Maternal Grandmother      Recent Labs     11/28/23  0636 01/24/24  0640 08/30/24  0640   ALBUMIN 4.4  --   --    HDL 27* 35*  --    TRIGLYCERIDE 288* 189*  --    SODIUM 142 145 139   POTASSIUM 4.9 5.3 5.3   CHLORIDE 106 109 105   CO2 27 27 26   BUN 30* 30* 40*   CREATININE 1.87* 1.80* 1.90*                             Assessment & Plan        Assessment & Plan  Essential hypertension, benign  Controlled  Continue same medication regimen  Continue low-sodium diet      Stage 3b  chronic kidney disease    Stable  No uremic symptoms  Renal dose of medication  Avoid nephrotoxins  Continue same medication regimen  Patient was advised to call us if symptoms worsen    Type 2 diabetes mellitus with stage 3b chronic kidney disease, unspecified whether long term insulin use (HCC)

## 2024-09-19 ENCOUNTER — TELEPHONE (OUTPATIENT)
Dept: HEALTH INFORMATION MANAGEMENT | Facility: OTHER | Age: 84
End: 2024-09-19
Payer: MEDICARE

## 2024-09-23 ENCOUNTER — HOSPITAL ENCOUNTER (OUTPATIENT)
Facility: MEDICAL CENTER | Age: 84
End: 2024-09-23
Attending: PHYSICIAN ASSISTANT
Payer: MEDICARE

## 2024-09-23 ENCOUNTER — OFFICE VISIT (OUTPATIENT)
Dept: URGENT CARE | Facility: CLINIC | Age: 84
End: 2024-09-23
Payer: MEDICARE

## 2024-09-23 ENCOUNTER — APPOINTMENT (OUTPATIENT)
Dept: URGENT CARE | Facility: CLINIC | Age: 84
End: 2024-09-23
Payer: MEDICARE

## 2024-09-23 VITALS
OXYGEN SATURATION: 99 % | BODY MASS INDEX: 23.95 KG/M2 | WEIGHT: 158 LBS | SYSTOLIC BLOOD PRESSURE: 104 MMHG | HEART RATE: 58 BPM | TEMPERATURE: 97.3 F | RESPIRATION RATE: 16 BRPM | DIASTOLIC BLOOD PRESSURE: 58 MMHG | HEIGHT: 68 IN

## 2024-09-23 DIAGNOSIS — N48.1 BALANITIS: ICD-10-CM

## 2024-09-23 DIAGNOSIS — R30.0 DYSURIA: ICD-10-CM

## 2024-09-23 LAB
APPEARANCE UR: CLEAR
BILIRUB UR STRIP-MCNC: NEGATIVE MG/DL
COLOR UR AUTO: YELLOW
GLUCOSE UR STRIP.AUTO-MCNC: NORMAL MG/DL
KETONES UR STRIP.AUTO-MCNC: NEGATIVE MG/DL
LEUKOCYTE ESTERASE UR QL STRIP.AUTO: NEGATIVE
NITRITE UR QL STRIP.AUTO: NEGATIVE
PH UR STRIP.AUTO: 5 [PH] (ref 5–8)
PROT UR QL STRIP: NEGATIVE MG/DL
RBC UR QL AUTO: NEGATIVE
SP GR UR STRIP.AUTO: 1.02
UROBILINOGEN UR STRIP-MCNC: NORMAL MG/DL

## 2024-09-23 PROCEDURE — 87077 CULTURE AEROBIC IDENTIFY: CPT

## 2024-09-23 PROCEDURE — 81002 URINALYSIS NONAUTO W/O SCOPE: CPT | Performed by: PHYSICIAN ASSISTANT

## 2024-09-23 PROCEDURE — 99213 OFFICE O/P EST LOW 20 MIN: CPT | Performed by: PHYSICIAN ASSISTANT

## 2024-09-23 PROCEDURE — 3078F DIAST BP <80 MM HG: CPT | Performed by: PHYSICIAN ASSISTANT

## 2024-09-23 PROCEDURE — 3074F SYST BP LT 130 MM HG: CPT | Performed by: PHYSICIAN ASSISTANT

## 2024-09-23 PROCEDURE — 87086 URINE CULTURE/COLONY COUNT: CPT

## 2024-09-23 ASSESSMENT — ENCOUNTER SYMPTOMS
VOMITING: 0
HEADACHES: 0
DIARRHEA: 0
MYALGIAS: 0
CHILLS: 0
SORE THROAT: 0
CONSTIPATION: 0
SHORTNESS OF BREATH: 0
NAUSEA: 0
EYE PAIN: 0
FEVER: 0
ABDOMINAL PAIN: 0
COUGH: 0

## 2024-09-23 ASSESSMENT — FIBROSIS 4 INDEX: FIB4 SCORE: 6.98

## 2024-09-23 NOTE — PROGRESS NOTES
"Subjective:   Paulo Paredes is a 84 y.o. male who presents for Painful Urination (X10 days, \"Dark color urine, itching, rash. I do suffer from incontinence. I try and stay dry, but I believe this is why I have a rash.\" )      84-year-old male notes history of incontinence to urine, he has been trying to keep the groin area dry but has noted a rash that is been getting worse the last 10 days and of the last 2 or 3 days he is noted some discomfort while urinating especially in the area of the rash.  No frequency urgency or nocturia    Review of Systems   Constitutional:  Negative for chills and fever.   HENT:  Negative for congestion, ear pain and sore throat.    Eyes:  Negative for pain.   Respiratory:  Negative for cough and shortness of breath.    Cardiovascular:  Negative for chest pain.   Gastrointestinal:  Negative for abdominal pain, constipation, diarrhea, nausea and vomiting.   Genitourinary:  Positive for dysuria.   Musculoskeletal:  Negative for myalgias.   Skin:  Positive for itching and rash.   Neurological:  Negative for headaches.       Medications, Allergies, and current problem list reviewed today in Epic.     Objective:     /58 (BP Location: Left arm, Patient Position: Sitting, BP Cuff Size: Adult)   Pulse (!) 58   Temp 36.3 °C (97.3 °F) (Temporal)   Resp 16   Ht 1.727 m (5' 8\")   Wt 71.7 kg (158 lb)   SpO2 99%     Physical Exam  Vitals reviewed.   Constitutional:       Appearance: Normal appearance.   HENT:      Head: Normocephalic and atraumatic.      Right Ear: External ear normal.      Left Ear: External ear normal.      Nose: Nose normal.      Mouth/Throat:      Mouth: Mucous membranes are moist.   Eyes:      Conjunctiva/sclera: Conjunctivae normal.   Cardiovascular:      Rate and Rhythm: Normal rate.   Pulmonary:      Effort: Pulmonary effort is normal.   Genitourinary:     Comments: Uncircumcised male with smegma around glans, diffuse erythema around glans of penis, foreskin, " anterior scrotum  Skin:     General: Skin is warm and dry.      Capillary Refill: Capillary refill takes less than 2 seconds.   Neurological:      Mental Status: He is alert and oriented to person, place, and time.         Assessment/Plan:     Diagnosis and associated orders:     1. Dysuria  POCT Urinalysis    URINE CULTURE(NEW)      2. Balanitis           Comments/MDM:     Urinalysis and symptoms inconsistent with urinary tract infection but will rule out UTI by urine culture.  Recommend clotrimazole or miconazole be applied topically twice daily until resolved.  Discussed hygienic concerns mainly keeping the area clean and dry is much as possible.  Follow-up as needed         Differential diagnosis, natural history, supportive care, and indications for immediate follow-up discussed.    Advised the patient to follow-up with the primary care physician for recheck, reevaluation, and consideration of further management.    Please note that this dictation was created using voice recognition software. I have made a reasonable attempt to correct obvious errors, but I expect that there are errors of grammar and possibly content that I did not discover before finalizing the note.    This note was electronically signed by Lamberto Arora PA-C

## 2024-09-24 DIAGNOSIS — R30.0 DYSURIA: ICD-10-CM

## 2024-09-26 LAB
BACTERIA UR CULT: NORMAL
SIGNIFICANT IND 70042: NORMAL
SITE SITE: NORMAL
SOURCE SOURCE: NORMAL

## 2024-10-03 ENCOUNTER — ANTICOAGULATION VISIT (OUTPATIENT)
Dept: MEDICAL GROUP | Facility: MEDICAL CENTER | Age: 84
End: 2024-10-03
Payer: MEDICARE

## 2024-10-03 VITALS — DIASTOLIC BLOOD PRESSURE: 87 MMHG | OXYGEN SATURATION: 99 % | SYSTOLIC BLOOD PRESSURE: 125 MMHG | HEART RATE: 52 BPM

## 2024-10-03 DIAGNOSIS — E78.5 DYSLIPIDEMIA: ICD-10-CM

## 2024-10-03 DIAGNOSIS — Z95.828 PRESENCE OF IVC FILTER: ICD-10-CM

## 2024-10-03 LAB — INR PPP: 1.9 (ref 2–3.5)

## 2024-10-03 PROCEDURE — 99211 OFF/OP EST MAY X REQ PHY/QHP: CPT | Performed by: NURSE PRACTITIONER

## 2024-10-03 PROCEDURE — 3074F SYST BP LT 130 MM HG: CPT | Performed by: NURSE PRACTITIONER

## 2024-10-03 PROCEDURE — 3079F DIAST BP 80-89 MM HG: CPT | Performed by: NURSE PRACTITIONER

## 2024-10-03 PROCEDURE — 85610 PROTHROMBIN TIME: CPT | Performed by: NURSE PRACTITIONER

## 2024-10-04 RX ORDER — ROSUVASTATIN CALCIUM 40 MG/1
40 TABLET, COATED ORAL DAILY
Qty: 100 TABLET | Refills: 3 | Status: SHIPPED | OUTPATIENT
Start: 2024-10-04

## 2024-10-07 DIAGNOSIS — E11.65 TYPE 2 DIABETES MELLITUS WITH HYPERGLYCEMIA, WITHOUT LONG-TERM CURRENT USE OF INSULIN (HCC): ICD-10-CM

## 2024-10-28 ENCOUNTER — TELEPHONE (OUTPATIENT)
Dept: ENDOCRINOLOGY | Facility: MEDICAL CENTER | Age: 84
End: 2024-10-28
Payer: MEDICARE

## 2024-10-28 DIAGNOSIS — I10 ESSENTIAL HYPERTENSION, BENIGN: ICD-10-CM

## 2024-10-29 RX ORDER — LOSARTAN POTASSIUM 25 MG/1
25 TABLET ORAL DAILY
Qty: 100 TABLET | Refills: 3 | Status: SHIPPED | OUTPATIENT
Start: 2024-10-29

## 2024-10-31 ENCOUNTER — ANTICOAGULATION VISIT (OUTPATIENT)
Dept: MEDICAL GROUP | Facility: MEDICAL CENTER | Age: 84
End: 2024-10-31
Payer: MEDICARE

## 2024-10-31 VITALS — WEIGHT: 158 LBS | BODY MASS INDEX: 23.95 KG/M2 | HEIGHT: 68 IN | RESPIRATION RATE: 15 BRPM

## 2024-10-31 DIAGNOSIS — Z95.828 PRESENCE OF IVC FILTER: ICD-10-CM

## 2024-10-31 LAB — INR PPP: 2.1 (ref 2–3.5)

## 2024-10-31 ASSESSMENT — FIBROSIS 4 INDEX: FIB4 SCORE: 6.98

## 2024-11-18 DIAGNOSIS — E78.5 DYSLIPIDEMIA: ICD-10-CM

## 2024-11-18 RX ORDER — FENOFIBRATE 160 MG/1
160 TABLET ORAL DAILY
Qty: 100 TABLET | Refills: 0 | Status: SHIPPED | OUTPATIENT
Start: 2024-11-18

## 2024-12-05 ENCOUNTER — TELEPHONE (OUTPATIENT)
Dept: CARDIOLOGY | Facility: MEDICAL CENTER | Age: 84
End: 2024-12-05
Payer: MEDICARE

## 2024-12-12 ENCOUNTER — ANTICOAGULATION VISIT (OUTPATIENT)
Dept: MEDICAL GROUP | Facility: MEDICAL CENTER | Age: 84
End: 2024-12-12
Payer: MEDICARE

## 2024-12-12 VITALS
SYSTOLIC BLOOD PRESSURE: 115 MMHG | BODY MASS INDEX: 23.49 KG/M2 | WEIGHT: 155 LBS | HEIGHT: 68 IN | DIASTOLIC BLOOD PRESSURE: 51 MMHG | HEART RATE: 51 BPM

## 2024-12-12 DIAGNOSIS — Z95.828 PRESENCE OF IVC FILTER: ICD-10-CM

## 2024-12-12 LAB — INR PPP: 1.9 (ref 2–3.5)

## 2024-12-12 PROCEDURE — 85610 PROTHROMBIN TIME: CPT | Performed by: NURSE PRACTITIONER

## 2024-12-12 PROCEDURE — 3078F DIAST BP <80 MM HG: CPT | Performed by: NURSE PRACTITIONER

## 2024-12-12 PROCEDURE — 3074F SYST BP LT 130 MM HG: CPT | Performed by: NURSE PRACTITIONER

## 2024-12-12 PROCEDURE — 99211 OFF/OP EST MAY X REQ PHY/QHP: CPT | Performed by: NURSE PRACTITIONER

## 2024-12-12 ASSESSMENT — FIBROSIS 4 INDEX: FIB4 SCORE: 6.98

## 2024-12-12 NOTE — PROGRESS NOTES
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  Endocrinology [phone]                  Refer to Patient Findings for HPI:  Patient Findings       Negatives:  Signs/symptoms of thrombosis, Signs/symptoms of bleeding, Laboratory test error suspected, Change in health, Change in alcohol use, Change in activity, Upcoming invasive procedure, Emergency department visit, Upcoming dental procedure, Missed doses, Extra doses, Change in medications, Change in diet/appetite, Hospital admission, Bruising, Other complaints            Vitals:    24 0910   BP: 115/51   Pulse: (!) 51   Weight: 70.3 kg (155 lb)   Height: 1.727 m (5' 8\")       Verified current warfarin dosing schedule.    Medications reconciled.  Pt is on antiplatelet therapy with aspirin 81mg for history of multiple thromboembolisms.      A/P   INR is slightly subtherapeutic  Reason(s) for out of range INR today: Increased vitamin K intake - more salad intake this week "     Warfarin dosing recommendation: Increase to Warfarin 5 mg and then continue previous warfarin regimen.     Pt educated to contact our clinic with any changes in medications or s/s of bleeding or thrombosis. Pt is aware to seek immediate medical attention for falls, head injury or deep cuts.    Request pt to return in 1 month(s). Pt agrees.    Madhavi Elizabeth, ZayD

## 2024-12-27 ENCOUNTER — TELEPHONE (OUTPATIENT)
Dept: ENDOCRINOLOGY | Facility: MEDICAL CENTER | Age: 84
End: 2024-12-27
Payer: MEDICARE

## 2024-12-28 NOTE — TELEPHONE ENCOUNTER
Called Pt and reminded them of their appt on 01/10/25 and let them know they have labs due prior to their appt  Pt said they would get their labs taken care of prior to their appt

## 2025-01-02 ENCOUNTER — HOSPITAL ENCOUNTER (OUTPATIENT)
Dept: LAB | Facility: MEDICAL CENTER | Age: 85
End: 2025-01-02
Attending: STUDENT IN AN ORGANIZED HEALTH CARE EDUCATION/TRAINING PROGRAM
Payer: MEDICARE

## 2025-01-02 DIAGNOSIS — C80.1 PAPILLARY ADENOCARCINOMA (HCC): ICD-10-CM

## 2025-01-02 DIAGNOSIS — E89.0 POSTOPERATIVE HYPOTHYROIDISM: ICD-10-CM

## 2025-01-02 LAB
T4 FREE SERPL-MCNC: 1.47 NG/DL (ref 0.93–1.7)
TSH SERPL DL<=0.005 MIU/L-ACNC: 2.08 UIU/ML (ref 0.38–5.33)

## 2025-01-02 PROCEDURE — 84443 ASSAY THYROID STIM HORMONE: CPT

## 2025-01-02 PROCEDURE — 36415 COLL VENOUS BLD VENIPUNCTURE: CPT

## 2025-01-02 PROCEDURE — 84439 ASSAY OF FREE THYROXINE: CPT

## 2025-01-02 PROCEDURE — 84432 ASSAY OF THYROGLOBULIN: CPT

## 2025-01-02 PROCEDURE — 86800 THYROGLOBULIN ANTIBODY: CPT

## 2025-01-03 LAB
THYROGLOB AB SERPL-ACNC: <0.9 IU/ML (ref 0–4)
THYROGLOB SERPL-MCNC: 3.4 NG/ML (ref 1.3–31.8)
THYROGLOB SERPL-MCNC: NORMAL NG/ML (ref 1.3–31.8)

## 2025-01-16 ENCOUNTER — ANTICOAGULATION VISIT (OUTPATIENT)
Dept: MEDICAL GROUP | Facility: MEDICAL CENTER | Age: 85
End: 2025-01-16
Payer: MEDICARE

## 2025-01-16 VITALS — WEIGHT: 152 LBS | BODY MASS INDEX: 23.04 KG/M2 | RESPIRATION RATE: 15 BRPM | HEIGHT: 68 IN

## 2025-01-16 DIAGNOSIS — Z95.828 PRESENCE OF IVC FILTER: ICD-10-CM

## 2025-01-16 LAB — INR PPP: 2 (ref 2–3.5)

## 2025-01-16 PROCEDURE — 85610 PROTHROMBIN TIME: CPT | Performed by: NURSE PRACTITIONER

## 2025-01-16 PROCEDURE — 93793 ANTICOAG MGMT PT WARFARIN: CPT | Performed by: NURSE PRACTITIONER

## 2025-01-16 ASSESSMENT — FIBROSIS 4 INDEX: FIB4 SCORE: 6.98

## 2025-01-16 NOTE — PROGRESS NOTES
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  Endocrinology [phone]                  Refer to Patient Findings for HPI:  Patient Findings       Negatives:  Signs/symptoms of thrombosis, Signs/symptoms of bleeding, Laboratory test error suspected, Change in health, Change in alcohol use, Change in activity, Upcoming invasive procedure, Emergency department visit, Upcoming dental procedure, Missed doses, Extra doses, Change in medications, Change in diet/appetite, Hospital admission, Bruising, Other complaints            Vitals:    25 0918   Resp: 15   Weight: 68.9 kg (152 lb)   Height: 1.727 m (5' 8\")       Verified current warfarin dosing schedule.    Medications reconciled.  Pt is on antiplatelet therapy with aspirin 81mg for history of multiple thromboembolisms.      A/P   INR is therapeutic  Reason(s) for out of range INR today: N/A      Warfarin dosing recommendation: Continue regimen as listed above.    Pt educated to " contact our clinic with any changes in medications or s/s of bleeding or thrombosis. Pt is aware to seek immediate medical attention for falls, head injury or deep cuts.    Request pt to return in 6 week(s). Pt agrees.    Zay EckertD

## 2025-01-20 DIAGNOSIS — Z79.01 CHRONIC ANTICOAGULATION: ICD-10-CM

## 2025-01-31 ENCOUNTER — OFFICE VISIT (OUTPATIENT)
Dept: CARDIOLOGY | Facility: MEDICAL CENTER | Age: 85
End: 2025-01-31
Attending: INTERNAL MEDICINE
Payer: MEDICARE

## 2025-01-31 VITALS
RESPIRATION RATE: 14 BRPM | WEIGHT: 161 LBS | OXYGEN SATURATION: 97 % | DIASTOLIC BLOOD PRESSURE: 56 MMHG | SYSTOLIC BLOOD PRESSURE: 122 MMHG | HEIGHT: 68 IN | BODY MASS INDEX: 24.4 KG/M2 | HEART RATE: 57 BPM

## 2025-01-31 DIAGNOSIS — E78.5 DYSLIPIDEMIA: ICD-10-CM

## 2025-01-31 DIAGNOSIS — N18.32 STAGE 3B CHRONIC KIDNEY DISEASE: ICD-10-CM

## 2025-01-31 DIAGNOSIS — R09.89 BILATERAL CAROTID BRUITS: ICD-10-CM

## 2025-01-31 DIAGNOSIS — I10 ESSENTIAL HYPERTENSION, BENIGN: ICD-10-CM

## 2025-01-31 DIAGNOSIS — Z95.1 S/P CABG X 4: ICD-10-CM

## 2025-01-31 DIAGNOSIS — I25.10 CAD IN NATIVE ARTERY: ICD-10-CM

## 2025-01-31 DIAGNOSIS — Z86.73 H/O TIA (TRANSIENT ISCHEMIC ATTACK) AND STROKE: ICD-10-CM

## 2025-01-31 PROCEDURE — 99213 OFFICE O/P EST LOW 20 MIN: CPT | Performed by: INTERNAL MEDICINE

## 2025-01-31 RX ORDER — EZETIMIBE 10 MG/1
10 TABLET ORAL DAILY
Qty: 90 TABLET | Refills: 3 | Status: SHIPPED | OUTPATIENT
Start: 2025-01-31

## 2025-01-31 ASSESSMENT — ENCOUNTER SYMPTOMS
CLAUDICATION: 0
SHORTNESS OF BREATH: 0
NEUROLOGICAL NEGATIVE: 1
VOMITING: 0
NAUSEA: 0
DOUBLE VISION: 0
NERVOUS/ANXIOUS: 0
EYES NEGATIVE: 1
PSYCHIATRIC NEGATIVE: 1
PALPITATIONS: 0
ABDOMINAL PAIN: 0
FEVER: 0
BLURRED VISION: 0
COUGH: 0
GASTROINTESTINAL NEGATIVE: 1
WEAKNESS: 0
CARDIOVASCULAR NEGATIVE: 1
DIZZINESS: 0
MUSCULOSKELETAL NEGATIVE: 1
MYALGIAS: 0
FOCAL WEAKNESS: 0
RESPIRATORY NEGATIVE: 1
WEIGHT LOSS: 0
CONSTITUTIONAL NEGATIVE: 1
BRUISES/BLEEDS EASILY: 0
DEPRESSION: 0
CHILLS: 0
HEADACHES: 0

## 2025-01-31 ASSESSMENT — FIBROSIS 4 INDEX: FIB4 SCORE: 6.98

## 2025-01-31 NOTE — PROGRESS NOTES
Chief Complaint   Patient presents with    Coronary Artery Disease     F/v DX:CAD in native artery    Syncope     F/v Dx:Syncope and collapse       Subjective     Kevin Paredes is a 84 y.o. male who presents today for annual follow up of coronary artery disease with prior coronary artery bypass graft surgery, hypertension and hyperlipidemia.    Since the patient's last visit on 02/01/24, he has been doing well clinically from cardiac standpoint. He denies fatigue, chest pain, shortness of breath, palpitations, lower extremity edema, dizziness or syncope. He keeps active exercising on his treadmill, 50 minutes daily.     Past Medical History:   Diagnosis Date    Anesthesia     difficult intubation with surgery 2008,2010    Basal cell carcinoma of skin     CAD (coronary artery disease)     Cancer (HCC)     rt  kidney 1989;  thyroid cancer 2012, prostate 2008, skin    Chronic kidney disease (CKD) stage G3b/A2, moderately decreased glomerular filtration rate (GFR) between 30-44 mL/min/1.73 square meter and albuminuria creatinine ratio between  mg/g 4/23/2015    DVT (deep venous thrombosis) (HCC) 2014    ED (erectile dysfunction)     GERD (gastroesophageal reflux disease)     Glaucoma     Heart burn     Hyperlipidemia 12/3/2012    Hypertension     Indigestion     Multiple pulmonary emboli (HCC) 2014    Multiple thyroid nodules 2009    Papillary carcinoma of thyroid (HCC) 2014    R 2 foci / 4.5mm,0.25mm / no mets/ pT1pN0    postsurgical hypothyroidism 2014    Pre-diabetes     Renal disorder     rt kidney cancer,nephrectomy    S/P CABG x 4 2003    S/P colectomy 2010    multiple colon polyps / no Ca    S/p nephrectomy 1998    carcinoma    S/P prostatectomy 2008    carcinoma    Stroke (HCC)     'mild',no residual,'one doctor said it was a tia'    Transient renal failure 2014    renal vein thrombosis    Type II or unspecified type diabetes mellitus without mention of complication, not stated as uncontrolled     on no  medications    Unspecified urinary incontinence      Past Surgical History:   Procedure Laterality Date    THYROIDECTOMY TOTAL  5/13/2014    Performed by Eliceo Bolanos M.D. at SURGERY SAME DAY [facility] ORS    NODE DISSECTION  5/13/2014    Performed by Eliceo Bolanos M.D. at SURGERY SAME DAY [facility] ORS    COLON RESECTION      MULTIPLE CORONARY ARTERY BYPASS      x 4 11/2003    NEPHRECTOMY RADICAL      right side 1998    OTHER ORTHOPEDIC SURGERY      trotator cuff    PROSTATECTOMY, RADICAL RETRO      cancer /january 2008     Family History   Problem Relation Age of Onset    Diabetes Mother     Heart Disease Mother     Diabetes Father     Cancer Father         pancreas    Thyroid Sister         Cancer    Cancer Sister         thyroid    Diabetes Sister     Cancer Brother 49        colon    Diabetes Brother     Diabetes Maternal Grandfather     Diabetes Paternal Grandmother     Diabetes Paternal Grandfather     No Known Problems Maternal Grandmother      Social History     Socioeconomic History    Marital status:      Spouse name: Not on file    Number of children: Not on file    Years of education: Not on file    Highest education level: Not on file   Occupational History    Not on file   Tobacco Use    Smoking status: Never    Smokeless tobacco: Never   Vaping Use    Vaping status: Never Used   Substance and Sexual Activity    Alcohol use: Yes     Comment: Occasionally    Drug use: No    Sexual activity: Not Currently     Comment: retired    Other Topics Concern    Not on file   Social History Narrative    Not on file     Social Drivers of Health     Financial Resource Strain: Not on file   Food Insecurity: Not on file   Transportation Needs: Not on file   Physical Activity: Not on file   Stress: Not on file   Social Connections: Not on file   Intimate Partner Violence: Not on file   Housing Stability: Not on file     Allergies   Allergen Reactions    Lactose     Seasonal      (Medications  reviewed.)  Outpatient Encounter Medications as of 1/31/2025   Medication Sig Dispense Refill    ezetimibe (ZETIA) 10 MG Tab Take 1 Tablet by mouth every day. 90 Tablet 3    fenofibrate (TRIGLIDE) 160 MG tablet Take 1 tablet by mouth once daily 100 Tablet 0    losartan (COZAAR) 25 MG Tab Take 1 tablet by mouth once daily 100 Tablet 3    Empagliflozin (JARDIANCE) 10 MG Tab tablet Take 1 Tablet by mouth every morning with breakfast. 90 Tablet 3    rosuvastatin (CRESTOR) 40 MG tablet Take 1 Tablet by mouth every day. 100 Tablet 3    levothyroxine (SYNTHROID) 125 MCG Tab Take 1 Tablet by mouth every morning on an empty stomach. 90 Tablet 5    atenolol (TENORMIN) 25 MG Tab Take 1 tablet by mouth once daily 100 Tablet 2    Continuous Glucose Sensor (FREESTYLE YUMIKO 3 SENSOR) Misc Use 1 Each every 14 days. 2 Each 11    aspirin 81 MG EC tablet Take 81 mg by mouth every day.      triamcinolone acetonide (KENALOG) 0.025 % Cream Apply to the affected area twice a day on legs. 15 g 1    capsaicin (ZOSTRIX) 0.025 % cream       Multiple Vitamins-Minerals (ZINC PO) Take  by mouth.      Calcium Carb-Cholecalciferol (CALCIUM 1000 + D PO) Take 1 Dose by mouth every day.      Omega-3 Krill Oil 300 MG Cap Take 300 mg by mouth every day.      Cinnamon 500 MG Cap Take 1,000 mg by mouth.      Cyanocobalamin (VITAMIN B-12) 1000 MCG Tab Take 1,000 mcg by mouth every day.      Coenzyme Q10 (CO Q-10 PO) Take 1 Dose by mouth every day. Unknown OTC Strength       No facility-administered encounter medications on file as of 1/31/2025.     Review of Systems   Constitutional: Negative.  Negative for chills, fever, malaise/fatigue and weight loss.   HENT: Negative.  Negative for hearing loss.    Eyes: Negative.  Negative for blurred vision and double vision.   Respiratory: Negative.  Negative for cough and shortness of breath.    Cardiovascular: Negative.  Negative for chest pain, palpitations, claudication and leg swelling.   Gastrointestinal:  "Negative.  Negative for abdominal pain, nausea and vomiting.   Genitourinary: Negative.  Negative for dysuria and urgency.   Musculoskeletal: Negative.  Negative for joint pain and myalgias.   Skin: Negative.  Negative for itching and rash.   Neurological: Negative.  Negative for dizziness, focal weakness, weakness and headaches.   Endo/Heme/Allergies: Negative.  Does not bruise/bleed easily.   Psychiatric/Behavioral: Negative.  Negative for depression. The patient is not nervous/anxious.               Objective     /56 (BP Location: Left arm, Patient Position: Sitting, BP Cuff Size: Adult)   Pulse (!) 57   Resp 14   Ht 1.727 m (5' 8\")   Wt 73 kg (161 lb)   SpO2 97%   BMI 24.48 kg/m²     Physical Exam  Constitutional:       Appearance: Normal appearance. He is well-developed and normal weight.   HENT:      Head: Normocephalic and atraumatic.   Neck:      Vascular: Carotid bruit present. No JVD.   Cardiovascular:      Rate and Rhythm: Normal rate and regular rhythm.      Heart sounds: Normal heart sounds.   Pulmonary:      Effort: Pulmonary effort is normal.      Breath sounds: Normal breath sounds.   Abdominal:      General: Bowel sounds are normal.      Palpations: Abdomen is soft.      Comments: No hepatosplenomegaly.   Musculoskeletal:         General: Normal range of motion.   Lymphadenopathy:      Cervical: No cervical adenopathy.   Skin:     General: Skin is warm and dry.   Neurological:      Mental Status: He is alert and oriented to person, place, and time.            CARDIAC STUDIES/PROCEDURES:     ABDOMINAL ULTRASOUND (06/04/14)  1. Ectatic aorta.  2. Atherosclerotic plaque.  3. Cholelithiasis.     CT OF CHEST (06/06/14)  1. There is thrombus in the IVC which extends into the distal left renal vein between the aorta and vena cava. The patient is on heparin for 36 hours approximately, and the improvement in left   renal edema and decrease in caliber of the left renal vein compared to previous CT " probably reflects improved venous drainage related to the therapy .  2. The thrombus attached to the filter extends cephalad to the filter to the level of the caudate lobe of the liver.  3. There is DVT in both lower extremities.     ECHOCARDIOGRAM CONCLUSIONS (09/11/23)  Compared to the images of the prior study 10/18/18, there has been no   significant change.   Normal left ventricular size and systolic function.  The left ventricular ejection fraction is visually estimated to be 60%.  Normal diastolic function.  Normal right ventricular size and systolic function.  Mild mitral regurgitation.     ECHOCARDIOGRAM CONCLUSIONS (10/18/18)  Prior echo on 05/21/14. Compared to the report of the study done -   there has been no significant change.   Normal left ventricular systolic function.  Left ventricular ejection fraction is visually estimated to be 65%.  Mild tricuspid regurgitation.  Unable to estimate pulmonary artery pressure due to an inadequate   tricuspid regurgitant jet.     ECHOCARDIOGRAM CONCLUSIONS (05/21/14)  Normal left ventricular size. Sigmoid septum with increased outflow   velocity but not anterior motion of the mitral valve.  Basal inferior and apical septal hypokinesis. Left ventricular   ejection fraction is 50% to 55%.  Grade I diastolic dysfunction is present.  Normal left atrial size.  Aortic sclerosis without significant stenosis.  Trace mitral regurgitation.     ECHOCARDIOGRAM CONCLUSIONS (01/25/14)  Normal left ventricular size and function.   Grade I diastolic dysfunction is present.   Normal left ventricular wall thickness.   Left ventricular ejection fraction is 60% to 65%.  Mild mitral regurgitation.   Aortic valve probably trileaflet. No stenosis or regurgitation seen.   AV MG 5.39 mmHg, PG 9.33 mmHg.  Mild tricuspid regurgitation. Right ventricular systolic pressure is   estimated to be 30 to 35 mmHg consistent with mild pulmonary hypertension.  No pericardial effusion seen.   Normal  aortic diameter 3.2 cm  No prior study for comparison.     EKG performed on (08/23/23) EKG personally interpreted shows sinus rhythm.   EKG performed on (01/23/19) EKG shows sinus bradycardia.  EKG performed on (05/15/14) EKG shows normal sinus rhythm with non-specific intra-ventricular conduction delay.     Laboratory results of (01/24/24) Cholesterol profile of 146/189/35/73 mg/dL noted.   Laboratory results of (11/28/23) Cholesterol profile of 173/288/27/88 mg/dL noted.  Laboratory results of (08/31/23) Cholesterol profile of 156/239/31/77 mg/dL noted.   Laboratory results of (10/26/21) Cholesterol profile of 167/224/34/88 mg/dL noted.  Laboratory results of (08/24/20) Cholesterol profile of 199/202/37/122 mg/dl noted.  Laboratory results of (07/15/19) Cholesterol profile of 169/185/32/100 noted.  Laboratory results of (09/29/18) Cholesterol profile of 261/365/36/151 noted.  Laboratory results of (09/21/15) Cholesterol profile of 251/307/37/153 noted.  Laboratory results of (06/16/14) Cholesterol profile of 172/233/37/88 noted.     LOWER EXTREMITY VENOUS ULTRASOUND (07/19/19)  No evidence of RIGHT lower extremity DVT.      LOWER EXTREMITY VENOUS ULTRASOUND (06/05/14)  1. No evidence of acute right lower extremity deep venous thrombosis with   recannalized venous thrombosis throughout.  2. Normal left lower extremity superficial and deep venous examination.      MRA OF BRAIN (01/25/14)  1. MRA OF THE [place] OF GREEN WITHIN NORMAL LIMITS WITH NO EVIDENCE OF ANEURYSM OR CEREBROVASCULAR OCCLUSIVE DISEASE.  2. FIELD OF VIEW PARTIALLY EXCLUDES THE INFERIOR TEMPORAL M2 DIVISION LEFT MCA.     MRA OF NECK (01/25/14)  1. Unremarkable cervical vertebral arteries.  2. Right carotid bulb and ICA origin minimal plaquing with up to about 10% stenosis. No flow limiting lesion.  3. Left carotid bulb and left ICA origin minimal plaquing with up to about 10-15% stenosis. No flow limiting lesion.     MYOCARDIAL PERFUSION STUDY  CONCLUSIONS (10/18/18)  No evidence of significant jeopardized viable myocardium or prior myocardial infarction.  Apical thinning noted.  Normal wall motion, EF 59%.   TID absent.   Sinus rhythm.  Nondiagnostic ECG portion of a nuclear stress test.  ECG INTERPRETATION  Nondiagnostic ECG portion of a nuclear stress test.     STRESS ECHOCARDIOGRAM [facility] (03/14/12)  Stress echocardiogram showing no evidence for stress-induced ischemia.     ONEIL Lawrence  DOS: 10/11/23   Summary:   Sinus rhythm without significant tachy- or kaylee-arrhythmias.      Assessment & Plan     1. CAD in native artery        2. S/P CABG x 4        3. Essential hypertension, benign        4. Dyslipidemia  ezetimibe (ZETIA) 10 MG Tab    Comp Metabolic Panel    Lipid Profile      5. H/O TIA (transient ischemic attack) and stroke        6. Bilateral carotid bruits            Medical Decision Making: Today's Assessment/Status/Plan:        Coronary artery disease with prior coronary bypass graft (4 vessel CABG at [facility] 2003): He is an 84 year old male with coronary artery disease with prior coronary artery bypass graft surgery, hypertension and hyperlipidemia.  He is clinically doing well from coronary standpoint. I will continue with current medical care including aspirin, atenolol, losartan and rosuvastatin. We will perform a myocardial perfusion imaging study.  Hypertension: Currently, the average blood pressure is well controlled. We will continue with beta blockade therapy and angiotensin receptor blockade.  Hyperlipidemia: He is doing well on statin and fenofibrate therapy without myalgia symptoms. LDL level is not at target. We will start ezetimibe 10 mg QD. We will repeat labs including fasting lipid profile.   Chronic anticoagulation therapy (warfarin) and IVC filter with pulmonary embolism/deep venous thrombosis. He is clinically doing well without recurrence or chest pain or shortness of breath.  History of stroke  (2004): He remains clinically stable without recurrence of stroke symptoms. We will perform carotid ultrasound as requested.   Renal insufficiency (Managed by Dr. Najjar, F[place])  Health maintenance: Recovered large hematoma following thyroidectomy.    We will follow up in 3 months.    CC Damion Snow

## 2025-02-12 ENCOUNTER — APPOINTMENT (OUTPATIENT)
Dept: URBAN - METROPOLITAN AREA CLINIC 22 | Facility: CLINIC | Age: 85
Setting detail: DERMATOLOGY
End: 2025-02-12

## 2025-02-12 DIAGNOSIS — L81.4 OTHER MELANIN HYPERPIGMENTATION: ICD-10-CM

## 2025-02-12 DIAGNOSIS — L72.0 EPIDERMAL CYST: ICD-10-CM

## 2025-02-12 DIAGNOSIS — Z85.828 PERSONAL HISTORY OF OTHER MALIGNANT NEOPLASM OF SKIN: ICD-10-CM

## 2025-02-12 DIAGNOSIS — L82.1 OTHER SEBORRHEIC KERATOSIS: ICD-10-CM

## 2025-02-12 DIAGNOSIS — D18.0 HEMANGIOMA: ICD-10-CM

## 2025-02-12 DIAGNOSIS — L57.0 ACTINIC KERATOSIS: ICD-10-CM

## 2025-02-12 DIAGNOSIS — D22 MELANOCYTIC NEVI: ICD-10-CM

## 2025-02-12 PROBLEM — D22.39 MELANOCYTIC NEVI OF OTHER PARTS OF FACE: Status: ACTIVE | Noted: 2025-02-12

## 2025-02-12 PROBLEM — D18.01 HEMANGIOMA OF SKIN AND SUBCUTANEOUS TISSUE: Status: ACTIVE | Noted: 2025-02-12

## 2025-02-12 PROBLEM — D22.5 MELANOCYTIC NEVI OF TRUNK: Status: ACTIVE | Noted: 2025-02-12

## 2025-02-12 PROCEDURE — ? SUNSCREEN TREATMENT REGIMEN

## 2025-02-12 PROCEDURE — ? LIQUID NITROGEN

## 2025-02-12 PROCEDURE — 99213 OFFICE O/P EST LOW 20 MIN: CPT | Mod: 25

## 2025-02-12 PROCEDURE — ? COUNSELING

## 2025-02-12 PROCEDURE — 17000 DESTRUCT PREMALG LESION: CPT

## 2025-02-12 ASSESSMENT — LOCATION DETAILED DESCRIPTION DERM
LOCATION DETAILED: SUPERIOR THORACIC SPINE
LOCATION DETAILED: RIGHT FOREHEAD
LOCATION DETAILED: RIGHT POSTERIOR EAR
LOCATION DETAILED: RIGHT DISTAL CALF
LOCATION DETAILED: RIGHT MEDIAL UPPER BACK
LOCATION DETAILED: RIGHT CENTRAL MALAR CHEEK
LOCATION DETAILED: LEFT VENTRAL PROXIMAL FOREARM
LOCATION DETAILED: STERNAL NOTCH
LOCATION DETAILED: RIGHT VENTRAL PROXIMAL FOREARM
LOCATION DETAILED: LEFT FOREHEAD
LOCATION DETAILED: LEFT CENTRAL MALAR CHEEK
LOCATION DETAILED: LEFT MEDIAL MALAR CHEEK
LOCATION DETAILED: LEFT INFERIOR UPPER BACK
LOCATION DETAILED: LEFT INFERIOR MEDIAL FOREHEAD
LOCATION DETAILED: RIGHT LATERAL ABDOMEN

## 2025-02-12 ASSESSMENT — LOCATION ZONE DERM
LOCATION ZONE: EAR
LOCATION ZONE: TRUNK
LOCATION ZONE: FACE
LOCATION ZONE: ARM
LOCATION ZONE: LEG

## 2025-02-12 ASSESSMENT — LOCATION SIMPLE DESCRIPTION DERM
LOCATION SIMPLE: CHEST
LOCATION SIMPLE: RIGHT CALF
LOCATION SIMPLE: RIGHT CHEEK
LOCATION SIMPLE: ABDOMEN
LOCATION SIMPLE: LEFT FOREARM
LOCATION SIMPLE: RIGHT EAR
LOCATION SIMPLE: UPPER BACK
LOCATION SIMPLE: RIGHT FOREARM
LOCATION SIMPLE: RIGHT FOREHEAD
LOCATION SIMPLE: RIGHT UPPER BACK
LOCATION SIMPLE: LEFT UPPER BACK
LOCATION SIMPLE: LEFT CHEEK
LOCATION SIMPLE: LEFT FOREHEAD

## 2025-02-12 NOTE — PROCEDURE: LIQUID NITROGEN
Render Note In Bullet Format When Appropriate: No
Detail Level: Detailed
Number Of Freeze-Thaw Cycles: 2 freeze-thaw cycles
Show Aperture Variable?: Yes
Post-Care Instructions: I reviewed with the patient in detail post-care instructions. Patient is to wear sunprotection, and avoid picking at any of the treated lesions. Pt may apply Vaseline to crusted or scabbing areas.
Consent: The patient's consent was obtained including but not limited to risks of crusting, scabbing, blistering, scarring, darker or lighter pigmentary change, recurrence, incomplete removal and infection.
Duration Of Freeze Thaw-Cycle (Seconds): 0
decreased ability to use arms for pushing/pulling

## 2025-02-21 DIAGNOSIS — E78.5 DYSLIPIDEMIA: ICD-10-CM

## 2025-02-24 ENCOUNTER — HOSPITAL ENCOUNTER (OUTPATIENT)
Dept: RADIOLOGY | Facility: MEDICAL CENTER | Age: 85
End: 2025-02-24
Attending: INTERNAL MEDICINE
Payer: MEDICARE

## 2025-02-24 DIAGNOSIS — Z86.73 H/O TIA (TRANSIENT ISCHEMIC ATTACK) AND STROKE: ICD-10-CM

## 2025-02-24 PROCEDURE — 93880 EXTRACRANIAL BILAT STUDY: CPT

## 2025-02-26 ENCOUNTER — RESULTS FOLLOW-UP (OUTPATIENT)
Dept: CARDIOLOGY | Facility: MEDICAL CENTER | Age: 85
End: 2025-02-26

## 2025-02-26 RX ORDER — FENOFIBRATE 160 MG/1
160 TABLET ORAL DAILY
Qty: 100 TABLET | Refills: 3 | Status: SHIPPED | OUTPATIENT
Start: 2025-02-26

## 2025-02-27 ENCOUNTER — ANTICOAGULATION VISIT (OUTPATIENT)
Dept: MEDICAL GROUP | Facility: MEDICAL CENTER | Age: 85
End: 2025-02-27
Payer: MEDICARE

## 2025-02-27 VITALS
SYSTOLIC BLOOD PRESSURE: 108 MMHG | HEART RATE: 56 BPM | BODY MASS INDEX: 24.4 KG/M2 | HEIGHT: 68 IN | WEIGHT: 161 LBS | DIASTOLIC BLOOD PRESSURE: 62 MMHG

## 2025-02-27 DIAGNOSIS — Z95.828 PRESENCE OF IVC FILTER: ICD-10-CM

## 2025-02-27 LAB — INR PPP: 2.2 (ref 2–3.5)

## 2025-02-27 ASSESSMENT — FIBROSIS 4 INDEX: FIB4 SCORE: 6.98

## 2025-02-28 ENCOUNTER — HOSPITAL ENCOUNTER (OUTPATIENT)
Dept: RADIOLOGY | Facility: MEDICAL CENTER | Age: 85
End: 2025-02-28
Attending: INTERNAL MEDICINE
Payer: MEDICARE

## 2025-02-28 DIAGNOSIS — Z95.1 S/P CABG X 4: ICD-10-CM

## 2025-02-28 DIAGNOSIS — I25.10 CAD IN NATIVE ARTERY: ICD-10-CM

## 2025-02-28 DIAGNOSIS — I10 ESSENTIAL HYPERTENSION, BENIGN: ICD-10-CM

## 2025-02-28 PROCEDURE — A9502 TC99M TETROFOSMIN: HCPCS

## 2025-02-28 PROCEDURE — 78452 HT MUSCLE IMAGE SPECT MULT: CPT | Mod: 26 | Performed by: INTERNAL MEDICINE

## 2025-02-28 PROCEDURE — 93018 CV STRESS TEST I&R ONLY: CPT | Performed by: INTERNAL MEDICINE

## 2025-02-28 PROCEDURE — 700111 HCHG RX REV CODE 636 W/ 250 OVERRIDE (IP)

## 2025-02-28 RX ORDER — REGADENOSON 0.08 MG/ML
0.4 INJECTION, SOLUTION INTRAVENOUS ONCE
Status: COMPLETED | OUTPATIENT
Start: 2025-02-28 | End: 2025-02-28

## 2025-02-28 RX ORDER — REGADENOSON 0.08 MG/ML
INJECTION, SOLUTION INTRAVENOUS
Status: COMPLETED
Start: 2025-02-28 | End: 2025-02-28

## 2025-02-28 RX ORDER — AMINOPHYLLINE 25 MG/ML
100 INJECTION, SOLUTION INTRAVENOUS
Status: DISCONTINUED | OUTPATIENT
Start: 2025-02-28 | End: 2025-03-01 | Stop reason: HOSPADM

## 2025-02-28 RX ADMIN — REGADENOSON 0.4 MG: 0.08 INJECTION, SOLUTION INTRAVENOUS at 11:03

## 2025-03-04 ENCOUNTER — TELEPHONE (OUTPATIENT)
Dept: NEPHROLOGY | Facility: MEDICAL CENTER | Age: 85
End: 2025-03-04
Payer: MEDICARE

## 2025-03-04 ENCOUNTER — HOSPITAL ENCOUNTER (OUTPATIENT)
Facility: MEDICAL CENTER | Age: 85
End: 2025-03-04
Attending: INTERNAL MEDICINE
Payer: MEDICARE

## 2025-03-04 DIAGNOSIS — N18.32 STAGE 3B CHRONIC KIDNEY DISEASE: ICD-10-CM

## 2025-03-04 DIAGNOSIS — E87.5 HYPERKALEMIA: ICD-10-CM

## 2025-03-04 DIAGNOSIS — I10 ESSENTIAL HYPERTENSION, BENIGN: ICD-10-CM

## 2025-03-04 LAB
ANION GAP SERPL CALC-SCNC: 9 MMOL/L (ref 7–16)
BUN SERPL-MCNC: 35 MG/DL (ref 8–22)
CALCIUM SERPL-MCNC: 9.5 MG/DL (ref 8.4–10.2)
CHLORIDE SERPL-SCNC: 110 MMOL/L (ref 96–112)
CO2 SERPL-SCNC: 24 MMOL/L (ref 20–33)
CREAT SERPL-MCNC: 1.94 MG/DL (ref 0.5–1.4)
ERYTHROCYTE [DISTWIDTH] IN BLOOD BY AUTOMATED COUNT: 53.3 FL (ref 35.9–50)
GFR SERPLBLD CREATININE-BSD FMLA CKD-EPI: 33 ML/MIN/1.73 M 2
GLUCOSE SERPL-MCNC: 156 MG/DL (ref 65–99)
HCT VFR BLD AUTO: 45.9 % (ref 42–52)
HGB BLD-MCNC: 14.2 G/DL (ref 14–18)
MCH RBC QN AUTO: 31.5 PG (ref 27–33)
MCHC RBC AUTO-ENTMCNC: 30.9 G/DL (ref 32.3–36.5)
MCV RBC AUTO: 101.8 FL (ref 81.4–97.8)
PLATELET # BLD AUTO: 102 K/UL (ref 164–446)
PMV BLD AUTO: 11.8 FL (ref 9–12.9)
POTASSIUM SERPL-SCNC: 5.8 MMOL/L (ref 3.6–5.5)
RBC # BLD AUTO: 4.51 M/UL (ref 4.7–6.1)
SODIUM SERPL-SCNC: 143 MMOL/L (ref 135–145)
WBC # BLD AUTO: 4.4 K/UL (ref 4.8–10.8)

## 2025-03-04 PROCEDURE — 36415 COLL VENOUS BLD VENIPUNCTURE: CPT

## 2025-03-04 PROCEDURE — 82570 ASSAY OF URINE CREATININE: CPT

## 2025-03-04 PROCEDURE — 82043 UR ALBUMIN QUANTITATIVE: CPT

## 2025-03-04 PROCEDURE — 80048 BASIC METABOLIC PNL TOTAL CA: CPT

## 2025-03-04 PROCEDURE — 85027 COMPLETE CBC AUTOMATED: CPT

## 2025-03-04 NOTE — TELEPHONE ENCOUNTER
I called and spoke to patient today about his recent lab and hyperkalemia  I advised the patient to be on low potassium diet, recheck labs in 1 week

## 2025-03-04 NOTE — PROGRESS NOTES
Follow up Endocrinology Visit  Initial consult/last visit on: 4/25/24  Last visit on: 9/10/24  Referred by: Fadi Najjar, M.D./nephrologist     Chief complaint:  Paulo Paredes, 84 y.o., male, who is here for follow up for T2DM management. Here with his spouse - Ursula    Interval history:   - overall doing well  - he recently had stress test, noted perfusion defect primarily related to old MI, treated with CABG, no concerns of current ischemia  - admits some dietary indiscretion  - reviewed recent labs  Prior notes:  6/10/24  - patient has a hip pain, but he still can walk, he states that lately he was able to walk more and experienced less fatigue, even overall he lost weight but  lately was able to regain some weight  - reports some constipation but improved with treatment  - reviewed recent labs  9/10/24  -  had recent COVID-19 infection  - he couldn't tolerate Ozempic due to side effects  - reviewed recent labs, neck US, CGM report    Current therapy:  Jardiance 10 mg    Tolerates medications: well  Compliant: yes    Prior used medications:   Trulicity - previously tolerated, was stopped due to difficulty to get the medication  Ozempic -> diarrhea - stopped 2-3 weeks ago  Jardiance 25 mg - fatigue, difficulty walking, lost weight, falls asleep on his chair during the day, frequent urination    Other important medications:  ASA 81 mg  Warfarin  Rosuvastatin 40 mg  Fenofibrate 160 mg  Atenolol 25 mg  Capsaicin cream  Levothyroxine 137 mcg/  Losartan 25 mg     BG control:  CGM type - Michelle 3  Period -  5/28/24 - 6/10/24 -> 8/27/24 - 9/9/24 -> 2/20/25 - 3/5/25  Data were reviewed and discussed with the patient  Active time - 96 -> 99 -> 98%  ABG - 152  -> 148  -> 176 mg/dl  GV - 20.3 -> 24.4 -> 27.1%  GMI - 6.9 -> 6.9 -> 7.5%  TIR - 82 -> 80 -> 60%  TAR - high - 18 ->  20 -> 33%, very high - 0 -> 0 -> 7%  TBR - 0-> 0 -> 0%  2/20/25 - 3/5/25    8/27/24 - 9/9/24 5/28/24 - 6/10/24      DM history:  - diagnosed 20  years ago, diagnosed on screening test,  220 lb  - hospitalizations for DKA/HHS/severe hyperglycemia/severe hypoglycemia in the past: none  - prior therapy: Trulicity started 2 years ago, couldn't get Trulicity -> Jardiance - 3 weeks ago  - known DM complications: CKD 3b, single kidney, s/p nephrectomy,  Hx of TIA, CAD, s/p CABG x 4  - latest HbA1C - 8.5% in 3/2024  - pertinent PMHx:   -- dyslipidemia, GERD, PTC, s/p thyroidectomy in 2014, hypothyroidism, Hx of PTC, Hx of PE, s/p IVF filter placement, HTN  -- denies NAFLD, PAD/CHF      PTC Hx, s/p thyroidectomy in 5/2014:  - currently on LT4 137, euthyroid    Current Medications:  Current Outpatient Medications:     Empagliflozin 25 MG Tab, Take 1 Tablet by mouth every day., Disp: 90 Tablet, Rfl: 3    liraglutide (VICTOZA) 18 MG/3ML Solution Pen-injector, Inject 0.6 mg under the skin every day., Disp: 6 mL, Rfl: 4    rosuvastatin (CRESTOR) 40 MG tablet, Take 1 Tablet by mouth every day., Disp: 90 Tablet, Rfl: 3    levothyroxine (EUTHYROX) 137 MCG Tab, TAKE 1 TABLET BY MOUTH ONCE DAILY IN THE MORNING ON  AN  EMPTY  STOMACH  ONE  HOUR  BEFORE  EATING, Disp: 100 Tablet, Rfl: 3    fenofibrate (TRIGLIDE) 160 MG tablet, Take 1 Tablet by mouth every day., Disp: 100 Tablet, Rfl: 3    warfarin (COUMADIN) 5 MG Tab, Take 1 Tablet by mouth every evening. Take 0.5 to 1 tablet by mouth once daily. Take as directed by anticoagulation clinic., Disp: 100 Tablet, Rfl: 3    losartan (COZAAR) 25 MG Tab, Take 1 Tablet by mouth every day for 360 days., Disp: 100 Tablet, Rfl: 2    Dulaglutide 1.5 MG/0.5ML Solution Pen-injector, Inject 0.5 mL under the skin every 7 days for 360 days., Disp: 24 mL, Rfl: 3    aspirin 81 MG EC tablet, Take 81 mg by mouth every day., Disp: , Rfl:     atenolol (TENORMIN) 25 MG Tab, Take 1 tablet by mouth once daily, Disp: 100 Tablet, Rfl: 3    oxymetazoline (AFRIN 12 HOUR) 0.05 % Solution, Administer 2 Sprays into affected nostril(S) 2 times a day. Discontinue  "after 2 days., Disp: 15 mL, Rfl: 0    triamcinolone acetonide (KENALOG) 0.025 % Cream, Apply to the affected area twice a day on legs., Disp: 15 g, Rfl: 1    capsaicin (ZOSTRIX) 0.025 % cream, APPLY TO ITCHY AREA ON NECK UP TO 5 TIMES A DAY FOR 1 WEEK, THEN 3 TIMES A DAY FOR 3 TO 6 WEEKS. WASH HANDS THOROUGHLY AFTER APPLYING. DO TEST SPOT 1ST, Disp: , Rfl:     Multiple Vitamins-Minerals (ZINC PO), Take  by mouth., Disp: , Rfl:     hydrocortisone 2.5 % Cream topical cream, APPLY TO RASH ON GROIN TWICE DAILY FOR 1 WEEK WITH FLARES, Disp: , Rfl:     Calcium Carb-Cholecalciferol (CALCIUM 1000 + D PO), Take 1 Dose by mouth every day., Disp: , Rfl:     Omega-3 Krill Oil 300 MG Cap, Take 300 mg by mouth every day., Disp: , Rfl:     Cinnamon 500 MG Cap, Take 1,000 mg by mouth., Disp: , Rfl:     Cyanocobalamin (VITAMIN B-12) 1000 MCG Tab, Take 1,000 mcg by mouth every day., Disp: , Rfl:     Coenzyme Q10 (CO Q-10 PO), Take 1 Dose by mouth every day. Unknown OTC Strength, Disp: , Rfl:     PHYSICAL EXAM:   Vital signs: /60   Pulse (!) 52   Ht 1.727 m (5' 8\") Comment: pt stated  Wt 73.7 kg (162 lb 6.4 oz)   SpO2 97%   BMI 24.69 kg/m²   GENERAL: Well-developed, well-nourished  in no apparent distress.   CARDIOVASCULAR: Regular rate and rhythm.   LUNGS: No dyspnea  ABDOMEN: Soft, nondistended  NEUROLOGICAL: Cranial nerves II-XII are grossly intact No visible tremor with both outstretched hands  SKIN: No rashes, lesions. Turgor is normal.    Labs:  - reviewed  HbA1C/BG/Hb:   Reference Range  6/10/24 9/10/24 3/6/25   HbA1C 4.0 - 5.6 % 7.6 (H) 6.5 ! 7.2 !      Reference Range  01/24/24    Hb 14.0 - 18.0 g/dL 14.3   Hct 42.0 - 52.0 % 44.3     Kidney function/MACR:   Reference Range 01/24/24  3/4/25   Creatinine 0.50 - 1.40 mg/dL 1.80 (H) 1.94 (H)   GFR (CKD-EPI) >60 mL/min/1.73 m 2 36 ! 33 !   MACR 0 - 30 mg/g 95 (H) 89 (H)     LFTs:   Reference Range 11/28/23    AST(SGOT) 12 - 45 U/L 43   ALT(SGPT) 2 - 50 U/L 27   ALP 30 - " 99 U/L 44     Lipid panel:   Reference Range 01/24/24    Triglycerides 0 - 149 mg/dL 189 (H)   HDL >=40 mg/dL 35 !   LDL <100 mg/dL 73     Hypothyroidism, s/p thyroidectomy in 2014, LOW RISK:   Reference Range  01/04/23 06/04/24 8/30/24 1/2/25    TSH 0.380 - 5.330 uIU/mL 0.850 0.103 (L) 1.260 2.080    fT4 0.93 - 1.70 ng/dL  1.57 1.07 1.47    Tg 1.3 - 31.8 ng/mL  0.3 (L) 0.4 (L) 3.4    Anti-Tg Abs 0.0 - 4.0 IU/mL  <0.9 <0.9 < 0.9    LT4 Mcg/day  137 125 125 137     Tg trend:   Reference Range 05/02/14 06/26/14 09/25/14 02/05/15  07/17/15  09/21/15 01/26/16  03/19/18  04/12/19  06/04/24  08/30/24  1/2/25   Tg 1.3 - 31.8 ng/mL 47.2 (H) 0.2 (L) 0.2 (L) 0.1 (L) 0.2 (L) 0.3 (L) 0.6 (L) 0.3 (L) 0.2 (L) 0.3 (L) 0.4 (L) 3.4     Imaging:  - reviewed  Neck US on 7/3/24:  HISTORY/REASON FOR EXAM:  Hx of papillary thyroid cancer, Tg in grey zone   TECHNIQUE/EXAM DESCRIPTION: Ultrasound of the soft tissues of the head and neck.  COMPARISON:  7/1/2015  FINDINGS:  The thyroid is surgically absent. No residual or recurrent thyroid tissue is evident. No pathologically enlarged cervical lymphadenopathy is appreciated.  IMPRESSION:  Status post thyroidectomy.    Pathology report:  - reviewed  Pathology report after thyroidectomy with excision of deep cervical lymph node with jugular dissection of right neck on 5/13/2014:  FINAL DIAGNOSIS:  A. Right cervical lymph node: One benign lymph node negative for metastatic carcinoma. (0/1)  B. 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  Benign hyperplastic nodule of left lobe with hemorrhage and fibrosis.    -Specimen Size:  Right Lobe: 4.8 x 2.5 x 2 cm  Left Lobe: 5 x 3 x 2.5 cm  Isthmus: 2.5 x 2.2 x 1.2 cm  Central Compartment: Not applicable.  Right neck dissection: Not applicable.  Left neck dissection: Not applicable.  -Specimen Weight: 33.4 gram  -Tumor Focality: Multifocal, ipsilateral  -Dominant Tumor:  Tumor Laterality:  Right lobe  Tumor Size: 4.5 mm  Histologic Type: Papillary carcinoma  Variant: Classical (usual)  Architecture: Classical (usual)  Cytomorphology: Classical  Margins: Margins uninvolved by carcinoma  Distance of invasive carcinoma to closest margin: 3 mm.  Tumor Capsule: Partially encapsulated  Tumor Capsule Invasion: Present  Lymph-Vascular Invasion: Not identified  Extrathyroidal Extension: Not identified  -Second Tumor:  Tumor Laterality: Right lobe  Tumor Size: 0.25 mm  Histologic Type: Papillary carcinoma (usual)  Variant: Classical (usual)  Architecture: Classical (papillary)  Cytomorphology: Classical  Margins: Margins uninvolved by carcinoma  Distance of invasive carcinoma to closest margin: 1 mm  Tumor Capsule: Totally unencapsulated  Tumor Capsule Invasion: Not applicable  Lymph-Vascular Invasion: Not identified  Extrathyroidal Extension: Not identified  -Pathologic Staging:  Primary Tumor: *pT1(m)  Regional Lymph Nodes: *pN0  Number examined: 1  Number involved: 0  Lymph Node, Extranodal Extension: Not applicable  Distant Metastasis: Not applicable.  -Additional Pathologic Findings: None    ASSESSMENT/PLAN:   # Type 2 diabetes mellitus with hyperglycemia, without long-term current use of insulin (HCC)  - duration x 20 years  - on SGTL-2 inhibitor   - HbA1C - 8.5% (3/2024) -> 6.5 % (9/2024) -> 7.2% (3/2025)     Microvascular complications: nephropathy with CKD 3b, microalbuminuria, denies peripheral neuropathy, retinopathy  Macrovascular complications: Hx of TIA, CAD, s/p CABG x 4  Associated comorbidities: dyslipidemia, GERD, hypothyroidism, Hx of PTC, Hx of PE, s/p IVF filter placement, HTN, single kidney, s/p nephrectomy,    DM complication screening:  Labs reviewed:  MACR - 95 -> 89 (+), last checked in 1/2024 -> 3/2025  Creat/GFR  1.9/33, consistent with CKD 3b, last checked in 3/2025  LDL - 73 - close to target, last checked in 1/2024     Patient is taking statin: on  Rosuvastatin 40 mg + fenofibrate  160 mg + Zetia 10 mg  Patient is taking ASA: yes  Patient is taking ACE/ARB: on Losartan 25 mg    Last eye exam: 12/4/23, no evidence of DR  Last foot exam: on 4/25/24 by me, normal monofilament test     Home medications:  Jardiance 10 mg    Prior used medications:   Trulicity - previously tolerated, was stopped due to difficulty to get the medication  Ozempic -> diarrhea - stopped 2-3 weeks ago  Jardiance 25 mg - fatigue, difficulty walking, lost weight, falls asleep on his chair during the day, frequent urination    Discussion:  - noted worsening of glycemic control, but still reasonable given patient's age  - again, discussed that given CKD, SGLT-2 inhibitor is unlikely to have significant impact on glycemic control  - suggested adding DPP-4 inhibitor, but patient wants first work on his diet  Prior notes:  6/10/24  - noted excellent glycemic control  - will decrease dose of SGLT-2 inhibitor as GFR is low and no additional glycemic management benefit from higher dose  - continue low dose GLP-1 agonist as long as no significant weight loss or difficult to manage constipation  9/10/24  - reasonable glycemic control  - off Ozempic due to side effects  - discussed that SGLT-2 inhibitor with CKD3b has minimal antihyperglycemic effect, but helpful for kidney protection  - if worsening of glycemic control consider using DPP-4 inhibitor    Plan:  Discussed with the patient goals for DM management:  Fasting BG 90 - 130  2 hrs postprandial  - 180  HbA1C < 7.0%     Therapy adjustments:  - none  - if no improvement with dietary changes -> add Januvia    3. Continue use of Michelle 3 plus as long as it is not expensive, ok to do 1 mo breaks off CGM.  4. Given foot care instructions on prior visit.   - POCT Hemoglobin A1C  - Continuous Glucose Sensor (FREESTYLE MICHELLE 3 PLUS SENSOR) Misc; Apply every 15 days  Dispense: 6 Each; Refill: 4    # CKD3b  # Microalbuminuria  - stable  9/10/24  - already on ARB and SGLT-2  inhibitor  - keep BP < 130/80 mmHg    # Hx of PTC  # Postoperative hypothyroidism, intermediate reponse  - TSH at 2.0, but noted sudden raise of Tg  - will increase dose LT4, tighten goal of TSH to 0.1 - 0.5  - repeat neck US  Prior notes:  6/10/24  - s/p thyroidectomy in 2014, pT1N0  - LOW risk as per pathology report  - indeterminate response as Tg> 1 but < 10,but given period of 10 years after the surgery and relatively stable level of Tg - TSH goal 0.4 - 2.0  - recent TSH suppression on same dose of LT4 likely to be explained by weight loss  9/10/24  - TSH < 2.0, noted slight raise of Tg  - neck US didn't show any residual/recurrent disease -> no evidence of structural disease  - ok to continue current therapy -> repeat TFTs, Tg  Plan:  Increase Levothyroxine 125 - > 137 mcg/day. TSH goal 0.1 - 0.5.  Repeat TFTs  + Tg/Tg-Abs in 3 mo.   Repeat neck US.   - levothyroxine (SYNTHROID) 137 MCG Tab; Take 1 Tablet by mouth every morning on an empty stomach.  Dispense: 90 Tablet; Refill: 4  - US-SOFT TISSUES OF HEAD - NECK; Future  - FREE THYROXINE; Future  - TSH; Future  - THYROGLOBULIN, QT; Future    # CAD, s/p CABG x 4  # Dyslipidemia  - LDL is not at goal (< 55), on max dose of statin -> Zetia was added in 1/2025 - after lipid panel results  - due for  lipid panel  9/10/24  - managed by cardiology/PCP  - on high dose statin  - LDL close to goal, defer management to cardiology  Plan:  Continue Rosuvastatin 40 mg  Continue Zetia 10 mg  Repeat lipid panel.     RTC: 2 mo    Total time (face-to-face and non-face-to face time): 40  min - extensive discussion of diagnoses, treatment, prognosis, lab review, documentation.  Plan reviewed with the patient and agreed with plan.  All questions answered to patient's satisfaction.  Thank you kindly for allowing me to participate in the diabetes care plan for this patient.    Erin Orta MD    CC:   Damion Larose M.D.

## 2025-03-05 DIAGNOSIS — E11.65 TYPE 2 DIABETES MELLITUS WITH HYPERGLYCEMIA, WITHOUT LONG-TERM CURRENT USE OF INSULIN (HCC): ICD-10-CM

## 2025-03-05 LAB
CREAT UR-MCNC: 65.3 MG/DL
MICROALBUMIN UR-MCNC: 5.8 MG/DL
MICROALBUMIN/CREAT UR: 89 MG/G (ref 0–30)

## 2025-03-05 RX ORDER — ACYCLOVIR 800 MG/1
TABLET ORAL
Qty: 12 EACH | Refills: 0 | Status: SHIPPED | OUTPATIENT
Start: 2025-03-05 | End: 2025-03-06

## 2025-03-06 ENCOUNTER — OFFICE VISIT (OUTPATIENT)
Dept: ENDOCRINOLOGY | Facility: MEDICAL CENTER | Age: 85
End: 2025-03-06
Attending: STUDENT IN AN ORGANIZED HEALTH CARE EDUCATION/TRAINING PROGRAM
Payer: MEDICARE

## 2025-03-06 VITALS
BODY MASS INDEX: 24.61 KG/M2 | WEIGHT: 162.4 LBS | DIASTOLIC BLOOD PRESSURE: 60 MMHG | HEIGHT: 68 IN | HEART RATE: 52 BPM | SYSTOLIC BLOOD PRESSURE: 118 MMHG | OXYGEN SATURATION: 97 %

## 2025-03-06 DIAGNOSIS — E78.5 DYSLIPIDEMIA: ICD-10-CM

## 2025-03-06 DIAGNOSIS — E89.0 POSTOPERATIVE HYPOTHYROIDISM: ICD-10-CM

## 2025-03-06 DIAGNOSIS — C80.1 PAPILLARY ADENOCARCINOMA (HCC): ICD-10-CM

## 2025-03-06 DIAGNOSIS — R80.9 MICROALBUMINURIA: ICD-10-CM

## 2025-03-06 DIAGNOSIS — E11.65 TYPE 2 DIABETES MELLITUS WITH HYPERGLYCEMIA, WITHOUT LONG-TERM CURRENT USE OF INSULIN (HCC): ICD-10-CM

## 2025-03-06 LAB
HBA1C MFR BLD: 7.2 % (ref ?–5.8)
POCT INT CON NEG: NEGATIVE
POCT INT CON POS: POSITIVE

## 2025-03-06 PROCEDURE — 95251 CONT GLUC MNTR ANALYSIS I&R: CPT | Performed by: STUDENT IN AN ORGANIZED HEALTH CARE EDUCATION/TRAINING PROGRAM

## 2025-03-06 PROCEDURE — 3074F SYST BP LT 130 MM HG: CPT | Performed by: STUDENT IN AN ORGANIZED HEALTH CARE EDUCATION/TRAINING PROGRAM

## 2025-03-06 PROCEDURE — 99211 OFF/OP EST MAY X REQ PHY/QHP: CPT | Performed by: STUDENT IN AN ORGANIZED HEALTH CARE EDUCATION/TRAINING PROGRAM

## 2025-03-06 PROCEDURE — 95250 CONT GLUC MNTR PHYS/QHP EQP: CPT | Performed by: STUDENT IN AN ORGANIZED HEALTH CARE EDUCATION/TRAINING PROGRAM

## 2025-03-06 PROCEDURE — 99215 OFFICE O/P EST HI 40 MIN: CPT | Performed by: STUDENT IN AN ORGANIZED HEALTH CARE EDUCATION/TRAINING PROGRAM

## 2025-03-06 PROCEDURE — 3078F DIAST BP <80 MM HG: CPT | Performed by: STUDENT IN AN ORGANIZED HEALTH CARE EDUCATION/TRAINING PROGRAM

## 2025-03-06 PROCEDURE — 83036 HEMOGLOBIN GLYCOSYLATED A1C: CPT | Performed by: STUDENT IN AN ORGANIZED HEALTH CARE EDUCATION/TRAINING PROGRAM

## 2025-03-06 RX ORDER — HYDROCHLOROTHIAZIDE 12.5 MG/1
CAPSULE ORAL
Qty: 6 EACH | Refills: 4 | Status: SHIPPED | OUTPATIENT
Start: 2025-03-06

## 2025-03-06 RX ORDER — LEVOTHYROXINE SODIUM 137 UG/1
137 TABLET ORAL
Qty: 90 TABLET | Refills: 4 | Status: SHIPPED | OUTPATIENT
Start: 2025-03-06

## 2025-03-06 ASSESSMENT — FIBROSIS 4 INDEX: FIB4 SCORE: 6.98

## 2025-03-07 ENCOUNTER — HOSPITAL ENCOUNTER (OUTPATIENT)
Facility: MEDICAL CENTER | Age: 85
End: 2025-03-07
Attending: INTERNAL MEDICINE
Payer: MEDICARE

## 2025-03-07 ENCOUNTER — HOSPITAL ENCOUNTER (OUTPATIENT)
Facility: MEDICAL CENTER | Age: 85
End: 2025-03-07
Attending: STUDENT IN AN ORGANIZED HEALTH CARE EDUCATION/TRAINING PROGRAM
Payer: MEDICARE

## 2025-03-07 DIAGNOSIS — C80.1 PAPILLARY ADENOCARCINOMA (HCC): ICD-10-CM

## 2025-03-07 DIAGNOSIS — E87.5 HYPERKALEMIA: ICD-10-CM

## 2025-03-07 DIAGNOSIS — E89.0 POSTOPERATIVE HYPOTHYROIDISM: ICD-10-CM

## 2025-03-07 LAB
ANION GAP SERPL CALC-SCNC: 9 MMOL/L (ref 7–16)
BUN SERPL-MCNC: 36 MG/DL (ref 8–22)
CALCIUM SERPL-MCNC: 9.6 MG/DL (ref 8.4–10.2)
CHLORIDE SERPL-SCNC: 105 MMOL/L (ref 96–112)
CO2 SERPL-SCNC: 27 MMOL/L (ref 20–33)
CREAT SERPL-MCNC: 1.96 MG/DL (ref 0.5–1.4)
GFR SERPLBLD CREATININE-BSD FMLA CKD-EPI: 33 ML/MIN/1.73 M 2
GLUCOSE SERPL-MCNC: 186 MG/DL (ref 65–99)
POTASSIUM SERPL-SCNC: 4.9 MMOL/L (ref 3.6–5.5)
SODIUM SERPL-SCNC: 141 MMOL/L (ref 135–145)
T4 FREE SERPL-MCNC: 1.51 NG/DL (ref 0.93–1.7)
TSH SERPL DL<=0.005 MIU/L-ACNC: 1.25 UIU/ML (ref 0.38–5.33)

## 2025-03-07 PROCEDURE — 84439 ASSAY OF FREE THYROXINE: CPT

## 2025-03-07 PROCEDURE — 84443 ASSAY THYROID STIM HORMONE: CPT

## 2025-03-07 PROCEDURE — 36415 COLL VENOUS BLD VENIPUNCTURE: CPT

## 2025-03-07 PROCEDURE — 84432 ASSAY OF THYROGLOBULIN: CPT

## 2025-03-07 PROCEDURE — 86800 THYROGLOBULIN ANTIBODY: CPT

## 2025-03-07 PROCEDURE — 80048 BASIC METABOLIC PNL TOTAL CA: CPT

## 2025-03-08 LAB
THYROGLOB AB SERPL-ACNC: <1.5 IU/ML (ref 0–4)
THYROGLOB SERPL-MCNC: 0.4 NG/ML (ref 1.3–31.8)
THYROGLOB SERPL-MCNC: ABNORMAL NG/ML (ref 1.3–31.8)

## 2025-03-12 ENCOUNTER — OFFICE VISIT (OUTPATIENT)
Dept: NEPHROLOGY | Facility: MEDICAL CENTER | Age: 85
End: 2025-03-12
Payer: MEDICARE

## 2025-03-12 VITALS
TEMPERATURE: 96.9 F | DIASTOLIC BLOOD PRESSURE: 64 MMHG | SYSTOLIC BLOOD PRESSURE: 112 MMHG | HEIGHT: 68 IN | WEIGHT: 162 LBS | OXYGEN SATURATION: 97 % | BODY MASS INDEX: 24.55 KG/M2 | HEART RATE: 51 BPM

## 2025-03-12 DIAGNOSIS — I10 ESSENTIAL HYPERTENSION, BENIGN: ICD-10-CM

## 2025-03-12 DIAGNOSIS — N18.32 STAGE 3B CHRONIC KIDNEY DISEASE: ICD-10-CM

## 2025-03-12 DIAGNOSIS — E11.22 TYPE 2 DIABETES MELLITUS WITH STAGE 3B CHRONIC KIDNEY DISEASE, UNSPECIFIED WHETHER LONG TERM INSULIN USE (HCC): ICD-10-CM

## 2025-03-12 DIAGNOSIS — E87.5 HYPERKALEMIA: ICD-10-CM

## 2025-03-12 DIAGNOSIS — N18.32 TYPE 2 DIABETES MELLITUS WITH STAGE 3B CHRONIC KIDNEY DISEASE, UNSPECIFIED WHETHER LONG TERM INSULIN USE (HCC): ICD-10-CM

## 2025-03-12 PROCEDURE — G2211 COMPLEX E/M VISIT ADD ON: HCPCS | Performed by: INTERNAL MEDICINE

## 2025-03-12 PROCEDURE — 99214 OFFICE O/P EST MOD 30 MIN: CPT | Performed by: INTERNAL MEDICINE

## 2025-03-12 PROCEDURE — 3074F SYST BP LT 130 MM HG: CPT | Performed by: INTERNAL MEDICINE

## 2025-03-12 PROCEDURE — 3078F DIAST BP <80 MM HG: CPT | Performed by: INTERNAL MEDICINE

## 2025-03-12 ASSESSMENT — ENCOUNTER SYMPTOMS
FEVER: 0
CHILLS: 0
COUGH: 0
VOMITING: 0
NAUSEA: 0
HYPERTENSION: 1
SHORTNESS OF BREATH: 0

## 2025-03-12 ASSESSMENT — FIBROSIS 4 INDEX: FIB4 SCORE: 6.98

## 2025-03-12 NOTE — ASSESSMENT & PLAN NOTE
Stable  No uremic symptoms  Renal dose of medication  Avoid nephrotoxins  Continue same medication regimen  Patient was advised to call us if symptoms worsen    Orders:    Basic Metabolic Panel; Future    CBC WITHOUT DIFFERENTIAL; Future    MICROALB/CREAT RATIO RAND. UR

## 2025-03-12 NOTE — PROGRESS NOTES
"Caron Ramos is a 84 y.o. male who presents with Hypertension and Chronic Kidney Disease            Hypertension  This is a chronic problem. The current episode started more than 1 year ago. The problem is unchanged. The problem is controlled. Pertinent negatives include no chest pain, malaise/fatigue, peripheral edema or shortness of breath. Risk factors for coronary artery disease include male gender and diabetes mellitus. Past treatments include angiotensin blockers. The current treatment provides significant improvement. There are no compliance problems.  Hypertensive end-organ damage includes kidney disease. Identifiable causes of hypertension include chronic renal disease.   Chronic Kidney Disease  This is a chronic problem. The current episode started more than 1 year ago. The problem occurs constantly. The problem has been unchanged. Pertinent negatives include no chest pain, chills, coughing, fever, nausea, urinary symptoms or vomiting.       Review of Systems   Constitutional:  Negative for chills, fever and malaise/fatigue.   Respiratory:  Negative for cough and shortness of breath.    Cardiovascular:  Negative for chest pain and leg swelling.   Gastrointestinal:  Negative for nausea and vomiting.   Genitourinary:  Negative for dysuria, frequency and urgency.              Objective     /64 (BP Location: Right arm, Patient Position: Sitting, BP Cuff Size: Adult)   Pulse (!) 51   Temp 36.1 °C (96.9 °F) (Temporal)   Ht 1.727 m (5' 8\")   Wt 73.5 kg (162 lb)   SpO2 97%   BMI 24.63 kg/m²      Physical Exam  Vitals and nursing note reviewed.   Constitutional:       General: He is not in acute distress.     Appearance: He is not ill-appearing.   HENT:      Head: Normocephalic and atraumatic.      Right Ear: External ear normal.      Left Ear: External ear normal.      Nose: Nose normal.   Eyes:      General:         Right eye: No discharge.         Left eye: No discharge.      " Conjunctiva/sclera: Conjunctivae normal.   Cardiovascular:      Rate and Rhythm: Normal rate and regular rhythm.      Heart sounds: No murmur heard.  Pulmonary:      Effort: Pulmonary effort is normal. No respiratory distress.      Breath sounds: Normal breath sounds. No wheezing.   Musculoskeletal:         General: No tenderness or deformity.      Right lower leg: No edema.      Left lower leg: No edema.   Skin:     General: Skin is warm.   Neurological:      General: No focal deficit present.      Mental Status: He is alert and oriented to person, place, and time.   Psychiatric:         Mood and Affect: Mood normal.         Behavior: Behavior normal.       Past Medical History:   Diagnosis Date    Anesthesia     difficult intubation with surgery 2008,2010    Basal cell carcinoma of skin     CAD (coronary artery disease)     Cancer (HCC)     rt  kidney 1989;  thyroid cancer 2012, prostate 2008, skin    Chronic kidney disease (CKD) stage G3b/A2, moderately decreased glomerular filtration rate (GFR) between 30-44 mL/min/1.73 square meter and albuminuria creatinine ratio between  mg/g 4/23/2015    DVT (deep venous thrombosis) (HCC) 2014    ED (erectile dysfunction)     GERD (gastroesophageal reflux disease)     Glaucoma     Heart burn     Hyperlipidemia 12/3/2012    Hypertension     Indigestion     Multiple pulmonary emboli (HCC) 2014    Multiple thyroid nodules 2009    Papillary carcinoma of thyroid (HCC) 2014    R 2 foci / 4.5mm,0.25mm / no mets/ pT1pN0    postsurgical hypothyroidism 2014    Pre-diabetes     Renal disorder     rt kidney cancer,nephrectomy    S/P CABG x 4 2003    S/P colectomy 2010    multiple colon polyps / no Ca    S/p nephrectomy 1998    carcinoma    S/P prostatectomy 2008    carcinoma    Stroke (HCC)     'mild',no residual,'one doctor said it was a tia'    Transient renal failure 2014    renal vein thrombosis    Type II or unspecified type diabetes mellitus without mention of complication,  not stated as uncontrolled     on no medications    Unspecified urinary incontinence      Social History     Socioeconomic History    Marital status:      Spouse name: Not on file    Number of children: Not on file    Years of education: Not on file    Highest education level: Not on file   Occupational History    Not on file   Tobacco Use    Smoking status: Never    Smokeless tobacco: Never   Vaping Use    Vaping status: Never Used   Substance and Sexual Activity    Alcohol use: Yes     Comment: Occasionally    Drug use: No    Sexual activity: Not Currently     Comment: retired    Other Topics Concern    Not on file   Social History Narrative    Not on file     Social Drivers of Health     Financial Resource Strain: Not on file   Food Insecurity: Not on file   Transportation Needs: Not on file   Physical Activity: Not on file   Stress: Not on file   Social Connections: Not on file   Intimate Partner Violence: Not on file   Housing Stability: Not on file     Family History   Problem Relation Age of Onset    Diabetes Mother     Heart Disease Mother     Diabetes Father     Cancer Father         pancreas    Thyroid Sister         Cancer    Cancer Sister         thyroid    Diabetes Sister     Cancer Brother 49        colon    Diabetes Brother     Diabetes Maternal Grandfather     Diabetes Paternal Grandmother     Diabetes Paternal Grandfather     No Known Problems Maternal Grandmother      Recent Labs     08/30/24  0640 03/04/25  0638 03/07/25  1016   SODIUM 139 143 141   POTASSIUM 5.3 5.8* 4.9   CHLORIDE 105 110 105   CO2 26 24 27   BUN 40* 35* 36*   CREATININE 1.90* 1.94* 1.96*                                  Assessment & Plan  Essential hypertension, benign  Controlled  Continue same medication regimen  Continue low-sodium diet    Orders:    Basic Metabolic Panel; Future    CBC WITHOUT DIFFERENTIAL; Future    MICROALB/CREAT RATIO RAND. UR    Stage 3b chronic kidney disease  Stable  No uremic  symptoms  Renal dose of medication  Avoid nephrotoxins  Continue same medication regimen  Patient was advised to call us if symptoms worsen    Orders:    Basic Metabolic Panel; Future    CBC WITHOUT DIFFERENTIAL; Future    MICROALB/CREAT RATIO RAND. UR    Hyperkalemia  Improved  Patient was advised to be on low potassium diet  Orders:    Basic Metabolic Panel; Future    CBC WITHOUT DIFFERENTIAL; Future    MICROALB/CREAT RATIO RAND. UR    Type 2 diabetes mellitus with stage 3b chronic kidney disease, unspecified whether long term insulin use (HCC)  Continue to optimize diabetes control for hemoglobin A1c below 7%

## 2025-03-15 DIAGNOSIS — I10 ESSENTIAL HYPERTENSION, BENIGN: ICD-10-CM

## 2025-03-17 RX ORDER — ATENOLOL 25 MG/1
25 TABLET ORAL DAILY
Qty: 100 TABLET | Refills: 3 | Status: SHIPPED | OUTPATIENT
Start: 2025-03-17

## 2025-03-17 NOTE — TELEPHONE ENCOUNTER
Is the patient due for a refill? Yes    Was the patient seen the last 15 months? Yes    Date of last office visit: 1/31/2025    Does the patient have an upcoming appointment?  Yes   If yes, When? 4/30/25    Provider to refill:ELIZABETH    Does the patient have intermediate Plus and need 100-day supply? (This applies to ALL medications) Yes, quantity updated to 100 days

## 2025-03-20 ENCOUNTER — PATIENT MESSAGE (OUTPATIENT)
Dept: HEALTH INFORMATION MANAGEMENT | Facility: OTHER | Age: 85
End: 2025-03-20

## 2025-03-21 DIAGNOSIS — I25.10 CAD IN NATIVE ARTERY: ICD-10-CM

## 2025-03-26 ENCOUNTER — TELEPHONE (OUTPATIENT)
Dept: ENDOCRINOLOGY | Facility: MEDICAL CENTER | Age: 85
End: 2025-03-26
Payer: MEDICARE

## 2025-03-26 NOTE — TELEPHONE ENCOUNTER
Received Refill PA request via  [employer] [facility]   for Freestyle Michelle 3 Sensor. (Quantity:2 Each, Day Supply:30)     Insurance: [employer] Medicare Part D  Member ID: [id number]  BIN: [id number]  PCN: [id number]  Group: [facility]     Ran Test claim via [place] & medication Rejects stating prior authorization is required.    Prior Authorization has been submitted via [id number]  Will follow up in 24-48 business hours.     Thank you,  Aliya Velazquez, [facility]  Endo RX Coordinator  [phone]

## 2025-03-28 NOTE — TELEPHONE ENCOUNTER
Prior Authorization for Freestyle Michelle 3 Sensor has been DENIED  Prior authorization was denied per the following: Did not meet the prior authorization requirement(s).  Prior Authorization denial reference number: [id number]  Next Steps: Advised provider and Rx Coordinator of denial.     Thank you,  Shante Grey, [facility]  Endocrinology Pharmacy Coordinator

## 2025-03-31 ENCOUNTER — HOSPITAL ENCOUNTER (OUTPATIENT)
Facility: MEDICAL CENTER | Age: 85
End: 2025-03-31
Attending: INTERNAL MEDICINE
Payer: MEDICARE

## 2025-03-31 DIAGNOSIS — E78.5 DYSLIPIDEMIA: ICD-10-CM

## 2025-03-31 LAB
ALBUMIN SERPL BCP-MCNC: 4.2 G/DL (ref 3.2–4.9)
ALBUMIN/GLOB SERPL: 1.8 G/DL
ALP SERPL-CCNC: 42 U/L (ref 30–99)
ALT SERPL-CCNC: 31 U/L (ref 2–50)
ANION GAP SERPL CALC-SCNC: 9 MMOL/L (ref 7–16)
AST SERPL-CCNC: 45 U/L (ref 12–45)
BILIRUB SERPL-MCNC: 0.4 MG/DL (ref 0.1–1.5)
BUN SERPL-MCNC: 36 MG/DL (ref 8–22)
CALCIUM ALBUM COR SERPL-MCNC: 9.3 MG/DL (ref 8.5–10.5)
CALCIUM SERPL-MCNC: 9.5 MG/DL (ref 8.5–10.5)
CHLORIDE SERPL-SCNC: 109 MMOL/L (ref 96–112)
CHOLEST SERPL-MCNC: 127 MG/DL (ref 100–199)
CO2 SERPL-SCNC: 25 MMOL/L (ref 20–33)
CREAT SERPL-MCNC: 1.84 MG/DL (ref 0.5–1.4)
FASTING STATUS PATIENT QL REPORTED: NORMAL
GFR SERPLBLD CREATININE-BSD FMLA CKD-EPI: 35 ML/MIN/1.73 M 2
GLOBULIN SER CALC-MCNC: 2.4 G/DL (ref 1.9–3.5)
GLUCOSE SERPL-MCNC: 165 MG/DL (ref 65–99)
HDLC SERPL-MCNC: 34 MG/DL
LDLC SERPL CALC-MCNC: 61 MG/DL
POTASSIUM SERPL-SCNC: 5.1 MMOL/L (ref 3.6–5.5)
PROT SERPL-MCNC: 6.6 G/DL (ref 6–8.2)
SODIUM SERPL-SCNC: 143 MMOL/L (ref 135–145)
TRIGL SERPL-MCNC: 162 MG/DL (ref 0–149)

## 2025-03-31 PROCEDURE — 36415 COLL VENOUS BLD VENIPUNCTURE: CPT

## 2025-03-31 PROCEDURE — 80053 COMPREHEN METABOLIC PANEL: CPT

## 2025-03-31 PROCEDURE — 80061 LIPID PANEL: CPT

## 2025-04-01 ENCOUNTER — OFFICE VISIT (OUTPATIENT)
Dept: URGENT CARE | Facility: CLINIC | Age: 85
End: 2025-04-01
Payer: MEDICARE

## 2025-04-01 ENCOUNTER — APPOINTMENT (OUTPATIENT)
Dept: RADIOLOGY | Facility: MEDICAL CENTER | Age: 85
End: 2025-04-01
Attending: STUDENT IN AN ORGANIZED HEALTH CARE EDUCATION/TRAINING PROGRAM
Payer: MEDICARE

## 2025-04-01 VITALS
BODY MASS INDEX: 24.55 KG/M2 | WEIGHT: 162 LBS | HEIGHT: 68 IN | SYSTOLIC BLOOD PRESSURE: 118 MMHG | HEART RATE: 52 BPM | TEMPERATURE: 97.9 F | DIASTOLIC BLOOD PRESSURE: 56 MMHG | OXYGEN SATURATION: 97 % | RESPIRATION RATE: 15 BRPM

## 2025-04-01 DIAGNOSIS — T14.8XXA BRUISE: ICD-10-CM

## 2025-04-01 DIAGNOSIS — S50.11XA CONTUSION OF RIGHT FOREARM, INITIAL ENCOUNTER: ICD-10-CM

## 2025-04-01 DIAGNOSIS — C80.1 PAPILLARY ADENOCARCINOMA (HCC): ICD-10-CM

## 2025-04-01 PROCEDURE — 76536 US EXAM OF HEAD AND NECK: CPT

## 2025-04-01 PROCEDURE — 99212 OFFICE O/P EST SF 10 MIN: CPT | Performed by: NURSE PRACTITIONER

## 2025-04-01 PROCEDURE — 3074F SYST BP LT 130 MM HG: CPT | Performed by: NURSE PRACTITIONER

## 2025-04-01 PROCEDURE — 3078F DIAST BP <80 MM HG: CPT | Performed by: NURSE PRACTITIONER

## 2025-04-01 ASSESSMENT — FIBROSIS 4 INDEX: FIB4 SCORE: 6.81

## 2025-04-01 NOTE — PROGRESS NOTES
Chief Complaint   Patient presents with    Bleeding/Bruising     Book fell on right arm.  Bruise is getting better, he is on coumadin          History of Present Illness  The patient is an 84-year-old male who presents for evaluation of a contusion on his right arm.    He reports that the contusion has been present for approximately one week, with the onset occurring four days ago. He expresses concern about the potential spread of the bruising across his entire arm. The contusion initially started as a small bruise but has progressively expanded each day. He notes that there is no active bleeding from the site. Additionally, he mentions the presence of multiple bruises on his body, which he attributes to his Coumadin therapy. He also observes minimal redness around the contusion. He has been applying a topical agent to the affected area in an attempt to expedite the removal of the blood.    MEDICATIONS  Coumadin         ROS:    No severe shortness of breath   No Cardiac like chest pain, as discussed   As otherwise stated in HPI    Medical/SX/ Social History:  Reviewed per chart    Pertinent Medications:    Current Outpatient Medications on File Prior to Visit   Medication Sig Dispense Refill    atenolol (TENORMIN) 25 MG Tab Take 1 tablet by mouth once daily 100 Tablet 3    levothyroxine (SYNTHROID) 137 MCG Tab Take 1 Tablet by mouth every morning on an empty stomach. 90 Tablet 4    Continuous Glucose Sensor (FREESTYLE YUMIKO 3 PLUS SENSOR) Misc Apply every 15 days 6 Each 4    fenofibrate (TRIGLIDE) 160 MG tablet Take 1 tablet by mouth once daily 100 Tablet 3    ezetimibe (ZETIA) 10 MG Tab Take 1 Tablet by mouth every day. 90 Tablet 3    losartan (COZAAR) 25 MG Tab Take 1 tablet by mouth once daily 100 Tablet 3    Empagliflozin (JARDIANCE) 10 MG Tab tablet Take 1 Tablet by mouth every morning with breakfast. 90 Tablet 3    rosuvastatin (CRESTOR) 40 MG tablet Take 1 Tablet by mouth every day. 100 Tablet 3    aspirin  81 MG EC tablet Take 81 mg by mouth every day.      triamcinolone acetonide (KENALOG) 0.025 % Cream Apply to the affected area twice a day on legs. 15 g 1    capsaicin (ZOSTRIX) 0.025 % cream       Multiple Vitamins-Minerals (ZINC PO) Take  by mouth.      Calcium Carb-Cholecalciferol (CALCIUM 1000 + D PO) Take 1 Dose by mouth every day.      Omega-3 Krill Oil 300 MG Cap Take 300 mg by mouth every day.      Cinnamon 500 MG Cap Take 1,000 mg by mouth.      Cyanocobalamin (VITAMIN B-12) 1000 MCG Tab Take 1,000 mcg by mouth every day.      Coenzyme Q10 (CO Q-10 PO) Take 1 Dose by mouth every day. Unknown OTC Strength       No current facility-administered medications on file prior to visit.        Allergies:    Lactose and Seasonal     Problem list, medications, and allergies reviewed by myself today in Epic     Physical Exam:    Vitals:    04/01/25 1005   BP: 118/56   Pulse: (!) 52   Resp: 15   Temp: 36.6 °C (97.9 °F)   SpO2: 97%             Physical Exam  Musculoskeletal:        Arms:             Medical Decision making and plan :  I personally reviewed prior external notes and test results pertinent to today's visit. Pt is clinically stable at today's acute urgent care visit.  Patient appears nontoxic with no acute distress noted. Appropriate for outpatient care at this time.    Pleasant 84 y.o. male presented clinic with:     Assessment & Plan  1. Right arm contusion.  The contusion is not actively bleeding and does not exhibit signs of infection. The bruising is expected to continue spreading until it dissipates naturally. He was advised to apply warm compresses to expedite the healing process, but cautioned against using excessively warm compresses due to his Coumadin therapy, which could potentially induce rebleeding. He was reassured that the bruising will gradually resolve on its own. He was instructed to monitor the contusion for any increase in redness, swelling, or pain, and to seek immediate medical  attention if these symptoms occur.      Shared decision-making was utilized with patient for treatment plan. Medication discussed included indication for use and the potential benefits and side effects. Education was provided regarding the aforementioned assessments.  Differential Diagnosis, natural history, and supportive care discussed. All of the patient's questions were answered to their satisfaction at the time of discharge. Patient was encouraged to monitor symptoms closely. Those signs and symptoms which would warrant concern and mandate seeking a higher level of service through the emergency department discussed at length.  Patient stated agreement and understanding of this plan of care.    Disposition:  Home in stable condition     Voice Recognition Disclaimer:  Portions of this document were created using voice recognition software and [employer] technology provided by [facility].  Patient was informed of [email or web address] technology being used.  The software does have a chance of producing errors of grammar and possibly content. I have made every reasonable attempt to correct obvious errors, but there could be errors of grammar and possibly content that I did not discover before finalizing the documentation.    [email or web address].

## 2025-04-04 ENCOUNTER — RESULTS FOLLOW-UP (OUTPATIENT)
Dept: CARDIOLOGY | Facility: MEDICAL CENTER | Age: 85
End: 2025-04-04
Payer: MEDICARE

## 2025-04-23 ENCOUNTER — OFFICE VISIT (OUTPATIENT)
Dept: MEDICAL GROUP | Facility: LAB | Age: 85
End: 2025-04-23
Payer: MEDICARE

## 2025-04-23 VITALS
RESPIRATION RATE: 14 BRPM | DIASTOLIC BLOOD PRESSURE: 58 MMHG | HEIGHT: 68 IN | SYSTOLIC BLOOD PRESSURE: 106 MMHG | HEART RATE: 53 BPM | WEIGHT: 160 LBS | OXYGEN SATURATION: 93 % | BODY MASS INDEX: 24.25 KG/M2 | TEMPERATURE: 97.9 F

## 2025-04-23 DIAGNOSIS — M25.552 LEFT HIP PAIN: ICD-10-CM

## 2025-04-23 DIAGNOSIS — L60.0 INGROWN NAIL OF GREAT TOE OF RIGHT FOOT: ICD-10-CM

## 2025-04-23 PROCEDURE — 3078F DIAST BP <80 MM HG: CPT | Performed by: FAMILY MEDICINE

## 2025-04-23 PROCEDURE — 3074F SYST BP LT 130 MM HG: CPT | Performed by: FAMILY MEDICINE

## 2025-04-23 PROCEDURE — 99214 OFFICE O/P EST MOD 30 MIN: CPT | Performed by: FAMILY MEDICINE

## 2025-04-23 ASSESSMENT — PATIENT HEALTH QUESTIONNAIRE - PHQ9: CLINICAL INTERPRETATION OF PHQ2 SCORE: 0

## 2025-04-23 ASSESSMENT — FIBROSIS 4 INDEX: FIB4 SCORE: 6.81

## 2025-04-23 NOTE — LETTER
[employer] [facility]  Damion Larose M.D.  [address]  [place] [address]  Fax: [phone]   Authorization for Release/Disclosure of   Protected Health Information   Name: DAQUAN MEDINA : 1940 SSN: [id number]   Address: [address] [place] [address]  [place] [address] Phone:    There are no phone numbers on file.   I authorize the entity listed below to release/disclose the PHI below to:   [facility]/Damion Larose M.D. and Damion Larose M.D.   Provider or Entity Name: [facility]    Address [facility], Zip: [address], [address]  [place], [place] [address]   [phone]    [phone]   Reason for request: continuity of care   Information to be released:    [  ] LAST COLONOSCOPY,  including any PATH REPORT and follow-up  [  ] LAST FIT/COLOGUARD RESULT [  ] LAST DEXA  [  ] LAST MAMMOGRAM  [  ] LAST PAP  [  ] LAST LABS [ XXX ] RETINA EXAM REPORT  [  ] IMMUNIZATION RECORDS  [  ] Release all info      [  ] Check here and initial the line next to each item to release ALL health information INCLUDING  _____ Care and treatment for drug and / or alcohol abuse  _____ HIV testing, infection status, or AIDS  _____ Genetic Testing    DATES OF SERVICE OR TIME PERIOD TO BE DISCLOSED: _____________  I understand and acknowledge that:  * This Authorization may be revoked at any time by you in writing, except if your health information has already been used or disclosed.  * Your health information that will be used or disclosed as a result of you signing this authorization could be re-disclosed by the recipient. If this occurs, your re-disclosed health information may no longer be protected by State or Federal laws.  * You may refuse to sign this Authorization. Your refusal will not affect your ability to obtain treatment.  * This Authorization becomes effective upon signing and will  on (date) __________.      If no date is indicated, this Authorization will   one (1) year from the signature date.    Name: Paulo Paredes  Signature: Date:   2025     PLEASE FAX REQUESTED RECORDS BACK TO: [phone]

## 2025-04-23 NOTE — PROGRESS NOTES
Verbal consent was acquired by the patient to use [employer] ambient listening note generation during this visit Yes    Subjective:   Paulo Paredes is a 84 y.o. male here today for   Chief Complaint   Patient presents with    Hip Pain     Bilateral hip pain      History of Present Illness  The patient is an 84-year-old male who presents for evaluation of bilateral hip pain.    Severe pain is reported in the left hip when bending down to tie shoes. Discomfort is noted during the initial 3 to 4 steps after rising from a seated position, which subsequently subsides. The right hip, although currently symptom-free, has previously exhibited similar issues. The onset of these symptoms was approximately 2 months ago. Pain is predominantly localized on the outer aspect of the hip, with occasional radiation into the back.     A daily treadmill routine has been maintained for the past 22 years following heart surgery. An incident of carrying a heavy box of groceries from [email or web address] seemed to exacerbate the pain. Despite this, treadmill use does not cause immediate discomfort, but pain is experienced upon moving the leg in bed post-treadmill use. Prolonged walking or standing does not significantly worsen the pain, but lying on the affected side at night intensifies it. No constant pain is reported, but rather intermittent discomfort, particularly after prolonged sitting. No associated weakness, numbness, or tingling in the leg is reported. Pain management has been achieved with Advil, taken sparingly.    An ingrown toenail on the right great toe due to excessive hardness is also reported, and a referral to a podiatrist is sought.      Allergies   Allergen Reactions    Lactose     Seasonal          Current medicines (including changes today)  Current Outpatient Medications   Medication Sig Dispense Refill    atenolol (TENORMIN) 25 MG Tab Take 1 tablet by mouth once daily 100 Tablet 3    levothyroxine (SYNTHROID) 137 MCG Tab Take  1 Tablet by mouth every morning on an empty stomach. 90 Tablet 4    Continuous Glucose Sensor (FREESTYLE YUMIKO 3 PLUS SENSOR) Misc Apply every 15 days 6 Each 4    fenofibrate (TRIGLIDE) 160 MG tablet Take 1 tablet by mouth once daily 100 Tablet 3    ezetimibe (ZETIA) 10 MG Tab Take 1 Tablet by mouth every day. 90 Tablet 3    losartan (COZAAR) 25 MG Tab Take 1 tablet by mouth once daily 100 Tablet 3    Empagliflozin (JARDIANCE) 10 MG Tab tablet Take 1 Tablet by mouth every morning with breakfast. 90 Tablet 3    rosuvastatin (CRESTOR) 40 MG tablet Take 1 Tablet by mouth every day. 100 Tablet 3    aspirin 81 MG EC tablet Take 81 mg by mouth every day.      triamcinolone acetonide (KENALOG) 0.025 % Cream Apply to the affected area twice a day on legs. 15 g 1    capsaicin (ZOSTRIX) 0.025 % cream       Multiple Vitamins-Minerals (ZINC PO) Take  by mouth.      Calcium Carb-Cholecalciferol (CALCIUM 1000 + D PO) Take 1 Dose by mouth every day.      Omega-3 Krill Oil 300 MG Cap Take 300 mg by mouth every day.      Cinnamon 500 MG Cap Take 1,000 mg by mouth.      Cyanocobalamin (VITAMIN B-12) 1000 MCG Tab Take 1,000 mcg by mouth every day.      Coenzyme Q10 (CO Q-10 PO) Take 1 Dose by mouth every day. Unknown OTC Strength       No current facility-administered medications for this visit.     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/A2, moderately decreased glomerular filtration rate (GFR) between 30-44 mL/min/1.73 square meter and albuminuria creatinine ratio between  mg/g (4/23/2015), DVT (deep venous thrombosis) ([facility]) (2014), ED (erectile dysfunction), GERD (gastroesophageal reflux disease), Glaucoma, Heart burn, Hyperlipidemia (12/3/2012), Hypertension, Indigestion, Multiple pulmonary emboli ([facility]) (2014), Multiple thyroid nodules (2009), Papillary carcinoma of thyroid ([facility]) (2014), postsurgical  "hypothyroidism (2014), Pre-diabetes, Renal disorder, S/P CABG x 4 (2003), S/P colectomy (2010), S/p nephrectomy (1998), S/P prostatectomy (2008), Stroke (HCC), Transient renal failure (2014), Type II or unspecified type diabetes mellitus without mention of complication, not stated as uncontrolled, and Unspecified urinary incontinence.    ROS   ROS  -See HPI     Objective:     Physical Exam:  /58   Pulse (!) 53   Temp 36.6 °C (97.9 °F) (Temporal)   Resp 14   Ht 1.727 m (5' 7.99\")   Wt 72.6 kg (160 lb)   SpO2 93%  Body mass index is 24.33 kg/m².   Const: Vitals above. Well-appearing.  CV: Inspected for lower extremity edema. Abdomen inspected for abnormal pulsation. Femoral pulse palpated, noting below if less than 2+.  GI: abdomen evaluated, noting below for tenderness to palpation, masses, or hernia.  : Prostate exam (male) or pelvic exam (female): n/a.  Skin: Inspected for rash or lesions in area of concern.  Neuro/psych: Reflexes at bilateral patella (L4), Achilles (S1) evaluated. Sensation to light touch evaluated at medial thigh (L3), medial leg/foot (L4), lateral leg/dorsal foot (L5), lateral foot (S1). Straight leg raise done in sitting/supine/prone position. Observed for normal mood/affect/insight.  MSK: Gait and posture evaluated. Examination of pelvis:  - Inspected/palpated for leg length discrepancy, and tenderness at the pubic symphysis, pubic rami, iliac crests, ASIS, SI joints, PSIS, ischial tuberosity, and sciatic notch. Evaluated SI compression and CHANDLER for tenderness. Apophyses palpated for tenderness (if age appropriate): n/a.  - Assessed stability with evaluation for abnormal SI motion. Examination of bilateral hips:  - Inspected/palpated for tenderness at the IT band, greater trochanter, hamstrings, and adductor insertions.  - Assessed passive and active range of motion with hip flexion, extension, abduction, adduction, external rotation, and internal rotation, noting below for any " pain or crepitation. Log roll, FADIR, Segundo test performed.  - Assessed muscle strength in hip flexion, extension, abduction, adduction, external/internal rotation, noting below if less than 5/5 or pain. Observed for muscle atrophy.  - Assessed hip stability with evaluation for laxity or pain with FADIR, CHANDLER, and range of motion testing above.     All of the above were found to be normal, except:  Decreased range of motion on FADIR and CHANDLER.    Results      Assessment and Plan:     Assessment & Plan  1. Bilateral hip pain.  Pain exacerbated by certain movements, such as tying shoes and initial steps after prolonged sitting.  Differential diagnosis includes osteoarthritis versus greater trochanteric bursitis as stated below.  Diagnostic plan: An x-ray of the hips will be ordered for further evaluation.  Treatment plan: Referral to physical therapy for strengthening and conditioning of hip stabilization muscles; treatment plan will be adjusted based on x-ray results.  Clinical decision making: Discussed potential diagnoses including arthritis and greater trochanteric bursitis.    2. Ingrown toenail on the right great toe.  Patient reports difficulty due to hard toenails leading to ingrown toenail.  Treatment plan: Referral to a podiatrist will be arranged for specialized care and management.    Follow-up: PRN    Orders:  1. Left hip pain  - DX-HIP-BILATERAL-WITH PELVIS-3/4 VIEWS; Future  - Referral to Physical Therapy    2. Ingrown nail of great toe of right foot  - Referral to Podiatry    Followup: No follow-ups on file.         PLEASE NOTE: This dictation was created using voice recognition and [employer] ambient listening software. I have made every reasonable attempt to correct obvious errors, but I expect that there are errors of grammar and possibly content that I did not discover before finalizing the note.

## 2025-04-24 ENCOUNTER — ANTICOAGULATION VISIT (OUTPATIENT)
Dept: MEDICAL GROUP | Facility: MEDICAL CENTER | Age: 85
End: 2025-04-24
Payer: MEDICARE

## 2025-04-24 VITALS
DIASTOLIC BLOOD PRESSURE: 61 MMHG | WEIGHT: 163.14 LBS | BODY MASS INDEX: 24.73 KG/M2 | SYSTOLIC BLOOD PRESSURE: 138 MMHG | HEART RATE: 57 BPM | HEIGHT: 68 IN

## 2025-04-24 DIAGNOSIS — Z95.828 PRESENCE OF IVC FILTER: ICD-10-CM

## 2025-04-24 LAB — INR PPP: 3.3 (ref 2–3.5)

## 2025-04-24 PROCEDURE — 3078F DIAST BP <80 MM HG: CPT | Performed by: STUDENT IN AN ORGANIZED HEALTH CARE EDUCATION/TRAINING PROGRAM

## 2025-04-24 PROCEDURE — 85610 PROTHROMBIN TIME: CPT | Performed by: STUDENT IN AN ORGANIZED HEALTH CARE EDUCATION/TRAINING PROGRAM

## 2025-04-24 PROCEDURE — 99211 OFF/OP EST MAY X REQ PHY/QHP: CPT | Performed by: STUDENT IN AN ORGANIZED HEALTH CARE EDUCATION/TRAINING PROGRAM

## 2025-04-24 PROCEDURE — 3075F SYST BP GE 130 - 139MM HG: CPT | Performed by: STUDENT IN AN ORGANIZED HEALTH CARE EDUCATION/TRAINING PROGRAM

## 2025-04-24 ASSESSMENT — FIBROSIS 4 INDEX: FIB4 SCORE: 6.81

## 2025-04-24 NOTE — PROGRESS NOTES
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  Endocrinology [phone]                Refer to Patient Findings for HPI:  Patient Findings       Negatives:  Signs/symptoms of thrombosis, Signs/symptoms of bleeding, Laboratory test error suspected, Change in health, Change in alcohol use, Change in activity, Upcoming invasive procedure, Emergency department visit, Upcoming dental procedure, Missed doses, Extra doses, Change in medications, Change in diet/appetite, Hospital admission, Bruising, Other complaints          Clinical Outcomes       Negatives:  Major bleeding event, Thromboembolic event, Anticoagulation-related hospital admission, Anticoagulation-related ED visit, Anticoagulation-related fatality            Date Referral Placed: 1/20/25      Vitals:  Vitals:    04/24/25 0840   BP: 138/61   Pulse: (!) 57   Weight: 74 kg (163 lb 2.3 oz)   Height: 1.727 m (5' 7.99\")       Verified current warfarin dosing " schedule.    Medications reconciled.  Pt is on antiplatelet therapy with aspirin 81mg for history of multiple thromboembolisms.       A/P   INR is supratherapeutic  Reason(s) for out of range INR today: N/A      Warfarin dosing recommendation: Hold warfarin today, then Continue regimen as listed above.    Pt educated to contact our clinic with any changes in medications or s/s of bleeding or thrombosis. Pt is aware to seek immediate medical attention for falls, head injury or deep cuts.    Request pt to return in 2 week(s). Pt agrees.    Zay EckertD

## 2025-04-29 ENCOUNTER — APPOINTMENT (OUTPATIENT)
Dept: RADIOLOGY | Facility: MEDICAL CENTER | Age: 85
End: 2025-04-29
Attending: FAMILY MEDICINE
Payer: MEDICARE

## 2025-04-29 DIAGNOSIS — M25.552 LEFT HIP PAIN: ICD-10-CM

## 2025-04-29 PROCEDURE — 73521 X-RAY EXAM HIPS BI 2 VIEWS: CPT

## 2025-04-30 ENCOUNTER — OFFICE VISIT (OUTPATIENT)
Dept: CARDIOLOGY | Facility: MEDICAL CENTER | Age: 85
End: 2025-04-30
Attending: INTERNAL MEDICINE
Payer: MEDICARE

## 2025-04-30 VITALS
WEIGHT: 160 LBS | DIASTOLIC BLOOD PRESSURE: 60 MMHG | OXYGEN SATURATION: 99 % | RESPIRATION RATE: 16 BRPM | SYSTOLIC BLOOD PRESSURE: 110 MMHG | HEIGHT: 68 IN | BODY MASS INDEX: 24.25 KG/M2 | HEART RATE: 54 BPM

## 2025-04-30 DIAGNOSIS — I25.10 CAD IN NATIVE ARTERY: ICD-10-CM

## 2025-04-30 DIAGNOSIS — I10 ESSENTIAL HYPERTENSION, BENIGN: ICD-10-CM

## 2025-04-30 DIAGNOSIS — Z95.1 S/P CABG X 4: ICD-10-CM

## 2025-04-30 DIAGNOSIS — E78.5 DYSLIPIDEMIA: ICD-10-CM

## 2025-04-30 PROCEDURE — 99212 OFFICE O/P EST SF 10 MIN: CPT | Performed by: INTERNAL MEDICINE

## 2025-04-30 ASSESSMENT — ENCOUNTER SYMPTOMS
CARDIOVASCULAR NEGATIVE: 1
COUGH: 0
PSYCHIATRIC NEGATIVE: 1
NERVOUS/ANXIOUS: 0
DIZZINESS: 0
RESPIRATORY NEGATIVE: 1
ABDOMINAL PAIN: 0
CLAUDICATION: 0
HEADACHES: 0
DOUBLE VISION: 0
VOMITING: 0
MYALGIAS: 0
CONSTITUTIONAL NEGATIVE: 1
CHILLS: 0
NEUROLOGICAL NEGATIVE: 1
PALPITATIONS: 0
EYES NEGATIVE: 1
BRUISES/BLEEDS EASILY: 0
DEPRESSION: 0
WEAKNESS: 0
FOCAL WEAKNESS: 0
SHORTNESS OF BREATH: 0
WEIGHT LOSS: 0
FEVER: 0
GASTROINTESTINAL NEGATIVE: 1
NAUSEA: 0
BLURRED VISION: 0

## 2025-04-30 ASSESSMENT — FIBROSIS 4 INDEX: FIB4 SCORE: 6.81

## 2025-04-30 NOTE — Clinical Note
REFERRAL APPROVAL NOTICE         Sent on April 30, 2025                   Kevin Paredes  [address]  [place] [address]                   Dear Mr. Paredes,    After a careful review of the medical information and benefit coverage, [facility] has processed your referral. See below for additional details.    If applicable, you must be actively enrolled with your insurance for coverage of the authorized service. If you have any questions regarding your coverage, please contact your insurance directly.    REFERRAL INFORMATION   Referral #:  [id number]  Referred-To Provider    Referred-By Provider:  Podiatry    [facility]      [address]  [place] [address]  [phone] [id number]  [place] [address]  [phone]    Referral Start Date:  04/23/2025  Referral End Date:   04/23/2026             SCHEDULING  If you do not already have an appointment, please call [phone] to make an appointment.     MORE INFORMATION  If you do not already have a [employer] account, sign up at: [email or web address]  You can access your medical information, make appointments, see lab results, billing information, and more.  If you have questions regarding this referral, please contact  the [facility] Referrals department at:             [phone]. Monday - Friday 8:00AM - 5:00PM.     Sincerely,    [facility]

## 2025-04-30 NOTE — PROGRESS NOTES
Chief Complaint   Patient presents with    Coronary Artery Disease     F/V Dx: CAD in native artery    Hypertension       Subjective     Kevin Paredes is a 84 y.o. male who presents today for follow up of coronary artery disease with prior coronary artery bypass graft surgery, hypertension and hyperlipidemia, carotid artery stenosis.    Since the patient's last visit on 01/31/25, he has been doing well clinically from cardiac standpoint. He admit to hip pain. He denies fatigue, chest pain, shortness of breath, palpitations, lower extremity edema, dizziness or syncope. He has slowed from exercising on his treadmill, 50 minutes daily.     Past Medical History:   Diagnosis Date    Anesthesia     difficult intubation with surgery 2008,2010    Basal cell carcinoma of skin     CAD (coronary artery disease)     Cancer (HCC)     rt  kidney 1989;  thyroid cancer 2012, prostate 2008, skin    Chronic kidney disease (CKD) stage G3b/A2, moderately decreased glomerular filtration rate (GFR) between 30-44 mL/min/1.73 square meter and albuminuria creatinine ratio between  mg/g 4/23/2015    DVT (deep venous thrombosis) (HCC) 2014    ED (erectile dysfunction)     GERD (gastroesophageal reflux disease)     Glaucoma     Heart burn     Hyperlipidemia 12/3/2012    Hypertension     Indigestion     Multiple pulmonary emboli (HCC) 2014    Multiple thyroid nodules 2009    Papillary carcinoma of thyroid (HCC) 2014    R 2 foci / 4.5mm,0.25mm / no mets/ pT1pN0    postsurgical hypothyroidism 2014    Pre-diabetes     Renal disorder     rt kidney cancer,nephrectomy    S/P CABG x 4 2003    S/P colectomy 2010    multiple colon polyps / no Ca    S/p nephrectomy 1998    carcinoma    S/P prostatectomy 2008    carcinoma    Stroke (HCC)     'mild',no residual,'one doctor said it was a tia'    Transient renal failure 2014    renal vein thrombosis    Type II or unspecified type diabetes mellitus without mention of complication, not stated as  uncontrolled     on no medications    Unspecified urinary incontinence      Past Surgical History:   Procedure Laterality Date    THYROIDECTOMY TOTAL  5/13/2014    Performed by Eliceo Bolanos M.D. at SURGERY SAME DAY [facility] ORS    NODE DISSECTION  5/13/2014    Performed by Eliceo Bolanos M.D. at SURGERY SAME DAY [facility] ORS    COLON RESECTION      MULTIPLE CORONARY ARTERY BYPASS      x 4 11/2003    NEPHRECTOMY RADICAL      right side 1998    OTHER ORTHOPEDIC SURGERY      trotator cuff    PROSTATECTOMY, RADICAL RETRO      cancer /january 2008     Family History   Problem Relation Age of Onset    Diabetes Mother     Heart Disease Mother     Diabetes Father     Cancer Father         pancreas    Thyroid Sister         Cancer    Cancer Sister         thyroid    Diabetes Sister     Cancer Brother 49        colon    Diabetes Brother     Diabetes Maternal Grandfather     Diabetes Paternal Grandmother     Diabetes Paternal Grandfather     No Known Problems Maternal Grandmother      Social History     Socioeconomic History    Marital status:      Spouse name: Not on file    Number of children: Not on file    Years of education: Not on file    Highest education level: Not on file   Occupational History    Not on file   Tobacco Use    Smoking status: Never    Smokeless tobacco: Never   Vaping Use    Vaping status: Never Used   Substance and Sexual Activity    Alcohol use: Yes     Comment: Occasionally    Drug use: No    Sexual activity: Not Currently     Comment: retired    Other Topics Concern    Not on file   Social History Narrative    Not on file     Social Drivers of Health     Financial Resource Strain: Not on file   Food Insecurity: Not on file   Transportation Needs: Not on file   Physical Activity: Not on file   Stress: Not on file   Social Connections: Not on file   Intimate Partner Violence: Not on file   Housing Stability: Not on file     Allergies   Allergen Reactions    Lactose      Seasonal      (Medications reviewed.)  Outpatient Encounter Medications as of 4/30/2025   Medication Sig Dispense Refill    atenolol (TENORMIN) 25 MG Tab Take 1 tablet by mouth once daily 100 Tablet 3    levothyroxine (SYNTHROID) 137 MCG Tab Take 1 Tablet by mouth every morning on an empty stomach. 90 Tablet 4    Continuous Glucose Sensor (FREESTYLE YUMIKO 3 PLUS SENSOR) Misc Apply every 15 days 6 Each 4    fenofibrate (TRIGLIDE) 160 MG tablet Take 1 tablet by mouth once daily 100 Tablet 3    ezetimibe (ZETIA) 10 MG Tab Take 1 Tablet by mouth every day. 90 Tablet 3    losartan (COZAAR) 25 MG Tab Take 1 tablet by mouth once daily 100 Tablet 3    Empagliflozin (JARDIANCE) 10 MG Tab tablet Take 1 Tablet by mouth every morning with breakfast. 90 Tablet 3    rosuvastatin (CRESTOR) 40 MG tablet Take 1 Tablet by mouth every day. 100 Tablet 3    aspirin 81 MG EC tablet Take 81 mg by mouth every day.      triamcinolone acetonide (KENALOG) 0.025 % Cream Apply to the affected area twice a day on legs. 15 g 1    capsaicin (ZOSTRIX) 0.025 % cream       Multiple Vitamins-Minerals (ZINC PO) Take  by mouth.      Calcium Carb-Cholecalciferol (CALCIUM 1000 + D PO) Take 1 Dose by mouth every day.      Omega-3 Krill Oil 300 MG Cap Take 300 mg by mouth every day.      Cinnamon 500 MG Cap Take 1,000 mg by mouth.      Cyanocobalamin (VITAMIN B-12) 1000 MCG Tab Take 1,000 mcg by mouth every day.      Coenzyme Q10 (CO Q-10 PO) Take 1 Dose by mouth every day. Unknown OTC Strength       No facility-administered encounter medications on file as of 4/30/2025.     Review of Systems   Constitutional: Negative.  Negative for chills, fever, malaise/fatigue and weight loss.   HENT: Negative.  Negative for hearing loss.    Eyes: Negative.  Negative for blurred vision and double vision.   Respiratory: Negative.  Negative for cough and shortness of breath.    Cardiovascular: Negative.  Negative for chest pain, palpitations, claudication and leg  "swelling.   Gastrointestinal: Negative.  Negative for abdominal pain, nausea and vomiting.   Genitourinary: Negative.  Negative for dysuria and urgency.   Musculoskeletal:  Positive for joint pain. Negative for myalgias.   Skin: Negative.  Negative for itching and rash.   Neurological: Negative.  Negative for dizziness, focal weakness, weakness and headaches.   Endo/Heme/Allergies: Negative.  Does not bruise/bleed easily.   Psychiatric/Behavioral: Negative.  Negative for depression. The patient is not nervous/anxious.               Objective     /60 (BP Location: Left arm, Patient Position: Sitting, BP Cuff Size: Adult)   Pulse (!) 54   Resp 16   Ht 1.727 m (5' 7.99\")   Wt 72.6 kg (160 lb)   SpO2 99%   BMI 24.34 kg/m²     Physical Exam  Constitutional:       Appearance: Normal appearance. He is well-developed and normal weight.   HENT:      Head: Normocephalic and atraumatic.   Neck:      Vascular: No JVD.   Cardiovascular:      Rate and Rhythm: Normal rate and regular rhythm.      Heart sounds: Normal heart sounds.   Pulmonary:      Effort: Pulmonary effort is normal.      Breath sounds: Normal breath sounds.   Abdominal:      General: Bowel sounds are normal.      Palpations: Abdomen is soft.      Comments: No hepatosplenomegaly.   Musculoskeletal:         General: Normal range of motion.   Lymphadenopathy:      Cervical: No cervical adenopathy.   Skin:     General: Skin is warm and dry.   Neurological:      Mental Status: He is alert and oriented to person, place, and time.            CARDIAC STUDIES/PROCEDURES:     ABDOMINAL ULTRASOUND (06/04/14)  1. Ectatic aorta.  2. Atherosclerotic plaque.  3. Cholelithiasis.    CAROTID ULTRASOUND (02/24/25)  Mild bilateral internal carotid artery stenosis (<50%).   (study result reviewed)      CT OF CHEST (06/06/14)  1. There is thrombus in the IVC which extends into the distal left renal vein between the aorta and vena cava. The patient is on heparin for 36 " hours approximately, and the improvement in left   renal edema and decrease in caliber of the left renal vein compared to previous CT probably reflects improved venous drainage related to the therapy .  2. The thrombus attached to the filter extends cephalad to the filter to the level of the caudate lobe of the liver.  3. There is DVT in both lower extremities.     ECHOCARDIOGRAM CONCLUSIONS (09/11/23)  Compared to the images of the prior study 10/18/18, there has been no   significant change.   Normal left ventricular size and systolic function.  The left ventricular ejection fraction is visually estimated to be 60%.  Normal diastolic function.  Normal right ventricular size and systolic function.  Mild mitral regurgitation.     ECHOCARDIOGRAM CONCLUSIONS (10/18/18)  Prior echo on 05/21/14. Compared to the report of the study done -   there has been no significant change.   Normal left ventricular systolic function.  Left ventricular ejection fraction is visually estimated to be 65%.  Mild tricuspid regurgitation.  Unable to estimate pulmonary artery pressure due to an inadequate   tricuspid regurgitant jet.     ECHOCARDIOGRAM CONCLUSIONS (05/21/14)  Normal left ventricular size. Sigmoid septum with increased outflow   velocity but not anterior motion of the mitral valve.  Basal inferior and apical septal hypokinesis. Left ventricular   ejection fraction is 50% to 55%.  Grade I diastolic dysfunction is present.  Normal left atrial size.  Aortic sclerosis without significant stenosis.  Trace mitral regurgitation.     ECHOCARDIOGRAM CONCLUSIONS (01/25/14)  Normal left ventricular size and function.   Grade I diastolic dysfunction is present.   Normal left ventricular wall thickness.   Left ventricular ejection fraction is 60% to 65%.  Mild mitral regurgitation.   Aortic valve probably trileaflet. No stenosis or regurgitation seen.   AV MG 5.39 mmHg, PG 9.33 mmHg.  Mild tricuspid regurgitation. Right ventricular  systolic pressure is   estimated to be 30 to 35 mmHg consistent with mild pulmonary hypertension.  No pericardial effusion seen.   Normal aortic diameter 3.2 cm  No prior study for comparison.     EKG performed on (08/23/23) EKG shows sinus rhythm.   EKG performed on (01/23/19) EKG shows sinus bradycardia.  EKG performed on (05/15/14) EKG shows normal sinus rhythm with non-specific intra-ventricular conduction delay.     Laboratory results of (03/31/25) were reviewed. Cholesterol profile of 127/162/34/61 mg/dL noted.  Laboratory results of (01/24/24) Cholesterol profile of 146/189/35/73 mg/dL noted.   Laboratory results of (11/28/23) Cholesterol profile of 173/288/27/88 mg/dL noted.  Laboratory results of (08/31/23) Cholesterol profile of 156/239/31/77 mg/dL noted.   Laboratory results of (10/26/21) Cholesterol profile of 167/224/34/88 mg/dL noted.  Laboratory results of (08/24/20) Cholesterol profile of 199/202/37/122 mg/dl noted.  Laboratory results of (07/15/19) Cholesterol profile of 169/185/32/100 noted.  Laboratory results of (09/29/18) Cholesterol profile of 261/365/36/151 noted.  Laboratory results of (09/21/15) Cholesterol profile of 251/307/37/153 noted.  Laboratory results of (06/16/14) Cholesterol profile of 172/233/37/88 noted.     LOWER EXTREMITY VENOUS ULTRASOUND (07/19/19)  No evidence of RIGHT lower extremity DVT.      LOWER EXTREMITY VENOUS ULTRASOUND (06/05/14)  1. No evidence of acute right lower extremity deep venous thrombosis with   recannalized venous thrombosis throughout.  2. Normal left lower extremity superficial and deep venous examination.      MRA OF BRAIN (01/25/14)  1. MRA OF THE [place] OF GREEN WITHIN NORMAL LIMITS WITH NO EVIDENCE OF ANEURYSM OR CEREBROVASCULAR OCCLUSIVE DISEASE.  2. FIELD OF VIEW PARTIALLY EXCLUDES THE INFERIOR TEMPORAL M2 DIVISION LEFT MCA.     MRA OF NECK (01/25/14)  1. Unremarkable cervical vertebral arteries.  2. Right carotid bulb and ICA origin minimal  plaquing with up to about 10% stenosis. No flow limiting lesion.  3. Left carotid bulb and left ICA origin minimal plaquing with up to about 10-15% stenosis. No flow limiting lesion.     MYOCARDIAL PERFUSION STUDY CONCLUSIONS (02/28/25)  NUCLEAR IMAGING INTERPRETATION  Abnormal Lexiscan myocardial perfusion study.  Inferior apical infarct.   No significant ischemia.  SDS 0.  LVEF 68%.  No ischemic changes with Regadenoson.  No arrhythmias.  ECG INTERPRETATION  Negative stress ECG for ischemia.  (study result reviewed)    MYOCARDIAL PERFUSION STUDY CONCLUSIONS (10/18/18)  No evidence of significant jeopardized viable myocardium or prior myocardial infarction.  Apical thinning noted.  Normal wall motion, EF 59%.   TID absent.   Sinus rhythm.  Nondiagnostic ECG portion of a nuclear stress test.  ECG INTERPRETATION  Nondiagnostic ECG portion of a nuclear stress test.     STRESS ECHOCARDIOGRAM [facility] (03/14/12)  Stress echocardiogram showing no evidence for stress-induced ischemia.     ONEIL Lawrence  DOS: 10/11/23   Summary:   Sinus rhythm without significant tachy- or kaylee-arrhythmias.     Assessment & Plan     1. CAD in native artery        2. S/P CABG x 4        3. Essential hypertension, benign        4. Dyslipidemia            Medical Decision Making: Today's Assessment/Status/Plan:        Coronary artery disease with prior coronary bypass graft (4 vessel CABG [facility] 2003): He is an 84 year old male with coronary artery disease with prior coronary artery bypass graft surgery, hypertension and hyperlipidemia, carotid artery stenosis. He is clinically doing well from coronary standpoint. He is clinically doing well from coronary standpoint. I will continue with current medical care including aspirin, atenolol, losartan and rosuvastatin.  Hypertension: Currently, the average blood pressure is well controlled. We will continue with beta blockade therapy and angiotensin receptor  blockade.  Hyperlipidemia: He is doing well on statin and ezetimibe therapy without myalgia symptoms.  Carotid artery stenosis: Clinically stable on above medical therapy.  Chronic anticoagulation therapy (warfarin) and IVC filter with pulmonary embolism/deep venous thrombosis.   History of stroke (2004): He remains clinically stable without recurrence of stroke symptoms.   Renal insufficiency (Managed by Dr. Najjar, [place])    We will follow up the patient in 1 year.    CC Damion Snow

## 2025-05-01 ENCOUNTER — RESULTS FOLLOW-UP (OUTPATIENT)
Dept: MEDICAL GROUP | Facility: LAB | Age: 85
End: 2025-05-01

## 2025-05-06 NOTE — PROGRESS NOTES
Follow up Endocrinology Visit  Initial consult/last visit on: 4/25/24  Last visit on: 3/6/25  Referred by: Fadi Najjar, M.D./nephrologist     Chief complaint:  Paulo Paredes, 84 y.o., male, who is here for follow up for T2DM management. Here with his spouse - Ursula    Interval history:   Prior notes:  6/10/24  - patient has a hip pain, but he still can walk, he states that lately he was able to walk more and experienced less fatigue, even overall he lost weight but  lately was able to regain some weight  - reports some constipation but improved with treatment  - reviewed recent labs  9/10/24  -  had recent COVID-19 infection  - he couldn't tolerate Ozempic due to side effects  - reviewed recent labs, neck US, CGM report  3/6/25  - overall doing well  - he recently had stress test, noted perfusion defect primarily related to old MI, treated with CABG, no concerns of current ischemia  - admits some dietary indiscretion  - reviewed recent labs    Current therapy:  Jardiance 10 mg    Tolerates medications: well  Compliant: yes    Prior used medications:   Trulicity - previously tolerated, was stopped due to difficulty to get the medication  Ozempic -> diarrhea - stopped 2-3 weeks ago  Jardiance 25 mg - fatigue, difficulty walking, lost weight, falls asleep on his chair during the day, frequent urination    Other important medications:  ASA 81 mg  Warfarin  Rosuvastatin 40 mg  Fenofibrate 160 mg  Atenolol 25 mg  Capsaicin cream  Levothyroxine 137 mcg/  Losartan 25 mg     BG control:  CGM type - Michelle 3  Period -  5/28/24 - 6/10/24 -> 8/27/24 - 9/9/24 -> 2/20/25 - 3/5/25 -> 4/17/25 - 4/30/25  Data were reviewed and discussed with the patient  Active time - 96 -> 99 -> 98 -> 99%  ABG - 152  -> 148  -> 176  -> 183 mg/dl  GV - 20.3 -> 24.4 -> 27.1 -> 26.7%  GMI - 6.9 -> 6.9 -> 7.5 -> 7.7%  TIR - 82 -> 80 -> 60 -> 52%  TAR - high - 18 ->  20 -> 33 -> 37%, very high - 0 -> 0 -> 7 -> 11%  TBR - 0-> 0 -> 0 -> 0%  4/17/25 -  4/30/25 2/20/25 - 3/5/25    8/27/24 - 9/9/24 5/28/24 - 6/10/24      DM history:  - diagnosed 20 years ago, diagnosed on screening test,  220 lb  - hospitalizations for DKA/HHS/severe hyperglycemia/severe hypoglycemia in the past: none  - prior therapy: Trulicity started 2 years ago, couldn't get Trulicity -> Jardiance - 3 weeks ago  - known DM complications: CKD 3b, single kidney, s/p nephrectomy,  Hx of TIA, CAD, s/p CABG x 4  - latest HbA1C - 8.5% in 3/2024  - pertinent PMHx:   -- dyslipidemia, GERD, PTC, s/p thyroidectomy in 2014, hypothyroidism, Hx of PTC, Hx of PE, s/p IVF filter placement, HTN  -- denies NAFLD, PAD/CHF      PTC Hx, s/p thyroidectomy in 5/2014:  - currently on LT4 137, euthyroid    Current Medications:  Current Outpatient Medications:     Empagliflozin 25 MG Tab, Take 1 Tablet by mouth every day., Disp: 90 Tablet, Rfl: 3    liraglutide (VICTOZA) 18 MG/3ML Solution Pen-injector, Inject 0.6 mg under the skin every day., Disp: 6 mL, Rfl: 4    rosuvastatin (CRESTOR) 40 MG tablet, Take 1 Tablet by mouth every day., Disp: 90 Tablet, Rfl: 3    levothyroxine (EUTHYROX) 137 MCG Tab, TAKE 1 TABLET BY MOUTH ONCE DAILY IN THE MORNING ON  AN  EMPTY  STOMACH  ONE  HOUR  BEFORE  EATING, Disp: 100 Tablet, Rfl: 3    fenofibrate (TRIGLIDE) 160 MG tablet, Take 1 Tablet by mouth every day., Disp: 100 Tablet, Rfl: 3    warfarin (COUMADIN) 5 MG Tab, Take 1 Tablet by mouth every evening. Take 0.5 to 1 tablet by mouth once daily. Take as directed by anticoagulation clinic., Disp: 100 Tablet, Rfl: 3    losartan (COZAAR) 25 MG Tab, Take 1 Tablet by mouth every day for 360 days., Disp: 100 Tablet, Rfl: 2    Dulaglutide 1.5 MG/0.5ML Solution Pen-injector, Inject 0.5 mL under the skin every 7 days for 360 days., Disp: 24 mL, Rfl: 3    aspirin 81 MG EC tablet, Take 81 mg by mouth every day., Disp: , Rfl:     atenolol (TENORMIN) 25 MG Tab, Take 1 tablet by mouth once daily, Disp: 100 Tablet, Rfl: 3    oxymetazoline  "(AFRIN 12 HOUR) 0.05 % Solution, Administer 2 Sprays into affected nostril(S) 2 times a day. Discontinue after 2 days., Disp: 15 mL, Rfl: 0    triamcinolone acetonide (KENALOG) 0.025 % Cream, Apply to the affected area twice a day on legs., Disp: 15 g, Rfl: 1    capsaicin (ZOSTRIX) 0.025 % cream, APPLY TO ITCHY AREA ON NECK UP TO 5 TIMES A DAY FOR 1 WEEK, THEN 3 TIMES A DAY FOR 3 TO 6 WEEKS. WASH HANDS THOROUGHLY AFTER APPLYING. DO TEST SPOT 1ST, Disp: , Rfl:     Multiple Vitamins-Minerals (ZINC PO), Take  by mouth., Disp: , Rfl:     hydrocortisone 2.5 % Cream topical cream, APPLY TO RASH ON GROIN TWICE DAILY FOR 1 WEEK WITH FLARES, Disp: , Rfl:     Calcium Carb-Cholecalciferol (CALCIUM 1000 + D PO), Take 1 Dose by mouth every day., Disp: , Rfl:     Omega-3 Krill Oil 300 MG Cap, Take 300 mg by mouth every day., Disp: , Rfl:     Cinnamon 500 MG Cap, Take 1,000 mg by mouth., Disp: , Rfl:     Cyanocobalamin (VITAMIN B-12) 1000 MCG Tab, Take 1,000 mcg by mouth every day., Disp: , Rfl:     Coenzyme Q10 (CO Q-10 PO), Take 1 Dose by mouth every day. Unknown OTC Strength, Disp: , Rfl:     PHYSICAL EXAM:   Vital signs: /60   Pulse (!) 55   Ht 1.727 m (5' 7.99\") Comment: pt stated  Wt 74.4 kg (164 lb)   SpO2 96%   BMI 24.94 kg/m²   GENERAL: Well-developed, well-nourished  in no apparent distress.   CARDIOVASCULAR: Regular rate and rhythm.   LUNGS: No dyspnea  ABDOMEN: Soft, nondistended  NEUROLOGICAL: Cranial nerves II-XII are grossly intact No visible tremor with both outstretched hands  SKIN: No rashes, lesions. Turgor is normal.    Labs:  - reviewed  HbA1C/BG/Hb:   Reference Range  6/10/24 9/10/24 3/6/25   HbA1C 4.0 - 5.6 % 7.6 (H) 6.5 ! 7.2 !      Reference Range  01/24/24    Hb 14.0 - 18.0 g/dL 14.3   Hct 42.0 - 52.0 % 44.3     Kidney function/MACR:   Reference Range 01/24/24  3/4/25   Creatinine 0.50 - 1.40 mg/dL 1.80 (H) 1.94 (H)   GFR (CKD-EPI) >60 mL/min/1.73 m 2 36 ! 33 !   MACR 0 - 30 mg/g 95 (H) 89 (H) "     LFTs:   Reference Range 11/28/23    AST(SGOT) 12 - 45 U/L 43   ALT(SGPT) 2 - 50 U/L 27   ALP 30 - 99 U/L 44     Lipid panel:   Reference Range 01/24/24    Triglycerides 0 - 149 mg/dL 189 (H)   HDL >=40 mg/dL 35 !   LDL <100 mg/dL 73     Hypothyroidism, s/p thyroidectomy in 2014, LOW RISK:   Reference Range  01/04/23  06/04/24  8/30/24 1/2/25 3/7/25    TSH 0.380 - 5.330 uIU/mL 0.850 0.103 (L) 1.260 2.080 1.250    fT4 0.93 - 1.70 ng/dL  1.57 1.07 1.47 1.51    Tg 1.3 - 31.8 ng/mL  0.3 (L) 0.4 (L) 3.4 0.4 (L)    Anti-Tg Abs 0.0 - 4.0 IU/mL  <0.9 <0.9 < 0.9 < 1.5    LT4 Mcg/day  137 125 125 137      Tg trend:   Reference Range 05/02/14 06/26/14 09/25/14 02/05/15  07/17/15  09/21/15 01/26/16  03/19/18  04/12/19  06/04/24  08/30/24  1/2/25 3/7/25   Tg 1.3 - 31.8 ng/mL 47.2 (H) 0.2 (L) 0.2 (L) 0.1 (L) 0.2 (L) 0.3 (L) 0.6 (L) 0.3 (L) 0.2 (L) 0.3 (L) 0.4 (L) 3.4 0.4 (L)     Imaging:  - reviewed  Neck US on 4/1/25:  HISTORY/REASON FOR EXAM: Thyroidectomy  COMPARISON:  7/11/2015  FINDINGS:  Focused ultrasound of the area of concern bilateral neck demonstrates surgical absence of the thyroid gland no mass within the thyroid bed identified.  There is a 1.0 x 1.1 x 0.8 cm structure within the lower right soft tissues of the neck. Likely borderline enlarged lymph node. It measured 1.1 x 0.9 x 0.8 cm 7/11/2015.  There is a 1.1 x 0.6 x 0.4 cm hypoechoic structure within the lower right  soft tissues of the neck. No echogenic hilum identified. It measured 1.2 x 1.0 x 0.5 cm 7/11/2015.  IMPRESSION:  1.  Surgical absence of the thyroid gland. No mass within the thyroid bed identified.  2.  Borderline enlarged lymph node within the lower right soft tissues of the similar to previous  3.  Abnormal appearing lymph node lower right soft tissues of the neck similar to previous.    Neck US on 7/3/24:  HISTORY/REASON FOR EXAM:  Hx of papillary thyroid cancer, Tg in grey zone   TECHNIQUE/EXAM DESCRIPTION: Ultrasound of the soft tissues of  the head and neck.  COMPARISON:  7/1/2015  FINDINGS:  The thyroid is surgically absent. No residual or recurrent thyroid tissue is evident. No pathologically enlarged cervical lymphadenopathy is appreciated.  IMPRESSION:  Status post thyroidectomy.    Pathology report:  - reviewed  Pathology report after thyroidectomy with excision of deep cervical lymph node with jugular dissection of right neck on 5/13/2014:  FINAL DIAGNOSIS:  A. Right cervical lymph node: One benign lymph node negative for metastatic carcinoma. (0/1)  B. 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  Benign hyperplastic nodule of left lobe with hemorrhage and fibrosis.    -Specimen Size:  Right Lobe: 4.8 x 2.5 x 2 cm  Left Lobe: 5 x 3 x 2.5 cm  Isthmus: 2.5 x 2.2 x 1.2 cm  Central Compartment: Not applicable.  Right neck dissection: Not applicable.  Left neck dissection: Not applicable.  -Specimen Weight: 33.4 gram  -Tumor Focality: Multifocal, ipsilateral  -Dominant Tumor:  Tumor Laterality: Right lobe  Tumor Size: 4.5 mm  Histologic Type: Papillary carcinoma  Variant: Classical (usual)  Architecture: Classical (usual)  Cytomorphology: Classical  Margins: Margins uninvolved by carcinoma  Distance of invasive carcinoma to closest margin: 3 mm.  Tumor Capsule: Partially encapsulated  Tumor Capsule Invasion: Present  Lymph-Vascular Invasion: Not identified  Extrathyroidal Extension: Not identified  -Second Tumor:  Tumor Laterality: Right lobe  Tumor Size: 0.25 mm  Histologic Type: Papillary carcinoma (usual)  Variant: Classical (usual)  Architecture: Classical (papillary)  Cytomorphology: Classical  Margins: Margins uninvolved by carcinoma  Distance of invasive carcinoma to closest margin: 1 mm  Tumor Capsule: Totally unencapsulated  Tumor Capsule Invasion: Not applicable  Lymph-Vascular Invasion: Not identified  Extrathyroidal Extension: Not identified  -Pathologic Staging:  Primary  Tumor: *pT1(m)  Regional Lymph Nodes: *pN0  Number examined: 1  Number involved: 0  Lymph Node, Extranodal Extension: Not applicable  Distant Metastasis: Not applicable.  -Additional Pathologic Findings: None    ASSESSMENT/PLAN:   # Type 2 diabetes mellitus with hyperglycemia, without long-term current use of insulin (HCC)  - duration x 20 years  - on SGTL-2 inhibitor   - HbA1C - 8.5% (3/2024) -> 6.5 % (9/2024) -> 7.2% (3/2025)     Microvascular complications: nephropathy with CKD 3b, microalbuminuria, denies peripheral neuropathy, retinopathy  Macrovascular complications: Hx of TIA, CAD, s/p CABG x 4  Associated comorbidities: dyslipidemia, GERD, hypothyroidism, Hx of PTC, Hx of PE, s/p IVF filter placement, HTN, single kidney, s/p nephrectomy,    DM complication screening:  Labs reviewed:  MACR - 95 -> 89 (+), last checked in 1/2024 -> 3/2025  Creat/GFR  1.9/33, consistent with CKD 3b, last checked in 3/2025  LDL - 73 - close to target, last checked in 1/2024     Patient is taking statin: on  Rosuvastatin 40 mg + fenofibrate 160 mg + Zetia 10 mg  Patient is taking ASA: yes  Patient is taking ACE/ARB: on Losartan 25 mg    Last eye exam: 12/4/23, no evidence of DR  Last foot exam: on 4/25/24 by me, normal monofilament test     Home medications:  Jardiance 10 mg    Prior used medications:   Trulicity - previously tolerated, was stopped due to difficulty to get the medication  Ozempic -> diarrhea - stopped 2-3 weeks ago  Jardiance 25 mg - fatigue, difficulty walking, lost weight, falls asleep on his chair during the day, frequent urination    Discussion:  Prior notes:  6/10/24  - noted excellent glycemic control  - will decrease dose of SGLT-2 inhibitor as GFR is low and no additional glycemic management benefit from higher dose  - continue low dose GLP-1 agonist as long as no significant weight loss or difficult to manage constipation  9/10/24  - reasonable glycemic control  - off Ozempic due to side effects  -  discussed that SGLT-2 inhibitor with CKD3b has minimal antihyperglycemic effect, but helpful for kidney protection  - if worsening of glycemic control consider using DPP-4 inhibitor  3/6/25  - noted worsening of glycemic control, but still reasonable given patient's age  - again, discussed that given CKD, SGLT-2 inhibitor is unlikely to have significant impact on glycemic control  - suggested adding DPP-4 inhibitor, but patient wants first work on his diet    Plan:  Discussed with the patient goals for DM management:  Fasting BG 90 - 130  2 hrs postprandial  - 180  HbA1C < 7.0%     Therapy adjustments:  - none  - add Januvia    3. Continue use of Michelle 3 plus as long as it is not expensive, ok to do 1 mo breaks off CGM.  4. Given foot care instructions on prior visit.     - SITagliptin (JANUVIA) 100 MG Tab; Take 1 Tablet by mouth every day.  Dispense: 30 Tablet; Refill: 11  - Continuous Glucose Sensor (FREESTYLE MICHELLE 3 PLUS SENSOR) Misc; Apply every 15 days  Dispense: 6 Each; Refill: 4    # CKD3b  # Microalbuminuria  - stable  9/10/24  - already on ARB and SGLT-2 inhibitor  - keep BP < 130/80 mmHg    # Hx of PTC  # Postoperative hypothyroidism, intermediate reponse  Prior notes:  6/10/24  - s/p thyroidectomy in 2014, pT1N0  - LOW risk as per pathology report  - indeterminate response as Tg> 1 but < 10,but given period of 10 years after the surgery and relatively stable level of Tg - TSH goal 0.4 - 2.0  - recent TSH suppression on same dose of LT4 likely to be explained by weight loss  9/10/24  - TSH < 2.0, noted slight raise of Tg  - neck US didn't show any residual/recurrent disease -> no evidence of structural disease  - ok to continue current therapy -> repeat TFTs, Tg  3/6/25  - TSH at 2.0, but noted sudden raise of Tg  - will increase dose LT4, tighten goal of TSH to 0.1 - 0.5  - repeat neck US  Plan:  Increase Levothyroxine 125 - > 137 mcg/day. TSH goal 0.1 - 0.5.  Repeat TFTs  + Tg/Tg-Abs in 3 mo.      - FREE THYROXINE; Future  - TSH; Future  - THYROGLOBULIN, QT; Future  - ANTITHYROGLOBULIN AB; Future  - POC FNA BIOPSY US GUIDED THYROID/PARATHYROID/LYMPH NODE; Future    # CAD, s/p CABG x 4  # Dyslipidemia  9/10/24  - managed by cardiology/PCP  - on high dose statin  - LDL close to goal, defer management to cardiology  3/6/25  - LDL is not at goal (< 55), on max dose of statin -> Zetia was added in 1/2025 - after lipid panel results  - due for  lipid panel  Plan:  Continue Rosuvastatin 40 mg  Continue Zetia 10 mg  Repeat lipid panel.     RTC: 3 mo    Total time (face-to-face and non-face-to face time): 75  min - extensive discussion of diagnoses, treatment, prognosis, lab review, documentation.  Plan reviewed with the patient and agreed with plan.  All questions answered to patient's satisfaction.  Thank you kindly for allowing me to participate in the diabetes care plan for this patient.    Erin Orta MD    CC:   Damion Larose M.D.

## 2025-05-07 ENCOUNTER — OFFICE VISIT (OUTPATIENT)
Dept: ENDOCRINOLOGY | Facility: MEDICAL CENTER | Age: 85
End: 2025-05-07
Attending: STUDENT IN AN ORGANIZED HEALTH CARE EDUCATION/TRAINING PROGRAM
Payer: MEDICARE

## 2025-05-07 VITALS
DIASTOLIC BLOOD PRESSURE: 60 MMHG | HEIGHT: 68 IN | SYSTOLIC BLOOD PRESSURE: 112 MMHG | WEIGHT: 164 LBS | HEART RATE: 55 BPM | OXYGEN SATURATION: 96 % | BODY MASS INDEX: 24.86 KG/M2

## 2025-05-07 DIAGNOSIS — E89.0 POSTOPERATIVE HYPOTHYROIDISM: ICD-10-CM

## 2025-05-07 DIAGNOSIS — E11.65 TYPE 2 DIABETES MELLITUS WITH HYPERGLYCEMIA, WITHOUT LONG-TERM CURRENT USE OF INSULIN (HCC): ICD-10-CM

## 2025-05-07 DIAGNOSIS — C80.1 PAPILLARY ADENOCARCINOMA (HCC): ICD-10-CM

## 2025-05-07 PROCEDURE — 99212 OFFICE O/P EST SF 10 MIN: CPT | Performed by: STUDENT IN AN ORGANIZED HEALTH CARE EDUCATION/TRAINING PROGRAM

## 2025-05-07 PROCEDURE — 95250 CONT GLUC MNTR PHYS/QHP EQP: CPT | Performed by: STUDENT IN AN ORGANIZED HEALTH CARE EDUCATION/TRAINING PROGRAM

## 2025-05-07 PROCEDURE — RXMED WILLOW AMBULATORY MEDICATION CHARGE: Performed by: STUDENT IN AN ORGANIZED HEALTH CARE EDUCATION/TRAINING PROGRAM

## 2025-05-07 RX ORDER — HYDROCHLOROTHIAZIDE 12.5 MG/1
CAPSULE ORAL
Qty: 6 EACH | Refills: 4 | Status: SHIPPED | OUTPATIENT
Start: 2025-05-07

## 2025-05-07 RX ORDER — WARFARIN SODIUM 5 MG/1
TABLET ORAL
COMMUNITY
Start: 2025-03-22

## 2025-05-07 RX ORDER — HYDROCORTISONE 25 MG/G
CREAM TOPICAL
COMMUNITY

## 2025-05-07 RX ORDER — DULAGLUTIDE 0.75 MG/.5ML
INJECTION, SOLUTION SUBCUTANEOUS
COMMUNITY
End: 2025-05-07

## 2025-05-07 ASSESSMENT — FIBROSIS 4 INDEX: FIB4 SCORE: 6.81

## 2025-05-08 ENCOUNTER — PHARMACY VISIT (OUTPATIENT)
Dept: PHARMACY | Facility: MEDICAL CENTER | Age: 85
End: 2025-05-08
Payer: COMMERCIAL

## 2025-05-08 ENCOUNTER — ANTICOAGULATION VISIT (OUTPATIENT)
Dept: MEDICAL GROUP | Facility: MEDICAL CENTER | Age: 85
End: 2025-05-08
Payer: MEDICARE

## 2025-05-08 VITALS
HEIGHT: 68 IN | DIASTOLIC BLOOD PRESSURE: 47 MMHG | BODY MASS INDEX: 24.86 KG/M2 | HEART RATE: 53 BPM | WEIGHT: 164.02 LBS | SYSTOLIC BLOOD PRESSURE: 116 MMHG

## 2025-05-08 DIAGNOSIS — Z95.828 PRESENCE OF IVC FILTER: ICD-10-CM

## 2025-05-08 LAB — INR PPP: 1.9 (ref 2–3.5)

## 2025-05-08 PROCEDURE — 99211 OFF/OP EST MAY X REQ PHY/QHP: CPT | Performed by: NURSE PRACTITIONER

## 2025-05-08 PROCEDURE — 3078F DIAST BP <80 MM HG: CPT | Performed by: NURSE PRACTITIONER

## 2025-05-08 PROCEDURE — 85610 PROTHROMBIN TIME: CPT | Performed by: NURSE PRACTITIONER

## 2025-05-08 PROCEDURE — 3074F SYST BP LT 130 MM HG: CPT | Performed by: NURSE PRACTITIONER

## 2025-05-08 ASSESSMENT — FIBROSIS 4 INDEX: FIB4 SCORE: 6.81

## 2025-05-08 NOTE — PROGRESS NOTES
"Anticoagulation Summary  As of 2025      INR goal:  2.0-3.0   TTR:  71.7% (7.2 y)   INR used for dosin.90 (2025)   Warfarin maintenance plan:  2.5 mg (5 mg x 0.5) every Thu; 5 mg (5 mg x 1) all other days   Weekly warfarin total:  32.5 mg   Plan last modified:  Skip Dowling PharmD (2022)   Next INR check:  2025   Priority:  Maintenance   Target end date:  Indefinite    Indications    Presence of IVC filter [Z95.828]  Transient ischemic attack [G45.9] (Resolved) [G45.9]  Deep vein thrombosis (DVT) of both lower extremities (HCC) (Resolved) [I82.403]  DVT (deep venous thrombosis) (HCC) (Resolved) [I82.409]  Multiple pulmonary emboli (HCC) (Resolved) [I26.99]  Chronic anticoagulation (Resolved) [Z79.01]                 Anticoagulation Episode Summary       INR check location:  --    Preferred lab:  --    Send INR reminders to:  --    Comments:  --          Anticoagulation Care Providers       Provider Role Specialty Phone number    [facility]   [phone]    Edward Walters M.D.  Endocrinology [phone]                Refer to Patient Findings for HPI:  Patient Findings       Negatives:  Signs/symptoms of thrombosis, Signs/symptoms of bleeding, Laboratory test error suspected, Change in health, Change in alcohol use, Change in activity, Upcoming invasive procedure, Emergency department visit, Upcoming dental procedure, Missed doses, Extra doses, Change in medications, Change in diet/appetite, Hospital admission, Bruising, Other complaints          Clinical Outcomes       Negatives:  Major bleeding event, Thromboembolic event, Anticoagulation-related hospital admission, Anticoagulation-related ED visit, Anticoagulation-related fatality            Date Referral Placed: 25      Vitals:  Vitals:    25 0918   BP: 116/47   Pulse: (!) 53   Weight: 74.4 kg (164 lb 0.4 oz)   Height: 1.727 m (5' 8\")       Verified current warfarin dosing schedule.    Medications " reconciled.  Pt is on antiplatelet therapy with aspirin 81mg for history of multiple thromboembolisms.       A/P   INR is slightly subtherapeutic  Reason(s) for out of range INR today: No obvious causes      Warfarin dosing recommendation: Continue regimen as listed above.    Pt educated to contact our clinic with any changes in medications or s/s of bleeding or thrombosis. Pt is aware to seek immediate medical attention for falls, head injury or deep cuts.    Request pt to return in 2 week(s). Pt agrees.    Zay EckertD

## 2025-05-13 ENCOUNTER — PHYSICAL THERAPY (OUTPATIENT)
Dept: PHYSICAL THERAPY | Facility: MEDICAL CENTER | Age: 85
End: 2025-05-13
Attending: FAMILY MEDICINE
Payer: MEDICARE

## 2025-05-13 DIAGNOSIS — M25.552 LEFT HIP PAIN: ICD-10-CM

## 2025-05-13 PROCEDURE — 97110 THERAPEUTIC EXERCISES: CPT

## 2025-05-13 PROCEDURE — 97162 PT EVAL MOD COMPLEX 30 MIN: CPT

## 2025-05-13 ASSESSMENT — ENCOUNTER SYMPTOMS
QUALITY: ACHING
PAIN SCALE AT HIGHEST: 6
EXACERBATED BY: PROLONGED SITTING
PAIN SCALE AT LOWEST: 0
EXACERBATED BY: PROLONGED STANDING
PAIN SCALE: 0

## 2025-05-13 NOTE — OP THERAPY EVALUATION
Outpatient Physical Therapy  INITIAL EVALUATION    [facility]  [address]  [place] [address]  Phone:  [phone]  Fax:  [phone]    Date of Evaluation: 2025    Patient: Kevin Paredes  YOB: 1940  MRN: [id number]     Referring Provider: Damion Larose M.D.  [address]  [address]  [place] [address]   Referring Diagnosis Left hip pain [M25.552]     Time Calculation  Start time: 1415  Stop time: 1455 Time Calculation (min): 40 minutes         Chief Complaint: Hip Problem    Visit Diagnoses     ICD-10-CM   1. Left hip pain  M25.552       Date of onset of impairment: No data found    Subjective:   History of Present Illness:     Mechanism of injury:  Pt reports that when he sits too long or stands too long his L hip will become painful. He reports since his heart surgery 20+ years ago, he had been walking on the treadmill. He reports he has begun to have more pain in the hip when walking after 10-15 mins. He reports this walking related pain began about  1-2 months ago. He reports he could not lay on his R side for a while and had to switch to his L hip. He reports he consistently has to sleep in his recliner at night for 3 hours because of hip pain.   Sleep disturbance:  Interrupted sleep  Pain:     Current pain ratin    At best pain ratin    At worst pain ratin    Quality:  Aching    Aggravating factors:  Prolonged sitting and prolonged standing    Progression:  Worsening  Social Support:     Lives in:  One-story house    Lives with:  Spouse  Patient Goals:     Patient goals for therapy:  Decreased pain and return to sport/leisure activities    Other patient goals:  Return to regular walking on treadmill      Past Medical History:   Diagnosis Date    Anesthesia     difficult intubation with surgery ,    Basal cell carcinoma of skin     CAD (coronary artery disease)     Cancer (HCC)      rt  kidney 1989;  thyroid cancer 2012, prostate 2008, skin    Chronic kidney disease (CKD) stage G3b/A2, moderately decreased glomerular filtration rate (GFR) between 30-44 mL/min/1.73 square meter and albuminuria creatinine ratio between  mg/g 4/23/2015    DVT (deep venous thrombosis) (HCC) 2014    ED (erectile dysfunction)     GERD (gastroesophageal reflux disease)     Glaucoma     Heart burn     Hyperlipidemia 12/3/2012    Hypertension     Indigestion     Multiple pulmonary emboli (HCC) 2014    Multiple thyroid nodules 2009    Papillary carcinoma of thyroid (HCC) 2014    R 2 foci / 4.5mm,0.25mm / no mets/ pT1pN0    postsurgical hypothyroidism 2014    Pre-diabetes     Renal disorder     rt kidney cancer,nephrectomy    S/P CABG x 4 2003    S/P colectomy 2010    multiple colon polyps / no Ca    S/p nephrectomy 1998    carcinoma    S/P prostatectomy 2008    carcinoma    Stroke (HCC)     'mild',no residual,'one doctor said it was a tia'    Transient renal failure 2014    renal vein thrombosis    Type II or unspecified type diabetes mellitus without mention of complication, not stated as uncontrolled     on no medications    Unspecified urinary incontinence      Past Surgical History:   Procedure Laterality Date    THYROIDECTOMY TOTAL  5/13/2014    Performed by Eliceo Bolanos M.D. at SURGERY SAME DAY [facility]    NODE DISSECTION  5/13/2014    Performed by Eliceo Bolanos M.D. at SURGERY SAME DAY [facility] ORS    COLON RESECTION      MULTIPLE CORONARY ARTERY BYPASS      x 4 11/2003    NEPHRECTOMY RADICAL      right side 1998    OTHER ORTHOPEDIC SURGERY      trotator cuff    PROSTATECTOMY, RADICAL RETRO      cancer /january 2008     Social History     Tobacco Use    Smoking status: Never    Smokeless tobacco: Never   Substance Use Topics    Alcohol use: Yes     Comment: Occasionally     Family and Occupational History     Socioeconomic History    Marital status:      Spouse name: Not on file    Number of  children: Not on file    Years of education: Not on file    Highest education level: Not on file   Occupational History    Not on file       Objective     Passive Range of Motion   Left Hip   Flexion: WFL  External rotation (90/90): 25 degrees   Internal rotation (90/90): 5 degrees     Right Hip   Flexion: WFL  External rotation (90/90): 25 degrees   Internal rotation (90/90): 5 degrees     Additional Passive Range of Motion Details  Significant reduction of IR ROM (firm end feel)        Therapeutic Exercises (CPT 20001):     1. Bridge, x10, HEP    2. Clamshell, x10, HEP    3. BKFO, x10, HEP    4. Sit to stand, x10, HEP    5. SLR, x10, HEP      Time-based treatments/modalities:    Physical Therapy Timed Treatment Charges  Therapeutic exercise minutes (CPT 70203): 15 minutes      Assessment, Response and Plan:   Impairments: abnormal or restricted ROM, activity intolerance, hypersensitivity, lacks appropriate home exercise program and pain with function    Assessment details:  Pt is an 83 y/o M presenting to PT with signs and symptoms consistent with L hip pain secondary to OA changes. Pt has deficits of decreased tolerance for walking, pain in the L hip, decreased L hip ROM. Given pt's age, overall health, current condition and adherence to PT recommendations, anticipated duration of episode is 8 weeks.      Goals:   Short Term Goals:   1. Pt will achieve 10 or lower on WOMAC  2. Pt will report improved tolerance of treadmill walking to greater than 10 minutes  Short term goal time span:  2-4 weeks      Long Term Goals:    1. Pt will report improved tolerance of treadmill walking to greater than 15 minutes  2. Pt will achieve improved ER of the L hip to 30 degrees  3. Pt will achieve improved IR of the L hip to 10 degrees  Long term goal time span:  6-8 weeks    Plan:   Therapy options:  Physical therapy treatment to continue  Planned therapy interventions:  Neuromuscular Re-education (CPT 53782), Therapeutic  Activities (CPT 14163), Therapeutic Exercise (CPT 99770) and Manual Therapy (CPT 84437)  Frequency:  2x week  Duration in weeks:  8  Discussed with:  Patient      Functional Assessment Used  WOMAC Grand Total: 15.63     Referring provider co-signature:  I have reviewed this plan of care and my co-signature certifies the need for services.    Certification Period: 05/13/2025 to  07/13/25    Physician Signature: ________________________________ Date: ______________

## 2025-05-15 ENCOUNTER — PHYSICAL THERAPY (OUTPATIENT)
Dept: PHYSICAL THERAPY | Facility: MEDICAL CENTER | Age: 85
End: 2025-05-15
Attending: FAMILY MEDICINE
Payer: MEDICARE

## 2025-05-15 DIAGNOSIS — M25.552 LEFT HIP PAIN: Primary | ICD-10-CM

## 2025-05-15 PROCEDURE — 97110 THERAPEUTIC EXERCISES: CPT

## 2025-05-15 NOTE — OP THERAPY DAILY TREATMENT
Outpatient Physical Therapy  DAILY TREATMENT     [facility] Outpatient Physical Therapy  [address]  [place] [address]  Phone:  [phone]  Fax:  [phone]    Date: 05/15/2025    Patient: Kevin Paredes  YOB: 1940  MRN: [id number]     Time Calculation    Start time: 1330  Stop time: 1409 Time Calculation (min): 39 minutes         Chief Complaint: Hip Problem    Visit #: 2    SUBJECTIVE:  Pt reports he has been walking at 2.5 mph for 7.5 minutes multiple times a day with little to no hip pain.             Therapeutic Exercises (CPT 99962):     1. Bridge, 2x12, HEP    2. Clamshell, NT, HEP    3. BKFO, x10, HEP    4. Sit to stand, x10, HEP    5. SLR, 2x8, HEP    6. standing hip abd, [place] TB 2x10, HEP    7. Standing hip extension, [place] TB 2x10    9. Nustep, 5 mins, warm up      Time-based treatments/modalities:    Physical Therapy Timed Treatment Charges  Therapeutic exercise minutes (CPT 98380): 39 minutes        ASSESSMENT:   Response to treatment: Pt had good tolerance for today's PT session. Pt will continue to benefit from skilled PT to address aforementioned deficits.      PLAN/RECOMMENDATIONS:   Plan for treatment: therapy treatment to continue next visit.  Planned interventions for next visit: continue with current treatment.

## 2025-05-20 ENCOUNTER — PHYSICAL THERAPY (OUTPATIENT)
Dept: PHYSICAL THERAPY | Facility: MEDICAL CENTER | Age: 85
End: 2025-05-20
Attending: FAMILY MEDICINE
Payer: MEDICARE

## 2025-05-20 DIAGNOSIS — M25.552 LEFT HIP PAIN: Primary | ICD-10-CM

## 2025-05-20 NOTE — OP THERAPY DAILY TREATMENT
Outpatient Physical Therapy  DAILY TREATMENT     [facility] Outpatient Physical Therapy  [address]  [place] [address]  Phone:  [phone]  Fax:  [phone]    Date: 05/20/2025    Patient: Kevin Paredes  YOB: 1940  MRN: [id number]     Time Calculation    Start time: 1416  Stop time: 1447 Time Calculation (min): 31 minutes         Chief Complaint: Hip Problem    Visit #: 3    SUBJECTIVE:  Pt reports continued improvement of his L hip pain and increased walking.               Therapeutic Exercises (CPT 97150):     1. Bridge, 2x12, HEP, HEP    3. BKFO, x10, HEP    4. Sit to stand, x10, HEP    5. SLR, 2x8, HEP    6. standing hip abd, [place] TB 2x10, HEP    7. Standing hip extension, [place] TB 2x10    9. Nustep, 5 mins, warm up      Time-based treatments/modalities:    Physical Therapy Timed Treatment Charges  Therapeutic exercise minutes (CPT 88799): 31 minutes        ASSESSMENT:   Response to treatment: Pt had good tolerance for today's PT session. Pt will continue to benefit from skilled PT to address aforementioned deficits.      PLAN/RECOMMENDATIONS:   Plan for treatment: therapy treatment to continue next visit.  Planned interventions for next visit: continue with current treatment.

## 2025-05-21 ENCOUNTER — TELEPHONE (OUTPATIENT)
Dept: ENDOCRINOLOGY | Facility: MEDICAL CENTER | Age: 85
End: 2025-05-21
Payer: MEDICARE

## 2025-05-21 NOTE — TELEPHONE ENCOUNTER
DOCUMENTATION OF PAR STATUS: incoming fax request for PA (Key: [id number])    1. Name of Medication & Dose: Freestyle Michelle 3 plus sensors     2. Name of Prescription Coverage Company & phone #: [employer]holder [employer] PART d epa    3. Date Prior Auth Submitted: 05/21/2025    4. What information was given to obtain insurance decision? Recent chart notes    5. Prior Auth Status? Pending    6. Patient Notified: N\A

## 2025-05-22 ENCOUNTER — ANTICOAGULATION VISIT (OUTPATIENT)
Dept: MEDICAL GROUP | Facility: MEDICAL CENTER | Age: 85
End: 2025-05-22
Payer: MEDICARE

## 2025-05-22 ENCOUNTER — APPOINTMENT (OUTPATIENT)
Dept: PHYSICAL THERAPY | Facility: MEDICAL CENTER | Age: 85
End: 2025-05-22
Attending: FAMILY MEDICINE
Payer: MEDICARE

## 2025-05-22 VITALS
WEIGHT: 162.15 LBS | DIASTOLIC BLOOD PRESSURE: 58 MMHG | SYSTOLIC BLOOD PRESSURE: 120 MMHG | BODY MASS INDEX: 24.65 KG/M2 | HEART RATE: 58 BPM

## 2025-05-22 DIAGNOSIS — Z95.828 PRESENCE OF IVC FILTER: Primary | ICD-10-CM

## 2025-05-22 LAB — INR PPP: 2.1 (ref 2–3.5)

## 2025-05-22 PROCEDURE — 93793 ANTICOAG MGMT PT WARFARIN: CPT | Performed by: NURSE PRACTITIONER

## 2025-05-22 PROCEDURE — 3074F SYST BP LT 130 MM HG: CPT | Performed by: NURSE PRACTITIONER

## 2025-05-22 PROCEDURE — 85610 PROTHROMBIN TIME: CPT | Performed by: NURSE PRACTITIONER

## 2025-05-22 PROCEDURE — 3078F DIAST BP <80 MM HG: CPT | Performed by: NURSE PRACTITIONER

## 2025-05-22 ASSESSMENT — FIBROSIS 4 INDEX: FIB4 SCORE: 6.81

## 2025-05-22 NOTE — PROGRESS NOTES
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  Endocrinology [phone]                Refer to Patient Findings for HPI:  Patient Findings       Negatives:  Signs/symptoms of thrombosis, Signs/symptoms of bleeding, Laboratory test error suspected, Change in health, Change in alcohol use, Change in activity, Upcoming invasive procedure, Emergency department visit, Upcoming dental procedure, Missed doses, Extra doses, Change in medications, Change in diet/appetite, Hospital admission, Bruising, Other complaints          Clinical Outcomes       Negatives:  Major bleeding event, Thromboembolic event, Anticoagulation-related hospital admission, Anticoagulation-related ED visit, Anticoagulation-related fatality            Date Referral Placed: 25      Vitals:  Vitals:    25 0919   BP: 120/58   Pulse: (!) 58   Weight: 73.5 kg (162 lb 2.4 oz)       Verified current warfarin dosing schedule.    Medications reconciled.  Pt is on  antiplatelet therapy with aspirin 81mg for history of multiple thromboembolisms       A/P   INR is therapeutic  Reason(s) for out of range INR today: N/A      Warfarin dosing recommendation: Continue regimen as listed above.    Pt educated to contact our clinic with any changes in medications or s/s of bleeding or thrombosis. Pt is aware to seek immediate medical attention for falls, head injury or deep cuts.    Request pt to return in 2 week(s). Pt agrees.    Zay EckertD

## 2025-05-27 ENCOUNTER — APPOINTMENT (OUTPATIENT)
Dept: PHYSICAL THERAPY | Facility: MEDICAL CENTER | Age: 85
End: 2025-05-27
Attending: FAMILY MEDICINE
Payer: MEDICARE

## 2025-05-29 ENCOUNTER — PHYSICAL THERAPY (OUTPATIENT)
Dept: PHYSICAL THERAPY | Facility: MEDICAL CENTER | Age: 85
End: 2025-05-29
Attending: FAMILY MEDICINE
Payer: MEDICARE

## 2025-05-29 DIAGNOSIS — M25.552 LEFT HIP PAIN: Primary | ICD-10-CM

## 2025-05-29 PROCEDURE — 97110 THERAPEUTIC EXERCISES: CPT

## 2025-05-29 PROCEDURE — 97530 THERAPEUTIC ACTIVITIES: CPT

## 2025-05-29 NOTE — OP THERAPY DAILY TREATMENT
Outpatient Physical Therapy  DAILY TREATMENT     [facility] Outpatient Physical Therapy  [address]  [place] [address]  Phone:  [phone]  Fax:  [phone]    Date: 05/29/2025    Patient: Kevin Paredes  YOB: 1940  MRN: [id number]     Time Calculation    Start time: 1416  Stop time: 1455 Time Calculation (min): 39 minutes         Chief Complaint: Hip Problem    Visit #: 4    SUBJECTIVE:  Pt reports near 100% resolution of symptoms      WOMAC Grand Total: 4.17       Therapeutic Exercises (CPT 60872):     1. Bridge, 2x12, HEP, HEP    3. BKFO, x10, HEP    4. Sit to stand, x10, HEP    5. SLR, x15, HEP    6. standing hip abd, [place] TB 2x10, HEP    7. Standing hip extension, [place] TB 2x10    9. Nustep, 5 mins, warm up    Therapeutic Treatments and Modalities:     1. Therapeutic Activities (CPT 59251), progress check    Therapeutic Treatment and Modalities Summary: WOMAC  Hip measures:  PROM Hip IR: 12 degrees  PROM Hip ER: 33 degrees    Time-based treatments/modalities:    Physical Therapy Timed Treatment Charges  Therapeutic activity minutes (CPT 36654): 15 minutes  Therapeutic exercise minutes (CPT 70238): 24 minutes        ASSESSMENT:   Response to treatment: Pt has achieved all goals and is discharged from PT.      PLAN/RECOMMENDATIONS:   Plan for treatment: discharge patient due to accomplished goals.  Planned interventions for next visit: Discharge.

## 2025-05-29 NOTE — OP THERAPY PROGRESS SUMMARY
Outpatient Physical Therapy  {PROGRESS/RE-EVAL:[id number]} NOTE      [facility] Outpatient Physical Therapy  [address]  [place] [address]  Phone:  [phone]  Fax:  [phone]    Date of Visit: 05/29/2025    Patient: Kevin Paredes  YOB: 1940  MRN: [id number]     Referring Provider: Damion Larose M.D.  [address] [facility]  [address]  [place] [address]   Referring Diagnosis Pain in left hip [M25.552]     {No diagnosis found. (Refresh or delete this SmartLink)}    Rehab Potential: {excellent/good/fair/poor:40712}    Progress Report Period: ***    Functional Assessment Used          Objective Findings and Assessment:   Patient progression towards goals: Short Term Goals:   1. Pt will achieve 10 or lower on WOMAC  2. Pt will report improved tolerance of treadmill walking to greater than 10 minutes (achieved)      Long Term Goals:    1. Pt will report improved tolerance of treadmill walking to greater than 15 minutes  2. Pt will achieve improved ER of the L hip to 30 degrees  3. Pt will achieve improved IR of the L hip to 10 degrees          Referring provider co-signature:  I have reviewed this plan of care and my co-signature certifies the need for services.     Certification Period: 05/29/2025 to {Future Date:97928}    Physician Signature: ________________________________ Date: ______________

## 2025-05-29 NOTE — OP THERAPY DISCHARGE SUMMARY
Outpatient Physical Therapy  DISCHARGE SUMMARY NOTE      [facility] Outpatient Physical Therapy  [address]  [place] [address]  Phone:  [phone]  Fax:  [phone]    Date of Visit: 05/29/2025    Patient: Kevin Paredes  YOB: 1940  MRN: [id number]     Referring Provider: Damion Larose M.D.  [address] [facility] [address]  [place] [address]   Referring Diagnosis Pain in left hip [M25.552]         Functional Assessment Used  WOMAC Grand Total: 4.17     Your patient is being discharged from Physical Therapy with the following comments:   Goals met    Comments:  Discharge     Limitations Remaining:  None    Recommendations:  Discharge    Joe Hardwick, PT    Date: 5/29/2025

## 2025-06-05 ENCOUNTER — APPOINTMENT (OUTPATIENT)
Dept: PHYSICAL THERAPY | Facility: MEDICAL CENTER | Age: 85
End: 2025-06-05
Attending: FAMILY MEDICINE
Payer: MEDICARE

## 2025-06-09 ENCOUNTER — PROCEDURE VISIT (OUTPATIENT)
Dept: ENDOCRINOLOGY | Facility: MEDICAL CENTER | Age: 85
End: 2025-06-09
Attending: STUDENT IN AN ORGANIZED HEALTH CARE EDUCATION/TRAINING PROGRAM
Payer: MEDICARE

## 2025-06-09 ENCOUNTER — ANTICOAGULATION VISIT (OUTPATIENT)
Dept: MEDICAL GROUP | Facility: MEDICAL CENTER | Age: 85
End: 2025-06-09
Payer: MEDICARE

## 2025-06-09 ENCOUNTER — HOSPITAL ENCOUNTER (OUTPATIENT)
Facility: MEDICAL CENTER | Age: 85
End: 2025-06-09
Attending: INTERNAL MEDICINE
Payer: MEDICARE

## 2025-06-09 VITALS
HEART RATE: 52 BPM | WEIGHT: 162.48 LBS | SYSTOLIC BLOOD PRESSURE: 116 MMHG | HEIGHT: 68 IN | BODY MASS INDEX: 24.62 KG/M2 | DIASTOLIC BLOOD PRESSURE: 84 MMHG

## 2025-06-09 DIAGNOSIS — E89.0 POSTOPERATIVE HYPOTHYROIDISM: Primary | ICD-10-CM

## 2025-06-09 DIAGNOSIS — C80.1 PAPILLARY ADENOCARCINOMA (HCC): ICD-10-CM

## 2025-06-09 DIAGNOSIS — R59.1 LYMPHADENOPATHY: ICD-10-CM

## 2025-06-09 DIAGNOSIS — Z95.828 PRESENCE OF IVC FILTER: Primary | ICD-10-CM

## 2025-06-09 LAB
INR PPP: 1.9 (ref 2–3.5)
PATHOLOGY CONSULT NOTE: NORMAL

## 2025-06-09 PROCEDURE — 10005 FNA BX W/US GDN 1ST LES: CPT | Performed by: INTERNAL MEDICINE

## 2025-06-09 PROCEDURE — 88173 CYTOPATH EVAL FNA REPORT: CPT | Performed by: PATHOLOGY

## 2025-06-09 PROCEDURE — 84432 ASSAY OF THYROGLOBULIN: CPT

## 2025-06-09 PROCEDURE — 99211 OFF/OP EST MAY X REQ PHY/QHP: CPT | Performed by: STUDENT IN AN ORGANIZED HEALTH CARE EDUCATION/TRAINING PROGRAM

## 2025-06-09 PROCEDURE — 88173 CYTOPATH EVAL FNA REPORT: CPT | Mod: 26 | Performed by: PATHOLOGY

## 2025-06-09 PROCEDURE — 3079F DIAST BP 80-89 MM HG: CPT | Performed by: STUDENT IN AN ORGANIZED HEALTH CARE EDUCATION/TRAINING PROGRAM

## 2025-06-09 PROCEDURE — 3074F SYST BP LT 130 MM HG: CPT | Performed by: STUDENT IN AN ORGANIZED HEALTH CARE EDUCATION/TRAINING PROGRAM

## 2025-06-09 PROCEDURE — 85610 PROTHROMBIN TIME: CPT | Performed by: STUDENT IN AN ORGANIZED HEALTH CARE EDUCATION/TRAINING PROGRAM

## 2025-06-09 ASSESSMENT — FIBROSIS 4 INDEX: FIB4 SCORE: 6.81

## 2025-06-09 NOTE — PROGRESS NOTES
POC US guided FNA procedure was completed today for the suspicious Right lateral neck LN measuring 1.11 cm   Please see endocrinologist procedure note  Patient tolerated procedure well without complaints.  Patient was advised that she will be contacted regarding the results and next plan  Patient was advised to follow up as planned with labs prior   Patient was advised to take tylenol or ibuprofen for pain  No restrictions were advised post procedure   Patient was advised to call for any issues post biopsy       Conrad Duron M.D.

## 2025-06-09 NOTE — PROGRESS NOTES
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]  Transient ischemic attack [G45.9] (Resolved) [G45.9]  Deep vein thrombosis (DVT) of both lower extremities (HCC) (Resolved) [I82.403]  DVT (deep venous thrombosis) (HCC) (Resolved) [I82.409]  Multiple pulmonary emboli (HCC) (Resolved) [I26.99]  Chronic anticoagulation (Resolved) [Z79.01]                 Anticoagulation Episode Summary       INR check location:  --    Preferred lab:  --    Send INR reminders to:  AMB ANTICOAG POOL    Comments:  --          Anticoagulation Care Providers       Provider Role Specialty Phone number    [facility]   [phone]    Edward Walters M.D.  Endocrinology [phone]                Refer to Patient Findings for HPI:  Patient Findings       Negatives:  Signs/symptoms of thrombosis, Signs/symptoms of bleeding, Laboratory test error suspected, Change in health, Change in alcohol use, Change in activity, Upcoming invasive procedure, Emergency department visit, Upcoming dental procedure, Missed doses, Extra doses, Change in medications, Change in diet/appetite, Hospital admission, Bruising, Other complaints          Clinical Outcomes       Negatives:  Major bleeding event, Thromboembolic event, Anticoagulation-related hospital admission, Anticoagulation-related ED visit, Anticoagulation-related fatality            Date Referral Placed: 25      Vitals:  Vitals:    25 1045   BP: 116/84   Pulse: (!) 52   Weight: 73.7 kg (162 lb 7.7 oz)   Height: 1.727 m (5' 8\")       Verified current warfarin dosing " schedule.    Medications reconciled.  Pt is on antiplatelet therapy with aspirin 81mg for history of multiple thromboembolisms.       A/P   INR is slightly subtherapeutic  Reason(s) for out of range INR today: No obvious causes        Warfarin dosing recommendation: Continue regimen as listed above.    Pt educated to contact our clinic with any changes in medications or s/s of bleeding or thrombosis. Pt is aware to seek immediate medical attention for falls, head injury or deep cuts.    Request pt to return in 2 week(s). Pt agrees.    Zay EckertD

## 2025-06-10 ENCOUNTER — APPOINTMENT (OUTPATIENT)
Dept: PHYSICAL THERAPY | Facility: MEDICAL CENTER | Age: 85
End: 2025-06-10
Attending: FAMILY MEDICINE
Payer: MEDICARE

## 2025-06-11 ENCOUNTER — RESULTS FOLLOW-UP (OUTPATIENT)
Dept: ENDOCRINOLOGY | Facility: MEDICAL CENTER | Age: 85
End: 2025-06-11

## 2025-06-11 LAB — MISCELLANEOUS LAB RESULT MISCLAB: NORMAL

## 2025-06-18 ENCOUNTER — RESULTS FOLLOW-UP (OUTPATIENT)
Dept: ENDOCRINOLOGY | Facility: MEDICAL CENTER | Age: 85
End: 2025-06-18

## 2025-06-23 ENCOUNTER — ANTICOAGULATION VISIT (OUTPATIENT)
Dept: MEDICAL GROUP | Facility: MEDICAL CENTER | Age: 85
End: 2025-06-23
Payer: MEDICARE

## 2025-06-23 VITALS — RESPIRATION RATE: 15 BRPM | WEIGHT: 162 LBS | BODY MASS INDEX: 24.55 KG/M2 | HEIGHT: 68 IN

## 2025-06-23 DIAGNOSIS — Z95.828 PRESENCE OF IVC FILTER: Primary | ICD-10-CM

## 2025-06-23 LAB — INR PPP: 1.8 (ref 2–3.5)

## 2025-06-23 PROCEDURE — 85610 PROTHROMBIN TIME: CPT | Performed by: STUDENT IN AN ORGANIZED HEALTH CARE EDUCATION/TRAINING PROGRAM

## 2025-06-23 PROCEDURE — 99211 OFF/OP EST MAY X REQ PHY/QHP: CPT | Performed by: STUDENT IN AN ORGANIZED HEALTH CARE EDUCATION/TRAINING PROGRAM

## 2025-06-23 ASSESSMENT — FIBROSIS 4 INDEX: FIB4 SCORE: 6.81

## 2025-06-23 NOTE — PROGRESS NOTES
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]  Transient ischemic attack [G45.9] (Resolved) [G45.9]  Deep vein thrombosis (DVT) of both lower extremities (HCC) (Resolved) [I82.403]  DVT (deep venous thrombosis) (HCC) (Resolved) [I82.409]  Multiple pulmonary emboli (HCC) (Resolved) [I26.99]  Chronic anticoagulation (Resolved) [Z79.01]                 Anticoagulation Episode Summary       INR check location:  --    Preferred lab:  --    Send INR reminders to:  AMB ANTICOAG POOL    Comments:  --          Anticoagulation Care Providers       Provider Role Specialty Phone number    [facility]   [phone]    Edward Walters M.D.  Endocrinology [phone]                Refer to Patient Findings for HPI:  Patient Findings       Negatives:  Signs/symptoms of thrombosis, Signs/symptoms of bleeding, Laboratory test error suspected, Change in health, Change in alcohol use, Change in activity, Upcoming invasive procedure, Emergency department visit, Upcoming dental procedure, Missed doses, Extra doses, Change in medications, Change in diet/appetite, Hospital admission, Bruising, Other complaints          Clinical Outcomes       Negatives:  Major bleeding event, Thromboembolic event, Anticoagulation-related hospital admission, Anticoagulation-related ED visit, Anticoagulation-related fatality            Date Referral Placed: 25      Vitals:  Vitals:    25 0937   Resp: 15   Weight: 73.5 kg (162 lb)   Height: 1.727 m (5' 8\")     Pt declined vitals    Verified current warfarin dosing " schedule.    Medications reconciled.  Pt is on antiplatelet therapy with aspirin 81mg for history of multiple thromboembolisms.      A/P   INR is slightly subtherapeutic  Reason(s) for out of range INR today: No obvious causes        Warfarin dosing recommendation: Increase to Warfarin 7.5 mg and then Continue regimen as listed above.    Pt educated to contact our clinic with any changes in medications or s/s of bleeding or thrombosis. Pt is aware to seek immediate medical attention for falls, head injury or deep cuts.    Request pt to return in 3 week(s). Pt agrees.    Madhavi Elizabeth, ZayD

## 2025-06-26 ENCOUNTER — TELEPHONE (OUTPATIENT)
Dept: CARDIOLOGY | Facility: MEDICAL CENTER | Age: 85
End: 2025-06-26
Payer: MEDICARE

## 2025-06-26 NOTE — TELEPHONE ENCOUNTER
----- Message from Nurse Liv CHENEY R.N. sent at 6/26/2025  7:32 AM PDT -----    ----- Message -----  From: JOHN Vaughn  Sent: 6/26/2025   7:21 AM PDT  To: YRN Nichole,    I was wondering if you can call this patient and see what home BP numbers are?  He has been controled previously but had an elevated BP at anticoagualtion visit earlier this month.     Thank you,  Mayra

## 2025-06-26 NOTE — TELEPHONE ENCOUNTER
Phone Number Called: [phone]    Call outcome: Spoke to patient regarding message below.    Message: Called to discuss BP's. Pt reports he has not been taking his home BP. Discussed with patient to take his BP over the next couple days and I will check back on Monday next week to compare readings. Pt verbalized understanding.

## 2025-06-26 NOTE — TELEPHONE ENCOUNTER
Phone Number Called: [phone]    Call outcome: Left detailed message for patient. Informed to call back with any additional questions.    Message: Called to discuss recent BP's per EA. Left VM, asking pt to return call to discuss BP.

## 2025-07-01 VITALS — DIASTOLIC BLOOD PRESSURE: 67 MMHG | SYSTOLIC BLOOD PRESSURE: 126 MMHG

## 2025-07-08 ENCOUNTER — APPOINTMENT (OUTPATIENT)
Dept: PHYSICAL THERAPY | Facility: MEDICAL CENTER | Age: 85
End: 2025-07-08
Attending: FAMILY MEDICINE
Payer: MEDICARE

## 2025-07-10 ENCOUNTER — APPOINTMENT (OUTPATIENT)
Dept: PHYSICAL THERAPY | Facility: MEDICAL CENTER | Age: 85
End: 2025-07-10
Attending: FAMILY MEDICINE
Payer: MEDICARE

## 2025-07-14 ENCOUNTER — ANTICOAGULATION VISIT (OUTPATIENT)
Dept: MEDICAL GROUP | Facility: MEDICAL CENTER | Age: 85
End: 2025-07-14
Payer: MEDICARE

## 2025-07-14 VITALS
BODY MASS INDEX: 24.32 KG/M2 | HEART RATE: 58 BPM | WEIGHT: 160.5 LBS | DIASTOLIC BLOOD PRESSURE: 58 MMHG | SYSTOLIC BLOOD PRESSURE: 108 MMHG | HEIGHT: 68 IN

## 2025-07-14 DIAGNOSIS — Z95.828 PRESENCE OF IVC FILTER: Primary | ICD-10-CM

## 2025-07-14 LAB — INR PPP: 1.5 (ref 2–3.5)

## 2025-07-14 PROCEDURE — 3078F DIAST BP <80 MM HG: CPT | Performed by: STUDENT IN AN ORGANIZED HEALTH CARE EDUCATION/TRAINING PROGRAM

## 2025-07-14 PROCEDURE — 85610 PROTHROMBIN TIME: CPT | Performed by: STUDENT IN AN ORGANIZED HEALTH CARE EDUCATION/TRAINING PROGRAM

## 2025-07-14 PROCEDURE — 99211 OFF/OP EST MAY X REQ PHY/QHP: CPT | Performed by: STUDENT IN AN ORGANIZED HEALTH CARE EDUCATION/TRAINING PROGRAM

## 2025-07-14 PROCEDURE — 3074F SYST BP LT 130 MM HG: CPT | Performed by: STUDENT IN AN ORGANIZED HEALTH CARE EDUCATION/TRAINING PROGRAM

## 2025-07-14 ASSESSMENT — FIBROSIS 4 INDEX: FIB4 SCORE: 6.81

## 2025-07-14 NOTE — PROGRESS NOTES
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]  Transient ischemic attack [G45.9] (Resolved) [G45.9]  Deep vein thrombosis (DVT) of both lower extremities (HCC) (Resolved) [I82.403]  DVT (deep venous thrombosis) (HCC) (Resolved) [I82.409]  Multiple pulmonary emboli (HCC) (Resolved) [I26.99]  Chronic anticoagulation (Resolved) [Z79.01]                 Anticoagulation Episode Summary       INR check location:  --    Preferred lab:  --    Send INR reminders to:  AMB ANTICOAG POOL    Comments:  --          Anticoagulation Care Providers       Provider Role Specialty Phone number    [facility]   [phone]    Edward Walters M.D.  Endocrinology [phone]                Refer to Patient Findings for HPI:  Patient Findings       Positives:  Missed doses    Negatives:  Signs/symptoms of thrombosis, Signs/symptoms of bleeding, Laboratory test error suspected, Change in health, Change in alcohol use, Change in activity, Upcoming invasive procedure, Emergency department visit, Upcoming dental procedure, Extra doses, Change in medications, Change in diet/appetite, Hospital admission, Bruising, Other complaints          Clinical Outcomes       Negatives:  Major bleeding event, Thromboembolic event, Anticoagulation-related hospital admission, Anticoagulation-related ED visit, Anticoagulation-related fatality            Date Referral Placed: 25      Vitals:  Vitals:    25 1045   BP: 108/58   Pulse: (!) 58   Weight: 72.8 kg (160 lb 7.9 oz)   Height: 1.727 m (5' 8\")       Verified current warfarin " dosing schedule.    Medications reconciled.  Pt is on antiplatelet therapy with aspirin 81mg for history of multiple thromboembolisms.       A/P   INR is subtherapeutic  Reason(s) for out of range INR today: N/A        Warfarin dosing recommendation: Increase to Warfarin 7.5 mg today and 5 mg for the rest of the week then resume previous warfarin dosing.     Pt educated to contact our clinic with any changes in medications or s/s of bleeding or thrombosis. Pt is aware to seek immediate medical attention for falls, head injury or deep cuts.    Request pt to return in 2 week(s). Pt agrees.    Madhavi Elizabeth, ZayD

## 2025-07-15 ENCOUNTER — APPOINTMENT (OUTPATIENT)
Dept: PHYSICAL THERAPY | Facility: MEDICAL CENTER | Age: 85
End: 2025-07-15
Attending: FAMILY MEDICINE
Payer: MEDICARE

## 2025-07-21 PROCEDURE — RXMED WILLOW AMBULATORY MEDICATION CHARGE: Performed by: STUDENT IN AN ORGANIZED HEALTH CARE EDUCATION/TRAINING PROGRAM

## 2025-07-22 ENCOUNTER — PHARMACY VISIT (OUTPATIENT)
Dept: PHARMACY | Facility: MEDICAL CENTER | Age: 85
End: 2025-07-22
Payer: COMMERCIAL

## 2025-07-28 ENCOUNTER — ANTICOAGULATION VISIT (OUTPATIENT)
Dept: MEDICAL GROUP | Facility: MEDICAL CENTER | Age: 85
End: 2025-07-28
Payer: MEDICARE

## 2025-07-28 VITALS
DIASTOLIC BLOOD PRESSURE: 74 MMHG | SYSTOLIC BLOOD PRESSURE: 115 MMHG | WEIGHT: 161.38 LBS | HEIGHT: 68 IN | HEART RATE: 64 BPM | BODY MASS INDEX: 24.46 KG/M2

## 2025-07-28 DIAGNOSIS — Z95.828 PRESENCE OF IVC FILTER: Primary | ICD-10-CM

## 2025-07-28 LAB — INR PPP: 1.8 (ref 2–3.5)

## 2025-07-28 PROCEDURE — 85610 PROTHROMBIN TIME: CPT | Performed by: STUDENT IN AN ORGANIZED HEALTH CARE EDUCATION/TRAINING PROGRAM

## 2025-07-28 PROCEDURE — 3078F DIAST BP <80 MM HG: CPT | Performed by: STUDENT IN AN ORGANIZED HEALTH CARE EDUCATION/TRAINING PROGRAM

## 2025-07-28 PROCEDURE — 99211 OFF/OP EST MAY X REQ PHY/QHP: CPT | Performed by: STUDENT IN AN ORGANIZED HEALTH CARE EDUCATION/TRAINING PROGRAM

## 2025-07-28 PROCEDURE — 3074F SYST BP LT 130 MM HG: CPT | Performed by: STUDENT IN AN ORGANIZED HEALTH CARE EDUCATION/TRAINING PROGRAM

## 2025-07-28 ASSESSMENT — FIBROSIS 4 INDEX: FIB4 SCORE: 6.81

## 2025-07-28 NOTE — PROGRESS NOTES
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]  Transient ischemic attack [G45.9] (Resolved) [G45.9]  Deep vein thrombosis (DVT) of both lower extremities (HCC) (Resolved) [I82.403]  DVT (deep venous thrombosis) (HCC) (Resolved) [I82.409]  Multiple pulmonary emboli (HCC) (Resolved) [I26.99]  Chronic anticoagulation (Resolved) [Z79.01]                 Anticoagulation Episode Summary       INR check location:  --    Preferred lab:  --    Send INR reminders to:  AMB ANTICOAG POOL    Comments:  --          Anticoagulation Care Providers       Provider Role Specialty Phone number    [facility]   [phone]    Edward Walters M.D.  Endocrinology [phone]                Refer to Patient Findings for HPI:  Patient Findings       Negatives:  Signs/symptoms of thrombosis, Signs/symptoms of bleeding, Laboratory test error suspected, Change in health, Change in alcohol use, Change in activity, Upcoming invasive procedure, Emergency department visit, Upcoming dental procedure, Missed doses, Extra doses, Change in medications, Change in diet/appetite, Hospital admission, Bruising, Other complaints          Clinical Outcomes       Negatives:  Major bleeding event, Thromboembolic event, Anticoagulation-related hospital admission, Anticoagulation-related ED visit, Anticoagulation-related fatality            Date Referral Placed: 25      Vitals:  Vitals:    25 1143   BP: 115/74   Pulse: 64   Weight: 73.2 kg (161 lb 6 oz)   Height: 1.727 m (5' 8\")       Verified current warfarin dosing " schedule.    Medications reconciled.  Pt is on antiplatelet therapy with aspirin 81mg for history of multiple thromboembolisms.      A/P   INR is subtherapeutic  Reason(s) for out of range INR today: No obvious causes        Warfarin dosing recommendation: Increase to Warfarin 5 mg on Thursday and then resume previous warfarin regimen.     Pt educated to contact our clinic with any changes in medications or s/s of bleeding or thrombosis. Pt is aware to seek immediate medical attention for falls, head injury or deep cuts.    Request pt to return in 2 week(s). Pt agrees.    Madhavi Elizabeth, ZayD

## 2025-08-07 ENCOUNTER — SPECIALTY PHARMACY (OUTPATIENT)
Dept: ENDOCRINOLOGY | Facility: MEDICAL CENTER | Age: 85
End: 2025-08-07

## 2025-08-07 ENCOUNTER — OFFICE VISIT (OUTPATIENT)
Dept: ENDOCRINOLOGY | Facility: MEDICAL CENTER | Age: 85
End: 2025-08-07
Attending: STUDENT IN AN ORGANIZED HEALTH CARE EDUCATION/TRAINING PROGRAM
Payer: MEDICARE

## 2025-08-07 VITALS
HEART RATE: 54 BPM | WEIGHT: 163 LBS | BODY MASS INDEX: 24.71 KG/M2 | HEIGHT: 68 IN | OXYGEN SATURATION: 98 % | DIASTOLIC BLOOD PRESSURE: 55 MMHG | SYSTOLIC BLOOD PRESSURE: 113 MMHG

## 2025-08-07 DIAGNOSIS — E11.65 TYPE 2 DIABETES MELLITUS WITH HYPERGLYCEMIA, WITHOUT LONG-TERM CURRENT USE OF INSULIN (HCC): Primary | ICD-10-CM

## 2025-08-07 DIAGNOSIS — E89.0 POSTOPERATIVE HYPOTHYROIDISM: ICD-10-CM

## 2025-08-07 LAB
HBA1C MFR BLD: 7.8 % (ref ?–5.8)
POCT INT CON NEG: NEGATIVE
POCT INT CON POS: POSITIVE

## 2025-08-07 PROCEDURE — 83036 HEMOGLOBIN GLYCOSYLATED A1C: CPT | Performed by: STUDENT IN AN ORGANIZED HEALTH CARE EDUCATION/TRAINING PROGRAM

## 2025-08-07 PROCEDURE — 3078F DIAST BP <80 MM HG: CPT | Performed by: STUDENT IN AN ORGANIZED HEALTH CARE EDUCATION/TRAINING PROGRAM

## 2025-08-07 PROCEDURE — 99214 OFFICE O/P EST MOD 30 MIN: CPT | Mod: 25 | Performed by: STUDENT IN AN ORGANIZED HEALTH CARE EDUCATION/TRAINING PROGRAM

## 2025-08-07 PROCEDURE — 95250 CONT GLUC MNTR PHYS/QHP EQP: CPT | Performed by: STUDENT IN AN ORGANIZED HEALTH CARE EDUCATION/TRAINING PROGRAM

## 2025-08-07 PROCEDURE — RXMED WILLOW AMBULATORY MEDICATION CHARGE: Performed by: STUDENT IN AN ORGANIZED HEALTH CARE EDUCATION/TRAINING PROGRAM

## 2025-08-07 PROCEDURE — 3074F SYST BP LT 130 MM HG: CPT | Performed by: STUDENT IN AN ORGANIZED HEALTH CARE EDUCATION/TRAINING PROGRAM

## 2025-08-07 PROCEDURE — 99213 OFFICE O/P EST LOW 20 MIN: CPT | Performed by: STUDENT IN AN ORGANIZED HEALTH CARE EDUCATION/TRAINING PROGRAM

## 2025-08-07 PROCEDURE — 95251 CONT GLUC MNTR ANALYSIS I&R: CPT | Performed by: STUDENT IN AN ORGANIZED HEALTH CARE EDUCATION/TRAINING PROGRAM

## 2025-08-07 RX ORDER — DULAGLUTIDE 0.75 MG/.5ML
1 INJECTION, SOLUTION SUBCUTANEOUS
Qty: 6 ML | Refills: 4 | Status: SHIPPED | OUTPATIENT
Start: 2025-08-07

## 2025-08-07 ASSESSMENT — FIBROSIS 4 INDEX: FIB4 SCORE: 6.81

## 2025-08-11 ENCOUNTER — PHARMACY VISIT (OUTPATIENT)
Dept: PHARMACY | Facility: MEDICAL CENTER | Age: 85
End: 2025-08-11
Payer: COMMERCIAL

## 2025-08-11 ENCOUNTER — ANTICOAGULATION VISIT (OUTPATIENT)
Dept: MEDICAL GROUP | Facility: MEDICAL CENTER | Age: 85
End: 2025-08-11
Payer: MEDICARE

## 2025-08-11 VITALS
HEART RATE: 52 BPM | BODY MASS INDEX: 24.19 KG/M2 | WEIGHT: 159.61 LBS | HEIGHT: 68 IN | SYSTOLIC BLOOD PRESSURE: 91 MMHG | DIASTOLIC BLOOD PRESSURE: 72 MMHG

## 2025-08-11 DIAGNOSIS — Z95.828 PRESENCE OF IVC FILTER: Primary | ICD-10-CM

## 2025-08-11 LAB — INR PPP: 1.8 (ref 2–3.5)

## 2025-08-11 PROCEDURE — 3078F DIAST BP <80 MM HG: CPT | Performed by: STUDENT IN AN ORGANIZED HEALTH CARE EDUCATION/TRAINING PROGRAM

## 2025-08-11 PROCEDURE — 85610 PROTHROMBIN TIME: CPT | Performed by: STUDENT IN AN ORGANIZED HEALTH CARE EDUCATION/TRAINING PROGRAM

## 2025-08-11 PROCEDURE — 99211 OFF/OP EST MAY X REQ PHY/QHP: CPT | Performed by: STUDENT IN AN ORGANIZED HEALTH CARE EDUCATION/TRAINING PROGRAM

## 2025-08-11 PROCEDURE — 3074F SYST BP LT 130 MM HG: CPT | Performed by: STUDENT IN AN ORGANIZED HEALTH CARE EDUCATION/TRAINING PROGRAM

## 2025-08-11 ASSESSMENT — FIBROSIS 4 INDEX: FIB4 SCORE: 6.81

## 2025-08-25 ENCOUNTER — HOSPITAL ENCOUNTER (OUTPATIENT)
Facility: MEDICAL CENTER | Age: 85
End: 2025-08-25
Attending: INTERNAL MEDICINE
Payer: MEDICARE

## 2025-08-25 ENCOUNTER — HOSPITAL ENCOUNTER (OUTPATIENT)
Facility: MEDICAL CENTER | Age: 85
End: 2025-08-25
Attending: STUDENT IN AN ORGANIZED HEALTH CARE EDUCATION/TRAINING PROGRAM
Payer: MEDICARE

## 2025-08-25 ENCOUNTER — ANTICOAGULATION VISIT (OUTPATIENT)
Dept: MEDICAL GROUP | Facility: MEDICAL CENTER | Age: 85
End: 2025-08-25
Payer: MEDICARE

## 2025-08-25 VITALS
HEART RATE: 69 BPM | SYSTOLIC BLOOD PRESSURE: 107 MMHG | HEIGHT: 68 IN | DIASTOLIC BLOOD PRESSURE: 53 MMHG | WEIGHT: 159 LBS | BODY MASS INDEX: 24.1 KG/M2

## 2025-08-25 DIAGNOSIS — E89.0 POSTOPERATIVE HYPOTHYROIDISM: ICD-10-CM

## 2025-08-25 DIAGNOSIS — Z95.828 PRESENCE OF IVC FILTER: Primary | ICD-10-CM

## 2025-08-25 DIAGNOSIS — I10 ESSENTIAL HYPERTENSION, BENIGN: ICD-10-CM

## 2025-08-25 DIAGNOSIS — N18.32 STAGE 3B CHRONIC KIDNEY DISEASE: ICD-10-CM

## 2025-08-25 DIAGNOSIS — E87.5 HYPERKALEMIA: ICD-10-CM

## 2025-08-25 LAB
ANION GAP SERPL CALC-SCNC: 11 MMOL/L (ref 7–16)
BUN SERPL-MCNC: 39 MG/DL (ref 8–22)
CALCIUM SERPL-MCNC: 9.3 MG/DL (ref 8.5–10.5)
CHLORIDE SERPL-SCNC: 109 MMOL/L (ref 96–112)
CO2 SERPL-SCNC: 22 MMOL/L (ref 20–33)
CREAT SERPL-MCNC: 1.94 MG/DL (ref 0.5–1.4)
CREAT UR-MCNC: 66.9 MG/DL
ERYTHROCYTE [DISTWIDTH] IN BLOOD BY AUTOMATED COUNT: 47.5 FL (ref 35.9–50)
GFR SERPLBLD CREATININE-BSD FMLA CKD-EPI: 33 ML/MIN/1.73 M 2
GLUCOSE SERPL-MCNC: 201 MG/DL (ref 65–99)
HCT VFR BLD AUTO: 42 % (ref 42–52)
HGB BLD-MCNC: 13.5 G/DL (ref 14–18)
INR PPP: 2.7 (ref 2–3.5)
MCH RBC QN AUTO: 31.8 PG (ref 27–33)
MCHC RBC AUTO-ENTMCNC: 32.1 G/DL (ref 32.3–36.5)
MCV RBC AUTO: 98.8 FL (ref 81.4–97.8)
MICROALBUMIN UR-MCNC: 1.7 MG/DL
MICROALBUMIN/CREAT UR: 25 MG/G (ref 0–30)
PLATELET # BLD AUTO: 103 K/UL (ref 164–446)
PMV BLD AUTO: 11.4 FL (ref 9–12.9)
POTASSIUM SERPL-SCNC: 5.1 MMOL/L (ref 3.6–5.5)
RBC # BLD AUTO: 4.25 M/UL (ref 4.7–6.1)
SODIUM SERPL-SCNC: 142 MMOL/L (ref 135–145)
T4 FREE SERPL-MCNC: 1.95 NG/DL (ref 0.93–1.7)
TSH SERPL DL<=0.005 MIU/L-ACNC: 0.05 UIU/ML (ref 0.38–5.33)
WBC # BLD AUTO: 5 K/UL (ref 4.8–10.8)

## 2025-08-25 PROCEDURE — 84443 ASSAY THYROID STIM HORMONE: CPT

## 2025-08-25 PROCEDURE — 3074F SYST BP LT 130 MM HG: CPT | Performed by: NURSE PRACTITIONER

## 2025-08-25 PROCEDURE — 93793 ANTICOAG MGMT PT WARFARIN: CPT | Performed by: NURSE PRACTITIONER

## 2025-08-25 PROCEDURE — 85027 COMPLETE CBC AUTOMATED: CPT

## 2025-08-25 PROCEDURE — 3078F DIAST BP <80 MM HG: CPT | Performed by: NURSE PRACTITIONER

## 2025-08-25 PROCEDURE — 84432 ASSAY OF THYROGLOBULIN: CPT

## 2025-08-25 PROCEDURE — 82570 ASSAY OF URINE CREATININE: CPT

## 2025-08-25 PROCEDURE — 82043 UR ALBUMIN QUANTITATIVE: CPT

## 2025-08-25 PROCEDURE — 80048 BASIC METABOLIC PNL TOTAL CA: CPT

## 2025-08-25 PROCEDURE — 84439 ASSAY OF FREE THYROXINE: CPT

## 2025-08-25 PROCEDURE — 86800 THYROGLOBULIN ANTIBODY: CPT

## 2025-08-25 PROCEDURE — 36415 COLL VENOUS BLD VENIPUNCTURE: CPT

## 2025-08-25 PROCEDURE — 85610 PROTHROMBIN TIME: CPT | Performed by: NURSE PRACTITIONER

## 2025-08-25 ASSESSMENT — FIBROSIS 4 INDEX: FIB4 SCORE: 6.75

## 2025-08-26 LAB
THYROGLOB AB SERPL-ACNC: <1.5 IU/ML (ref 0–4)
THYROGLOB SERPL-MCNC: 0.3 NG/ML (ref 1.3–31.8)
THYROGLOB SERPL-MCNC: ABNORMAL NG/ML (ref 1.3–31.8)

## 2025-09-03 ENCOUNTER — APPOINTMENT (OUTPATIENT)
Dept: NEPHROLOGY | Facility: MEDICAL CENTER | Age: 85
End: 2025-09-03
Payer: MEDICARE